# Patient Record
Sex: FEMALE | Race: WHITE | NOT HISPANIC OR LATINO | Employment: OTHER | URBAN - METROPOLITAN AREA
[De-identification: names, ages, dates, MRNs, and addresses within clinical notes are randomized per-mention and may not be internally consistent; named-entity substitution may affect disease eponyms.]

---

## 2017-01-15 RX ORDER — FUROSEMIDE 40 MG/1
TABLET ORAL
Qty: 30 TABLET | Refills: 10 | Status: SHIPPED | OUTPATIENT
Start: 2017-01-15 | End: 2021-07-31

## 2017-03-02 ENCOUNTER — PATIENT OUTREACH (OUTPATIENT)
Dept: ADMINISTRATIVE | Facility: HOSPITAL | Age: 57
End: 2017-03-02

## 2017-03-02 NOTE — PROGRESS NOTES
Spoke with Ms Bui to assist with scheduling mammo. States she has changed pcp. Chart updated and appt cancelled per request.

## 2017-08-29 ENCOUNTER — HOSPITAL ENCOUNTER (EMERGENCY)
Facility: HOSPITAL | Age: 57
Discharge: HOME | End: 2017-08-29
Attending: EMERGENCY MEDICINE
Payer: MEDICARE

## 2017-08-29 VITALS
HEIGHT: 63 IN | OXYGEN SATURATION: 100 % | SYSTOLIC BLOOD PRESSURE: 160 MMHG | WEIGHT: 250 LBS | TEMPERATURE: 98 F | BODY MASS INDEX: 44.3 KG/M2 | HEART RATE: 79 BPM | DIASTOLIC BLOOD PRESSURE: 92 MMHG | RESPIRATION RATE: 18 BRPM

## 2017-08-29 DIAGNOSIS — N39.0 ACUTE UTI: ICD-10-CM

## 2017-08-29 DIAGNOSIS — R11.2 NAUSEA VOMITING AND DIARRHEA: Primary | ICD-10-CM

## 2017-08-29 DIAGNOSIS — R19.7 NAUSEA VOMITING AND DIARRHEA: Primary | ICD-10-CM

## 2017-08-29 LAB
ALBUMIN SERPL BCP-MCNC: 3.9 G/DL
ALP SERPL-CCNC: 101 U/L
ALT SERPL W/O P-5'-P-CCNC: 15 U/L
ANION GAP SERPL CALC-SCNC: 16 MMOL/L
AST SERPL-CCNC: 24 U/L
BACTERIA #/AREA URNS HPF: ABNORMAL /HPF
BASOPHILS # BLD AUTO: 0.01 K/UL
BASOPHILS NFR BLD: 0.1 %
BILIRUB SERPL-MCNC: 0.9 MG/DL
BILIRUB UR QL STRIP: NEGATIVE
BUN SERPL-MCNC: 12 MG/DL
CALCIUM SERPL-MCNC: 8.3 MG/DL
CHLORIDE SERPL-SCNC: 89 MMOL/L
CLARITY UR: ABNORMAL
CO2 SERPL-SCNC: 21 MMOL/L
COLOR UR: YELLOW
CREAT SERPL-MCNC: 0.9 MG/DL
DIFFERENTIAL METHOD: ABNORMAL
EOSINOPHIL # BLD AUTO: 0 K/UL
EOSINOPHIL NFR BLD: 0 %
ERYTHROCYTE [DISTWIDTH] IN BLOOD BY AUTOMATED COUNT: 13.8 %
EST. GFR  (AFRICAN AMERICAN): >60 ML/MIN/1.73 M^2
EST. GFR  (NON AFRICAN AMERICAN): >60 ML/MIN/1.73 M^2
GLUCOSE SERPL-MCNC: 160 MG/DL
GLUCOSE UR QL STRIP: NEGATIVE
HCT VFR BLD AUTO: 39.6 %
HGB BLD-MCNC: 13.4 G/DL
HGB UR QL STRIP: ABNORMAL
HYALINE CASTS #/AREA URNS LPF: 0 /LPF
KETONES UR QL STRIP: NEGATIVE
LEUKOCYTE ESTERASE UR QL STRIP: ABNORMAL
LIPASE SERPL-CCNC: <3 U/L
LYMPHOCYTES # BLD AUTO: 1.1 K/UL
LYMPHOCYTES NFR BLD: 16.2 %
MCH RBC QN AUTO: 27.5 PG
MCHC RBC AUTO-ENTMCNC: 33.8 G/DL
MCV RBC AUTO: 81 FL
MICROSCOPIC COMMENT: ABNORMAL
MONOCYTES # BLD AUTO: 0.5 K/UL
MONOCYTES NFR BLD: 7.5 %
NEUTROPHILS # BLD AUTO: 5.3 K/UL
NEUTROPHILS NFR BLD: 76.2 %
NITRITE UR QL STRIP: NEGATIVE
PH UR STRIP: 7 [PH] (ref 5–8)
PLATELET # BLD AUTO: 250 K/UL
PMV BLD AUTO: 8.1 FL
POTASSIUM SERPL-SCNC: 3.6 MMOL/L
PROT SERPL-MCNC: 8.3 G/DL
PROT UR QL STRIP: ABNORMAL
RBC # BLD AUTO: 4.88 M/UL
RBC #/AREA URNS HPF: 0 /HPF (ref 0–4)
SODIUM SERPL-SCNC: 126 MMOL/L
SP GR UR STRIP: 1.01 (ref 1–1.03)
SQUAMOUS #/AREA URNS HPF: 10 /HPF
URN SPEC COLLECT METH UR: ABNORMAL
UROBILINOGEN UR STRIP-ACNC: ABNORMAL EU/DL
WBC # BLD AUTO: 6.92 K/UL
WBC #/AREA URNS HPF: >100 /HPF (ref 0–5)
WBC CLUMPS URNS QL MICRO: ABNORMAL

## 2017-08-29 PROCEDURE — 81000 URINALYSIS NONAUTO W/SCOPE: CPT

## 2017-08-29 PROCEDURE — 25000003 PHARM REV CODE 250: Performed by: EMERGENCY MEDICINE

## 2017-08-29 PROCEDURE — 99284 EMERGENCY DEPT VISIT MOD MDM: CPT | Mod: 25

## 2017-08-29 PROCEDURE — 96365 THER/PROPH/DIAG IV INF INIT: CPT

## 2017-08-29 PROCEDURE — 83690 ASSAY OF LIPASE: CPT

## 2017-08-29 PROCEDURE — 25500020 PHARM REV CODE 255: Performed by: EMERGENCY MEDICINE

## 2017-08-29 PROCEDURE — 96361 HYDRATE IV INFUSION ADD-ON: CPT

## 2017-08-29 PROCEDURE — 85025 COMPLETE CBC W/AUTO DIFF WBC: CPT

## 2017-08-29 PROCEDURE — 63600175 PHARM REV CODE 636 W HCPCS: Performed by: EMERGENCY MEDICINE

## 2017-08-29 PROCEDURE — 80053 COMPREHEN METABOLIC PANEL: CPT

## 2017-08-29 RX ORDER — ONDANSETRON 2 MG/ML
4 INJECTION INTRAMUSCULAR; INTRAVENOUS
Status: COMPLETED | OUTPATIENT
Start: 2017-08-29 | End: 2017-08-29

## 2017-08-29 RX ORDER — METOCLOPRAMIDE 10 MG/1
10 TABLET ORAL EVERY 6 HOURS
Qty: 18 TABLET | Refills: 0 | Status: SHIPPED | OUTPATIENT
Start: 2017-08-29 | End: 2020-06-14 | Stop reason: SDUPTHER

## 2017-08-29 RX ORDER — MORPHINE SULFATE 4 MG/ML
4 INJECTION, SOLUTION INTRAMUSCULAR; INTRAVENOUS
Status: COMPLETED | OUTPATIENT
Start: 2017-08-29 | End: 2017-08-29

## 2017-08-29 RX ORDER — CIPROFLOXACIN 500 MG/1
500 TABLET ORAL 2 TIMES DAILY
Qty: 20 TABLET | Refills: 0 | Status: SHIPPED | OUTPATIENT
Start: 2017-08-29 | End: 2017-09-08

## 2017-08-29 RX ADMIN — SODIUM CHLORIDE 1000 ML: 0.9 INJECTION, SOLUTION INTRAVENOUS at 11:08

## 2017-08-29 RX ADMIN — ONDANSETRON 4 MG: 2 INJECTION INTRAMUSCULAR; INTRAVENOUS at 02:08

## 2017-08-29 RX ADMIN — MORPHINE SULFATE 4 MG: 4 INJECTION, SOLUTION INTRAMUSCULAR; INTRAVENOUS at 02:08

## 2017-08-29 RX ADMIN — ONDANSETRON 4 MG: 2 INJECTION INTRAMUSCULAR; INTRAVENOUS at 01:08

## 2017-08-29 RX ADMIN — IOHEXOL 75 ML: 350 INJECTION, SOLUTION INTRAVENOUS at 01:08

## 2017-08-29 RX ADMIN — MORPHINE SULFATE 4 MG: 4 INJECTION, SOLUTION INTRAMUSCULAR; INTRAVENOUS at 12:08

## 2017-08-29 RX ADMIN — CEFTRIAXONE 1 G: 1 INJECTION, SOLUTION INTRAVENOUS at 02:08

## 2017-08-29 RX ADMIN — ONDANSETRON 4 MG: 2 INJECTION INTRAMUSCULAR; INTRAVENOUS at 12:08

## 2017-08-29 NOTE — ED PROVIDER NOTES
"SCRIBE #1 NOTE: I, Aries Emily, am scribing for, and in the presence of, Juanita Pagan MD. I have scribed the entire note.      History      Chief Complaint   Patient presents with    Emesis     with diarrhea. RLQ pain since yesterday.        Review of patient's allergies indicates:   Allergen Reactions    Flagyl [metronidazole hcl]      Mouth ulcers developed with Flagyl        HPI   HPI    8/29/2017, 11:58 AM   History obtained from the patient      History of Present Illness: Divya Bui is a 57 y.o. female patient who presents to the Emergency Department for N/V which onset suddenly 3 days ago. Symptoms are episodic and moderate in severity. Sx are exacerbated when trying to tolerate PO and relieved by nothing. Associated sxs include diarrhea and RUQ abd pain. Pt reports last episode of diarrhea yesterday and reports only one episode yesterday. Pt states she reported to urgent care 2 days ago and was prescribed phenergan, bentyl, and Zofran but pt reports no relief from the medications and states "I can't even keep my medications down." No other sxs reported. Patient denies any fever, chills, constipation, dysuria, difficulty urinating, hematuria, CP, SOB and all other sxs at this time. No further complaints or concerns at this time.     Arrival mode: Personal vehicle     PCP: Lucio Salas MD       Past Medical History:  Past Medical History:   Diagnosis Date    Anemia     Anxiety     Blood transfusion     Bowel incontinence     Depression     Esophageal stricture     GERD (gastroesophageal reflux disease)     Hypertension     Latent tuberculosis by skin test 2001    took INH    Liver nodule 1/6/2014    Morbid obesity with BMI of 45.0-49.9, adult     OA (osteoarthritis) of knee 12/12/2014    Pericardial effusion 9/4/2014       Past Surgical History:  Past Surgical History:   Procedure Laterality Date    CHOLECYSTECTOMY      GASTRIC BYPASS      HERNIA REPAIR      right sholder " surgery      SMALL INTESTINE SURGERY           Family History:  Family History   Problem Relation Age of Onset    Liver cancer Father     Diabetes Sister     Crohn's disease Sister     Liver cancer Sister     Crohn's disease Brother     Crohn's disease Other     Lung cancer Mother        Social History:  Social History     Social History Main Topics    Smoking status: Never Smoker    Smokeless tobacco: Never Used    Alcohol use No    Drug use: No    Sexual activity: Yes     Partners: Male       ROS   Review of Systems   Constitutional: Negative for chills and fever.   HENT: Negative for congestion and sore throat.    Respiratory: Negative for cough, chest tightness and shortness of breath.    Cardiovascular: Negative for chest pain.   Gastrointestinal: Positive for abdominal pain, diarrhea, nausea and vomiting. Negative for constipation.   Genitourinary: Negative for difficulty urinating and dysuria.   Musculoskeletal: Negative for back pain and neck pain.   Skin: Negative for rash.   Neurological: Negative for dizziness, weakness, light-headedness, numbness and headaches.   Psychiatric/Behavioral: Negative for agitation and confusion.   All other systems reviewed and are negative.      Physical Exam      Initial Vitals [08/29/17 1137]   BP Pulse Resp Temp SpO2   (!) 182/100 90 18 98.8 °F (37.1 °C) 100 %      MAP       127.33          Physical Exam  Nursing Notes and Vital Signs Reviewed.  Constitutional: Patient is in no apparent distress. Well-developed and well-nourished. Obese.   Head: Atraumatic. Normocephalic.  Eyes: PERRL. EOM intact. Conjunctivae are not pale. No scleral icterus.  ENT: Mucous membranes are moist. Oropharynx is clear and symmetric.    Neck: Supple. Full ROM. No lymphadenopathy.  Cardiovascular: Regular rate. Regular rhythm. No murmurs, rubs, or gallops. Distal pulses are 2+ and symmetric.  Pulmonary/Chest: No respiratory distress. Clear to auscultation bilaterally. No wheezing,  "rales, or rhonchi.  Abdominal: Soft and non-distended.  There is RUQ tenderness.  No rebound, guarding, or rigidity. Good bowel sounds.  Musculoskeletal: Moves all extremities. No obvious deformities. No edema. No calf tenderness.  Skin: Warm and dry.  Neurological:  Alert, awake, and appropriate.  Normal speech.  No acute focal neurological deficits are appreciated.  Psychiatric: Normal affect. Good eye contact. Appropriate in content.    ED Course    Procedures  ED Vital Signs:  Vitals:    08/29/17 1137 08/29/17 1301 08/29/17 1345   BP: (!) 182/100  (!) 147/62   Pulse: 90 86 82   Resp: 18     Temp: 98.8 °F (37.1 °C)     TempSrc: Oral     SpO2: 100% 100% 99%   Weight: 113.4 kg (250 lb)     Height: 5' 3" (1.6 m)         Abnormal Lab Results:  Labs Reviewed   CBC W/ AUTO DIFFERENTIAL - Abnormal; Notable for the following:        Result Value    MCV 81 (*)     MPV 8.1 (*)     Gran% 76.2 (*)     Lymph% 16.2 (*)     All other components within normal limits   COMPREHENSIVE METABOLIC PANEL - Abnormal; Notable for the following:     Sodium 126 (*)     Chloride 89 (*)     CO2 21 (*)     Glucose 160 (*)     Calcium 8.3 (*)     All other components within normal limits   LIPASE - Abnormal; Notable for the following:     Lipase <3 (*)     All other components within normal limits   URINALYSIS - Abnormal; Notable for the following:     Appearance, UA Hazy (*)     Protein, UA 2+ (*)     Occult Blood UA 2+ (*)     Urobilinogen, UA 4.0-6.0 (*)     Leukocytes, UA 1+ (*)     All other components within normal limits   URINALYSIS MICROSCOPIC - Abnormal; Notable for the following:     WBC, UA >100 (*)     WBC Clumps, UA Moderate (*)     Bacteria, UA Many (*)     All other components within normal limits        All Lab Results:  Results for orders placed or performed during the hospital encounter of 08/29/17   CBC W/ AUTO DIFFERENTIAL   Result Value Ref Range    WBC 6.92 3.90 - 12.70 K/uL    RBC 4.88 4.00 - 5.40 M/uL    Hemoglobin " 13.4 12.0 - 16.0 g/dL    Hematocrit 39.6 37.0 - 48.5 %    MCV 81 (L) 82 - 98 fL    MCH 27.5 27.0 - 31.0 pg    MCHC 33.8 32.0 - 36.0 g/dL    RDW 13.8 11.5 - 14.5 %    Platelets 250 150 - 350 K/uL    MPV 8.1 (L) 9.2 - 12.9 fL    Gran # 5.3 1.8 - 7.7 K/uL    Lymph # 1.1 1.0 - 4.8 K/uL    Mono # 0.5 0.3 - 1.0 K/uL    Eos # 0.0 0.0 - 0.5 K/uL    Baso # 0.01 0.00 - 0.20 K/uL    Gran% 76.2 (H) 38.0 - 73.0 %    Lymph% 16.2 (L) 18.0 - 48.0 %    Mono% 7.5 4.0 - 15.0 %    Eosinophil% 0.0 0.0 - 8.0 %    Basophil% 0.1 0.0 - 1.9 %    Differential Method Automated    Comp. Metabolic Panel   Result Value Ref Range    Sodium 126 (L) 136 - 145 mmol/L    Potassium 3.6 3.5 - 5.1 mmol/L    Chloride 89 (L) 95 - 110 mmol/L    CO2 21 (L) 23 - 29 mmol/L    Glucose 160 (H) 70 - 110 mg/dL    BUN, Bld 12 6 - 20 mg/dL    Creatinine 0.9 0.5 - 1.4 mg/dL    Calcium 8.3 (L) 8.7 - 10.5 mg/dL    Total Protein 8.3 6.0 - 8.4 g/dL    Albumin 3.9 3.5 - 5.2 g/dL    Total Bilirubin 0.9 0.1 - 1.0 mg/dL    Alkaline Phosphatase 101 55 - 135 U/L    AST 24 10 - 40 U/L    ALT 15 10 - 44 U/L    Anion Gap 16 8 - 16 mmol/L    eGFR if African American >60 >60 mL/min/1.73 m^2    eGFR if non African American >60 >60 mL/min/1.73 m^2   Lipase   Result Value Ref Range    Lipase <3 (L) 4 - 60 U/L   Urinalysis - Clean Catch   Result Value Ref Range    Specimen UA Urine, Clean Catch     Color, UA Yellow Yellow, Straw, Debra    Appearance, UA Hazy (A) Clear    pH, UA 7.0 5.0 - 8.0    Specific Gravity, UA 1.015 1.005 - 1.030    Protein, UA 2+ (A) Negative    Glucose, UA Negative Negative    Ketones, UA Negative Negative    Bilirubin (UA) Negative Negative    Occult Blood UA 2+ (A) Negative    Nitrite, UA Negative Negative    Urobilinogen, UA 4.0-6.0 (A) <2.0 EU/dL    Leukocytes, UA 1+ (A) Negative   Urinalysis Microscopic   Result Value Ref Range    RBC, UA 0 0 - 4 /hpf    WBC, UA >100 (H) 0 - 5 /hpf    WBC Clumps, UA Moderate (A) None-Rare    Bacteria, UA Many (A) None-Occ  /hpf    Squam Epithel, UA 10 /hpf    Hyaline Casts, UA 0 0-1/lpf /lpf    Microscopic Comment SEE COMMENT          Imaging Results:  Imaging Results          CT Abdomen Pelvis With Contrast (Final result)  Result time 08/29/17 13:38:22    Final result by Rukhsana Adair MD (08/29/17 13:38:22)                 Impression:        No acute intra-abdominal process.    Postoperative changes of the stomach with hiatal hernia.  Status post cholecystectomy.  Postoperative changes with sutures of the bowel loops identified.    Fatty infiltration of the pancreas    Diverticulosis without evidence of acute diverticulitis.      All CT scans at this facility use dose modulation, iterative reconstruction and/or weight based dosing when appropriate to reduce radiation dose to as low as reasonably achievable.       Electronically signed by: RUKHSANA ADAIR MD  Date:     08/29/17  Time:    13:38              Narrative:    CT ABDOMEN PELVIS WITH CONTRAST     Date:  08/29/17 13:09:43    History:   Right upper quadrant pain.     Technique: Procedure performed with 75ml Omnipaque 350.     Comparison: 07/06/2015.    Findings:        CT scan of the Abdomen with contrast:      Lung bases are clear.  There are postoperative changes of the stomach with a moderate size hiatal hernia.    The gallbladder is absent with clips in the gallbladder fossa.  The common bile duct is prominent in caliber at approximately 11 mm.  The liver is homogeneous.    The spleen is nonenlarged.    Adrenal glands and kidneys are normal.  Fatty infiltration with atrophy of the pancreas is noted.    The bowel loops are nondilated.  The appendix in the right abdomen appears normal.  The large bowel loops are nondilated.  Several scattered diverticula are seen throughout the left colon and sigmoid colon.    No ascites or fluid collection.    Ct of the Pelvis with contrast:     No free fluid, masses or inflammation.                                      The Emergency  Provider reviewed the vital signs and test results, which are outlined above.    ED Discussion     1:49 PM: Re-evaluated pt. Pt is resting comfortably and is in no acute distress.  Pt states she is still having pain and is still nauseated.  D/w pt all pertinent results. D/w pt any concerns expressed at this time. Answered all questions. Pt expresses understanding at this time.    2:17 PM: Reassessed pt at this time.  Pt states her condition has improved at this time and she is feeling better. Pt is laying comfortably in ED bed and in NAD. Pt is awake, alert, and oriented. Discussed with pt all pertinent ED information and results. Discussed pt dx and plan of tx. Gave pt all f/u and return to the ED instructions. All questions and concerns were addressed at this time. Pt expresses understanding of information and instructions, and is comfortable with plan to discharge. Pt is stable for discharge.    I discussed with patient and/or family/caretaker that evaluation in the ED does not suggest any emergent or life threatening medical conditions requiring immediate intervention beyond what was provided in the ED, and I believe patient is safe for discharge.  Regardless, an unremarkable evaluation in the ED does not preclude the development or presence of a serious of life threatening condition. As such, patient was instructed to return immediately for any worsening or change in current symptoms.      ED Medication(s):  Medications   cefTRIAXone (ROCEPHIN) 1 g in dextrose 5 % 50 mL IVPB (1 g Intravenous New Bag 8/29/17 1426)   ondansetron injection 4 mg (4 mg Intravenous Given 8/29/17 1211)   sodium chloride 0.9% bolus 1,000 mL (1,000 mLs Intravenous New Bag 8/29/17 1159)   morphine injection 4 mg (4 mg Intravenous Given 8/29/17 1212)   ondansetron injection 4 mg (4 mg Intravenous Given 8/29/17 1327)   omnipaque 350 iohexol 75 mL (75 mLs Intravenous Given 8/29/17 1320)   morphine injection 4 mg (4 mg Intravenous Given  8/29/17 1418)   ondansetron injection 4 mg (4 mg Intravenous Given 8/29/17 1418)       New Prescriptions    CIPROFLOXACIN HCL (CIPRO) 500 MG TABLET    Take 1 tablet (500 mg total) by mouth 2 (two) times daily.    METOCLOPRAMIDE HCL (REGLAN) 10 MG TABLET    Take 1 tablet (10 mg total) by mouth every 6 (six) hours.       Follow-up Information     Lucio Salas MD In 2 days.    Specialty:  Internal Medicine  Contact information:  [address]  [facility]  [place] [address] [phone]                     Medical Decision Making    Medical Decision Making:   Clinical Tests:   Lab Tests: Ordered and Reviewed  Radiological Study: Ordered and Reviewed           Scribe Attestation:   Scribe #1: I performed the above scribed service and the documentation accurately describes the services I performed. I attest to the accuracy of the note.    Attending:   Physician Attestation Statement for Scribe #1: I, Juanita Pagan MD, personally performed the services described in this documentation, as scribed by Aries Diaz, in my presence, and it is both accurate and complete.          Clinical Impression       ICD-10-CM ICD-9-CM   1. Nausea vomiting and diarrhea R11.2 787.91    R19.7 787.01   2. Acute UTI N39.0 599.0       Disposition:   Disposition: Discharged  Condition: Stable         Juanita Pagan MD  08/29/17 [id number]

## 2018-05-10 ENCOUNTER — HOSPITAL ENCOUNTER (OUTPATIENT)
Facility: HOSPITAL | Age: 58
Discharge: HOME | End: 2018-05-12
Attending: EMERGENCY MEDICINE | Admitting: HOSPITALIST
Payer: MEDICARE

## 2018-05-10 DIAGNOSIS — R11.2 INTRACTABLE VOMITING WITH NAUSEA, UNSPECIFIED VOMITING TYPE: Primary | ICD-10-CM

## 2018-05-10 DIAGNOSIS — K44.9 HIATAL HERNIA: ICD-10-CM

## 2018-05-10 DIAGNOSIS — K52.9 GASTROENTERITIS: ICD-10-CM

## 2018-05-10 DIAGNOSIS — K64.4 EXTERNAL HEMORRHOIDS: ICD-10-CM

## 2018-05-10 DIAGNOSIS — R10.9 ABDOMINAL PAIN: ICD-10-CM

## 2018-05-10 DIAGNOSIS — K57.90 DIVERTICULOSIS OF INTESTINE WITHOUT BLEEDING, UNSPECIFIED INTESTINAL TRACT LOCATION: ICD-10-CM

## 2018-05-10 DIAGNOSIS — R11.10 EMESIS: ICD-10-CM

## 2018-05-10 LAB
ALBUMIN SERPL BCP-MCNC: 3.6 G/DL
ALP SERPL-CCNC: 93 U/L
ALT SERPL W/O P-5'-P-CCNC: 8 U/L
ANION GAP SERPL CALC-SCNC: 13 MMOL/L
AST SERPL-CCNC: 11 U/L
BASOPHILS # BLD AUTO: 0.02 K/UL
BASOPHILS NFR BLD: 0.2 %
BILIRUB SERPL-MCNC: 0.3 MG/DL
BILIRUB UR QL STRIP: NEGATIVE
BUN SERPL-MCNC: 9 MG/DL
CALCIUM SERPL-MCNC: 8.8 MG/DL
CHLORIDE SERPL-SCNC: 103 MMOL/L
CLARITY UR: CLEAR
CO2 SERPL-SCNC: 22 MMOL/L
COLOR UR: YELLOW
CREAT SERPL-MCNC: 0.8 MG/DL
DIFFERENTIAL METHOD: ABNORMAL
EOSINOPHIL # BLD AUTO: 0.3 K/UL
EOSINOPHIL NFR BLD: 2.9 %
ERYTHROCYTE [DISTWIDTH] IN BLOOD BY AUTOMATED COUNT: 15.3 %
EST. GFR  (AFRICAN AMERICAN): >60 ML/MIN/1.73 M^2
EST. GFR  (NON AFRICAN AMERICAN): >60 ML/MIN/1.73 M^2
GLUCOSE SERPL-MCNC: 131 MG/DL
GLUCOSE UR QL STRIP: NEGATIVE
HCT VFR BLD AUTO: 35 %
HGB BLD-MCNC: 11 G/DL
HGB UR QL STRIP: ABNORMAL
KETONES UR QL STRIP: NEGATIVE
LEUKOCYTE ESTERASE UR QL STRIP: NEGATIVE
LIPASE SERPL-CCNC: <4 U/L
LYMPHOCYTES # BLD AUTO: 1.4 K/UL
LYMPHOCYTES NFR BLD: 15.6 %
MCH RBC QN AUTO: 25.3 PG
MCHC RBC AUTO-ENTMCNC: 31.4 G/DL
MCV RBC AUTO: 81 FL
MONOCYTES # BLD AUTO: 0.4 K/UL
MONOCYTES NFR BLD: 4.7 %
NEUTROPHILS # BLD AUTO: 6.8 K/UL
NEUTROPHILS NFR BLD: 76.6 %
NITRITE UR QL STRIP: NEGATIVE
OB PNL STL: NEGATIVE
PH UR STRIP: 7 [PH] (ref 5–8)
PLATELET # BLD AUTO: 244 K/UL
PMV BLD AUTO: 9.2 FL
POTASSIUM SERPL-SCNC: 3.6 MMOL/L
PROT SERPL-MCNC: 7.4 G/DL
PROT UR QL STRIP: ABNORMAL
RBC # BLD AUTO: 4.35 M/UL
SODIUM SERPL-SCNC: 138 MMOL/L
SP GR UR STRIP: 1.02 (ref 1–1.03)
URN SPEC COLLECT METH UR: ABNORMAL
UROBILINOGEN UR STRIP-ACNC: NEGATIVE EU/DL
WBC # BLD AUTO: 8.85 K/UL

## 2018-05-10 PROCEDURE — 99285 EMERGENCY DEPT VISIT HI MDM: CPT | Mod: 25

## 2018-05-10 PROCEDURE — 96361 HYDRATE IV INFUSION ADD-ON: CPT

## 2018-05-10 PROCEDURE — 83735 ASSAY OF MAGNESIUM: CPT

## 2018-05-10 PROCEDURE — 83690 ASSAY OF LIPASE: CPT

## 2018-05-10 PROCEDURE — 25000003 PHARM REV CODE 250: Performed by: EMERGENCY MEDICINE

## 2018-05-10 PROCEDURE — 63600175 PHARM REV CODE 636 W HCPCS: Performed by: EMERGENCY MEDICINE

## 2018-05-10 PROCEDURE — 93005 ELECTROCARDIOGRAM TRACING: CPT

## 2018-05-10 PROCEDURE — 82272 OCCULT BLD FECES 1-3 TESTS: CPT

## 2018-05-10 PROCEDURE — 84484 ASSAY OF TROPONIN QUANT: CPT

## 2018-05-10 PROCEDURE — 96374 THER/PROPH/DIAG INJ IV PUSH: CPT

## 2018-05-10 PROCEDURE — 85025 COMPLETE CBC W/AUTO DIFF WBC: CPT

## 2018-05-10 PROCEDURE — 96375 TX/PRO/DX INJ NEW DRUG ADDON: CPT

## 2018-05-10 PROCEDURE — 25500020 PHARM REV CODE 255: Performed by: EMERGENCY MEDICINE

## 2018-05-10 PROCEDURE — 84100 ASSAY OF PHOSPHORUS: CPT

## 2018-05-10 PROCEDURE — 80053 COMPREHEN METABOLIC PANEL: CPT

## 2018-05-10 PROCEDURE — 81003 URINALYSIS AUTO W/O SCOPE: CPT

## 2018-05-10 RX ORDER — ONDANSETRON 2 MG/ML
4 INJECTION INTRAMUSCULAR; INTRAVENOUS
Status: COMPLETED | OUTPATIENT
Start: 2018-05-10 | End: 2018-05-10

## 2018-05-10 RX ORDER — FENTANYL CITRATE 50 UG/ML
50 INJECTION, SOLUTION INTRAMUSCULAR; INTRAVENOUS
Status: COMPLETED | OUTPATIENT
Start: 2018-05-10 | End: 2018-05-10

## 2018-05-10 RX ADMIN — IOHEXOL 75 ML: 350 INJECTION, SOLUTION INTRAVENOUS at 10:05

## 2018-05-10 RX ADMIN — ONDANSETRON 4 MG: 2 INJECTION INTRAMUSCULAR; INTRAVENOUS at 09:05

## 2018-05-10 RX ADMIN — SODIUM CHLORIDE 1000 ML: 0.9 INJECTION, SOLUTION INTRAVENOUS at 09:05

## 2018-05-10 RX ADMIN — FENTANYL CITRATE 50 MCG: 50 INJECTION, SOLUTION INTRAMUSCULAR; INTRAVENOUS at 09:05

## 2018-05-10 RX ADMIN — PROMETHAZINE HYDROCHLORIDE 12.5 MG: 25 INJECTION INTRAMUSCULAR; INTRAVENOUS at 10:05

## 2018-05-11 PROBLEM — K52.9 GASTROENTERITIS: Status: ACTIVE | Noted: 2018-05-11

## 2018-05-11 PROBLEM — K64.4 EXTERNAL HEMORRHOIDS: Status: ACTIVE | Noted: 2018-05-11

## 2018-05-11 PROBLEM — R11.2 INTRACTABLE VOMITING WITH NAUSEA: Status: ACTIVE | Noted: 2018-05-11

## 2018-05-11 PROBLEM — R10.9 ABDOMINAL PAIN: Status: ACTIVE | Noted: 2018-05-11

## 2018-05-11 PROBLEM — K44.9 HIATAL HERNIA: Status: ACTIVE | Noted: 2018-05-11

## 2018-05-11 PROBLEM — K57.90 DIVERTICULOSIS OF INTESTINE WITHOUT BLEEDING: Status: ACTIVE | Noted: 2018-05-11

## 2018-05-11 LAB
ANION GAP SERPL CALC-SCNC: 9 MMOL/L
BASOPHILS # BLD AUTO: 0.01 K/UL
BASOPHILS NFR BLD: 0.1 %
BUN SERPL-MCNC: 5 MG/DL
CALCIUM SERPL-MCNC: 8 MG/DL
CHLORIDE SERPL-SCNC: 103 MMOL/L
CHOLEST SERPL-MCNC: 189 MG/DL
CHOLEST/HDLC SERPL: 2.7 {RATIO}
CO2 SERPL-SCNC: 24 MMOL/L
CREAT SERPL-MCNC: 0.7 MG/DL
CRP SERPL-MCNC: 9.2 MG/L
DIFFERENTIAL METHOD: ABNORMAL
EOSINOPHIL # BLD AUTO: 0 K/UL
EOSINOPHIL NFR BLD: 0.1 %
ERYTHROCYTE [DISTWIDTH] IN BLOOD BY AUTOMATED COUNT: 15.5 %
ERYTHROCYTE [SEDIMENTATION RATE] IN BLOOD BY WESTERGREN METHOD: 49 MM/HR
EST. GFR  (AFRICAN AMERICAN): >60 ML/MIN/1.73 M^2
EST. GFR  (NON AFRICAN AMERICAN): >60 ML/MIN/1.73 M^2
ESTIMATED AVG GLUCOSE: 126 MG/DL
FERRITIN SERPL-MCNC: 12 NG/ML
FOLATE SERPL-MCNC: 12.6 NG/ML
GLUCOSE SERPL-MCNC: 166 MG/DL
HBA1C MFR BLD HPLC: 6 %
HCT VFR BLD AUTO: 33.4 %
HDLC SERPL-MCNC: 71 MG/DL
HDLC SERPL: 37.6 %
HGB BLD-MCNC: 10.5 G/DL
IRON SERPL-MCNC: 31 UG/DL
LACTATE SERPL-SCNC: 1.3 MMOL/L
LDLC SERPL CALC-MCNC: 104.8 MG/DL
LYMPHOCYTES # BLD AUTO: 0.9 K/UL
LYMPHOCYTES NFR BLD: 11.2 %
MAGNESIUM SERPL-MCNC: 1.6 MG/DL
MAGNESIUM SERPL-MCNC: 1.8 MG/DL
MCH RBC QN AUTO: 25.4 PG
MCHC RBC AUTO-ENTMCNC: 31.4 G/DL
MCV RBC AUTO: 81 FL
MONOCYTES # BLD AUTO: 0.3 K/UL
MONOCYTES NFR BLD: 4 %
NEUTROPHILS # BLD AUTO: 6.5 K/UL
NEUTROPHILS NFR BLD: 84.6 %
NONHDLC SERPL-MCNC: 118 MG/DL
PHOSPHATE SERPL-MCNC: 3.3 MG/DL
PHOSPHATE SERPL-MCNC: 4.1 MG/DL
PLATELET # BLD AUTO: 246 K/UL
PMV BLD AUTO: 8.2 FL
POTASSIUM SERPL-SCNC: 3.5 MMOL/L
PROCALCITONIN SERPL IA-MCNC: <0.02 NG/ML
RBC # BLD AUTO: 4.14 M/UL
SATURATED IRON: 7 %
SODIUM SERPL-SCNC: 136 MMOL/L
TOTAL IRON BINDING CAPACITY: 437 UG/DL
TRANSFERRIN SERPL-MCNC: 295 MG/DL
TRIGL SERPL-MCNC: 66 MG/DL
TROPONIN I SERPL DL<=0.01 NG/ML-MCNC: 0.02 NG/ML
VIT B12 SERPL-MCNC: 1174 PG/ML
WBC # BLD AUTO: 7.69 K/UL

## 2018-05-11 PROCEDURE — 96365 THER/PROPH/DIAG IV INF INIT: CPT | Mod: 59

## 2018-05-11 PROCEDURE — 84425 ASSAY OF VITAMIN B-1: CPT

## 2018-05-11 PROCEDURE — 63600175 PHARM REV CODE 636 W HCPCS: Performed by: PHYSICIAN ASSISTANT

## 2018-05-11 PROCEDURE — 84100 ASSAY OF PHOSPHORUS: CPT

## 2018-05-11 PROCEDURE — 82607 VITAMIN B-12: CPT

## 2018-05-11 PROCEDURE — 25000003 PHARM REV CODE 250: Performed by: PHYSICIAN ASSISTANT

## 2018-05-11 PROCEDURE — 93010 ELECTROCARDIOGRAM REPORT: CPT | Mod: ,,, | Performed by: INTERNAL MEDICINE

## 2018-05-11 PROCEDURE — 83735 ASSAY OF MAGNESIUM: CPT

## 2018-05-11 PROCEDURE — C9113 INJ PANTOPRAZOLE SODIUM, VIA: HCPCS | Performed by: NURSE PRACTITIONER

## 2018-05-11 PROCEDURE — 80048 BASIC METABOLIC PNL TOTAL CA: CPT

## 2018-05-11 PROCEDURE — 25000003 PHARM REV CODE 250: Performed by: NURSE PRACTITIONER

## 2018-05-11 PROCEDURE — 96372 THER/PROPH/DIAG INJ SC/IM: CPT | Mod: 59

## 2018-05-11 PROCEDURE — 85025 COMPLETE CBC W/AUTO DIFF WBC: CPT

## 2018-05-11 PROCEDURE — 63600175 PHARM REV CODE 636 W HCPCS: Performed by: NURSE PRACTITIONER

## 2018-05-11 PROCEDURE — 83605 ASSAY OF LACTIC ACID: CPT

## 2018-05-11 PROCEDURE — 96368 THER/DIAG CONCURRENT INF: CPT | Mod: 59

## 2018-05-11 PROCEDURE — 96376 TX/PRO/DX INJ SAME DRUG ADON: CPT | Mod: 59

## 2018-05-11 PROCEDURE — 83036 HEMOGLOBIN GLYCOSYLATED A1C: CPT

## 2018-05-11 PROCEDURE — 82728 ASSAY OF FERRITIN: CPT

## 2018-05-11 PROCEDURE — 85651 RBC SED RATE NONAUTOMATED: CPT

## 2018-05-11 PROCEDURE — 82746 ASSAY OF FOLIC ACID SERUM: CPT

## 2018-05-11 PROCEDURE — 83540 ASSAY OF IRON: CPT

## 2018-05-11 PROCEDURE — 86140 C-REACTIVE PROTEIN: CPT

## 2018-05-11 PROCEDURE — 84145 PROCALCITONIN (PCT): CPT

## 2018-05-11 PROCEDURE — 63600175 PHARM REV CODE 636 W HCPCS: Performed by: EMERGENCY MEDICINE

## 2018-05-11 PROCEDURE — 96375 TX/PRO/DX INJ NEW DRUG ADDON: CPT | Mod: 59

## 2018-05-11 PROCEDURE — 25000003 PHARM REV CODE 250: Performed by: EMERGENCY MEDICINE

## 2018-05-11 PROCEDURE — S0028 INJECTION, FAMOTIDINE, 20 MG: HCPCS | Performed by: EMERGENCY MEDICINE

## 2018-05-11 PROCEDURE — 96366 THER/PROPH/DIAG IV INF ADDON: CPT | Mod: 59

## 2018-05-11 PROCEDURE — G0378 HOSPITAL OBSERVATION PER HR: HCPCS

## 2018-05-11 PROCEDURE — 96361 HYDRATE IV INFUSION ADD-ON: CPT | Mod: 59

## 2018-05-11 PROCEDURE — 80061 LIPID PANEL: CPT

## 2018-05-11 RX ORDER — FAMOTIDINE 10 MG/ML
20 INJECTION INTRAVENOUS
Status: COMPLETED | OUTPATIENT
Start: 2018-05-11 | End: 2018-05-11

## 2018-05-11 RX ORDER — CARVEDILOL 12.5 MG/1
12.5 TABLET ORAL
COMMUNITY
Start: 2018-04-30 | End: 2023-01-09

## 2018-05-11 RX ORDER — ONDANSETRON 2 MG/ML
4 INJECTION INTRAMUSCULAR; INTRAVENOUS EVERY 6 HOURS PRN
Status: DISCONTINUED | OUTPATIENT
Start: 2018-05-11 | End: 2018-05-11

## 2018-05-11 RX ORDER — METOCLOPRAMIDE HYDROCHLORIDE 5 MG/ML
10 INJECTION INTRAMUSCULAR; INTRAVENOUS EVERY 6 HOURS
Status: DISCONTINUED | OUTPATIENT
Start: 2018-05-11 | End: 2018-05-12 | Stop reason: HOSPADM

## 2018-05-11 RX ORDER — DICYCLOMINE HYDROCHLORIDE 10 MG/ML
20 INJECTION INTRAMUSCULAR
Status: COMPLETED | OUTPATIENT
Start: 2018-05-11 | End: 2018-05-11

## 2018-05-11 RX ORDER — ZOLPIDEM TARTRATE 5 MG/1
5 TABLET ORAL NIGHTLY PRN
Status: DISCONTINUED | OUTPATIENT
Start: 2018-05-11 | End: 2018-05-12 | Stop reason: HOSPADM

## 2018-05-11 RX ORDER — AMLODIPINE BESYLATE 5 MG/1
5 TABLET ORAL DAILY
Status: DISCONTINUED | OUTPATIENT
Start: 2018-05-11 | End: 2018-05-12 | Stop reason: HOSPADM

## 2018-05-11 RX ORDER — METOCLOPRAMIDE HYDROCHLORIDE 5 MG/ML
10 INJECTION INTRAMUSCULAR; INTRAVENOUS EVERY 8 HOURS
Status: DISCONTINUED | OUTPATIENT
Start: 2018-05-11 | End: 2018-05-11

## 2018-05-11 RX ORDER — ONDANSETRON 2 MG/ML
4 INJECTION INTRAMUSCULAR; INTRAVENOUS
Status: COMPLETED | OUTPATIENT
Start: 2018-05-11 | End: 2018-05-11

## 2018-05-11 RX ORDER — MORPHINE SULFATE 4 MG/ML
4 INJECTION, SOLUTION INTRAMUSCULAR; INTRAVENOUS
Status: DISCONTINUED | OUTPATIENT
Start: 2018-05-11 | End: 2018-05-11

## 2018-05-11 RX ORDER — ONDANSETRON 2 MG/ML
4 INJECTION INTRAMUSCULAR; INTRAVENOUS EVERY 8 HOURS PRN
Status: DISCONTINUED | OUTPATIENT
Start: 2018-05-11 | End: 2018-05-11

## 2018-05-11 RX ORDER — CARVEDILOL 12.5 MG/1
12.5 TABLET ORAL 2 TIMES DAILY
Status: DISCONTINUED | OUTPATIENT
Start: 2018-05-11 | End: 2018-05-12 | Stop reason: HOSPADM

## 2018-05-11 RX ORDER — DIPHENHYDRAMINE HYDROCHLORIDE 50 MG/ML
12.5 INJECTION INTRAMUSCULAR; INTRAVENOUS ONCE
Status: COMPLETED | OUTPATIENT
Start: 2018-05-11 | End: 2018-05-11

## 2018-05-11 RX ORDER — HYDROCODONE BITARTRATE AND ACETAMINOPHEN 10; 325 MG/1; MG/1
1 TABLET ORAL EVERY 6 HOURS PRN
Status: DISCONTINUED | OUTPATIENT
Start: 2018-05-11 | End: 2018-05-12 | Stop reason: HOSPADM

## 2018-05-11 RX ORDER — CARVEDILOL 12.5 MG/1
12.5 TABLET ORAL 2 TIMES DAILY
Status: DISCONTINUED | OUTPATIENT
Start: 2018-05-11 | End: 2018-05-11

## 2018-05-11 RX ORDER — ONDANSETRON 2 MG/ML
4 INJECTION INTRAMUSCULAR; INTRAVENOUS EVERY 8 HOURS PRN
Status: DISCONTINUED | OUTPATIENT
Start: 2018-05-11 | End: 2018-05-12 | Stop reason: HOSPADM

## 2018-05-11 RX ORDER — MAGNESIUM SULFATE HEPTAHYDRATE 40 MG/ML
2 INJECTION, SOLUTION INTRAVENOUS ONCE
Status: COMPLETED | OUTPATIENT
Start: 2018-05-11 | End: 2018-05-11

## 2018-05-11 RX ORDER — SODIUM CHLORIDE 9 MG/ML
INJECTION, SOLUTION INTRAVENOUS CONTINUOUS
Status: DISCONTINUED | OUTPATIENT
Start: 2018-05-11 | End: 2018-05-11

## 2018-05-11 RX ORDER — MORPHINE SULFATE 4 MG/ML
4 INJECTION, SOLUTION INTRAMUSCULAR; INTRAVENOUS
Status: COMPLETED | OUTPATIENT
Start: 2018-05-11 | End: 2018-05-11

## 2018-05-11 RX ORDER — SODIUM CHLORIDE 0.9 % (FLUSH) 0.9 %
3 SYRINGE (ML) INJECTION
Status: DISCONTINUED | OUTPATIENT
Start: 2018-05-11 | End: 2018-05-12 | Stop reason: HOSPADM

## 2018-05-11 RX ADMIN — MORPHINE SULFATE 4 MG: 4 INJECTION INTRAVENOUS at 06:05

## 2018-05-11 RX ADMIN — AMLODIPINE BESYLATE 5 MG: 5 TABLET ORAL at 10:05

## 2018-05-11 RX ADMIN — PROMETHAZINE HYDROCHLORIDE 6.25 MG: 25 INJECTION INTRAMUSCULAR; INTRAVENOUS at 05:05

## 2018-05-11 RX ADMIN — HYDROCODONE BITARTRATE AND ACETAMINOPHEN 1 TABLET: 10; 325 TABLET ORAL at 06:05

## 2018-05-11 RX ADMIN — PANTOPRAZOLE SODIUM 40 MG: 40 INJECTION, POWDER, FOR SOLUTION INTRAVENOUS at 04:05

## 2018-05-11 RX ADMIN — CARVEDILOL 12.5 MG: 12.5 TABLET, FILM COATED ORAL at 09:05

## 2018-05-11 RX ADMIN — DIPHENHYDRAMINE HYDROCHLORIDE 12.5 MG: 50 INJECTION, SOLUTION INTRAMUSCULAR; INTRAVENOUS at 03:05

## 2018-05-11 RX ADMIN — METOCLOPRAMIDE 10 MG: 5 INJECTION, SOLUTION INTRAMUSCULAR; INTRAVENOUS at 11:05

## 2018-05-11 RX ADMIN — FAMOTIDINE 20 MG: 10 INJECTION INTRAVENOUS at 12:05

## 2018-05-11 RX ADMIN — CARVEDILOL 12.5 MG: 12.5 TABLET, FILM COATED ORAL at 10:05

## 2018-05-11 RX ADMIN — MORPHINE SULFATE 4 MG: 4 INJECTION INTRAVENOUS at 12:05

## 2018-05-11 RX ADMIN — DICYCLOMINE HYDROCHLORIDE 20 MG: 10 INJECTION INTRAMUSCULAR at 12:05

## 2018-05-11 RX ADMIN — PROMETHAZINE HYDROCHLORIDE 6.25 MG: 25 INJECTION INTRAMUSCULAR; INTRAVENOUS at 11:05

## 2018-05-11 RX ADMIN — LORAZEPAM 0.5 MG: 2 INJECTION INTRAMUSCULAR; INTRAVENOUS at 03:05

## 2018-05-11 RX ADMIN — METOCLOPRAMIDE 10 MG: 5 INJECTION, SOLUTION INTRAMUSCULAR; INTRAVENOUS at 10:05

## 2018-05-11 RX ADMIN — METOCLOPRAMIDE 10 MG: 5 INJECTION, SOLUTION INTRAMUSCULAR; INTRAVENOUS at 03:05

## 2018-05-11 RX ADMIN — ZOLPIDEM TARTRATE 5 MG: 5 TABLET ORAL at 09:05

## 2018-05-11 RX ADMIN — HYDROCODONE BITARTRATE AND ACETAMINOPHEN 1 TABLET: 10; 325 TABLET ORAL at 11:05

## 2018-05-11 RX ADMIN — PANTOPRAZOLE SODIUM 40 MG: 40 INJECTION, POWDER, FOR SOLUTION INTRAVENOUS at 09:05

## 2018-05-11 RX ADMIN — MAGNESIUM SULFATE IN WATER 2 G: 40 INJECTION, SOLUTION INTRAVENOUS at 05:05

## 2018-05-11 RX ADMIN — METOCLOPRAMIDE 10 MG: 5 INJECTION, SOLUTION INTRAMUSCULAR; INTRAVENOUS at 06:05

## 2018-05-11 RX ADMIN — FOLIC ACID: 5 INJECTION, SOLUTION INTRAMUSCULAR; INTRAVENOUS; SUBCUTANEOUS at 05:05

## 2018-05-11 RX ADMIN — ONDANSETRON 4 MG: 2 INJECTION INTRAMUSCULAR; INTRAVENOUS at 06:05

## 2018-05-11 RX ADMIN — SODIUM CHLORIDE 1000 ML: 0.9 INJECTION, SOLUTION INTRAVENOUS at 03:05

## 2018-05-11 NOTE — ED NOTES
Patient continues to have intermittent episodes of vomiting.  Dr. Wright notified; awaiting new orders.    IV fluid bolus infusing as ordered. Vitals stable.  Awaiting results and disposition.

## 2018-05-11 NOTE — HPI
" Divya Bui is a 58 y.o. female patient who presents for lower abdominal pain. Oset gradually yesterday.No mitigating or exacerbating factors reported. Associated sxs include N/V and hematochezia described as "streaks of bright red blood when wiping. To note patient reports being discharged from [place] about 2 weeks ago with diverticulitis and campylobacter infection which she completed levaquin course for. Patient states she has history of Gastric Bypass and esophogeal stricture. Patient denies trouble swallowing and getting foods down, states she cant keep them down.  No further complaints or concerns at this time. Hospital medicine was consulted as patient continuing to have n/v with Er treatment. CBC/CMP stable. Lactic neg. CT ABD/Pelvis naf. Chest Xray Naf. Patient placed in obs for further eval/treatment.   "

## 2018-05-11 NOTE — ASSESSMENT & PLAN NOTE
Continue antiemetics  IV hydration  Assess response to ativan/benadryl   Assess respone to reglan IV x 4 doses

## 2018-05-11 NOTE — ED PROVIDER NOTES
"SCRIBE #1 NOTE: I, Zainab Kit, am scribing for, and in the presence of, Mele Wright Jr., MD. I have scribed the entire note.      History      Chief Complaint   Patient presents with    Abdominal Pain     nausea/vomiting and diarrhea for 2 days. Released from [facility] 2 weeks ago with diverticulitis and campylobacter infection.        Review of patient's allergies indicates:   Allergen Reactions    Flagyl [metronidazole hcl]      Mouth ulcers developed with Flagyl        HPI   HPI    5/10/2018, 8:34 PM   History obtained from the patient      History of Present Illness: Divya Bui is a 58 y.o. female patient who presents to the Emergency Department for lower abdominal pain which onset gradually yesterday. Patient reports being discharged from [facility] 2 weeks ago with diverticulitis and campylobacter infection. Patient reports finishing Levaquin a week ago. Patient states sxs were improving since discharge until yesterday. Symptoms are constant and moderate in severity. No mitigating or exacerbating factors reported. Associated sxs include N/V and hematochezia described as "streaks of bright red blood in formed stool". Prior tx includes Zofran without relief. Patient denies any fever, chills, dysuria, hematuria, constipation, diarrhea, and all other sxs at this time. No further complaints or concerns at this time.     Arrival mode: Personal vehicle     PCP: Lucio Salas MD       Past Medical History:  Past Medical History:   Diagnosis Date    Anemia     Anxiety     Blood transfusion     Bowel incontinence     Depression     Esophageal stricture     GERD (gastroesophageal reflux disease)     Hypertension     Latent tuberculosis by skin test 2001    took INH    Liver nodule 1/6/2014    Morbid obesity with BMI of 45.0-49.9, adult     OA (osteoarthritis) of knee 12/12/2014    Pericardial effusion 9/4/2014       Past Surgical History:  Past Surgical History:   Procedure Laterality Date    CHOLECYSTECTOMY   "    GASTRIC BYPASS      HERNIA REPAIR      right sholder surgery      SMALL INTESTINE SURGERY           Family History:  Family History   Problem Relation Age of Onset    Liver cancer Father     Diabetes Sister     Crohn's disease Sister     Liver cancer Sister     Crohn's disease Brother     Crohn's disease Other     Lung cancer Mother        Social History:  Social History     Social History Main Topics    Smoking status: Never Smoker    Smokeless tobacco: Never Used    Alcohol use No    Drug use: No    Sexual activity: Yes     Partners: Male       ROS   Review of Systems   Constitutional: Negative for chills and fever.   HENT: Negative for sore throat.    Respiratory: Negative for shortness of breath.    Cardiovascular: Negative for chest pain.   Gastrointestinal: Positive for abdominal pain (lower), blood in stool, nausea and vomiting. Negative for constipation and diarrhea.   Genitourinary: Negative for dysuria and hematuria.   Musculoskeletal: Negative for back pain.   Skin: Negative for rash.   Neurological: Negative for weakness.   Hematological: Does not bruise/bleed easily.   All other systems reviewed and are negative.      Physical Exam      Initial Vitals [05/10/18 1911]   BP Pulse Resp Temp SpO2   (!) 164/104 95 (!) 24 98.9 °F (37.2 °C) (!) 94 %      MAP       124          Physical Exam  Nursing Notes and Vital Signs Reviewed.  Constitutional: Patient is in no acute distress. Well-developed and well-nourished.  Head: Atraumatic. Normocephalic.  Eyes: PERRL. EOM intact. Conjunctivae are not pale. No scleral icterus.  ENT: Mucous membranes are moist. Oropharynx is clear and symmetric.    Neck: Supple. Full ROM. No lymphadenopathy.  Cardiovascular: Regular rate. Regular rhythm. No murmurs, rubs, or gallops. Distal pulses are 2+ and symmetric.  Pulmonary/Chest: No respiratory distress. Clear to auscultation bilaterally. No wheezing or rales.  Abdominal: Soft and non-distended.  There is  lower abd tenderness.  No rebound, guarding, or rigidity. Good bowel sounds.  Genitourinary: No CVA tenderness  Rectal:  No tenderness.  No masses.  Large external hemorrhoids, no active bleeding, no thrombosed hemorrhoids. No perirectal abscess or masses palpated.  Normal sphincter tone.  Stool color is brown.  Musculoskeletal: Moves all extremities. No obvious deformities. No edema. No calf tenderness.  Skin: Warm and dry.  Neurological:  Alert, awake, and appropriate.  Normal speech.  No acute focal neurological deficits are appreciated.  Psychiatric: Normal affect. Good eye contact. Appropriate in content.    ED Course    Procedures  ED Vital Signs:  Vitals:    05/10/18 2201 05/10/18 2221 05/10/18 2301 05/10/18 2331   BP: (!) 168/88 (!) 152/86 (!) 163/80 (!) 158/82   Pulse: 94 88 96 96   Resp: (!) 22 20     Temp:       TempSrc:       SpO2: 99% 100% 99% 98%   Weight:       Height:        05/11/18 0228 05/11/18 0550 05/11/18 0602 05/11/18 0702   BP:  (!) 144/89 (!) 163/89 (!) 166/78   Pulse: 100 86 84 88   Resp:    18   Temp:    98.5 °F (36.9 °C)   TempSrc:    Oral   SpO2: 99% 98% 98% 98%   Weight:       Height:        05/11/18 0732 05/11/18 1005 05/11/18 1021 05/11/18 1043   BP: (!) 171/90 (!) 170/93 (!) 159/93 (!) 161/87   Pulse: 87 88 94 86   Resp: 19 18 17   Temp:    97.7 °F (36.5 °C)   TempSrc:    Oral   SpO2: 96% 97% 97% 99%   Weight:       Height:        05/11/18 1536 05/11/18 1937 05/11/18 2331   BP: (!) 161/88 130/84 114/64   Pulse: 75 68 75   Resp: 16 18 18   Temp: 98.1 °F (36.7 °C) 98 °F (36.7 °C) 97.8 °F (36.6 °C)   TempSrc: Oral Oral Oral   SpO2: 96% 96% (!) 93%   Weight:      Height:          Abnormal Lab Results:  Labs Reviewed   CBC W/ AUTO DIFFERENTIAL - Abnormal; Notable for the following:        Result Value    Hemoglobin 11.0 (*)     Hematocrit 35.0 (*)     MCV 81 (*)     MCH 25.3 (*)     MCHC 31.4 (*)     RDW 15.3 (*)     Gran% 76.6 (*)     Lymph% 15.6 (*)     All other components within  normal limits   COMPREHENSIVE METABOLIC PANEL - Abnormal; Notable for the following:     CO2 22 (*)     Glucose 131 (*)     ALT 8 (*)     All other components within normal limits   URINALYSIS - Abnormal; Notable for the following:     Protein, UA Trace (*)     Occult Blood UA Trace (*)     All other components within normal limits   BASIC METABOLIC PANEL - Abnormal; Notable for the following:     Glucose 166 (*)     BUN, Bld 5 (*)     Calcium 8.0 (*)     All other components within normal limits   CBC W/ AUTO DIFFERENTIAL - Abnormal; Notable for the following:     Hemoglobin 10.5 (*)     Hematocrit 33.4 (*)     MCV 81 (*)     MCH 25.4 (*)     MCHC 31.4 (*)     RDW 15.5 (*)     MPV 8.2 (*)     Lymph # 0.9 (*)     Gran% 84.6 (*)     Lymph% 11.2 (*)     All other components within normal limits   HEMOGLOBIN A1C - Abnormal; Notable for the following:     Hemoglobin A1C 6.0 (*)     All other components within normal limits   IRON AND TIBC - Abnormal; Notable for the following:     Saturated Iron 7 (*)     All other components within normal limits    Narrative:     Fasting   FERRITIN - Abnormal; Notable for the following:     Ferritin 12 (*)     All other components within normal limits    Narrative:     Fasting   SEDIMENTATION RATE, MANUAL - Abnormal; Notable for the following:     Sed Rate 49 (*)     All other components within normal limits   C-REACTIVE PROTEIN - Abnormal; Notable for the following:     CRP 9.2 (*)     All other components within normal limits   CULTURE, STOOL   CLOSTRIDIUM DIFFICILE   LIPASE   OCCULT BLOOD X 1, STOOL   TROPONIN I   TROPONIN I   TROPONIN I   MAGNESIUM   PHOSPHORUS   LIPID PANEL    Narrative:     Fasting   PROCALCITONIN    Narrative:     Fasting   LACTIC ACID, PLASMA    Narrative:     Fasting   MAGNESIUM    Narrative:     Fasting   PHOSPHORUS    Narrative:     Fasting   MAGNESIUM   PHOSPHORUS   WBC, STOOL   STOOL EXAM-OVA,CYSTS,PARASITES        All Lab Results:  Results for orders  placed or performed during the hospital encounter of 05/10/18   CBC W/ AUTO DIFFERENTIAL   Result Value Ref Range    WBC 8.85 3.90 - 12.70 K/uL    RBC 4.35 4.00 - 5.40 M/uL    Hemoglobin 11.0 (L) 12.0 - 16.0 g/dL    Hematocrit 35.0 (L) 37.0 - 48.5 %    MCV 81 (L) 82 - 98 fL    MCH 25.3 (L) 27.0 - 31.0 pg    MCHC 31.4 (L) 32.0 - 36.0 g/dL    RDW 15.3 (H) 11.5 - 14.5 %    Platelets 244 150 - 350 K/uL    MPV 9.2 9.2 - 12.9 fL    Gran # (ANC) 6.8 1.8 - 7.7 K/uL    Lymph # 1.4 1.0 - 4.8 K/uL    Mono # 0.4 0.3 - 1.0 K/uL    Eos # 0.3 0.0 - 0.5 K/uL    Baso # 0.02 0.00 - 0.20 K/uL    Gran% 76.6 (H) 38.0 - 73.0 %    Lymph% 15.6 (L) 18.0 - 48.0 %    Mono% 4.7 4.0 - 15.0 %    Eosinophil% 2.9 0.0 - 8.0 %    Basophil% 0.2 0.0 - 1.9 %    Differential Method Automated    Comp. Metabolic Panel   Result Value Ref Range    Sodium 138 136 - 145 mmol/L    Potassium 3.6 3.5 - 5.1 mmol/L    Chloride 103 95 - 110 mmol/L    CO2 22 (L) 23 - 29 mmol/L    Glucose 131 (H) 70 - 110 mg/dL    BUN, Bld 9 6 - 20 mg/dL    Creatinine 0.8 0.5 - 1.4 mg/dL    Calcium 8.8 8.7 - 10.5 mg/dL    Total Protein 7.4 6.0 - 8.4 g/dL    Albumin 3.6 3.5 - 5.2 g/dL    Total Bilirubin 0.3 0.1 - 1.0 mg/dL    Alkaline Phosphatase 93 55 - 135 U/L    AST 11 10 - 40 U/L    ALT 8 (L) 10 - 44 U/L    Anion Gap 13 8 - 16 mmol/L    eGFR if African American >60 >60 mL/min/1.73 m^2    eGFR if non African American >60 >60 mL/min/1.73 m^2   Lipase   Result Value Ref Range    Lipase <4 4 - 60 U/L   Urinalysis - Clean Catch   Result Value Ref Range    Specimen UA Urine, Clean Catch     Color, UA Yellow Yellow, Straw, Debra    Appearance, UA Clear Clear    pH, UA 7.0 5.0 - 8.0    Specific Gravity, UA 1.020 1.005 - 1.030    Protein, UA Trace (A) Negative    Glucose, UA Negative Negative    Ketones, UA Negative Negative    Bilirubin (UA) Negative Negative    Occult Blood UA Trace (A) Negative    Nitrite, UA Negative Negative    Urobilinogen, UA Negative <2.0 EU/dL    Leukocytes, UA  Negative Negative   Occult blood x 1, stool   Result Value Ref Range    Occult Blood Negative Negative   Troponin I   Result Value Ref Range    Troponin I 0.023 0.000 - 0.026 ng/mL   Basic metabolic panel   Result Value Ref Range    Sodium 136 136 - 145 mmol/L    Potassium 3.5 3.5 - 5.1 mmol/L    Chloride 103 95 - 110 mmol/L    CO2 24 23 - 29 mmol/L    Glucose 166 (H) 70 - 110 mg/dL    BUN, Bld 5 (L) 6 - 20 mg/dL    Creatinine 0.7 0.5 - 1.4 mg/dL    Calcium 8.0 (L) 8.7 - 10.5 mg/dL    Anion Gap 9 8 - 16 mmol/L    eGFR if African American >60 >60 mL/min/1.73 m^2    eGFR if non African American >60 >60 mL/min/1.73 m^2   CBC auto differential   Result Value Ref Range    WBC 7.69 3.90 - 12.70 K/uL    RBC 4.14 4.00 - 5.40 M/uL    Hemoglobin 10.5 (L) 12.0 - 16.0 g/dL    Hematocrit 33.4 (L) 37.0 - 48.5 %    MCV 81 (L) 82 - 98 fL    MCH 25.4 (L) 27.0 - 31.0 pg    MCHC 31.4 (L) 32.0 - 36.0 g/dL    RDW 15.5 (H) 11.5 - 14.5 %    Platelets 246 150 - 350 K/uL    MPV 8.2 (L) 9.2 - 12.9 fL    Gran # (ANC) 6.5 1.8 - 7.7 K/uL    Lymph # 0.9 (L) 1.0 - 4.8 K/uL    Mono # 0.3 0.3 - 1.0 K/uL    Eos # 0.0 0.0 - 0.5 K/uL    Baso # 0.01 0.00 - 0.20 K/uL    Gran% 84.6 (H) 38.0 - 73.0 %    Lymph% 11.2 (L) 18.0 - 48.0 %    Mono% 4.0 4.0 - 15.0 %    Eosinophil% 0.1 0.0 - 8.0 %    Basophil% 0.1 0.0 - 1.9 %    Differential Method Automated    Lipid panel   Result Value Ref Range    Cholesterol 189 120 - 199 mg/dL    Triglycerides 66 30 - 150 mg/dL    HDL 71 40 - 75 mg/dL    LDL Cholesterol 104.8 63.0 - 159.0 mg/dL    HDL/Chol Ratio 37.6 20.0 - 50.0 %    Total Cholesterol/HDL Ratio 2.7 2.0 - 5.0    Non-HDL Cholesterol 118 mg/dL   Hemoglobin A1c   Result Value Ref Range    Hemoglobin A1C 6.0 (H) 4.0 - 5.6 %    Estimated Avg Glucose 126 68 - 131 mg/dL   Procalcitonin   Result Value Ref Range    Procalcitonin <0.02 <0.25 ng/mL   Lactic acid, plasma   Result Value Ref Range    Lactate (Lactic Acid) 1.3 0.5 - 2.2 mmol/L   Magnesium   Result Value Ref  Range    Magnesium 1.6 1.6 - 2.6 mg/dL   Phosphorus   Result Value Ref Range    Phosphorus 3.3 2.7 - 4.5 mg/dL   Iron and TIBC   Result Value Ref Range    Iron 31 30 - 160 ug/dL    Transferrin 295 200 - 375 mg/dL    TIBC 437 250 - 450 ug/dL    Saturated Iron 7 (L) 20 - 50 %   Ferritin   Result Value Ref Range    Ferritin 12 (L) 20.0 - 300.0 ng/mL   Folate   Result Value Ref Range    Folate 12.6 4.0 - 24.0 ng/mL   Vitamin B12   Result Value Ref Range    Vitamin B-12 1174 (H) 210 - 950 pg/mL   Magnesium   Result Value Ref Range    Magnesium 1.8 1.6 - 2.6 mg/dL   Phosphorus   Result Value Ref Range    Phosphorus 4.1 2.7 - 4.5 mg/dL   Sedimentation rate, manual   Result Value Ref Range    Sed Rate 49 (H) 0 - 20 mm/Hr   C-reactive protein   Result Value Ref Range    CRP 9.2 (H) 0.0 - 8.2 mg/L       Imaging Results:  Imaging Results          X-Ray Chest 1 View (Final result)  Result time 05/11/18 07:46:45    Final result by Max Chavez MD (05/11/18 07:46:45)                 Impression:      No acute process seen.      Electronically signed by: Max Chavez MD  Date:    05/11/2018  Time:    07:46             Narrative:    EXAMINATION:  XR CHEST 1 VIEW    CLINICAL HISTORY:  Vomiting, unspecified    FINDINGS:  Single view of the chest.    Cardiac silhouette is normal.  The lungs demonstrate no evidence of active disease.  No evidence of pleural effusion or pneumothorax.  Bones appear intact.                               CT Abdomen Pelvis With Contrast (Final result)  Result time 05/10/18 23:31:46    Final result by Tono Day MD (05/10/18 23:31:46)                 Impression:      No acute findings    All CT scans at this facility use dose modulation, iterative reconstruction and/or weight based dosing when appropriate to reduce radiation dose to as low as reasonably achievable.      Electronically signed by: Tono Day MD  Date:    05/10/2018  Time:    23:31             Narrative:    EXAMINATION:  CT  ABDOMEN PELVIS WITH CONTRAST    CLINICAL HISTORY:  Lower abdominal pain with onset gradually yesterday.  Patient reports being discharged from outside hospital 2 weeks ago with diverticulitis.  Patient also reports rectal bleeding.    TECHNIQUE:  Procedure performed with 75ml Omnipaque 350.    COMPARISON:  08/29/2017 CT    FINDINGS:  Prior gastric surgery.  Moderate hiatal hernia similar to prior CT.  Prior cholecystectomy.  No abnormality of the liver, spleen, pancreas, adrenals, or kidneys is seen.  There is diverticulosis without CT evidence of diverticulitis.  There is a bowel anastomotic suture line to the left of midline in the upper abdomen.  No evidence of bowel obstruction.  The uterus and adnexal structures are unremarkable.  No evidence to indicate appendicitis.  No acute inflammatory process or free fluid or free air is seen in the abdomen or pelvis.  The lung bases are clear.                               The EKG was ordered, reviewed, and independently interpreted by the ED provider.  Interpretation time: 0106  Rate: 92 BPM  Rhythm: normal sinus rhythm  Interpretation: Possible left atrial enlargement. Borderline ECG. No STEMI.         The Emergency Provider reviewed the vital signs and test results, which are outlined above.    ED Discussion     11:55 PM: Re-evaluated pt. Patient is actively vomiting and c/o worsening abd [place]. Patient will be given Bentyl, pepsid, and morphine. D/w pt all pertinent results. D/w pt any concerns expressed at this time. Answered all questions. Pt expresses understanding at this time.    2:17 AM: Re-evaluated pt. Pt continues to be dry-heaving.  D/w pt all pertinent results. D/w pt any concerns expressed at this time. Answered all questions. Pt expresses understanding at this time.    2:35 AM: Discussed case with Max Bernstein NP ([facility]). Dr. Huffman agrees with current care and management of pt and accepts admission.   Admitting Service: [facility]    Admitting Physician: Dr. Huffman  Admit to: Obs    2:41 AM: Re-evaluated pt. I have discussed test results, shared treatment plan, and the need for admission with patient and family at bedside. Pt and family express understanding at this time and agree with all information. All questions answered. Pt and family have no further questions or concerns at this time. Pt is ready for admit.      ED Medication(s):  Medications   sodium chloride 0.9% flush 3 mL (not administered)   pantoprazole 40 mg in dextrose 5 % 100 mL IVPB (over 15 minutes) (ready to mix system) (40 mg Intravenous Given 5/11/18 2103)   metoclopramide HCl injection 10 mg (10 mg Intravenous Given 5/11/18 2348)   amLODIPine tablet 5 mg (5 mg Oral Given 5/11/18 1030)   carvedilol tablet 12.5 mg (12.5 mg Oral Given 5/11/18 2104)   hydrocodone-acetaminophen 10-325mg per tablet 1 tablet (1 tablet Oral Given 5/12/18 0019)   promethazine (PHENERGAN) 6.25 mg in dextrose 5 % 50 mL IVPB (6.25 mg Intravenous New Bag 5/11/18 2348)   ondansetron injection 4 mg (not administered)   zolpidem tablet 5 mg (5 mg Oral Given 5/11/18 2142)   sodium chloride 0.9% bolus 1,000 mL (0 mLs Intravenous Stopped 5/11/18 0345)   ondansetron injection 4 mg (4 mg Intravenous Given 5/10/18 2144)   fentaNYL injection 50 mcg (50 mcg Intravenous Given 5/10/18 2145)   omnipaque 350 iohexol 75 mL (75 mLs Intravenous Given 5/10/18 2230)   promethazine (PHENERGAN) 12.5 mg in dextrose 5 % 50 mL IVPB (0 mg Intravenous Stopped 5/10/18 2251)   dicyclomine injection 20 mg (20 mg Intramuscular Given 5/11/18 0057)   famotidine (PF) injection 20 mg (20 mg Intravenous Given 5/11/18 0057)   morphine injection 4 mg (4 mg Intravenous Given 5/11/18 0057)   lorazepam (ATIVAN) injection 0.5 mg (0.5 mg Intravenous Given 5/11/18 0338)   diphenhydrAMINE injection 12.5 mg (12.5 mg Intravenous Given 5/11/18 7810)   sodium chloride 0.9% bolus 1,000 mL (0 mLs Intravenous Stopped 5/11/18 9841)   sodium chloride 0.9%  1,000 mL with mvi, adult no.4 with vit K 3,300 unit- 150 mcg/10 mL 10 mL, thiamine 100 mg, folic acid 1 mg infusion ( Intravenous Stopped 5/11/18 1012)   magnesium sulfate 2g in water 50mL IVPB (premix) (0 g Intravenous Stopped 5/11/18 0921)   morphine injection 4 mg (4 mg Intravenous Given 5/11/18 0643)   ondansetron injection 4 mg (4 mg Intravenous Given 5/11/18 0643)       Current Discharge Medication List                Medical Decision Making    Medical Decision Making:   Clinical Tests:   Lab Tests: Ordered and Reviewed  Radiological Study: Ordered and Reviewed  Medical Tests: Ordered and Reviewed           Scribe Attestation:   Scribe #1: I performed the above scribed service and the documentation accurately describes the services I performed. I attest to the accuracy of the note.    Attending:   Physician Attestation Statement for Scribe #1: I, Mele Wright Jr., MD, personally performed the services described in this documentation, as scribed by Zainab Pantoja, in my presence, and it is both accurate and complete.          Clinical Impression       ICD-10-CM ICD-9-CM   1. Intractable vomiting with nausea, unspecified vomiting type R11.2 536.2   2. Hiatal hernia K44.9 553.3   3. Abdominal pain R10.9 789.00   4. Diverticulosis of intestine without bleeding, unspecified intestinal tract location K57.90 562.10   5. External hemorrhoids K64.4 455.3   6. Emesis R11.10 787.03       Disposition:   Disposition: Placed in Observation  Condition: Cora Wright Jr., MD  05/12/18 4827

## 2018-05-11 NOTE — SUBJECTIVE & OBJECTIVE
Past Medical History:   Diagnosis Date    Anemia     Anxiety     Blood transfusion     Bowel incontinence     Depression     Esophageal stricture     GERD (gastroesophageal reflux disease)     Hypertension     Latent tuberculosis by skin test 2001    took INH    Liver nodule 1/6/2014    Morbid obesity with BMI of 45.0-49.9, adult     OA (osteoarthritis) of knee 12/12/2014    Pericardial effusion 9/4/2014       Past Surgical History:   Procedure Laterality Date    CHOLECYSTECTOMY      GASTRIC BYPASS      HERNIA REPAIR      right sholder surgery      SMALL INTESTINE SURGERY         Review of patient's allergies indicates:   Allergen Reactions    Flagyl [metronidazole hcl]      Mouth ulcers developed with Flagyl       Current Facility-Administered Medications on File Prior to Encounter   Medication    cyanocobalamin injection 1,000 mcg     Current Outpatient Prescriptions on File Prior to Encounter   Medication Sig    ABILIFY 15 mg Tab Take 15 mg by mouth once daily.     alprazolam (XANAX XR) 2 MG Tb24 Take 1 mg by mouth once daily. 2 mg tablet(2tabs) oral in the am and 1 mg(1tab)in the afternoon    amlodipine (NORVASC) 5 MG tablet Take 1 tablet (5 mg total) by mouth once daily.    aspirin 81 mg Tab Take 1 tablet by mouth Daily.    cloNIDine (CATAPRES) 0.1 MG tablet Take 1 tablet (0.1 mg total) by mouth 2 (two) times daily.    cyanocobalamin (VITAMIN B-12) 1,000 mcg/mL injection Inject 1 mL (1,000 mcg total) into the muscle every 30 days.    diclofenac (VOLTAREN) 75 MG EC tablet Take 75 mg by mouth 2 (two) times daily.    fluoxetine (PROZAC) 40 MG capsule Take 1 capsule (40 mg total) by mouth once daily.    furosemide (LASIX) 40 MG tablet TAKE 1 TABLET (40 MG TOTAL) BY MOUTH ONCE DAILY.    loperamide (IMODIUM A-D) 2 mg Tab Take 2 mg by mouth. 2 in am    melatonin 10 mg Cap Take by mouth nightly as needed.    metoclopramide HCl (REGLAN) 10 MG tablet Take 1 tablet (10 mg total) by mouth  every 6 (six) hours.    omeprazole (PRILOSEC) 40 MG capsule Take 40 mg by mouth once daily.    ondansetron (ZOFRAN) 4 MG tablet Take 2 tablets (8 mg total) by mouth every 8 (eight) hours as needed for Nausea.    orphenadrine (NORFLEX) 100 mg tablet     oxycodone-acetaminophen (PERCOCET)  mg per tablet     potassium chloride (KLOR-CON) 10 MEQ TbSR Take 1 tablet (10 mEq total) by mouth 2 (two) times daily.    promethazine (PHENERGAN) 25 MG tablet Take 1 tablet (25 mg total) by mouth every 6 (six) hours as needed for Nausea.    zoledronic acid-mannitol & water (RECLAST) 5 mg/100 mL Soln Inject 5 mg into the vein Once a year.    zolpidem (AMBIEN CR) 12.5 MG CR tablet Take 12.5 mg by mouth nightly as needed for Insomnia.     Family History     Problem Relation (Age of Onset)    Crohn's disease Sister, Brother, Other    Diabetes Sister    Liver cancer Father, Sister    Lung cancer Mother        Social History Main Topics    Smoking status: Never Smoker    Smokeless tobacco: Never Used    Alcohol use No    Drug use: No    Sexual activity: Yes     Partners: Male     Review of Systems   Constitutional: Positive for fatigue. Negative for chills, diaphoresis and fever.   HENT: Negative for congestion, sore throat and voice change.    Eyes: Negative for photophobia and visual disturbance.   Respiratory: Negative for cough, shortness of breath, wheezing and stridor.    Cardiovascular: Negative for chest pain and leg swelling.   Gastrointestinal: Positive for abdominal pain, blood in stool, nausea and vomiting. Negative for abdominal distention and constipation.   Endocrine: Negative for polydipsia, polyphagia and polyuria.   Genitourinary: Negative for difficulty urinating, dysuria, flank pain, pelvic pain, urgency and vaginal discharge.   Musculoskeletal: Negative for back pain, joint swelling, neck pain and neck stiffness.   Skin: Negative for color change and rash.   Allergic/Immunologic: Negative for  immunocompromised state.   Neurological: Negative for dizziness, syncope, weakness, numbness and headaches.   Hematological: Does not bruise/bleed easily.   Psychiatric/Behavioral: Negative for agitation, behavioral problems and confusion.     Objective:     Vital Signs (Most Recent):  Temp: 98.9 °F (37.2 °C) (05/10/18 1911)  Pulse: 100 (05/11/18 0228)  Resp: 20 (05/10/18 2221)  BP: (!) 158/82 (05/10/18 2331)  SpO2: 99 % (05/11/18 0228) Vital Signs (24h Range):  Temp:  [98.9 °F (37.2 °C)] 98.9 °F (37.2 °C)  Pulse:  [] 100  Resp:  [20-24] 20  SpO2:  [94 %-100 %] 99 %  BP: (152-168)/() 158/82     Weight: 110.4 kg (243 lb 6.2 oz)  Body mass index is 43.11 kg/m².    Physical Exam   Constitutional: She is oriented to person, place, and time. She appears well-developed and well-nourished. No distress.   HENT:   Head: Normocephalic and atraumatic.   Nose: Nose normal.   Eyes: Conjunctivae and EOM are normal. Pupils are equal, round, and reactive to light. No scleral icterus.   Neck: Normal range of motion. Neck supple. No tracheal deviation present.   Cardiovascular: Normal rate, regular rhythm, normal heart sounds and intact distal pulses.    No murmur heard.  Pulmonary/Chest: Effort normal and breath sounds normal. No stridor. No respiratory distress. She has no wheezes. She has no rales.   Abdominal: Soft. Bowel sounds are normal. She exhibits no distension. There is tenderness ( lower). There is no guarding.   obsese   Genitourinary:   Genitourinary Comments: ua neg  Check stool studies     Musculoskeletal: Normal range of motion. She exhibits no edema or deformity.   Neurological: She is alert and oriented to person, place, and time. No cranial nerve deficit.   Skin: Skin is warm and dry. Capillary refill takes less than 2 seconds. No rash noted. She is not diaphoretic.   Psychiatric: She has a normal mood and affect. Her behavior is normal. Judgment and thought content normal.   Nursing note and vitals  reviewed.        CRANIAL NERVES     CN III, IV, VI   Pupils are equal, round, and reactive to light.  Extraocular motions are normal.        Significant Labs: All pertinent labs within the past 24 hours have been reviewed.  Results for orders placed or performed during the hospital encounter of 05/10/18   CBC W/ AUTO DIFFERENTIAL   Result Value Ref Range    WBC 8.85 3.90 - 12.70 K/uL    RBC 4.35 4.00 - 5.40 M/uL    Hemoglobin 11.0 (L) 12.0 - 16.0 g/dL    Hematocrit 35.0 (L) 37.0 - 48.5 %    MCV 81 (L) 82 - 98 fL    MCH 25.3 (L) 27.0 - 31.0 pg    MCHC 31.4 (L) 32.0 - 36.0 g/dL    RDW 15.3 (H) 11.5 - 14.5 %    Platelets 244 150 - 350 K/uL    MPV 9.2 9.2 - 12.9 fL    Gran # (ANC) 6.8 1.8 - 7.7 K/uL    Lymph # 1.4 1.0 - 4.8 K/uL    Mono # 0.4 0.3 - 1.0 K/uL    Eos # 0.3 0.0 - 0.5 K/uL    Baso # 0.02 0.00 - 0.20 K/uL    Gran% 76.6 (H) 38.0 - 73.0 %    Lymph% 15.6 (L) 18.0 - 48.0 %    Mono% 4.7 4.0 - 15.0 %    Eosinophil% 2.9 0.0 - 8.0 %    Basophil% 0.2 0.0 - 1.9 %    Differential Method Automated    Comp. Metabolic Panel   Result Value Ref Range    Sodium 138 136 - 145 mmol/L    Potassium 3.6 3.5 - 5.1 mmol/L    Chloride 103 95 - 110 mmol/L    CO2 22 (L) 23 - 29 mmol/L    Glucose 131 (H) 70 - 110 mg/dL    BUN, Bld 9 6 - 20 mg/dL    Creatinine 0.8 0.5 - 1.4 mg/dL    Calcium 8.8 8.7 - 10.5 mg/dL    Total Protein 7.4 6.0 - 8.4 g/dL    Albumin 3.6 3.5 - 5.2 g/dL    Total Bilirubin 0.3 0.1 - 1.0 mg/dL    Alkaline Phosphatase 93 55 - 135 U/L    AST 11 10 - 40 U/L    ALT 8 (L) 10 - 44 U/L    Anion Gap 13 8 - 16 mmol/L    eGFR if African American >60 >60 mL/min/1.73 m^2    eGFR if non African American >60 >60 mL/min/1.73 m^2   Lipase   Result Value Ref Range    Lipase <4 4 - 60 U/L   Urinalysis - Clean Catch   Result Value Ref Range    Specimen UA Urine, Clean Catch     Color, UA Yellow Yellow, Straw, Debra    Appearance, UA Clear Clear    pH, UA 7.0 5.0 - 8.0    Specific Gravity, UA 1.020 1.005 - 1.030    Protein, UA Trace (A)  Negative    Glucose, UA Negative Negative    Ketones, UA Negative Negative    Bilirubin (UA) Negative Negative    Occult Blood UA Trace (A) Negative    Nitrite, UA Negative Negative    Urobilinogen, UA Negative <2.0 EU/dL    Leukocytes, UA Negative Negative   Occult blood x 1, stool   Result Value Ref Range    Occult Blood Negative Negative   Troponin I   Result Value Ref Range    Troponin I 0.023 0.000 - 0.026 ng/mL   Lactic acid, plasma   Result Value Ref Range    Lactate (Lactic Acid) 1.3 0.5 - 2.2 mmol/L   Magnesium   Result Value Ref Range    Magnesium 1.6 1.6 - 2.6 mg/dL   Phosphorus   Result Value Ref Range    Phosphorus 3.3 2.7 - 4.5 mg/dL   Magnesium   Result Value Ref Range    Magnesium 1.8 1.6 - 2.6 mg/dL   Phosphorus   Result Value Ref Range    Phosphorus 4.1 2.7 - 4.5 mg/dL       Significant Imaging: I have reviewed all pertinent imaging results/findings within the past 24 hours.   Imaging Results          X-Ray Chest 1 View (In process)                CT Abdomen Pelvis With Contrast (Final result)  Result time 05/10/18 23:31:46    Final result by Tono Day MD (05/10/18 23:31:46)                 Impression:      No acute findings    All CT scans at this facility use dose modulation, iterative reconstruction and/or weight based dosing when appropriate to reduce radiation dose to as low as reasonably achievable.      Electronically signed by: Tono Day MD  Date:    05/10/2018  Time:    23:31             Narrative:    EXAMINATION:  CT ABDOMEN PELVIS WITH CONTRAST    CLINICAL HISTORY:  Lower abdominal pain with onset gradually yesterday.  Patient reports being discharged from outside hospital 2 weeks ago with diverticulitis.  Patient also reports rectal bleeding.    TECHNIQUE:  Procedure performed with 75ml Omnipaque 350.    COMPARISON:  08/29/2017 CT    FINDINGS:  Prior gastric surgery.  Moderate hiatal hernia similar to prior CT.  Prior cholecystectomy.  No abnormality of the liver, spleen,  pancreas, adrenals, or kidneys is seen.  There is diverticulosis without CT evidence of diverticulitis.  There is a bowel anastomotic suture line to the left of midline in the upper abdomen.  No evidence of bowel obstruction.  The uterus and adnexal structures are unremarkable.  No evidence to indicate appendicitis.  No acute inflammatory process or free fluid or free air is seen in the abdomen or pelvis.  The lung bases are clear.

## 2018-05-11 NOTE — PROGRESS NOTES
Pt to room. VSS. Pt c.o of abdomen and headache pain. Pt told NPO. Waiting for DAMON Toro to some see.

## 2018-05-11 NOTE — PLAN OF CARE
Problem: Patient Care Overview  Goal: Plan of Care Review  Outcome: Ongoing (interventions implemented as appropriate)  Pt has been free of falls. PIV intact. Scheduled reglan, PRN zofran and phenergan. No vomiting at this time. Pt is on RA. Pt has no c/o pain, PRN meds given. Call light in reach and hourly rounding made.

## 2018-05-11 NOTE — ASSESSMENT & PLAN NOTE
Check procal, lactic  Check ESR/CRP  Check stool studies  Consider antx therapy pending results. Patient reports just completing levaquin cousre with improvement and symptoms started today of n/v   IV hydration  Gi rest  Consider GI consult pending course

## 2018-05-11 NOTE — ED NOTES
Ita in lab notified that a phlebotomist is needed to draw am labs on patient.  She states she has left a note and will notify the phlebotomist when she comes back to lab.

## 2018-05-11 NOTE — ED NOTES
Patient resting in bed in no acute distress.  IV fluid and medication infusing as ordered.  Patient continues to have intermittent episodes of dry heaving and occasional headache.  Bedside commode available for patient.  No emesis or bowel movements noted.  Vitals are stable.  No further needs voiced at this time.  Bed remains low and locked with side rail up x 1 and call light within reach.  Awaiting placement to hospital floor.

## 2018-05-11 NOTE — PROGRESS NOTES
There patient reports some improvement in symptoms since admit. No further reports of diarrhea. Will check c-diff if the patient has further watery stools. Will start on clear liquid diet and advance as tolerated. Possible discharge home tomorrow.

## 2018-05-11 NOTE — ASSESSMENT & PLAN NOTE
Hx of bypass surgery and history of esophageal stricture    Control n/v and assess response to oral toleration. If unable nor not improving Consider consult to GI/surgery pending course

## 2018-05-11 NOTE — H&P
"[facility] Hospital Medicine  History & Physical    Patient Name: Divya Bui  MRN: [id number]  Admission Date: 5/10/2018  Attending Physician: Nathanael Hennessy MD  Primary Care Provider: Lucio Salas MD         Patient information was obtained from patient, past medical records and ER records.     Subjective:     Principal Problem:Gastroenteritis    Chief Complaint:   Chief Complaint   Patient presents with    Abdominal Pain     nausea/vomiting and diarrhea for 2 days. Released from [facility] 2 weeks ago with diverticulitis and campylobacter infection.         HPI:  Divya Bui is a 58 y.o. female patient who presents for lower abdominal pain. Oset gradually yesterday.No mitigating or exacerbating factors reported. Associated sxs include N/V and hematochezia described as "streaks of bright red blood when wiping. To note patient reports being discharged from [facility] about 2 weeks ago with diverticulitis and campylobacter infection which she completed levaquin course for. Patient states she has history of Gastric Bypass and esophogeal stricture. Patient denies trouble swallowing and getting foods down, states she cant keep them down.  No further complaints or concerns at this time. Hospital medicine was consulted as patient continuing to have n/v with Er treatment. CBC/CMP stable. Lactic neg. CT ABD/Pelvis naf. Chest Xray Naf. Patient placed in obs for further eval/treatment.     Past Medical History:   Diagnosis Date    Anemia     Anxiety     Blood transfusion     Bowel incontinence     Depression     Esophageal stricture     GERD (gastroesophageal reflux disease)     Hypertension     Latent tuberculosis by skin test 2001    took INH    Liver nodule 1/6/2014    Morbid obesity with BMI of 45.0-49.9, adult     OA (osteoarthritis) of knee 12/12/2014    Pericardial effusion 9/4/2014       Past Surgical History:   Procedure Laterality Date    CHOLECYSTECTOMY      GASTRIC BYPASS      HERNIA REPAIR   "    right [facility] [place] surgery      SMALL INTESTINE SURGERY         Review of patient's allergies indicates:   Allergen Reactions    Flagyl [metronidazole hcl]      Mouth ulcers developed with Flagyl       Current Facility-Administered Medications on File Prior to Encounter   Medication    cyanocobalamin injection 1,000 mcg     Current Outpatient Prescriptions on File Prior to Encounter   Medication Sig    ABILIFY 15 mg Tab Take 15 mg by mouth once daily.     alprazolam (XANAX XR) 2 MG Tb24 Take 1 mg by mouth once daily. 2 mg tablet(2tabs) oral in the am and 1 mg(1tab)in the afternoon    amlodipine (NORVASC) 5 MG tablet Take 1 tablet (5 mg total) by mouth once daily.    aspirin 81 mg Tab Take 1 tablet by mouth Daily.    cloNIDine (CATAPRES) 0.1 MG tablet Take 1 tablet (0.1 mg total) by mouth 2 (two) times daily.    cyanocobalamin (VITAMIN B-12) 1,000 mcg/mL injection Inject 1 mL (1,000 mcg total) into the muscle every 30 days.    diclofenac (VOLTAREN) 75 MG EC tablet Take 75 mg by mouth 2 (two) times daily.    fluoxetine (PROZAC) 40 MG capsule Take 1 capsule (40 mg total) by mouth once daily.    furosemide (LASIX) 40 MG tablet TAKE 1 TABLET (40 MG TOTAL) BY MOUTH ONCE DAILY.    loperamide (IMODIUM A-D) 2 mg Tab Take 2 mg by mouth. 2 in am    melatonin 10 mg Cap Take by mouth nightly as needed.    metoclopramide HCl (REGLAN) 10 MG tablet Take 1 tablet (10 mg total) by mouth every 6 (six) hours.    omeprazole (PRILOSEC) 40 MG capsule Take 40 mg by mouth once daily.    ondansetron (ZOFRAN) 4 MG tablet Take 2 tablets (8 mg total) by mouth every 8 (eight) hours as needed for Nausea.    orphenadrine (NORFLEX) 100 mg tablet     oxycodone-acetaminophen (PERCOCET)  mg per tablet     potassium chloride (KLOR-CON) 10 MEQ TbSR Take 1 tablet (10 mEq total) by mouth 2 (two) times daily.    promethazine (PHENERGAN) 25 MG tablet Take 1 tablet (25 mg total) by mouth every 6 (six) hours as needed for Nausea.     zoledronic acid-mannitol & water (RECLAST) 5 mg/100 mL Soln Inject 5 mg into the vein Once a year.    zolpidem (AMBIEN CR) 12.5 MG CR tablet Take 12.5 mg by mouth nightly as needed for Insomnia.     Family History     Problem Relation (Age of Onset)    Crohn's disease Sister, Brother, Other    Diabetes Sister    Liver cancer Father, Sister    Lung cancer Mother        Social History Main Topics    Smoking status: Never Smoker    Smokeless tobacco: Never Used    Alcohol use No    Drug use: No    Sexual activity: Yes     Partners: Male     Review of Systems   Constitutional: Positive for fatigue. Negative for chills, diaphoresis and fever.   HENT: Negative for congestion, sore throat and voice change.    Eyes: Negative for photophobia and visual disturbance.   Respiratory: Negative for cough, shortness of breath, wheezing and stridor.    Cardiovascular: Negative for chest pain and leg swelling.   Gastrointestinal: Positive for abdominal pain, blood in stool, nausea and vomiting. Negative for abdominal distention and constipation.   Endocrine: Negative for polydipsia, polyphagia and polyuria.   Genitourinary: Negative for difficulty urinating, dysuria, flank pain, pelvic pain, urgency and vaginal discharge.   Musculoskeletal: Negative for back pain, joint swelling, neck pain and neck stiffness.   Skin: Negative for color change and rash.   Allergic/Immunologic: Negative for immunocompromised state.   Neurological: Negative for dizziness, syncope, weakness, numbness and headaches.   Hematological: Does not bruise/bleed easily.   Psychiatric/Behavioral: Negative for agitation, behavioral problems and confusion.     Objective:     Vital Signs (Most Recent):  Temp: 98.9 °F (37.2 °C) (05/10/18 1911)  Pulse: 100 (05/11/18 0228)  Resp: 20 (05/10/18 2221)  BP: (!) 158/82 (05/10/18 2331)  SpO2: 99 % (05/11/18 0228) Vital Signs (24h Range):  Temp:  [98.9 °F (37.2 °C)] 98.9 °F (37.2 °C)  Pulse:  [] 100  Resp:  [20-24]  20  SpO2:  [94 %-100 %] 99 %  BP: (152-168)/() 158/82     Weight: 110.4 kg (243 lb 6.2 oz)  Body mass index is 43.11 kg/m².    Physical Exam   Constitutional: She is oriented to person, place, and time. She appears well-developed and well-nourished. No distress.   HENT:   Head: Normocephalic and atraumatic.   Nose: Nose normal.   Eyes: Conjunctivae and EOM are normal. Pupils are equal, round, and reactive to light. No scleral icterus.   Neck: Normal range of motion. Neck supple. No tracheal deviation present.   Cardiovascular: Normal rate, regular rhythm, normal heart sounds and intact distal pulses.    No murmur heard.  Pulmonary/Chest: Effort normal and breath sounds normal. No stridor. No respiratory distress. She has no wheezes. She has no rales.   Abdominal: Soft. Bowel sounds are normal. She exhibits no distension. There is tenderness ( lower). There is no guarding.   obsese   Genitourinary:   Genitourinary Comments: ua neg  Check stool studies     Musculoskeletal: Normal range of motion. She exhibits no edema or deformity.   Neurological: She is alert and oriented to person, place, and time. No cranial nerve deficit.   Skin: Skin is warm and dry. Capillary refill takes less than 2 seconds. No rash noted. She is not diaphoretic.   Psychiatric: She has a normal mood and affect. Her behavior is normal. Judgment and thought content normal.   Nursing note and vitals reviewed.        CRANIAL NERVES     CN III, IV, VI   Pupils are equal, round, and reactive to light.  Extraocular motions are normal.        Significant Labs: All pertinent labs within the past 24 hours have been reviewed.  Results for orders placed or performed during the hospital encounter of 05/10/18   CBC W/ AUTO DIFFERENTIAL   Result Value Ref Range    WBC 8.85 3.90 - 12.70 K/uL    RBC 4.35 4.00 - 5.40 M/uL    Hemoglobin 11.0 (L) 12.0 - 16.0 g/dL    Hematocrit 35.0 (L) 37.0 - 48.5 %    MCV 81 (L) 82 - 98 fL    MCH 25.3 (L) 27.0 - 31.0 pg     MCHC 31.4 (L) 32.0 - 36.0 g/dL    RDW 15.3 (H) 11.5 - 14.5 %    Platelets 244 150 - 350 K/uL    MPV 9.2 9.2 - 12.9 fL    Gran # (ANC) 6.8 1.8 - 7.7 K/uL    Lymph # 1.4 1.0 - 4.8 K/uL    Mono # 0.4 0.3 - 1.0 K/uL    Eos # 0.3 0.0 - 0.5 K/uL    Baso # 0.02 0.00 - 0.20 K/uL    Gran% 76.6 (H) 38.0 - 73.0 %    Lymph% 15.6 (L) 18.0 - 48.0 %    Mono% 4.7 4.0 - 15.0 %    Eosinophil% 2.9 0.0 - 8.0 %    Basophil% 0.2 0.0 - 1.9 %    Differential Method Automated    Comp. Metabolic Panel   Result Value Ref Range    Sodium 138 136 - 145 mmol/L    Potassium 3.6 3.5 - 5.1 mmol/L    Chloride 103 95 - 110 mmol/L    CO2 22 (L) 23 - 29 mmol/L    Glucose 131 (H) 70 - 110 mg/dL    BUN, Bld 9 6 - 20 mg/dL    Creatinine 0.8 0.5 - 1.4 mg/dL    Calcium 8.8 8.7 - 10.5 mg/dL    Total Protein 7.4 6.0 - 8.4 g/dL    Albumin 3.6 3.5 - 5.2 g/dL    Total Bilirubin 0.3 0.1 - 1.0 mg/dL    Alkaline Phosphatase 93 55 - 135 U/L    AST 11 10 - 40 U/L    ALT 8 (L) 10 - 44 U/L    Anion Gap 13 8 - 16 mmol/L    eGFR if African American >60 >60 mL/min/1.73 m^2    eGFR if non African American >60 >60 mL/min/1.73 m^2   Lipase   Result Value Ref Range    Lipase <4 4 - 60 U/L   Urinalysis - Clean Catch   Result Value Ref Range    Specimen UA Urine, Clean Catch     Color, UA Yellow Yellow, Straw, Debra    Appearance, UA Clear Clear    pH, UA 7.0 5.0 - 8.0    Specific Gravity, UA 1.020 1.005 - 1.030    Protein, UA Trace (A) Negative    Glucose, UA Negative Negative    Ketones, UA Negative Negative    Bilirubin (UA) Negative Negative    Occult Blood UA Trace (A) Negative    Nitrite, UA Negative Negative    Urobilinogen, UA Negative <2.0 EU/dL    Leukocytes, UA Negative Negative   Occult blood x 1, stool   Result Value Ref Range    Occult Blood Negative Negative   Troponin I   Result Value Ref Range    Troponin I 0.023 0.000 - 0.026 ng/mL   Lactic acid, plasma   Result Value Ref Range    Lactate (Lactic Acid) 1.3 0.5 - 2.2 mmol/L   Magnesium   Result Value Ref Range     Magnesium 1.6 1.6 - 2.6 mg/dL   Phosphorus   Result Value Ref Range    Phosphorus 3.3 2.7 - 4.5 mg/dL   Magnesium   Result Value Ref Range    Magnesium 1.8 1.6 - 2.6 mg/dL   Phosphorus   Result Value Ref Range    Phosphorus 4.1 2.7 - 4.5 mg/dL       Significant Imaging: I have reviewed all pertinent imaging results/findings within the past 24 hours.   Imaging Results          X-Ray Chest 1 View (In process)                CT Abdomen Pelvis With Contrast (Final result)  Result time 05/10/18 23:31:46    Final result by Tono Day MD (05/10/18 23:31:46)                 Impression:      No acute findings    All CT scans at this facility use dose modulation, iterative reconstruction and/or weight based dosing when appropriate to reduce radiation dose to as low as reasonably achievable.      Electronically signed by: Tono Day MD  Date:    05/10/2018  Time:    23:31             Narrative:    EXAMINATION:  CT ABDOMEN PELVIS WITH CONTRAST    CLINICAL HISTORY:  Lower abdominal pain with onset gradually yesterday.  Patient reports being discharged from outside hospital 2 weeks ago with diverticulitis.  Patient also reports rectal bleeding.    TECHNIQUE:  Procedure performed with 75ml Omnipaque 350.    COMPARISON:  08/29/2017 CT    FINDINGS:  Prior gastric surgery.  Moderate hiatal hernia similar to prior CT.  Prior cholecystectomy.  No abnormality of the liver, spleen, pancreas, adrenals, or kidneys is seen.  There is diverticulosis without CT evidence of diverticulitis.  There is a bowel anastomotic suture line to the left of midline in the upper abdomen.  No evidence of bowel obstruction.  The uterus and adnexal structures are unremarkable.  No evidence to indicate appendicitis.  No acute inflammatory process or free fluid or free air is seen in the abdomen or pelvis.  The lung bases are clear.                                  Assessment/Plan:     * Gastroenteritis    Check procal, lactic  Check  ESR/CRP  Check stool studies  Consider antx therapy pending results. Patient reports just completing levaquin cousre with improvement and symptoms started today of n/v   IV hydration  Gi rest  Consider GI consult pending course          Intractable vomiting with nausea    Continue antiemetics  IV hydration  Assess response to ativan/benadryl   Assess respone to reglan IV x 4 doses           Hiatal hernia    Consult to surgery vs OP follow up pending course        HTN (hypertension)    Monitor trends  Treat IV if need if not tolerating PO        Gastric bypass status for obesity    Hx of bypass surgery and history of esophageal stricture    Control n/v and assess response to oral toleration. If unable nor not improving Consider consult to GI/surgery pending course          VTE Risk Mitigation         Ordered     IP VTE HIGH RISK PATIENT  Once      05/11/18 0247     Place sequential compression device  Until discontinued      05/11/18 0247     Place SALVADOR hose  Until discontinued      05/11/18 0247     Reason for no Mechanical VTE Prophylaxis  Once      05/11/18 0247             Max Bernstein NP  Department of Hospital Medicine   [facility]

## 2018-05-12 VITALS
RESPIRATION RATE: 18 BRPM | SYSTOLIC BLOOD PRESSURE: 151 MMHG | WEIGHT: 243.38 LBS | DIASTOLIC BLOOD PRESSURE: 82 MMHG | TEMPERATURE: 98 F | OXYGEN SATURATION: 96 % | BODY MASS INDEX: 43.12 KG/M2 | HEART RATE: 63 BPM | HEIGHT: 63 IN

## 2018-05-12 PROBLEM — K52.9 GASTROENTERITIS: Status: RESOLVED | Noted: 2018-05-11 | Resolved: 2018-05-12

## 2018-05-12 PROBLEM — R11.2 INTRACTABLE VOMITING WITH NAUSEA: Status: RESOLVED | Noted: 2018-05-11 | Resolved: 2018-05-12

## 2018-05-12 LAB
ANION GAP SERPL CALC-SCNC: 9 MMOL/L
BUN SERPL-MCNC: 6 MG/DL
CALCIUM SERPL-MCNC: 8.3 MG/DL
CHLORIDE SERPL-SCNC: 100 MMOL/L
CO2 SERPL-SCNC: 24 MMOL/L
CREAT SERPL-MCNC: 0.8 MG/DL
EST. GFR  (AFRICAN AMERICAN): >60 ML/MIN/1.73 M^2
EST. GFR  (NON AFRICAN AMERICAN): >60 ML/MIN/1.73 M^2
GLUCOSE SERPL-MCNC: 93 MG/DL
POTASSIUM SERPL-SCNC: 3.5 MMOL/L
SODIUM SERPL-SCNC: 133 MMOL/L

## 2018-05-12 PROCEDURE — 80048 BASIC METABOLIC PNL TOTAL CA: CPT

## 2018-05-12 PROCEDURE — 96367 TX/PROPH/DG ADDL SEQ IV INF: CPT

## 2018-05-12 PROCEDURE — 63600175 PHARM REV CODE 636 W HCPCS: Performed by: PHYSICIAN ASSISTANT

## 2018-05-12 PROCEDURE — 25000003 PHARM REV CODE 250: Performed by: PHYSICIAN ASSISTANT

## 2018-05-12 PROCEDURE — 96375 TX/PRO/DX INJ NEW DRUG ADDON: CPT

## 2018-05-12 PROCEDURE — G0378 HOSPITAL OBSERVATION PER HR: HCPCS

## 2018-05-12 PROCEDURE — 25000003 PHARM REV CODE 250: Performed by: NURSE PRACTITIONER

## 2018-05-12 PROCEDURE — 63600175 PHARM REV CODE 636 W HCPCS: Performed by: NURSE PRACTITIONER

## 2018-05-12 PROCEDURE — C9113 INJ PANTOPRAZOLE SODIUM, VIA: HCPCS | Performed by: NURSE PRACTITIONER

## 2018-05-12 PROCEDURE — 96376 TX/PRO/DX INJ SAME DRUG ADON: CPT

## 2018-05-12 PROCEDURE — 36415 COLL VENOUS BLD VENIPUNCTURE: CPT

## 2018-05-12 PROCEDURE — 96365 THER/PROPH/DIAG IV INF INIT: CPT

## 2018-05-12 RX ORDER — PROMETHAZINE HYDROCHLORIDE 25 MG/1
25 TABLET ORAL EVERY 6 HOURS PRN
Qty: 30 TABLET | Refills: 0 | Status: ON HOLD | OUTPATIENT
Start: 2018-05-12 | End: 2019-04-09 | Stop reason: SDUPTHER

## 2018-05-12 RX ADMIN — HYDROCODONE BITARTRATE AND ACETAMINOPHEN 1 TABLET: 10; 325 TABLET ORAL at 12:05

## 2018-05-12 RX ADMIN — METOCLOPRAMIDE 10 MG: 5 INJECTION, SOLUTION INTRAMUSCULAR; INTRAVENOUS at 05:05

## 2018-05-12 RX ADMIN — AMLODIPINE BESYLATE 5 MG: 5 TABLET ORAL at 08:05

## 2018-05-12 RX ADMIN — PANTOPRAZOLE SODIUM 40 MG: 40 INJECTION, POWDER, FOR SOLUTION INTRAVENOUS at 08:05

## 2018-05-12 RX ADMIN — CARVEDILOL 12.5 MG: 12.5 TABLET, FILM COATED ORAL at 08:05

## 2018-05-12 RX ADMIN — PROMETHAZINE HYDROCHLORIDE 6.25 MG: 25 INJECTION INTRAMUSCULAR; INTRAVENOUS at 06:05

## 2018-05-12 RX ADMIN — HYDROCODONE BITARTRATE AND ACETAMINOPHEN 1 TABLET: 10; 325 TABLET ORAL at 06:05

## 2018-05-12 NOTE — PROGRESS NOTES
Discharge orders received and reviewed with patient. Pt instructed when to take each medication next. Verified pharmacy with patient. IV removed. Pt changing clothes and to press call light when family arrives to .       [id number] pt wheeled down with family for transport home

## 2018-05-12 NOTE — PLAN OF CARE
05/12/18 1203   Final Note   Assessment Type Final Discharge Note   Discharge Disposition Home   Right Care Referral Info   Post Acute Recommendation No Care

## 2018-05-12 NOTE — DISCHARGE SUMMARY
"[facility] Hospital Medicine  Discharge Summary      Patient Name: Divya Bui  MRN: [id number]  Admission Date: 5/10/2018  Hospital Length of Stay: 0 days  Discharge Date and Time:  05/12/2018 12:15 PM  Attending Physician: Christopher Champagne MD   Discharging Provider: Etienne Willams NP  Primary Care Provider: Lucio Salas MD      HPI:    Divya Bui is a 58 y.o. female patient who presents for lower abdominal pain. Oset gradually yesterday.No mitigating or exacerbating factors reported. Associated sxs include N/V and hematochezia described as "streaks of bright red blood when wiping. To note patient reports being discharged from [facility] about 2 weeks ago with diverticulitis and campylobacter infection which she completed levaquin course for. Patient states she has history of Gastric Bypass and esophogeal stricture. Patient denies trouble swallowing and getting foods down, states she cant keep them down.  No further complaints or concerns at this time. Hospital medicine was consulted as patient continuing to have n/v with Er treatment. CBC/CMP stable. Lactic neg. CT ABD/Pelvis naf. Chest Xray Naf. Patient placed in obs for further eval/treatment.     * No surgery found *      Hospital Course:   5/12/18 The patient reports improvement in symptoms overnight. The patient reports that she does have some residual intermittent nausea but no further vomiting. The patient denies any further diarrhea. The patient is tolerating a regular diet. The patient was seen and examined today and deemed stable for discharge. The patient will follow up with her PCP.      Consults:     No new Assessment & Plan notes have been filed under this hospital service since the last note was generated.  Service: Hospital Medicine    Final Active Diagnoses:    Diagnosis Date Noted POA    Hiatal hernia [K44.9] 05/11/2018 Yes    HTN (hypertension) [I10] 08/26/2013 Yes    Gastric bypass status for obesity [Z98.84] 07/19/2013 Not " Applicable      Problems Resolved During this Admission:    Diagnosis Date Noted Date Resolved POA    PRINCIPAL PROBLEM:  Gastroenteritis [K52.9] 05/11/2018 05/12/2018 Yes    Intractable vomiting with nausea [R11.2] 05/11/2018 05/12/2018 Yes       Discharged Condition: stable    Disposition: Home or Self Care    Follow Up:  Follow-up Information     Lucio Salas MD. Schedule an appointment as soon as possible for a visit in 3 days.    Specialty:  Internal Medicine  Contact information:  [address]  [facility]  [place] [address]  [phone]                 Patient Instructions:   No discharge procedures on file.    Significant Diagnostic Studies:   Imaging Results          X-Ray Chest 1 View (Final result)  Result time 05/11/18 07:46:45    Final result by Max Chavez MD (05/11/18 07:46:45)                 Impression:      No acute process seen.      Electronically signed by: Max Chavez MD  Date:    05/11/2018  Time:    07:46             Narrative:    EXAMINATION:  XR CHEST 1 VIEW    CLINICAL HISTORY:  Vomiting, unspecified    FINDINGS:  Single view of the chest.    Cardiac silhouette is normal.  The lungs demonstrate no evidence of active disease.  No evidence of pleural effusion or pneumothorax.  Bones appear intact.                               CT Abdomen Pelvis With Contrast (Final result)  Result time 05/10/18 23:31:46    Final result by Tono Day MD (05/10/18 23:31:46)                 Impression:      No acute findings    All CT scans at this facility use dose modulation, iterative reconstruction and/or weight based dosing when appropriate to reduce radiation dose to as low as reasonably achievable.      Electronically signed by: Tono Day MD  Date:    05/10/2018  Time:    23:31             Narrative:    EXAMINATION:  CT ABDOMEN PELVIS WITH CONTRAST    CLINICAL HISTORY:  Lower abdominal pain with onset gradually yesterday.  Patient reports being discharged from outside  hospital 2 weeks ago with diverticulitis.  Patient also reports rectal bleeding.    TECHNIQUE:  Procedure performed with 75ml Omnipaque 350.    COMPARISON:  08/29/2017 CT    FINDINGS:  Prior gastric surgery.  Moderate hiatal hernia similar to prior CT.  Prior cholecystectomy.  No abnormality of the liver, spleen, pancreas, adrenals, or kidneys is seen.  There is diverticulosis without CT evidence of diverticulitis.  There is a bowel anastomotic suture line to the left of midline in the upper abdomen.  No evidence of bowel obstruction.  The uterus and adnexal structures are unremarkable.  No evidence to indicate appendicitis.  No acute inflammatory process or free fluid or free air is seen in the abdomen or pelvis.  The lung bases are clear.                                   Pending Diagnostic Studies:     Procedure Component Value Units Date/Time    Vitamin B1 [[id number]] Collected:  05/11/18 0507    Order Status:  Sent Lab Status:  In process Updated:  05/11/18 1239    Specimen:  Blood from Blood          Medications:  Reconciled Home Medications:      Medication List      CONTINUE taking these medications    ABILIFY 15 MG Tab  Generic drug:  ARIPiprazole  Take 15 mg by mouth once daily.     ALPRAZolam 2 MG Tb24  Commonly known as:  XANAX XR  Take 1 mg by mouth once daily. 2 mg tablet(2tabs) oral in the am and 1 mg(1tab)in the afternoon     amLODIPine 5 MG tablet  Commonly known as:  NORVASC  Take 1 tablet (5 mg total) by mouth once daily.     aspirin 81 mg Tab  Take 1 tablet by mouth Daily.     carvedilol 12.5 MG tablet  Commonly known as:  COREG  Take 12.5 mg by mouth.     cloNIDine 0.1 MG tablet  Commonly known as:  CATAPRES  Take 1 tablet (0.1 mg total) by mouth 2 (two) times daily.     cyanocobalamin 1,000 mcg/mL injection  Commonly known as:  VITAMIN B-12  Inject 1 mL (1,000 mcg total) into the muscle every 30 days.     diclofenac 75 MG EC tablet  Commonly known as:  VOLTAREN  Take 75 mg by mouth 2 (two)  times daily.     FLUoxetine 40 MG capsule  Commonly known as:  PROZAC  Take 1 capsule (40 mg total) by mouth once daily.     furosemide 40 MG tablet  Commonly known as:  LASIX  TAKE 1 TABLET (40 MG TOTAL) BY MOUTH ONCE DAILY.     loperamide 2 mg Tab  Commonly known as:  IMODIUM A-D  Take 2 mg by mouth. 2 in am     melatonin 10 mg Cap  Take by mouth nightly as needed.     metoclopramide HCl 10 MG tablet  Commonly known as:  REGLAN  Take 1 tablet (10 mg total) by mouth every 6 (six) hours.     omeprazole 40 MG capsule  Commonly known as:  PRILOSEC  Take 40 mg by mouth once daily.     ondansetron 4 MG tablet  Commonly known as:  ZOFRAN  Take 2 tablets (8 mg total) by mouth every 8 (eight) hours as needed for Nausea.     orphenadrine 100 mg tablet  Commonly known as:  NORFLEX     oxyCODONE-acetaminophen  mg per tablet  Commonly known as:  PERCOCET     potassium chloride 10 MEQ Tbsr  Commonly known as:  KLOR-CON  Take 1 tablet (10 mEq total) by mouth 2 (two) times daily.     promethazine 25 MG tablet  Commonly known as:  PHENERGAN  Take 1 tablet (25 mg total) by mouth every 6 (six) hours as needed for Nausea.     RECLAST 5 mg/100 mL Pgbk  Generic drug:  zoledronic acid-mannitol & water  Inject 5 mg into the vein Once a year.     zolpidem 12.5 MG CR tablet  Commonly known as:  AMBIEN CR  Take 12.5 mg by mouth nightly as needed for Insomnia.            Indwelling Lines/Drains at time of discharge:   Lines/Drains/Airways          No matching active lines, drains, or airways          Time spent on the discharge of patient: > 30 minutes  Patient was seen and examined on the date of discharge and determined to be suitable for discharge.         Etienne Willams NP  Department of Hospital Medicine  [facility]

## 2018-05-12 NOTE — HOSPITAL COURSE
5/12/18 The patient reports improvement in symptoms overnight. The patient reports that she does have some residual intermittent nausea but no further vomiting. The patient denies any further diarrhea. The patient is tolerating a regular diet. The patient was seen and examined today and deemed stable for discharge. The patient will follow up with her PCP.

## 2018-05-12 NOTE — PLAN OF CARE
Problem: Patient Care Overview  Goal: Plan of Care Review  Outcome: Ongoing (interventions implemented as appropriate)  Pt alert and oriented. POC reviewed. Clear liquid diet josh advance as tolerated. Pain medication administered per orders. Pt continues to c/o nausea phenergan administered. Pt remained free of falls during shift, no skin breakdown noted at this time. Bed alarm set , call light in reach, room free of clutter, side rails  up x2, will continue to monitor.

## 2018-05-15 LAB — VIT B1 SERPL-MCNC: 40 UG/L (ref 38–122)

## 2019-04-07 ENCOUNTER — HOSPITAL ENCOUNTER (INPATIENT)
Facility: HOSPITAL | Age: 59
LOS: 1 days | Discharge: HOME | DRG: 603 | End: 2019-04-09
Attending: EMERGENCY MEDICINE | Admitting: EMERGENCY MEDICINE
Payer: MEDICARE

## 2019-04-07 DIAGNOSIS — L02.415 CELLULITIS AND ABSCESS OF RIGHT LEG: ICD-10-CM

## 2019-04-07 DIAGNOSIS — I10 ESSENTIAL HYPERTENSION: Primary | ICD-10-CM

## 2019-04-07 DIAGNOSIS — L03.115 CELLULITIS AND ABSCESS OF RIGHT LEG: ICD-10-CM

## 2019-04-07 LAB
ALBUMIN SERPL BCP-MCNC: 3.6 G/DL (ref 3.5–5.2)
ALP SERPL-CCNC: 111 U/L (ref 55–135)
ALT SERPL W/O P-5'-P-CCNC: 9 U/L (ref 10–44)
ANION GAP SERPL CALC-SCNC: 12 MMOL/L (ref 8–16)
AST SERPL-CCNC: 10 U/L (ref 10–40)
BASOPHILS # BLD AUTO: 0.03 K/UL (ref 0–0.2)
BASOPHILS NFR BLD: 0.5 % (ref 0–1.9)
BILIRUB SERPL-MCNC: 0.4 MG/DL (ref 0.1–1)
BUN SERPL-MCNC: 14 MG/DL (ref 8–23)
CALCIUM SERPL-MCNC: 8.9 MG/DL (ref 8.7–10.5)
CHLORIDE SERPL-SCNC: 100 MMOL/L (ref 95–110)
CO2 SERPL-SCNC: 24 MMOL/L (ref 23–29)
CREAT SERPL-MCNC: 1 MG/DL (ref 0.5–1.4)
DIFFERENTIAL METHOD: ABNORMAL
EOSINOPHIL # BLD AUTO: 0.6 K/UL (ref 0–0.5)
EOSINOPHIL NFR BLD: 10.9 % (ref 0–8)
ERYTHROCYTE [DISTWIDTH] IN BLOOD BY AUTOMATED COUNT: 17.2 % (ref 11.5–14.5)
EST. GFR  (AFRICAN AMERICAN): >60 ML/MIN/1.73 M^2
EST. GFR  (NON AFRICAN AMERICAN): >60 ML/MIN/1.73 M^2
GLUCOSE SERPL-MCNC: 120 MG/DL (ref 70–110)
HCT VFR BLD AUTO: 33.7 % (ref 37–48.5)
HGB BLD-MCNC: 10.2 G/DL (ref 12–16)
LACTATE SERPL-SCNC: 0.8 MMOL/L (ref 0.5–2.2)
LYMPHOCYTES # BLD AUTO: 1.9 K/UL (ref 1–4.8)
LYMPHOCYTES NFR BLD: 33 % (ref 18–48)
MCH RBC QN AUTO: 23.8 PG (ref 27–31)
MCHC RBC AUTO-ENTMCNC: 30.3 G/DL (ref 32–36)
MCV RBC AUTO: 79 FL (ref 82–98)
MONOCYTES # BLD AUTO: 0.4 K/UL (ref 0.3–1)
MONOCYTES NFR BLD: 7.4 % (ref 4–15)
NEUTROPHILS # BLD AUTO: 2.7 K/UL (ref 1.8–7.7)
NEUTROPHILS NFR BLD: 48.2 % (ref 38–73)
PLATELET # BLD AUTO: 275 K/UL (ref 150–350)
PMV BLD AUTO: 8 FL (ref 9.2–12.9)
POTASSIUM SERPL-SCNC: 3.8 MMOL/L (ref 3.5–5.1)
PROT SERPL-MCNC: 7.2 G/DL (ref 6–8.4)
RBC # BLD AUTO: 4.28 M/UL (ref 4–5.4)
SODIUM SERPL-SCNC: 136 MMOL/L (ref 136–145)
WBC # BLD AUTO: 5.67 K/UL (ref 3.9–12.7)

## 2019-04-07 PROCEDURE — 87040 BLOOD CULTURE FOR BACTERIA: CPT | Mod: 59

## 2019-04-07 PROCEDURE — 86703 HIV-1/HIV-2 1 RESULT ANTBDY: CPT

## 2019-04-07 PROCEDURE — 85025 COMPLETE CBC W/AUTO DIFF WBC: CPT

## 2019-04-07 PROCEDURE — 63600175 PHARM REV CODE 636 W HCPCS: Performed by: NURSE PRACTITIONER

## 2019-04-07 PROCEDURE — 63600175 PHARM REV CODE 636 W HCPCS: Performed by: EMERGENCY MEDICINE

## 2019-04-07 PROCEDURE — 96376 TX/PRO/DX INJ SAME DRUG ADON: CPT | Performed by: EMERGENCY MEDICINE

## 2019-04-07 PROCEDURE — 99285 EMERGENCY DEPT VISIT HI MDM: CPT | Mod: 25

## 2019-04-07 PROCEDURE — G0378 HOSPITAL OBSERVATION PER HR: HCPCS

## 2019-04-07 PROCEDURE — 86803 HEPATITIS C AB TEST: CPT

## 2019-04-07 PROCEDURE — 80053 COMPREHEN METABOLIC PANEL: CPT

## 2019-04-07 PROCEDURE — 25000003 PHARM REV CODE 250: Performed by: EMERGENCY MEDICINE

## 2019-04-07 PROCEDURE — 96372 THER/PROPH/DIAG INJ SC/IM: CPT | Mod: 59 | Performed by: EMERGENCY MEDICINE

## 2019-04-07 PROCEDURE — 25000003 PHARM REV CODE 250: Performed by: NURSE PRACTITIONER

## 2019-04-07 PROCEDURE — 96375 TX/PRO/DX INJ NEW DRUG ADDON: CPT

## 2019-04-07 PROCEDURE — 83605 ASSAY OF LACTIC ACID: CPT

## 2019-04-07 PROCEDURE — 96365 THER/PROPH/DIAG IV INF INIT: CPT

## 2019-04-07 RX ORDER — ONDANSETRON 2 MG/ML
4 INJECTION INTRAMUSCULAR; INTRAVENOUS
Status: COMPLETED | OUTPATIENT
Start: 2019-04-07 | End: 2019-04-07

## 2019-04-07 RX ORDER — RAMELTEON 8 MG/1
8 TABLET ORAL NIGHTLY PRN
Status: DISCONTINUED | OUTPATIENT
Start: 2019-04-07 | End: 2019-04-09 | Stop reason: HOSPADM

## 2019-04-07 RX ORDER — OXYCODONE AND ACETAMINOPHEN 10; 325 MG/1; MG/1
1 TABLET ORAL EVERY 6 HOURS PRN
Status: DISCONTINUED | OUTPATIENT
Start: 2019-04-07 | End: 2019-04-09 | Stop reason: HOSPADM

## 2019-04-07 RX ORDER — VANCOMYCIN HCL IN 5 % DEXTROSE 1.5G/250ML
1500 PLASTIC BAG, INJECTION (ML) INTRAVENOUS
Status: COMPLETED | OUTPATIENT
Start: 2019-04-07 | End: 2019-04-07

## 2019-04-07 RX ORDER — ASPIRIN 81 MG/1
81 TABLET ORAL DAILY
Status: DISCONTINUED | OUTPATIENT
Start: 2019-04-07 | End: 2019-04-09 | Stop reason: HOSPADM

## 2019-04-07 RX ORDER — PANTOPRAZOLE SODIUM 40 MG/1
40 TABLET, DELAYED RELEASE ORAL DAILY
Status: DISCONTINUED | OUTPATIENT
Start: 2019-04-07 | End: 2019-04-09 | Stop reason: HOSPADM

## 2019-04-07 RX ORDER — CLONIDINE HYDROCHLORIDE 0.1 MG/1
0.1 TABLET ORAL 2 TIMES DAILY
Status: DISCONTINUED | OUTPATIENT
Start: 2019-04-07 | End: 2019-04-09 | Stop reason: HOSPADM

## 2019-04-07 RX ORDER — ARIPIPRAZOLE 5 MG/1
15 TABLET ORAL DAILY
Status: DISCONTINUED | OUTPATIENT
Start: 2019-04-08 | End: 2019-04-09 | Stop reason: HOSPADM

## 2019-04-07 RX ORDER — ALPRAZOLAM 1 MG/1
1 TABLET ORAL 2 TIMES DAILY PRN
Status: DISCONTINUED | OUTPATIENT
Start: 2019-04-07 | End: 2019-04-09 | Stop reason: HOSPADM

## 2019-04-07 RX ORDER — FUROSEMIDE 40 MG/1
40 TABLET ORAL DAILY
Status: DISCONTINUED | OUTPATIENT
Start: 2019-04-08 | End: 2019-04-09

## 2019-04-07 RX ORDER — LOPERAMIDE HYDROCHLORIDE 2 MG/1
2 CAPSULE ORAL DAILY PRN
Status: DISCONTINUED | OUTPATIENT
Start: 2019-04-07 | End: 2019-04-09 | Stop reason: HOSPADM

## 2019-04-07 RX ORDER — ONDANSETRON HYDROCHLORIDE 8 MG/1
8 TABLET, FILM COATED ORAL EVERY 8 HOURS PRN
Status: DISCONTINUED | OUTPATIENT
Start: 2019-04-07 | End: 2019-04-09 | Stop reason: HOSPADM

## 2019-04-07 RX ORDER — CARVEDILOL 12.5 MG/1
12.5 TABLET ORAL 2 TIMES DAILY
Status: DISCONTINUED | OUTPATIENT
Start: 2019-04-07 | End: 2019-04-09 | Stop reason: HOSPADM

## 2019-04-07 RX ORDER — SODIUM CHLORIDE 0.9 % (FLUSH) 0.9 %
10 SYRINGE (ML) INJECTION
Status: DISCONTINUED | OUTPATIENT
Start: 2019-04-07 | End: 2019-04-09 | Stop reason: HOSPADM

## 2019-04-07 RX ORDER — ARIPIPRAZOLE 5 MG/1
15 TABLET ORAL DAILY
Status: DISCONTINUED | OUTPATIENT
Start: 2019-04-07 | End: 2019-04-07

## 2019-04-07 RX ORDER — ENOXAPARIN SODIUM 100 MG/ML
40 INJECTION SUBCUTANEOUS EVERY 24 HOURS
Status: DISCONTINUED | OUTPATIENT
Start: 2019-04-07 | End: 2019-04-09 | Stop reason: HOSPADM

## 2019-04-07 RX ORDER — PROMETHAZINE HYDROCHLORIDE 25 MG/ML
12.5 INJECTION, SOLUTION INTRAMUSCULAR; INTRAVENOUS
Status: DISCONTINUED | OUTPATIENT
Start: 2019-04-07 | End: 2019-04-07

## 2019-04-07 RX ADMIN — ALPRAZOLAM 1 MG: 1 TABLET ORAL at 10:04

## 2019-04-07 RX ADMIN — PIPERACILLIN AND TAZOBACTAM 4.5 G: 4; .5 INJECTION, POWDER, LYOPHILIZED, FOR SOLUTION INTRAVENOUS; PARENTERAL at 12:04

## 2019-04-07 RX ADMIN — ENOXAPARIN SODIUM 40 MG: 100 INJECTION SUBCUTANEOUS at 05:04

## 2019-04-07 RX ADMIN — PIPERACILLIN AND TAZOBACTAM 4.5 G: 4; .5 INJECTION, POWDER, LYOPHILIZED, FOR SOLUTION INTRAVENOUS; PARENTERAL at 08:04

## 2019-04-07 RX ADMIN — OXYCODONE AND ACETAMINOPHEN 1 TABLET: 10; 325 TABLET ORAL at 12:04

## 2019-04-07 RX ADMIN — ASPIRIN 81 MG: 81 TABLET, COATED ORAL at 12:04

## 2019-04-07 RX ADMIN — OXYCODONE AND ACETAMINOPHEN 1 TABLET: 10; 325 TABLET ORAL at 05:04

## 2019-04-07 RX ADMIN — PANTOPRAZOLE SODIUM 40 MG: 40 TABLET, DELAYED RELEASE ORAL at 12:04

## 2019-04-07 RX ADMIN — VANCOMYCIN HYDROCHLORIDE 1500 MG: 100 INJECTION, POWDER, LYOPHILIZED, FOR SOLUTION INTRAVENOUS at 01:04

## 2019-04-07 RX ADMIN — ONDANSETRON 4 MG: 2 INJECTION INTRAMUSCULAR; INTRAVENOUS at 10:04

## 2019-04-07 NOTE — ED NOTES
Pt AAOx3, resting in bed, side rails up x 2 with bed in lowest and locked position, call bell within reach. NAD at this time. Will continue to monitor.

## 2019-04-07 NOTE — ED NOTES
Pt c/o increasing redness and pain to RLE x1 week after hitting it on a bed while on vacation and scratching it. Pt reports being tx with abx.    Patient identifiers verified and correct for Divya Bui.    LOC: The patient is awake, alert and aware of environment with an appropriate affect, the patient is oriented x 3 and speaking appropriately.  APPEARANCE: Patient resting comfortably and in no acute distress, patient is clean and well groomed, patient's clothing is properly fastened.  SKIN: The skin is warm and dry, color consistent with ethnicity, patient has normal skin turgor and moist mucus membranes, skin intact, no breakdown or bruising noted. ** exception to right lower leg. One and a half inch lac to anterior right lower leg with redness and warmth surrounding it. No drainage noted.  MUSCULOSKELETAL: Patient moving all extremities spontaneously.  RESPIRATORY: Airway is open and patent, respirations are spontaneous.  CARDIAC: Patient has a normal rate, no periphreal edema noted, capillary refill < 3 seconds.  ABDOMEN: Soft and non tender to palpation.

## 2019-04-07 NOTE — ED PROVIDER NOTES
"SCRIBE #1 NOTE: I, Maude Isidro, am scribing for, and in the presence of, Jessica Breaux MD. I have scribed the entire note.       History     Chief Complaint   Patient presents with    Cellulitis     Pt states, "I had cut my leg and got the stitches taken out but my leg is all red and swollen now."     Review of patient's allergies indicates:   Allergen Reactions    Flagyl [metronidazole hcl]      Mouth ulcers developed with Flagyl         History of Present Illness     HPI    4/7/2019, 8:56 AM  History obtained from the patient      History of Present Illness: Divya Bui is a 59 y.o. female patient with a PMHx of anemia, anxiety, depression, GERD, HTN, and morbid obesity who presents to the Emergency Department for evaluation of worsening cellulitis which onset gradually over night. Pt reports that she initially cut her R lateral anterior lower leg on a hotel bed frame on 3/31/19. Pt had a laceration repair (and tetanus shot) done at that time and was placed on Cephalexin and Bacitracin ointment. Pt states that the wound became red, infected, and swelled over time. Pt went to  yesterday and was prescribed Clindamycin 150mg and Bactrim DS. However, her leg worsened over night and came to the ED for treatment and evaluation. Symptoms are constant and moderate in severity. No mitigating or exacerbating factors reported. Associated sxs include pain to the area, nausea, redness, and swelling. Patient denies any fever/ chills, CP, SOB, cough, congestion, rash, HA, abdominal pain, emesis, diarrhea, and all other sxs at this time. No further complaints or concerns at this time.       Arrival mode: Personal vehicle      PCP: Lucio Salas MD        Past Medical History:  Past Medical History:   Diagnosis Date    Anemia     Anxiety     Blood transfusion     Bowel incontinence     Depression     Esophageal stricture     GERD (gastroesophageal reflux disease)     Hypertension     Latent tuberculosis by skin " test 2001    took INH    Liver nodule 1/6/2014    Morbid obesity with BMI of 45.0-49.9, adult     OA (osteoarthritis) of knee 12/12/2014    Pericardial effusion 9/4/2014       Past Surgical History:  Past Surgical History:   Procedure Laterality Date    CHOLECYSTECTOMY      COLONOSCOPY N/A 2/25/2015    Performed by [facility]inder P[facility] III, MD at [facility]    EGD (ESOPHAGOGASTRODUODENOSCOPY) N/A 12/19/2013    Performed by Raffaele Bales MD at [facility] ENDO    ESOPHAGOGASTRODUODENOSCOPY (EGD) N/A 2/25/2015    Performed by Narinder Paz III, MD at [facility] ENDO    GASTRIC BYPASS      HERNIA REPAIR      right sholder surgery      SMALL INTESTINE SURGERY           Family History:  Family History   Problem Relation Age of Onset    Crohn's disease Other     Liver cancer Father     Diabetes Sister     Crohn's disease Sister     Liver cancer Sister     Crohn's disease Brother     Lung cancer Mother        Social History:  Social History     Tobacco Use    Smoking status: Never Smoker    Smokeless tobacco: Never Used   Substance and Sexual Activity    Alcohol use: No     Alcohol/week: 0.0 oz    Drug use: No    Sexual activity: Yes     Partners: Male        Review of Systems     Review of Systems   Constitutional: Negative for chills and fever.   HENT: Negative for congestion and sore throat.    Respiratory: Negative for cough and shortness of breath.    Cardiovascular: Positive for leg swelling. Negative for chest pain.   Gastrointestinal: Positive for nausea. Negative for abdominal pain, diarrhea and vomiting.   Genitourinary: Negative for dysuria.   Musculoskeletal: Negative for back pain.   Skin: Positive for color change and wound. Negative for rash.   Neurological: Negative for weakness, light-headedness and headaches.   Hematological: Does not bruise/bleed easily.   All other systems reviewed and are negative.       Physical Exam     Initial Vitals [04/07/19 0830]   BP Pulse Resp Temp SpO2   (!)  "171/88 86 20 97.9 °F (36.6 °C) 97 %      MAP       --          Physical Exam  Nursing Notes and Vital Signs Reviewed.  Constitutional: Patient is in no acute distress. Well-developed and well-nourished.  Head: Atraumatic. Normocephalic.  Eyes: PERRL. EOM intact. Conjunctivae are not pale. No scleral icterus.  ENT: Mucous membranes are moist. Oropharynx is clear and symmetric.    Neck: Supple. Full ROM. No lymphadenopathy.  Cardiovascular: Regular rate. Regular rhythm. No murmurs, rubs, or gallops. Distal pulses are 2+ and symmetric.  Pulmonary/Chest: No respiratory distress. Clear to auscultation bilaterally. No wheezing or rales.  Abdominal: Soft and non-distended.  There is no tenderness.  No rebound, guarding, or rigidity. Good bowel sounds.  Genitourinary: No CVA tenderness  Musculoskeletal: Moves all extremities. No obvious deformities. No edema. No calf tenderness.  Skin: Warm and dry.  Wound of the anterior lateral RLE with surrounding erythema and swelling.  Neurological:  Alert, awake, and appropriate.  Normal speech.  No acute focal neurological deficits are appreciated.  Psychiatric: Normal affect. Good eye contact. Appropriate in content.     ED Course   Procedures  ED Vital Signs:  Vitals:    04/07/19 0830 04/07/19 0905 04/07/19 1005 04/07/19 1135   BP: (!) 171/88 98/70 125/65    Pulse: 86 88 80    Resp: 20 18 18    Temp: 97.9 °F (36.6 °C)   98 °F (36.7 °C)   TempSrc: Oral   Oral   SpO2: 97% 98% 98%    Weight: 112.8 kg (248 lb 9.1 oz)      Height: 5' 3" (1.6 m)       04/07/19 1230 04/07/19 1347   BP: 134/64    Pulse: 82    Resp: 18    Temp:  98.1 °F (36.7 °C)   TempSrc:  Oral   SpO2: 97%    Weight:     Height:         Abnormal Lab Results:  Labs Reviewed   CBC W/ AUTO DIFFERENTIAL - Abnormal; Notable for the following components:       Result Value    Hemoglobin 10.2 (*)     Hematocrit 33.7 (*)     MCV 79 (*)     MCH 23.8 (*)     MCHC 30.3 (*)     RDW 17.2 (*)     MPV 8.0 (*)     Eos # 0.6 (*)     " Eosinophil% 10.9 (*)     All other components within normal limits   COMPREHENSIVE METABOLIC PANEL - Abnormal; Notable for the following components:    Glucose 120 (*)     ALT 9 (*)     All other components within normal limits   CULTURE, BLOOD   CULTURE, BLOOD   LACTIC ACID, PLASMA   HIV 1 / 2 ANTIBODY   HEPATITIS C ANTIBODY        All Lab Results:  Results for orders placed or performed during the hospital encounter of 04/07/19   CBC auto differential   Result Value Ref Range    WBC 5.67 3.90 - 12.70 K/uL    RBC 4.28 4.00 - 5.40 M/uL    Hemoglobin 10.2 (L) 12.0 - 16.0 g/dL    Hematocrit 33.7 (L) 37.0 - 48.5 %    MCV 79 (L) 82 - 98 fL    MCH 23.8 (L) 27.0 - 31.0 pg    MCHC 30.3 (L) 32.0 - 36.0 g/dL    RDW 17.2 (H) 11.5 - 14.5 %    Platelets 275 150 - 350 K/uL    MPV 8.0 (L) 9.2 - 12.9 fL    Gran # (ANC) 2.7 1.8 - 7.7 K/uL    Lymph # 1.9 1.0 - 4.8 K/uL    Mono # 0.4 0.3 - 1.0 K/uL    Eos # 0.6 (H) 0.0 - 0.5 K/uL    Baso # 0.03 0.00 - 0.20 K/uL    Gran% 48.2 38.0 - 73.0 %    Lymph% 33.0 18.0 - 48.0 %    Mono% 7.4 4.0 - 15.0 %    Eosinophil% 10.9 (H) 0.0 - 8.0 %    Basophil% 0.5 0.0 - 1.9 %    Differential Method Automated    Comprehensive metabolic panel   Result Value Ref Range    Sodium 136 136 - 145 mmol/L    Potassium 3.8 3.5 - 5.1 mmol/L    Chloride 100 95 - 110 mmol/L    CO2 24 23 - 29 mmol/L    Glucose 120 (H) 70 - 110 mg/dL    BUN, Bld 14 8 - 23 mg/dL    Creatinine 1.0 0.5 - 1.4 mg/dL    Calcium 8.9 8.7 - 10.5 mg/dL    Total Protein 7.2 6.0 - 8.4 g/dL    Albumin 3.6 3.5 - 5.2 g/dL    Total Bilirubin 0.4 0.1 - 1.0 mg/dL    Alkaline Phosphatase 111 55 - 135 U/L    AST 10 10 - 40 U/L    ALT 9 (L) 10 - 44 U/L    Anion Gap 12 8 - 16 mmol/L    eGFR if African American >60 >60 mL/min/1.73 m^2    eGFR if non African American >60 >60 mL/min/1.73 m^2   Lactic acid, plasma   Result Value Ref Range    Lactate (Lactic Acid) 0.8 0.5 - 2.2 mmol/L         Imaging Results:  Imaging Results          US Soft Tissue Misc (Final  result)  Result time 04/07/19 12:38:12    Final result by Diego Kevin MD (04/07/19 12:38:12)                 Impression:      Subcutaneous edema.  No focal fluid collection.      Electronically signed by: Diego Kevin MD  Date:    04/07/2019  Time:    12:38             Narrative:    EXAMINATION:  US SOFT TISSUE MISC    CLINICAL HISTORY:  Right leg swelling.    TECHNIQUE:  Standard ultrasound technique.    COMPARISON:  None    FINDINGS:  Ultrasound of the anterior right lower extremity soft tissues was performed.  Diffuse subcutaneous edema is present.  No localized fluid collection.                               X-Ray Tibia Fibula 2 View Right (Final result)  Result time 04/07/19 09:33:49    Final result by Diego Kevin MD (04/07/19 09:33:49)                 Impression:      No acute bony abnormality.  Please see above.      Electronically signed by: Diego Kevin MD  Date:    04/07/2019  Time:    09:33             Narrative:    EXAMINATION:  XR TIBIA FIBULA 2 VIEW RIGHT    CLINICAL HISTORY:  Cellulitis of right lower limb,    TECHNIQUE:  Standard radiography performed.    COMPARISON:  None    FINDINGS:  Right knee prosthesis is present.    There appears to be diffuse soft tissue swelling/subcutaneous edema.    There are mild degenerative changes of the right ankle joint.  No acute fracture.                                        The Emergency Provider reviewed the vital signs and test results, which are outlined above.     ED Discussion     10:18 AM: Re-evaluated pt. Pt is resting comfortably and is in no acute distress.  D/w pt all pertinent results. D/w pt any concerns expressed at this time. Answered all questions. Pt expresses understanding at this time.    10:52 AM: Re-evaluated pt. I have discussed test results, shared treatment plan, and the need for admission with patient and family at bedside. Pt and family express understanding at this time and agree with all information. All  questions answered. Pt and family have no further questions or concerns at this time. Pt is ready for admit.    10:52 AM: Discussed case with Dr. Gao ([facility] Medicine). Dr. Gao agrees with current care and management of pt and accepts admission.   Admitting Service: Hospital Medicine  Admitting Physician: Dr. Gao  Admit to: Observation/ [facility]      ED Medication(s):  Medications   ALPRAZolam tablet 1 mg (has no administration in time range)   carvedilol tablet 12.5 mg (has no administration in time range)   aspirin EC tablet 81 mg (81 mg Oral Given 4/7/19 1225)   cloNIDine tablet 0.1 mg (has no administration in time range)   loperamide capsule 2 mg (has no administration in time range)   furosemide tablet 40 mg (has no administration in time range)   pantoprazole EC tablet 40 mg (40 mg Oral Given 4/7/19 1225)   ondansetron tablet 8 mg (has no administration in time range)   oxyCODONE-acetaminophen  mg per tablet 1 tablet (1 tablet Oral Given 4/7/19 1225)   sodium chloride 0.9% flush 10 mL (has no administration in time range)   enoxaparin injection 40 mg (has no administration in time range)   piperacillin-tazobactam 4.5 g in dextrose 5 % 100 mL IVPB (ready to mix system) (0 g Intravenous Stopped 4/7/19 1347)   ARIPiprazole tablet 15 mg (has no administration in time range)   vancomycin (VANCOCIN) 1,250 mg in dextrose 5 % 250 mL IVPB (1,250 mg Intravenous Trough Due 30 minutes Before Dose 4/9/19 0000)   ondansetron injection 4 mg (4 mg Intravenous Given 4/7/19 1027)   vancomycin 1.5 g in 5 % dextrose 250 mL IVPB (1,500 mg Intravenous New Bag 4/7/19 1351)       New Prescriptions    No medications on file                 Medical Decision Making:   Clinical Tests:   Lab Tests: Ordered and Reviewed  Radiological Study: Ordered and Reviewed             Scribe Attestation:   Scribe #1: I performed the above scribed service and the documentation accurately describes the services I performed. I attest to  the accuracy of the note.     Attending:   Physician Attestation Statement for Scribe #1: I, Jessica Breaux MD, personally performed the services described in this documentation, as scribed by Maude Felix, in my presence, and it is both accurate and complete.           Clinical Impression       ICD-10-CM ICD-9-CM   1. Cellulitis and abscess of right leg L03.115 682.6    L02.415        Disposition:   Disposition: Placed in Observation  Condition: Fair         Jessica Breaux MD  04/07/19 1606

## 2019-04-07 NOTE — ED NOTES
Attempted to call report on patient for admit - nurse unable to be reached at this time. Unit secretary states she will have the nurse call me when she finds her. Charge nurse notified.

## 2019-04-07 NOTE — PROGRESS NOTES
Vanc Consult : per Daniela Rizzo for cellulitis of rt leg.   60 y/o with chronic kidney disease, stage 1.   Goal vanc level-10-15. Blood culture x 2 pending.   Crcl 71.4, SCr 1, WBC 5.67, Tmax 98. DW 76.5 kg,   Vancomycin 1500 mg loading followed by 1250mg q18h, due to chronic kidney disease.   Trough before 3rd dose.   Radha Santiago RPh. 4/7/2019 12:25 PM

## 2019-04-07 NOTE — H&P
"[facility] Hospital Medicine  History & Physical    Patient Name: Divya Bui  MRN: [id number]  Admission Date: 4/7/2019  Attending Physician: Dann Gao MD   Primary Care Provider: Lucio Salas MD         Patient information was obtained from patient and ER records.     Subjective:     Principal Problem:Cellulitis and abscess of right leg    Chief Complaint:   Chief Complaint   Patient presents with    Cellulitis     Pt states, "I had cut my leg and got the stitches taken out but my leg is all red and swollen now."        HPI: Divya Bui is a 59 y.o. female patient with a PMHx of anemia, anxiety, depression, GERD, HTN, and morbid obesity who presents to the Emergency Department for evaluation of worsening right LE cellulitis which onset gradually  1 week ago and worsening overnight. She reports initially cutting her leg on a hotel bed frame on 3/31/19. Pt had a laceration repair (and tetanus shot) done at that time and was placed on Cephalexin and Bacitracin ointment. Pt states that the wound became red, infected, and swelled over time. She was seen at urgent care on 4/6/19 and was prescribed Clindamycin 150mg and Bactrim DS. Associated sxs include pain to the area, nausea, redness, and swelling. Patient denies any fever/ chills, CP, SOB, cough, congestion, rash, HA, abdominal pain, emesis, diarrhea, and all other sxs at this time. Labs reviewed and stable. Hospital medicine consult for admission.         Past Medical History:   Diagnosis Date    Anemia     Anxiety     Blood transfusion     Bowel incontinence     Depression     Esophageal stricture     GERD (gastroesophageal reflux disease)     Hypertension     Latent tuberculosis by skin test 2001    took INH    Liver nodule 1/6/2014    Morbid obesity with BMI of 45.0-49.9, adult     OA (osteoarthritis) of knee 12/12/2014    Pericardial effusion 9/4/2014       Past Surgical History:   Procedure Laterality Date    " CHOLECYSTECTOMY      COLONOSCOPY N/A 2/25/2015    Performed by Narinder Paz III, MD at [facility] ENDO    EGD (ESOPHAGOGASTRODUODENOSCOPY) N/A 12/19/2013    Performed by Raffaele Bales MD at [facility] ENDO    ESOPHAGOGASTRODUODENOSCOPY (EGD) N/A 2/25/2015    Performed by Narinder Paz III, MD at [facility] ENDO    GASTRIC BYPASS      HERNIA REPAIR      right [facility] surgery      SMALL INTESTINE SURGERY         Review of patient's allergies indicates:   Allergen Reactions    Flagyl [metronidazole hcl]      Mouth ulcers developed with Flagyl       Current Facility-Administered Medications on File Prior to Encounter   Medication    cyanocobalamin injection 1,000 mcg     Current Outpatient Medications on File Prior to Encounter   Medication Sig    ABILIFY 15 mg Tab Take 15 mg by mouth once daily.     alprazolam (XANAX XR) 2 MG Tb24 Take 1 mg by mouth once daily. 2 mg tablet(2tabs) oral in the am and 1 mg(1tab)in the afternoon    aspirin 81 mg Tab Take 1 tablet by mouth Daily.    carvedilol (COREG) 12.5 MG tablet Take 12.5 mg by mouth.    cloNIDine (CATAPRES) 0.1 MG tablet Take 1 tablet (0.1 mg total) by mouth 2 (two) times daily.    cyanocobalamin (VITAMIN B-12) 1,000 mcg/mL injection Inject 1 mL (1,000 mcg total) into the muscle every 30 days.    diclofenac (VOLTAREN) 75 MG EC tablet Take 75 mg by mouth 2 (two) times daily.    furosemide (LASIX) 40 MG tablet TAKE 1 TABLET (40 MG TOTAL) BY MOUTH ONCE DAILY.    loperamide (IMODIUM A-D) 2 mg Tab Take 2 mg by mouth. 2 in am    melatonin 10 mg Cap Take by mouth nightly as needed.    omeprazole (PRILOSEC) 40 MG capsule Take 40 mg by mouth once daily.    orphenadrine (NORFLEX) 100 mg tablet     oxycodone-acetaminophen (PERCOCET)  mg per tablet     potassium chloride (KLOR-CON) 10 MEQ TbSR Take 1 tablet (10 mEq total) by mouth 2 (two) times daily.    zolpidem (AMBIEN CR) 12.5 MG CR tablet Take 12.5 mg by mouth nightly as needed for Insomnia.     amlodipine (NORVASC) 5 MG tablet Take 1 tablet (5 mg total) by mouth once daily.    fluoxetine (PROZAC) 40 MG capsule Take 1 capsule (40 mg total) by mouth once daily.    metoclopramide HCl (REGLAN) 10 MG tablet Take 1 tablet (10 mg total) by mouth every 6 (six) hours.    ondansetron (ZOFRAN) 4 MG tablet Take 2 tablets (8 mg total) by mouth every 8 (eight) hours as needed for Nausea.    promethazine (PHENERGAN) 25 MG tablet Take 1 tablet (25 mg total) by mouth every 6 (six) hours as needed for Nausea.    zoledronic acid-mannitol & water (RECLAST) 5 mg/100 mL Soln Inject 5 mg into the vein Once a year.     Family History     Problem Relation (Age of Onset)    Crohn's disease Other, Sister, Brother    Diabetes Sister    Liver cancer Father, Sister    Lung cancer Mother        Tobacco Use    Smoking status: Never Smoker    Smokeless tobacco: Never Used   Substance and Sexual Activity    Alcohol use: No     Alcohol/week: 0.0 oz    Drug use: No    Sexual activity: Yes     Partners: Male     Review of Systems   Constitutional: Negative.  Negative for activity change, appetite change, fatigue and fever.   HENT: Negative.    Eyes: Negative.    Respiratory: Negative.  Negative for cough and shortness of breath.    Cardiovascular: Positive for leg swelling. Negative for chest pain.   Gastrointestinal: Positive for nausea. Negative for abdominal pain and vomiting.   Endocrine: Negative.    Genitourinary: Negative.  Negative for dysuria, frequency and urgency.   Musculoskeletal: Negative.    Skin: Positive for color change and wound.   Allergic/Immunologic: Negative.    Neurological: Negative.  Negative for dizziness, speech difficulty and headaches.   Hematological: Negative.    Psychiatric/Behavioral: Negative.      Objective:     Vital Signs (Most Recent):  Temp: 98 °F (36.7 °C) (04/07/19 1135)  Pulse: 80 (04/07/19 1005)  Resp: 18 (04/07/19 1005)  BP: 125/65 (04/07/19 1005)  SpO2: 98 % (04/07/19 1005) Vital Signs  (24h Range):  Temp:  [97.9 °F (36.6 °C)-98 °F (36.7 °C)] 98 °F (36.7 °C)  Pulse:  [80-88] 80  Resp:  [18-20] 18  SpO2:  [97 %-98 %] 98 %  BP: ()/(65-88) 125/65     Weight: 112.8 kg (248 lb 9.1 oz)  Body mass index is 44.03 kg/m².    Physical Exam   Constitutional: She is oriented to person, place, and time. She appears well-developed and well-nourished.   Obese    HENT:   Head: Normocephalic and atraumatic.   Eyes: Pupils are equal, round, and reactive to light. Conjunctivae and EOM are normal.   Neck: Normal range of motion. Neck supple.   Cardiovascular: Normal rate, regular rhythm, normal heart sounds and intact distal pulses.   No murmur heard.  Pulmonary/Chest: Effort normal and breath sounds normal. No respiratory distress.   Abdominal: Soft. Bowel sounds are normal.   Musculoskeletal: Normal range of motion.   Neurological: She is alert and oriented to person, place, and time. She has normal reflexes.   Skin: Skin is warm and dry.   Lateral anterior RLE wound with surrounding erythema and swelling. See pic below    Psychiatric: She has a normal mood and affect. Her behavior is normal. Judgment and thought content normal.   Nursing note and vitals reviewed.                 Significant Labs:   Blood Culture: No results for input(s): LABBLOO in the last 48 hours.  BMP:   Recent Labs   Lab 04/07/19  0921   *      K 3.8      CO2 24   BUN 14   CREATININE 1.0   CALCIUM 8.9     CBC:   Recent Labs   Lab 04/07/19  0921   WBC 5.67   HGB 10.2*   HCT 33.7*        CMP:   Recent Labs   Lab 04/07/19  0921      K 3.8      CO2 24   *   BUN 14   CREATININE 1.0   CALCIUM 8.9   PROT 7.2   ALBUMIN 3.6   BILITOT 0.4   ALKPHOS 111   AST 10   ALT 9*   ANIONGAP 12   EGFRNONAA >60     Lactic Acid:   Recent Labs   Lab 04/07/19  0921   LACTATE 0.8     All pertinent labs within the past 24 hours have been reviewed.    Significant Imaging:   Imaging Results          X-Ray Tibia Fibula 2  View Right (Final result)  Result time 04/07/19 09:33:49    Final result by Diego Kevin MD (04/07/19 09:33:49)                 Impression:      No acute bony abnormality.  Please see above.      Electronically signed by: Diego Kevin MD  Date:    04/07/2019  Time:    09:33             Narrative:    EXAMINATION:  XR TIBIA FIBULA 2 VIEW RIGHT    CLINICAL HISTORY:  Cellulitis of right lower limb,    TECHNIQUE:  Standard radiography performed.    COMPARISON:  None    FINDINGS:  Right knee prosthesis is present.    There appears to be diffuse soft tissue swelling/subcutaneous edema.    There are mild degenerative changes of the right ankle joint.  No acute fracture.                              Assessment/Plan:     * Cellulitis and abscess of right leg  US RLE pending to r/o abscess   Blood cx   IV Vancomycin and Zosyn  Monitor        HTN (hypertension)  Continue carvedilol, clonidine, and lasix    Monitor     Chronic kidney disease, stage I  Stable   Bmp daily         VTE Risk Mitigation (From admission, onward)        Ordered     enoxaparin injection 40 mg  Daily      04/07/19 1157     IP VTE HIGH RISK PATIENT  Once      04/07/19 1157             Daniela Rizzo NP  Department of Hospital Medicine   [facility]

## 2019-04-07 NOTE — HPI
Divya Bui is a 59 y.o. female patient with a PMHx of anemia, anxiety, depression, GERD, HTN, and morbid obesity who presents to the Emergency Department for evaluation of worsening right LE cellulitis which onset gradually 1 week ago and worsening overnight. She reports initially cutting her leg on a hotel bed frame on 3/31/19. Pt had a laceration repair (and tetanus shot) done at that time and was placed on Cephalexin and Bacitracin ointment. Pt states that the wound became red, infected, and swelled over time. She was seen at urgent care on 4/6/19 and was prescribed Clindamycin 150mg and Bactrim DS. Associated sxs include pain to the area, nausea, redness, and swelling. Patient denies any fever/ chills, CP, SOB, cough, congestion, rash, HA, abdominal pain, emesis, diarrhea, and all other sxs at this time. Labs reviewed and stable. Hospital medicine consult for admission.

## 2019-04-07 NOTE — NURSING
Patient arrived to floor via wheelchair. She is AAO x3 and in NAD. She ambulated to bed. Vitals stable. No complaints of pain or nausea. RLE cellulitis- red, tender and warm to touch. Red area marked with pen. Plan of care reviewed with patient. All questions answered. Will continue to monitor.

## 2019-04-07 NOTE — SUBJECTIVE & OBJECTIVE
Past Medical History:   Diagnosis Date    Anemia     Anxiety     Blood transfusion     Bowel incontinence     Depression     Esophageal stricture     GERD (gastroesophageal reflux disease)     Hypertension     Latent tuberculosis by skin test 2001    took INH    Liver nodule 1/6/2014    Morbid obesity with BMI of 45.0-49.9, adult     OA (osteoarthritis) of knee 12/12/2014    Pericardial effusion 9/4/2014       Past Surgical History:   Procedure Laterality Date    CHOLECYSTECTOMY      COLONOSCOPY N/A 2/25/2015    Performed by Narinder Paz III, MD at [facility]    EGD (ESOPHAGOGASTRODUODENOSCOPY) N/A 12/19/2013    Performed by Raffaele Bales MD at [facility] ENDO    ESOPHAGOGASTRODUODENOSCOPY (EGD) N/A 2/25/2015    Performed by Narinder Paz III, MD at [facility] ENDO    GASTRIC BYPASS      HERNIA REPAIR      [facility]      SMALL INTESTINE SURGERY         Review of patient's allergies indicates:   Allergen Reactions    Flagyl [metronidazole hcl]      Mouth ulcers developed with Flagyl       Current Facility-Administered Medications on File Prior to Encounter   Medication    cyanocobalamin injection 1,000 mcg     Current Outpatient Medications on File Prior to Encounter   Medication Sig    ABILIFY 15 mg Tab Take 15 mg by mouth once daily.     alprazolam (XANAX XR) 2 MG Tb24 Take 1 mg by mouth once daily. 2 mg tablet(2tabs) oral in the am and 1 mg(1tab)in the afternoon    aspirin 81 mg Tab Take 1 tablet by mouth Daily.    carvedilol (COREG) 12.5 MG tablet Take 12.5 mg by mouth.    cloNIDine (CATAPRES) 0.1 MG tablet Take 1 tablet (0.1 mg total) by mouth 2 (two) times daily.    cyanocobalamin (VITAMIN B-12) 1,000 mcg/mL injection Inject 1 mL (1,000 mcg total) into the muscle every 30 days.    diclofenac (VOLTAREN) 75 MG EC tablet Take 75 mg by mouth 2 (two) times daily.    furosemide (LASIX) 40 MG tablet TAKE 1 TABLET (40 MG TOTAL) BY MOUTH ONCE DAILY.    loperamide (IMODIUM A-D)  2 mg Tab Take 2 mg by mouth. 2 in am    melatonin 10 mg Cap Take by mouth nightly as needed.    omeprazole (PRILOSEC) 40 MG capsule Take 40 mg by mouth once daily.    orphenadrine (NORFLEX) 100 mg tablet     oxycodone-acetaminophen (PERCOCET)  mg per tablet     potassium chloride (KLOR-CON) 10 MEQ TbSR Take 1 tablet (10 mEq total) by mouth 2 (two) times daily.    zolpidem (AMBIEN CR) 12.5 MG CR tablet Take 12.5 mg by mouth nightly as needed for Insomnia.    amlodipine (NORVASC) 5 MG tablet Take 1 tablet (5 mg total) by mouth once daily.    fluoxetine (PROZAC) 40 MG capsule Take 1 capsule (40 mg total) by mouth once daily.    metoclopramide HCl (REGLAN) 10 MG tablet Take 1 tablet (10 mg total) by mouth every 6 (six) hours.    ondansetron (ZOFRAN) 4 MG tablet Take 2 tablets (8 mg total) by mouth every 8 (eight) hours as needed for Nausea.    promethazine (PHENERGAN) 25 MG tablet Take 1 tablet (25 mg total) by mouth every 6 (six) hours as needed for Nausea.    zoledronic acid-mannitol & water (RECLAST) 5 mg/100 mL Soln Inject 5 mg into the vein Once a year.     Family History     Problem Relation (Age of Onset)    Crohn's disease Other, Sister, Brother    Diabetes Sister    Liver cancer Father, Sister    Lung cancer Mother        Tobacco Use    Smoking status: Never Smoker    Smokeless tobacco: Never Used   Substance and Sexual Activity    Alcohol use: No     Alcohol/week: 0.0 oz    Drug use: No    Sexual activity: Yes     Partners: Male     Review of Systems   Constitutional: Negative.  Negative for activity change, appetite change, fatigue and fever.   HENT: Negative.    Eyes: Negative.    Respiratory: Negative.  Negative for cough and shortness of breath.    Cardiovascular: Positive for leg swelling. Negative for chest pain.   Gastrointestinal: Positive for nausea. Negative for abdominal pain and vomiting.   Endocrine: Negative.    Genitourinary: Negative.  Negative for dysuria, frequency and  urgency.   Musculoskeletal: Negative.    Skin: Positive for color change and wound.   Allergic/Immunologic: Negative.    Neurological: Negative.  Negative for dizziness, speech difficulty and headaches.   Hematological: Negative.    Psychiatric/Behavioral: Negative.      Objective:     Vital Signs (Most Recent):  Temp: 98 °F (36.7 °C) (04/07/19 1135)  Pulse: 80 (04/07/19 1005)  Resp: 18 (04/07/19 1005)  BP: 125/65 (04/07/19 1005)  SpO2: 98 % (04/07/19 1005) Vital Signs (24h Range):  Temp:  [97.9 °F (36.6 °C)-98 °F (36.7 °C)] 98 °F (36.7 °C)  Pulse:  [80-88] 80  Resp:  [18-20] 18  SpO2:  [97 %-98 %] 98 %  BP: ()/(65-88) 125/65     Weight: 112.8 kg (248 lb 9.1 oz)  Body mass index is 44.03 kg/m².    Physical Exam   Constitutional: She is oriented to person, place, and time. She appears well-developed and well-nourished.   Obese    HENT:   Head: Normocephalic and atraumatic.   Eyes: Pupils are equal, round, and reactive to light. Conjunctivae and EOM are normal.   Neck: Normal range of motion. Neck supple.   Cardiovascular: Normal rate, regular rhythm, normal heart sounds and intact distal pulses.   No murmur heard.  Pulmonary/Chest: Effort normal and breath sounds normal. No respiratory distress.   Abdominal: Soft. Bowel sounds are normal.   Musculoskeletal: Normal range of motion.   Neurological: She is alert and oriented to person, place, and time. She has normal reflexes.   Skin: Skin is warm and dry.   Lateral anterior RLE wound with surrounding erythema and swelling. See pic below    Psychiatric: She has a normal mood and affect. Her behavior is normal. Judgment and thought content normal.   Nursing note and vitals reviewed.         Significant Labs:   Blood Culture: No results for input(s): LABBLOO in the last 48 hours.  BMP:   Recent Labs   Lab 04/07/19  0921   *      K 3.8      CO2 24   BUN 14   CREATININE 1.0   CALCIUM 8.9     CBC:   Recent Labs   Lab 04/07/19  0921   WBC 5.67   HGB  10.2*   HCT 33.7*        CMP:   Recent Labs   Lab 04/07/19  0921      K 3.8      CO2 24   *   BUN 14   CREATININE 1.0   CALCIUM 8.9   PROT 7.2   ALBUMIN 3.6   BILITOT 0.4   ALKPHOS 111   AST 10   ALT 9*   ANIONGAP 12   EGFRNONAA >60     Lactic Acid:   Recent Labs   Lab 04/07/19  0921   LACTATE 0.8     All pertinent labs within the past 24 hours have been reviewed.    Significant Imaging:   Imaging Results          X-Ray Tibia Fibula 2 View Right (Final result)  Result time 04/07/19 09:33:49    Final result by Diego Kevin MD (04/07/19 09:33:49)                 Impression:      No acute bony abnormality.  Please see above.      Electronically signed by: Diego Kevin MD  Date:    04/07/2019  Time:    09:33             Narrative:    EXAMINATION:  XR TIBIA FIBULA 2 VIEW RIGHT    CLINICAL HISTORY:  Cellulitis of right lower limb,    TECHNIQUE:  Standard radiography performed.    COMPARISON:  None    FINDINGS:  Right knee prosthesis is present.    There appears to be diffuse soft tissue swelling/subcutaneous edema.    There are mild degenerative changes of the right ankle joint.  No acute fracture.

## 2019-04-08 LAB
ANION GAP SERPL CALC-SCNC: 9 MMOL/L (ref 8–16)
BASOPHILS # BLD AUTO: 0.03 K/UL (ref 0–0.2)
BASOPHILS NFR BLD: 0.5 % (ref 0–1.9)
BUN SERPL-MCNC: 14 MG/DL (ref 8–23)
CALCIUM SERPL-MCNC: 8.2 MG/DL (ref 8.7–10.5)
CHLORIDE SERPL-SCNC: 100 MMOL/L (ref 95–110)
CO2 SERPL-SCNC: 26 MMOL/L (ref 23–29)
CREAT SERPL-MCNC: 1 MG/DL (ref 0.5–1.4)
DIFFERENTIAL METHOD: ABNORMAL
EOSINOPHIL # BLD AUTO: 0.5 K/UL (ref 0–0.5)
EOSINOPHIL NFR BLD: 9 % (ref 0–8)
ERYTHROCYTE [DISTWIDTH] IN BLOOD BY AUTOMATED COUNT: 17.6 % (ref 11.5–14.5)
EST. GFR  (AFRICAN AMERICAN): >60 ML/MIN/1.73 M^2
EST. GFR  (NON AFRICAN AMERICAN): >60 ML/MIN/1.73 M^2
GLUCOSE SERPL-MCNC: 99 MG/DL (ref 70–110)
HCT VFR BLD AUTO: 30.2 % (ref 37–48.5)
HCV AB SERPL QL IA: NEGATIVE
HGB BLD-MCNC: 8.9 G/DL (ref 12–16)
HIV 1+2 AB+HIV1 P24 AG SERPL QL IA: NEGATIVE
LYMPHOCYTES # BLD AUTO: 1.4 K/UL (ref 1–4.8)
LYMPHOCYTES NFR BLD: 24.3 % (ref 18–48)
MCH RBC QN AUTO: 23.7 PG (ref 27–31)
MCHC RBC AUTO-ENTMCNC: 29.5 G/DL (ref 32–36)
MCV RBC AUTO: 80 FL (ref 82–98)
MONOCYTES # BLD AUTO: 0.5 K/UL (ref 0.3–1)
MONOCYTES NFR BLD: 8.8 % (ref 4–15)
NEUTROPHILS # BLD AUTO: 3.3 K/UL (ref 1.8–7.7)
NEUTROPHILS NFR BLD: 57.4 % (ref 38–73)
PLATELET # BLD AUTO: 270 K/UL (ref 150–350)
PMV BLD AUTO: 8.2 FL (ref 9.2–12.9)
POTASSIUM SERPL-SCNC: 4 MMOL/L (ref 3.5–5.1)
RBC # BLD AUTO: 3.76 M/UL (ref 4–5.4)
SODIUM SERPL-SCNC: 135 MMOL/L (ref 136–145)
WBC # BLD AUTO: 5.8 K/UL (ref 3.9–12.7)

## 2019-04-08 PROCEDURE — 80048 BASIC METABOLIC PNL TOTAL CA: CPT

## 2019-04-08 PROCEDURE — 36415 COLL VENOUS BLD VENIPUNCTURE: CPT

## 2019-04-08 PROCEDURE — 25000003 PHARM REV CODE 250: Performed by: NURSE PRACTITIONER

## 2019-04-08 PROCEDURE — 63600175 PHARM REV CODE 636 W HCPCS: Performed by: NURSE PRACTITIONER

## 2019-04-08 PROCEDURE — 96372 THER/PROPH/DIAG INJ SC/IM: CPT | Mod: 59 | Performed by: EMERGENCY MEDICINE

## 2019-04-08 PROCEDURE — 11000001 HC ACUTE MED/SURG PRIVATE ROOM

## 2019-04-08 PROCEDURE — 63600175 PHARM REV CODE 636 W HCPCS: Performed by: EMERGENCY MEDICINE

## 2019-04-08 PROCEDURE — 25000003 PHARM REV CODE 250: Performed by: EMERGENCY MEDICINE

## 2019-04-08 PROCEDURE — 96376 TX/PRO/DX INJ SAME DRUG ADON: CPT | Performed by: EMERGENCY MEDICINE

## 2019-04-08 PROCEDURE — 85025 COMPLETE CBC W/AUTO DIFF WBC: CPT

## 2019-04-08 RX ORDER — MICONAZOLE NITRATE 2 %
POWDER (GRAM) TOPICAL 2 TIMES DAILY
Status: DISCONTINUED | OUTPATIENT
Start: 2019-04-08 | End: 2019-04-09 | Stop reason: HOSPADM

## 2019-04-08 RX ORDER — MICONAZOLE NITRATE 2 %
1 CREAM WITH APPLICATOR VAGINAL NIGHTLY
Status: DISCONTINUED | OUTPATIENT
Start: 2019-04-08 | End: 2019-04-09 | Stop reason: HOSPADM

## 2019-04-08 RX ADMIN — Medication: at 09:04

## 2019-04-08 RX ADMIN — OXYCODONE AND ACETAMINOPHEN 1 TABLET: 10; 325 TABLET ORAL at 06:04

## 2019-04-08 RX ADMIN — ARIPIPRAZOLE 15 MG: 5 TABLET ORAL at 09:04

## 2019-04-08 RX ADMIN — CARVEDILOL 12.5 MG: 12.5 TABLET, FILM COATED ORAL at 09:04

## 2019-04-08 RX ADMIN — MICONAZOLE NITRATE 1 APPLICATOR: 20 CREAM VAGINAL at 10:04

## 2019-04-08 RX ADMIN — PIPERACILLIN AND TAZOBACTAM 4.5 G: 4; .5 INJECTION, POWDER, LYOPHILIZED, FOR SOLUTION INTRAVENOUS; PARENTERAL at 04:04

## 2019-04-08 RX ADMIN — VANCOMYCIN HYDROCHLORIDE 1250 MG: 100 INJECTION, POWDER, LYOPHILIZED, FOR SOLUTION INTRAVENOUS at 09:04

## 2019-04-08 RX ADMIN — FUROSEMIDE 40 MG: 40 TABLET ORAL at 09:04

## 2019-04-08 RX ADMIN — ENOXAPARIN SODIUM 40 MG: 100 INJECTION SUBCUTANEOUS at 06:04

## 2019-04-08 RX ADMIN — OXYCODONE AND ACETAMINOPHEN 1 TABLET: 10; 325 TABLET ORAL at 04:04

## 2019-04-08 RX ADMIN — OXYCODONE AND ACETAMINOPHEN 1 TABLET: 10; 325 TABLET ORAL at 10:04

## 2019-04-08 RX ADMIN — RAMELTEON 8 MG: 8 TABLET, FILM COATED ORAL at 10:04

## 2019-04-08 RX ADMIN — PANTOPRAZOLE SODIUM 40 MG: 40 TABLET, DELAYED RELEASE ORAL at 09:04

## 2019-04-08 RX ADMIN — PIPERACILLIN AND TAZOBACTAM 4.5 G: 4; .5 INJECTION, POWDER, LYOPHILIZED, FOR SOLUTION INTRAVENOUS; PARENTERAL at 08:04

## 2019-04-08 RX ADMIN — ASPIRIN 81 MG: 81 TABLET, COATED ORAL at 09:04

## 2019-04-08 NOTE — ASSESSMENT & PLAN NOTE
US RLE pending to r/o abscess   Blood cx   IV Vancomycin and Zosyn  Monitor    4/8/19  Blood cx show NGTD   Continue IV abx

## 2019-04-08 NOTE — PROGRESS NOTES
[facility] Hospital Medicine  Progress Note    Patient Name: Divya Bui  MRN: [id number]  Patient Class: OP- Observation   Admission Date: 4/7/2019  Length of Stay: 0 days  Attending Physician: Dann Gao MD  Primary Care Provider: Lucio Salas MD        Subjective:     Principal Problem:Cellulitis and abscess of right leg    HPI:  Divya Bui is a 59 y.o. female patient with a PMHx of anemia, anxiety, depression, GERD, HTN, and morbid obesity who presents to the Emergency Department for evaluation of worsening right LE cellulitis which onset gradually  1 week ago and worsening overnight. She reports initially cutting her leg on a hotel bed frame on 3/31/19. Pt had a laceration repair (and tetanus shot) done at that time and was placed on Cephalexin and Bacitracin ointment. Pt states that the wound became red, infected, and swelled over time. She was seen at urgent care on 4/6/19 and was prescribed Clindamycin 150mg and Bactrim DS. Associated sxs include pain to the area, nausea, redness, and swelling. Patient denies any fever/ chills, CP, SOB, cough, congestion, rash, HA, abdominal pain, emesis, diarrhea, and all other sxs at this time. Labs reviewed and stable. Hospital medicine consult for admission.         Hospital Course:  60 y/o female admitted for cellulitis of right leg. IV vancomycin and zosyn started. Blood cx remain negative. Symptoms improving. Will continue IV abx.     Interval History: No acute events overnight. Symptoms improving. Continue IV abx.    Review of Systems   Constitutional: Negative.  Negative for activity change, appetite change, fatigue and fever.   HENT: Negative.    Eyes: Negative.    Respiratory: Negative.  Negative for cough and shortness of breath.    Cardiovascular: Positive for leg swelling. Negative for chest pain.   Gastrointestinal: Positive for nausea. Negative for abdominal pain and vomiting.   Endocrine: Negative.    Genitourinary: Negative.   Negative for dysuria, frequency and urgency.   Musculoskeletal: Negative.    Skin: Positive for color change and wound.   Allergic/Immunologic: Negative.    Neurological: Negative.  Negative for dizziness, speech difficulty and headaches.   Hematological: Negative.    Psychiatric/Behavioral: Negative.      Objective:     Vital Signs (Most Recent):  Temp: 97.7 °F (36.5 °C) (04/08/19 0734)  Pulse: 80 (04/08/19 0734)  Resp: 18 (04/08/19 0734)  BP: 118/62 (04/08/19 0734)  SpO2: 98 % (04/08/19 0734) Vital Signs (24h Range):  Temp:  [97.4 °F (36.3 °C)-98.4 °F (36.9 °C)] 97.7 °F (36.5 °C)  Pulse:  [75-87] 80  Resp:  [18] 18  SpO2:  [95 %-98 %] 98 %  BP: (100-134)/(58-72) 118/62     Weight: 112.8 kg (248 lb 9.1 oz)  Body mass index is 44.03 kg/m².    Intake/Output Summary (Last 24 hours) at 4/8/2019 1209  Last data filed at 4/7/2019 1347  Gross per 24 hour   Intake 100 ml   Output --   Net 100 ml      Physical Exam   Constitutional: She is oriented to person, place, and time. She appears well-developed and well-nourished.   Obese    HENT:   Head: Normocephalic and atraumatic.   Eyes: Pupils are equal, round, and reactive to light. Conjunctivae and EOM are normal.   Neck: Normal range of motion. Neck supple.   Cardiovascular: Normal rate, regular rhythm, normal heart sounds and intact distal pulses.   No murmur heard.  Pulmonary/Chest: Effort normal and breath sounds normal. No respiratory distress.   Abdominal: Soft. Bowel sounds are normal.   Musculoskeletal: Normal range of motion.   Neurological: She is alert and oriented to person, place, and time. She has normal reflexes.   Skin: Skin is warm and dry.   Lateral anterior RLE wound with surrounding erythema and swelling. (improving)    Psychiatric: She has a normal mood and affect. Her behavior is normal. Judgment and thought content normal.   Nursing note and vitals reviewed.      Significant Labs:   Blood Culture:   Recent Labs   Lab 04/07/19  1230 04/07/19  1243    LABBLOO No Growth to date No Growth to date     BMP:   Recent Labs   Lab 04/08/19  0444   GLU 99   *   K 4.0      CO2 26   BUN 14   CREATININE 1.0   CALCIUM 8.2*     CBC:   Recent Labs   Lab 04/07/19 0921 04/08/19 0445   WBC 5.67 5.80   HGB 10.2* 8.9*   HCT 33.7* 30.2*    270     CMP:   Recent Labs   Lab 04/07/19 0921 04/08/19 0444    135*   K 3.8 4.0    100   CO2 24 26   * 99   BUN 14 14   CREATININE 1.0 1.0   CALCIUM 8.9 8.2*   PROT 7.2  --    ALBUMIN 3.6  --    BILITOT 0.4  --    ALKPHOS 111  --    AST 10  --    ALT 9*  --    ANIONGAP 12 9   EGFRNONAA >60 >60     All pertinent labs within the past 24 hours have been reviewed.    Significant Imaging:   Imaging Results          US Soft Tissue Misc (Final result)  Result time 04/07/19 12:38:12    Final result by Diego Kevin MD (04/07/19 12:38:12)                 Impression:      Subcutaneous edema.  No focal fluid collection.      Electronically signed by: Diego Kevin MD  Date:    04/07/2019  Time:    12:38             Narrative:    EXAMINATION:  US SOFT TISSUE MISC    CLINICAL HISTORY:  Right leg swelling.    TECHNIQUE:  Standard ultrasound technique.    COMPARISON:  None    FINDINGS:  Ultrasound of the anterior right lower extremity soft tissues was performed.  Diffuse subcutaneous edema is present.  No localized fluid collection.                               X-Ray Tibia Fibula 2 View Right (Final result)  Result time 04/07/19 09:33:49    Final result by Diego Kevin MD (04/07/19 09:33:49)                 Impression:      No acute bony abnormality.  Please see above.      Electronically signed by: Diego Kevin MD  Date:    04/07/2019  Time:    09:33             Narrative:    EXAMINATION:  XR TIBIA FIBULA 2 VIEW RIGHT    CLINICAL HISTORY:  Cellulitis of right lower limb,    TECHNIQUE:  Standard radiography performed.    COMPARISON:  None    FINDINGS:  Right knee prosthesis is present.    There  appears to be diffuse soft tissue swelling/subcutaneous edema.    There are mild degenerative changes of the right ankle joint.  No acute fracture.                              Assessment/Plan:      * Cellulitis and abscess of right leg  US RLE pending to r/o abscess   Blood cx   IV Vancomycin and Zosyn  Monitor    4/8/19  Blood cx show NGTD   Continue IV abx       HTN (hypertension)  Continue carvedilol, clonidine, and lasix    Monitor   Bp stable     Chronic kidney disease, stage I  Stable   Bmp daily       VTE Risk Mitigation (From admission, onward)        Ordered     enoxaparin injection 40 mg  Daily      04/07/19 1157     IP VTE HIGH RISK PATIENT  Once      04/07/19 1157              Daniela Rizzo NP  Department of Hospital Medicine   [facility]

## 2019-04-08 NOTE — PLAN OF CARE
Problem: Adult Inpatient Plan of Care  Goal: Patient-Specific Goal (Individualization)  Outcome: Ongoing (interventions implemented as appropriate)  Patient remains injury free.  No signs or symptoms of distress noted.  No further swelling noted to the RLL.  Will continue to monitor.

## 2019-04-08 NOTE — PLAN OF CARE
Problem: Adult Inpatient Plan of Care  Goal: Plan of Care Review  Outcome: Ongoing (interventions implemented as appropriate)  No acute distress noted. Antibiotics infusing. Redness and warmth to RLE. Safety measures are in place. Call bell within reach. Pain being managed. Pt free of falls. Will continue to monitor. Chart check complete.

## 2019-04-08 NOTE — HOSPITAL COURSE
Patient admitted to Med Surg for cellulitis of R leg under the care of Hospital Medicine. She was given IV vancomyinc and IV zosyn. BC are NGTD. Symptoms improved slowly. US showed Subcutaneous edema.  No focal fluid collection. Patient will continue PO clindamycin and PO bactrim along with bacitracin ointment BID and f/u with her PCP on Friday. Patient was seen and examined today and deemed stable for discharge home.

## 2019-04-08 NOTE — PLAN OF CARE
04/08/19 1320   CHURCHILL Message   Medicare Outpatient and Observation Notification regarding financial responsibility Given to patient/caregiver;Explained to patient/caregiver;Signed/date by patient/caregiver   Date CHURCHILL was signed 04/08/19   Time CHURCHILL was signed 1326

## 2019-04-08 NOTE — SUBJECTIVE & OBJECTIVE
Interval History: No acute events overnight. Symptoms improving. Continue IV abx.    Review of Systems   Constitutional: Negative.  Negative for activity change, appetite change, fatigue and fever.   HENT: Negative.    Eyes: Negative.    Respiratory: Negative.  Negative for cough and shortness of breath.    Cardiovascular: Positive for leg swelling. Negative for chest pain.   Gastrointestinal: Positive for nausea. Negative for abdominal pain and vomiting.   Endocrine: Negative.    Genitourinary: Negative.  Negative for dysuria, frequency and urgency.   Musculoskeletal: Negative.    Skin: Positive for color change and wound.   Allergic/Immunologic: Negative.    Neurological: Negative.  Negative for dizziness, speech difficulty and headaches.   Hematological: Negative.    Psychiatric/Behavioral: Negative.      Objective:     Vital Signs (Most Recent):  Temp: 97.7 °F (36.5 °C) (04/08/19 0734)  Pulse: 80 (04/08/19 0734)  Resp: 18 (04/08/19 0734)  BP: 118/62 (04/08/19 0734)  SpO2: 98 % (04/08/19 0734) Vital Signs (24h Range):  Temp:  [97.4 °F (36.3 °C)-98.4 °F (36.9 °C)] 97.7 °F (36.5 °C)  Pulse:  [75-87] 80  Resp:  [18] 18  SpO2:  [95 %-98 %] 98 %  BP: (100-134)/(58-72) 118/62     Weight: 112.8 kg (248 lb 9.1 oz)  Body mass index is 44.03 kg/m².    Intake/Output Summary (Last 24 hours) at 4/8/2019 1209  Last data filed at 4/7/2019 1347  Gross per 24 hour   Intake 100 ml   Output --   Net 100 ml      Physical Exam   Constitutional: She is oriented to person, place, and time. She appears well-developed and well-nourished.   Obese    HENT:   Head: Normocephalic and atraumatic.   Eyes: Pupils are equal, round, and reactive to light. Conjunctivae and EOM are normal.   Neck: Normal range of motion. Neck supple.   Cardiovascular: Normal rate, regular rhythm, normal heart sounds and intact distal pulses.   No murmur heard.  Pulmonary/Chest: Effort normal and breath sounds normal. No respiratory distress.   Abdominal: Soft.  Bowel sounds are normal.   Musculoskeletal: Normal range of motion.   Neurological: She is alert and oriented to person, place, and time. She has normal reflexes.   Skin: Skin is warm and dry.   Lateral anterior RLE wound with surrounding erythema and swelling. (improving)    Psychiatric: She has a normal mood and affect. Her behavior is normal. Judgment and thought content normal.   Nursing note and vitals reviewed.      Significant Labs:   Blood Culture:   Recent Labs   Lab 04/07/19  1230 04/07/19  1240   LABBLOO No Growth to date No Growth to date     BMP:   Recent Labs   Lab 04/08/19  0444   GLU 99   *   K 4.0      CO2 26   BUN 14   CREATININE 1.0   CALCIUM 8.2*     CBC:   Recent Labs   Lab 04/07/19  0921 04/08/19  0445   WBC 5.67 5.80   HGB 10.2* 8.9*   HCT 33.7* 30.2*    270     CMP:   Recent Labs   Lab 04/07/19 0921 04/08/19  0444    135*   K 3.8 4.0    100   CO2 24 26   * 99   BUN 14 14   CREATININE 1.0 1.0   CALCIUM 8.9 8.2*   PROT 7.2  --    ALBUMIN 3.6  --    BILITOT 0.4  --    ALKPHOS 111  --    AST 10  --    ALT 9*  --    ANIONGAP 12 9   EGFRNONAA >60 >60     All pertinent labs within the past 24 hours have been reviewed.    Significant Imaging:   Imaging Results          US Soft Tissue Misc (Final result)  Result time 04/07/19 12:38:12    Final result by Diego Kevin MD (04/07/19 12:38:12)                 Impression:      Subcutaneous edema.  No focal fluid collection.      Electronically signed by: Diego Kevin MD  Date:    04/07/2019  Time:    12:38             Narrative:    EXAMINATION:  US SOFT TISSUE MISC    CLINICAL HISTORY:  Right leg swelling.    TECHNIQUE:  Standard ultrasound technique.    COMPARISON:  None    FINDINGS:  Ultrasound of the anterior right lower extremity soft tissues was performed.  Diffuse subcutaneous edema is present.  No localized fluid collection.                               X-Ray Tibia Fibula 2 View Right (Final  result)  Result time 04/07/19 09:33:49    Final result by Diego Kevin MD (04/07/19 09:33:49)                 Impression:      No acute bony abnormality.  Please see above.      Electronically signed by: Diego Kevin MD  Date:    04/07/2019  Time:    09:33             Narrative:    EXAMINATION:  XR TIBIA FIBULA 2 VIEW RIGHT    CLINICAL HISTORY:  Cellulitis of right lower limb,    TECHNIQUE:  Standard radiography performed.    COMPARISON:  None    FINDINGS:  Right knee prosthesis is present.    There appears to be diffuse soft tissue swelling/subcutaneous edema.    There are mild degenerative changes of the right ankle joint.  No acute fracture.

## 2019-04-09 VITALS
SYSTOLIC BLOOD PRESSURE: 130 MMHG | DIASTOLIC BLOOD PRESSURE: 64 MMHG | OXYGEN SATURATION: 99 % | RESPIRATION RATE: 18 BRPM | WEIGHT: 248.56 LBS | HEART RATE: 67 BPM | BODY MASS INDEX: 44.04 KG/M2 | TEMPERATURE: 98 F | HEIGHT: 63 IN

## 2019-04-09 LAB
ANION GAP SERPL CALC-SCNC: 8 MMOL/L (ref 8–16)
BASOPHILS # BLD AUTO: 0.03 K/UL (ref 0–0.2)
BASOPHILS NFR BLD: 0.7 % (ref 0–1.9)
BUN SERPL-MCNC: 12 MG/DL (ref 8–23)
CALCIUM SERPL-MCNC: 8.4 MG/DL (ref 8.7–10.5)
CHLORIDE SERPL-SCNC: 95 MMOL/L (ref 95–110)
CO2 SERPL-SCNC: 28 MMOL/L (ref 23–29)
CREAT SERPL-MCNC: 1 MG/DL (ref 0.5–1.4)
DIFFERENTIAL METHOD: ABNORMAL
EOSINOPHIL # BLD AUTO: 0.5 K/UL (ref 0–0.5)
EOSINOPHIL NFR BLD: 10.2 % (ref 0–8)
ERYTHROCYTE [DISTWIDTH] IN BLOOD BY AUTOMATED COUNT: 17.4 % (ref 11.5–14.5)
EST. GFR  (AFRICAN AMERICAN): >60 ML/MIN/1.73 M^2
EST. GFR  (NON AFRICAN AMERICAN): >60 ML/MIN/1.73 M^2
GLUCOSE SERPL-MCNC: 115 MG/DL (ref 70–110)
HCT VFR BLD AUTO: 32.8 % (ref 37–48.5)
HGB BLD-MCNC: 9.6 G/DL (ref 12–16)
LYMPHOCYTES # BLD AUTO: 1.8 K/UL (ref 1–4.8)
LYMPHOCYTES NFR BLD: 40 % (ref 18–48)
MCH RBC QN AUTO: 23.9 PG (ref 27–31)
MCHC RBC AUTO-ENTMCNC: 29.3 G/DL (ref 32–36)
MCV RBC AUTO: 82 FL (ref 82–98)
MONOCYTES # BLD AUTO: 0.3 K/UL (ref 0.3–1)
MONOCYTES NFR BLD: 7.4 % (ref 4–15)
NEUTROPHILS # BLD AUTO: 1.9 K/UL (ref 1.8–7.7)
NEUTROPHILS NFR BLD: 41.7 % (ref 38–73)
PLATELET # BLD AUTO: 303 K/UL (ref 150–350)
PMV BLD AUTO: 8.4 FL (ref 9.2–12.9)
POTASSIUM SERPL-SCNC: 4.1 MMOL/L (ref 3.5–5.1)
RBC # BLD AUTO: 4.02 M/UL (ref 4–5.4)
SODIUM SERPL-SCNC: 131 MMOL/L (ref 136–145)
VANCOMYCIN TROUGH SERPL-MCNC: 13.9 UG/ML (ref 10–22)
WBC # BLD AUTO: 4.6 K/UL (ref 3.9–12.7)

## 2019-04-09 PROCEDURE — 80048 BASIC METABOLIC PNL TOTAL CA: CPT

## 2019-04-09 PROCEDURE — 63600175 PHARM REV CODE 636 W HCPCS: Performed by: EMERGENCY MEDICINE

## 2019-04-09 PROCEDURE — 80202 ASSAY OF VANCOMYCIN: CPT

## 2019-04-09 PROCEDURE — 36415 COLL VENOUS BLD VENIPUNCTURE: CPT

## 2019-04-09 PROCEDURE — 25000003 PHARM REV CODE 250: Performed by: NURSE PRACTITIONER

## 2019-04-09 PROCEDURE — 63600175 PHARM REV CODE 636 W HCPCS: Performed by: NURSE PRACTITIONER

## 2019-04-09 PROCEDURE — 85025 COMPLETE CBC W/AUTO DIFF WBC: CPT

## 2019-04-09 PROCEDURE — 25000003 PHARM REV CODE 250: Performed by: EMERGENCY MEDICINE

## 2019-04-09 RX ORDER — SULFAMETHOXAZOLE AND TRIMETHOPRIM 800; 160 MG/1; MG/1
1 TABLET ORAL 2 TIMES DAILY
Qty: 20 TABLET | Refills: 0 | Status: SHIPPED | OUTPATIENT
Start: 2019-04-09 | End: 2020-06-14

## 2019-04-09 RX ORDER — PROMETHAZINE HYDROCHLORIDE 25 MG/1
25 TABLET ORAL EVERY 6 HOURS PRN
Qty: 30 TABLET | Refills: 0 | Status: SHIPPED | OUTPATIENT
Start: 2019-04-09 | End: 2023-01-23 | Stop reason: SDUPTHER

## 2019-04-09 RX ORDER — CLINDAMYCIN HYDROCHLORIDE 300 MG/1
300 CAPSULE ORAL EVERY 6 HOURS
Status: ON HOLD | COMMUNITY
End: 2019-04-09 | Stop reason: SDUPTHER

## 2019-04-09 RX ORDER — SULFAMETHOXAZOLE AND TRIMETHOPRIM 800; 160 MG/1; MG/1
1 TABLET ORAL 2 TIMES DAILY
Status: ON HOLD | COMMUNITY
End: 2019-04-09 | Stop reason: SDUPTHER

## 2019-04-09 RX ORDER — CLINDAMYCIN HYDROCHLORIDE 300 MG/1
300 CAPSULE ORAL EVERY 6 HOURS
Qty: 40 CAPSULE | Refills: 0 | Status: SHIPPED | OUTPATIENT
Start: 2019-04-09 | End: 2020-06-14

## 2019-04-09 RX ORDER — BACITRACIN 500 [USP'U]/G
1 OINTMENT TOPICAL 2 TIMES DAILY
COMMUNITY
Start: 2019-03-31 | End: 2021-07-31

## 2019-04-09 RX ORDER — FLUCONAZOLE 150 MG/1
TABLET ORAL
Qty: 2 TABLET | Refills: 0 | Status: SHIPPED | OUTPATIENT
Start: 2019-04-09 | End: 2021-07-31

## 2019-04-09 RX ADMIN — CARVEDILOL 12.5 MG: 12.5 TABLET, FILM COATED ORAL at 08:04

## 2019-04-09 RX ADMIN — ARIPIPRAZOLE 15 MG: 5 TABLET ORAL at 08:04

## 2019-04-09 RX ADMIN — Medication: at 08:04

## 2019-04-09 RX ADMIN — FUROSEMIDE 40 MG: 40 TABLET ORAL at 08:04

## 2019-04-09 RX ADMIN — ASPIRIN 81 MG: 81 TABLET, COATED ORAL at 08:04

## 2019-04-09 RX ADMIN — PANTOPRAZOLE SODIUM 40 MG: 40 TABLET, DELAYED RELEASE ORAL at 08:04

## 2019-04-09 RX ADMIN — OXYCODONE AND ACETAMINOPHEN 1 TABLET: 10; 325 TABLET ORAL at 03:04

## 2019-04-09 RX ADMIN — ONDANSETRON HYDROCHLORIDE 8 MG: 8 TABLET, FILM COATED ORAL at 03:04

## 2019-04-09 RX ADMIN — PIPERACILLIN AND TAZOBACTAM 4.5 G: 4; .5 INJECTION, POWDER, LYOPHILIZED, FOR SOLUTION INTRAVENOUS; PARENTERAL at 05:04

## 2019-04-09 RX ADMIN — VANCOMYCIN HYDROCHLORIDE 1250 MG: 100 INJECTION, POWDER, LYOPHILIZED, FOR SOLUTION INTRAVENOUS at 03:04

## 2019-04-09 NOTE — CHAPLAIN
Initial visit with patient.  Provided ministries of listening, presence, and prayer.  Patient shared with me what happened to her and plans to take steps to make sure it doesn't happen to her again.  Patient was getting ready to go home and I prayed with her before leaving.  Spiritual care remains available as needed.    Chaplain Sang Abbasi M.Div., [facility]

## 2019-04-09 NOTE — DISCHARGE SUMMARY
[facility] Hospital Medicine  Discharge Summary      Patient Name: Divya Bui  MRN: [id number]  Admission Date: 4/7/2019  Hospital Length of Stay: 1 days  Discharge Date and Time:  04/09/2019 3:28 PM  Attending Physician: No att. providers found   Discharging Provider: GOKUL Mauricio  Primary Care Provider: Lucio Salas MD      HPI:   Divya Bui is a 59 y.o. female patient with a PMHx of anemia, anxiety, depression, GERD, HTN, and morbid obesity who presents to the Emergency Department for evaluation of worsening right LE cellulitis which onset gradually  1 week ago and worsening overnight. She reports initially cutting her leg on a hotel bed frame on 3/31/19. Pt had a laceration repair (and tetanus shot) done at that time and was placed on Cephalexin and Bacitracin ointment. Pt states that the wound became red, infected, and swelled over time. She was seen at urgent care on 4/6/19 and was prescribed Clindamycin 150mg and Bactrim DS. Associated sxs include pain to the area, nausea, redness, and swelling. Patient denies any fever/ chills, CP, SOB, cough, congestion, rash, HA, abdominal pain, emesis, diarrhea, and all other sxs at this time. Labs reviewed and stable. Hospital medicine consult for admission.         * No surgery found *      Hospital Course:   Patient admitted to Med Surg for cellulitis of R leg under the care of Hospital Medicine. She was given IV vancomyinc and IV zosyn. BC are NGTD. Symptoms improved slowly. US showed Subcutaneous edema.  No focal fluid collection. Patient will continue PO clindamycin and PO bactrim along with bacitracin ointment BID and f/u with her PCP on Friday. Patient was seen and examined today and deemed stable for discharge home.      Consults:       Final Active Diagnoses:    Diagnosis Date Noted POA    PRINCIPAL PROBLEM:  Cellulitis and abscess of right leg [L03.115, L02.415] 04/07/2019 Yes    HTN (hypertension) [I10] 08/26/2013 Yes     Chronic kidney disease, stage I [N18.1] 07/02/2012 Yes      Problems Resolved During this Admission:       Discharged Condition: stable    Disposition: Home or Self Care    Follow Up:  Follow-up Information     Lucio Salas MD In 1 week.    Specialty:  Internal Medicine  Why:  hospital follow up  Contact information:  [address]  [facility]  [place] [address]  [phone]                 Patient Instructions:      Diet Cardiac     Notify your health care provider if you experience any of the following:  temperature >100.4     Notify your health care provider if you experience any of the following:  redness, tenderness, or signs of infection (pain, swelling, redness, odor or green/yellow discharge around incision site)     Activity as tolerated       Significant Diagnostic Studies: Labs:   BMP:   Recent Labs   Lab 04/08/19 0444 04/09/19  0511   GLU 99 115*   * 131*   K 4.0 4.1    95   CO2 26 28   BUN 14 12   CREATININE 1.0 1.0   CALCIUM 8.2* 8.4*   , CMP   Recent Labs   Lab 04/08/19 0444 04/09/19  0511   * 131*   K 4.0 4.1    95   CO2 26 28   GLU 99 115*   BUN 14 12   CREATININE 1.0 1.0   CALCIUM 8.2* 8.4*   ANIONGAP 9 8   ESTGFRAFRICA >60 >60   EGFRNONAA >60 >60   , CBC   Recent Labs   Lab 04/08/19 0445 04/09/19  0511   WBC 5.80 4.60   HGB 8.9* 9.6*   HCT 30.2* 32.8*    303    and All labs within the past 24 hours have been reviewed    Pending Diagnostic Studies:     None         Medications:  Reconciled Home Medications:      Medication List      START taking these medications    fluconazole 150 MG Tab  Commonly known as:  DIFLUCAN  Take 1 pill. If no relief in 3 days, take 1 additional pill.        CONTINUE taking these medications    ABILIFY 15 MG Tab  Generic drug:  ARIPiprazole  Take 15 mg by mouth once daily.     ALPRAZolam 2 MG Tb24  Commonly known as:  XANAX XR  Take 1 mg by mouth once daily. 2 mg tablet(2tabs) oral in the am and 1 mg(1tab)in the afternoon      amLODIPine 5 MG tablet  Commonly known as:  NORVASC  Take 1 tablet (5 mg total) by mouth once daily.     aspirin 81 mg Tab  Take 1 tablet by mouth Daily.     bacitracin 500 unit/gram ointment  Apply 1 g topically 2 (two) times daily.     carvedilol 12.5 MG tablet  Commonly known as:  COREG  Take 12.5 mg by mouth.     clindamycin 300 MG capsule  Commonly known as:  CLEOCIN  Take 1 capsule (300 mg total) by mouth every 6 (six) hours.     cloNIDine 0.1 MG tablet  Commonly known as:  CATAPRES  Take 1 tablet (0.1 mg total) by mouth 2 (two) times daily.     cyanocobalamin 1,000 mcg/mL injection  Commonly known as:  VITAMIN B-12  Inject 1 mL (1,000 mcg total) into the muscle every 30 days.     diclofenac 75 MG EC tablet  Commonly known as:  VOLTAREN  Take 75 mg by mouth 2 (two) times daily.     FLUoxetine 40 MG capsule  Take 1 capsule (40 mg total) by mouth once daily.     furosemide 40 MG tablet  Commonly known as:  LASIX  TAKE 1 TABLET (40 MG TOTAL) BY MOUTH ONCE DAILY.     loperamide 2 mg Tab  Commonly known as:  IMODIUM A-D  Take 2 mg by mouth. 2 in am     melatonin 10 mg Cap  Take by mouth nightly as needed.     metoclopramide HCl 10 MG tablet  Commonly known as:  REGLAN  Take 1 tablet (10 mg total) by mouth every 6 (six) hours.     omeprazole 40 MG capsule  Commonly known as:  PRILOSEC  Take 40 mg by mouth once daily.     ondansetron 4 MG tablet  Commonly known as:  ZOFRAN  Take 2 tablets (8 mg total) by mouth every 8 (eight) hours as needed for Nausea.     orphenadrine 100 mg tablet  Commonly known as:  NORFLEX     oxyCODONE-acetaminophen  mg per tablet  Commonly known as:  PERCOCET     potassium chloride 10 MEQ Tbsr  Commonly known as:  KLOR-CON  Take 1 tablet (10 mEq total) by mouth 2 (two) times daily.     promethazine 25 MG tablet  Commonly known as:  PHENERGAN  Take 1 tablet (25 mg total) by mouth every 6 (six) hours as needed for Nausea.     RECLAST 5 mg/100 mL Pgbk  Generic drug:  zoledronic  acid-mannitol & water  Inject 5 mg into the vein Once a year.     sulfamethoxazole-trimethoprim 800-160mg 800-160 mg Tab  Commonly known as:  BACTRIM DS  Take 1 tablet by mouth 2 (two) times daily.     zolpidem 12.5 MG CR tablet  Commonly known as:  AMBIEN CR  Take 12.5 mg by mouth nightly as needed for Insomnia.            Indwelling Lines/Drains at time of discharge:   Lines/Drains/Airways          None          Time spent on the discharge of patient: 60 minutes  Patient was seen and examined on the date of discharge and determined to be suitable for discharge.         JUNIOR Mauricio-C  Department of Hospital Medicine  [facility]

## 2019-04-09 NOTE — PLAN OF CARE
04/09/19 1356   Final Note   Assessment Type Final Discharge Note   Anticipated Discharge Disposition Home   Right Care Referral Info   Post Acute Recommendation No Care

## 2019-04-09 NOTE — PLAN OF CARE
Problem: Adult Inpatient Plan of Care  Goal: Patient-Specific Goal (Individualization)  Outcome: Outcome(s) achieved Date Met: 04/09/19  Patient remains injury free.  No signs or symptoms of distress noted. Patient denies any discomfort at this time.  Patient being discharged to home with self care.

## 2019-04-09 NOTE — PLAN OF CARE
Problem: Adult Inpatient Plan of Care  Goal: Plan of Care Review  Outcome: Ongoing (interventions implemented as appropriate)  Pt remained free of injury during shift, stable condition, pain adequately controlled with PRN medication, no acute distress,  receiving antibiotics, and will continue to monitor. 24hr chart review performed.

## 2019-04-09 NOTE — PLAN OF CARE
Met with patient initial assessment completed.. Patient is independent with adls and iadls.  Patient denies any post hospital needs or services at this time. Patient indicated that her spouse will provide her transportation upon discharge. Patient agreed to bedside delivery.  Updated white board with 's name and number. Transitional Care Folder, Discharge Planning Begins on Admission pamphlet, [facility] Bedside Delivery pamphlet, Advance Directive information given to patient along with the contact information given.Instructed patient or family to call with any questions or concerns.    Discussed accessing the "[employer] via the "[employer] Instant Activation. Patient agreed. Confirmed telephone number. Activation link sent.            [facility] - [facility] - [address]  [address]  [place] [address]  Phone: [phone] Fax: [phone]    [facility] [address]  [address] [facility] [address]  Phone: [phone] Fax: [phone]    [facility] [id number] [facility] [address]  [address]  [facility] [address]  Phone: [phone] Fax: [phone]    Lucio Salas MD  Payor: MEDICARE / Plan: MEDICARE PART A & B / Product Type: [facility] /             04/09/19 0827   Discharge Assessment   Assessment Type Discharge Planning Assessment   Confirmed/corrected address and phone number on facesheet? Yes   Assessment information obtained from? Patient;Medical Record   Expected Length of Stay (days)   (tbd)   Communicated expected length of stay with patient/caregiver yes   Prior to hospitilization cognitive status: Alert/Oriented   Prior to hospitalization functional status: Independent   Current cognitive status: Alert/Oriented   Current Functional Status: Independent   Facility Arrived From: home   Lives With spouse   Able to Return to Prior Arrangements yes   Is patient able to care for self  after discharge? Yes   Who are your caregiver(s) and their phone number(s)? Trenton Bui ( spouse ) [phone]   Patient's perception of discharge disposition home or selfcare   Readmission Within the Last 30 Days no previous admission in last 30 days   Patient currently being followed by outpatient case management? No   Patient currently receives any other outside agency services? No   Equipment Currently Used at Home none   Do you have any problems affording any of your prescribed medications? No   Is the patient taking medications as prescribed? yes   Does the patient have transportation home? Yes   Transportation Anticipated family or friend will provide   Does the patient receive services at the [facility] Clinic? No   Discharge Plan A Home;Home with family   Discharge Plan B Home;Home with family;Home Health   DME Needed Upon Discharge  none   Patient/Family in Agreement with Plan yes

## 2019-04-09 NOTE — PLAN OF CARE
04/09/19 0825   Discharge Rx Bedside Delivery   If you have discharge prescriptions, do you want them filled and delivered to you before you leave? Yes

## 2019-04-09 NOTE — PROGRESS NOTES
Clinical Pharmacy Progress Note - Vancomycin Dosing    Vancomycin day # 3     Indication: Cellulitis   Goal trough: 10-15 mcg/mL     Concomitant abx tx: Zosyn 4.5 g IV every 8 hours.     Estimated Creatinine Clearance: 71.4 mL/min (based on SCr of 1 mg/dL).  Serum Creatinine stable     Lab Results   Component Value Date    WBC 4.60 04/09/2019       Tmax/24h 98.9 °F (37.2 °C)     Cultures: Blood x 2 collected 4/7/19: NGTD  (Updated: 04/08/19 2012)     Vancomycin trough level 13.9 mcg/mL at 0135 this morning prior to the 3rd dose. Trough is within goal range of 10-15 mcg/mL     Plan:   - Continue Vancomycin 1250 mg IV every 18 hours.   - Repeat Vancomycin trough level on 4/11/19 at 0800.  - Pharmacy will continue to follow patient?s clinical status, renal function, C&S, and adjust dose as necessary to maintain trough levels between 10 and 15 mcg/mL.      Thank you for allowing us to participate in this patient's care.      Roslyn Warren, PharmMARQUES 04/09/2019 8:27 AM

## 2019-04-12 LAB
BACTERIA BLD CULT: NORMAL
BACTERIA BLD CULT: NORMAL

## 2020-06-13 ENCOUNTER — HOSPITAL ENCOUNTER (EMERGENCY)
Facility: HOSPITAL | Age: 60
Discharge: HOME | End: 2020-06-13
Attending: EMERGENCY MEDICINE
Payer: MEDICARE

## 2020-06-13 VITALS
WEIGHT: 197.75 LBS | BODY MASS INDEX: 35.04 KG/M2 | HEIGHT: 63 IN | RESPIRATION RATE: 16 BRPM | SYSTOLIC BLOOD PRESSURE: 139 MMHG | TEMPERATURE: 99 F | DIASTOLIC BLOOD PRESSURE: 68 MMHG | OXYGEN SATURATION: 96 % | HEART RATE: 78 BPM

## 2020-06-13 DIAGNOSIS — E87.6 HYPOKALEMIA: ICD-10-CM

## 2020-06-13 DIAGNOSIS — N39.0 ACUTE LOWER UTI: ICD-10-CM

## 2020-06-13 DIAGNOSIS — R11.2 NON-INTRACTABLE VOMITING WITH NAUSEA, UNSPECIFIED VOMITING TYPE: Primary | ICD-10-CM

## 2020-06-13 LAB
ALBUMIN SERPL BCP-MCNC: 4 G/DL (ref 3.5–5.2)
ALP SERPL-CCNC: 90 U/L (ref 55–135)
ALT SERPL W/O P-5'-P-CCNC: 9 U/L (ref 10–44)
ANION GAP SERPL CALC-SCNC: 15 MMOL/L (ref 8–16)
AST SERPL-CCNC: 9 U/L (ref 10–40)
BACTERIA #/AREA URNS HPF: ABNORMAL /HPF
BASOPHILS # BLD AUTO: 0.01 K/UL (ref 0–0.2)
BASOPHILS NFR BLD: 0.1 % (ref 0–1.9)
BILIRUB SERPL-MCNC: 0.5 MG/DL (ref 0.1–1)
BILIRUB UR QL STRIP: NEGATIVE
BUN SERPL-MCNC: 13 MG/DL (ref 8–23)
CALCIUM SERPL-MCNC: 8.9 MG/DL (ref 8.7–10.5)
CHLORIDE SERPL-SCNC: 97 MMOL/L (ref 95–110)
CLARITY UR: CLEAR
CO2 SERPL-SCNC: 19 MMOL/L (ref 23–29)
COLOR UR: YELLOW
CREAT SERPL-MCNC: 1.1 MG/DL (ref 0.5–1.4)
DIFFERENTIAL METHOD: ABNORMAL
EOSINOPHIL # BLD AUTO: 0 K/UL (ref 0–0.5)
EOSINOPHIL NFR BLD: 0 % (ref 0–8)
ERYTHROCYTE [DISTWIDTH] IN BLOOD BY AUTOMATED COUNT: 14.9 % (ref 11.5–14.5)
EST. GFR  (AFRICAN AMERICAN): >60 ML/MIN/1.73 M^2
EST. GFR  (NON AFRICAN AMERICAN): 54 ML/MIN/1.73 M^2
GLUCOSE SERPL-MCNC: 147 MG/DL (ref 70–110)
GLUCOSE UR QL STRIP: NEGATIVE
HCT VFR BLD AUTO: 35.2 % (ref 37–48.5)
HGB BLD-MCNC: 11.2 G/DL (ref 12–16)
HGB UR QL STRIP: ABNORMAL
IMM GRANULOCYTES # BLD AUTO: 0.05 K/UL (ref 0–0.04)
IMM GRANULOCYTES NFR BLD AUTO: 0.7 % (ref 0–0.5)
KETONES UR QL STRIP: NEGATIVE
LEUKOCYTE ESTERASE UR QL STRIP: ABNORMAL
LIPASE SERPL-CCNC: 5 U/L (ref 4–60)
LYMPHOCYTES # BLD AUTO: 0.8 K/UL (ref 1–4.8)
LYMPHOCYTES NFR BLD: 11.5 % (ref 18–48)
MCH RBC QN AUTO: 25.6 PG (ref 27–31)
MCHC RBC AUTO-ENTMCNC: 31.8 G/DL (ref 32–36)
MCV RBC AUTO: 80 FL (ref 82–98)
MICROSCOPIC COMMENT: ABNORMAL
MONOCYTES # BLD AUTO: 0.4 K/UL (ref 0.3–1)
MONOCYTES NFR BLD: 5.5 % (ref 4–15)
NEUTROPHILS # BLD AUTO: 6 K/UL (ref 1.8–7.7)
NEUTROPHILS NFR BLD: 82.2 % (ref 38–73)
NITRITE UR QL STRIP: NEGATIVE
NRBC BLD-RTO: 0 /100 WBC
PH UR STRIP: 6 [PH] (ref 5–8)
PLATELET # BLD AUTO: 412 K/UL (ref 150–350)
PMV BLD AUTO: 8.8 FL (ref 9.2–12.9)
POTASSIUM SERPL-SCNC: 2.9 MMOL/L (ref 3.5–5.1)
PROT SERPL-MCNC: 7.6 G/DL (ref 6–8.4)
PROT UR QL STRIP: NEGATIVE
RBC # BLD AUTO: 4.38 M/UL (ref 4–5.4)
RBC #/AREA URNS HPF: 0 /HPF (ref 0–4)
SARS-COV-2 RDRP RESP QL NAA+PROBE: NEGATIVE
SODIUM SERPL-SCNC: 131 MMOL/L (ref 136–145)
SP GR UR STRIP: 1.02 (ref 1–1.03)
URN SPEC COLLECT METH UR: ABNORMAL
UROBILINOGEN UR STRIP-ACNC: NEGATIVE EU/DL
WBC # BLD AUTO: 7.29 K/UL (ref 3.9–12.7)
WBC #/AREA URNS HPF: 10 /HPF (ref 0–5)

## 2020-06-13 PROCEDURE — 99285 EMERGENCY DEPT VISIT HI MDM: CPT | Mod: 25

## 2020-06-13 PROCEDURE — 63600175 PHARM REV CODE 636 W HCPCS: Performed by: EMERGENCY MEDICINE

## 2020-06-13 PROCEDURE — 96375 TX/PRO/DX INJ NEW DRUG ADDON: CPT | Mod: 59

## 2020-06-13 PROCEDURE — 96366 THER/PROPH/DIAG IV INF ADDON: CPT

## 2020-06-13 PROCEDURE — 96376 TX/PRO/DX INJ SAME DRUG ADON: CPT | Mod: 59

## 2020-06-13 PROCEDURE — 81000 URINALYSIS NONAUTO W/SCOPE: CPT

## 2020-06-13 PROCEDURE — 85025 COMPLETE CBC W/AUTO DIFF WBC: CPT

## 2020-06-13 PROCEDURE — 25000003 PHARM REV CODE 250: Performed by: EMERGENCY MEDICINE

## 2020-06-13 PROCEDURE — 36415 COLL VENOUS BLD VENIPUNCTURE: CPT

## 2020-06-13 PROCEDURE — 96367 TX/PROPH/DG ADDL SEQ IV INF: CPT

## 2020-06-13 PROCEDURE — 96365 THER/PROPH/DIAG IV INF INIT: CPT

## 2020-06-13 PROCEDURE — 80053 COMPREHEN METABOLIC PANEL: CPT

## 2020-06-13 PROCEDURE — U0002 COVID-19 LAB TEST NON-CDC: HCPCS

## 2020-06-13 PROCEDURE — 25500020 PHARM REV CODE 255: Performed by: EMERGENCY MEDICINE

## 2020-06-13 PROCEDURE — 83690 ASSAY OF LIPASE: CPT

## 2020-06-13 RX ORDER — CEPHALEXIN 500 MG/1
500 CAPSULE ORAL EVERY 8 HOURS
Qty: 21 CAPSULE | Refills: 0 | Status: SHIPPED | OUTPATIENT
Start: 2020-06-13 | End: 2020-06-20

## 2020-06-13 RX ORDER — HYDRALAZINE HYDROCHLORIDE 20 MG/ML
20 INJECTION INTRAMUSCULAR; INTRAVENOUS
Status: COMPLETED | OUTPATIENT
Start: 2020-06-13 | End: 2020-06-13

## 2020-06-13 RX ORDER — POTASSIUM CHLORIDE 20 MEQ/1
40 TABLET, EXTENDED RELEASE ORAL
Status: COMPLETED | OUTPATIENT
Start: 2020-06-13 | End: 2020-06-13

## 2020-06-13 RX ORDER — MORPHINE SULFATE 4 MG/ML
4 INJECTION, SOLUTION INTRAMUSCULAR; INTRAVENOUS
Status: COMPLETED | OUTPATIENT
Start: 2020-06-13 | End: 2020-06-13

## 2020-06-13 RX ORDER — METOCLOPRAMIDE 10 MG/1
10 TABLET ORAL EVERY 6 HOURS
Qty: 30 TABLET | Refills: 0 | Status: SHIPPED | OUTPATIENT
Start: 2020-06-13 | End: 2020-10-19 | Stop reason: SDUPTHER

## 2020-06-13 RX ORDER — ONDANSETRON 2 MG/ML
4 INJECTION INTRAMUSCULAR; INTRAVENOUS
Status: COMPLETED | OUTPATIENT
Start: 2020-06-13 | End: 2020-06-13

## 2020-06-13 RX ORDER — FLUCONAZOLE 200 MG/1
200 TABLET ORAL DAILY
Qty: 2 TABLET | Refills: 0 | Status: SHIPPED | OUTPATIENT
Start: 2020-06-13 | End: 2020-06-15

## 2020-06-13 RX ORDER — POTASSIUM CHLORIDE 7.45 MG/ML
10 INJECTION INTRAVENOUS
Status: COMPLETED | OUTPATIENT
Start: 2020-06-13 | End: 2020-06-13

## 2020-06-13 RX ORDER — HYDRALAZINE HYDROCHLORIDE 20 MG/ML
20 INJECTION INTRAMUSCULAR; INTRAVENOUS
Status: DISCONTINUED | OUTPATIENT
Start: 2020-06-13 | End: 2020-06-14 | Stop reason: HOSPADM

## 2020-06-13 RX ADMIN — POTASSIUM CHLORIDE 10 MEQ: 10 INJECTION, SOLUTION INTRAVENOUS at 05:06

## 2020-06-13 RX ADMIN — POTASSIUM CHLORIDE 20 MEQ: 20 TABLET, EXTENDED RELEASE ORAL at 02:06

## 2020-06-13 RX ADMIN — PROMETHAZINE HYDROCHLORIDE 12.5 MG: 25 INJECTION INTRAMUSCULAR; INTRAVENOUS at 12:06

## 2020-06-13 RX ADMIN — POTASSIUM CHLORIDE 10 MEQ: 10 INJECTION, SOLUTION INTRAVENOUS at 08:06

## 2020-06-13 RX ADMIN — PROMETHAZINE HYDROCHLORIDE 12.5 MG: 25 INJECTION INTRAMUSCULAR; INTRAVENOUS at 04:06

## 2020-06-13 RX ADMIN — POTASSIUM CHLORIDE 10 MEQ: 10 INJECTION, SOLUTION INTRAVENOUS at 06:06

## 2020-06-13 RX ADMIN — MORPHINE SULFATE 4 MG: 4 INJECTION INTRAVENOUS at 02:06

## 2020-06-13 RX ADMIN — IOHEXOL 100 ML: 350 INJECTION, SOLUTION INTRAVENOUS at 02:06

## 2020-06-13 RX ADMIN — ONDANSETRON HYDROCHLORIDE 4 MG: 2 INJECTION, SOLUTION INTRAMUSCULAR; INTRAVENOUS at 02:06

## 2020-06-13 RX ADMIN — SODIUM CHLORIDE 1000 ML: 0.9 INJECTION, SOLUTION INTRAVENOUS at 12:06

## 2020-06-13 RX ADMIN — ONDANSETRON HYDROCHLORIDE 4 MG: 2 INJECTION, SOLUTION INTRAMUSCULAR; INTRAVENOUS at 12:06

## 2020-06-13 RX ADMIN — CEFTRIAXONE 1 G: 1 INJECTION, SOLUTION INTRAVENOUS at 04:06

## 2020-06-13 RX ADMIN — MORPHINE SULFATE 4 MG: 4 INJECTION INTRAVENOUS at 12:06

## 2020-06-13 RX ADMIN — PROMETHAZINE HYDROCHLORIDE 12.5 MG: 25 INJECTION INTRAMUSCULAR; INTRAVENOUS at 02:06

## 2020-06-13 RX ADMIN — MORPHINE SULFATE 4 MG: 4 INJECTION INTRAVENOUS at 06:06

## 2020-06-13 RX ADMIN — POTASSIUM CHLORIDE 10 MEQ: 10 INJECTION, SOLUTION INTRAVENOUS at 07:06

## 2020-06-13 RX ADMIN — HYDRALAZINE HYDROCHLORIDE 20 MG: 20 INJECTION INTRAMUSCULAR; INTRAVENOUS at 12:06

## 2020-06-13 NOTE — ED PROVIDER NOTES
SCRIBE #1 NOTE: I, Goldie Cha, am scribing for, and in the presence of, Fortunato Rodriguez Jr., MD. I have scribed the entire note.       History     Chief Complaint   Patient presents with    Emesis     x 1 week, went to urgent care yesterday and was given IV fluids and sent home on medication. pt still unable to keep anything down.     Review of patient's allergies indicates:   Allergen Reactions    Flagyl [metronidazole hcl]      Mouth ulcers developed with Flagyl         History of Present Illness     HPI    6/13/2020, 12:06 PM  History obtained from the patient      History of Present Illness: Divya Bui is a 60 y.o. female patient with a h/o anemia, GERD, HTN, morbid obesity, pericardial effusion, who presents to the Emergency Department for evaluation of emesis which onset a week ago.  The patient has had a Delores-en-Y gastric bypass in the past.  Pt reports going to  yesterday and was given IV fluids, Toradol, phenergan, and then sent home. After she had a gastric bypass, she has these sxs present every 6-7 months which usually resolves.  Notes this episode is worsened usual .  Symptoms are episodic and moderate in severity. No mitigating or exacerbating factors reported. Associated sxs include nausea, diarrhea, passing gas, low grade fever (Tmax 98.7 F). Patient denies any SOB, CP, cough, constipation, abd pain, dysuria, HA, and all other sxs at this time. Prior Tx includes Toradol and phenergan given at . Pt reports being allergic to flagyl. No further complaints or concerns at this time.       Arrival mode: Personal transportation     PCP: Lucio Salas MD      Past Medical History:  Past Medical History:   Diagnosis Date    Anemia     Anxiety     Blood transfusion     Bowel incontinence     Depression     Esophageal stricture     GERD (gastroesophageal reflux disease)     Hypertension     Latent tuberculosis by skin test 2001    took INH    Liver nodule 1/6/2014    Morbid  obesity with BMI of 45.0-49.9, adult     OA (osteoarthritis) of knee 12/12/2014    Pericardial effusion 9/4/2014       Past Surgical History:  Past Surgical History:   Procedure Laterality Date    CHOLECYSTECTOMY      GASTRIC BYPASS      HERNIA REPAIR      right sholder surgery      SMALL INTESTINE SURGERY           Family History:  Family History   Problem Relation Age of Onset    Crohn's disease Other     Liver cancer Father     Diabetes Sister     Crohn's disease Sister     Liver cancer Sister     Crohn's disease Brother     Lung cancer Mother        Social History:   Social History     Tobacco Use    Smoking status: Never Smoker    Smokeless tobacco: Never Used   Substance and Sexual Activity    Alcohol use: No     Alcohol/week: 0.0 standard drinks    Drug use: No    Sexual activity: Yes     Partners: Male        Review of Systems     Review of Systems   Constitutional: Positive for fever (low grade 98.7 F).   HENT: Negative for sore throat.    Respiratory: Negative for shortness of breath.    Cardiovascular: Negative for chest pain.   Gastrointestinal: Positive for diarrhea, nausea and vomiting. Negative for abdominal pain.        (+) passing gas   Genitourinary: Negative for dysuria.   Musculoskeletal: Negative for back pain.   Skin: Negative for rash.   Neurological: Negative for headaches.   Hematological: Does not bruise/bleed easily.   All other systems reviewed and are negative.       Physical Exam     Initial Vitals [06/13/20 1128]   BP Pulse Resp Temp SpO2   (!) 186/103 94 16 98.2 °F (36.8 °C) 100 %      MAP       --          Physical Exam  Nursing Notes and Vital Signs Reviewed.  Constitutional: Well-developed and well-nourished.   Head: Atraumatic. Normocephalic.  Eyes: EOM intact. No scleral icterus.  ENT: Mucous membranes are moist. Oropharynx is clear and symmetric.  nares are clear  Neck: Supple. Full ROM. No lymphadenopathy.  Cardiovascular: Regular rate. Regular rhythm. No  "murmurs, rubs, or gallops. Distal pulses are 2+ and symmetric.  Pulmonary/Chest: No respiratory distress. Clear to auscultation bilaterally. No wheezing or rales.  Abdominal: Soft and non-distended.  There is no tenderness.  No rebound, guarding, or rigidity. Good bowel sounds.  Genitourinary: No CVA tenderness.  No suprapubic tenderness  Musculoskeletal: Moves all extremities. No obvious deformities. No calf tenderness.  Skin: Warm and dry.  Neurological:  Alert, awake, and appropriate.  Normal speech.  No acute focal neurological deficits are appreciated.  Two through 12 intact bilaterally.  Psychiatric: Normal affect. Good eye contact. Appropriate in content.     ED Course   Procedures  ED Vital Signs:  Vitals:    06/13/20 1128 06/13/20 1200 06/13/20 1202 06/13/20 1233   BP: (!) 186/103 (!) 191/98 (!) 202/113 (!) 194/92   Pulse: 94 81 107 81   Resp: 16      Temp: 98.2 °F (36.8 °C)      TempSrc: Oral      SpO2: 100% 99% 99% 100%   Weight: 89.7 kg (197 lb 12 oz)      Height: 5' 3" (1.6 m)       06/13/20 1302 06/13/20 1332   BP: (!) 173/79 (!) 173/81   Pulse: 91 102   Resp:     Temp:     TempSrc:     SpO2: 100% 100%   Weight:     Height:         Abnormal Lab Results:  Labs Reviewed   CBC W/ AUTO DIFFERENTIAL - Abnormal; Notable for the following components:       Result Value    Hemoglobin 11.2 (*)     Hematocrit 35.2 (*)     Mean Corpuscular Volume 80 (*)     Mean Corpuscular Hemoglobin 25.6 (*)     Mean Corpuscular Hemoglobin Conc 31.8 (*)     RDW 14.9 (*)     Platelets 412 (*)     MPV 8.8 (*)     Immature Granulocytes 0.7 (*)     Immature Grans (Abs) 0.05 (*)     Lymph # 0.8 (*)     Gran% 82.2 (*)     Lymph% 11.5 (*)     All other components within normal limits   COMPREHENSIVE METABOLIC PANEL - Abnormal; Notable for the following components:    Sodium 131 (*)     Potassium 2.9 (*)     CO2 19 (*)     Glucose 147 (*)     AST 9 (*)     ALT 9 (*)     eGFR if non  54 (*)     All other components " within normal limits   URINALYSIS, REFLEX TO URINE CULTURE - Abnormal; Notable for the following components:    Occult Blood UA Trace (*)     Leukocytes, UA 2+ (*)     All other components within normal limits    Narrative:     Preferred Collection Type->Urine, Clean Catch  Specimen Source->Urine   URINALYSIS MICROSCOPIC - Abnormal; Notable for the following components:    WBC, UA 10 (*)     Bacteria Moderate (*)     All other components within normal limits    Narrative:     Preferred Collection Type->Urine, Clean Catch  Specimen Source->Urine   LIPASE   SARS-COV-2 RNA AMPLIFICATION, QUAL        All Lab Results:  Results for orders placed or performed during the hospital encounter of 06/13/20   CBC auto differential   Result Value Ref Range    WBC 7.29 3.90 - 12.70 K/uL    RBC 4.38 4.00 - 5.40 M/uL    Hemoglobin 11.2 (L) 12.0 - 16.0 g/dL    Hematocrit 35.2 (L) 37.0 - 48.5 %    Mean Corpuscular Volume 80 (L) 82 - 98 fL    Mean Corpuscular Hemoglobin 25.6 (L) 27.0 - 31.0 pg    Mean Corpuscular Hemoglobin Conc 31.8 (L) 32.0 - 36.0 g/dL    RDW 14.9 (H) 11.5 - 14.5 %    Platelets 412 (H) 150 - 350 K/uL    MPV 8.8 (L) 9.2 - 12.9 fL    Immature Granulocytes 0.7 (H) 0.0 - 0.5 %    Gran # (ANC) 6.0 1.8 - 7.7 K/uL    Immature Grans (Abs) 0.05 (H) 0.00 - 0.04 K/uL    Lymph # 0.8 (L) 1.0 - 4.8 K/uL    Mono # 0.4 0.3 - 1.0 K/uL    Eos # 0.0 0.0 - 0.5 K/uL    Baso # 0.01 0.00 - 0.20 K/uL    nRBC 0 0 /100 WBC    Gran% 82.2 (H) 38.0 - 73.0 %    Lymph% 11.5 (L) 18.0 - 48.0 %    Mono% 5.5 4.0 - 15.0 %    Eosinophil% 0.0 0.0 - 8.0 %    Basophil% 0.1 0.0 - 1.9 %    Differential Method Automated    Comprehensive metabolic panel   Result Value Ref Range    Sodium 131 (L) 136 - 145 mmol/L    Potassium 2.9 (L) 3.5 - 5.1 mmol/L    Chloride 97 95 - 110 mmol/L    CO2 19 (L) 23 - 29 mmol/L    Glucose 147 (H) 70 - 110 mg/dL    BUN, Bld 13 8 - 23 mg/dL    Creatinine 1.1 0.5 - 1.4 mg/dL    Calcium 8.9 8.7 - 10.5 mg/dL    Total Protein 7.6 6.0 -  8.4 g/dL    Albumin 4.0 3.5 - 5.2 g/dL    Total Bilirubin 0.5 0.1 - 1.0 mg/dL    Alkaline Phosphatase 90 55 - 135 U/L    AST 9 (L) 10 - 40 U/L    ALT 9 (L) 10 - 44 U/L    Anion Gap 15 8 - 16 mmol/L    eGFR if African American >60 >60 mL/min/1.73 m^2    eGFR if non African American 54 (A) >60 mL/min/1.73 m^2   Lipase   Result Value Ref Range    Lipase 5 4 - 60 U/L   Urinalysis, Reflex to Urine Culture Urine, Clean Catch    Specimen: Urine   Result Value Ref Range    Specimen UA Urine, Clean Catch     Color, UA Yellow Yellow, Straw, Debra    Appearance, UA Clear Clear    pH, UA 6.0 5.0 - 8.0    Specific Gravity, UA 1.020 1.005 - 1.030    Protein, UA Negative Negative    Glucose, UA Negative Negative    Ketones, UA Negative Negative    Bilirubin (UA) Negative Negative    Occult Blood UA Trace (A) Negative    Nitrite, UA Negative Negative    Urobilinogen, UA Negative <2.0 EU/dL    Leukocytes, UA 2+ (A) Negative   COVID-19 Rapid Screening   Result Value Ref Range    SARS-CoV-2 RNA, Amplification, Qual Negative Negative   Urinalysis Microscopic   Result Value Ref Range    RBC, UA 0 0 - 4 /hpf    WBC, UA 10 (H) 0 - 5 /hpf    Bacteria Moderate (A) None-Occ /hpf    Microscopic Comment SEE COMMENT          Imaging Results          CT Abdomen Pelvis With Contrast (Final result)  Result time 06/13/20 15:20:23    Final result by Max Grewal III, MD (06/13/20 15:20:23)                 Impression:      1.  Diverticulosis with no definite radiographic evidence of diverticulitis.    2.  No michelle bowel obstruction or free air.    3.  Hiatal hernia.  Postop changes.    4.  Otherwise as above.    All CT scans at this facility are performed  using dose modulation techniques as appropriate to performed exam including the following:  automated exposure control; adjustment of mA and/or kV according to the patients size (this includes techniques or standardized protocols for targeted exams where dose is matched to indication/reason  for exam: i.e. extremities or head);  iterative reconstruction technique.      Electronically signed by: Max Grewal MD  Date:    06/13/2020  Time:    15:20             Narrative:    EXAMINATION:  CT ABDOMEN PELVIS WITH CONTRAST    CLINICAL HISTORY:  Abdominal distension;    TECHNIQUE:  Axial CT imaging was performed through the abdomen and pelvis with  75cc  of intravenous contrast. Multiplanar reformats were performed and interpreted.    COMPARISON:  None    FINDINGS:  The heart size is normal.  Lung bases are clear.  No free intraperitoneal air or bowel obstruction.  Bony windows show no acute abnormality.    There is a moderate size hiatal hernia.  Surgical clips are projected along the distal esophagus/proximal stomach region.  The liver and spleen are not enlarged.  Surgical clips noted in the gallbladder fossa.  There is no adrenal mass or enlargement.  No pancreatic abnormality is seen.  There is mild prominence of the common duct.    Suspect small renal cysts but no solid mass or obstruction.  Suspect a small fat containing anterior abdominal wall hernia.  There is again evidence of moderately extensive diverticulosis.  Cannot definitely visualize evidence of diverticulitis.  A large segment of the colon is somewhat small and questionably contracted but I cannot evaluate the lumen or wall thickness without oral contrast.    No additional significant findings.  No abnormality in the right lower quadrant to suggest acute appendicitis.                                     The Emergency Provider reviewed the vital signs and test results, which are outlined above.     ED Discussion       2:15 PM  Patient remains nauseated.  Workup remains in process.  Patient is aware of results this time.  Will re-dose with antiemetics.  Pain is well controlled.    3:29 PM: Re-evaluated pt. Pt is resting comfortably and is in no acute distress.  Pt states her nausea has improved, but not completely.  D/w pt all pertinent  results. D/w pt any concerns expressed at this time. Answered all questions. Pt expresses understanding at this time.    4:20 PM: Reassessed pt at this time.  Pt states her condition has improved at this time. Discussed with pt all pertinent ED information and results. Discussed pt dx and plan of tx. Gave pt all f/u and return to the ED instructions. All questions and concerns were addressed at this time. Pt expresses understanding of information and instructions, and is comfortable with plan to discharge. Pt is stable for discharge.    I discussed with patient and/or family/caretaker that evaluation in the ED does not suggest any emergent or life threatening medical conditions requiring immediate intervention beyond what was provided in the ED, and I believe patient is safe for discharge.  Regardless, an unremarkable evaluation in the ED does not preclude the development or presence of a serious of life threatening condition. As such, patient was instructed to return immediately for any worsening or change in current symptoms.    4:24 PM  Patient is stable nontoxic in nausea has resolved.  Patient is now tolerating p.o. and states that she will be able to tolerate her antibiotics at home for her UTI.  She does have Reglan usually works very well for her and requests a prescription for this.  She is requesting discharge and agrees to return if her symptoms return or worsen in any way.  She is stable and safe for discharge in my opinion.  I discussed with her all findings well as plan of care.  She has requested Diflucan as usually gets a yeast infection after antibiotics.     MDM        Medical Decision Making:   Clinical Tests:   Lab Tests: Ordered and Reviewed  Radiological Study: Ordered and Reviewed           ED Medication(s):  Medications   potassium chloride 10 mEq in 100 mL IVPB (has no administration in time range)   hydrALAZINE injection 20 mg (has no administration in time range)   cefTRIAXone (ROCEPHIN) 1  g/50 mL D5W IVPB (has no administration in time range)   sodium chloride 0.9% bolus 1,000 mL (0 mLs Intravenous Stopped 6/13/20 1424)   morphine injection 4 mg (4 mg Intravenous Given 6/13/20 1249)   ondansetron injection 4 mg (4 mg Intravenous Given 6/13/20 1248)   promethazine (PHENERGAN) 12.5 mg in dextrose 5 % 50 mL IVPB (0 mg Intravenous Stopped 6/13/20 1425)   hydrALAZINE injection 20 mg (20 mg Intravenous Given 6/13/20 1249)   promethazine (PHENERGAN) 12.5 mg in dextrose 5 % 50 mL IVPB (12.5 mg Intravenous New Bag 6/13/20 1437)   ondansetron injection 4 mg (4 mg Intravenous Given 6/13/20 1437)   potassium chloride SA CR tablet 40 mEq (20 mEq Oral Given 6/13/20 1436)   morphine injection 4 mg (4 mg Intravenous Given 6/13/20 1437)   iohexoL (OMNIPAQUE 350) injection 100 mL (100 mLs Intravenous Given 6/13/20 1455)   promethazine (PHENERGAN) 12.5 mg in dextrose 5 % 50 mL IVPB (12.5 mg Intravenous New Bag 6/13/20 1600)       New Prescriptions    No medications on file               Scribe Attestation:   Scribe #1: I performed the above scribed service and the documentation accurately describes the services I performed. I attest to the accuracy of the note.     Attending:   Physician Attestation Statement for Scribe #1: I, Fortunato Rodriguez Jr., MD, personally performed the services described in this documentation, as scribed by Goldie Cha, in my presence, and it is both accurate and complete.           Clinical Impression     No diagnosis found.    Disposition:   Disposition: Discharged  Condition: Stable         Fortunato Rodriguez Jr., MD  06/13/20 [id number]

## 2020-06-14 ENCOUNTER — HOSPITAL ENCOUNTER (OUTPATIENT)
Facility: HOSPITAL | Age: 60
Discharge: HOME | End: 2020-06-15
Attending: EMERGENCY MEDICINE | Admitting: INTERNAL MEDICINE
Payer: MEDICARE

## 2020-06-14 DIAGNOSIS — R11.2 INTRACTABLE VOMITING WITH NAUSEA, UNSPECIFIED VOMITING TYPE: Primary | ICD-10-CM

## 2020-06-14 DIAGNOSIS — K22.2 ESOPHAGEAL STRICTURE: ICD-10-CM

## 2020-06-14 DIAGNOSIS — N39.0 ACUTE LOWER UTI: ICD-10-CM

## 2020-06-14 DIAGNOSIS — I10 ESSENTIAL HYPERTENSION: ICD-10-CM

## 2020-06-14 PROBLEM — R11.10 INTRACTABLE VOMITING: Status: ACTIVE | Noted: 2020-06-14

## 2020-06-14 PROBLEM — E87.6 HYPOKALEMIA: Status: ACTIVE | Noted: 2020-06-14

## 2020-06-14 LAB
ALBUMIN SERPL BCP-MCNC: 4 G/DL (ref 3.5–5.2)
ALP SERPL-CCNC: 85 U/L (ref 55–135)
ALT SERPL W/O P-5'-P-CCNC: 10 U/L (ref 10–44)
ANION GAP SERPL CALC-SCNC: 16 MMOL/L (ref 8–16)
AST SERPL-CCNC: 10 U/L (ref 10–40)
BASOPHILS # BLD AUTO: 0.02 K/UL (ref 0–0.2)
BASOPHILS NFR BLD: 0.3 % (ref 0–1.9)
BILIRUB SERPL-MCNC: 0.5 MG/DL (ref 0.1–1)
BUN SERPL-MCNC: 10 MG/DL (ref 8–23)
CALCIUM SERPL-MCNC: 9 MG/DL (ref 8.7–10.5)
CHLORIDE SERPL-SCNC: 96 MMOL/L (ref 95–110)
CO2 SERPL-SCNC: 18 MMOL/L (ref 23–29)
CREAT SERPL-MCNC: 1.1 MG/DL (ref 0.5–1.4)
DIFFERENTIAL METHOD: ABNORMAL
EOSINOPHIL # BLD AUTO: 0 K/UL (ref 0–0.5)
EOSINOPHIL NFR BLD: 0.1 % (ref 0–8)
ERYTHROCYTE [DISTWIDTH] IN BLOOD BY AUTOMATED COUNT: 15.2 % (ref 11.5–14.5)
EST. GFR  (AFRICAN AMERICAN): >60 ML/MIN/1.73 M^2
EST. GFR  (NON AFRICAN AMERICAN): 54 ML/MIN/1.73 M^2
GLUCOSE SERPL-MCNC: 181 MG/DL (ref 70–110)
HCT VFR BLD AUTO: 35.9 % (ref 37–48.5)
HGB BLD-MCNC: 11.5 G/DL (ref 12–16)
IMM GRANULOCYTES # BLD AUTO: 0.05 K/UL (ref 0–0.04)
IMM GRANULOCYTES NFR BLD AUTO: 0.7 % (ref 0–0.5)
LIPASE SERPL-CCNC: 5 U/L (ref 4–60)
LYMPHOCYTES # BLD AUTO: 0.6 K/UL (ref 1–4.8)
LYMPHOCYTES NFR BLD: 8.2 % (ref 18–48)
MCH RBC QN AUTO: 25.6 PG (ref 27–31)
MCHC RBC AUTO-ENTMCNC: 32 G/DL (ref 32–36)
MCV RBC AUTO: 80 FL (ref 82–98)
MONOCYTES # BLD AUTO: 0.4 K/UL (ref 0.3–1)
MONOCYTES NFR BLD: 5.8 % (ref 4–15)
NEUTROPHILS # BLD AUTO: 6.3 K/UL (ref 1.8–7.7)
NEUTROPHILS NFR BLD: 84.9 % (ref 38–73)
NRBC BLD-RTO: 0 /100 WBC
PLATELET # BLD AUTO: 401 K/UL (ref 150–350)
PMV BLD AUTO: 8.5 FL (ref 9.2–12.9)
POTASSIUM SERPL-SCNC: 3.1 MMOL/L (ref 3.5–5.1)
PROT SERPL-MCNC: 7.7 G/DL (ref 6–8.4)
RBC # BLD AUTO: 4.49 M/UL (ref 4–5.4)
SODIUM SERPL-SCNC: 130 MMOL/L (ref 136–145)
WBC # BLD AUTO: 7.36 K/UL (ref 3.9–12.7)

## 2020-06-14 PROCEDURE — 63600175 PHARM REV CODE 636 W HCPCS: Performed by: EMERGENCY MEDICINE

## 2020-06-14 PROCEDURE — 80053 COMPREHEN METABOLIC PANEL: CPT

## 2020-06-14 PROCEDURE — 99285 EMERGENCY DEPT VISIT HI MDM: CPT | Mod: 25

## 2020-06-14 PROCEDURE — 96367 TX/PROPH/DG ADDL SEQ IV INF: CPT

## 2020-06-14 PROCEDURE — 25000003 PHARM REV CODE 250: Performed by: EMERGENCY MEDICINE

## 2020-06-14 PROCEDURE — 96375 TX/PRO/DX INJ NEW DRUG ADDON: CPT

## 2020-06-14 PROCEDURE — 96376 TX/PRO/DX INJ SAME DRUG ADON: CPT

## 2020-06-14 PROCEDURE — 25000003 PHARM REV CODE 250: Performed by: NURSE PRACTITIONER

## 2020-06-14 PROCEDURE — 85025 COMPLETE CBC W/AUTO DIFF WBC: CPT

## 2020-06-14 PROCEDURE — G0378 HOSPITAL OBSERVATION PER HR: HCPCS

## 2020-06-14 PROCEDURE — 83690 ASSAY OF LIPASE: CPT

## 2020-06-14 PROCEDURE — 96372 THER/PROPH/DIAG INJ SC/IM: CPT | Mod: 59

## 2020-06-14 PROCEDURE — 63600175 PHARM REV CODE 636 W HCPCS: Performed by: NURSE PRACTITIONER

## 2020-06-14 PROCEDURE — 96365 THER/PROPH/DIAG IV INF INIT: CPT

## 2020-06-14 RX ORDER — METOCLOPRAMIDE HYDROCHLORIDE 5 MG/ML
10 INJECTION INTRAMUSCULAR; INTRAVENOUS EVERY 6 HOURS
Status: DISCONTINUED | OUTPATIENT
Start: 2020-06-14 | End: 2020-06-15 | Stop reason: HOSPADM

## 2020-06-14 RX ORDER — CLONIDINE HYDROCHLORIDE 0.1 MG/1
0.1 TABLET ORAL 2 TIMES DAILY
Status: DISCONTINUED | OUTPATIENT
Start: 2020-06-14 | End: 2020-06-15 | Stop reason: HOSPADM

## 2020-06-14 RX ORDER — SODIUM CHLORIDE 9 MG/ML
1000 INJECTION, SOLUTION INTRAVENOUS CONTINUOUS
Status: DISCONTINUED | OUTPATIENT
Start: 2020-06-14 | End: 2020-06-14

## 2020-06-14 RX ORDER — CARVEDILOL 12.5 MG/1
12.5 TABLET ORAL 2 TIMES DAILY WITH MEALS
Status: DISCONTINUED | OUTPATIENT
Start: 2020-06-14 | End: 2020-06-15 | Stop reason: HOSPADM

## 2020-06-14 RX ORDER — ZOLPIDEM TARTRATE 5 MG/1
5 TABLET ORAL NIGHTLY PRN
Status: DISCONTINUED | OUTPATIENT
Start: 2020-06-14 | End: 2020-06-15 | Stop reason: HOSPADM

## 2020-06-14 RX ORDER — AMLODIPINE BESYLATE 5 MG/1
5 TABLET ORAL DAILY
Status: DISCONTINUED | OUTPATIENT
Start: 2020-06-15 | End: 2020-06-15 | Stop reason: HOSPADM

## 2020-06-14 RX ORDER — SODIUM CHLORIDE 0.9 % (FLUSH) 0.9 %
10 SYRINGE (ML) INJECTION
Status: DISCONTINUED | OUTPATIENT
Start: 2020-06-14 | End: 2020-06-15 | Stop reason: HOSPADM

## 2020-06-14 RX ORDER — ENOXAPARIN SODIUM 100 MG/ML
40 INJECTION SUBCUTANEOUS EVERY 24 HOURS
Status: DISCONTINUED | OUTPATIENT
Start: 2020-06-14 | End: 2020-06-15 | Stop reason: HOSPADM

## 2020-06-14 RX ORDER — ONDANSETRON 2 MG/ML
4 INJECTION INTRAMUSCULAR; INTRAVENOUS
Status: COMPLETED | OUTPATIENT
Start: 2020-06-14 | End: 2020-06-14

## 2020-06-14 RX ORDER — METOCLOPRAMIDE HYDROCHLORIDE 5 MG/ML
10 INJECTION INTRAMUSCULAR; INTRAVENOUS
Status: COMPLETED | OUTPATIENT
Start: 2020-06-14 | End: 2020-06-14

## 2020-06-14 RX ORDER — HYDRALAZINE HYDROCHLORIDE 20 MG/ML
20 INJECTION INTRAMUSCULAR; INTRAVENOUS
Status: DISCONTINUED | OUTPATIENT
Start: 2020-06-14 | End: 2020-06-14

## 2020-06-14 RX ORDER — PANTOPRAZOLE SODIUM 40 MG/10ML
40 INJECTION, POWDER, LYOPHILIZED, FOR SOLUTION INTRAVENOUS DAILY
Status: DISCONTINUED | OUTPATIENT
Start: 2020-06-15 | End: 2020-06-15 | Stop reason: HOSPADM

## 2020-06-14 RX ORDER — ONDANSETRON 4 MG/1
4 TABLET, ORALLY DISINTEGRATING ORAL EVERY 8 HOURS PRN
Status: DISCONTINUED | OUTPATIENT
Start: 2020-06-14 | End: 2020-06-15 | Stop reason: HOSPADM

## 2020-06-14 RX ORDER — DICYCLOMINE HYDROCHLORIDE 10 MG/1
10 CAPSULE ORAL EVERY 6 HOURS PRN
Status: DISCONTINUED | OUTPATIENT
Start: 2020-06-14 | End: 2020-06-15 | Stop reason: HOSPADM

## 2020-06-14 RX ORDER — ONDANSETRON 2 MG/ML
4 INJECTION INTRAMUSCULAR; INTRAVENOUS EVERY 8 HOURS
Status: DISCONTINUED | OUTPATIENT
Start: 2020-06-14 | End: 2020-06-15 | Stop reason: HOSPADM

## 2020-06-14 RX ORDER — SODIUM CHLORIDE AND POTASSIUM CHLORIDE 150; 900 MG/100ML; MG/100ML
INJECTION, SOLUTION INTRAVENOUS CONTINUOUS
Status: DISCONTINUED | OUTPATIENT
Start: 2020-06-14 | End: 2020-06-15 | Stop reason: HOSPADM

## 2020-06-14 RX ORDER — MORPHINE SULFATE 4 MG/ML
4 INJECTION, SOLUTION INTRAMUSCULAR; INTRAVENOUS
Status: COMPLETED | OUTPATIENT
Start: 2020-06-14 | End: 2020-06-14

## 2020-06-14 RX ORDER — HYDRALAZINE HYDROCHLORIDE 20 MG/ML
10 INJECTION INTRAMUSCULAR; INTRAVENOUS EVERY 6 HOURS PRN
Status: DISCONTINUED | OUTPATIENT
Start: 2020-06-14 | End: 2020-06-15 | Stop reason: HOSPADM

## 2020-06-14 RX ORDER — ASPIRIN 81 MG/1
81 TABLET ORAL DAILY
Status: DISCONTINUED | OUTPATIENT
Start: 2020-06-15 | End: 2020-06-15 | Stop reason: HOSPADM

## 2020-06-14 RX ORDER — POTASSIUM CHLORIDE 7.45 MG/ML
10 INJECTION INTRAVENOUS
Status: COMPLETED | OUTPATIENT
Start: 2020-06-14 | End: 2020-06-14

## 2020-06-14 RX ADMIN — SODIUM CHLORIDE AND POTASSIUM CHLORIDE: .9; .15 SOLUTION INTRAVENOUS at 04:06

## 2020-06-14 RX ADMIN — ZOLPIDEM TARTRATE 5 MG: 5 TABLET, COATED ORAL at 08:06

## 2020-06-14 RX ADMIN — MORPHINE SULFATE 4 MG: 4 INJECTION INTRAVENOUS at 12:06

## 2020-06-14 RX ADMIN — PROMETHAZINE HYDROCHLORIDE 12.5 MG: 25 INJECTION INTRAMUSCULAR; INTRAVENOUS at 08:06

## 2020-06-14 RX ADMIN — ENOXAPARIN SODIUM 40 MG: 40 INJECTION SUBCUTANEOUS at 04:06

## 2020-06-14 RX ADMIN — METOCLOPRAMIDE 10 MG: 5 INJECTION, SOLUTION INTRAMUSCULAR; INTRAVENOUS at 06:06

## 2020-06-14 RX ADMIN — SODIUM CHLORIDE 1000 ML: 0.9 INJECTION, SOLUTION INTRAVENOUS at 01:06

## 2020-06-14 RX ADMIN — CEFTRIAXONE 1 G: 1 INJECTION, SOLUTION INTRAVENOUS at 12:06

## 2020-06-14 RX ADMIN — CARVEDILOL 12.5 MG: 12.5 TABLET, FILM COATED ORAL at 06:06

## 2020-06-14 RX ADMIN — CLONIDINE HYDROCHLORIDE 0.1 MG: 0.1 TABLET ORAL at 08:06

## 2020-06-14 RX ADMIN — METOCLOPRAMIDE 10 MG: 5 INJECTION, SOLUTION INTRAMUSCULAR; INTRAVENOUS at 12:06

## 2020-06-14 RX ADMIN — SODIUM CHLORIDE 1000 ML: 0.9 INJECTION, SOLUTION INTRAVENOUS at 12:06

## 2020-06-14 RX ADMIN — DICYCLOMINE HYDROCHLORIDE 10 MG: 10 CAPSULE ORAL at 08:06

## 2020-06-14 RX ADMIN — ONDANSETRON HYDROCHLORIDE 4 MG: 2 INJECTION, SOLUTION INTRAMUSCULAR; INTRAVENOUS at 03:06

## 2020-06-14 RX ADMIN — ONDANSETRON HYDROCHLORIDE 4 MG: 2 INJECTION, SOLUTION INTRAMUSCULAR; INTRAVENOUS at 01:06

## 2020-06-14 RX ADMIN — ONDANSETRON 4 MG: 2 INJECTION INTRAMUSCULAR; INTRAVENOUS at 11:06

## 2020-06-14 RX ADMIN — MORPHINE SULFATE 4 MG: 4 INJECTION INTRAVENOUS at 03:06

## 2020-06-14 RX ADMIN — POTASSIUM CHLORIDE 10 MEQ: 10 INJECTION, SOLUTION INTRAVENOUS at 01:06

## 2020-06-14 NOTE — ASSESSMENT & PLAN NOTE
Has been intolerant to medications by mouth  Prn IV hydralazine  Resume amlodipine, Coreg, Clonidine

## 2020-06-14 NOTE — HPI
Pt is a 59 yo female with PMHx of HTN, GERD, Depression/Anxiety, S/P Delores - N- Y gastric bypass (several years ago) who presents to the ED for second day due to intractable nausea and vomiting. Also, associated intermittent epigastric and lower abdominal pain. Pt describes episodes of intractable nausea and vomiting approx every 6 months. She returned to the ED due to continued symptoms. She denies any urinary symptoms and has chronic diarrhea. CT imaging of abdomen is negative for acute findings. She denies weakness, lightheadedness and presyncopal symptoms.   Vital signs noted elevated blood pressure &  heart rate 122  Labs find chronic anemia with H/H 11.5/35.9, sodium 130, K + 3.1, CO2 18 and serum creatinine 1.1(at baseline). Pt was given consecutive doses of IV Zofran, Reglan and IV morphine in the ED.  Pt is placed on Observation for symptom management and replacement of potassium.

## 2020-06-14 NOTE — H&P
"[facility]  History & Physical    Patient Name: Divya Bui  MRN: [id number]  Admission Date: 6/14/2020  Attending Physician: Jayjay Simon MD   Primary Care Provider: Lucio Salas MD         Patient information was obtained from patient, past medical records and ER records.     Subjective:     Principal Problem:Intractable vomiting    Chief Complaint:   Chief Complaint   Patient presents with    Vomiting     PT states, "I was here yesterday and they told me to come back if I couldn't hold anythig down."        HPI: Pt is a 59 yo female with PMHx of HTN, GERD, Depression/Anxiety, S/P [place] - N- Y gastric bypass (several years ago) who presents to the ED for second day due to intractable nausea and vomiting. Also, associated intermittent epigastric and lower abdominal pain. Pt describes episodes of intractable nausea and vomiting approx every 6 months. She returned to the ED due to continued symptoms. She denies any urinary symptoms and has chronic diarrhea. CT imaging of abdomen is negative for acute findings. She denies weakness, lightheadedness and presyncopal symptoms.   Vital signs noted elevated blood pressure &  heart rate 122  Labs find chronic anemia with H/H 11.5/35.9, sodium 130, K + 3.1, CO2 18 and serum creatinine 1.1(at baseline). Pt was given consecutive doses of IV Zofran, Reglan and IV morphine in the ED.  Pt is placed on Observation for symptom management and replacement of potassium.     Past Medical History:   Diagnosis Date    Anemia     Anxiety     Blood transfusion     Bowel incontinence     Depression     Esophageal stricture     GERD (gastroesophageal reflux disease)     Hypertension     Latent tuberculosis by skin test 2001    took INH    Liver nodule 1/6/2014    Morbid obesity with BMI of 45.0-49.9, adult     OA (osteoarthritis) of knee 12/12/2014    Pericardial effusion 9/4/2014       Past Surgical History:   Procedure Laterality " Date    CHOLECYSTECTOMY      GASTRIC BYPASS      HERNIA REPAIR      right [facility] surgery      SMALL INTESTINE SURGERY         Review of patient's allergies indicates:   Allergen Reactions    Metronidazole hcl Other (See Comments)     Mouth ulcers developed with Flagyl  Oral sores.  Mouth ulcers developed with Flagyl       Current Facility-Administered Medications on File Prior to Encounter   Medication    [COMPLETED] cefTRIAXone (ROCEPHIN) 1 g/50 mL D5W IVPB    [COMPLETED] morphine injection 4 mg    [COMPLETED] potassium chloride 10 mEq in 100 mL IVPB    [DISCONTINUED] hydrALAZINE injection 20 mg     Current Outpatient Medications on File Prior to Encounter   Medication Sig    ABILIFY 15 mg Tab Take 15 mg by mouth once daily.     alprazolam (XANAX XR) 2 MG Tb24 Take 1 mg by mouth once daily. 2 mg tablet(2tabs) oral in the am and 1 mg(1tab)in the afternoon    amlodipine (NORVASC) 5 MG tablet Take 1 tablet (5 mg total) by mouth once daily.    aspirin 81 mg Tab Take 1 tablet by mouth Daily.    bacitracin 500 unit/gram ointment Apply 1 g topically 2 (two) times daily.    carvedilol (COREG) 12.5 MG tablet Take 12.5 mg by mouth.    cephALEXin (KEFLEX) 500 MG capsule Take 1 capsule (500 mg total) by mouth every 8 (eight) hours. for 7 days    cloNIDine (CATAPRES) 0.1 MG tablet Take 1 tablet (0.1 mg total) by mouth 2 (two) times daily.    cyanocobalamin (VITAMIN B-12) 1,000 mcg/mL injection Inject 1 mL (1,000 mcg total) into the muscle every 30 days.    diclofenac (VOLTAREN) 75 MG EC tablet Take 75 mg by mouth 2 (two) times daily.    fluconazole (DIFLUCAN) 150 MG Tab Take 1 pill. If no relief in 3 days, take 1 additional pill.    fluconazole (DIFLUCAN) 200 MG Tab Take 1 tablet (200 mg total) by mouth once daily. for 2 doses    fluoxetine (PROZAC) 40 MG capsule Take 1 capsule (40 mg total) by mouth once daily.    furosemide (LASIX) 40 MG tablet TAKE 1 TABLET (40 MG TOTAL) BY MOUTH ONCE DAILY.     loperamide (IMODIUM A-D) 2 mg Tab Take 2 mg by mouth. 2 in am    melatonin 10 mg Cap Take by mouth nightly as needed.    metoclopramide HCl (REGLAN) 10 MG tablet Take 1 tablet (10 mg total) by mouth every 6 (six) hours.    omeprazole (PRILOSEC) 40 MG capsule Take 40 mg by mouth once daily.    orphenadrine (NORFLEX) 100 mg tablet     oxycodone-acetaminophen (PERCOCET)  mg per tablet     potassium chloride (KLOR-CON) 10 MEQ TbSR Take 1 tablet (10 mEq total) by mouth 2 (two) times daily.    promethazine (PHENERGAN) 25 MG tablet Take 1 tablet (25 mg total) by mouth every 6 (six) hours as needed for Nausea.    zoledronic acid-mannitol & water (RECLAST) 5 mg/100 mL Soln Inject 5 mg into the vein Once a year.    zolpidem (AMBIEN CR) 12.5 MG CR tablet Take 12.5 mg by mouth nightly as needed for Insomnia.    [DISCONTINUED] clindamycin (CLEOCIN) 300 MG capsule Take 1 capsule (300 mg total) by mouth every 6 (six) hours.    [DISCONTINUED] metoclopramide HCl (REGLAN) 10 MG tablet Take 1 tablet (10 mg total) by mouth every 6 (six) hours.    [DISCONTINUED] ondansetron (ZOFRAN) 4 MG tablet Take 2 tablets (8 mg total) by mouth every 8 (eight) hours as needed for Nausea.    [DISCONTINUED] sulfamethoxazole-trimethoprim 800-160mg (BACTRIM DS) 800-160 mg Tab Take 1 tablet by mouth 2 (two) times daily.     Family History     Problem Relation (Age of Onset)    Crohn's disease Other, Sister, Brother    Diabetes Sister    Liver cancer Father, Sister    Lung cancer Mother        Tobacco Use    Smoking status: Never Smoker    Smokeless tobacco: Never Used   Substance and Sexual Activity    Alcohol use: No     Alcohol/week: 0.0 standard drinks    Drug use: No    Sexual activity: Yes     Partners: Male     Review of Systems   Constitutional: Positive for activity change, appetite change and chills. Negative for fatigue and fever.   HENT: Negative.    Respiratory: Negative for cough and shortness of breath.     Cardiovascular: Negative for chest pain, palpitations and leg swelling.   Gastrointestinal: Positive for abdominal pain, diarrhea, nausea and vomiting.   Genitourinary: Negative.    Musculoskeletal: Negative.    Skin: Negative for pallor, rash and wound.   Neurological: Negative for dizziness, weakness and light-headedness.   Psychiatric/Behavioral: Negative for confusion.     Objective:     Vital Signs (Most Recent):  Temp: 97.8 °F (36.6 °C) (06/14/20 1546)  Pulse: 92 (06/14/20 1546)  Resp: 20 (06/14/20 1206)  BP: (!) 179/99 (06/14/20 1546)  SpO2: 100 % (06/14/20 1546) Vital Signs (24h Range):  Temp:  [97.8 °F (36.6 °C)-98.7 °F (37.1 °C)] 97.8 °F (36.6 °C)  Pulse:  [] 92  Resp:  [16-20] 20  SpO2:  [96 %-100 %] 100 %  BP: (124-208)/() 179/99     Weight: 89.5 kg (197 lb 6.8 oz)  Body mass index is 34.97 kg/m².    Physical Exam  Vitals signs and nursing note reviewed.   Constitutional:       Appearance: Normal appearance. She is well-developed.   HENT:      Head: Normocephalic and atraumatic.      Nose: Nose normal.   Eyes:      General: No scleral icterus.     Conjunctiva/sclera: Conjunctivae normal.      Pupils: Pupils are equal, round, and reactive to light.   Neck:      Musculoskeletal: Normal range of motion and neck supple.   Cardiovascular:      Rate and Rhythm: Normal rate and regular rhythm.      Heart sounds: Normal heart sounds. No murmur. No friction rub. No gallop.    Pulmonary:      Effort: Pulmonary effort is normal.      Breath sounds: Normal breath sounds.   Abdominal:      General: Bowel sounds are normal. There is no distension.      Palpations: Abdomen is soft.      Tenderness: There is abdominal tenderness in the epigastric area.   Musculoskeletal: Normal range of motion.         General: No tenderness.   Skin:     General: Skin is warm and dry.   Neurological:      Mental Status: She is alert and oriented to person, place, and time.   Psychiatric:         Behavior: Behavior normal.            CRANIAL NERVES     CN III, IV, VI   Pupils are equal, round, and reactive to light.       Significant Labs:   CBC:   Recent Labs   Lab 06/13/20  1156 06/14/20  1238   WBC 7.29 7.36   HGB 11.2* 11.5*   HCT 35.2* 35.9*   * 401*     CMP:   Recent Labs   Lab 06/13/20  1156 06/14/20  1238   * 130*   K 2.9* 3.1*   CL 97 96   CO2 19* 18*   * 181*   BUN 13 10   CREATININE 1.1 1.1   CALCIUM 8.9 9.0   PROT 7.6 7.7   ALBUMIN 4.0 4.0   BILITOT 0.5 0.5   ALKPHOS 90 85   AST 9* 10   ALT 9* 10   ANIONGAP 15 16   EGFRNONAA 54* 54*     All pertinent labs within the past 24 hours have been reviewed.    Significant Imaging: I have reviewed all pertinent imaging results/findings within the past 24 hours.    Assessment/Plan:     * Intractable vomiting  IV hydration  Scheduled and prn antiemetics  Minimize analgesics due to slowing effects of peristalsis   Clear liquid diet      Hypokalemia  Replete and monitor  IV fluids with potassium      Hypothyroid  Lab Results   Component Value Date    TSH 1.376 09/06/2016         HTN (hypertension)  Has been intolerant to medications by mouth  Prn IV hydralazine  Resume amlodipine, Coreg, Clonidine        VTE Risk Mitigation (From admission, onward)         Ordered     enoxaparin injection 40 mg  Every 24 hours      06/14/20 1544     IP VTE HIGH RISK PATIENT  Once      06/14/20 1544     Place sequential compression device  Until discontinued      06/14/20 1544                   Carmen Michael NP  Department of Hospital Medicine   [facility]

## 2020-06-14 NOTE — SUBJECTIVE & OBJECTIVE
Past Medical History:   Diagnosis Date    Anemia     Anxiety     Blood transfusion     Bowel incontinence     Depression     Esophageal stricture     GERD (gastroesophageal reflux disease)     Hypertension     Latent tuberculosis by skin test 2001    took INH    Liver nodule 1/6/2014    Morbid obesity with BMI of 45.0-49.9, adult     OA (osteoarthritis) of knee 12/12/2014    Pericardial effusion 9/4/2014       Past Surgical History:   Procedure Laterality Date    CHOLECYSTECTOMY      GASTRIC BYPASS      HERNIA REPAIR      right sholder surgery      SMALL INTESTINE SURGERY         Review of patient's allergies indicates:   Allergen Reactions    Metronidazole hcl Other (See Comments)     Mouth ulcers developed with Flagyl  Oral sores.  Mouth ulcers developed with Flagyl       Current Facility-Administered Medications on File Prior to Encounter   Medication    [COMPLETED] cefTRIAXone (ROCEPHIN) 1 g/50 mL D5W IVPB    [COMPLETED] morphine injection 4 mg    [COMPLETED] potassium chloride 10 mEq in 100 mL IVPB    [DISCONTINUED] hydrALAZINE injection 20 mg     Current Outpatient Medications on File Prior to Encounter   Medication Sig    ABILIFY 15 mg Tab Take 15 mg by mouth once daily.     alprazolam (XANAX XR) 2 MG Tb24 Take 1 mg by mouth once daily. 2 mg tablet(2tabs) oral in the am and 1 mg(1tab)in the afternoon    amlodipine (NORVASC) 5 MG tablet Take 1 tablet (5 mg total) by mouth once daily.    aspirin 81 mg Tab Take 1 tablet by mouth Daily.    bacitracin 500 unit/gram ointment Apply 1 g topically 2 (two) times daily.    carvedilol (COREG) 12.5 MG tablet Take 12.5 mg by mouth.    cephALEXin (KEFLEX) 500 MG capsule Take 1 capsule (500 mg total) by mouth every 8 (eight) hours. for 7 days    cloNIDine (CATAPRES) 0.1 MG tablet Take 1 tablet (0.1 mg total) by mouth 2 (two) times daily.    cyanocobalamin (VITAMIN B-12) 1,000 mcg/mL injection Inject 1 mL (1,000 mcg total) into the muscle every  30 days.    diclofenac (VOLTAREN) 75 MG EC tablet Take 75 mg by mouth 2 (two) times daily.    fluconazole (DIFLUCAN) 150 MG Tab Take 1 pill. If no relief in 3 days, take 1 additional pill.    fluconazole (DIFLUCAN) 200 MG Tab Take 1 tablet (200 mg total) by mouth once daily. for 2 doses    fluoxetine (PROZAC) 40 MG capsule Take 1 capsule (40 mg total) by mouth once daily.    furosemide (LASIX) 40 MG tablet TAKE 1 TABLET (40 MG TOTAL) BY MOUTH ONCE DAILY.    loperamide (IMODIUM A-D) 2 mg Tab Take 2 mg by mouth. 2 in am    melatonin 10 mg Cap Take by mouth nightly as needed.    metoclopramide HCl (REGLAN) 10 MG tablet Take 1 tablet (10 mg total) by mouth every 6 (six) hours.    omeprazole (PRILOSEC) 40 MG capsule Take 40 mg by mouth once daily.    orphenadrine (NORFLEX) 100 mg tablet     oxycodone-acetaminophen (PERCOCET)  mg per tablet     potassium chloride (KLOR-CON) 10 MEQ TbSR Take 1 tablet (10 mEq total) by mouth 2 (two) times daily.    promethazine (PHENERGAN) 25 MG tablet Take 1 tablet (25 mg total) by mouth every 6 (six) hours as needed for Nausea.    zoledronic acid-mannitol & water (RECLAST) 5 mg/100 mL Soln Inject 5 mg into the vein Once a year.    zolpidem (AMBIEN CR) 12.5 MG CR tablet Take 12.5 mg by mouth nightly as needed for Insomnia.    [DISCONTINUED] clindamycin (CLEOCIN) 300 MG capsule Take 1 capsule (300 mg total) by mouth every 6 (six) hours.    [DISCONTINUED] metoclopramide HCl (REGLAN) 10 MG tablet Take 1 tablet (10 mg total) by mouth every 6 (six) hours.    [DISCONTINUED] ondansetron (ZOFRAN) 4 MG tablet Take 2 tablets (8 mg total) by mouth every 8 (eight) hours as needed for Nausea.    [DISCONTINUED] sulfamethoxazole-trimethoprim 800-160mg (BACTRIM DS) 800-160 mg Tab Take 1 tablet by mouth 2 (two) times daily.     Family History     Problem Relation (Age of Onset)    Crohn's disease Other, Sister, Brother    Diabetes Sister    Liver cancer Father, Sister    Lung  cancer Mother        Tobacco Use    Smoking status: Never Smoker    Smokeless tobacco: Never Used   Substance and Sexual Activity    Alcohol use: No     Alcohol/week: 0.0 standard drinks    Drug use: No    Sexual activity: Yes     Partners: Male     Review of Systems   Constitutional: Positive for activity change, appetite change and chills. Negative for fatigue and fever.   HENT: Negative.    Respiratory: Negative for cough and shortness of breath.    Cardiovascular: Negative for chest pain, palpitations and leg swelling.   Gastrointestinal: Positive for abdominal pain, diarrhea, nausea and vomiting.   Genitourinary: Negative.    Musculoskeletal: Negative.    Skin: Negative for pallor, rash and wound.   Neurological: Negative for dizziness, weakness and light-headedness.   Psychiatric/Behavioral: Negative for confusion.     Objective:     Vital Signs (Most Recent):  Temp: 97.8 °F (36.6 °C) (06/14/20 1546)  Pulse: 92 (06/14/20 1546)  Resp: 20 (06/14/20 1206)  BP: (!) 179/99 (06/14/20 1546)  SpO2: 100 % (06/14/20 1546) Vital Signs (24h Range):  Temp:  [97.8 °F (36.6 °C)-98.7 °F (37.1 °C)] 97.8 °F (36.6 °C)  Pulse:  [] 92  Resp:  [16-20] 20  SpO2:  [96 %-100 %] 100 %  BP: (124-208)/() 179/99     Weight: 89.5 kg (197 lb 6.8 oz)  Body mass index is 34.97 kg/m².    Physical Exam  Vitals signs and nursing note reviewed.   Constitutional:       Appearance: Normal appearance. She is well-developed.   HENT:      Head: Normocephalic and atraumatic.      Nose: Nose normal.   Eyes:      General: No scleral icterus.     Conjunctiva/sclera: Conjunctivae normal.      Pupils: Pupils are equal, round, and reactive to light.   Neck:      Musculoskeletal: Normal range of motion and neck supple.   Cardiovascular:      Rate and Rhythm: Normal rate and regular rhythm.      Heart sounds: Normal heart sounds. No murmur. No friction rub. No gallop.    Pulmonary:      Effort: Pulmonary effort is normal.      Breath sounds:  Normal breath sounds.   Abdominal:      General: Bowel sounds are normal. There is no distension.      Palpations: Abdomen is soft.      Tenderness: There is abdominal tenderness in the epigastric area.   Musculoskeletal: Normal range of motion.         General: No tenderness.   Skin:     General: Skin is warm and dry.   Neurological:      Mental Status: She is alert and oriented to person, place, and time.   Psychiatric:         Behavior: Behavior normal.           CRANIAL NERVES     CN III, IV, VI   Pupils are equal, round, and reactive to light.       Significant Labs:   CBC:   Recent Labs   Lab 06/13/20  1156 06/14/20  1238   WBC 7.29 7.36   HGB 11.2* 11.5*   HCT 35.2* 35.9*   * 401*     CMP:   Recent Labs   Lab 06/13/20  1156 06/14/20  1238   * 130*   K 2.9* 3.1*   CL 97 96   CO2 19* 18*   * 181*   BUN 13 10   CREATININE 1.1 1.1   CALCIUM 8.9 9.0   PROT 7.6 7.7   ALBUMIN 4.0 4.0   BILITOT 0.5 0.5   ALKPHOS 90 85   AST 9* 10   ALT 9* 10   ANIONGAP 15 16   EGFRNONAA 54* 54*     All pertinent labs within the past 24 hours have been reviewed.    Significant Imaging: I have reviewed all pertinent imaging results/findings within the past 24 hours.

## 2020-06-14 NOTE — ED NOTES
Patient sitting up in bed, no acute distress noted, awake, alert, and oriented x 3, calm, respirations even and unlabored, and skin is warm and dry. Patient updated on status and plan of care. Side rails up x 2, call light in reach, bed low and locked.Denies any needs at this time. Will continue to monitor.

## 2020-06-14 NOTE — ASSESSMENT & PLAN NOTE
IV hydration  Scheduled and prn antiemetics  Minimize analgesics due to slowing effects of peristalsis   Clear liquid diet

## 2020-06-14 NOTE — ED PROVIDER NOTES
"SCRIBE #1 NOTE: I, Donato Brandon, am scribing for, and in the presence of, Fortunato Rodriguez Jr., MD. I have scribed the entire note.       History     Chief Complaint   Patient presents with    Vomiting     PT states, "I was here yesterday and they told me to come back if I couldn't hold anythig down."     Review of patient's allergies indicates:   Allergen Reactions    Metronidazole hcl Other (See Comments)     Mouth ulcers developed with Flagyl  Oral sores.  Mouth ulcers developed with Flagyl         History of Present Illness     HPI    6/14/2020, 12:12 PM  History obtained from the patient      History of Present Illness: Divya Bui is a 60 y.o. female patient with a PMHx of anemia, anxiety, bowel incontinence, depression, esophageal stricture, GERD, HTN, liver nodule, osteoarthritis of the knee, and pericardial effusion who presents to the Emergency Department for evaluation of vomiting which onset suddenly 1 week PTA. Pt was seen at Urgent Care yesterday and was discharged after IV fluids, Toradol, and phenergan. Pt has a PSHx of Delores-en-Y gastric bypass, and states that similar sxs occur every 6 months or so before resolving spontaneously. Pt was seen here yesterday and offered admission, but pt declined.  Patient was feeling better at time of discharge and thought she would do okay at home.  Pt was also dx'd with a UTI and Rx'd Keflex. Symptoms are episodic and moderate in severity. No mitigating or exacerbating factors reported. Associated sxs include abdominal pain, nausea, diarrhea, and a mild fever. Patient denies any constipation, hematochezia, CP, SOB, back pain, hematuria, and all other sxs at this time. Prior Tx includes phenergan, but pt could not tolerate. No further complaints or concerns at this time.  Urine not repeated today as she has received antibiotics in the interim.  She did not take her antibiotics this morning however will be re-dosed with Rocephin pending cultures.      Arrival " mode: Personal vehicle    PCP: Lucio Salas MD        Past Medical History:  Past Medical History:   Diagnosis Date    Anemia     Anxiety     Blood transfusion     Bowel incontinence     Depression     Esophageal stricture     GERD (gastroesophageal reflux disease)     Hypertension     Latent tuberculosis by skin test 2001    took INH    Liver nodule 1/6/2014    Morbid obesity with BMI of 45.0-49.9, adult     OA (osteoarthritis) of knee 12/12/2014    Pericardial effusion 9/4/2014       Past Surgical History:  Past Surgical History:   Procedure Laterality Date    CHOLECYSTECTOMY      GASTRIC BYPASS      HERNIA REPAIR      right sholder surgery      SMALL INTESTINE SURGERY           Family History:  Family History   Problem Relation Age of Onset    Crohn's disease Other     Liver cancer Father     Diabetes Sister     Crohn's disease Sister     Liver cancer Sister     Crohn's disease Brother     Lung cancer Mother        Social History:  Social History     Tobacco Use    Smoking status: Never Smoker    Smokeless tobacco: Never Used   Substance and Sexual Activity    Alcohol use: No     Alcohol/week: 0.0 standard drinks    Drug use: No    Sexual activity: Yes     Partners: Male        Review of Systems     Review of Systems   Constitutional: Positive for fever (Mild). Negative for chills.   HENT: Negative for sore throat.    Respiratory: Negative for cough and shortness of breath.    Cardiovascular: Negative for chest pain and leg swelling.   Gastrointestinal: Positive for abdominal pain, diarrhea, nausea and vomiting. Negative for blood in stool and constipation.   Genitourinary: Negative for dysuria and hematuria.   Musculoskeletal: Negative for back pain, neck pain and neck stiffness.   Skin: Negative for rash and wound.   Neurological: Negative for dizziness, weakness, light-headedness, numbness and headaches.   Hematological: Does not bruise/bleed easily.   All other systems  "reviewed and are negative.     Physical Exam     Initial Vitals [06/14/20 1206]   BP Pulse Resp Temp SpO2   (!) 140/92 (!) 122 20 -- 97 %      MAP       --          Physical Exam  Nursing Notes and Vital Signs Reviewed.  Constitutional: Well-developed and well-nourished.  Head: Atraumatic. Normocephalic.  Eyes: EOM intact. No scleral icterus.  ENT: Mucous membranes are moist. Oropharynx is clear and symmetric.    Neck: Supple. Full ROM. No lymphadenopathy.  Cardiovascular: Regular rate. Regular rhythm. No murmurs, rubs, or gallops. Distal pulses are 2+ and symmetric.  Pulmonary/Chest: No respiratory distress. Clear to auscultation bilaterally. No wheezing or rales.  Abdominal: Soft and non-distended. There is no tenderness to palpation. No rebound, guarding, or rigidity. Good bowel sounds.  Genitourinary: No CVA tenderness. No suprapubic tenderness.  Musculoskeletal: Moves all extremities. No obvious deformities. No calf tenderness.  Skin: Warm and dry.  Neurological:  Alert, awake, and appropriate.  Normal speech.  No acute focal neurological deficits are appreciated. CN II-XII intact bilaterally.  Psychiatric: Normal affect. Good eye contact. Appropriate in content.     ED Course   Procedures  ED Vital Signs:  Vitals:    06/14/20 1206 06/14/20 1300 06/14/20 1315 06/14/20 1330   BP: (!) 140/92 (!) 198/98 (!) 178/91 (!) 188/110   Pulse: (!) 122 (!) 119 106 104   Resp: 20      TempSrc: Axillary      SpO2: 97% 99% 100% 100%   Weight: 89.5 kg (197 lb 6.8 oz)      Height: 5' 3" (1.6 m)          Abnormal Lab Results:  Labs Reviewed   CBC W/ AUTO DIFFERENTIAL - Abnormal; Notable for the following components:       Result Value    Hemoglobin 11.5 (*)     Hematocrit 35.9 (*)     Mean Corpuscular Volume 80 (*)     Mean Corpuscular Hemoglobin 25.6 (*)     RDW 15.2 (*)     Platelets 401 (*)     MPV 8.5 (*)     Immature Granulocytes 0.7 (*)     Immature Grans (Abs) 0.05 (*)     Lymph # 0.6 (*)     Gran% 84.9 (*)     Lymph% 8.2 " (*)     All other components within normal limits   COMPREHENSIVE METABOLIC PANEL - Abnormal; Notable for the following components:    Sodium 130 (*)     Potassium 3.1 (*)     CO2 18 (*)     Glucose 181 (*)     eGFR if non  54 (*)     All other components within normal limits   LIPASE        All Lab Results:  From 6/13/20:  Urinalysis, Reflex to Urine Culture Urine, Clean Catch    Specimen: Urine   Result Value Ref Range    Specimen UA Urine, Clean Catch     Color, UA Yellow Yellow, Straw, Debra    Appearance, UA Clear Clear    pH, UA 6.0 5.0 - 8.0    Specific Gravity, UA 1.020 1.005 - 1.030    Protein, UA Negative Negative    Glucose, UA Negative Negative    Ketones, UA Negative Negative    Bilirubin (UA) Negative Negative    Occult Blood UA Trace (A) Negative    Nitrite, UA Negative Negative    Urobilinogen, UA Negative <2.0 EU/dL    Leukocytes, UA 2+ (A) Negative   COVID-19 Rapid Screening   Result Value Ref Range    SARS-CoV-2 RNA, Amplification, Qual Negative Negative   Urinalysis Microscopic   Result Value Ref Range    RBC, UA 0 0 - 4 /hpf    WBC, UA 10 (H) 0 - 5 /hpf    Bacteria Moderate (A) None-Occ /hpf        Results for orders placed or performed during the hospital encounter of 06/14/20   CBC auto differential   Result Value Ref Range    WBC 7.36 3.90 - 12.70 K/uL    RBC 4.49 4.00 - 5.40 M/uL    Hemoglobin 11.5 (L) 12.0 - 16.0 g/dL    Hematocrit 35.9 (L) 37.0 - 48.5 %    Mean Corpuscular Volume 80 (L) 82 - 98 fL    Mean Corpuscular Hemoglobin 25.6 (L) 27.0 - 31.0 pg    Mean Corpuscular Hemoglobin Conc 32.0 32.0 - 36.0 g/dL    RDW 15.2 (H) 11.5 - 14.5 %    Platelets 401 (H) 150 - 350 K/uL    MPV 8.5 (L) 9.2 - 12.9 fL    Immature Granulocytes 0.7 (H) 0.0 - 0.5 %    Gran # (ANC) 6.3 1.8 - 7.7 K/uL    Immature Grans (Abs) 0.05 (H) 0.00 - 0.04 K/uL    Lymph # 0.6 (L) 1.0 - 4.8 K/uL    Mono # 0.4 0.3 - 1.0 K/uL    Eos # 0.0 0.0 - 0.5 K/uL    Baso # 0.02 0.00 - 0.20 K/uL    nRBC 0 0 /100 WBC     Gran% 84.9 (H) 38.0 - 73.0 %    Lymph% 8.2 (L) 18.0 - 48.0 %    Mono% 5.8 4.0 - 15.0 %    Eosinophil% 0.1 0.0 - 8.0 %    Basophil% 0.3 0.0 - 1.9 %    Differential Method Automated    Comprehensive metabolic panel   Result Value Ref Range    Sodium 130 (L) 136 - 145 mmol/L    Potassium 3.1 (L) 3.5 - 5.1 mmol/L    Chloride 96 95 - 110 mmol/L    CO2 18 (L) 23 - 29 mmol/L    Glucose 181 (H) 70 - 110 mg/dL    BUN, Bld 10 8 - 23 mg/dL    Creatinine 1.1 0.5 - 1.4 mg/dL    Calcium 9.0 8.7 - 10.5 mg/dL    Total Protein 7.7 6.0 - 8.4 g/dL    Albumin 4.0 3.5 - 5.2 g/dL    Total Bilirubin 0.5 0.1 - 1.0 mg/dL    Alkaline Phosphatase 85 55 - 135 U/L    AST 10 10 - 40 U/L    ALT 10 10 - 44 U/L    Anion Gap 16 8 - 16 mmol/L    eGFR if African American >60 >60 mL/min/1.73 m^2    eGFR if non African American 54 (A) >60 mL/min/1.73 m^2   Lipase   Result Value Ref Range    Lipase 5 4 - 60 U/L       Imaging Results:  Imaging Results    None               The Emergency Provider reviewed the vital signs and test results, which are outlined above.     ED Discussion     12:19 PM: Did not redo UA because pt was given antibiotics in the interim and had a UA yesterday with culture pending. Pt was given antibiotics today because they could not tolerate their morning dose at home.    1:35 PM: Discussed case with Biju Simon MD ([facility]). Dr. Simon agrees with current care and management of pt and accepts admission.   Admitting Service: [facility]  Admitting Physician: Dr. Simon  Admit to: Observation/Tele    1:37 PM: Re-evaluated pt. I have discussed test results, shared treatment plan, and the need for admission with patient and family at bedside. Pt and family express understanding at this time and agree with all information. All questions answered. Pt and family have no further questions or concerns at this time. Pt is ready for admit.    1:38 PM  Patient is stable and nontoxic.  Patient is a bounce-back from intractable nausea  and vomiting yesterday.  She was better yesterday but reverted and is now unable hold down any of her meds.  She has hypokalemia can as well.  Patient is being.  Patient is aware of the of care.     Medical Decision Making:   Clinical Tests:   Lab Tests: Ordered and Reviewed           ED Medication(s):  Medications   potassium chloride 10 mEq in 100 mL IVPB (has no administration in time range)   ondansetron injection 4 mg (has no administration in time range)   0.9%  NaCl infusion (has no administration in time range)   sodium chloride 0.9% bolus 1,000 mL (1,000 mLs Intravenous New Bag 6/14/20 1237)   morphine injection 4 mg (4 mg Intravenous Given 6/14/20 1258)   metoclopramide HCl injection 10 mg (10 mg Intravenous Given 6/14/20 1258)   cefTRIAXone (ROCEPHIN) 1 g/50 mL D5W IVPB (1 g Intravenous New Bag 6/14/20 1259)       New Prescriptions    No medications on file               Scribe Attestation:   Scribe #1: I performed the above scribed service and the documentation accurately describes the services I performed. I attest to the accuracy of the note.     Attending:   Physician Attestation Statement for Scribe #1: I, Fortunato Rodriguez Jr., MD, personally performed the services described in this documentation, as scribed by Donato Taylor, in my presence, and it is both accurate and complete.           Clinical Impression       ICD-10-CM ICD-9-CM   1. Intractable vomiting with nausea, unspecified vomiting type  R11.2 536.2   2. Acute lower UTI  N39.0 599.0       Disposition:   Disposition: Placed in Observation  Condition: Fair       Fortunato Rodriguez Jr., MD  06/14/20 9789

## 2020-06-15 VITALS
DIASTOLIC BLOOD PRESSURE: 68 MMHG | RESPIRATION RATE: 20 BRPM | WEIGHT: 197.56 LBS | HEIGHT: 63 IN | HEART RATE: 64 BPM | TEMPERATURE: 98 F | BODY MASS INDEX: 35 KG/M2 | OXYGEN SATURATION: 100 % | SYSTOLIC BLOOD PRESSURE: 129 MMHG

## 2020-06-15 PROBLEM — R11.10 INTRACTABLE VOMITING: Status: RESOLVED | Noted: 2020-06-14 | Resolved: 2020-06-15

## 2020-06-15 LAB
ALBUMIN SERPL BCP-MCNC: 3.3 G/DL (ref 3.5–5.2)
ALP SERPL-CCNC: 69 U/L (ref 55–135)
ALT SERPL W/O P-5'-P-CCNC: 7 U/L (ref 10–44)
ANION GAP SERPL CALC-SCNC: 9 MMOL/L (ref 8–16)
AST SERPL-CCNC: 7 U/L (ref 10–40)
BASOPHILS # BLD AUTO: 0.03 K/UL (ref 0–0.2)
BASOPHILS NFR BLD: 0.5 % (ref 0–1.9)
BILIRUB SERPL-MCNC: 0.3 MG/DL (ref 0.1–1)
BUN SERPL-MCNC: 7 MG/DL (ref 8–23)
CALCIUM SERPL-MCNC: 8.2 MG/DL (ref 8.7–10.5)
CHLORIDE SERPL-SCNC: 102 MMOL/L (ref 95–110)
CO2 SERPL-SCNC: 24 MMOL/L (ref 23–29)
CREAT SERPL-MCNC: 1.1 MG/DL (ref 0.5–1.4)
DIFFERENTIAL METHOD: ABNORMAL
EOSINOPHIL # BLD AUTO: 0.1 K/UL (ref 0–0.5)
EOSINOPHIL NFR BLD: 2.2 % (ref 0–8)
ERYTHROCYTE [DISTWIDTH] IN BLOOD BY AUTOMATED COUNT: 15.4 % (ref 11.5–14.5)
EST. GFR  (AFRICAN AMERICAN): >60 ML/MIN/1.73 M^2
EST. GFR  (NON AFRICAN AMERICAN): 54 ML/MIN/1.73 M^2
GLUCOSE SERPL-MCNC: 104 MG/DL (ref 70–110)
HCT VFR BLD AUTO: 32.6 % (ref 37–48.5)
HGB BLD-MCNC: 10 G/DL (ref 12–16)
IMM GRANULOCYTES # BLD AUTO: 0.03 K/UL (ref 0–0.04)
IMM GRANULOCYTES NFR BLD AUTO: 0.5 % (ref 0–0.5)
LYMPHOCYTES # BLD AUTO: 1.6 K/UL (ref 1–4.8)
LYMPHOCYTES NFR BLD: 28.4 % (ref 18–48)
MAGNESIUM SERPL-MCNC: 1.8 MG/DL (ref 1.6–2.6)
MCH RBC QN AUTO: 25.3 PG (ref 27–31)
MCHC RBC AUTO-ENTMCNC: 30.7 G/DL (ref 32–36)
MCV RBC AUTO: 82 FL (ref 82–98)
MONOCYTES # BLD AUTO: 0.8 K/UL (ref 0.3–1)
MONOCYTES NFR BLD: 13.7 % (ref 4–15)
NEUTROPHILS # BLD AUTO: 3 K/UL (ref 1.8–7.7)
NEUTROPHILS NFR BLD: 54.7 % (ref 38–73)
NRBC BLD-RTO: 0 /100 WBC
PHOSPHATE SERPL-MCNC: 2.8 MG/DL (ref 2.7–4.5)
PLATELET # BLD AUTO: 359 K/UL (ref 150–350)
PMV BLD AUTO: 8.8 FL (ref 9.2–12.9)
POTASSIUM SERPL-SCNC: 3.1 MMOL/L (ref 3.5–5.1)
PROT SERPL-MCNC: 6.1 G/DL (ref 6–8.4)
RBC # BLD AUTO: 3.96 M/UL (ref 4–5.4)
SODIUM SERPL-SCNC: 135 MMOL/L (ref 136–145)
WBC # BLD AUTO: 5.49 K/UL (ref 3.9–12.7)

## 2020-06-15 PROCEDURE — 25000003 PHARM REV CODE 250: Performed by: NURSE PRACTITIONER

## 2020-06-15 PROCEDURE — 36415 COLL VENOUS BLD VENIPUNCTURE: CPT

## 2020-06-15 PROCEDURE — G0378 HOSPITAL OBSERVATION PER HR: HCPCS

## 2020-06-15 PROCEDURE — 63600175 PHARM REV CODE 636 W HCPCS: Performed by: NURSE PRACTITIONER

## 2020-06-15 PROCEDURE — 80053 COMPREHEN METABOLIC PANEL: CPT

## 2020-06-15 PROCEDURE — 85025 COMPLETE CBC W/AUTO DIFF WBC: CPT

## 2020-06-15 PROCEDURE — C9113 INJ PANTOPRAZOLE SODIUM, VIA: HCPCS | Performed by: NURSE PRACTITIONER

## 2020-06-15 PROCEDURE — 83735 ASSAY OF MAGNESIUM: CPT

## 2020-06-15 PROCEDURE — 84100 ASSAY OF PHOSPHORUS: CPT

## 2020-06-15 RX ADMIN — PANTOPRAZOLE SODIUM 40 MG: 40 INJECTION, POWDER, LYOPHILIZED, FOR SOLUTION INTRAVENOUS at 09:06

## 2020-06-15 RX ADMIN — ENOXAPARIN SODIUM 40 MG: 40 INJECTION SUBCUTANEOUS at 05:06

## 2020-06-15 RX ADMIN — METOCLOPRAMIDE 10 MG: 5 INJECTION, SOLUTION INTRAMUSCULAR; INTRAVENOUS at 05:06

## 2020-06-15 RX ADMIN — ONDANSETRON 4 MG: 2 INJECTION INTRAMUSCULAR; INTRAVENOUS at 01:06

## 2020-06-15 RX ADMIN — ONDANSETRON 4 MG: 2 INJECTION INTRAMUSCULAR; INTRAVENOUS at 05:06

## 2020-06-15 RX ADMIN — CEFTRIAXONE 1 G: 1 INJECTION, SOLUTION INTRAVENOUS at 01:06

## 2020-06-15 RX ADMIN — METOCLOPRAMIDE 10 MG: 5 INJECTION, SOLUTION INTRAMUSCULAR; INTRAVENOUS at 02:06

## 2020-06-15 RX ADMIN — ASPIRIN 81 MG: 81 TABLET, COATED ORAL at 09:06

## 2020-06-15 RX ADMIN — DICYCLOMINE HYDROCHLORIDE 10 MG: 10 CAPSULE ORAL at 12:06

## 2020-06-15 RX ADMIN — METOCLOPRAMIDE 10 MG: 5 INJECTION, SOLUTION INTRAMUSCULAR; INTRAVENOUS at 11:06

## 2020-06-15 RX ADMIN — CLONIDINE HYDROCHLORIDE 0.1 MG: 0.1 TABLET ORAL at 09:06

## 2020-06-15 RX ADMIN — PROMETHAZINE HYDROCHLORIDE 12.5 MG: 25 INJECTION INTRAMUSCULAR; INTRAVENOUS at 10:06

## 2020-06-15 RX ADMIN — AMLODIPINE BESYLATE 5 MG: 5 TABLET ORAL at 09:06

## 2020-06-15 RX ADMIN — CARVEDILOL 12.5 MG: 12.5 TABLET, FILM COATED ORAL at 05:06

## 2020-06-15 RX ADMIN — CARVEDILOL 12.5 MG: 12.5 TABLET, FILM COATED ORAL at 09:06

## 2020-06-15 RX ADMIN — SODIUM CHLORIDE AND POTASSIUM CHLORIDE: .9; .15 SOLUTION INTRAVENOUS at 04:06

## 2020-06-15 NOTE — PLAN OF CARE
Problem: Nausea and Vomiting  Goal: Fluid and Electrolyte Balance  Outcome: Ongoing, Progressing     Pt aao x 4. VSS  Speech clear.  Complain of abdominal cramps, administered prn bentyl.  No vomiting during shift.  Nausea and vomiting controlled with scheduled medications.  Plan of care reviewed. Pt verbalized understanding.  Safety interventions continued.  Will continue to monitor.

## 2020-06-15 NOTE — PLAN OF CARE
MIGUEL ANGEL met with patient and her  at bedside to complete discharge planning assessment. Patient was alert and oriented during the assessment. Patient lives with her  and he helps mange her care at this time. Patient currently denies using any medical assistive equipment. She denies any current Home Health or Outpatient treatment. Patient denies having a LW/POA. She denies any admission in the last 30 days. At this time no cm needs are identified. Patient denies any post hospital needs or services at this time. Transitional Care Folder, Discharge Planning Begins on Admission pamphlet, [facility] Bedside Delivery pamphlet, Advance Directive information given to patient. Sw placed contact information on white board. Miguel Angel instructed patient or family to call with any questions or concerns.    D/C plan: Home with family  Lucio Salas MD    [facility] [place] - [address]  [address]  [place] [address]  Phone: [phone] Fax: [phone]    [facility] [address]  [address] [facility] [address]  Phone: [phone] Fax: [phone]    [id number] [place], [place] - [address]  [address] [facility] [address]  Phone: [phone] Fax: [phone]  BedSide: Yes  Transportation:      06/15/20 1616   Discharge Assessment   Assessment Type Discharge Planning Assessment   Confirmed/corrected address and phone number on facesheet? Yes   Assessment information obtained from? Patient   Communicated expected length of stay with patient/caregiver no   Prior to hospitilization cognitive status: Alert/Oriented   Prior to hospitalization functional status: Independent   Current cognitive status: Alert/Oriented   Current Functional Status: Independent   Lives With spouse   Able to Return to Prior Arrangements yes   Is patient able to care for self after discharge? Yes   Who are your caregiver(s) and their  phone number(s)? Hao Chowdhury [phone]   Readmission Within the Last 30 Days no previous admission in last 30 days   Patient currently being followed by outpatient case management? No   Patient currently receives any other outside agency services? No   Equipment Currently Used at Home none   Do you have any problems affording any of your prescribed medications? No   Is the patient taking medications as prescribed? yes   Does the patient have transportation home? Yes   Transportation Anticipated family or friend will provide   Does the patient receive services at the [facility]mad[facility] Clinic? No   Discharge Plan A Home with family   Discharge Plan B Home   DME Needed Upon Discharge  none   Patient/Family in Agreement with Plan yes

## 2020-06-16 NOTE — PROGRESS NOTES
Discharge instructions given to patient, verbalized understanding - IV removed without difficulty, pressure dressing applied to site - cardiac monitor removed and returned to monitor tech - patient to car via wheelchair without distress noted.

## 2020-06-17 NOTE — HOSPITAL COURSE
Patient 61 yo female placed in observation due to N/V in a patient with Rouen Y. Patient treated symptomatically, monitored on tele. Patient improving with therapies and able to tolerate PO intake. Patient seen and examined determined appropriate for a safe discharge with close O/P follow up.

## 2020-06-17 NOTE — DISCHARGE SUMMARY
[facility] Hospital Medicine  Discharge Summary      Patient Name: Divya Bui  MRN: [id number]  Admission Date: 6/14/2020  Hospital Length of Stay: 0 days  Discharge Date and Time: 6/15/2020  9:54 PM  Attending Physician: No att. providers found   Discharging Provider: Faisal Klein MD  Primary Care Provider: Lucio Salas MD      HPI:   Pt is a 59 yo female with PMHx of HTN, GERD, Depression/Anxiety, S/P Delores - N- Y gastric bypass (several years ago) who presents to the ED for second day due to intractable nausea and vomiting. Also, associated intermittent epigastric and lower abdominal pain. Pt describes episodes of intractable nausea and vomiting approx every 6 months. She returned to the ED due to continued symptoms. She denies any urinary symptoms and has chronic diarrhea. CT imaging of abdomen is negative for acute findings. She denies weakness, lightheadedness and presyncopal symptoms.   Vital signs noted elevated blood pressure &  heart rate 122  Labs find chronic anemia with H/H 11.5/35.9, sodium 130, K + 3.1, CO2 18 and serum creatinine 1.1(at baseline). Pt was given consecutive doses of IV Zofran, Reglan and IV morphine in the ED.  Pt is placed on Observation for symptom management and replacement of potassium.     * No surgery found *      Hospital Course:   Patient 59 yo female placed in observation due to N/V in a patient with Rouen Y. Patient treated symptomatically, monitored on tele. Patient improving with therapies and able to tolerate PO intake. Patient seen and examined determined appropriate for a safe discharge with close O/P follow up.     Consults:     No new Assessment & Plan notes have been filed under this hospital service since the last note was generated.  Service: Hospital Medicine    Final Active Diagnoses:    Diagnosis Date Noted POA    Hypokalemia [E87.6] 06/14/2020 Yes    HTN (hypertension) [I10] 08/26/2013 Yes    Hypothyroid [E03.9] 08/26/2013 Yes       Problems Resolved During this Admission:    Diagnosis Date Noted Date Resolved POA    PRINCIPAL PROBLEM:  Intractable vomiting [R11.10] 06/14/2020 06/15/2020 Yes       Discharged Condition: stable    Disposition: Home or Self Care    Follow Up:  Follow-up Information     Lucio Salas MD In 3 days.    Specialty: Internal Medicine  Contact information:  [address]  [facility]  [place] [address]  [phone]                 Patient Instructions:      Diet Cardiac     Activity as tolerated       Significant Diagnostic Studies: Labs: BMP: No results for input(s): GLU, NA, K, CL, CO2, BUN, CREATININE, CALCIUM, MG in the last 48 hours., CMP No results for input(s): NA, K, CL, CO2, GLU, BUN, CREATININE, CALCIUM, PROT, ALBUMIN, BILITOT, ALKPHOS, AST, ALT, ANIONGAP, ESTGFRAFRICA, EGFRNONAA in the last 48 hours., CBC No results for input(s): WBC, HGB, HCT, PLT in the last 48 hours. and All labs within the past 24 hours have been reviewed    Pending Diagnostic Studies:     None         Medications:  Reconciled Home Medications:      Medication List      CONTINUE taking these medications    ABILIFY 15 MG Tab  Generic drug: ARIPiprazole  Take 15 mg by mouth once daily.     ALPRAZolam 2 MG Tb24  Commonly known as: XANAX XR  Take 1 mg by mouth once daily. 2 mg tablet(2tabs) oral in the am and 1 mg(1tab)in the afternoon     amLODIPine 5 MG tablet  Commonly known as: NORVASC  Take 1 tablet (5 mg total) by mouth once daily.     aspirin 81 mg Tab  Take 1 tablet by mouth Daily.     bacitracin 500 unit/gram ointment  Apply 1 g topically 2 (two) times daily.     carvediloL 12.5 MG tablet  Commonly known as: COREG  Take 12.5 mg by mouth.     cephALEXin 500 MG capsule  Commonly known as: KEFLEX  Take 1 capsule (500 mg total) by mouth every 8 (eight) hours. for 7 days     cloNIDine 0.1 MG tablet  Commonly known as: CATAPRES  Take 1 tablet (0.1 mg total) by mouth 2 (two) times daily.     cyanocobalamin 1,000 mcg/mL  injection  Commonly known as: VITAMIN B-12  Inject 1 mL (1,000 mcg total) into the muscle every 30 days.     diclofenac 75 MG EC tablet  Commonly known as: VOLTAREN  Take 75 mg by mouth 2 (two) times daily.     * fluconazole 150 MG Tab  Commonly known as: DIFLUCAN  Take 1 pill. If no relief in 3 days, take 1 additional pill.     FLUoxetine 40 MG capsule  Take 1 capsule (40 mg total) by mouth once daily.     furosemide 40 MG tablet  Commonly known as: LASIX  TAKE 1 TABLET (40 MG TOTAL) BY MOUTH ONCE DAILY.     loperamide 2 mg Tab  Commonly known as: IMODIUM A-D  Take 2 mg by mouth. 2 in am     melatonin 10 mg Cap  Take by mouth nightly as needed.     metoclopramide HCl 10 MG tablet  Commonly known as: REGLAN  Take 1 tablet (10 mg total) by mouth every 6 (six) hours.     omeprazole 40 MG capsule  Commonly known as: PRILOSEC  Take 40 mg by mouth once daily.     orphenadrine 100 mg tablet  Commonly known as: NORFLEX     oxyCODONE-acetaminophen  mg per tablet  Commonly known as: PERCOCET     potassium chloride 10 MEQ Tbsr  Commonly known as: KLOR-CON  Take 1 tablet (10 mEq total) by mouth 2 (two) times daily.     promethazine 25 MG tablet  Commonly known as: PHENERGAN  Take 1 tablet (25 mg total) by mouth every 6 (six) hours as needed for Nausea.     RECLAST 5 mg/100 mL Pgbk  Generic drug: zoledronic acid-mannitol & water  Inject 5 mg into the vein Once a year.     zolpidem 12.5 MG CR tablet  Commonly known as: AMBIEN CR  Take 12.5 mg by mouth nightly as needed for Insomnia.         * This list has 1 medication(s) that are the same as other medications prescribed for you. Read the directions carefully, and ask your doctor or other care provider to review them with you.            ASK your doctor about these medications    * fluconazole 200 MG Tab  Commonly known as: DIFLUCAN  Take 1 tablet (200 mg total) by mouth once daily. for 2 doses  Ask about: Should I take this medication?         * This list has 1  medication(s) that are the same as other medications prescribed for you. Read the directions carefully, and ask your doctor or other care provider to review them with you.                Indwelling Lines/Drains at time of discharge:   Lines/Drains/Airways     None                 Time spent on the discharge of patient: 34 minutes  Patient was seen and examined on the date of discharge and determined to be suitable for discharge.         Faisal Klein MD  Department of Hospital Medicine  [facility]

## 2020-10-19 ENCOUNTER — HOSPITAL ENCOUNTER (EMERGENCY)
Facility: HOSPITAL | Age: 60
Discharge: HOME | End: 2020-10-19
Attending: EMERGENCY MEDICINE
Payer: MEDICARE

## 2020-10-19 VITALS
OXYGEN SATURATION: 100 % | HEART RATE: 66 BPM | RESPIRATION RATE: 18 BRPM | TEMPERATURE: 98 F | SYSTOLIC BLOOD PRESSURE: 145 MMHG | WEIGHT: 193 LBS | DIASTOLIC BLOOD PRESSURE: 81 MMHG | BODY MASS INDEX: 34.2 KG/M2 | HEIGHT: 63 IN

## 2020-10-19 DIAGNOSIS — R11.2 NON-INTRACTABLE VOMITING WITH NAUSEA, UNSPECIFIED VOMITING TYPE: Primary | ICD-10-CM

## 2020-10-19 DIAGNOSIS — E87.6 HYPOKALEMIA: ICD-10-CM

## 2020-10-19 LAB
ALBUMIN SERPL BCP-MCNC: 3.6 G/DL (ref 3.5–5.2)
ALP SERPL-CCNC: 80 U/L (ref 55–135)
ALT SERPL W/O P-5'-P-CCNC: 7 U/L (ref 10–44)
ANION GAP SERPL CALC-SCNC: 13 MMOL/L (ref 8–16)
AST SERPL-CCNC: 11 U/L (ref 10–40)
BASOPHILS # BLD AUTO: 0.05 K/UL (ref 0–0.2)
BASOPHILS NFR BLD: 0.9 % (ref 0–1.9)
BILIRUB SERPL-MCNC: 0.3 MG/DL (ref 0.1–1)
BILIRUB UR QL STRIP: NEGATIVE
BUN SERPL-MCNC: 14 MG/DL (ref 8–23)
CALCIUM SERPL-MCNC: 8.2 MG/DL (ref 8.7–10.5)
CHLORIDE SERPL-SCNC: 106 MMOL/L (ref 95–110)
CLARITY UR: CLEAR
CO2 SERPL-SCNC: 19 MMOL/L (ref 23–29)
COLOR UR: YELLOW
CREAT SERPL-MCNC: 0.8 MG/DL (ref 0.5–1.4)
DACRYOCYTES BLD QL SMEAR: ABNORMAL
DIFFERENTIAL METHOD: ABNORMAL
EOSINOPHIL # BLD AUTO: 0.5 K/UL (ref 0–0.5)
EOSINOPHIL NFR BLD: 8.8 % (ref 0–8)
ERYTHROCYTE [DISTWIDTH] IN BLOOD BY AUTOMATED COUNT: 16.8 % (ref 11.5–14.5)
EST. GFR  (AFRICAN AMERICAN): >60 ML/MIN/1.73 M^2
EST. GFR  (NON AFRICAN AMERICAN): >60 ML/MIN/1.73 M^2
GLUCOSE SERPL-MCNC: 115 MG/DL (ref 70–110)
GLUCOSE UR QL STRIP: NEGATIVE
HCT VFR BLD AUTO: 30.6 % (ref 37–48.5)
HGB BLD-MCNC: 9.4 G/DL (ref 12–16)
HGB UR QL STRIP: ABNORMAL
IMM GRANULOCYTES # BLD AUTO: 0.01 K/UL (ref 0–0.04)
IMM GRANULOCYTES NFR BLD AUTO: 0.2 % (ref 0–0.5)
KETONES UR QL STRIP: NEGATIVE
LEUKOCYTE ESTERASE UR QL STRIP: ABNORMAL
LIPASE SERPL-CCNC: 12 U/L (ref 4–60)
LYMPHOCYTES # BLD AUTO: 1.2 K/UL (ref 1–4.8)
LYMPHOCYTES NFR BLD: 22.4 % (ref 18–48)
MCH RBC QN AUTO: 24.9 PG (ref 27–31)
MCHC RBC AUTO-ENTMCNC: 30.7 G/DL (ref 32–36)
MCV RBC AUTO: 81 FL (ref 82–98)
MICROSCOPIC COMMENT: NORMAL
MONOCYTES # BLD AUTO: 0.4 K/UL (ref 0.3–1)
MONOCYTES NFR BLD: 7.3 % (ref 4–15)
NEUTROPHILS # BLD AUTO: 3.3 K/UL (ref 1.8–7.7)
NEUTROPHILS NFR BLD: 60.4 % (ref 38–73)
NITRITE UR QL STRIP: NEGATIVE
NRBC BLD-RTO: 0 /100 WBC
OVALOCYTES BLD QL SMEAR: ABNORMAL
PH UR STRIP: 6 [PH] (ref 5–8)
PLATELET # BLD AUTO: 327 K/UL (ref 150–350)
PMV BLD AUTO: 9.4 FL (ref 9.2–12.9)
POIKILOCYTOSIS BLD QL SMEAR: SLIGHT
POTASSIUM SERPL-SCNC: 2.9 MMOL/L (ref 3.5–5.1)
PROT SERPL-MCNC: 7 G/DL (ref 6–8.4)
PROT UR QL STRIP: NEGATIVE
RBC # BLD AUTO: 3.77 M/UL (ref 4–5.4)
RBC #/AREA URNS HPF: 1 /HPF (ref 0–4)
SCHISTOCYTES BLD QL SMEAR: PRESENT
SODIUM SERPL-SCNC: 138 MMOL/L (ref 136–145)
SP GR UR STRIP: 1.01 (ref 1–1.03)
STOMATOCYTES BLD QL SMEAR: PRESENT
TROPONIN I SERPL DL<=0.01 NG/ML-MCNC: <0.006 NG/ML (ref 0–0.03)
URN SPEC COLLECT METH UR: ABNORMAL
UROBILINOGEN UR STRIP-ACNC: NEGATIVE EU/DL
WBC # BLD AUTO: 5.48 K/UL (ref 3.9–12.7)
WBC #/AREA URNS HPF: 1 /HPF (ref 0–5)

## 2020-10-19 PROCEDURE — 96375 TX/PRO/DX INJ NEW DRUG ADDON: CPT

## 2020-10-19 PROCEDURE — 81000 URINALYSIS NONAUTO W/SCOPE: CPT

## 2020-10-19 PROCEDURE — 96365 THER/PROPH/DIAG IV INF INIT: CPT

## 2020-10-19 PROCEDURE — 83690 ASSAY OF LIPASE: CPT

## 2020-10-19 PROCEDURE — 80053 COMPREHEN METABOLIC PANEL: CPT

## 2020-10-19 PROCEDURE — 36415 COLL VENOUS BLD VENIPUNCTURE: CPT

## 2020-10-19 PROCEDURE — 25000003 PHARM REV CODE 250: Performed by: EMERGENCY MEDICINE

## 2020-10-19 PROCEDURE — C9113 INJ PANTOPRAZOLE SODIUM, VIA: HCPCS | Performed by: EMERGENCY MEDICINE

## 2020-10-19 PROCEDURE — 99284 EMERGENCY DEPT VISIT MOD MDM: CPT | Mod: 25

## 2020-10-19 PROCEDURE — 63600175 PHARM REV CODE 636 W HCPCS: Performed by: EMERGENCY MEDICINE

## 2020-10-19 PROCEDURE — 84484 ASSAY OF TROPONIN QUANT: CPT

## 2020-10-19 PROCEDURE — 85025 COMPLETE CBC W/AUTO DIFF WBC: CPT

## 2020-10-19 PROCEDURE — 96366 THER/PROPH/DIAG IV INF ADDON: CPT

## 2020-10-19 RX ORDER — DIPHENHYDRAMINE HYDROCHLORIDE 50 MG/ML
12.5 INJECTION INTRAMUSCULAR; INTRAVENOUS
Status: COMPLETED | OUTPATIENT
Start: 2020-10-19 | End: 2020-10-19

## 2020-10-19 RX ORDER — METOCLOPRAMIDE HYDROCHLORIDE 5 MG/ML
10 INJECTION INTRAMUSCULAR; INTRAVENOUS
Status: DISCONTINUED | OUTPATIENT
Start: 2020-10-19 | End: 2020-10-19

## 2020-10-19 RX ORDER — ONDANSETRON 2 MG/ML
4 INJECTION INTRAMUSCULAR; INTRAVENOUS
Status: DISCONTINUED | OUTPATIENT
Start: 2020-10-19 | End: 2020-10-19

## 2020-10-19 RX ORDER — METOCLOPRAMIDE 10 MG/1
10 TABLET ORAL EVERY 6 HOURS
Qty: 30 TABLET | Refills: 0 | Status: SHIPPED | OUTPATIENT
Start: 2020-10-19 | End: 2020-10-27

## 2020-10-19 RX ORDER — MAG HYDROX/ALUMINUM HYD/SIMETH 200-200-20
30 SUSPENSION, ORAL (FINAL DOSE FORM) ORAL
Status: COMPLETED | OUTPATIENT
Start: 2020-10-19 | End: 2020-10-19

## 2020-10-19 RX ORDER — PANTOPRAZOLE SODIUM 40 MG/10ML
40 INJECTION, POWDER, LYOPHILIZED, FOR SOLUTION INTRAVENOUS
Status: COMPLETED | OUTPATIENT
Start: 2020-10-19 | End: 2020-10-19

## 2020-10-19 RX ORDER — POTASSIUM CHLORIDE 7.45 MG/ML
10 INJECTION INTRAVENOUS
Status: COMPLETED | OUTPATIENT
Start: 2020-10-19 | End: 2020-10-19

## 2020-10-19 RX ORDER — METOCLOPRAMIDE HYDROCHLORIDE 5 MG/ML
10 INJECTION INTRAMUSCULAR; INTRAVENOUS
Status: COMPLETED | OUTPATIENT
Start: 2020-10-19 | End: 2020-10-19

## 2020-10-19 RX ADMIN — DIPHENHYDRAMINE HYDROCHLORIDE 12.5 MG: 50 INJECTION, SOLUTION INTRAMUSCULAR; INTRAVENOUS at 06:10

## 2020-10-19 RX ADMIN — METOCLOPRAMIDE 10 MG: 5 INJECTION, SOLUTION INTRAMUSCULAR; INTRAVENOUS at 06:10

## 2020-10-19 RX ADMIN — POTASSIUM CHLORIDE 10 MEQ: 7.46 INJECTION, SOLUTION INTRAVENOUS at 06:10

## 2020-10-19 RX ADMIN — MAGNESIUM HYDROXIDE/ALUMINUM HYDROXICE/SIMETHICONE 30 ML: 120; 1200; 1200 SUSPENSION ORAL at 07:10

## 2020-10-19 RX ADMIN — SODIUM CHLORIDE, SODIUM LACTATE, POTASSIUM CHLORIDE, AND CALCIUM CHLORIDE 1000 ML: .6; .31; .03; .02 INJECTION, SOLUTION INTRAVENOUS at 06:10

## 2020-10-19 RX ADMIN — PANTOPRAZOLE SODIUM 40 MG: 40 INJECTION, POWDER, FOR SOLUTION INTRAVENOUS at 07:10

## 2020-10-19 NOTE — ED NOTES
Patient notified of need for urine sample; states she used the restroom in the lobby after triage.

## 2020-10-19 NOTE — ED PROVIDER NOTES
3:44 PM: N/V unrelieved by zofran and phenergan.  Feels dehydrated.  Hx bariatric surgery.      Pt was placed back in lobby. I explained to pt that due to lack of available rooms pt was seen by myself to begin the workup. Pt understands they will be seen and dispositioned by the next available physician. Pt voices understanding and agrees with plan. Pt also understands the longer than normal wait time. I am removing myself from the care of pt and pt will now be assigned to the next available physician.     Betsy De Leon PA-C  3:44 PM         Betsy De Leon PA-C  10/19/20 1551

## 2020-10-19 NOTE — ED PROVIDER NOTES
3:44 PM: N/V unrelieved by zofran and phenergan.  Feels dehydrated.  Hx bariatric surgery.       Pt was placed back in lobby. I explained to pt that due to lack of available rooms pt was seen by myself to begin the workup. Pt understands they will be seen and dispositioned by the next available physician. Pt voices understanding and agrees with plan. Pt also understands the longer than normal wait time. I am removing myself from the care of pt and pt will now be assigned to the next available physician.      Betsy De Leon PA-C  3:44 PM    SCRIBE #1 NOTE: I, Goldie Cha, am scribing for, and in the presence of, Calin Still MD. I have scribed the entire note.       History     Chief Complaint   Patient presents with    Emesis     N/V x 7 weeks. Sent over from Dr. Tuttle (GI) lora they could not get an IV on her at the office.    Abdominal Cramping     Review of patient's allergies indicates:   Allergen Reactions    Metronidazole hcl Other (See Comments)     Mouth ulcers developed with Flagyl  Oral sores.  Mouth ulcers developed with Flagyl         History of Present Illness     HPI    10/19/2020, 4:29 PM  History obtained from the patient      History of Present Illness: Divya Bui is a 60 y.o. female patient with a h/o anemia, anxiety, blood transfusion, bowel incontinence, depression, esophageal stricture, GERD, HTN, liver nodule, morbid obesity, pericardial effusion, gastric bypass surgery who presents to the Emergency Department for evaluation of nausea/emesis x 7 weeks. Pt was sent to the ED by Dr. Tuttle (Gastroenterology) for further tx and evaluation. Pt reports that she hasn't been able to hold down any solid foods for the past 7 weeks. Symptoms are episodic and moderate in severity. No mitigating or exacerbating factors reported. Associated sxs include LUQ and epigastric abd pain. Patient denies any fever, chills, diarrhea, constipation, back pain, dysuria, flank pain, HA, and all other sxs at  this time. Prior Tx includes Zofran and Phenergan with minimal relief in sxs. No further complaints or concerns at this time.       Arrival mode: Personal transportation     PCP: Lucio Salas MD      Past Medical History:  Past Medical History:   Diagnosis Date    Anemia     Anxiety     Blood transfusion     Bowel incontinence     Depression     Esophageal stricture     GERD (gastroesophageal reflux disease)     Hypertension     Latent tuberculosis by skin test 2001    took INH    Liver nodule 1/6/2014    Morbid obesity with BMI of 45.0-49.9, adult     OA (osteoarthritis) of knee 12/12/2014    Pericardial effusion 9/4/2014       Past Surgical History:  Past Surgical History:   Procedure Laterality Date    CHOLECYSTECTOMY      GASTRIC BYPASS      HERNIA REPAIR      right sholder surgery      SMALL INTESTINE SURGERY           Family History:  Family History   Problem Relation Age of Onset    Crohn's disease Other     Liver cancer Father     Diabetes Sister     Crohn's disease Sister     Liver cancer Sister     Crohn's disease Brother     Lung cancer Mother        Social History:   Social History     Tobacco Use    Smoking status: Never Smoker    Smokeless tobacco: Never Used   Substance and Sexual Activity    Alcohol use: No     Alcohol/week: 0.0 standard drinks    Drug use: No    Sexual activity: Yes     Partners: Male        Review of Systems     Review of Systems   Constitutional: Negative for chills and fever.   HENT: Negative for congestion and sore throat.    Respiratory: Negative for cough and shortness of breath.    Cardiovascular: Negative for chest pain.   Gastrointestinal: Positive for abdominal pain (LUQ/epigastric), nausea and vomiting. Negative for constipation and diarrhea.   Genitourinary: Negative for dysuria and flank pain.   Musculoskeletal: Negative for back pain.   Skin: Negative for rash.   Neurological: Negative for weakness and headaches.   Hematological: Does  "not bruise/bleed easily.   All other systems reviewed and are negative.       Physical Exam     Initial Vitals [10/19/20 1526]   BP Pulse Resp Temp SpO2   (!) 145/80 75 18 97.9 °F (36.6 °C) 100 %      MAP       --          Physical Exam  Nursing Notes and Vital Signs Reviewed.  Constitutional: Well-developed and well-nourished.   Head: Atraumatic. Normocephalic.  Eyes: EOM intact. No scleral icterus.  ENT: Mucous membranes are moist. Oropharynx is clear and symmetric.    Neck: Supple. Full ROM. No lymphadenopathy.  Cardiovascular: Regular rate. Regular rhythm. No murmurs, rubs, or gallops. Distal pulses are 2+ and symmetric.  Pulmonary/Chest: No respiratory distress. Clear to auscultation bilaterally. No wheezing or rales.  Abdominal: Soft and non-distended. LUQ and epigastric tenderness to palpation.  No rebound, guarding, or rigidity.  Genitourinary: No CVA tenderness  Musculoskeletal: Moves all extremities. No obvious deformities. No calf tenderness.  Skin: Warm and dry.  Neurological:  Alert, awake, and appropriate.  Normal speech.  No acute focal neurological deficits are appreciated.  Psychiatric: Normal affect. Good eye contact. Appropriate in content.     ED Course   Procedures  ED Vital Signs:  Vitals:    10/19/20 1526 10/19/20 1757 10/19/20 1833 10/19/20 1930   BP: (!) 145/80 (!) 162/77 (!) 170/91 (!) 157/96   Pulse: 75 80 69 77   Resp: 18 18 20 20   Temp: 97.9 °F (36.6 °C)      TempSrc: Oral      SpO2: 100% 99% 100% 100%   Weight: 87.5 kg (193 lb)      Height: 5' 3" (1.6 m)       10/19/20 2000   BP: (!) 145/81   Pulse: 66   Resp: 18   Temp:    TempSrc:    SpO2: 100%   Weight:    Height:        Abnormal Lab Results:  Labs Reviewed   CBC W/ AUTO DIFFERENTIAL - Abnormal; Notable for the following components:       Result Value    RBC 3.77 (*)     Hemoglobin 9.4 (*)     Hematocrit 30.6 (*)     Mean Corpuscular Volume 81 (*)     Mean Corpuscular Hemoglobin 24.9 (*)     Mean Corpuscular Hemoglobin Conc 30.7 (*) "     RDW 16.8 (*)     Eosinophil% 8.8 (*)     All other components within normal limits   COMPREHENSIVE METABOLIC PANEL - Abnormal; Notable for the following components:    Potassium 2.9 (*)     CO2 19 (*)     Glucose 115 (*)     Calcium 8.2 (*)     ALT 7 (*)     All other components within normal limits   URINALYSIS, REFLEX TO URINE CULTURE - Abnormal; Notable for the following components:    Occult Blood UA Trace (*)     Leukocytes, UA Trace (*)     All other components within normal limits    Narrative:     Specimen Source->Urine   LIPASE   TROPONIN I   URINALYSIS MICROSCOPIC    Narrative:     Specimen Source->Urine        All Lab Results:  Results for orders placed or performed during the hospital encounter of 10/19/20   CBC auto differential   Result Value Ref Range    WBC 5.48 3.90 - 12.70 K/uL    RBC 3.77 (L) 4.00 - 5.40 M/uL    Hemoglobin 9.4 (L) 12.0 - 16.0 g/dL    Hematocrit 30.6 (L) 37.0 - 48.5 %    Mean Corpuscular Volume 81 (L) 82 - 98 fL    Mean Corpuscular Hemoglobin 24.9 (L) 27.0 - 31.0 pg    Mean Corpuscular Hemoglobin Conc 30.7 (L) 32.0 - 36.0 g/dL    RDW 16.8 (H) 11.5 - 14.5 %    Platelets 327 150 - 350 K/uL    MPV 9.4 9.2 - 12.9 fL    Immature Granulocytes 0.2 0.0 - 0.5 %    Gran # (ANC) 3.3 1.8 - 7.7 K/uL    Immature Grans (Abs) 0.01 0.00 - 0.04 K/uL    Lymph # 1.2 1.0 - 4.8 K/uL    Mono # 0.4 0.3 - 1.0 K/uL    Eos # 0.5 0.0 - 0.5 K/uL    Baso # 0.05 0.00 - 0.20 K/uL    nRBC 0 0 /100 WBC    Gran% 60.4 38.0 - 73.0 %    Lymph% 22.4 18.0 - 48.0 %    Mono% 7.3 4.0 - 15.0 %    Eosinophil% 8.8 (H) 0.0 - 8.0 %    Basophil% 0.9 0.0 - 1.9 %    Poik Slight     Ovalocytes Occasional     Tear Drop Cells Occasional     Stomatocytes Present     Schistocytes Present     Differential Method Automated    Comprehensive metabolic panel   Result Value Ref Range    Sodium 138 136 - 145 mmol/L    Potassium 2.9 (L) 3.5 - 5.1 mmol/L    Chloride 106 95 - 110 mmol/L    CO2 19 (L) 23 - 29 mmol/L    Glucose 115 (H) 70 - 110  mg/dL    BUN, Bld 14 8 - 23 mg/dL    Creatinine 0.8 0.5 - 1.4 mg/dL    Calcium 8.2 (L) 8.7 - 10.5 mg/dL    Total Protein 7.0 6.0 - 8.4 g/dL    Albumin 3.6 3.5 - 5.2 g/dL    Total Bilirubin 0.3 0.1 - 1.0 mg/dL    Alkaline Phosphatase 80 55 - 135 U/L    AST 11 10 - 40 U/L    ALT 7 (L) 10 - 44 U/L    Anion Gap 13 8 - 16 mmol/L    eGFR if African American >60 >60 mL/min/1.73 m^2    eGFR if non African American >60 >60 mL/min/1.73 m^2   Lipase   Result Value Ref Range    Lipase 12 4 - 60 U/L   Urinalysis, Reflex to Urine Culture Urine, Clean Catch    Specimen: Urine   Result Value Ref Range    Specimen UA Urine, Clean Catch     Color, UA Yellow Yellow, Straw, Debra    Appearance, UA Clear Clear    pH, UA 6.0 5.0 - 8.0    Specific Gravity, UA 1.015 1.005 - 1.030    Protein, UA Negative Negative    Glucose, UA Negative Negative    Ketones, UA Negative Negative    Bilirubin (UA) Negative Negative    Occult Blood UA Trace (A) Negative    Nitrite, UA Negative Negative    Urobilinogen, UA Negative <2.0 EU/dL    Leukocytes, UA Trace (A) Negative   Troponin I   Result Value Ref Range    Troponin I <0.006 0.000 - 0.026 ng/mL   Urinalysis Microscopic   Result Value Ref Range    RBC, UA 1 0 - 4 /hpf    WBC, UA 1 0 - 5 /hpf    Microscopic Comment SEE COMMENT          The Emergency Provider reviewed the vital signs and test results, which are outlined above.     ED Discussion       6:44 PM: Reassessed pt at this time.  Pt states her condition has improved at this time. Discussed with pt all pertinent ED information and results. Discussed pt dx and plan of tx. Gave pt all f/u and return to the ED instructions. All questions and concerns were addressed at this time. Pt expresses understanding of information and instructions, and is comfortable with plan to discharge. Pt is stable for discharge.    I discussed with patient and/or family/caretaker that evaluation in the ED does not suggest any emergent or life threatening medical  conditions requiring immediate intervention beyond what was provided in the ED, and I believe patient is safe for discharge.  Regardless, an unremarkable evaluation in the ED does not preclude the development or presence of a serious of life threatening condition. As such, patient was instructed to return immediately for any worsening or change in current symptoms.       MDM     ED Course as of Oct 19 2157   Mon Oct 19, 2020   1847 Should be able to tolerate her oral potassium as prescribed.    Potassium(!): 2.9 [BA]   1847 Tolerating PO. Feeling much better. No abdominal tenderness/pain at this time. Okay for outpatient f/u with strict return precautions.     [BA]      ED Course User Index  [BA] Calin Still MD     Medical Decision Making:   Clinical Tests:   Lab Tests: Ordered and Reviewed           ED Medication(s):  Medications   lactated ringers bolus 1,000 mL (0 mLs Intravenous Stopped 10/19/20 2014)   metoclopramide HCl injection 10 mg (10 mg Intravenous Given 10/19/20 1809)   diphenhydrAMINE injection 12.5 mg (12.5 mg Intravenous Given 10/19/20 1807)   potassium chloride 10 mEq in 100 mL IVPB (0 mEq Intravenous Stopped 10/19/20 2014)   aluminum-magnesium hydroxide-simethicone 200-200-20 mg/5 mL suspension 30 mL (30 mLs Oral Given 10/19/20 1920)   pantoprazole injection 40 mg (40 mg Intravenous Given 10/19/20 1921)       Discharge Medication List as of 10/19/2020  6:48 PM          Follow-up Information     Your Gastroenterologist. Schedule an appointment as soon as possible for a visit in 2 days.    Why: For re-evaluation and further treatment           [facility]. Go today.    Specialty: Emergency Medicine  Why: If symptoms worsen, For re-evaluation and further treatment, As needed  Contact information:  [id number] [facility] [address] [phone]                   Scribe Attestation:   Scribe #1: I performed the above scribed service and the documentation  accurately describes the services I performed. I attest to the accuracy of the note.     Attending:   Physician Attestation Statement for Scribe #1: I, Calin Still MD, personally performed the services described in this documentation, as scribed by Goldie Cha, in my presence, and it is both accurate and complete.           Clinical Impression       ICD-10-CM ICD-9-CM   1. Non-intractable vomiting with nausea, unspecified vomiting type  R11.2 787.01   2. Hypokalemia  E87.6 276.8       Disposition:   Disposition: Discharged  Condition: Stable            Calin Still MD  10/19/20 0234

## 2021-04-28 ENCOUNTER — PATIENT MESSAGE (OUTPATIENT)
Dept: RESEARCH | Facility: HOSPITAL | Age: 61
End: 2021-04-28

## 2021-07-30 ENCOUNTER — HOSPITAL ENCOUNTER (EMERGENCY)
Facility: HOSPITAL | Age: 61
Discharge: HOME | End: 2021-07-31
Attending: EMERGENCY MEDICINE
Payer: MEDICARE

## 2021-07-30 DIAGNOSIS — I10 HYPERTENSION: ICD-10-CM

## 2021-07-30 DIAGNOSIS — K59.00 CONSTIPATION, UNSPECIFIED CONSTIPATION TYPE: ICD-10-CM

## 2021-07-30 DIAGNOSIS — R10.13 EPIGASTRIC PAIN: Primary | ICD-10-CM

## 2021-07-30 DIAGNOSIS — R11.2 NON-INTRACTABLE VOMITING WITH NAUSEA, UNSPECIFIED VOMITING TYPE: ICD-10-CM

## 2021-07-30 LAB
ALBUMIN SERPL BCP-MCNC: 4 G/DL (ref 3.5–5.2)
ALP SERPL-CCNC: 108 U/L (ref 55–135)
ALT SERPL W/O P-5'-P-CCNC: 22 U/L (ref 10–44)
ANION GAP SERPL CALC-SCNC: 12 MMOL/L (ref 8–16)
AST SERPL-CCNC: 23 U/L (ref 10–40)
BACTERIA #/AREA URNS HPF: NORMAL /HPF
BASOPHILS # BLD AUTO: 0.03 K/UL (ref 0–0.2)
BASOPHILS NFR BLD: 0.6 % (ref 0–1.9)
BILIRUB SERPL-MCNC: 0.6 MG/DL (ref 0.1–1)
BILIRUB UR QL STRIP: NEGATIVE
BUN SERPL-MCNC: 8 MG/DL (ref 8–23)
CALCIUM SERPL-MCNC: 9 MG/DL (ref 8.7–10.5)
CHLORIDE SERPL-SCNC: 100 MMOL/L (ref 95–110)
CLARITY UR: CLEAR
CO2 SERPL-SCNC: 25 MMOL/L (ref 23–29)
COLOR UR: YELLOW
CREAT SERPL-MCNC: 0.8 MG/DL (ref 0.5–1.4)
DIFFERENTIAL METHOD: ABNORMAL
EOSINOPHIL # BLD AUTO: 0.2 K/UL (ref 0–0.5)
EOSINOPHIL NFR BLD: 4.8 % (ref 0–8)
ERYTHROCYTE [DISTWIDTH] IN BLOOD BY AUTOMATED COUNT: 18.6 % (ref 11.5–14.5)
EST. GFR  (AFRICAN AMERICAN): >60 ML/MIN/1.73 M^2
EST. GFR  (NON AFRICAN AMERICAN): >60 ML/MIN/1.73 M^2
GLUCOSE SERPL-MCNC: 103 MG/DL (ref 70–110)
GLUCOSE UR QL STRIP: NEGATIVE
HCT VFR BLD AUTO: 39.7 % (ref 37–48.5)
HGB BLD-MCNC: 12.3 G/DL (ref 12–16)
HGB UR QL STRIP: ABNORMAL
IMM GRANULOCYTES # BLD AUTO: 0.02 K/UL (ref 0–0.04)
IMM GRANULOCYTES NFR BLD AUTO: 0.4 % (ref 0–0.5)
KETONES UR QL STRIP: NEGATIVE
LACTATE SERPL-SCNC: 1.1 MMOL/L (ref 0.5–2.2)
LEUKOCYTE ESTERASE UR QL STRIP: ABNORMAL
LIPASE SERPL-CCNC: 4 U/L (ref 4–60)
LYMPHOCYTES # BLD AUTO: 1.3 K/UL (ref 1–4.8)
LYMPHOCYTES NFR BLD: 26.9 % (ref 18–48)
MCH RBC QN AUTO: 26.5 PG (ref 27–31)
MCHC RBC AUTO-ENTMCNC: 31 G/DL (ref 32–36)
MCV RBC AUTO: 85 FL (ref 82–98)
MICROSCOPIC COMMENT: NORMAL
MONOCYTES # BLD AUTO: 0.4 K/UL (ref 0.3–1)
MONOCYTES NFR BLD: 7 % (ref 4–15)
NEUTROPHILS # BLD AUTO: 3 K/UL (ref 1.8–7.7)
NEUTROPHILS NFR BLD: 60.3 % (ref 38–73)
NITRITE UR QL STRIP: NEGATIVE
NRBC BLD-RTO: 0 /100 WBC
PH UR STRIP: 7 [PH] (ref 5–8)
PLATELET # BLD AUTO: 240 K/UL (ref 150–450)
PMV BLD AUTO: 9.1 FL (ref 9.2–12.9)
POTASSIUM SERPL-SCNC: 4.1 MMOL/L (ref 3.5–5.1)
PROT SERPL-MCNC: 7.9 G/DL (ref 6–8.4)
PROT UR QL STRIP: NEGATIVE
RBC # BLD AUTO: 4.65 M/UL (ref 4–5.4)
RBC #/AREA URNS HPF: 0 /HPF (ref 0–4)
SARS-COV-2 RDRP RESP QL NAA+PROBE: NEGATIVE
SODIUM SERPL-SCNC: 137 MMOL/L (ref 136–145)
SP GR UR STRIP: 1.01 (ref 1–1.03)
URN SPEC COLLECT METH UR: ABNORMAL
UROBILINOGEN UR STRIP-ACNC: NEGATIVE EU/DL
WBC # BLD AUTO: 4.99 K/UL (ref 3.9–12.7)
WBC #/AREA URNS HPF: 5 /HPF (ref 0–5)

## 2021-07-30 PROCEDURE — 25000003 PHARM REV CODE 250: Performed by: EMERGENCY MEDICINE

## 2021-07-30 PROCEDURE — U0002 COVID-19 LAB TEST NON-CDC: HCPCS | Performed by: REGISTERED NURSE

## 2021-07-30 PROCEDURE — 80053 COMPREHEN METABOLIC PANEL: CPT | Performed by: REGISTERED NURSE

## 2021-07-30 PROCEDURE — 83690 ASSAY OF LIPASE: CPT | Performed by: REGISTERED NURSE

## 2021-07-30 PROCEDURE — 93010 EKG 12-LEAD: ICD-10-PCS | Mod: ,,, | Performed by: INTERNAL MEDICINE

## 2021-07-30 PROCEDURE — 93010 ELECTROCARDIOGRAM REPORT: CPT | Mod: ,,, | Performed by: INTERNAL MEDICINE

## 2021-07-30 PROCEDURE — 63600175 PHARM REV CODE 636 W HCPCS: Performed by: EMERGENCY MEDICINE

## 2021-07-30 PROCEDURE — 81000 URINALYSIS NONAUTO W/SCOPE: CPT | Performed by: REGISTERED NURSE

## 2021-07-30 PROCEDURE — 96375 TX/PRO/DX INJ NEW DRUG ADDON: CPT

## 2021-07-30 PROCEDURE — 83605 ASSAY OF LACTIC ACID: CPT | Performed by: EMERGENCY MEDICINE

## 2021-07-30 PROCEDURE — 93005 ELECTROCARDIOGRAM TRACING: CPT

## 2021-07-30 PROCEDURE — 85025 COMPLETE CBC W/AUTO DIFF WBC: CPT | Performed by: REGISTERED NURSE

## 2021-07-30 PROCEDURE — 99285 EMERGENCY DEPT VISIT HI MDM: CPT | Mod: 25

## 2021-07-30 PROCEDURE — 96365 THER/PROPH/DIAG IV INF INIT: CPT

## 2021-07-30 RX ORDER — ONDANSETRON 4 MG/1
4 TABLET, ORALLY DISINTEGRATING ORAL EVERY 8 HOURS PRN
COMMUNITY
Start: 2021-06-12 | End: 2022-01-22

## 2021-07-30 RX ORDER — DICYCLOMINE HYDROCHLORIDE 20 MG/1
20 TABLET ORAL 3 TIMES DAILY PRN
COMMUNITY
Start: 2021-05-09 | End: 2023-02-07 | Stop reason: SDUPTHER

## 2021-07-30 RX ORDER — DOXEPIN HYDROCHLORIDE 50 MG/1
50 CAPSULE ORAL NIGHTLY
COMMUNITY
Start: 2021-07-15

## 2021-07-30 RX ORDER — PANTOPRAZOLE SODIUM 40 MG/1
40 TABLET, DELAYED RELEASE ORAL EVERY MORNING
COMMUNITY
Start: 2021-02-25 | End: 2023-01-09

## 2021-07-30 RX ORDER — LISINOPRIL AND HYDROCHLOROTHIAZIDE 20; 25 MG/1; MG/1
1 TABLET ORAL DAILY
COMMUNITY
Start: 2020-08-13 | End: 2023-01-09

## 2021-07-30 RX ORDER — MORPHINE SULFATE 2 MG/ML
6 INJECTION, SOLUTION INTRAMUSCULAR; INTRAVENOUS
Status: COMPLETED | OUTPATIENT
Start: 2021-07-30 | End: 2021-07-30

## 2021-07-30 RX ORDER — DOCUSATE SODIUM 100 MG/1
100 CAPSULE, LIQUID FILLED ORAL 3 TIMES DAILY
COMMUNITY
Start: 2021-02-18 | End: 2022-01-22

## 2021-07-30 RX ADMIN — PROMETHAZINE HYDROCHLORIDE 12.5 MG: 25 INJECTION INTRAMUSCULAR; INTRAVENOUS at 10:07

## 2021-07-30 RX ADMIN — MORPHINE SULFATE 6 MG: 2 INJECTION, SOLUTION INTRAMUSCULAR; INTRAVENOUS at 10:07

## 2021-07-31 VITALS
WEIGHT: 203.63 LBS | TEMPERATURE: 100 F | RESPIRATION RATE: 18 BRPM | BODY MASS INDEX: 36.07 KG/M2 | OXYGEN SATURATION: 97 % | HEART RATE: 76 BPM | SYSTOLIC BLOOD PRESSURE: 155 MMHG | DIASTOLIC BLOOD PRESSURE: 88 MMHG

## 2021-07-31 LAB — TROPONIN I SERPL DL<=0.01 NG/ML-MCNC: <0.006 NG/ML (ref 0–0.03)

## 2021-07-31 PROCEDURE — C9113 INJ PANTOPRAZOLE SODIUM, VIA: HCPCS | Performed by: EMERGENCY MEDICINE

## 2021-07-31 PROCEDURE — 63600175 PHARM REV CODE 636 W HCPCS: Performed by: EMERGENCY MEDICINE

## 2021-07-31 PROCEDURE — 96375 TX/PRO/DX INJ NEW DRUG ADDON: CPT

## 2021-07-31 PROCEDURE — 84484 ASSAY OF TROPONIN QUANT: CPT | Performed by: EMERGENCY MEDICINE

## 2021-07-31 PROCEDURE — 96376 TX/PRO/DX INJ SAME DRUG ADON: CPT

## 2021-07-31 PROCEDURE — 25000003 PHARM REV CODE 250: Performed by: EMERGENCY MEDICINE

## 2021-07-31 PROCEDURE — 25500020 PHARM REV CODE 255: Performed by: EMERGENCY MEDICINE

## 2021-07-31 RX ORDER — PANTOPRAZOLE SODIUM 40 MG/10ML
40 INJECTION, POWDER, LYOPHILIZED, FOR SOLUTION INTRAVENOUS
Status: COMPLETED | OUTPATIENT
Start: 2021-07-31 | End: 2021-07-31

## 2021-07-31 RX ORDER — MORPHINE SULFATE 4 MG/ML
4 INJECTION, SOLUTION INTRAMUSCULAR; INTRAVENOUS
Status: COMPLETED | OUTPATIENT
Start: 2021-07-31 | End: 2021-07-31

## 2021-07-31 RX ORDER — PROMETHAZINE HYDROCHLORIDE 25 MG/1
25 TABLET ORAL EVERY 6 HOURS PRN
Qty: 15 TABLET | Refills: 0 | Status: SHIPPED | OUTPATIENT
Start: 2021-07-31 | End: 2023-01-09

## 2021-07-31 RX ORDER — CLONIDINE HYDROCHLORIDE 0.1 MG/1
0.1 TABLET ORAL
Status: COMPLETED | OUTPATIENT
Start: 2021-07-31 | End: 2021-07-31

## 2021-07-31 RX ADMIN — CLONIDINE HYDROCHLORIDE 0.1 MG: 0.1 TABLET ORAL at 02:07

## 2021-07-31 RX ADMIN — MORPHINE SULFATE 4 MG: 4 INJECTION INTRAVENOUS at 02:07

## 2021-07-31 RX ADMIN — MAGNESIUM HYDROXIDE/ALUMINUM HYDROXICE/SIMETHICONE 50 ML: 120; 1200; 1200 SUSPENSION ORAL at 02:07

## 2021-07-31 RX ADMIN — IOHEXOL 100 ML: 350 INJECTION, SOLUTION INTRAVENOUS at 01:07

## 2021-07-31 RX ADMIN — PANTOPRAZOLE SODIUM 40 MG: 40 INJECTION, POWDER, LYOPHILIZED, FOR SOLUTION INTRAVENOUS at 02:07

## 2021-09-27 ENCOUNTER — HOSPITAL ENCOUNTER (EMERGENCY)
Facility: HOSPITAL | Age: 61
Discharge: HOME | End: 2021-09-27
Attending: EMERGENCY MEDICINE
Payer: MEDICARE

## 2021-09-27 VITALS
BODY MASS INDEX: 33.66 KG/M2 | RESPIRATION RATE: 18 BRPM | TEMPERATURE: 99 F | WEIGHT: 190 LBS | HEART RATE: 102 BPM | DIASTOLIC BLOOD PRESSURE: 89 MMHG | OXYGEN SATURATION: 97 % | HEIGHT: 63 IN | SYSTOLIC BLOOD PRESSURE: 180 MMHG

## 2021-09-27 DIAGNOSIS — M79.601 RIGHT ARM PAIN: ICD-10-CM

## 2021-09-27 DIAGNOSIS — M54.50 ACUTE LOW BACK PAIN WITHOUT SCIATICA, UNSPECIFIED BACK PAIN LATERALITY: ICD-10-CM
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
## 2021-09-27 PROCEDURE — 25000003 PHARM REV CODE 250: Performed by: NURSE PRACTITIONER

## 2021-09-27 PROCEDURE — 99284 EMERGENCY DEPT VISIT MOD MDM: CPT | Mod: 25

## 2021-09-27 RX ORDER — TRAMADOL HYDROCHLORIDE 50 MG/1
50 TABLET ORAL
Status: COMPLETED | OUTPATIENT
Start: 2021-09-27 | End: 2021-09-27

## 2021-09-27 RX ORDER — TRAMADOL HYDROCHLORIDE 50 MG/1
50 TABLET ORAL EVERY 6 HOURS PRN
Qty: 12 TABLET | Refills: 0 | Status: SHIPPED | OUTPATIENT
Start: 2021-09-27 | End: 2021-09-27

## 2021-09-27 RX ORDER — ONDANSETRON 4 MG/1
4 TABLET, ORALLY DISINTEGRATING ORAL
Status: COMPLETED | OUTPATIENT
Start: 2021-09-27 | End: 2021-09-27

## 2021-09-27 RX ORDER — ONDANSETRON 4 MG/1
4 TABLET, FILM COATED ORAL EVERY 6 HOURS PRN
Qty: 12 TABLET | Refills: 0 | Status: SHIPPED | OUTPATIENT
Start: 2021-09-27 | End: 2022-01-22

## 2021-09-27 RX ORDER — CYCLOBENZAPRINE HCL 10 MG
10 TABLET ORAL 3 TIMES DAILY PRN
Qty: 15 TABLET | Refills: 0 | Status: SHIPPED | OUTPATIENT
Start: 2021-09-27 | End: 2021-10-02

## 2021-09-27 RX ORDER — CLONIDINE HYDROCHLORIDE 0.1 MG/1
0.1 TABLET ORAL
Status: COMPLETED | OUTPATIENT
Start: 2021-09-27 | End: 2021-09-27

## 2021-09-27 RX ADMIN — CLONIDINE HYDROCHLORIDE 0.1 MG: 0.1 TABLET ORAL at 05:09

## 2021-09-27 RX ADMIN — TRAMADOL HYDROCHLORIDE 50 MG: 50 TABLET, FILM COATED ORAL at 05:09

## 2021-09-27 RX ADMIN — ONDANSETRON 4 MG: 4 TABLET, ORALLY DISINTEGRATING ORAL at 08:09

## 2022-01-21 PROCEDURE — 96375 TX/PRO/DX INJ NEW DRUG ADDON: CPT

## 2022-01-21 PROCEDURE — 99285 EMERGENCY DEPT VISIT HI MDM: CPT | Mod: 25

## 2022-01-21 PROCEDURE — 96374 THER/PROPH/DIAG INJ IV PUSH: CPT

## 2022-01-22 ENCOUNTER — HOSPITAL ENCOUNTER (EMERGENCY)
Facility: HOSPITAL | Age: 62
Discharge: HOME | End: 2022-01-22
Attending: FAMILY MEDICINE
Payer: MEDICARE

## 2022-01-22 VITALS
WEIGHT: 226.13 LBS | OXYGEN SATURATION: 95 % | RESPIRATION RATE: 20 BRPM | DIASTOLIC BLOOD PRESSURE: 78 MMHG | TEMPERATURE: 99 F | SYSTOLIC BLOOD PRESSURE: 156 MMHG | HEART RATE: 82 BPM | BODY MASS INDEX: 40.05 KG/M2

## 2022-01-22 DIAGNOSIS — R07.9 CHEST PAIN: ICD-10-CM

## 2022-01-22 DIAGNOSIS — R06.02 SOB (SHORTNESS OF BREATH): Primary | ICD-10-CM

## 2022-01-22 DIAGNOSIS — I10 PRIMARY HYPERTENSION: ICD-10-CM

## 2022-01-22 LAB
ALBUMIN SERPL BCP-MCNC: 3.7 G/DL (ref 3.5–5.2)
ALP SERPL-CCNC: 98 U/L (ref 55–135)
ALT SERPL W/O P-5'-P-CCNC: 13 U/L (ref 10–44)
ANION GAP SERPL CALC-SCNC: 12 MMOL/L (ref 8–16)
AST SERPL-CCNC: 11 U/L (ref 10–40)
BASOPHILS # BLD AUTO: 0.04 K/UL (ref 0–0.2)
BASOPHILS NFR BLD: 0.7 % (ref 0–1.9)
BILIRUB SERPL-MCNC: 0.5 MG/DL (ref 0.1–1)
BNP SERPL-MCNC: 76 PG/ML (ref 0–99)
BUN SERPL-MCNC: 14 MG/DL (ref 8–23)
CALCIUM SERPL-MCNC: 8.8 MG/DL (ref 8.7–10.5)
CHLORIDE SERPL-SCNC: 105 MMOL/L (ref 95–110)
CO2 SERPL-SCNC: 20 MMOL/L (ref 23–29)
CREAT SERPL-MCNC: 0.8 MG/DL (ref 0.5–1.4)
CTP QC/QA: YES
CTP QC/QA: YES
DIFFERENTIAL METHOD: ABNORMAL
EOSINOPHIL # BLD AUTO: 0.3 K/UL (ref 0–0.5)
EOSINOPHIL NFR BLD: 4.4 % (ref 0–8)
ERYTHROCYTE [DISTWIDTH] IN BLOOD BY AUTOMATED COUNT: 13.1 % (ref 11.5–14.5)
EST. GFR  (AFRICAN AMERICAN): >60 ML/MIN/1.73 M^2
EST. GFR  (NON AFRICAN AMERICAN): >60 ML/MIN/1.73 M^2
GLUCOSE SERPL-MCNC: 102 MG/DL (ref 70–110)
HCT VFR BLD AUTO: 35.3 % (ref 37–48.5)
HGB BLD-MCNC: 11 G/DL (ref 12–16)
IMM GRANULOCYTES # BLD AUTO: 0.01 K/UL (ref 0–0.04)
IMM GRANULOCYTES NFR BLD AUTO: 0.2 % (ref 0–0.5)
LIPASE SERPL-CCNC: 6 U/L (ref 4–60)
LYMPHOCYTES # BLD AUTO: 1.5 K/UL (ref 1–4.8)
LYMPHOCYTES NFR BLD: 25 % (ref 18–48)
MCH RBC QN AUTO: 27.4 PG (ref 27–31)
MCHC RBC AUTO-ENTMCNC: 31.2 G/DL (ref 32–36)
MCV RBC AUTO: 88 FL (ref 82–98)
MONOCYTES # BLD AUTO: 0.5 K/UL (ref 0.3–1)
MONOCYTES NFR BLD: 8.9 % (ref 4–15)
NEUTROPHILS # BLD AUTO: 3.7 K/UL (ref 1.8–7.7)
NEUTROPHILS NFR BLD: 60.8 % (ref 38–73)
NRBC BLD-RTO: 0 /100 WBC
PLATELET # BLD AUTO: 257 K/UL (ref 150–450)
PMV BLD AUTO: 8.7 FL (ref 9.2–12.9)
POC MOLECULAR INFLUENZA A AGN: NEGATIVE
POC MOLECULAR INFLUENZA B AGN: NEGATIVE
POTASSIUM SERPL-SCNC: 3.7 MMOL/L (ref 3.5–5.1)
PROT SERPL-MCNC: 7 G/DL (ref 6–8.4)
RBC # BLD AUTO: 4.01 M/UL (ref 4–5.4)
SARS-COV-2 RDRP RESP QL NAA+PROBE: NEGATIVE
SODIUM SERPL-SCNC: 137 MMOL/L (ref 136–145)
TROPONIN I SERPL DL<=0.01 NG/ML-MCNC: <0.006 NG/ML (ref 0–0.03)
TROPONIN I SERPL DL<=0.01 NG/ML-MCNC: <0.006 NG/ML (ref 0–0.03)
WBC # BLD AUTO: 6.09 K/UL (ref 3.9–12.7)

## 2022-01-22 PROCEDURE — 83690 ASSAY OF LIPASE: CPT | Performed by: FAMILY MEDICINE

## 2022-01-22 PROCEDURE — 93010 ELECTROCARDIOGRAM REPORT: CPT | Mod: ,,, | Performed by: STUDENT IN AN ORGANIZED HEALTH CARE EDUCATION/TRAINING PROGRAM

## 2022-01-22 PROCEDURE — 84484 ASSAY OF TROPONIN QUANT: CPT | Mod: 91 | Performed by: FAMILY MEDICINE

## 2022-01-22 PROCEDURE — 25000003 PHARM REV CODE 250: Performed by: FAMILY MEDICINE

## 2022-01-22 PROCEDURE — 80053 COMPREHEN METABOLIC PANEL: CPT | Performed by: NURSE PRACTITIONER

## 2022-01-22 PROCEDURE — U0002 COVID-19 LAB TEST NON-CDC: HCPCS | Performed by: FAMILY MEDICINE

## 2022-01-22 PROCEDURE — 25500020 PHARM REV CODE 255: Performed by: FAMILY MEDICINE

## 2022-01-22 PROCEDURE — 84484 ASSAY OF TROPONIN QUANT: CPT | Performed by: NURSE PRACTITIONER

## 2022-01-22 PROCEDURE — 83880 ASSAY OF NATRIURETIC PEPTIDE: CPT | Performed by: NURSE PRACTITIONER

## 2022-01-22 PROCEDURE — 85025 COMPLETE CBC W/AUTO DIFF WBC: CPT | Performed by: NURSE PRACTITIONER

## 2022-01-22 PROCEDURE — 93010 EKG 12-LEAD: ICD-10-PCS | Mod: ,,, | Performed by: STUDENT IN AN ORGANIZED HEALTH CARE EDUCATION/TRAINING PROGRAM

## 2022-01-22 PROCEDURE — 93005 ELECTROCARDIOGRAM TRACING: CPT

## 2022-01-22 PROCEDURE — 25000003 PHARM REV CODE 250: Performed by: NURSE PRACTITIONER

## 2022-01-22 PROCEDURE — 63600175 PHARM REV CODE 636 W HCPCS: Performed by: FAMILY MEDICINE

## 2022-01-22 RX ORDER — ONDANSETRON 2 MG/ML
4 INJECTION INTRAMUSCULAR; INTRAVENOUS
Status: COMPLETED | OUTPATIENT
Start: 2022-01-22 | End: 2022-01-22

## 2022-01-22 RX ORDER — ASPIRIN 325 MG
325 TABLET ORAL
Status: COMPLETED | OUTPATIENT
Start: 2022-01-22 | End: 2022-01-22

## 2022-01-22 RX ORDER — MORPHINE SULFATE 4 MG/ML
4 INJECTION, SOLUTION INTRAMUSCULAR; INTRAVENOUS
Status: COMPLETED | OUTPATIENT
Start: 2022-01-22 | End: 2022-01-22

## 2022-01-22 RX ADMIN — NITROGLYCERIN 1 INCH: 20 OINTMENT TOPICAL at 05:01

## 2022-01-22 RX ADMIN — IOHEXOL 100 ML: 350 INJECTION, SOLUTION INTRAVENOUS at 04:01

## 2022-01-22 RX ADMIN — ONDANSETRON 4 MG: 2 INJECTION INTRAMUSCULAR; INTRAVENOUS at 03:01

## 2022-01-22 RX ADMIN — MORPHINE SULFATE 4 MG: 4 INJECTION INTRAVENOUS at 03:01

## 2022-01-22 RX ADMIN — ASPIRIN 325 MG ORAL TABLET 325 MG: 325 PILL ORAL at 02:01

## 2022-01-22 NOTE — ED NOTES
Pt removed the IV from her right breast herself. No bleeding noted on assessment of site. IV catheter intact.

## 2022-01-22 NOTE — ED NOTES
Bed: Int 24  Expected date:   Expected time:   Means of arrival: Personal Transportation  Comments:

## 2022-01-22 NOTE — FIRST PROVIDER EVALUATION
Medical screening exam completed.  I have conducted a focused provider triage encounter, findings are as follows:    Brief history of present illness:  SOB since yesterday     Vitals:    01/21/22 2347   BP: (!) 203/96   Pulse: 90   Resp: 16   Temp: 98.1 °F (36.7 °C)   TempSrc: Oral   SpO2: 95%   Weight: 106 kg (233 lb 11 oz)       Pertinent physical exam: Walks with no abnormality     Brief workup plan: [facility] covid     Preliminary workup initiated; this workup will be continued and followed by the physician or advanced practice provider that is assigned to the patient when roomed.

## 2022-01-22 NOTE — ED PROVIDER NOTES
SCRIBE #1 NOTE: I, Titus Nagy, am scribing for, and in the presence of, Alona Cloud MD. I have scribed the HPI, ROS, PEx.     SCRIBE #2 NOTE: I, Minerva Stewart, am scribing for, and in the presence of,  Jessica Breaux MD. I have scribed the remaining portions of the note not scribed by Scribe #1.      History     Chief Complaint   Patient presents with    Shortness of Breath     Pt reports SOB at rest. Pt states she is being tx for a UTI (cipro as of yesterday). Pt reports polyuria and dysuria. Pt reports right shoulder pain radiating to lower back, denies acute trauma, denies physical exertion. Pt's upper extremity pulses are equal.     Review of patient's allergies indicates:   Allergen Reactions    Metronidazole hcl Other (See Comments)     Mouth ulcers developed with Flagyl  Oral sores.  Mouth ulcers developed with Flagyl         History of Present Illness     HPI    1/22/2022, 2:51 AM  History obtained from the patient      History of Present Illness: Divya Bui is a 61 y.o. female patient with a PMHx of HTN and anemia who presents to the Emergency Department for evaluation of SOB x 3 days. Pt reports that she called EMS last night for same issue but decided not to come to ED after normal ECG. Pt reports that today sxs have worsened. Pt was seen at  x 2 days pta and started on cipro for UTI. Pt reports normal BM today. Pt notes SHx of appendectomy and cholecystectomy. Symptoms are constant and moderate in severity. No mitigating or exacerbating factors reported. Associated sxs include n/v, R shoulder, and R sided back pain. Patient denies any CP, fever, cough, HA, diarrhea, and all other sxs at this time. No further complaints or concerns at this time.       Arrival mode: Personal vehicle     PCP: Lucio Salas MD        Past Medical History:  Past Medical History:   Diagnosis Date    Anemia     Anxiety     Blood transfusion     Bowel incontinence     Depression     Esophageal stricture      GERD (gastroesophageal reflux disease)     Hypertension     Latent tuberculosis by skin test 2001    took INH    Liver nodule 1/6/2014    Morbid obesity with BMI of 45.0-49.9, adult     OA (osteoarthritis) of knee 12/12/2014    Pericardial effusion 9/4/2014       Past Surgical History:  Past Surgical History:   Procedure Laterality Date    CHOLECYSTECTOMY      GASTRIC BYPASS      HERNIA REPAIR      right sholder surgery      SMALL INTESTINE SURGERY           Family History:  Family History   Problem Relation Age of Onset    Crohn's disease Other     Liver cancer Father     Diabetes Sister     Crohn's disease Sister     Liver cancer Sister     Crohn's disease Brother     Lung cancer Mother        Social History:  Social History     Tobacco Use    Smoking status: Never Smoker    Smokeless tobacco: Never Used   Substance and Sexual Activity    Alcohol use: Not Currently     Alcohol/week: 0.0 standard drinks    Drug use: No    Sexual activity: Yes     Partners: Male        Review of Systems     Review of Systems   Constitutional: Negative for chills and fever.   HENT: Negative for sore throat.    Respiratory: Positive for shortness of breath. Negative for cough.    Cardiovascular: Negative for chest pain.   Gastrointestinal: Positive for nausea and vomiting. Negative for diarrhea.   Genitourinary: Positive for dysuria.   Musculoskeletal: Positive for myalgias (R shoulder and R sided back pain).      Physical Exam     Initial Vitals [01/21/22 2347]   BP Pulse Resp Temp SpO2   (!) 203/96 90 16 98.1 °F (36.7 °C) 95 %      MAP       --          Physical Exam  Nursing Notes and Vital Signs Reviewed.  Constitutional: Patient is in no acute distress. Well-developed and well-nourished.  Head: Atraumatic. Normocephalic.  Eyes: PERRL. EOM intact. Conjunctivae are not pale. No scleral icterus.  ENT: Mucous membranes are moist. Oropharynx is clear and symmetric.    Neck: Supple. Full ROM. No  lymphadenopathy.  Cardiovascular: Regular rate. Regular rhythm. No murmurs, rubs, or gallops. Distal pulses are 2+ and symmetric.  Pulmonary/Chest: No respiratory distress. Clear to auscultation bilaterally. No wheezing or rales.  Abdominal: Soft and non-distended.  Tenderness to palpation in RUQ.  No rebound, guarding, or rigidity. Good bowel sounds.  Genitourinary: No CVA tenderness  Musculoskeletal: Moves all extremities. No obvious deformities. No edema. No calf tenderness.  Skin: Warm and dry.  Neurological:  Alert, awake, and appropriate.  Normal speech.  No acute focal neurological deficits are appreciated.  Psychiatric: Normal affect. Good eye contact. Appropriate in content.     ED Course   Procedures  ED Vital Signs:  Vitals:    01/21/22 2347 01/22/22 0240 01/22/22 0249 01/22/22 0300   BP: (!) 203/96 (!) 180/92     Pulse: 90 91  90   Resp: 16 20     Temp: 98.1 °F (36.7 °C) 98.2 °F (36.8 °C) 98.2 °F (36.8 °C)    TempSrc: Oral Oral     SpO2: 95% 98%     Weight: 106 kg (233 lb 11 oz) 102.6 kg (226 lb 1.6 oz)      01/22/22 0346 01/22/22 0410 01/22/22 0524 01/22/22 0552   BP:  (!) 160/73  (!) 158/76   Pulse:  88 88 88   Resp: 20 16  16   Temp:       TempSrc:       SpO2:  98%  100%   Weight:        01/22/22 0645 01/22/22 0803 01/22/22 1118   BP: (!) 179/89 (!) 168/85 (!) 156/78   Pulse: 86 97 82   Resp: 16 20 20   Temp: 98.5 °F (36.9 °C)  98.6 °F (37 °C)   TempSrc: Oral  Oral   SpO2: 97% 95% 95%   Weight:          Abnormal Lab Results:  Labs Reviewed   CBC W/ AUTO DIFFERENTIAL - Abnormal; Notable for the following components:       Result Value    Hemoglobin 11.0 (*)     Hematocrit 35.3 (*)     MCHC 31.2 (*)     MPV 8.7 (*)     All other components within normal limits   COMPREHENSIVE METABOLIC PANEL - Abnormal; Notable for the following components:    CO2 20 (*)     All other components within normal limits   TROPONIN I   B-TYPE NATRIURETIC PEPTIDE   LIPASE   TROPONIN I   SARS-COV-2 RDRP GENE   POCT INFLUENZA  A/B MOLECULAR        All Lab Results:  Results for orders placed or performed during the hospital encounter of 01/22/22   CBC auto differential   Result Value Ref Range    WBC 6.09 3.90 - 12.70 K/uL    RBC 4.01 4.00 - 5.40 M/uL    Hemoglobin 11.0 (L) 12.0 - 16.0 g/dL    Hematocrit 35.3 (L) 37.0 - 48.5 %    MCV 88 82 - 98 fL    MCH 27.4 27.0 - 31.0 pg    MCHC 31.2 (L) 32.0 - 36.0 g/dL    RDW 13.1 11.5 - 14.5 %    Platelets 257 150 - 450 K/uL    MPV 8.7 (L) 9.2 - 12.9 fL    Immature Granulocytes 0.2 0.0 - 0.5 %    Gran # (ANC) 3.7 1.8 - 7.7 K/uL    Immature Grans (Abs) 0.01 0.00 - 0.04 K/uL    Lymph # 1.5 1.0 - 4.8 K/uL    Mono # 0.5 0.3 - 1.0 K/uL    Eos # 0.3 0.0 - 0.5 K/uL    Baso # 0.04 0.00 - 0.20 K/uL    nRBC 0 0 /100 WBC    Gran % 60.8 38.0 - 73.0 %    Lymph % 25.0 18.0 - 48.0 %    Mono % 8.9 4.0 - 15.0 %    Eosinophil % 4.4 0.0 - 8.0 %    Basophil % 0.7 0.0 - 1.9 %    Differential Method Automated    Comprehensive metabolic panel   Result Value Ref Range    Sodium 137 136 - 145 mmol/L    Potassium 3.7 3.5 - 5.1 mmol/L    Chloride 105 95 - 110 mmol/L    CO2 20 (L) 23 - 29 mmol/L    Glucose 102 70 - 110 mg/dL    BUN 14 8 - 23 mg/dL    Creatinine 0.8 0.5 - 1.4 mg/dL    Calcium 8.8 8.7 - 10.5 mg/dL    Total Protein 7.0 6.0 - 8.4 g/dL    Albumin 3.7 3.5 - 5.2 g/dL    Total Bilirubin 0.5 0.1 - 1.0 mg/dL    Alkaline Phosphatase 98 55 - 135 U/L    AST 11 10 - 40 U/L    ALT 13 10 - 44 U/L    Anion Gap 12 8 - 16 mmol/L    eGFR if African American >60 >60 mL/min/1.73 m^2    eGFR if non African American >60 >60 mL/min/1.73 m^2   Troponin I #1   Result Value Ref Range    Troponin I <0.006 0.000 - 0.026 ng/mL   BNP   Result Value Ref Range    BNP 76 0 - 99 pg/mL   Lipase   Result Value Ref Range    Lipase 6 4 - 60 U/L   Troponin I   Result Value Ref Range    Troponin I <0.006 0.000 - 0.026 ng/mL   POCT COVID-19 Rapid Screening   Result Value Ref Range    POC Rapid COVID Negative Negative     Acceptable Yes     POCT Influenza A/B Molecular   Result Value Ref Range    POC Molecular Influenza A Ag Negative Negative, Not Reported    POC Molecular Influenza B Ag Negative Negative, Not Reported     Acceptable Yes        Imaging Results:  Imaging Results          CTA Chest Non-Coronary (PE Study) (Final result)  Result time 01/22/22 08:13:44    Final result by Christopher Ibarra MD (01/22/22 08:13:44)                 Impression:      No CT evidence of pulmonary embolism. No acute findings.    All CT scans at this facility are performed  using dose modulation techniques as appropriate to performed exam including the following:  automated exposure control; adjustment of mA and/or kV according to the patients size (this includes techniques or standardized protocols for targeted exams where dose is matched to indication/reason for exam: i.e. extremities or head);  iterative reconstruction technique.      Electronically signed by: Christopher Ibarra  Date:    01/22/2022  Time:    08:13             Narrative:    EXAMINATION:  CTA CHEST NON CORONARY    CLINICAL HISTORY:  Pulmonary embolism (PE) suspected, unknown D-dimer;    FINDINGS:  Lungs are clear of acute infiltrate or consolidation.  No pleural fluid or pneumothorax is identified.  No CT evidence of pulmonary embolism or aortic dissection.  No enlarged lymph nodes identified.  No acute bony abnormality is appreciated.  Arthritic changes noted involving the thoracic spine.                               X-Ray Chest AP Portable (Final result)  Result time 01/22/22 08:02:30    Final result by Christopher Garcia MD (01/22/22 08:02:30)                 Impression:      No acute findings.      Electronically signed by: Christopher Garcia MD  Date:    01/22/2022  Time:    08:02             Narrative:    EXAMINATION:  XR CHEST AP PORTABLE    CLINICAL HISTORY:  Chest Pain;    TECHNIQUE:  Single frontal view of the chest was performed.    COMPARISON:  05/11/2018    FINDINGS:  The  cardiomediastinal silhouette is normal.    The lungs are clear.  No pleural effusions.    Bones are unremarkable.                             Type of Interpretation: ED Physician (Independently Interpreted).  Radiology Procedure Done: Chest CT with Contrast.  Interpretation: No evidence of PE or other acute abnormality.        The EKG was ordered, reviewed, and independently interpreted by the ED provider.  Interpretation time: 0:19  Rate: 93 BPM  Rhythm: normal sinus rhythm  Interpretation: Minimal voltage criteria for LVH, may be normal variant ( R in aVL). Cannot rule out anterior infarct, age undetermined. Abnormal ECG. No STEMI.         The Emergency Provider reviewed the vital signs and test results, which are outlined above.     ED Discussion     5:28 AM: Pt is having CP. Will give nitro.    6:00 AM: Dr. Cloud transfers care of patient to Dr. Breaux pending results.    9:13 AM: Reassessed pt at this time.  Pt states her condition has improved at this time. Discussed with pt all pertinent ED information and results. Discussed pt dx and plan of tx. Gave pt all f/u and return to the ED instructions. All questions and concerns were addressed at this time. Pt expresses understanding of information and instructions, and is comfortable with plan to discharge. Pt is stable for discharge.    I discussed with patient and/or family/caretaker that evaluation in the ED does not suggest any emergent or life threatening medical conditions requiring immediate intervention beyond what was provided in the ED, and I believe patient is safe for discharge.  Regardless, an unremarkable evaluation in the ED does not preclude the development or presence of a serious of life threatening condition. As such, patient was instructed to return immediately for any worsening or change in current symptoms.         Medical Decision Making:   Clinical Tests:   Lab Tests: Ordered and Reviewed  Radiological Study: Ordered and Reviewed  Medical  Tests: Ordered and Reviewed           ED Medication(s):  Medications   aspirin tablet 325 mg (325 mg Oral Given 1/22/22 7729)   morphine injection 4 mg (4 mg Intravenous Given 1/22/22 7766)   ondansetron injection 4 mg (4 mg Intravenous Given 1/22/22 6253)   iohexoL (OMNIPAQUE 350) injection 100 mL (100 mLs Intravenous Given 1/22/22 3222)   nitroGLYCERIN 2% TD oint ointment 1 inch (1 inch Topical (Top) Given 1/22/22 8461)       Discharge Medication List as of 1/22/2022  9:09 AM           Follow-up Information     [facility] In 2 days.    Specialty: Cardiology  Contact information:  [address] [place] [address] [id number] [phone]           [facility] - Emergency Dept..    Specialty: Emergency Medicine  Why: As needed, If symptoms worsen  Contact information:  [address] [facility] [id number] [phone]                           Scribe Attestation:   Scribe #1: I performed the above scribed service and the documentation accurately describes the services I performed. I attest to the accuracy of the note.     Attending:   Physician Attestation Statement for Scribe #1: I, Alona Cloud MD, personally performed the services described in this documentation, as scribed by Titus Nagy, in my presence, and it is both accurate and complete.       Scribe Attestation:   Scribe #2: I performed the above scribed service and the documentation accurately describes the services I performed. I attest to the accuracy of the note.    Attending Attestation:           Physician Attestation for Scribe:    Physician Attestation Statement for Scribe #2: I, Jessica Breaux MD, reviewed documentation, as scribed by Minerva Stewart in my presence, and it is both accurate and complete. I also acknowledge and confirm the content of the note done by rOtizibe #1.           Clinical Impression       ICD-10-CM ICD-9-CM   1. SOB (shortness of breath)  R06.02 786.05   2. Chest pain  R07.9 786.50    3. Primary hypertension  I10 401.9       Disposition:   Disposition: Discharged  Condition: Stable         Jessica Breaux MD  01/27/22 0426

## 2022-05-16 ENCOUNTER — HOSPITAL ENCOUNTER (EMERGENCY)
Facility: HOSPITAL | Age: 62
Discharge: HOME | End: 2022-05-17
Attending: EMERGENCY MEDICINE
Payer: MEDICARE

## 2022-05-16 DIAGNOSIS — I10 HYPERTENSION, UNSPECIFIED TYPE: ICD-10-CM

## 2022-05-16 DIAGNOSIS — R31.9 HEMATURIA, UNSPECIFIED TYPE: Primary | ICD-10-CM

## 2022-05-16 DIAGNOSIS — R10.9 FLANK PAIN, ACUTE: ICD-10-CM

## 2022-05-16 LAB
ALBUMIN SERPL BCP-MCNC: 3.7 G/DL (ref 3.5–5.2)
ALP SERPL-CCNC: 114 U/L (ref 55–135)
ALT SERPL W/O P-5'-P-CCNC: 9 U/L (ref 10–44)
ANION GAP SERPL CALC-SCNC: 14 MMOL/L (ref 8–16)
AST SERPL-CCNC: 11 U/L (ref 10–40)
BACTERIA #/AREA URNS HPF: NORMAL /HPF
BASOPHILS # BLD AUTO: 0.04 K/UL (ref 0–0.2)
BASOPHILS NFR BLD: 1 % (ref 0–1.9)
BILIRUB SERPL-MCNC: 0.5 MG/DL (ref 0.1–1)
BILIRUB UR QL STRIP: NEGATIVE
BUN SERPL-MCNC: 11 MG/DL (ref 8–23)
CALCIUM SERPL-MCNC: 8.7 MG/DL (ref 8.7–10.5)
CHLORIDE SERPL-SCNC: 104 MMOL/L (ref 95–110)
CLARITY UR: CLEAR
CO2 SERPL-SCNC: 22 MMOL/L (ref 23–29)
COLOR UR: YELLOW
CREAT SERPL-MCNC: 0.8 MG/DL (ref 0.5–1.4)
DIFFERENTIAL METHOD: ABNORMAL
EOSINOPHIL # BLD AUTO: 0.3 K/UL (ref 0–0.5)
EOSINOPHIL NFR BLD: 7.6 % (ref 0–8)
ERYTHROCYTE [DISTWIDTH] IN BLOOD BY AUTOMATED COUNT: 13.6 % (ref 11.5–14.5)
EST. GFR  (AFRICAN AMERICAN): >60 ML/MIN/1.73 M^2
EST. GFR  (NON AFRICAN AMERICAN): >60 ML/MIN/1.73 M^2
GLUCOSE SERPL-MCNC: 95 MG/DL (ref 70–110)
GLUCOSE UR QL STRIP: NEGATIVE
HCT VFR BLD AUTO: 37 % (ref 37–48.5)
HGB BLD-MCNC: 11.4 G/DL (ref 12–16)
HGB UR QL STRIP: ABNORMAL
IMM GRANULOCYTES # BLD AUTO: 0.02 K/UL (ref 0–0.04)
IMM GRANULOCYTES NFR BLD AUTO: 0.5 % (ref 0–0.5)
KETONES UR QL STRIP: NEGATIVE
LEUKOCYTE ESTERASE UR QL STRIP: ABNORMAL
LYMPHOCYTES # BLD AUTO: 1.1 K/UL (ref 1–4.8)
LYMPHOCYTES NFR BLD: 28.9 % (ref 18–48)
MCH RBC QN AUTO: 27.3 PG (ref 27–31)
MCHC RBC AUTO-ENTMCNC: 30.8 G/DL (ref 32–36)
MCV RBC AUTO: 89 FL (ref 82–98)
MICROSCOPIC COMMENT: NORMAL
MONOCYTES # BLD AUTO: 0.3 K/UL (ref 0.3–1)
MONOCYTES NFR BLD: 8.6 % (ref 4–15)
NEUTROPHILS # BLD AUTO: 2.1 K/UL (ref 1.8–7.7)
NEUTROPHILS NFR BLD: 53.4 % (ref 38–73)
NITRITE UR QL STRIP: NEGATIVE
NRBC BLD-RTO: 0 /100 WBC
PH UR STRIP: 7 [PH] (ref 5–8)
PLATELET # BLD AUTO: 283 K/UL (ref 150–450)
PMV BLD AUTO: 8.5 FL (ref 9.2–12.9)
POTASSIUM SERPL-SCNC: 3.9 MMOL/L (ref 3.5–5.1)
PROT SERPL-MCNC: 6.9 G/DL (ref 6–8.4)
PROT UR QL STRIP: NEGATIVE
RBC # BLD AUTO: 4.18 M/UL (ref 4–5.4)
RBC #/AREA URNS HPF: 1 /HPF (ref 0–4)
SODIUM SERPL-SCNC: 140 MMOL/L (ref 136–145)
SP GR UR STRIP: 1.02 (ref 1–1.03)
SQUAMOUS #/AREA URNS HPF: 1 /HPF
URN SPEC COLLECT METH UR: ABNORMAL
UROBILINOGEN UR STRIP-ACNC: NEGATIVE EU/DL
WBC # BLD AUTO: 3.84 K/UL (ref 3.9–12.7)
WBC #/AREA URNS HPF: 5 /HPF (ref 0–5)

## 2022-05-16 PROCEDURE — 96361 HYDRATE IV INFUSION ADD-ON: CPT

## 2022-05-16 PROCEDURE — 80053 COMPREHEN METABOLIC PANEL: CPT | Performed by: EMERGENCY MEDICINE

## 2022-05-16 PROCEDURE — 99285 EMERGENCY DEPT VISIT HI MDM: CPT | Mod: 25

## 2022-05-16 PROCEDURE — 85025 COMPLETE CBC W/AUTO DIFF WBC: CPT | Performed by: EMERGENCY MEDICINE

## 2022-05-16 PROCEDURE — 63600175 PHARM REV CODE 636 W HCPCS: Performed by: EMERGENCY MEDICINE

## 2022-05-16 PROCEDURE — 81000 URINALYSIS NONAUTO W/SCOPE: CPT | Performed by: EMERGENCY MEDICINE

## 2022-05-16 PROCEDURE — 96365 THER/PROPH/DIAG IV INF INIT: CPT

## 2022-05-16 PROCEDURE — 25000003 PHARM REV CODE 250: Performed by: EMERGENCY MEDICINE

## 2022-05-16 RX ORDER — CIPROFLOXACIN 500 MG/1
500 TABLET ORAL
COMMUNITY
Start: 2022-05-16 | End: 2022-05-26

## 2022-05-16 RX ORDER — TRAMADOL HYDROCHLORIDE 50 MG/1
50 TABLET ORAL EVERY 6 HOURS
COMMUNITY
End: 2023-01-09

## 2022-05-16 RX ORDER — TRAMADOL HYDROCHLORIDE 50 MG/1
50 TABLET ORAL
Status: COMPLETED | OUTPATIENT
Start: 2022-05-17 | End: 2022-05-17

## 2022-05-16 RX ADMIN — PROMETHAZINE HYDROCHLORIDE 12.5 MG: 25 INJECTION INTRAMUSCULAR; INTRAVENOUS at 10:05

## 2022-05-16 RX ADMIN — SODIUM CHLORIDE 500 ML: 0.9 INJECTION, SOLUTION INTRAVENOUS at 10:05

## 2022-05-17 VITALS
TEMPERATURE: 98 F | BODY MASS INDEX: 40.89 KG/M2 | SYSTOLIC BLOOD PRESSURE: 154 MMHG | WEIGHT: 230.81 LBS | DIASTOLIC BLOOD PRESSURE: 77 MMHG | HEIGHT: 63 IN | OXYGEN SATURATION: 97 % | RESPIRATION RATE: 18 BRPM | HEART RATE: 84 BPM

## 2022-05-17 PROCEDURE — 25000003 PHARM REV CODE 250: Performed by: EMERGENCY MEDICINE

## 2022-05-17 RX ADMIN — TRAMADOL HYDROCHLORIDE 50 MG: 50 TABLET, COATED ORAL at 12:05

## 2022-06-16 ENCOUNTER — HOSPITAL ENCOUNTER (EMERGENCY)
Facility: HOSPITAL | Age: 62
Discharge: HOME | End: 2022-06-16
Attending: EMERGENCY MEDICINE
Payer: MEDICARE

## 2022-06-16 VITALS
RESPIRATION RATE: 18 BRPM | OXYGEN SATURATION: 99 % | BODY MASS INDEX: 40.75 KG/M2 | TEMPERATURE: 98 F | SYSTOLIC BLOOD PRESSURE: 160 MMHG | WEIGHT: 230 LBS | DIASTOLIC BLOOD PRESSURE: 80 MMHG | HEART RATE: 87 BPM | HEIGHT: 63 IN

## 2022-06-16 DIAGNOSIS — S16.1XXA STRAIN OF NECK MUSCLE, INITIAL ENCOUNTER: Primary | ICD-10-CM
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
## 2022-06-16 PROCEDURE — 99283 EMERGENCY DEPT VISIT LOW MDM: CPT

## 2022-06-16 RX ORDER — METHOCARBAMOL 750 MG/1
1500 TABLET, FILM COATED ORAL 3 TIMES DAILY
Qty: 30 TABLET | Refills: 0 | Status: SHIPPED | OUTPATIENT
Start: 2022-06-16 | End: 2022-06-21
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
## 2023-01-09 ENCOUNTER — OFFICE VISIT (OUTPATIENT)
Dept: INTERNAL MEDICINE | Facility: CLINIC | Age: 63
End: 2023-01-09
Payer: MEDICARE

## 2023-01-09 ENCOUNTER — HOSPITAL ENCOUNTER (OUTPATIENT)
Dept: RADIOLOGY | Facility: HOSPITAL | Age: 63
Discharge: HOME | End: 2023-01-09
Attending: FAMILY MEDICINE
Payer: MEDICARE

## 2023-01-09 VITALS
WEIGHT: 226.88 LBS | HEART RATE: 104 BPM | BODY MASS INDEX: 40.2 KG/M2 | TEMPERATURE: 98 F | SYSTOLIC BLOOD PRESSURE: 138 MMHG | RESPIRATION RATE: 20 BRPM | DIASTOLIC BLOOD PRESSURE: 94 MMHG | OXYGEN SATURATION: 97 % | HEIGHT: 63 IN

## 2023-01-09 DIAGNOSIS — M25.561 CHRONIC PAIN OF BOTH KNEES: ICD-10-CM

## 2023-01-09 DIAGNOSIS — E78.5 HYPERLIPIDEMIA, UNSPECIFIED HYPERLIPIDEMIA TYPE: ICD-10-CM

## 2023-01-09 DIAGNOSIS — G89.29 CHRONIC PAIN OF BOTH KNEES: ICD-10-CM

## 2023-01-09 DIAGNOSIS — D51.9 ANEMIA DUE TO VITAMIN B12 DEFICIENCY, UNSPECIFIED B12 DEFICIENCY TYPE: ICD-10-CM

## 2023-01-09 DIAGNOSIS — I10 PRIMARY HYPERTENSION: ICD-10-CM

## 2023-01-09 DIAGNOSIS — R73.03 PREDIABETES: ICD-10-CM

## 2023-01-09 DIAGNOSIS — G89.29 CHRONIC ABDOMINAL PAIN: ICD-10-CM

## 2023-01-09 DIAGNOSIS — Z00.00 ROUTINE ADULT HEALTH MAINTENANCE: Primary | ICD-10-CM

## 2023-01-09 DIAGNOSIS — M25.562 CHRONIC PAIN OF BOTH KNEES: ICD-10-CM

## 2023-01-09 DIAGNOSIS — R10.9 CHRONIC ABDOMINAL PAIN: ICD-10-CM

## 2023-01-09 DIAGNOSIS — K13.0 ANGULAR CHEILITIS: ICD-10-CM

## 2023-01-09 DIAGNOSIS — I31.39 PERICARDIAL EFFUSION: ICD-10-CM

## 2023-01-09 PROCEDURE — 3044F PR MOST RECENT HEMOGLOBIN A1C LEVEL <7.0%: ICD-10-PCS | Mod: CPTII,S$GLB,, | Performed by: FAMILY MEDICINE

## 2023-01-09 PROCEDURE — 3008F PR BODY MASS INDEX (BMI) DOCUMENTED: ICD-10-PCS | Mod: CPTII,S$GLB,, | Performed by: FAMILY MEDICINE

## 2023-01-09 PROCEDURE — 3044F HG A1C LEVEL LT 7.0%: CPT | Mod: CPTII,S$GLB,, | Performed by: FAMILY MEDICINE

## 2023-01-09 PROCEDURE — 99204 OFFICE O/P NEW MOD 45 MIN: CPT | Mod: S$GLB,,, | Performed by: FAMILY MEDICINE

## 2023-01-09 PROCEDURE — 3080F DIAST BP >= 90 MM HG: CPT | Mod: CPTII,S$GLB,, | Performed by: FAMILY MEDICINE

## 2023-01-09 PROCEDURE — 99999 PR PBB SHADOW E&M-EST. PATIENT-LVL V: CPT | Mod: PBBFAC,,, | Performed by: FAMILY MEDICINE

## 2023-01-09 PROCEDURE — 3075F SYST BP GE 130 - 139MM HG: CPT | Mod: CPTII,S$GLB,, | Performed by: FAMILY MEDICINE

## 2023-01-09 PROCEDURE — 3080F PR MOST RECENT DIASTOLIC BLOOD PRESSURE >= 90 MM HG: ICD-10-PCS | Mod: CPTII,S$GLB,, | Performed by: FAMILY MEDICINE

## 2023-01-09 PROCEDURE — 99999 PR PBB SHADOW E&M-EST. PATIENT-LVL V: ICD-10-PCS | Mod: PBBFAC,,, | Performed by: FAMILY MEDICINE

## 2023-01-09 PROCEDURE — 3075F PR MOST RECENT SYSTOLIC BLOOD PRESS GE 130-139MM HG: ICD-10-PCS | Mod: CPTII,S$GLB,, | Performed by: FAMILY MEDICINE

## 2023-01-09 PROCEDURE — 3008F BODY MASS INDEX DOCD: CPT | Mod: CPTII,S$GLB,, | Performed by: FAMILY MEDICINE

## 2023-01-09 PROCEDURE — 99204 PR OFFICE/OUTPT VISIT, NEW, LEVL IV, 45-59 MIN: ICD-10-PCS | Mod: S$GLB,,, | Performed by: FAMILY MEDICINE

## 2023-01-09 RX ORDER — ATORVASTATIN CALCIUM 40 MG/1
40 TABLET, FILM COATED ORAL DAILY
Qty: 90 TABLET | Refills: 3 | Status: SHIPPED | OUTPATIENT
Start: 2023-01-09 | End: 2024-01-09

## 2023-01-09 RX ORDER — KETOCONAZOLE 20 MG/G
CREAM TOPICAL DAILY
Qty: 30 G | Refills: 1 | Status: SHIPPED | OUTPATIENT
Start: 2023-01-09 | End: 2023-08-27 | Stop reason: SDUPTHER

## 2023-01-09 RX ORDER — OLMESARTAN MEDOXOMIL AND HYDROCHLOROTHIAZIDE 40/12.5 40; 12.5 MG/1; MG/1
1 TABLET ORAL DAILY
COMMUNITY
End: 2023-05-17 | Stop reason: SDUPTHER

## 2023-01-09 RX ORDER — AMLODIPINE BESYLATE 5 MG/1
5 TABLET ORAL DAILY
Qty: 90 TABLET | Refills: 3 | Status: SHIPPED | OUTPATIENT
Start: 2023-01-09 | End: 2023-05-17 | Stop reason: SDUPTHER

## 2023-01-10 ENCOUNTER — APPOINTMENT (OUTPATIENT)
Dept: RADIOLOGY | Facility: HOSPITAL | Age: 63
End: 2023-01-10
Attending: FAMILY MEDICINE
Payer: MEDICARE

## 2023-01-10 ENCOUNTER — OFFICE VISIT (OUTPATIENT)
Dept: OBSTETRICS AND GYNECOLOGY | Facility: CLINIC | Age: 63
End: 2023-01-10
Payer: MEDICARE

## 2023-01-10 VITALS
WEIGHT: 230.94 LBS | BODY MASS INDEX: 42.5 KG/M2 | DIASTOLIC BLOOD PRESSURE: 76 MMHG | HEIGHT: 62 IN | SYSTOLIC BLOOD PRESSURE: 126 MMHG

## 2023-01-10 DIAGNOSIS — Z12.31 ENCOUNTER FOR SCREENING MAMMOGRAM FOR MALIGNANT NEOPLASM OF BREAST: ICD-10-CM

## 2023-01-10 DIAGNOSIS — B37.2 YEAST INFECTION OF THE SKIN: ICD-10-CM

## 2023-01-10 DIAGNOSIS — M25.561 CHRONIC PAIN OF BOTH KNEES: ICD-10-CM

## 2023-01-10 DIAGNOSIS — G89.29 CHRONIC PAIN OF BOTH KNEES: ICD-10-CM

## 2023-01-10 DIAGNOSIS — M25.562 CHRONIC PAIN OF BOTH KNEES: ICD-10-CM

## 2023-01-10 DIAGNOSIS — Z00.00 ROUTINE ADULT HEALTH MAINTENANCE: ICD-10-CM

## 2023-01-10 DIAGNOSIS — Z13.820 SCREENING FOR OSTEOPOROSIS: ICD-10-CM

## 2023-01-10 DIAGNOSIS — Z01.419 ENCOUNTER FOR GYNECOLOGICAL EXAMINATION WITHOUT ABNORMAL FINDING: Primary | ICD-10-CM

## 2023-01-10 DIAGNOSIS — N95.8 OTHER SPECIFIED MENOPAUSAL AND PERIMENOPAUSAL DISORDERS: ICD-10-CM

## 2023-01-10 PROBLEM — Z79.899 POLYPHARMACY: Status: ACTIVE | Noted: 2020-02-26

## 2023-01-10 PROBLEM — R29.810 WEAKNESS OF FACE MUSCLES: Status: ACTIVE | Noted: 2020-02-22

## 2023-01-10 PROBLEM — H91.90 HEARING LOSS: Status: ACTIVE | Noted: 2023-01-10

## 2023-01-10 PROBLEM — F43.21 GRIEF: Status: ACTIVE | Noted: 2021-04-29

## 2023-01-10 PROBLEM — L02.415 CELLULITIS AND ABSCESS OF RIGHT LEG: Status: RESOLVED | Noted: 2019-04-07 | Resolved: 2023-01-10

## 2023-01-10 PROBLEM — L03.115 CELLULITIS AND ABSCESS OF RIGHT LEG: Status: RESOLVED | Noted: 2019-04-07 | Resolved: 2023-01-10

## 2023-01-10 PROBLEM — N18.31 STAGE 3A CHRONIC KIDNEY DISEASE: Status: ACTIVE | Noted: 2022-05-16

## 2023-01-10 PROBLEM — H53.2 DIPLOPIA: Status: ACTIVE | Noted: 2020-02-22

## 2023-01-10 PROBLEM — C44.310 BASAL CELL CARCINOMA (BCC) OF FACE: Status: ACTIVE | Noted: 2020-02-26

## 2023-01-10 PROBLEM — B96.20 E COLI BACTEREMIA: Status: ACTIVE | Noted: 2020-12-09

## 2023-01-10 PROBLEM — F41.1 GAD (GENERALIZED ANXIETY DISORDER): Status: ACTIVE | Noted: 2019-02-05

## 2023-01-10 PROBLEM — K92.1 HEMATOCHEZIA: Status: ACTIVE | Noted: 2021-08-21

## 2023-01-10 PROBLEM — E66.01 MORBID OBESITY: Status: ACTIVE | Noted: 2022-06-20

## 2023-01-10 PROBLEM — R60.0 LOCALIZED EDEMA: Status: ACTIVE | Noted: 2019-01-10

## 2023-01-10 PROBLEM — K56.600 PARTIAL OBSTRUCTION OF SMALL INTESTINE: Status: ACTIVE | Noted: 2020-12-09

## 2023-01-10 PROBLEM — R09.89 SUSPECTED CEREBROVASCULAR ACCIDENT (CVA): Status: ACTIVE | Noted: 2022-06-20

## 2023-01-10 PROBLEM — E87.1 CHRONIC HYPONATREMIA: Status: ACTIVE | Noted: 2022-05-16

## 2023-01-10 PROBLEM — G89.4 CHRONIC PAIN DISORDER: Status: ACTIVE | Noted: 2019-01-10

## 2023-01-10 PROBLEM — R78.81 E COLI BACTEREMIA: Status: ACTIVE | Noted: 2020-12-09

## 2023-01-10 PROBLEM — F33.41 RECURRENT MAJOR DEPRESSIVE DISORDER, IN PARTIAL REMISSION: Status: ACTIVE | Noted: 2019-02-05

## 2023-01-10 PROBLEM — Z96.651 HISTORY OF RIGHT KNEE JOINT REPLACEMENT: Status: ACTIVE | Noted: 2022-05-16

## 2023-01-10 PROBLEM — Z90.49 S/P APPENDECTOMY: Status: ACTIVE | Noted: 2022-05-16

## 2023-01-10 PROBLEM — N17.9 AKI (ACUTE KIDNEY INJURY): Status: ACTIVE | Noted: 2020-12-09

## 2023-01-10 PROBLEM — Z79.899 CHRONIC PRESCRIPTION BENZODIAZEPINE USE: Status: ACTIVE | Noted: 2021-08-21

## 2023-01-10 PROCEDURE — 3008F BODY MASS INDEX DOCD: CPT | Mod: CPTII,S$GLB,, | Performed by: NURSE PRACTITIONER

## 2023-01-10 PROCEDURE — 3074F PR MOST RECENT SYSTOLIC BLOOD PRESSURE < 130 MM HG: ICD-10-PCS | Mod: CPTII,S$GLB,, | Performed by: NURSE PRACTITIONER

## 2023-01-10 PROCEDURE — 99999 PR PBB SHADOW E&M-EST. PATIENT-LVL V: ICD-10-PCS | Mod: PBBFAC,,, | Performed by: NURSE PRACTITIONER

## 2023-01-10 PROCEDURE — 73562 X-RAY EXAM OF KNEE 3: CPT | Mod: TC,50,PO

## 2023-01-10 PROCEDURE — 99999 PR PBB SHADOW E&M-EST. PATIENT-LVL V: CPT | Mod: PBBFAC,,, | Performed by: NURSE PRACTITIONER

## 2023-01-10 PROCEDURE — 3074F SYST BP LT 130 MM HG: CPT | Mod: CPTII,S$GLB,, | Performed by: NURSE PRACTITIONER

## 2023-01-10 PROCEDURE — 88175 CYTOPATH C/V AUTO FLUID REDO: CPT | Performed by: NURSE PRACTITIONER

## 2023-01-10 PROCEDURE — 87624 HPV HI-RISK TYP POOLED RSLT: CPT | Performed by: NURSE PRACTITIONER

## 2023-01-10 PROCEDURE — 1160F PR REVIEW ALL MEDS BY PRESCRIBER/CLIN PHARMACIST DOCUMENTED: ICD-10-PCS | Mod: CPTII,S$GLB,, | Performed by: NURSE PRACTITIONER

## 2023-01-10 PROCEDURE — 3078F PR MOST RECENT DIASTOLIC BLOOD PRESSURE < 80 MM HG: ICD-10-PCS | Mod: CPTII,S$GLB,, | Performed by: NURSE PRACTITIONER

## 2023-01-10 PROCEDURE — 3044F HG A1C LEVEL LT 7.0%: CPT | Mod: CPTII,S$GLB,, | Performed by: NURSE PRACTITIONER

## 2023-01-10 PROCEDURE — 3008F PR BODY MASS INDEX (BMI) DOCUMENTED: ICD-10-PCS | Mod: CPTII,S$GLB,, | Performed by: NURSE PRACTITIONER

## 2023-01-10 PROCEDURE — 1159F PR MEDICATION LIST DOCUMENTED IN MEDICAL RECORD: ICD-10-PCS | Mod: CPTII,S$GLB,, | Performed by: NURSE PRACTITIONER

## 2023-01-10 PROCEDURE — 3078F DIAST BP <80 MM HG: CPT | Mod: CPTII,S$GLB,, | Performed by: NURSE PRACTITIONER

## 2023-01-10 PROCEDURE — 73562 XR KNEE ORTHO BILAT: ICD-10-PCS | Mod: 26,50,, | Performed by: RADIOLOGY

## 2023-01-10 PROCEDURE — 73562 X-RAY EXAM OF KNEE 3: CPT | Mod: 26,50,, | Performed by: RADIOLOGY

## 2023-01-10 PROCEDURE — 3044F PR MOST RECENT HEMOGLOBIN A1C LEVEL <7.0%: ICD-10-PCS | Mod: CPTII,S$GLB,, | Performed by: NURSE PRACTITIONER

## 2023-01-10 PROCEDURE — 1160F RVW MEDS BY RX/DR IN RCRD: CPT | Mod: CPTII,S$GLB,, | Performed by: NURSE PRACTITIONER

## 2023-01-10 PROCEDURE — 1159F MED LIST DOCD IN RCRD: CPT | Mod: CPTII,S$GLB,, | Performed by: NURSE PRACTITIONER

## 2023-01-10 RX ORDER — HYDROCORTISONE 25 MG/G
CREAM TOPICAL
COMMUNITY
Start: 2022-09-26 | End: 2023-08-27 | Stop reason: SDUPTHER

## 2023-01-10 RX ORDER — ZOLPIDEM TARTRATE 10 MG/1
10 TABLET ORAL NIGHTLY PRN
COMMUNITY
Start: 2022-12-23

## 2023-01-10 RX ORDER — MUPIROCIN 20 MG/G
OINTMENT TOPICAL
COMMUNITY
Start: 2022-09-26 | End: 2023-08-27 | Stop reason: SDUPTHER

## 2023-01-10 RX ORDER — FLUCONAZOLE 150 MG/1
150 TABLET ORAL
Qty: 2 TABLET | Refills: 0 | Status: SHIPPED | OUTPATIENT
Start: 2023-01-10 | End: 2023-01-14

## 2023-01-10 RX ORDER — ARIPIPRAZOLE 10 MG/1
10 TABLET ORAL DAILY
COMMUNITY
Start: 2022-12-22

## 2023-01-10 RX ORDER — PREDNISONE 20 MG/1
40 TABLET ORAL DAILY
Qty: 8 TABLET | Refills: 0 | Status: SHIPPED | OUTPATIENT
Start: 2023-01-10 | End: 2023-01-23

## 2023-01-10 NOTE — PROGRESS NOTES
Subjective:       Patient ID: Divya Bui is a 62 y.o. female.    Chief Complaint:  Well Woman      History of Present Illness  HPI  Annual Exam-Postmenopausal   presents for annual exam. The patient has no complaints today. The patient is not currently sexually active --  passed away  r/t COVID. GYN screening history: last pap: approximate date 2012 and was normal and last mammogram: approximate date 2012 and was normal. Normal pap hx.  The patient has never taken hormone replacement therapy. Patient denies post-menopausal vaginal bleeding. The patient wears seatbelts: yes. The patient participates in regular exercise: no. Has the patient ever been transfused or tattooed?: yes. X1 right wrist .  The patient reports that there is not domestic violence in her life.    Frequent UTIs and blood in her stools; has seen GI and urology. Last UTI about a month ago.  In the last year about 8-9 UTIs.  Feels like it is almost monthly.      Colonoscopy 2021 normal-diverticulosis    Bone density  pt reports mild loss    GYN & OB History  No LMP recorded. Patient is postmenopausal.   Date of Last Pap: 1/10/2023    OB History    Para Term  AB Living   3 3 3     3   SAB IAB Ectopic Multiple Live Births                  # Outcome Date GA Lbr Alcon/2nd Weight Sex Delivery Anes PTL Lv   3 Term            2 Term            1 Term                Review of Systems  Review of Systems   Constitutional:  Negative for activity change, appetite change, chills, fatigue and fever.   HENT:  Negative for nasal congestion and mouth sores.    Respiratory:  Negative for cough, shortness of breath and wheezing.    Cardiovascular:  Negative for chest pain.   Gastrointestinal:  Positive for blood in stool. Negative for abdominal pain, constipation, diarrhea, nausea and vomiting.   Endocrine: Negative for hair loss and hot flashes.   Genitourinary:  Negative for bladder incontinence, decreased libido,  dyspareunia, dysuria, frequency, genital sores, pelvic pain, urgency, vaginal discharge, vaginal pain, urinary incontinence, postcoital bleeding, postmenopausal bleeding, vaginal dryness and vaginal odor.   Musculoskeletal:  Negative for back pain.   Integumentary:  Positive for rash. Negative for breast mass, nipple discharge, breast skin changes and breast tenderness.   Neurological:  Negative for headaches.   Hematological:  Negative for adenopathy.   Psychiatric/Behavioral:  Negative for depression. The patient is not nervous/anxious.    All other systems reviewed and are negative.  Breast: Negative for breast self exam, lump, mass, mastodynia, nipple discharge, skin changes and tenderness        Objective:      Physical Exam:   Constitutional: She is oriented to person, place, and time. She appears well-developed and well-nourished. No distress.    HENT:   Head: Normocephalic and atraumatic.   Nose: Nose normal.    Eyes: Pupils are equal, round, and reactive to light. Conjunctivae and EOM are normal. Right eye exhibits no discharge. Left eye exhibits no discharge.     Cardiovascular:  Normal rate, regular rhythm and normal heart sounds.      Exam reveals no gallop, no friction rub, no clubbing, no cyanosis and no edema.       No murmur heard.   Pulmonary/Chest: Effort normal and breath sounds normal. No respiratory distress. She has no decreased breath sounds. She has no wheezes. She has no rhonchi. She has no rales. She exhibits no tenderness. Right breast exhibits no inverted nipple, no mass, no nipple discharge, no skin change, no tenderness, no bleeding and no swelling. Left breast exhibits no inverted nipple, no mass, no nipple discharge, no skin change, no tenderness, no bleeding and no swelling. Breasts are symmetrical.            Abdominal: Soft. Bowel sounds are normal. She exhibits no distension. There is no abdominal tenderness. There is no rebound and no guarding. Hernia confirmed negative in the  right inguinal area and confirmed negative in the left inguinal area.     Genitourinary:    Inguinal canal and vagina normal.   Rectum:      No external hemorrhoid.            Pelvic exam was performed with patient supine.   The external female genitalia was normal.   No external genitalia lesions identified,Genitalia hair distrobution normal .   Labial bartholins normal.There is no rash, tenderness, lesion or injury on the right labia. There is no rash, tenderness, lesion or injury on the left labia. Cervix is normal. Left adnexum displays no tenderness. No erythema,  no vaginal discharge, tenderness or bleeding in the vagina.    No foreign body in the vagina.      No signs of injury in the vagina.   Vagina was moist.Cervix exhibits no lesion, no discharge, no friability, no lesion and no polyp. Uterus is not enlarged and not tender. Normal urethral meatus.Urethral Meatus exhibits: urethral lesion and prolapsedUrethra findings: no urethral mass, no tenderness and no urethral scarring          Musculoskeletal: Normal range of motion and moves all extremeties.      Lymphadenopathy: No inguinal adenopathy noted on the right or left side.    Neurological: She is alert and oriented to person, place, and time.    Skin: Skin is warm and dry. No rash noted. She is not diaphoretic. No cyanosis or erythema. No pallor. Nails show no clubbing.    Psychiatric: She has a normal mood and affect. Her speech is normal and behavior is normal. Judgment and thought content normal.           Assessment:        1. Encounter for gynecological examination without abnormal finding    2. Screening for osteoporosis    3. Other specified menopausal and perimenopausal disorders    4. Encounter for screening mammogram for malignant neoplasm of breast    5. Yeast infection of the skin    6. Routine adult health maintenance               Plan:   Make sure to wipe from front to back, void after intercourse, avoid bubble baths, void regularly.  RTC  if persists to consider vaginal estrogen    Rx sent for diflucan and discussed risk with xanax -- pt reports xanax does not make her sleepy, but will monitor -- prefers over cream/powder  Vaginal hygiene practices discussed.    Ensure adequate calcium and vitamin D intake and daily weight bearing exercises.  Dexa ordered    PMB discussed; if she experiences any vaginal bleeding she should come back in to be evaluated.    Reviewed updated recommendations for pap smears (every 3 years) in low risk patients.  Recommend annual pelvic exams.  Reviewed recommendations for annual CBE.  Next pap due in 2026 -- needs to continue until 10 years from now r/t irregular pap history.   RTC 1 year or sooner prn.  To PCP for other health maintenance.  mammo ordered, continue yearly until age 73    Encounter for gynecological examination without abnormal finding  -     Liquid-Based Pap Smear, Screening  -     HPV High Risk Genotypes, PCR  -     DXA Bone Density Spine And Hip; Future; Expected date: 01/10/2023    Screening for osteoporosis  -     DXA Bone Density Spine And Hip; Future; Expected date: 01/10/2023    Other specified menopausal and perimenopausal disorders  -     DXA Bone Density Spine And Hip; Future; Expected date: 01/10/2023    Encounter for screening mammogram for malignant neoplasm of breast  -     Mammo Digital Screening Bilat w/ Stepan; Future; Expected date: 01/10/2023    Yeast infection of the skin  -     fluconazole (DIFLUCAN) 150 MG Tab; Take 1 tablet (150 mg total) by mouth every 72 hours. for 2 doses  Dispense: 2 tablet; Refill: 0    Routine adult health maintenance  -     Ambulatory referral/consult to Gynecology

## 2023-01-11 ENCOUNTER — TELEPHONE (OUTPATIENT)
Dept: INTERNAL MEDICINE | Facility: CLINIC | Age: 63
End: 2023-01-11
Payer: MEDICARE

## 2023-01-11 ENCOUNTER — HOSPITAL ENCOUNTER (OUTPATIENT)
Dept: CARDIOLOGY | Facility: HOSPITAL | Age: 63
Discharge: HOME | End: 2023-01-11
Attending: FAMILY MEDICINE
Payer: MEDICARE

## 2023-01-11 VITALS
HEIGHT: 62 IN | SYSTOLIC BLOOD PRESSURE: 126 MMHG | DIASTOLIC BLOOD PRESSURE: 76 MMHG | BODY MASS INDEX: 42.33 KG/M2 | WEIGHT: 230 LBS

## 2023-01-11 DIAGNOSIS — G89.29 CHRONIC PAIN OF BOTH KNEES: Primary | ICD-10-CM

## 2023-01-11 DIAGNOSIS — M25.562 CHRONIC PAIN OF BOTH KNEES: Primary | ICD-10-CM

## 2023-01-11 DIAGNOSIS — M25.561 CHRONIC PAIN OF BOTH KNEES: Primary | ICD-10-CM

## 2023-01-11 DIAGNOSIS — G89.4 CHRONIC PAIN DISORDER: ICD-10-CM

## 2023-01-11 DIAGNOSIS — I31.39 PERICARDIAL EFFUSION: ICD-10-CM

## 2023-01-11 PROCEDURE — 93306 ECHO (CUPID ONLY): ICD-10-PCS | Mod: 26,,, | Performed by: INTERNAL MEDICINE

## 2023-01-11 PROCEDURE — 93306 TTE W/DOPPLER COMPLETE: CPT | Mod: 26,,, | Performed by: INTERNAL MEDICINE

## 2023-01-11 PROCEDURE — 93306 TTE W/DOPPLER COMPLETE: CPT

## 2023-01-11 NOTE — TELEPHONE ENCOUNTER
----- Message from Farzana Wakefield MA sent at 1/11/2023  4:30 PM CST -----  Patient wishes to be referred to pain management  ----- Message -----  From: Luis Antonio Bates  Sent: 1/11/2023   4:17 PM CST  To: Bill Ortiz Staff    Pt does wish to see Pain management

## 2023-01-11 NOTE — TELEPHONE ENCOUNTER
Would she like to see pain management?  I had sent in a few days of prednisone to help with inflammation

## 2023-01-11 NOTE — TELEPHONE ENCOUNTER
Patient stated ortho called her to set up an appt.  Then called back to cancel it because if she has hardware there was nothing they could do for the pain unless she would like surgery.  She would like to know if there is anything suggested to help with the pain and swelling.  Please advise

## 2023-01-12 ENCOUNTER — TELEPHONE (OUTPATIENT)
Dept: PAIN MEDICINE | Facility: CLINIC | Age: 63
End: 2023-01-12
Payer: MEDICARE

## 2023-01-12 NOTE — PROGRESS NOTES
Subjective:      Patient ID: Divya Bui is a 62 y.o. female.    Chief Complaint: Establish Care      Patient here to establish care as her previous PCP has passed away.  Reports severe pain in bilateral knees.  Gait instability, fell several weeks ago and having pain in her arm since that time.  Needs referral to multiple specialists.    Review of Systems   Constitutional:  Positive for activity change. Negative for appetite change.   Respiratory:  Negative for shortness of breath.    Cardiovascular:  Positive for leg swelling. Negative for chest pain and palpitations.   Gastrointestinal:  Positive for abdominal pain (chronic) and constipation. Negative for diarrhea, nausea and vomiting.   Psychiatric/Behavioral:  Positive for dysphoric mood. The patient is nervous/anxious.    Past Medical History:   Diagnosis Date    Anemia     Anxiety     Basal cell carcinoma (BCC) of face 2/26/2020    Blood transfusion     Bowel incontinence     Depression     Esophageal stricture     GERD (gastroesophageal reflux disease)     Hypertension     Latent tuberculosis by skin test 2001    took INH    Liver nodule 1/6/2014    Morbid obesity with BMI of 45.0-49.9, adult     OA (osteoarthritis) of knee 12/12/2014    Pericardial effusion 9/4/2014          Past Surgical History:   Procedure Laterality Date    CHOLECYSTECTOMY      GASTRIC BYPASS      HERNIA REPAIR      right sholder surgery      SMALL INTESTINE SURGERY       Family History   Problem Relation Age of Onset    Lung cancer Mother         smoker    Liver cancer Father     Diabetes Sister     Crohn's disease Sister     Liver cancer Sister     Diabetes Sister     Crohn's disease Brother     Crohn's disease Other     Breast cancer Neg Hx     Ovarian cancer Neg Hx     Colon cancer Neg Hx      Social History     Socioeconomic History    Marital status:    Tobacco Use    Smoking status: Never    Smokeless tobacco: Never   Substance and Sexual Activity    Alcohol use: Not  "Currently     Alcohol/week: 0.0 standard drinks    Drug use: No    Sexual activity: Yes     Partners: Male     Review of patient's allergies indicates:   Allergen Reactions    Metronidazole hcl Other (See Comments)     Mouth ulcers developed with Flagyl  Oral sores.  Mouth ulcers developed with Flagyl       Objective:       BP (!) 138/94 (BP Location: Right arm, Patient Position: Sitting, BP Method: Large (Manual))   Pulse 104   Temp 97.6 °F (36.4 °C) (Temporal)   Resp 20   Ht 5' 2.5" (1.588 m)   Wt 102.9 kg (226 lb 13.7 oz)   SpO2 97%   BMI 40.83 kg/m²   Physical Exam  Vitals reviewed.   Constitutional:       General: She is not in acute distress.     Appearance: Normal appearance. She is well-developed. She is obese. She is not ill-appearing or diaphoretic.   HENT:      Head: Normocephalic and atraumatic.      Right Ear: Hearing, tympanic membrane, ear canal and external ear normal.      Left Ear: Hearing, tympanic membrane, ear canal and external ear normal.      Nose: Nose normal.      Mouth/Throat:      Pharynx: Uvula midline. No oropharyngeal exudate.   Eyes:      Conjunctiva/sclera: Conjunctivae normal.      Pupils: Pupils are equal, round, and reactive to light.   Neck:      Thyroid: No thyromegaly.      Trachea: No tracheal deviation.   Cardiovascular:      Rate and Rhythm: Normal rate and regular rhythm.      Heart sounds: Normal heart sounds. No murmur heard.  Pulmonary:      Effort: Pulmonary effort is normal. No respiratory distress.      Breath sounds: Normal breath sounds.   Abdominal:      General: Bowel sounds are normal.      Palpations: Abdomen is soft.      Tenderness: There is no abdominal tenderness. There is no guarding.      Hernia: No hernia is present.   Musculoskeletal:         General: Swelling (bilateral knees) and tenderness (generalized bilateral knees) present. Normal range of motion.      Cervical back: Normal range of motion and neck supple.   Lymphadenopathy:      Cervical: " No cervical adenopathy.   Skin:     General: Skin is warm and dry.      Capillary Refill: Capillary refill takes less than 2 seconds.   Neurological:      General: No focal deficit present.      Mental Status: She is alert and oriented to person, place, and time.      Gait: Gait abnormal.   Psychiatric:         Mood and Affect: Mood normal.         Behavior: Behavior normal.         Thought Content: Thought content normal.         Judgment: Judgment normal.     Assessment:     1. Routine adult health maintenance    2. Anemia due to vitamin B12 deficiency, unspecified B12 deficiency type    3. Primary hypertension    4. Hyperlipidemia, unspecified hyperlipidemia type    5. Prediabetes    6. Chronic pain of both knees    7. Pericardial effusion    8. Chronic abdominal pain    9. Angular cheilitis      Plan:   Routine adult health maintenance  -     CBC Auto Differential; Future; Expected date: 01/09/2023  -     Comprehensive Metabolic Panel; Future; Expected date: 01/09/2023  -     Hemoglobin A1C; Future; Expected date: 01/09/2023  -     Lipid Panel; Future; Expected date: 01/09/2023  -     TSH; Future; Expected date: 01/09/2023  -     Vitamin B12; Future; Expected date: 01/09/2023  -     Ambulatory referral/consult to Gynecology; Future; Expected date: 01/16/2023    Anemia due to vitamin B12 deficiency, unspecified B12 deficiency type  -     CBC Auto Differential; Future; Expected date: 01/09/2023  -     Vitamin B12; Future; Expected date: 01/09/2023    Primary hypertension    Hyperlipidemia, unspecified hyperlipidemia type  -     CBC Auto Differential; Future; Expected date: 01/09/2023  -     Comprehensive Metabolic Panel; Future; Expected date: 01/09/2023  -     Hemoglobin A1C; Future; Expected date: 01/09/2023  -     Lipid Panel; Future; Expected date: 01/09/2023  -     TSH; Future; Expected date: 01/09/2023  -     Vitamin B12; Future; Expected date: 01/09/2023    Prediabetes  -     Hemoglobin A1C; Future;  Expected date: 01/09/2023    Chronic pain of both knees  -     Cancel: X-Ray Knee 3 View Bilateral; Future; Expected date: 01/09/2023  -     X-ray Knee Ortho Bilateral; Future; Expected date: 01/09/2023  -     Ambulatory referral/consult to Orthopedics; Future; Expected date: 01/17/2023    Pericardial effusion  -     Echo; Future    Chronic abdominal pain  -     Ambulatory referral/consult to Gastroenterology; Future; Expected date: 01/16/2023    Angular cheilitis    Other orders  -     atorvastatin (LIPITOR) 40 MG tablet; Take 1 tablet (40 mg total) by mouth once daily.  Dispense: 90 tablet; Refill: 3  -     amLODIPine (NORVASC) 5 MG tablet; Take 1 tablet (5 mg total) by mouth once daily.  Dispense: 90 tablet; Refill: 3  -     ketoconazole (NIZORAL) 2 % cream; Apply topically once daily.  Dispense: 30 g; Refill: 1  -     predniSONE (DELTASONE) 20 MG tablet; Take 2 tablets (40 mg total) by mouth once daily. for 4 days  Dispense: 8 tablet; Refill: 0    Follow-up with me in about 2 weeks to review labs, recheck blood pressure  Medication List with Changes/Refills   New Medications    ATORVASTATIN (LIPITOR) 40 MG TABLET    Take 1 tablet (40 mg total) by mouth once daily.    FLUCONAZOLE (DIFLUCAN) 150 MG TAB    Take 1 tablet (150 mg total) by mouth every 72 hours. for 2 doses    KETOCONAZOLE (NIZORAL) 2 % CREAM    Apply topically once daily.    PREDNISONE (DELTASONE) 20 MG TABLET    Take 2 tablets (40 mg total) by mouth once daily. for 4 days   Current Medications    ALPRAZOLAM (XANAX XR) 2 MG TB24    Take 1 mg by mouth once daily. 2 mg tablet(2tabs) oral in the am and 1 mg(1tab)in the afternoon    ARIPIPRAZOLE (ABILIFY) 10 MG TAB    Take 10 mg by mouth.    ASPIRIN 81 MG TAB    Take 1 tablet by mouth Daily.    CYANOCOBALAMIN (VITAMIN B-12) 1,000 MCG/ML INJECTION    Inject 1 mL (1,000 mcg total) into the muscle every 30 days.    DICYCLOMINE (BENTYL) 20 MG TABLET    Take 20 mg by mouth 3 (three) times daily as needed.     DOXEPIN (SINEQUAN) 50 MG CAPSULE    Take 50 mg by mouth nightly.    FLUOXETINE (PROZAC) 40 MG CAPSULE    Take 1 capsule (40 mg total) by mouth once daily.    HYDROCORTISONE 2.5 % CREAM    Apply topically.    MUPIROCIN (BACTROBAN) 2 % OINTMENT    Apply topically 2 (two) times daily.    OLMESARTAN-HYDROCHLOROTHIAZIDE (BENICAR HCT) 40-12.5 MG TAB    Take 1 tablet by mouth once daily.    PROMETHAZINE (PHENERGAN) 25 MG TABLET    Take 1 tablet (25 mg total) by mouth every 6 (six) hours as needed for Nausea.    ZOLPIDEM (AMBIEN) 10 MG TAB    Take 10 mg by mouth nightly as needed.   Changed and/or Refilled Medications    Modified Medication Previous Medication    AMLODIPINE (NORVASC) 5 MG TABLET amlodipine (NORVASC) 5 MG tablet       Take 1 tablet (5 mg total) by mouth once daily.    Take 1 tablet (5 mg total) by mouth once daily.   Discontinued Medications    ABILIFY 15 MG TAB    Take 15 mg by mouth once daily.     CARVEDILOL (COREG) 12.5 MG TABLET    Take 12.5 mg by mouth.    CLONIDINE (CATAPRES) 0.1 MG TABLET    Take 1 tablet (0.1 mg total) by mouth 2 (two) times daily.    LISINOPRIL-HYDROCHLOROTHIAZIDE (PRINZIDE,ZESTORETIC) 20-25 MG TAB    Take 1 tablet by mouth once daily.    PANTOPRAZOLE (PROTONIX) 40 MG TABLET    Take 40 mg by mouth every morning.    PROMETHAZINE (PHENERGAN) 25 MG TABLET    Take 1 tablet (25 mg total) by mouth every 6 (six) hours as needed for Nausea.    TRAMADOL (ULTRAM) 50 MG TABLET    Take 50 mg by mouth every 6 (six) hours.    ZOLPIDEM (AMBIEN CR) 12.5 MG CR TABLET    Take 12.5 mg by mouth nightly as needed for Insomnia.

## 2023-01-12 NOTE — TELEPHONE ENCOUNTER
PT notified next available NP at [facility] 6/1/23 scheduled at 9am with Dr. Lai. All questions answered. Pt placed on waitlist.

## 2023-01-12 NOTE — TELEPHONE ENCOUNTER
----- Message from Jaden Lopez sent at 1/12/2023  8:43 AM CST -----  Contact: Divya Stokes would like a call back at [phone] in regards to getting an appointment scheduled from her referral.The first available was on 5/24/23 and patient would like something sooner if possible.  Thanks   Am

## 2023-01-13 LAB
AORTIC ROOT ANNULUS: 2.66 CM
ASCENDING AORTA: 3.2 CM
AV INDEX (PROSTH): 0.94
AV MEAN GRADIENT: 5 MMHG
AV PEAK GRADIENT: 10 MMHG
AV VALVE AREA: 2.89 CM2
AV VELOCITY RATIO: 0.92
BSA FOR ECHO PROCEDURE: 2.14 M2
CV ECHO LV RWT: 0.5 CM
DOP CALC AO PEAK VEL: 1.57 M/S
DOP CALC AO VTI: 32.2 CM
DOP CALC LVOT AREA: 3.1 CM2
DOP CALC LVOT DIAMETER: 1.98 CM
DOP CALC LVOT PEAK VEL: 1.44 M/S
DOP CALC LVOT STROKE VOLUME: 92.94 CM3
DOP CALC RVOT PEAK VEL: 1.07 M/S
DOP CALC RVOT VTI: 22.8 CM
DOP CALCLVOT PEAK VEL VTI: 30.2 CM
E WAVE DECELERATION TIME: 245.25 MSEC
E/A RATIO: 0.73
E/E' RATIO: 12 M/S
ECHO LV POSTERIOR WALL: 1.09 CM (ref 0.6–1.1)
EJECTION FRACTION: 60 %
FRACTIONAL SHORTENING: 38 % (ref 28–44)
INTERVENTRICULAR SEPTUM: 1.05 CM (ref 0.6–1.1)
IVRT: 88.49 MSEC
LA MAJOR: 6.09 CM
LA MINOR: 5.17 CM
LA WIDTH: 4.1 CM
LEFT ATRIUM SIZE: 3.48 CM
LEFT ATRIUM VOLUME INDEX MOD: 14.8 ML/M2
LEFT ATRIUM VOLUME INDEX: 33.4 ML/M2
LEFT ATRIUM VOLUME MOD: 30.09 CM3
LEFT ATRIUM VOLUME: 67.82 CM3
LEFT INTERNAL DIMENSION IN SYSTOLE: 2.69 CM (ref 2.1–4)
LEFT VENTRICLE DIASTOLIC VOLUME INDEX: 41.57 ML/M2
LEFT VENTRICLE DIASTOLIC VOLUME: 84.39 ML
LEFT VENTRICLE MASS INDEX: 78 G/M2
LEFT VENTRICLE SYSTOLIC VOLUME INDEX: 13.2 ML/M2
LEFT VENTRICLE SYSTOLIC VOLUME: 26.89 ML
LEFT VENTRICULAR INTERNAL DIMENSION IN DIASTOLE: 4.33 CM (ref 3.5–6)
LEFT VENTRICULAR MASS: 158.39 G
LV LATERAL E/E' RATIO: 10.29 M/S
LV SEPTAL E/E' RATIO: 14.4 M/S
LVOT MG: 5.1 MMHG
LVOT MV: 1.09 CM/S
MV PEAK A VEL: 0.99 M/S
MV PEAK E VEL: 0.72 M/S
MV STENOSIS PRESSURE HALF TIME: 71.12 MS
MV VALVE AREA P 1/2 METHOD: 3.09 CM2
PISA TR MAX VEL: 2.82 M/S
PV MEAN GRADIENT: 2.56 MMHG
PV PEAK VELOCITY: 1.54 CM/S
RA MAJOR: 4.61 CM
RA WIDTH: 3.4 CM
RIGHT VENTRICULAR END-DIASTOLIC DIMENSION: 3.29 CM
SINUS: 3.05 CM
STJ: 3.09 CM
TDI LATERAL: 0.07 M/S
TDI SEPTAL: 0.05 M/S
TDI: 0.06 M/S
TR MAX PG: 32 MMHG
TRICUSPID ANNULAR PLANE SYSTOLIC EXCURSION: 1.78 CM

## 2023-01-23 ENCOUNTER — OFFICE VISIT (OUTPATIENT)
Dept: INTERNAL MEDICINE | Facility: CLINIC | Age: 63
End: 2023-01-23
Payer: MEDICARE

## 2023-01-23 VITALS
HEIGHT: 62 IN | TEMPERATURE: 99 F | SYSTOLIC BLOOD PRESSURE: 132 MMHG | OXYGEN SATURATION: 96 % | HEART RATE: 94 BPM | BODY MASS INDEX: 42.88 KG/M2 | DIASTOLIC BLOOD PRESSURE: 94 MMHG | WEIGHT: 233 LBS

## 2023-01-23 DIAGNOSIS — R11.10 VOMITING, UNSPECIFIED VOMITING TYPE, UNSPECIFIED WHETHER NAUSEA PRESENT: Primary | ICD-10-CM

## 2023-01-23 DIAGNOSIS — R11.0 NAUSEA: ICD-10-CM

## 2023-01-23 DIAGNOSIS — M79.10 MUSCLE PAIN: ICD-10-CM

## 2023-01-23 DIAGNOSIS — R19.7 DIARRHEA, UNSPECIFIED TYPE: ICD-10-CM

## 2023-01-23 DIAGNOSIS — R05.9 COUGH: ICD-10-CM

## 2023-01-23 DIAGNOSIS — R19.7 DIARRHEA: ICD-10-CM

## 2023-01-23 DIAGNOSIS — R05.9 COUGH, UNSPECIFIED TYPE: ICD-10-CM

## 2023-01-23 DIAGNOSIS — R73.03 PREDIABETES: ICD-10-CM

## 2023-01-23 LAB
CTP QC/QA: YES
CTP QC/QA: YES
POC MOLECULAR INFLUENZA A AGN: NEGATIVE
POC MOLECULAR INFLUENZA B AGN: NEGATIVE
SARS-COV-2 RDRP RESP QL NAA+PROBE: NEGATIVE
SARS-COV-2 RNA RESP QL NAA+PROBE: DETECTED

## 2023-01-23 PROCEDURE — 3044F HG A1C LEVEL LT 7.0%: CPT | Mod: CPTII,S$GLB,, | Performed by: FAMILY MEDICINE

## 2023-01-23 PROCEDURE — 87502 INFLUENZA DNA AMP PROBE: CPT | Mod: QW,S$GLB,, | Performed by: FAMILY MEDICINE

## 2023-01-23 PROCEDURE — 3008F PR BODY MASS INDEX (BMI) DOCUMENTED: ICD-10-PCS | Mod: CPTII,S$GLB,, | Performed by: FAMILY MEDICINE

## 2023-01-23 PROCEDURE — 1159F MED LIST DOCD IN RCRD: CPT | Mod: CPTII,S$GLB,, | Performed by: FAMILY MEDICINE

## 2023-01-23 PROCEDURE — 99214 PR OFFICE/OUTPT VISIT, EST, LEVL IV, 30-39 MIN: ICD-10-PCS | Mod: S$GLB,,, | Performed by: FAMILY MEDICINE

## 2023-01-23 PROCEDURE — 1159F PR MEDICATION LIST DOCUMENTED IN MEDICAL RECORD: ICD-10-PCS | Mod: CPTII,S$GLB,, | Performed by: FAMILY MEDICINE

## 2023-01-23 PROCEDURE — 87502 POCT INFLUENZA A/B MOLECULAR: ICD-10-PCS | Mod: QW,S$GLB,, | Performed by: FAMILY MEDICINE

## 2023-01-23 PROCEDURE — 87635: ICD-10-PCS | Mod: QW,S$GLB,, | Performed by: FAMILY MEDICINE

## 2023-01-23 PROCEDURE — 99214 OFFICE O/P EST MOD 30 MIN: CPT | Mod: S$GLB,,, | Performed by: FAMILY MEDICINE

## 2023-01-23 PROCEDURE — U0005 INFEC AGEN DETEC AMPLI PROBE: HCPCS | Performed by: FAMILY MEDICINE

## 2023-01-23 PROCEDURE — 3044F PR MOST RECENT HEMOGLOBIN A1C LEVEL <7.0%: ICD-10-PCS | Mod: CPTII,S$GLB,, | Performed by: FAMILY MEDICINE

## 2023-01-23 PROCEDURE — 3080F DIAST BP >= 90 MM HG: CPT | Mod: CPTII,S$GLB,, | Performed by: FAMILY MEDICINE

## 2023-01-23 PROCEDURE — 87635 SARS-COV-2 COVID-19 AMP PRB: CPT | Mod: QW,S$GLB,, | Performed by: FAMILY MEDICINE

## 2023-01-23 PROCEDURE — 3008F BODY MASS INDEX DOCD: CPT | Mod: CPTII,S$GLB,, | Performed by: FAMILY MEDICINE

## 2023-01-23 PROCEDURE — 3080F PR MOST RECENT DIASTOLIC BLOOD PRESSURE >= 90 MM HG: ICD-10-PCS | Mod: CPTII,S$GLB,, | Performed by: FAMILY MEDICINE

## 2023-01-23 PROCEDURE — 3075F PR MOST RECENT SYSTOLIC BLOOD PRESS GE 130-139MM HG: ICD-10-PCS | Mod: CPTII,S$GLB,, | Performed by: FAMILY MEDICINE

## 2023-01-23 PROCEDURE — 99999 PR PBB SHADOW E&M-EST. PATIENT-LVL IV: ICD-10-PCS | Mod: PBBFAC,,, | Performed by: FAMILY MEDICINE

## 2023-01-23 PROCEDURE — 3075F SYST BP GE 130 - 139MM HG: CPT | Mod: CPTII,S$GLB,, | Performed by: FAMILY MEDICINE

## 2023-01-23 PROCEDURE — 99999 PR PBB SHADOW E&M-EST. PATIENT-LVL IV: CPT | Mod: PBBFAC,,, | Performed by: FAMILY MEDICINE

## 2023-01-23 RX ORDER — PROMETHAZINE HYDROCHLORIDE 25 MG/1
25 TABLET ORAL EVERY 6 HOURS PRN
Qty: 30 TABLET | Refills: 0 | Status: SHIPPED | OUTPATIENT
Start: 2023-01-23 | End: 2023-04-19 | Stop reason: SDUPTHER

## 2023-01-23 RX ORDER — PREDNISONE 20 MG/1
40 TABLET ORAL DAILY
Qty: 8 TABLET | Refills: 2 | Status: SHIPPED | OUTPATIENT
Start: 2023-01-23 | End: 2023-01-27

## 2023-01-23 RX ORDER — DIPHENOXYLATE HYDROCHLORIDE AND ATROPINE SULFATE 2.5; .025 MG/1; MG/1
1 TABLET ORAL 4 TIMES DAILY PRN
Qty: 12 TABLET | Refills: 0 | Status: SHIPPED | OUTPATIENT
Start: 2023-01-23 | End: 2023-02-02

## 2023-01-23 NOTE — PROGRESS NOTES
Subjective:      Patient ID: Divya Bui is a 62 y.o. female.    Chief Complaint: Nausea, Generalized Body Aches, Cough, Headache, Diarrhea, and Nasal Congestion      The patient is here today for routine follow up. Labs drawn earlier discussed today with the patient.  Unable to get into pain management for some months now-reports prednisone did help for her knee pain.  Reports since yesterday having nausea, coughing, headache, diarrhea, vomiting, body aches, congestion.  Did have exposure to family members with COVID last week    Nausea  Associated symptoms include chills, congestion, coughing, fatigue, headaches and nausea.   Cough  Associated symptoms include chills and headaches.   Headache   Associated symptoms include coughing and nausea.   Diarrhea   Associated symptoms include chills, coughing and headaches.   Review of Systems   Constitutional:  Positive for chills and fatigue.   HENT:  Positive for congestion.    Respiratory:  Positive for cough.    Gastrointestinal:  Positive for diarrhea and nausea.   Neurological:  Positive for headaches.   Past Medical History:   Diagnosis Date    Anemia     Anxiety     Basal cell carcinoma (BCC) of face 2/26/2020    Blood transfusion     Bowel incontinence     Depression     Esophageal stricture     GERD (gastroesophageal reflux disease)     Hypertension     Latent tuberculosis by skin test 2001    took INH    Liver nodule 1/6/2014    Morbid obesity with BMI of 45.0-49.9, adult     OA (osteoarthritis) of knee 12/12/2014    Pericardial effusion 9/4/2014          Past Surgical History:   Procedure Laterality Date    CHOLECYSTECTOMY      GASTRIC BYPASS      HERNIA REPAIR      right sholder surgery      SMALL INTESTINE SURGERY       Family History   Problem Relation Age of Onset    Lung cancer Mother         smoker    Liver cancer Father     Diabetes Sister     Crohn's disease Sister     Liver cancer Sister     Diabetes Sister     Crohn's disease Brother     Crohn's  "disease Other     Breast cancer Neg Hx     Ovarian cancer Neg Hx     Colon cancer Neg Hx      Social History     Socioeconomic History    Marital status:    Tobacco Use    Smoking status: Never    Smokeless tobacco: Never   Substance and Sexual Activity    Alcohol use: Not Currently     Alcohol/week: 0.0 standard drinks    Drug use: No    Sexual activity: Yes     Partners: Male     Review of patient's allergies indicates:   Allergen Reactions    Metronidazole hcl Other (See Comments)     Mouth ulcers developed with Flagyl  Oral sores.  Mouth ulcers developed with Flagyl       Objective:       BP (!) 132/94 (BP Location: Right arm, Patient Position: Sitting, BP Method: Large (Manual))   Pulse 94   Temp 99.1 °F (37.3 °C) (Tympanic)   Ht 5' 2" (1.575 m)   Wt 105.7 kg (233 lb 0.4 oz)   SpO2 96%   BMI 42.62 kg/m²   Physical Exam  Vitals reviewed.   Constitutional:       General: She is not in acute distress.     Appearance: She is well-developed. She is not diaphoretic.   HENT:      Head: Normocephalic.      Right Ear: Hearing, tympanic membrane, ear canal and external ear normal.      Left Ear: Hearing, tympanic membrane, ear canal and external ear normal.      Nose: Mucosal edema present.      Right Sinus: No maxillary sinus tenderness or frontal sinus tenderness.      Left Sinus: No maxillary sinus tenderness or frontal sinus tenderness.      Mouth/Throat:      Pharynx: Uvula midline. Posterior oropharyngeal erythema present.   Eyes:      Conjunctiva/sclera: Conjunctivae normal.      Pupils: Pupils are equal, round, and reactive to light.   Cardiovascular:      Rate and Rhythm: Normal rate and regular rhythm.      Heart sounds: Normal heart sounds.   Pulmonary:      Effort: Pulmonary effort is normal. No respiratory distress.      Breath sounds: Normal breath sounds.   Abdominal:      General: Bowel sounds are normal.      Palpations: Abdomen is soft.   Lymphadenopathy:      Cervical: No cervical " adenopathy.   Skin:     General: Skin is warm and dry.   Psychiatric:         Behavior: Behavior normal.     Assessment:     1. Vomiting, unspecified vomiting type, unspecified whether nausea present    2. Prediabetes    3. Diarrhea, unspecified type    4. Cough, unspecified type    5. Cough    6. Diarrhea    7. Muscle pain    8. Nausea      Plan:   Vomiting, unspecified vomiting type, unspecified whether nausea present    Prediabetes    Diarrhea, unspecified type    Cough, unspecified type  -     POCT Influenza A/B Molecular  -     POCT COVID-19 Rapid Screening    Cough  -     COVID-19 Routine Screening    Diarrhea  -     COVID-19 Routine Screening    Muscle pain  -     COVID-19 Routine Screening    Nausea  -     COVID-19 Routine Screening    Other orders  -     promethazine (PHENERGAN) 25 MG tablet; Take 1 tablet (25 mg total) by mouth every 6 (six) hours as needed for Nausea.  Dispense: 30 tablet; Refill: 0  -     predniSONE (DELTASONE) 20 MG tablet; Take 2 tablets (40 mg total) by mouth once daily. for 4 days  Dispense: 8 tablet; Refill: 2  -     diphenoxylate-atropine 2.5-0.025 mg (LOMOTIL) 2.5-0.025 mg per tablet; Take 1 tablet by mouth 4 (four) times daily as needed for Diarrhea.  Dispense: 12 tablet; Refill: 0    Flu and COVID rapid test today negative-will send out PCR for COVID  Continue all other current medications.     Medication List with Changes/Refills   New Medications    DIPHENOXYLATE-ATROPINE 2.5-0.025 MG (LOMOTIL) 2.5-0.025 MG PER TABLET    Take 1 tablet by mouth 4 (four) times daily as needed for Diarrhea.   Current Medications    ALPRAZOLAM (XANAX XR) 2 MG TB24    Take 1 mg by mouth once daily. 2 mg tablet(2tabs) oral in the am and 1 mg(1tab)in the afternoon    AMLODIPINE (NORVASC) 5 MG TABLET    Take 1 tablet (5 mg total) by mouth once daily.    ARIPIPRAZOLE (ABILIFY) 10 MG TAB    Take 10 mg by mouth.    ASPIRIN 81 MG TAB    Take 1 tablet by mouth Daily.    ATORVASTATIN (LIPITOR) 40 MG TABLET     Take 1 tablet (40 mg total) by mouth once daily.    CYANOCOBALAMIN (VITAMIN B-12) 1,000 MCG/ML INJECTION    Inject 1 mL (1,000 mcg total) into the muscle every 30 days.    DICYCLOMINE (BENTYL) 20 MG TABLET    Take 20 mg by mouth 3 (three) times daily as needed.    DOXEPIN (SINEQUAN) 50 MG CAPSULE    Take 50 mg by mouth nightly.    FLUOXETINE (PROZAC) 40 MG CAPSULE    Take 1 capsule (40 mg total) by mouth once daily.    HYDROCORTISONE 2.5 % CREAM    Apply topically.    KETOCONAZOLE (NIZORAL) 2 % CREAM    Apply topically once daily.    MUPIROCIN (BACTROBAN) 2 % OINTMENT    Apply topically 2 (two) times daily.    OLMESARTAN-HYDROCHLOROTHIAZIDE (BENICAR HCT) 40-12.5 MG TAB    Take 1 tablet by mouth once daily.    ZOLPIDEM (AMBIEN) 10 MG TAB    Take 10 mg by mouth nightly as needed.   Changed and/or Refilled Medications    Modified Medication Previous Medication    PREDNISONE (DELTASONE) 20 MG TABLET predniSONE (DELTASONE) 20 MG tablet       Take 2 tablets (40 mg total) by mouth once daily. for 4 days    Take 2 tablets (40 mg total) by mouth once daily. for 4 days    PROMETHAZINE (PHENERGAN) 25 MG TABLET promethazine (PHENERGAN) 25 MG tablet       Take 1 tablet (25 mg total) by mouth every 6 (six) hours as needed for Nausea.    Take 1 tablet (25 mg total) by mouth every 6 (six) hours as needed for Nausea.

## 2023-01-24 ENCOUNTER — TELEPHONE (OUTPATIENT)
Dept: INTERNAL MEDICINE | Facility: CLINIC | Age: 63
End: 2023-01-24
Payer: MEDICARE

## 2023-01-24 NOTE — TELEPHONE ENCOUNTER
Left message on machine to call back to verify on which pharmacy she would like to have her prescription sent.

## 2023-01-24 NOTE — TELEPHONE ENCOUNTER
----- Message from Dominique Cha sent at 1/24/2023  9:48 AM CST -----  Contact: Pt  States she tested positive for COVID and the med that was given to her , her pharm doesn't carry and is calling to have it called in to another pharm & can be reached at [phone]//thanks/dbw     [facility]   [address]

## 2023-02-01 ENCOUNTER — HOSPITAL ENCOUNTER (EMERGENCY)
Facility: HOSPITAL | Age: 63
Discharge: HOME | End: 2023-02-02
Attending: EMERGENCY MEDICINE
Payer: MEDICARE

## 2023-02-01 ENCOUNTER — NURSE TRIAGE (OUTPATIENT)
Dept: ADMINISTRATIVE | Facility: CLINIC | Age: 63
End: 2023-02-01
Payer: MEDICARE

## 2023-02-01 DIAGNOSIS — K92.1 HEMATOCHEZIA: ICD-10-CM

## 2023-02-01 DIAGNOSIS — N39.0 ACUTE LOWER UTI: Primary | ICD-10-CM

## 2023-02-01 LAB
ALBUMIN SERPL BCP-MCNC: 3.3 G/DL (ref 3.5–5.2)
ALP SERPL-CCNC: 105 U/L (ref 55–135)
ALT SERPL W/O P-5'-P-CCNC: 11 U/L (ref 10–44)
ANION GAP SERPL CALC-SCNC: 16 MMOL/L (ref 8–16)
AST SERPL-CCNC: 9 U/L (ref 10–40)
BACTERIA #/AREA URNS HPF: ABNORMAL /HPF
BASOPHILS # BLD AUTO: 0.03 K/UL (ref 0–0.2)
BASOPHILS NFR BLD: 0.4 % (ref 0–1.9)
BILIRUB SERPL-MCNC: 0.3 MG/DL (ref 0.1–1)
BILIRUB UR QL STRIP: NEGATIVE
BUN SERPL-MCNC: 11 MG/DL (ref 8–23)
CALCIUM SERPL-MCNC: 8 MG/DL (ref 8.7–10.5)
CHLORIDE SERPL-SCNC: 107 MMOL/L (ref 95–110)
CLARITY UR: ABNORMAL
CO2 SERPL-SCNC: 18 MMOL/L (ref 23–29)
COLOR UR: YELLOW
CREAT SERPL-MCNC: 1.1 MG/DL (ref 0.5–1.4)
DIFFERENTIAL METHOD: ABNORMAL
EOSINOPHIL # BLD AUTO: 0.3 K/UL (ref 0–0.5)
EOSINOPHIL NFR BLD: 3.8 % (ref 0–8)
ERYTHROCYTE [DISTWIDTH] IN BLOOD BY AUTOMATED COUNT: 14.2 % (ref 11.5–14.5)
EST. GFR  (NO RACE VARIABLE): 56 ML/MIN/1.73 M^2
GLUCOSE SERPL-MCNC: 92 MG/DL (ref 70–110)
GLUCOSE UR QL STRIP: NEGATIVE
HCT VFR BLD AUTO: 33.2 % (ref 37–48.5)
HGB BLD-MCNC: 10.5 G/DL (ref 12–16)
HGB UR QL STRIP: ABNORMAL
IMM GRANULOCYTES # BLD AUTO: 0.08 K/UL (ref 0–0.04)
IMM GRANULOCYTES NFR BLD AUTO: 1.1 % (ref 0–0.5)
KETONES UR QL STRIP: NEGATIVE
LEUKOCYTE ESTERASE UR QL STRIP: ABNORMAL
LYMPHOCYTES # BLD AUTO: 1.5 K/UL (ref 1–4.8)
LYMPHOCYTES NFR BLD: 19.9 % (ref 18–48)
MCH RBC QN AUTO: 27.8 PG (ref 27–31)
MCHC RBC AUTO-ENTMCNC: 31.6 G/DL (ref 32–36)
MCV RBC AUTO: 88 FL (ref 82–98)
MICROSCOPIC COMMENT: ABNORMAL
MONOCYTES # BLD AUTO: 0.5 K/UL (ref 0.3–1)
MONOCYTES NFR BLD: 6.9 % (ref 4–15)
NEUTROPHILS # BLD AUTO: 5.1 K/UL (ref 1.8–7.7)
NEUTROPHILS NFR BLD: 67.9 % (ref 38–73)
NITRITE UR QL STRIP: POSITIVE
NRBC BLD-RTO: 0 /100 WBC
PH UR STRIP: 7 [PH] (ref 5–8)
PLATELET # BLD AUTO: 245 K/UL (ref 150–450)
PMV BLD AUTO: 8.5 FL (ref 9.2–12.9)
POTASSIUM SERPL-SCNC: 3.5 MMOL/L (ref 3.5–5.1)
PROT SERPL-MCNC: 6.8 G/DL (ref 6–8.4)
PROT UR QL STRIP: NEGATIVE
RBC # BLD AUTO: 3.78 M/UL (ref 4–5.4)
RBC #/AREA URNS HPF: 4 /HPF (ref 0–4)
SODIUM SERPL-SCNC: 141 MMOL/L (ref 136–145)
SP GR UR STRIP: 1.01 (ref 1–1.03)
SQUAMOUS #/AREA URNS HPF: 4 /HPF
URN SPEC COLLECT METH UR: ABNORMAL
UROBILINOGEN UR STRIP-ACNC: NEGATIVE EU/DL
WBC # BLD AUTO: 7.54 K/UL (ref 3.9–12.7)
WBC #/AREA URNS HPF: 69 /HPF (ref 0–5)
WBC CLUMPS URNS QL MICRO: ABNORMAL

## 2023-02-01 PROCEDURE — 25000003 PHARM REV CODE 250: Performed by: NURSE PRACTITIONER

## 2023-02-01 PROCEDURE — 81000 URINALYSIS NONAUTO W/SCOPE: CPT | Performed by: NURSE PRACTITIONER

## 2023-02-01 PROCEDURE — 85025 COMPLETE CBC W/AUTO DIFF WBC: CPT | Performed by: NURSE PRACTITIONER

## 2023-02-01 PROCEDURE — 99285 EMERGENCY DEPT VISIT HI MDM: CPT | Mod: 25

## 2023-02-01 PROCEDURE — 87088 URINE BACTERIA CULTURE: CPT | Performed by: NURSE PRACTITIONER

## 2023-02-01 PROCEDURE — 80053 COMPREHEN METABOLIC PANEL: CPT | Performed by: NURSE PRACTITIONER

## 2023-02-01 PROCEDURE — 25000003 PHARM REV CODE 250: Performed by: EMERGENCY MEDICINE

## 2023-02-01 PROCEDURE — 63600175 PHARM REV CODE 636 W HCPCS: Performed by: EMERGENCY MEDICINE

## 2023-02-01 PROCEDURE — 87077 CULTURE AEROBIC IDENTIFY: CPT | Mod: 59 | Performed by: NURSE PRACTITIONER

## 2023-02-01 PROCEDURE — 85025 COMPLETE CBC W/AUTO DIFF WBC: CPT | Mod: 91 | Performed by: EMERGENCY MEDICINE

## 2023-02-01 PROCEDURE — 87186 SC STD MICRODIL/AGAR DIL: CPT | Mod: 59 | Performed by: NURSE PRACTITIONER

## 2023-02-01 PROCEDURE — 96361 HYDRATE IV INFUSION ADD-ON: CPT

## 2023-02-01 PROCEDURE — 87086 URINE CULTURE/COLONY COUNT: CPT | Performed by: NURSE PRACTITIONER

## 2023-02-01 PROCEDURE — 96365 THER/PROPH/DIAG IV INF INIT: CPT

## 2023-02-01 PROCEDURE — 96375 TX/PRO/DX INJ NEW DRUG ADDON: CPT

## 2023-02-01 RX ORDER — ONDANSETRON 4 MG/1
4 TABLET, ORALLY DISINTEGRATING ORAL
Status: DISCONTINUED | OUTPATIENT
Start: 2023-02-01 | End: 2023-02-02 | Stop reason: HOSPADM

## 2023-02-01 RX ORDER — MORPHINE SULFATE 4 MG/ML
4 INJECTION, SOLUTION INTRAMUSCULAR; INTRAVENOUS
Status: COMPLETED | OUTPATIENT
Start: 2023-02-02 | End: 2023-02-01

## 2023-02-01 RX ORDER — METOCLOPRAMIDE HYDROCHLORIDE 5 MG/ML
10 INJECTION INTRAMUSCULAR; INTRAVENOUS
Status: COMPLETED | OUTPATIENT
Start: 2023-02-01 | End: 2023-02-01

## 2023-02-01 RX ORDER — ONDANSETRON 2 MG/ML
4 INJECTION INTRAMUSCULAR; INTRAVENOUS
Status: DISCONTINUED | OUTPATIENT
Start: 2023-02-02 | End: 2023-02-02 | Stop reason: HOSPADM

## 2023-02-01 RX ADMIN — MORPHINE SULFATE 4 MG: 4 INJECTION INTRAVENOUS at 11:02

## 2023-02-01 RX ADMIN — CEFTRIAXONE 1 G: 1 INJECTION, POWDER, FOR SOLUTION INTRAMUSCULAR; INTRAVENOUS at 11:02

## 2023-02-01 RX ADMIN — METOCLOPRAMIDE 10 MG: 5 INJECTION, SOLUTION INTRAMUSCULAR; INTRAVENOUS at 10:02

## 2023-02-01 RX ADMIN — SODIUM CHLORIDE 1000 ML: 9 INJECTION, SOLUTION INTRAVENOUS at 10:02

## 2023-02-01 NOTE — TELEPHONE ENCOUNTER
Bright red blood in diarrhea since last night. Has noticed blood in stool twice now. +vomiting. +severe weakness, trouble standing due to weakness.     Dispo-ER now for eval, have someone else drive. Pt vu     Reason for Disposition   SEVERE dizziness (e.g., unable to stand, requires support to walk, feels like passing out now)    Additional Information   Negative: Passed out (i.e., fainted, collapsed and was not responding)   Negative: Shock suspected (e.g., cold/pale/clammy skin, too weak to stand, low BP, rapid pulse)   Negative: Vomiting red blood or black (coffee ground) material   Negative: Sounds like a life-threatening emergency to the triager   Negative: SEVERE rectal bleeding (large blood clots; constant or on and off bleeding)    Protocols used: Rectal Bleeding-A-[place]

## 2023-02-01 NOTE — FIRST PROVIDER EVALUATION
Brief history of present illness:  Patient complains of nausea vomiting diarrhea COVID positive    There were no vitals filed for this visit.    Pertinent physical exam:  NAD    Brief workup plan:  Labs fluids    Preliminary workup initiated; this workup will be continued and followed by the physician or advanced practice provider that is assigned to the patient when roomed.

## 2023-02-02 ENCOUNTER — TELEPHONE (OUTPATIENT)
Dept: INTERNAL MEDICINE | Facility: CLINIC | Age: 63
End: 2023-02-02
Payer: MEDICARE

## 2023-02-02 VITALS
RESPIRATION RATE: 17 BRPM | SYSTOLIC BLOOD PRESSURE: 161 MMHG | OXYGEN SATURATION: 98 % | TEMPERATURE: 98 F | HEART RATE: 88 BPM | BODY MASS INDEX: 42.48 KG/M2 | DIASTOLIC BLOOD PRESSURE: 84 MMHG | WEIGHT: 232.25 LBS

## 2023-02-02 LAB
BASOPHILS # BLD AUTO: 0.01 K/UL (ref 0–0.2)
BASOPHILS NFR BLD: 0.2 % (ref 0–1.9)
DIFFERENTIAL METHOD: ABNORMAL
EOSINOPHIL # BLD AUTO: 0.2 K/UL (ref 0–0.5)
EOSINOPHIL NFR BLD: 3.7 % (ref 0–8)
ERYTHROCYTE [DISTWIDTH] IN BLOOD BY AUTOMATED COUNT: 14.1 % (ref 11.5–14.5)
HCT VFR BLD AUTO: 31.6 % (ref 37–48.5)
HGB BLD-MCNC: 10 G/DL (ref 12–16)
IMM GRANULOCYTES # BLD AUTO: 0.07 K/UL (ref 0–0.04)
IMM GRANULOCYTES NFR BLD AUTO: 1.1 % (ref 0–0.5)
LYMPHOCYTES # BLD AUTO: 1.1 K/UL (ref 1–4.8)
LYMPHOCYTES NFR BLD: 17.3 % (ref 18–48)
MCH RBC QN AUTO: 28 PG (ref 27–31)
MCHC RBC AUTO-ENTMCNC: 31.6 G/DL (ref 32–36)
MCV RBC AUTO: 89 FL (ref 82–98)
MONOCYTES # BLD AUTO: 0.4 K/UL (ref 0.3–1)
MONOCYTES NFR BLD: 5.9 % (ref 4–15)
NEUTROPHILS # BLD AUTO: 4.6 K/UL (ref 1.8–7.7)
NEUTROPHILS NFR BLD: 71.8 % (ref 38–73)
NRBC BLD-RTO: 0 /100 WBC
PLATELET # BLD AUTO: 221 K/UL (ref 150–450)
PMV BLD AUTO: 8.6 FL (ref 9.2–12.9)
RBC # BLD AUTO: 3.57 M/UL (ref 4–5.4)
WBC # BLD AUTO: 6.42 K/UL (ref 3.9–12.7)

## 2023-02-02 PROCEDURE — 63600175 PHARM REV CODE 636 W HCPCS: Performed by: EMERGENCY MEDICINE

## 2023-02-02 RX ORDER — TRAMADOL HYDROCHLORIDE 50 MG/1
50 TABLET ORAL EVERY 6 HOURS PRN
Qty: 12 TABLET | Refills: 0 | Status: SHIPPED | OUTPATIENT
Start: 2023-02-02 | End: 2023-02-07 | Stop reason: SDUPTHER

## 2023-02-02 RX ORDER — CEPHALEXIN 500 MG/1
500 CAPSULE ORAL 4 TIMES DAILY
Qty: 28 CAPSULE | Refills: 0 | Status: SHIPPED | OUTPATIENT
Start: 2023-02-02 | End: 2023-02-09

## 2023-02-02 NOTE — ED PROVIDER NOTES
SCRIBE #1 NOTE: I, Chris Russell, am scribing for, and in the presence of, Fortunato Rodriguez Jr., MD. I have scribed the entire note.       History     Chief Complaint   Patient presents with    Weakness     Weak, N/V/D, COVID+ one week ago. Now has blood in stool- bright red onset today.     Review of patient's allergies indicates:   Allergen Reactions    Metronidazole hcl Other (See Comments)     Mouth ulcers developed with Flagyl  Oral sores.  Mouth ulcers developed with Flagyl         History of Present Illness     HPI    2/1/2023, 10:02 PM  History obtained from the patient      History of Present Illness: Divya Bui is a 62 y.o. female patient with a PMHx of hemorrhoids, diverticulitis, GERD, and bowel incontinence who presents to the Emergency Department for evaluation of bright red blood in stool which onset today. Symptoms are intermittent and moderate in severity. Pt was diagnosed with COVID 1x week PTA, and reports completing her medicine regiment. No mitigating or exacerbating factors reported. Associated sxs include nausea, vomiting, diarrhea, fever, abdominal pain, and weakness. Patient denies any HA, dizziness, SOB, cough, light-headedness, and all other sxs at this time. No prior Tx for hemorrhoids reported. No further complaints or concerns at this time.       Arrival mode: Personal vehicle    PCP: Angel Khan MD        Past Medical History:  Past Medical History:   Diagnosis Date    Anemia     Anxiety     Basal cell carcinoma (BCC) of face 2/26/2020    Blood transfusion     Bowel incontinence     Depression     Esophageal stricture     GERD (gastroesophageal reflux disease)     Hypertension     Latent tuberculosis by skin test 2001    took INH    Liver nodule 1/6/2014    Morbid obesity with BMI of 45.0-49.9, adult     OA (osteoarthritis) of knee 12/12/2014    Pericardial effusion 9/4/2014       Past Surgical History:  Past Surgical History:   Procedure Laterality Date    CHOLECYSTECTOMY       GASTRIC BYPASS      HERNIA REPAIR      right sholder surgery      SMALL INTESTINE SURGERY           Family History:  Family History   Problem Relation Age of Onset    Lung cancer Mother         smoker    Liver cancer Father     Diabetes Sister     Crohn's disease Sister     Liver cancer Sister     Diabetes Sister     Crohn's disease Brother     Crohn's disease Other     Breast cancer Neg Hx     Ovarian cancer Neg Hx     Colon cancer Neg Hx        Social History:  Social History     Tobacco Use    Smoking status: Never    Smokeless tobacco: Never   Substance and Sexual Activity    Alcohol use: Not Currently     Alcohol/week: 0.0 standard drinks    Drug use: No    Sexual activity: Yes     Partners: Male        Review of Systems     Review of Systems   Constitutional:  Positive for fever.   HENT:  Negative for sore throat.    Respiratory:  Negative for cough and shortness of breath.    Cardiovascular:  Negative for chest pain.   Gastrointestinal:  Positive for abdominal pain, blood in stool (bright red), diarrhea, nausea and vomiting.   Genitourinary:  Negative for dysuria.   Musculoskeletal:  Negative for back pain.   Skin:  Negative for rash.   Neurological:  Positive for weakness. Negative for dizziness, light-headedness and headaches.   Hematological:  Does not bruise/bleed easily.   All other systems reviewed and are negative.     Physical Exam     Initial Vitals [02/01/23 1702]   BP Pulse Resp Temp SpO2   (!) 170/90 99 (!) 26 97.9 °F (36.6 °C) 97 %      MAP       --          Physical Exam  Nursing Notes and Vital Signs Reviewed.  Constitutional: Patient is in no acute distress. Well-developed and well-nourished.  Head: Atraumatic. Normocephalic.  Eyes:  EOM intact.  No scleral icterus.  ENT: Mucous membranes are moist.  Nares clear   Neck:  Full ROM. No JVD.  Cardiovascular: Regular rate. Regular rhythm No murmurs, rubs, or gallops. Distal pulses are 2+ and symmetric  Pulmonary/Chest: No respiratory distress.  Clear to auscultation bilaterally. No wheezing or rales.  Equal chest wall rise bilaterally  Abdominal: Soft and non-distended.  Suprapubic tenderness present.  Mild left lower quadrant tenderness.  No rebound, guarding, or rigidity. Good bowel sounds.    Genitourinary: No CVA tenderness.  Mild suprapubic tenderness  Musculoskeletal: Moves all extremities. No obvious deformities.  5 x 5 strength in all extremities   Skin: Warm and dry.  Neurological:  Alert, awake, and appropriate.  Normal speech.  No acute focal neurological deficits are appreciated.  Two through 12 intact bilaterally.  Psychiatric: Normal affect. Good eye contact. Appropriate in content.  Rectal: Female chaperone present for the duration of the rectal exam. Tenderness. External hemorrhoids. Normal sphincter tone.          ED Course   Procedures  ED Vital Signs:  Vitals:    02/01/23 1702 02/01/23 2157 02/01/23 2354 02/01/23 2359   BP: (!) 170/90 130/88  (!) 115/90   Pulse: 99 108  95   Resp: (!) 26 (!) 25 17    Temp: 97.9 °F (36.6 °C)      TempSrc: Oral      SpO2: 97% 98%  100%   Weight: 105.3 kg (232 lb 4.1 oz)          Abnormal Lab Results:  Labs Reviewed   CBC W/ AUTO DIFFERENTIAL - Abnormal; Notable for the following components:       Result Value    RBC 3.78 (*)     Hemoglobin 10.5 (*)     Hematocrit 33.2 (*)     MCHC 31.6 (*)     MPV 8.5 (*)     Immature Granulocytes 1.1 (*)     Immature Grans (Abs) 0.08 (*)     All other components within normal limits   COMPREHENSIVE METABOLIC PANEL - Abnormal; Notable for the following components:    CO2 18 (*)     Calcium 8.0 (*)     Albumin 3.3 (*)     AST 9 (*)     eGFR 56 (*)     All other components within normal limits   URINALYSIS, REFLEX TO URINE CULTURE - Abnormal; Notable for the following components:    Appearance, UA Hazy (*)     Occult Blood UA 1+ (*)     Nitrite, UA Positive (*)     Leukocytes, UA 3+ (*)     All other components within normal limits    Narrative:     Specimen Source->Urine    URINALYSIS MICROSCOPIC - Abnormal; Notable for the following components:    WBC, UA 69 (*)     WBC Clumps, UA Occasional (*)     Bacteria Many (*)     All other components within normal limits    Narrative:     Specimen Source->Urine   CBC W/ AUTO DIFFERENTIAL - Abnormal; Notable for the following components:    RBC 3.57 (*)     Hemoglobin 10.0 (*)     Hematocrit 31.6 (*)     MCHC 31.6 (*)     MPV 8.6 (*)     Immature Granulocytes 1.1 (*)     Immature Grans (Abs) 0.07 (*)     Lymph % 17.3 (*)     All other components within normal limits   CULTURE, URINE        All Lab Results:  Results for orders placed or performed during the hospital encounter of 02/01/23   CBC auto differential   Result Value Ref Range    WBC 7.54 3.90 - 12.70 K/uL    RBC 3.78 (L) 4.00 - 5.40 M/uL    Hemoglobin 10.5 (L) 12.0 - 16.0 g/dL    Hematocrit 33.2 (L) 37.0 - 48.5 %    MCV 88 82 - 98 fL    MCH 27.8 27.0 - 31.0 pg    MCHC 31.6 (L) 32.0 - 36.0 g/dL    RDW 14.2 11.5 - 14.5 %    Platelets 245 150 - 450 K/uL    MPV 8.5 (L) 9.2 - 12.9 fL    Immature Granulocytes 1.1 (H) 0.0 - 0.5 %    Gran # (ANC) 5.1 1.8 - 7.7 K/uL    Immature Grans (Abs) 0.08 (H) 0.00 - 0.04 K/uL    Lymph # 1.5 1.0 - 4.8 K/uL    Mono # 0.5 0.3 - 1.0 K/uL    Eos # 0.3 0.0 - 0.5 K/uL    Baso # 0.03 0.00 - 0.20 K/uL    nRBC 0 0 /100 WBC    Gran % 67.9 38.0 - 73.0 %    Lymph % 19.9 18.0 - 48.0 %    Mono % 6.9 4.0 - 15.0 %    Eosinophil % 3.8 0.0 - 8.0 %    Basophil % 0.4 0.0 - 1.9 %    Differential Method Automated    Comprehensive metabolic panel   Result Value Ref Range    Sodium 141 136 - 145 mmol/L    Potassium 3.5 3.5 - 5.1 mmol/L    Chloride 107 95 - 110 mmol/L    CO2 18 (L) 23 - 29 mmol/L    Glucose 92 70 - 110 mg/dL    BUN 11 8 - 23 mg/dL    Creatinine 1.1 0.5 - 1.4 mg/dL    Calcium 8.0 (L) 8.7 - 10.5 mg/dL    Total Protein 6.8 6.0 - 8.4 g/dL    Albumin 3.3 (L) 3.5 - 5.2 g/dL    Total Bilirubin 0.3 0.1 - 1.0 mg/dL    Alkaline Phosphatase 105 55 - 135 U/L    AST 9 (L) 10  - 40 U/L    ALT 11 10 - 44 U/L    Anion Gap 16 8 - 16 mmol/L    eGFR 56 (A) >60 mL/min/1.73 m^2   Urinalysis, Reflex to Urine Culture Urine, Clean Catch    Specimen: Urine   Result Value Ref Range    Specimen UA Urine, Clean Catch     Color, UA Yellow Yellow, Straw, Debra    Appearance, UA Hazy (A) Clear    pH, UA 7.0 5.0 - 8.0    Specific Gravity, UA 1.010 1.005 - 1.030    Protein, UA Negative Negative    Glucose, UA Negative Negative    Ketones, UA Negative Negative    Bilirubin (UA) Negative Negative    Occult Blood UA 1+ (A) Negative    Nitrite, UA Positive (A) Negative    Urobilinogen, UA Negative <2.0 EU/dL    Leukocytes, UA 3+ (A) Negative   Urinalysis Microscopic   Result Value Ref Range    RBC, UA 4 0 - 4 /hpf    WBC, UA 69 (H) 0 - 5 /hpf    WBC Clumps, UA Occasional (A) None-Rare    Bacteria Many (A) None-Occ /hpf    Squam Epithel, UA 4 /hpf    Microscopic Comment SEE COMMENT    CBC auto differential   Result Value Ref Range    WBC 6.42 3.90 - 12.70 K/uL    RBC 3.57 (L) 4.00 - 5.40 M/uL    Hemoglobin 10.0 (L) 12.0 - 16.0 g/dL    Hematocrit 31.6 (L) 37.0 - 48.5 %    MCV 89 82 - 98 fL    MCH 28.0 27.0 - 31.0 pg    MCHC 31.6 (L) 32.0 - 36.0 g/dL    RDW 14.1 11.5 - 14.5 %    Platelets 221 150 - 450 K/uL    MPV 8.6 (L) 9.2 - 12.9 fL    Immature Granulocytes 1.1 (H) 0.0 - 0.5 %    Gran # (ANC) 4.6 1.8 - 7.7 K/uL    Immature Grans (Abs) 0.07 (H) 0.00 - 0.04 K/uL    Lymph # 1.1 1.0 - 4.8 K/uL    Mono # 0.4 0.3 - 1.0 K/uL    Eos # 0.2 0.0 - 0.5 K/uL    Baso # 0.01 0.00 - 0.20 K/uL    nRBC 0 0 /100 WBC    Gran % 71.8 38.0 - 73.0 %    Lymph % 17.3 (L) 18.0 - 48.0 %    Mono % 5.9 4.0 - 15.0 %    Eosinophil % 3.7 0.0 - 8.0 %    Basophil % 0.2 0.0 - 1.9 %    Differential Method Automated          Imaging Results:  Imaging Results              CT Abdomen Pelvis  Without Contrast (Final result)  Result time 02/01/23 22:45:50      Final result by Kylie Hernandez MD (02/01/23 22:45:50)                   Impression:      No  acute process.    All CT scans   are performed using dose optimization techniques including the following: automated exposure control; adjustment of the mA and/or kV; use of iterative reconstruction technique.  Dose modulation was employed for ALARA by means of: Automated exposure control; adjustment of the mA and/or kV according to patient size (this includes techniques or standardized protocols for targeted exams where dose is matched to indication/reason for exam; i.e. extremities or head); and/or use of iterative reconstructive technique.      Electronically signed by: David Espinal  Date:    02/01/2023  Time:    22:45               Narrative:    EXAMINATION:  CT ABDOMEN PELVIS WITHOUT CONTRAST    CLINICAL HISTORY:  Abdominal abscess/infection suspected;    TECHNIQUE:  Low dose axial images, sagittal and coronal reformations were obtained from the lung bases to the pubic symphysis, Oral contrast was not administered.    COMPARISON:  None    FINDINGS:  Heart: Normal in size as far as seen.  No pericardial effusion as far seen.    Lung Bases: Well aerated, without consolidation or pleural fluid.    Liver: Hepatomegaly.  With no focal hepatic lesions.    Gallbladder: Cholecystectomy.    Bile Ducts: No evidence of dilated ducts.    Pancreas: Fatty atrophy.    Spleen: Unremarkable.    Adrenals: Unremarkable.    Kidneys/ Ureters: Unremarkable.    Bladder: No evidence of wall thickening.    Reproductive organs: Unremarkable.    GI Tract/Mesentery: No evidence of bowel obstruction or inflammation.  Postsurgical changes of possible prior appendectomy.  The appendix is not visualized and presumably surgically removed.  Postsurgical changes along the left hemiabdomen..  Status post gastric bypass.  Colonic diverticulosis    Peritoneal Space: No ascites. No free air.    Retroperitoneum: No significant adenopathy.    Abdominal wall: Fat containing umbilical hernia.    Vasculature: No aneurysm.    Bones: No acute fracture.                                             The Emergency Provider reviewed the vital signs and test results, which are outlined above.     ED Discussion     12:35 AM: Discussed pt's case with Dr. Alvarez (Gastroenterology) who recommends discharging and treating with abx and following up with PCP for blood tests within 48 hours. Recommends pt returns if bleeding persists.    12:38 AM: Reassessed pt at this time.  Discussed with pt all pertinent ED information and results. Discussed pt dx and plan of tx. Gave pt all f/u and return to the ED instructions. All questions and concerns were addressed at this time. Pt expresses understanding of information and instructions, and is comfortable with plan to discharge. Pt is stable for discharge.    I discussed with patient and/or family/caretaker that evaluation in the ED does not suggest any emergent or life threatening medical conditions requiring immediate intervention beyond what was provided in the ED, and I believe patient is safe for discharge.  Regardless, an unremarkable evaluation in the ED does not preclude the development or presence of a serious of life threatening condition. As such, patient was instructed to return immediately for any worsening or change in current symptoms.             Medical Decision Making:   History:   Old Medical Records: I decided to obtain old medical records.  Old Records Summarized: records from clinic visits.  Clinical Tests:   Lab Tests: Ordered and Reviewed  Radiological Study: Ordered and Reviewed  Other:   I have discussed this case with another health care provider.       <> Summary of the Discussion: I discussed the case with Dr. Alvarez with GI.  We discussed her condition.  Believes that her pain is likely due to her UTI and her hemorrhoid is likely the source of her bleeding.  He is aware of her minimal drop in her hemoglobin.  Patient has received hydration in the interim.  Patient is stable and nontoxic.  Patient presented  with concerns for possible GI bleed.  She is 1 week out from infection with COVID is complaining lower abdominal pain and left lower quadrant abdominal pain with bright red blood per rectum x1 today.  I ordered a evaluated each lab.  Patient has a urinary tract infection that is nitrite positive.  Initial hemoglobin was 10.5 with a mild dropped to 10.0 with hydration.  White count was normal.  I discussed the case with GI with recommendations included under discussion.  White count is normal..  I discussed with the patient all findings as well as the plan of care.  Patient seems very reliable verbalized her agreement understanding with the plan of care.  She is safe for discharge in my opinion.  Indication for CT scan was concerns for intra-abdominal infection such as diverticulitis source of her GI bleeding.         ED Medication(s):  Medications   ondansetron disintegrating tablet 4 mg (4 mg Oral Not Given 2/1/23 1730)   ondansetron injection 4 mg (4 mg Intravenous Not Given 2/2/23 0000)   sodium chloride 0.9% bolus 1,000 mL 1,000 mL (0 mLs Intravenous Stopped 2/1/23 2320)   metoclopramide HCl injection 10 mg (10 mg Intravenous Given 2/1/23 2220)   cefTRIAXone (ROCEPHIN) 1 g in dextrose 5 % in water (D5W) 5 % 50 mL IVPB (MB+) (0 g Intravenous Stopped 2/1/23 2342)   morphine injection 4 mg (4 mg Intravenous Given 2/1/23 2354)       New Prescriptions    CEPHALEXIN (KEFLEX) 500 MG CAPSULE    Take 1 capsule (500 mg total) by mouth 4 (four) times daily. for 7 days    TRAMADOL (ULTRAM) 50 MG TABLET    Take 1 tablet (50 mg total) by mouth every 6 (six) hours as needed for Pain.        Follow-up Information       Angel Khan MD In 2 days.    Specialty: Internal Medicine  Contact information:  [address] [facility] [address] [phone]               Paul Alvarez MD.    Specialties: Transplant, Hepatology, Gastroenterology  Contact information:  [address] [facility]  [id number]  [phone]                                 Scribe Attestation:   Scribe #1: I performed the above scribed service and the documentation accurately describes the services I performed. I attest to the accuracy of the note.     Attending:   Physician Attestation Statement for Scribe #1: I, Fortunato Rodriguez Jr., MD, personally performed the services described in this documentation, as scribed by Chris Russell, in my presence, and it is both accurate and complete.           Clinical Impression       ICD-10-CM ICD-9-CM   1. Acute lower UTI  N39.0 599.0   2. Hematochezia  K92.1 578.1       Disposition:   Disposition: Discharged  Condition: Stable       Fortunato Rodriguez Jr., MD  02/02/23 0044

## 2023-02-02 NOTE — DISCHARGE INSTRUCTIONS
Keflex as prescribed for her bladder infection.  Use Ultram for pain.  Please have your blood counts rechecked in 48 hours by your primary care doctor.  If her bleeding result returns or worsens in any way return immediately.

## 2023-02-02 NOTE — TELEPHONE ENCOUNTER
----- Message from Kim Betancourt sent at 2/2/2023  9:07 AM CST -----  Pt stated she went to ER last evening because of abdominal pain and blood in urine. She would like the nurse to call her back regarding if she need to schedule shane appt with PCP. Call back number is [phone]. Thx .EL

## 2023-02-03 ENCOUNTER — TELEPHONE (OUTPATIENT)
Dept: INTERNAL MEDICINE | Facility: CLINIC | Age: 63
End: 2023-02-03
Payer: MEDICARE

## 2023-02-03 NOTE — TELEPHONE ENCOUNTER
----- Message from Chinyere Horvath sent at 2/3/2023  3:23 PM CST -----  Pt stated she went to the er Wednesday and has a uti. She stated it has only gotten worse the cramping and spasms are unbearable. She is requesting a call back with advice on what to do now. Call back number [phone]  [facility] jm

## 2023-02-03 NOTE — TELEPHONE ENCOUNTER
Please notify I have sent in uribel to help with the spasms.  Sent to [facility].  She can start taking that this afternoon.  She does have a culture with 2 different bacteria growing out, sensitivities are pending, tell her I will check on that tomorrow and send her a message, may need to change her antibiotic. Make sure she is checking her messages on the portal.

## 2023-02-03 NOTE — TELEPHONE ENCOUNTER
Patient stated she went to ER Wednesday and has a UTI.  She is having a lot of spasms and cramping that is unbearable.  As well as having diarrhea and bloody stool.  Please advise

## 2023-02-04 ENCOUNTER — NURSE TRIAGE (OUTPATIENT)
Dept: ADMINISTRATIVE | Facility: CLINIC | Age: 63
End: 2023-02-04
Payer: MEDICARE

## 2023-02-04 ENCOUNTER — OFFICE VISIT (OUTPATIENT)
Dept: URGENT CARE | Facility: CLINIC | Age: 63
End: 2023-02-04
Payer: MEDICARE

## 2023-02-04 VITALS
HEART RATE: 91 BPM | DIASTOLIC BLOOD PRESSURE: 72 MMHG | TEMPERATURE: 99 F | OXYGEN SATURATION: 96 % | HEIGHT: 62 IN | BODY MASS INDEX: 42.69 KG/M2 | SYSTOLIC BLOOD PRESSURE: 126 MMHG | RESPIRATION RATE: 16 BRPM | WEIGHT: 232 LBS

## 2023-02-04 DIAGNOSIS — R10.2 SUPRAPUBIC ABDOMINAL PAIN: ICD-10-CM

## 2023-02-04 DIAGNOSIS — N12 PYELONEPHRITIS: Primary | ICD-10-CM

## 2023-02-04 DIAGNOSIS — R11.0 NAUSEA: ICD-10-CM

## 2023-02-04 DIAGNOSIS — R30.0 DYSURIA: ICD-10-CM

## 2023-02-04 LAB
BACTERIA UR CULT: ABNORMAL
BACTERIA UR CULT: ABNORMAL

## 2023-02-04 PROCEDURE — 1159F MED LIST DOCD IN RCRD: CPT | Mod: CPTII,S$GLB,,

## 2023-02-04 PROCEDURE — 1159F PR MEDICATION LIST DOCUMENTED IN MEDICAL RECORD: ICD-10-PCS | Mod: CPTII,S$GLB,,

## 2023-02-04 PROCEDURE — 99204 OFFICE O/P NEW MOD 45 MIN: CPT | Mod: 25,S$GLB,,

## 2023-02-04 PROCEDURE — 1160F PR REVIEW ALL MEDS BY PRESCRIBER/CLIN PHARMACIST DOCUMENTED: ICD-10-PCS | Mod: CPTII,S$GLB,,

## 2023-02-04 PROCEDURE — 3008F BODY MASS INDEX DOCD: CPT | Mod: CPTII,S$GLB,,

## 2023-02-04 PROCEDURE — 3044F HG A1C LEVEL LT 7.0%: CPT | Mod: CPTII,S$GLB,,

## 2023-02-04 PROCEDURE — 99204 PR OFFICE/OUTPT VISIT, NEW, LEVL IV, 45-59 MIN: ICD-10-PCS | Mod: 25,S$GLB,,

## 2023-02-04 PROCEDURE — 1160F RVW MEDS BY RX/DR IN RCRD: CPT | Mod: CPTII,S$GLB,,

## 2023-02-04 PROCEDURE — 3078F PR MOST RECENT DIASTOLIC BLOOD PRESSURE < 80 MM HG: ICD-10-PCS | Mod: CPTII,S$GLB,,

## 2023-02-04 PROCEDURE — 96372 THER/PROPH/DIAG INJ SC/IM: CPT | Mod: S$GLB,,,

## 2023-02-04 PROCEDURE — 3074F PR MOST RECENT SYSTOLIC BLOOD PRESSURE < 130 MM HG: ICD-10-PCS | Mod: CPTII,S$GLB,,

## 2023-02-04 PROCEDURE — 3074F SYST BP LT 130 MM HG: CPT | Mod: CPTII,S$GLB,,

## 2023-02-04 PROCEDURE — 96372 PR INJECTION,THERAP/PROPH/DIAG2ST, IM OR SUBCUT: ICD-10-PCS | Mod: S$GLB,,,

## 2023-02-04 PROCEDURE — 3078F DIAST BP <80 MM HG: CPT | Mod: CPTII,S$GLB,,

## 2023-02-04 PROCEDURE — 3044F PR MOST RECENT HEMOGLOBIN A1C LEVEL <7.0%: ICD-10-PCS | Mod: CPTII,S$GLB,,

## 2023-02-04 PROCEDURE — 3008F PR BODY MASS INDEX (BMI) DOCUMENTED: ICD-10-PCS | Mod: CPTII,S$GLB,,

## 2023-02-04 RX ORDER — CIPROFLOXACIN 500 MG/1
500 TABLET ORAL EVERY 12 HOURS
Qty: 14 TABLET | Refills: 0 | Status: SHIPPED | OUTPATIENT
Start: 2023-02-04 | End: 2023-02-11

## 2023-02-04 RX ORDER — ONDANSETRON 2 MG/ML
8 INJECTION INTRAMUSCULAR; INTRAVENOUS
Status: COMPLETED | OUTPATIENT
Start: 2023-02-04 | End: 2023-02-04

## 2023-02-04 RX ORDER — KETOROLAC TROMETHAMINE 30 MG/ML
30 INJECTION, SOLUTION INTRAMUSCULAR; INTRAVENOUS
Status: COMPLETED | OUTPATIENT
Start: 2023-02-04 | End: 2023-02-04

## 2023-02-04 RX ADMIN — ONDANSETRON 8 MG: 2 INJECTION INTRAMUSCULAR; INTRAVENOUS at 01:02

## 2023-02-04 RX ADMIN — KETOROLAC TROMETHAMINE 30 MG: 30 INJECTION, SOLUTION INTRAMUSCULAR; INTRAVENOUS at 01:02

## 2023-02-04 NOTE — PROGRESS NOTES
"Subjective:       Patient ID: Divya Bui is a 62 y.o. female.    Vitals:  height is 5' 2" (1.575 m) and weight is 105.2 kg (232 lb). Her tympanic temperature is 98.5 °F (36.9 °C). Her blood pressure is 126/72 and her pulse is 91. Her respiration is 16 and oxygen saturation is 96%.     Chief Complaint: Abdominal Pain    Divya Bui is a 62 y.o. female who presents for abdominal pain which onset 3 days ago. Patient was evaluated in ER on 2/1 where she was dx with UTI and given Keflex. Also noted to have external hemorrhoids. There were concerns for abdominal abscess/diverticulitis but CT negative at the time. She is still having abdominal pain. Pain radiates to bilateral flank area. She spoke with PCP yesterday who sent in Uribel for bladder spasms. She reports sxs have not resolved. No exacerbating or mitigating factors. Associated sxs include nausea. Patient denies any fever, chills, vomiting, constipation, or hematemesis. She is also concerned about urine culture results.     Abdominal Pain  This is a new problem. The current episode started in the past 7 days (3). The problem has been rapidly worsening. The pain is located in the suprapubic region. The pain is at a severity of 8/10. The quality of the pain is aching. Pain radiation: back. Associated symptoms include diarrhea, hematochezia and nausea. Pertinent negatives include no anorexia, arthralgias, belching, constipation, dysuria, fever, flatus, frequency, headaches, hematuria, melena, myalgias, vomiting or weight loss.     Constitution: Negative for chills and fever.   Cardiovascular:  Negative for chest pain.   Respiratory:  Negative for shortness of breath.    Gastrointestinal:  Positive for abdominal pain, nausea, diarrhea and bright red blood in stool. Negative for vomiting and constipation.   Genitourinary:  Negative for dysuria, frequency, urgency, hematuria and vaginal discharge.   Musculoskeletal:  Negative for joint pain and muscle ache. "   Neurological:  Negative for dizziness, headaches, numbness and tingling.     Objective:      Physical Exam   Constitutional: She is oriented to person, place, and time. She appears well-developed.   HENT:   Head: Normocephalic and atraumatic.   Ears:   Right Ear: External ear normal.   Left Ear: External ear normal.   Nose: Nose normal.   Mouth/Throat: Mucous membranes are normal.   Eyes: Conjunctivae and lids are normal.   Neck: Trachea normal. Neck supple.   Cardiovascular: Normal rate, regular rhythm and normal heart sounds.   Pulmonary/Chest: Effort normal and breath sounds normal. No respiratory distress.   Abdominal: Normal appearance and bowel sounds are normal. She exhibits no distension and no mass. Soft. There is generalized abdominal tenderness. There is left CVA tenderness and right CVA tenderness. There is no rebound and no guarding.      Comments: Generalized abdominal tenderness to palpation. Suprapubic tenderness to palpation. No distension. No rebound or guarding. Normal bowel sounds. R and L CVA tenderness.   Musculoskeletal: Normal range of motion.         General: Normal range of motion.   Neurological: She is alert and oriented to person, place, and time. She has normal strength.   Skin: Skin is warm, dry, intact, not diaphoretic and not pale.   Psychiatric: Her speech is normal and behavior is normal. Judgment and thought content normal.   Nursing note and vitals reviewed.      Assessment:       1. Pyelonephritis    2. Suprapubic abdominal pain    3. Dysuria    4. Nausea          Plan:         Pyelonephritis  -     ciprofloxacin HCl (CIPRO) 500 MG tablet; Take 1 tablet (500 mg total) by mouth every 12 (twelve) hours. for 7 days  Dispense: 14 tablet; Refill: 0    Suprapubic abdominal pain  -     ketorolac injection 30 mg    Dysuria    Nausea  -     ondansetron injection 8 mg    Chart reviewed.  Afebrile. VSS. Patient is in NAD.  UA reviewed from 2/1. Urine culture pending.  Discussed negative  results with patient.  Zofran IM given in clinic.   Toradol IM given in clinic. CBC and CMP reviewed. Kidney function reviewed.  Generalized abdominal tenderness on exam. No acute abdomen.  CVA tenderness. Will change abx from Keflex to Ciprofloxacin to cover for pyelonephritis.  Continue Uribel as needed for spasms.  Meds: Ciprofloxacin sent to preferred pharmacy.  Increase fluid intake and plenty of rest.  Tylenol/Ibuprofen (as permitted) as needed for any pain or discomfort.  Recommend close follow up with PCP for further management.  If symptoms do not resolve, return to clinic for further evaluation.  ER precautions given such as SOB, CP, or fever not resolved with fever-reducing medications.

## 2023-02-04 NOTE — TELEPHONE ENCOUNTER
Patient recently went to the ER and dx with a UTI. Started Keflex 3 days ago. Patient has a persistent fever. Culture shows PRESUMPTIVE PROTEUS SPECIES  GRAM NEGATIVE JUSTINE.     Patient also c/o worsening of bladder pain. Advised per protocol to be seen within the next 24 hours. Patient VU.  Advised the patient to call back with any further questions or if symptoms worsen.     Reason for Disposition   [1] Taking antibiotic > 24 hours for UTI (urinary tract or bladder infection) AND [2] fever persists    Additional Information   Negative: Shock suspected (e.g., cold/pale/clammy skin, too weak to stand, low BP, rapid pulse)   Negative: Sounds like a life-threatening emergency to the triager   Negative: [1] Unable to urinate (or only a few drops) > 4 hours AND [2] bladder feels very full (e.g., palpable bladder or strong urge to urinate)   Negative: Passing pure blood or large blood clots (i.e., size > a dime) (Exceptions: flecks, small strands, or pinkish-red color)   Negative: Fever > 103 F (39.4 C)   Negative: Patient sounds very sick or weak to the triager   Negative: [1] SEVERE pain (e.g., excruciating) AND [2] no improvement 2 hours after pain medications   Negative: [1] Fever > 100.0 F (37.8 C) AND [2] new-onset since starting antibiotics   Negative: [1] Side (flank) or lower back pain AND [2] new-onset since starting antibiotics   Negative: [1] Taking antibiotic > 24 hours for UTI AND [2] flank or lower back pain worsening   Negative: [1] Vomiting 2 or more times AND [2] interferes with taking oral antibiotic    Protocols used: Urinary Tract Infection on Antibiotic Follow-up Call - Female-MESSI-

## 2023-02-04 NOTE — PATIENT INSTRUCTIONS
PLEASE READ YOUR DISCHARGE INSTRUCTIONS ENTIRELY AS IT CONTAINS IMPORTANT INFORMATION.    Discontinue Keflex.    Take the antibiotics to completion.     Drink plenty of fluids, wipe front to back, take showers not baths, no scented soaps, wear breathable cotton underwear, urinate after sexual intercourse.     Please go to the ER for worsening symptoms including fever, worsening flank pain, vomiting, etc.     Please return or see your primary care doctor if you develop new or worsening symptoms.     Please arrange follow up with your primary medical clinic as soon as possible. You must understand that you've received an Urgent Care treatment only and that you may be released before all of your medical problems are known or treated. You, the patient, will arrange for follow up as instructed. If your symptoms worsen or fail to improve you should go to the Emergency Room.  WE CANNOT RULE OUT ALL POSSIBLE CAUSES OF YOUR SYMPTOMS IN THE URGENT CARE SETTING PLEASE GO TO THE ER IF YOU FEELS YOUR CONDITION IS WORSENING OR YOU WOULD LIKE EMERGENT EVALUATION.

## 2023-02-04 NOTE — TELEPHONE ENCOUNTER
"Divya calling states she had urine cultures done on 2/1/23. Just got results on pt portal and would like to know what results mean. States she went to  today for lower abdominal pain, nausea & diarrhea. Temp around 99. and antibiotic changed to Cipro. Wants to know if she is on the correct antibiotic for the culture growth. Advised per triage that on call provider will be contacted and informed caller of brief hold. V/u.     Spoke to Dr. Prateek Rhoades, on call provider regarding pt call. States if antibiotic listed as "sensitive" under the cultured bacteria, pt is on correct antibiotic. Triager sees Cipro listed as sensitive for each bacteria growth. Per Dr. Rhoades, pt is on correct antibiotic.    Updated Divya per Dr. Rhoades's verbal statement and instructed pt to call back if further questions and/or new/worsening symptoms. V/u.   Reason for Disposition   Caller requesting lab results  (Exception: Routine or non-urgent lab result.)    Protocols used: PCP Call - No Triage-A-    "

## 2023-02-07 ENCOUNTER — OFFICE VISIT (OUTPATIENT)
Dept: INTERNAL MEDICINE | Facility: CLINIC | Age: 63
End: 2023-02-07
Payer: MEDICARE

## 2023-02-07 ENCOUNTER — LAB VISIT (OUTPATIENT)
Dept: LAB | Facility: HOSPITAL | Age: 63
End: 2023-02-07
Attending: PHYSICIAN ASSISTANT
Payer: MEDICARE

## 2023-02-07 ENCOUNTER — OFFICE VISIT (OUTPATIENT)
Dept: GASTROENTEROLOGY | Facility: CLINIC | Age: 63
End: 2023-02-07
Payer: MEDICARE

## 2023-02-07 VITALS
HEIGHT: 63 IN | OXYGEN SATURATION: 96 % | DIASTOLIC BLOOD PRESSURE: 88 MMHG | HEART RATE: 99 BPM | WEIGHT: 235.88 LBS | TEMPERATURE: 99 F | BODY MASS INDEX: 41.79 KG/M2 | SYSTOLIC BLOOD PRESSURE: 128 MMHG

## 2023-02-07 VITALS
BODY MASS INDEX: 41.02 KG/M2 | DIASTOLIC BLOOD PRESSURE: 86 MMHG | HEART RATE: 97 BPM | WEIGHT: 231.5 LBS | HEIGHT: 63 IN | OXYGEN SATURATION: 98 % | SYSTOLIC BLOOD PRESSURE: 120 MMHG

## 2023-02-07 DIAGNOSIS — R19.7 DIARRHEA, UNSPECIFIED TYPE: ICD-10-CM

## 2023-02-07 DIAGNOSIS — K62.5 BRBPR (BRIGHT RED BLOOD PER RECTUM): ICD-10-CM

## 2023-02-07 DIAGNOSIS — N39.0 URINARY TRACT INFECTION WITHOUT HEMATURIA, SITE UNSPECIFIED: Primary | ICD-10-CM

## 2023-02-07 DIAGNOSIS — R10.11 RIGHT UPPER QUADRANT ABDOMINAL PAIN: ICD-10-CM

## 2023-02-07 LAB
BASOPHILS # BLD AUTO: 0.03 K/UL (ref 0–0.2)
BASOPHILS NFR BLD: 0.5 % (ref 0–1.9)
DIFFERENTIAL METHOD: ABNORMAL
EOSINOPHIL # BLD AUTO: 0.3 K/UL (ref 0–0.5)
EOSINOPHIL NFR BLD: 4.6 % (ref 0–8)
ERYTHROCYTE [DISTWIDTH] IN BLOOD BY AUTOMATED COUNT: 14.2 % (ref 11.5–14.5)
HCT VFR BLD AUTO: 33.8 % (ref 37–48.5)
HGB BLD-MCNC: 10.4 G/DL (ref 12–16)
IMM GRANULOCYTES # BLD AUTO: 0.04 K/UL (ref 0–0.04)
IMM GRANULOCYTES NFR BLD AUTO: 0.7 % (ref 0–0.5)
LYMPHOCYTES # BLD AUTO: 1.1 K/UL (ref 1–4.8)
LYMPHOCYTES NFR BLD: 19.3 % (ref 18–48)
MCH RBC QN AUTO: 28.1 PG (ref 27–31)
MCHC RBC AUTO-ENTMCNC: 30.8 G/DL (ref 32–36)
MCV RBC AUTO: 91 FL (ref 82–98)
MONOCYTES # BLD AUTO: 0.4 K/UL (ref 0.3–1)
MONOCYTES NFR BLD: 6.7 % (ref 4–15)
NEUTROPHILS # BLD AUTO: 3.9 K/UL (ref 1.8–7.7)
NEUTROPHILS NFR BLD: 68.2 % (ref 38–73)
NRBC BLD-RTO: 0 /100 WBC
PLATELET # BLD AUTO: 236 K/UL (ref 150–450)
PMV BLD AUTO: 9.1 FL (ref 9.2–12.9)
RBC # BLD AUTO: 3.7 M/UL (ref 4–5.4)
WBC # BLD AUTO: 5.66 K/UL (ref 3.9–12.7)

## 2023-02-07 PROCEDURE — 85025 COMPLETE CBC W/AUTO DIFF WBC: CPT | Performed by: PHYSICIAN ASSISTANT

## 2023-02-07 PROCEDURE — 3074F SYST BP LT 130 MM HG: CPT | Mod: CPTII,S$GLB,, | Performed by: FAMILY MEDICINE

## 2023-02-07 PROCEDURE — 3044F PR MOST RECENT HEMOGLOBIN A1C LEVEL <7.0%: ICD-10-PCS | Mod: CPTII,S$GLB,, | Performed by: FAMILY MEDICINE

## 2023-02-07 PROCEDURE — 99999 PR PBB SHADOW E&M-EST. PATIENT-LVL V: CPT | Mod: PBBFAC,,, | Performed by: PHYSICIAN ASSISTANT

## 2023-02-07 PROCEDURE — 1159F MED LIST DOCD IN RCRD: CPT | Mod: CPTII,S$GLB,, | Performed by: PHYSICIAN ASSISTANT

## 2023-02-07 PROCEDURE — 36415 COLL VENOUS BLD VENIPUNCTURE: CPT | Performed by: PHYSICIAN ASSISTANT

## 2023-02-07 PROCEDURE — 3079F DIAST BP 80-89 MM HG: CPT | Mod: CPTII,S$GLB,, | Performed by: FAMILY MEDICINE

## 2023-02-07 PROCEDURE — 3008F PR BODY MASS INDEX (BMI) DOCUMENTED: ICD-10-PCS | Mod: CPTII,S$GLB,, | Performed by: PHYSICIAN ASSISTANT

## 2023-02-07 PROCEDURE — 3008F PR BODY MASS INDEX (BMI) DOCUMENTED: ICD-10-PCS | Mod: CPTII,S$GLB,, | Performed by: FAMILY MEDICINE

## 2023-02-07 PROCEDURE — 99999 PR PBB SHADOW E&M-EST. PATIENT-LVL V: CPT | Mod: PBBFAC,,, | Performed by: FAMILY MEDICINE

## 2023-02-07 PROCEDURE — 3074F PR MOST RECENT SYSTOLIC BLOOD PRESSURE < 130 MM HG: ICD-10-PCS | Mod: CPTII,S$GLB,, | Performed by: PHYSICIAN ASSISTANT

## 2023-02-07 PROCEDURE — 3074F SYST BP LT 130 MM HG: CPT | Mod: CPTII,S$GLB,, | Performed by: PHYSICIAN ASSISTANT

## 2023-02-07 PROCEDURE — 3044F HG A1C LEVEL LT 7.0%: CPT | Mod: CPTII,S$GLB,, | Performed by: FAMILY MEDICINE

## 2023-02-07 PROCEDURE — 3008F BODY MASS INDEX DOCD: CPT | Mod: CPTII,S$GLB,, | Performed by: FAMILY MEDICINE

## 2023-02-07 PROCEDURE — 99214 OFFICE O/P EST MOD 30 MIN: CPT | Mod: S$GLB,,, | Performed by: FAMILY MEDICINE

## 2023-02-07 PROCEDURE — 99203 PR OFFICE/OUTPT VISIT, NEW, LEVL III, 30-44 MIN: ICD-10-PCS | Mod: S$GLB,,, | Performed by: PHYSICIAN ASSISTANT

## 2023-02-07 PROCEDURE — 99203 OFFICE O/P NEW LOW 30 MIN: CPT | Mod: S$GLB,,, | Performed by: PHYSICIAN ASSISTANT

## 2023-02-07 PROCEDURE — 3079F PR MOST RECENT DIASTOLIC BLOOD PRESSURE 80-89 MM HG: ICD-10-PCS | Mod: CPTII,S$GLB,, | Performed by: FAMILY MEDICINE

## 2023-02-07 PROCEDURE — 3079F DIAST BP 80-89 MM HG: CPT | Mod: CPTII,S$GLB,, | Performed by: PHYSICIAN ASSISTANT

## 2023-02-07 PROCEDURE — 1159F PR MEDICATION LIST DOCUMENTED IN MEDICAL RECORD: ICD-10-PCS | Mod: CPTII,S$GLB,, | Performed by: PHYSICIAN ASSISTANT

## 2023-02-07 PROCEDURE — 3044F HG A1C LEVEL LT 7.0%: CPT | Mod: CPTII,S$GLB,, | Performed by: PHYSICIAN ASSISTANT

## 2023-02-07 PROCEDURE — 99214 PR OFFICE/OUTPT VISIT, EST, LEVL IV, 30-39 MIN: ICD-10-PCS | Mod: S$GLB,,, | Performed by: FAMILY MEDICINE

## 2023-02-07 PROCEDURE — 99999 PR PBB SHADOW E&M-EST. PATIENT-LVL V: ICD-10-PCS | Mod: PBBFAC,,, | Performed by: FAMILY MEDICINE

## 2023-02-07 PROCEDURE — 3074F PR MOST RECENT SYSTOLIC BLOOD PRESSURE < 130 MM HG: ICD-10-PCS | Mod: CPTII,S$GLB,, | Performed by: FAMILY MEDICINE

## 2023-02-07 PROCEDURE — 99999 PR PBB SHADOW E&M-EST. PATIENT-LVL V: ICD-10-PCS | Mod: PBBFAC,,, | Performed by: PHYSICIAN ASSISTANT

## 2023-02-07 PROCEDURE — 3044F PR MOST RECENT HEMOGLOBIN A1C LEVEL <7.0%: ICD-10-PCS | Mod: CPTII,S$GLB,, | Performed by: PHYSICIAN ASSISTANT

## 2023-02-07 PROCEDURE — 3008F BODY MASS INDEX DOCD: CPT | Mod: CPTII,S$GLB,, | Performed by: PHYSICIAN ASSISTANT

## 2023-02-07 PROCEDURE — 3079F PR MOST RECENT DIASTOLIC BLOOD PRESSURE 80-89 MM HG: ICD-10-PCS | Mod: CPTII,S$GLB,, | Performed by: PHYSICIAN ASSISTANT

## 2023-02-07 RX ORDER — DICYCLOMINE HYDROCHLORIDE 20 MG/1
20 TABLET ORAL 3 TIMES DAILY PRN
Qty: 60 TABLET | Refills: 0 | Status: SHIPPED | OUTPATIENT
Start: 2023-02-07 | End: 2024-02-07

## 2023-02-07 RX ORDER — SUCRALFATE 1 G/1
1 TABLET ORAL 4 TIMES DAILY
Qty: 40 TABLET | Refills: 0 | Status: ON HOLD | OUTPATIENT
Start: 2023-02-07 | End: 2023-11-25 | Stop reason: HOSPADM

## 2023-02-07 RX ORDER — DICYCLOMINE HYDROCHLORIDE 20 MG/1
20 TABLET ORAL 3 TIMES DAILY PRN
Qty: 60 TABLET | Refills: 0 | Status: SHIPPED | OUTPATIENT
Start: 2023-02-07 | End: 2023-02-07 | Stop reason: SDUPTHER

## 2023-02-07 RX ORDER — DICYCLOMINE HYDROCHLORIDE 20 MG/1
TABLET ORAL
Qty: 60 TABLET | Refills: 0 | OUTPATIENT
Start: 2023-02-07

## 2023-02-07 RX ORDER — TRAMADOL HYDROCHLORIDE 50 MG/1
50 TABLET ORAL EVERY 6 HOURS PRN
Qty: 30 TABLET | Refills: 0 | Status: SHIPPED | OUTPATIENT
Start: 2023-02-07 | End: 2023-02-17

## 2023-02-07 NOTE — PROGRESS NOTES
Clinic Consult:  [facility] Consultation Note    Reason for Consult:  Diagnoses of Right upper quadrant abdominal pain, BRBPR (bright red blood per rectum), and Diarrhea, unspecified type were pertinent to this visit.    PCP: Angel Khan   [id number] [facility] [id number]    CC: Abdominal Pain and Diarrhea      HPI:  This is a 62 y.o. female here for evaluation of the above.  Began with abdominal pain near end of January - found to have COVID. Went to the ER with continued symptoms 2/1/23. CT of abdomen was negative. Followed up with PCP. Was given Lomotil for diarrhea - works well. If she doesn't take it, she can't make it to the bathroom. 10-12 episodes of diarrhea per day if she isn't taking Lomotil. + nocturnal symptoms. Sees intermittent rectal bleeding. BRBPR mostly with wiping but can see it dropping into the toilet bowel. Having irritation to the rectum from excessive bowel movements.   Was on antiviral for COVID. Then on cephalexin for UTI. Diarrhea started about the same time as UTI.   Nausea and vomiting. Nausea is constant. Not eating well. Eats soup a little at a time. She is tolerating liquids. Denies dizziness, weakness, lightheadedness.  Vomiting about 1-2x per day. Has phenergan which works well. Zofran didn't work.   Labs show anemia but stable with hgb at 10. Will recheck today. No previous WBC count.    Review of Systems   Constitutional:  Positive for fatigue. Negative for activity change, appetite change, chills, diaphoresis, fever and unexpected weight change.   HENT:  Negative for trouble swallowing.    Respiratory:  Negative for cough and shortness of breath.    Cardiovascular:  Negative for chest pain.   Gastrointestinal:  Positive for abdominal pain (lower), anal bleeding, blood in stool, diarrhea, nausea and vomiting. Negative for abdominal distention and constipation.   Genitourinary:  Negative for dysuria.   Skin:  Negative for color change and pallor.    Neurological:  Negative for dizziness, weakness and light-headedness.   Psychiatric/Behavioral:  Negative for dysphoric mood. The patient is nervous/anxious.       Medical History:   Past Medical History:   Diagnosis Date    Anemia     Anxiety     Basal cell carcinoma (BCC) of face 2/26/2020    Blood transfusion     Bowel incontinence     Depression     Esophageal stricture     GERD (gastroesophageal reflux disease)     Hypertension     Latent tuberculosis by skin test 2001    took INH    Liver nodule 1/6/2014    Morbid obesity with BMI of 45.0-49.9, adult     OA (osteoarthritis) of knee 12/12/2014    Pericardial effusion 9/4/2014       Surgical History:   Past Surgical History:   Procedure Laterality Date    CHOLECYSTECTOMY      GASTRIC BYPASS      HERNIA REPAIR      right sholder surgery      SMALL INTESTINE SURGERY         Family History:    Family History   Problem Relation Age of Onset    Lung cancer Mother         smoker    Liver cancer Father     Diabetes Sister     Crohn's disease Sister     Liver cancer Sister     Diabetes Sister     Crohn's disease Brother     Crohn's disease Other     Breast cancer Neg Hx     Ovarian cancer Neg Hx     Colon cancer Neg Hx        Social History:   Social History     Socioeconomic History    Marital status:    Tobacco Use    Smoking status: Never    Smokeless tobacco: Never   Substance and Sexual Activity    Alcohol use: Not Currently     Alcohol/week: 0.0 standard drinks    Drug use: No    Sexual activity: Yes     Partners: Male       Allergies:   Review of patient's allergies indicates:   Allergen Reactions    Metronidazole hcl Other (See Comments)     Mouth ulcers developed with Flagyl  Oral sores.  Mouth ulcers developed with Flagyl       Home Medications:   Current Outpatient Medications on File Prior to Visit   Medication Sig Dispense Refill    alprazolam (XANAX XR) 2 MG Tb24 Take 1 mg by mouth once daily. 2 mg tablet(2tabs) oral in the am and 1 mg(1tab)in the  afternoon      amLODIPine (NORVASC) 5 MG tablet Take 1 tablet (5 mg total) by mouth once daily. 90 tablet 3    ARIPiprazole (ABILIFY) 10 MG Tab Take 10 mg by mouth.      aspirin 81 mg Tab Take 1 tablet by mouth Daily.      atorvastatin (LIPITOR) 40 MG tablet Take 1 tablet (40 mg total) by mouth once daily. 90 tablet 3    cephALEXin (KEFLEX) 500 MG capsule Take 1 capsule (500 mg total) by mouth 4 (four) times daily. for 7 days 28 capsule 0    ciprofloxacin HCl (CIPRO) 500 MG tablet Take 1 tablet (500 mg total) by mouth every 12 (twelve) hours. for 7 days 14 tablet 0    dicyclomine (BENTYL) 20 mg tablet Take 1 tablet (20 mg total) by mouth 3 (three) times daily as needed (abdominal pain). 60 tablet 0    doxepin (SINEQUAN) 50 MG capsule Take 50 mg by mouth nightly.      hydrocortisone 2.5 % cream Apply topically.      ketoconazole (NIZORAL) 2 % cream Apply topically once daily. 30 g 1    adriel-m.blue-s.phos-phsal-hyo (URIBEL) 118-10-40.8-36 mg Cap Take 1 capsule by mouth 3 (three) times daily as needed (bladder pain). 30 capsule 1    mupirocin (BACTROBAN) 2 % ointment Apply topically 2 (two) times daily.      olmesartan-hydrochlorothiazide (BENICAR HCT) 40-12.5 mg Tab Take 1 tablet by mouth once daily.      promethazine (PHENERGAN) 25 MG tablet Take 1 tablet (25 mg total) by mouth every 6 (six) hours as needed for Nausea. 30 tablet 0    traMADoL (ULTRAM) 50 mg tablet Take 1 tablet (50 mg total) by mouth every 6 (six) hours as needed for Pain. 30 tablet 0    zolpidem (AMBIEN) 10 mg Tab Take 10 mg by mouth nightly as needed.      fluoxetine (PROZAC) 40 MG capsule Take 1 capsule (40 mg total) by mouth once daily. 30 capsule 11    sucralfate (CARAFATE) 1 gram tablet Take 1 tablet (1 g total) by mouth 4 (four) times daily. (Patient not taking: Reported on 2/7/2023) 40 tablet 0    [DISCONTINUED] cyanocobalamin (VITAMIN B-12) 1,000 mcg/mL injection Inject 1 mL (1,000 mcg total) into the muscle every 30 days. (Patient not  "taking: Reported on 2023) 3 mL 3    [DISCONTINUED] dicyclomine (BENTYL) 20 mg tablet Take 20 mg by mouth 3 (three) times daily as needed.      [DISCONTINUED] furosemide (LASIX) 40 MG tablet TAKE 1 TABLET (40 MG TOTAL) BY MOUTH ONCE DAILY. 30 tablet 10    [DISCONTINUED] traMADoL (ULTRAM) 50 mg tablet Take 1 tablet (50 mg total) by mouth every 6 (six) hours as needed for Pain. 12 tablet 0     No current facility-administered medications on file prior to visit.       Physical Exam:  /86   Pulse 97   Ht 5' 3" (1.6 m)   Wt 105 kg (231 lb 7.7 oz)   SpO2 98%   BMI 41.01 kg/m²   Body mass index is 41.01 kg/m².  Physical Exam  Constitutional:       General: She is not in acute distress.     Appearance: Normal appearance. She is not ill-appearing, toxic-appearing or diaphoretic.   HENT:      Head: Normocephalic and atraumatic.   Eyes:      General: No scleral icterus.     Extraocular Movements: Extraocular movements intact.   Cardiovascular:      Rate and Rhythm: Normal rate and regular rhythm.   Pulmonary:      Effort: Pulmonary effort is normal. No respiratory distress.      Breath sounds: Normal breath sounds.   Abdominal:      General: Bowel sounds are normal. There is no distension.      Palpations: Abdomen is soft. There is no mass.      Tenderness: There is no abdominal tenderness. There is no guarding.   Musculoskeletal:         General: Normal range of motion.      Cervical back: Normal range of motion.   Skin:     General: Skin is warm and dry.      Coloration: Skin is not jaundiced or pale.   Neurological:      Mental Status: She is alert and oriented to person, place, and time.         Labs: Pertinent labs reviewed.  Endoscopy:   CRC Screenin  Anticoagulation: Aspirin 81mg    Assessment:  1. Right upper quadrant abdominal pain    2. BRBPR (bright red blood per rectum)    3. Diarrhea, unspecified type         Recommendations:  -High suspicion of Cdiff. Stool studies. Explained c diff is " highly contagious. Wash hands frequently with warm water and soap.   -Repeat CBC today to ensure no progression of anemia or elevated WBC count.   -If negative, in need of colonoscopy and likely EGD as well given nausea/vomiting.  -Possibly related to COVID?  -Will follow up after stool studies.   -Instructed ED with red flags symptoms such as dehydration, progression of symptoms, dizziness, weakness, lightheadedness, severe abdominal pain, fevers, etc.     Right upper quadrant abdominal pain  -     Ambulatory referral/consult to Gastroenterology  -     Clostridium difficile EIA; Future; Expected date: 02/07/2023  -     Giardia / Cryptosporidum, EIA; Future; Expected date: 02/07/2023  -     Stool Exam-Ova,Cysts,Parasites; Future; Expected date: 02/21/2023  -     Stool culture; Future; Expected date: 02/07/2023  -     CBC Auto Differential; Future; Expected date: 02/07/2023    BRBPR (bright red blood per rectum)  -     Ambulatory referral/consult to Gastroenterology  -     Clostridium difficile EIA; Future; Expected date: 02/07/2023  -     Giardia / Cryptosporidum, EIA; Future; Expected date: 02/07/2023  -     Stool Exam-Ova,Cysts,Parasites; Future; Expected date: 02/21/2023  -     Stool culture; Future; Expected date: 02/07/2023  -     CBC Auto Differential; Future; Expected date: 02/07/2023    Diarrhea, unspecified type  -     Ambulatory referral/consult to Gastroenterology  -     Clostridium difficile EIA; Future; Expected date: 02/07/2023  -     Giardia / Cryptosporidum, EIA; Future; Expected date: 02/07/2023  -     Stool Exam-Ova,Cysts,Parasites; Future; Expected date: 02/21/2023  -     Stool culture; Future; Expected date: 02/07/2023  -     CBC Auto Differential; Future; Expected date: 02/07/2023        No follow-ups on file.    Thank you so much for allowing me to participate in the care of Divya Norwood PA-C

## 2023-02-07 NOTE — TELEPHONE ENCOUNTER
Please notify sent rx for dicyclomine to [facility] as [facility]

## 2023-02-09 ENCOUNTER — TELEPHONE (OUTPATIENT)
Dept: GASTROENTEROLOGY | Facility: CLINIC | Age: 63
End: 2023-02-09
Payer: MEDICARE

## 2023-02-09 NOTE — PROGRESS NOTES
Subjective:      Patient ID: Divya Bui is a 62 y.o. female.    Chief Complaint: Hospital Follow Up      Patient here for ER and urgent care follow-up.  Diagnosised with urinary infection, taking antibiotics.  Continues to have significant abdominal pain, worse on the right upper side.  Reports bright red blood with diarrhea as well.  She had CT at [facility] over the weekend which she reports was normal.  Concerned because she is not feeling any better.  Recent COVID infection-respiratory symptoms seem to have resolved    Review of Systems   Constitutional:  Positive for activity change, appetite change and fatigue.   Gastrointestinal:  Positive for abdominal pain, blood in stool and diarrhea.   Genitourinary:  Positive for dysuria.   Past Medical History:   Diagnosis Date    Anemia     Anxiety     Basal cell carcinoma (BCC) of face 2/26/2020    Blood transfusion     Bowel incontinence     Depression     Esophageal stricture     GERD (gastroesophageal reflux disease)     Hypertension     Latent tuberculosis by skin test 2001    took INH    Liver nodule 1/6/2014    Morbid obesity with BMI of 45.0-49.9, adult     OA (osteoarthritis) of knee 12/12/2014    Pericardial effusion 9/4/2014          Past Surgical History:   Procedure Laterality Date    CHOLECYSTECTOMY      GASTRIC BYPASS      HERNIA REPAIR      right sholder surgery      SMALL INTESTINE SURGERY       Family History   Problem Relation Age of Onset    Lung cancer Mother         smoker    Liver cancer Father     Diabetes Sister     Crohn's disease Sister     Liver cancer Sister     Diabetes Sister     Crohn's disease Brother     Crohn's disease Other     Breast cancer Neg Hx     Ovarian cancer Neg Hx     Colon cancer Neg Hx      Social History     Socioeconomic History    Marital status:    Tobacco Use    Smoking status: Never    Smokeless tobacco: Never   Substance and Sexual Activity    Alcohol use: Not Currently     Alcohol/week: 0.0 standard drinks  "   Drug use: No    Sexual activity: Yes     Partners: Male     Review of patient's allergies indicates:   Allergen Reactions    Metronidazole hcl Other (See Comments)     Mouth ulcers developed with Flagyl  Oral sores.  Mouth ulcers developed with Flagyl       Objective:       /88 (BP Location: Right arm)   Pulse 99   Temp 99 °F (37.2 °C) (Tympanic)   Ht 5' 3" (1.6 m)   Wt 107 kg (235 lb 14.3 oz)   SpO2 96%   BMI 41.79 kg/m²   Physical Exam  Vitals reviewed.   Constitutional:       General: She is not in acute distress.     Appearance: Normal appearance. She is well-developed. She is not diaphoretic.   Cardiovascular:      Rate and Rhythm: Normal rate and regular rhythm.      Heart sounds: Normal heart sounds.   Pulmonary:      Effort: Pulmonary effort is normal. No respiratory distress.      Breath sounds: Normal breath sounds.   Abdominal:      General: Bowel sounds are normal.      Palpations: Abdomen is soft.      Tenderness: There is abdominal tenderness (Right flank/right upper quadrant). There is no guarding.   Neurological:      Mental Status: She is alert.   Psychiatric:         Mood and Affect: Mood normal.         Behavior: Behavior normal.     Assessment:     1. Urinary tract infection without hematuria, site unspecified    2. Right upper quadrant abdominal pain    3. BRBPR (bright red blood per rectum)    4. Diarrhea, unspecified type      Plan:   Urinary tract infection without hematuria, site unspecified  -     Urine culture; Future; Expected date: 02/07/2023  -     Urinalysis; Future; Expected date: 02/07/2023    Right upper quadrant abdominal pain  -     Ambulatory referral/consult to Gastroenterology; Future; Expected date: 02/14/2023  -     traMADoL (ULTRAM) 50 mg tablet; Take 1 tablet (50 mg total) by mouth every 6 (six) hours as needed for Pain.  Dispense: 30 tablet; Refill: 0    BRBPR (bright red blood per rectum)  -     Ambulatory referral/consult to Gastroenterology; Future; " Expected date: 02/14/2023    Diarrhea, unspecified type  -     Ambulatory referral/consult to Gastroenterology; Future; Expected date: 02/14/2023    Other orders  -     sucralfate (CARAFATE) 1 gram tablet; Take 1 tablet (1 g total) by mouth 4 (four) times daily. (Patient not taking: Reported on 2/7/2023)  Dispense: 40 tablet; Refill: 0  -     Discontinue: dicyclomine (BENTYL) 20 mg tablet; Take 1 tablet (20 mg total) by mouth 3 (three) times daily as needed (abdominal pain).  Dispense: 60 tablet; Refill: 0    Will request CT results from [facility]  Plan to recheck urine and culture when she has completed her antibiotics early next week  Medication List with Changes/Refills   New Medications    SUCRALFATE (CARAFATE) 1 GRAM TABLET    Take 1 tablet (1 g total) by mouth 4 (four) times daily.   Current Medications    ALPRAZOLAM (XANAX XR) 2 MG TB24    Take 1 mg by mouth once daily. 2 mg tablet(2tabs) oral in the am and 1 mg(1tab)in the afternoon    AMLODIPINE (NORVASC) 5 MG TABLET    Take 1 tablet (5 mg total) by mouth once daily.    ARIPIPRAZOLE (ABILIFY) 10 MG TAB    Take 10 mg by mouth.    ASPIRIN 81 MG TAB    Take 1 tablet by mouth Daily.    ATORVASTATIN (LIPITOR) 40 MG TABLET    Take 1 tablet (40 mg total) by mouth once daily.    CEPHALEXIN (KEFLEX) 500 MG CAPSULE    Take 1 capsule (500 mg total) by mouth 4 (four) times daily. for 7 days    CIPROFLOXACIN HCL (CIPRO) 500 MG TABLET    Take 1 tablet (500 mg total) by mouth every 12 (twelve) hours. for 7 days    DOXEPIN (SINEQUAN) 50 MG CAPSULE    Take 50 mg by mouth nightly.    FLUOXETINE (PROZAC) 40 MG CAPSULE    Take 1 capsule (40 mg total) by mouth once daily.    HYDROCORTISONE 2.5 % CREAM    Apply topically.    KETOCONAZOLE (NIZORAL) 2 % CREAM    Apply topically once daily.    METHEN-M.BLUE-S.PHOS-PHSAL-HYO (URIBEL) 118-10-40.8-36 MG CAP    Take 1 capsule by mouth 3 (three) times daily as needed (bladder pain).    MUPIROCIN (BACTROBAN) 2 % OINTMENT    Apply  topically 2 (two) times daily.    OLMESARTAN-HYDROCHLOROTHIAZIDE (BENICAR HCT) 40-12.5 MG TAB    Take 1 tablet by mouth once daily.    PROMETHAZINE (PHENERGAN) 25 MG TABLET    Take 1 tablet (25 mg total) by mouth every 6 (six) hours as needed for Nausea.    ZOLPIDEM (AMBIEN) 10 MG TAB    Take 10 mg by mouth nightly as needed.   Changed and/or Refilled Medications    Modified Medication Previous Medication    DICYCLOMINE (BENTYL) 20 MG TABLET dicyclomine (BENTYL) 20 mg tablet       Take 1 tablet (20 mg total) by mouth 3 (three) times daily as needed (abdominal pain).    Take 20 mg by mouth 3 (three) times daily as needed.    TRAMADOL (ULTRAM) 50 MG TABLET traMADoL (ULTRAM) 50 mg tablet       Take 1 tablet (50 mg total) by mouth every 6 (six) hours as needed for Pain.    Take 1 tablet (50 mg total) by mouth every 6 (six) hours as needed for Pain.

## 2023-02-09 NOTE — TELEPHONE ENCOUNTER
----- Message from Eli Briseno sent at 2/9/2023 10:40 AM CST -----  Contact: Divya Stokes is needing a call back regarding her diarrhea getting worse as all of her testing has been negative. Please call her back at [phone]

## 2023-02-09 NOTE — TELEPHONE ENCOUNTER
Patient calling to report that diarrhea is getting worse. Patient informed that we are still waiting on remainder of results to come in. Once all results have returned then Ms. Evans will be able to make a recommendation. Patient reports taking imodium but states that it gives her constipation. Patient asking for something else.

## 2023-02-10 ENCOUNTER — TELEPHONE (OUTPATIENT)
Dept: GASTROENTEROLOGY | Facility: CLINIC | Age: 63
End: 2023-02-10
Payer: MEDICARE

## 2023-02-10 ENCOUNTER — PATIENT MESSAGE (OUTPATIENT)
Dept: GASTROENTEROLOGY | Facility: CLINIC | Age: 63
End: 2023-02-10
Payer: MEDICARE

## 2023-02-10 DIAGNOSIS — K62.5 BRBPR (BRIGHT RED BLOOD PER RECTUM): Primary | ICD-10-CM

## 2023-02-10 DIAGNOSIS — R10.11 RIGHT UPPER QUADRANT ABDOMINAL PAIN: ICD-10-CM

## 2023-02-10 DIAGNOSIS — R19.7 DIARRHEA, UNSPECIFIED TYPE: ICD-10-CM

## 2023-02-10 RX ORDER — POLYETHYLENE GLYCOL 3350, SODIUM SULFATE ANHYDROUS, SODIUM BICARBONATE, SODIUM CHLORIDE, POTASSIUM CHLORIDE 236; 22.74; 6.74; 5.86; 2.97 G/4L; G/4L; G/4L; G/4L; G/4L
4 POWDER, FOR SOLUTION ORAL ONCE
Qty: 4000 ML | Refills: 0 | Status: SHIPPED | OUTPATIENT
Start: 2023-02-10 | End: 2023-02-10

## 2023-02-10 NOTE — TELEPHONE ENCOUNTER
----- Message from Mayi Orta sent at 2/10/2023  2:34 PM CST -----  Contact: Divya Stokes is requesting a callback from the nurse about the colonoscopy please.    Please call Divya at [phone] (home)      thanks

## 2023-02-10 NOTE — TELEPHONE ENCOUNTER
Patient calling in regards to colonoscopy. Patient advised that she is scheduled for an endoscopy appointment on 2/17/23 at 11:30 pm. Patient states that her buttocks are raw from diarrhea. Patient instructed Desitin or Santino's butt paste, also recommended baby wipes to clean after bowel movements.

## 2023-02-13 ENCOUNTER — LAB VISIT (OUTPATIENT)
Dept: LAB | Facility: HOSPITAL | Age: 63
End: 2023-02-13
Attending: FAMILY MEDICINE
Payer: MEDICARE

## 2023-02-13 DIAGNOSIS — N39.0 URINARY TRACT INFECTION WITHOUT HEMATURIA, SITE UNSPECIFIED: ICD-10-CM

## 2023-02-13 LAB
BACTERIA #/AREA URNS AUTO: ABNORMAL /HPF
BILIRUB UR QL STRIP: NEGATIVE
CLARITY UR REFRACT.AUTO: CLEAR
COLOR UR AUTO: YELLOW
GLUCOSE UR QL STRIP: NEGATIVE
HGB UR QL STRIP: NEGATIVE
KETONES UR QL STRIP: NEGATIVE
LEUKOCYTE ESTERASE UR QL STRIP: ABNORMAL
MICROSCOPIC COMMENT: ABNORMAL
NITRITE UR QL STRIP: NEGATIVE
NON-SQ EPI CELLS #/AREA URNS AUTO: 0 /HPF
PH UR STRIP: 7 [PH] (ref 5–8)
PROT UR QL STRIP: ABNORMAL
RBC #/AREA URNS AUTO: 2 /HPF (ref 0–4)
SP GR UR STRIP: 1.01 (ref 1–1.03)
SQUAMOUS #/AREA URNS AUTO: 5 /HPF
URN SPEC COLLECT METH UR: ABNORMAL
WBC #/AREA URNS AUTO: 13 /HPF (ref 0–5)

## 2023-02-13 PROCEDURE — 81001 URINALYSIS AUTO W/SCOPE: CPT | Performed by: FAMILY MEDICINE

## 2023-02-13 PROCEDURE — 87086 URINE CULTURE/COLONY COUNT: CPT | Performed by: FAMILY MEDICINE

## 2023-02-14 ENCOUNTER — TELEPHONE (OUTPATIENT)
Dept: INTERNAL MEDICINE | Facility: CLINIC | Age: 63
End: 2023-02-14
Payer: MEDICARE

## 2023-02-14 LAB — BACTERIA UR CULT: NO GROWTH

## 2023-02-14 NOTE — TELEPHONE ENCOUNTER
----- Message from Livia Lopez sent at 2/14/2023  9:32 AM CST -----  Contact: Divya  Patient is calling to speak with the nurse in regards to results. Reports needing to go over results from urine labs. Please give patient a callback at .[phone].

## 2023-02-15 DIAGNOSIS — R19.7 DIARRHEA, UNSPECIFIED TYPE: Primary | ICD-10-CM

## 2023-02-15 RX ORDER — CHOLESTYRAMINE 4 G/4.8G
1 POWDER, FOR SUSPENSION ORAL 2 TIMES DAILY
Qty: 60 PACKET | Refills: 2 | Status: ON HOLD | OUTPATIENT
Start: 2023-02-15 | End: 2023-08-29 | Stop reason: HOSPADM

## 2023-02-17 ENCOUNTER — HOSPITAL ENCOUNTER (OUTPATIENT)
Dept: PREADMISSION TESTING | Facility: HOSPITAL | Age: 63
Discharge: HOME | End: 2023-02-17
Attending: INTERNAL MEDICINE
Payer: MEDICARE

## 2023-02-17 DIAGNOSIS — R19.7 DIARRHEA, UNSPECIFIED TYPE: ICD-10-CM

## 2023-02-17 DIAGNOSIS — R10.11 RIGHT UPPER QUADRANT ABDOMINAL PAIN: ICD-10-CM

## 2023-02-17 DIAGNOSIS — K62.5 BRBPR (BRIGHT RED BLOOD PER RECTUM): ICD-10-CM

## 2023-02-24 ENCOUNTER — HOSPITAL ENCOUNTER (OUTPATIENT)
Dept: PREADMISSION TESTING | Facility: HOSPITAL | Age: 63
Discharge: HOME | End: 2023-02-24
Attending: INTERNAL MEDICINE
Payer: MEDICARE

## 2023-03-01 ENCOUNTER — TELEPHONE (OUTPATIENT)
Dept: INTERNAL MEDICINE | Facility: CLINIC | Age: 63
End: 2023-03-01
Payer: MEDICARE

## 2023-03-01 NOTE — TELEPHONE ENCOUNTER
Patient stated that she needs a refill on PREDNISONE AND TRAMADOL   Does not have appointment until June with pain management   Please advise

## 2023-03-01 NOTE — TELEPHONE ENCOUNTER
----- Message from Vero Snell sent at 3/1/2023  3:46 PM CST -----  Contact: Divya  Patient stated that she needs a refill on PREDNISONE AND TRAMADOL Please call her back at [phone], Patient didn't know the mg, not on medication list on [facility].           [facility] [address] [facility]  [address] [place] [address]  Phone: [phone] Fax: [phone]

## 2023-03-06 ENCOUNTER — TELEPHONE (OUTPATIENT)
Dept: INTERNAL MEDICINE | Facility: CLINIC | Age: 63
End: 2023-03-06
Payer: MEDICARE

## 2023-03-06 ENCOUNTER — NURSE TRIAGE (OUTPATIENT)
Dept: ADMINISTRATIVE | Facility: CLINIC | Age: 63
End: 2023-03-06
Payer: MEDICARE

## 2023-03-06 ENCOUNTER — HOSPITAL ENCOUNTER (OUTPATIENT)
Facility: HOSPITAL | Age: 63
Discharge: HOME | End: 2023-03-06
Attending: INTERNAL MEDICINE | Admitting: INTERNAL MEDICINE
Payer: MEDICARE

## 2023-03-06 ENCOUNTER — ANESTHESIA (OUTPATIENT)
Dept: ENDOSCOPY | Facility: HOSPITAL | Age: 63
End: 2023-03-06
Payer: MEDICARE

## 2023-03-06 ENCOUNTER — ANESTHESIA EVENT (OUTPATIENT)
Dept: ENDOSCOPY | Facility: HOSPITAL | Age: 63
End: 2023-03-06
Payer: MEDICARE

## 2023-03-06 DIAGNOSIS — K62.5 RECTAL BLEEDING: Primary | ICD-10-CM

## 2023-03-06 PROCEDURE — 27201012 HC FORCEPS, HOT/COLD, DISP: Performed by: INTERNAL MEDICINE

## 2023-03-06 PROCEDURE — 88305 TISSUE EXAM BY PATHOLOGIST: CPT | Mod: 59 | Performed by: PATHOLOGY

## 2023-03-06 PROCEDURE — 63600175 PHARM REV CODE 636 W HCPCS: Performed by: NURSE ANESTHETIST, CERTIFIED REGISTERED

## 2023-03-06 PROCEDURE — 37000008 HC ANESTHESIA 1ST 15 MINUTES: Performed by: INTERNAL MEDICINE

## 2023-03-06 PROCEDURE — 25000003 PHARM REV CODE 250: Performed by: NURSE ANESTHETIST, CERTIFIED REGISTERED

## 2023-03-06 PROCEDURE — 37000009 HC ANESTHESIA EA ADD 15 MINS: Performed by: INTERNAL MEDICINE

## 2023-03-06 PROCEDURE — 45380 COLONOSCOPY AND BIOPSY: CPT | Performed by: INTERNAL MEDICINE

## 2023-03-06 PROCEDURE — 88305 TISSUE EXAM BY PATHOLOGIST: CPT | Mod: 26,,, | Performed by: PATHOLOGY

## 2023-03-06 PROCEDURE — 45380 COLONOSCOPY AND BIOPSY: CPT | Mod: ,,, | Performed by: INTERNAL MEDICINE

## 2023-03-06 PROCEDURE — 88305 TISSUE EXAM BY PATHOLOGIST: ICD-10-PCS | Mod: 26,,, | Performed by: PATHOLOGY

## 2023-03-06 PROCEDURE — 45380 PR COLONOSCOPY,BIOPSY: ICD-10-PCS | Mod: ,,, | Performed by: INTERNAL MEDICINE

## 2023-03-06 RX ORDER — PROPOFOL 10 MG/ML
VIAL (ML) INTRAVENOUS
Status: DISCONTINUED | OUTPATIENT
Start: 2023-03-06 | End: 2023-03-06

## 2023-03-06 RX ORDER — LIDOCAINE HYDROCHLORIDE 10 MG/ML
INJECTION, SOLUTION EPIDURAL; INFILTRATION; INTRACAUDAL; PERINEURAL
Status: DISCONTINUED | OUTPATIENT
Start: 2023-03-06 | End: 2023-03-06

## 2023-03-06 RX ADMIN — PROPOFOL 100 MG: 10 INJECTION, EMULSION INTRAVENOUS at 06:03

## 2023-03-06 RX ADMIN — PROPOFOL 50 MG: 10 INJECTION, EMULSION INTRAVENOUS at 06:03

## 2023-03-06 RX ADMIN — PROPOFOL 50 MG: 10 INJECTION, EMULSION INTRAVENOUS at 07:03

## 2023-03-06 RX ADMIN — SODIUM CHLORIDE, SODIUM LACTATE, POTASSIUM CHLORIDE, AND CALCIUM CHLORIDE: .6; .31; .03; .02 INJECTION, SOLUTION INTRAVENOUS at 06:03

## 2023-03-06 RX ADMIN — LIDOCAINE HYDROCHLORIDE 50 MG: 10 INJECTION, SOLUTION EPIDURAL; INFILTRATION; INTRACAUDAL; PERINEURAL at 06:03

## 2023-03-06 NOTE — ANESTHESIA PREPROCEDURE EVALUATION
03/06/2023  Divya Bui is a 63 y.o., female.      Pre-op Assessment    I have reviewed the Patient Summary Reports.     I have reviewed the Nursing Notes. I have reviewed the NPO Status.   I have reviewed the Medications.     Review of Systems  Anesthesia Hx:  No problems with previous Anesthesia    Cardiovascular:   Hypertension, well controlled ECG has been reviewed. Normal sinus rhythm   Voltage criteria for left ventricular hypertrophy   Cannot rule out Anterior infarct ,age undetermined   Abnormal ECG   When compared with ECG of 30-JUL-2021 23:21,   No significant change was found   Confirmed by Cesar MELO, Vanessa' BShana ([id number]) on 1/24/2022 6:39:53 AM    Hepatic/GI:   GERD Chronic Nausea refuses zofran   Musculoskeletal:   Arthritis     Endocrine:  Morbid Obesity / BMI > 40  Psych:   Psychiatric History anxiety depression          Physical Exam  General: Well nourished    Airway:  Mallampati: II   Mouth Opening: Normal  TM Distance: Normal  Tongue: Normal  Neck ROM: Normal ROM    Dental:  Intact    Chest/Lungs:  Normal Respiratory Rate    Heart:  Rate: Normal  Rhythm: Regular Rhythm        Anesthesia Plan  Type of Anesthesia, risks & benefits discussed:    Anesthesia Type: MAC  Intra-op Monitoring Plan: Standard ASA Monitors  Post Op Pain Control Plan: multimodal analgesia  Induction:  IV  Informed Consent: Informed consent signed with the Patient and all parties understand the risks and agree with anesthesia plan.  All questions answered.   ASA Score: 3  Day of Surgery Review of History & Physical: H&P Update referred to the surgeon/provider.I have interviewed and examined the patient. I have reviewed the patient's H&P dated: There are no significant changes.     Ready For Surgery From Anesthesia Perspective.     .

## 2023-03-06 NOTE — PROVATION PATIENT INSTRUCTIONS
Discharge Summary/Instructions after an Endoscopic Procedure  Patient Name: Divya Bui  Patient MRN: [id number]  Patient YOB: 1960     Monday, March 6, 2023 Beti Diallo MD  Dear patient,  As a result of recent federal legislation (The Federal Cures Act), you may   receive lab or pathology results from your procedure in your [facility]   account before your physician is able to contact you. Your physician or   their representative will relay the results to you with their   recommendations at their soonest availability.  Thank you,  RESTRICTIONS:  During your procedure today, you received medications for sedation.  These   medications may affect your judgment, balance and coordination.  Therefore,   for 24 hours, you have the following restrictions:   - DO NOT drive a car, operate machinery, make legal/financial decisions,   sign important papers or drink alcohol.    ACTIVITY:  Today: no heavy lifting, straining or running due to procedural   sedation/anesthesia.  The following day: return to full activity including work.  DIET:  Eat and drink normally unless instructed otherwise.     TREATMENT FOR COMMON SIDE EFFECTS:  - Mild abdominal pain, nausea, belching, bloating or excessive gas:  rest,   eat lightly and use a heating pad.  - Sore Throat: treat with throat lozenges and/or gargle with warm salt   water.  - Because air was used during the procedure, expelling large amounts of air   from your rectum or belching is normal.  - If a bowel prep was taken, you may not have a bowel movement for 1-3 days.    This is normal.  SYMPTOMS TO WATCH FOR AND REPORT TO YOUR PHYSICIAN:  1. Abdominal pain or bloating, other than gas cramps.  2. Chest pain.  3. Back pain.  4. Signs of infection such as: chills or fever occurring within 24 hours   after the procedure.  5. Rectal bleeding, which would show as bright red, maroon, or black stools.   (A tablespoon of blood from the rectum is not serious, especially if    hemorrhoids are present.)  6. Vomiting.  7. Weakness or dizziness.  GO DIRECTLY TO THE NEAREST EMERGENCY ROOM IF YOU HAVE ANY OF THE FOLLOWING:      Difficulty breathing              Chills and/or fever over 101 F   Persistent vomiting and/or vomiting blood   Severe abdominal pain   Severe chest pain   Black, tarry stools   Bleeding- more than one tablespoon   Any other symptom or condition that you feel may need urgent attention  Your doctor recommends these additional instructions:  If any biopsies were taken, your doctors clinic will contact you in 1 to 2   weeks with any results.  - Discharge patient to home.   - Resume previous diet.   - Continue present medications.   - Await pathology results.   - Repeat colonoscopy for surveillance based on previous pathology results.   - Patient has a contact number available for emergencies.  The signs and   symptoms of potential delayed complications were discussed with the   patient.  Return to normal activities tomorrow.  Written discharge   instructions were provided to the patient.  For questions, problems or results please call your physician Beti Diallo MD at Work:  [phone]  If you have any questions about the above instructions, call the GI   department at [phone] or call the endoscopy unit at [phone]   from 7am until 3 pm.  [facility] [place], EMERGENCY ROOM PHONE NUMBER:   [phone]  IF A COMPLICATION OR EMERGENCY SITUATION ARISES AND YOU ARE UNABLE TO REACH   YOUR PHYSICIAN - GO DIRECTLY TO THE EMERGENCY ROOM.  I have read or have had read to me these discharge instructions for my   procedure and have received a written copy.  I understand these   instructions and will follow-up with my physician if I have any questions.     __________________________________       _____________________________________  Nurse Signature                                          Patient/Designated   Responsible Party  Signature  Beti Diallo MD  3/6/2023 7:31:04 AM  This report has been verified and signed electronically.  Dear patient,  As a result of recent federal legislation (The Federal Cures Act), you may   receive lab or pathology results from your procedure in your [facility]   account before your physician is able to contact you. Your physician or   their representative will relay the results to you with their   recommendations at their soonest availability.  Thank you,  PROVATION

## 2023-03-06 NOTE — TELEPHONE ENCOUNTER
Patient informed due to elevated blood pressure, blurry vision, and headaches to go to the emergency room.  Patient stated EMT was on the way and she was going to [place].

## 2023-03-06 NOTE — PLAN OF CARE
DR FARIAS AT BEDSIDE TO SPEAK TO PT. REGARDING RESULTS.  VSS, NO GI BLEEDING, NO ABD. PAIN, NO N/V. PT. DISCHARGED FROM UNIT.

## 2023-03-06 NOTE — H&P
PRE PROCEDURE H&P    Patient Name: Divya Bui  MRN: [id number]  : 1960  Date of Procedure:  3/6/2023  Referring Physician: Cristina Norwood PA-C  Primary Physician: Angel Khan MD  Procedure Physician: Beti Diallo MD       Planned Procedure: Colonoscopy  Diagnosis: diarrhea and rectal bleeding   Chief Complaint: Same as above    HPI: Patient is an 63 y.o. female is here for the above.     Last colonoscopy:   Family history: None   Anticoagulation: None     Past Medical History:   Past Medical History:   Diagnosis Date    Anemia     Anxiety     Basal cell carcinoma (BCC) of face 2020    Blood transfusion     Bowel incontinence     Depression     Esophageal stricture     GERD (gastroesophageal reflux disease)     Hypertension     Latent tuberculosis by skin test     took INH    Liver nodule 2014    Morbid obesity with BMI of 45.0-49.9, adult     OA (osteoarthritis) of knee 2014    Pericardial effusion 2014        Past Surgical History:  Past Surgical History:   Procedure Laterality Date    CHOLECYSTECTOMY      GASTRIC BYPASS      HERNIA REPAIR      right sholder surgery      SMALL INTESTINE SURGERY          Home Medications:  Prior to Admission medications    Medication Sig Start Date End Date Taking? Authorizing Provider   alprazolam (XANAX XR) 2 MG Tb24 Take 1 mg by mouth once daily. 2 mg tablet(2tabs) oral in the am and 1 mg(1tab)in the afternoon 4/30/15  Yes Historical Provider   amLODIPine (NORVASC) 5 MG tablet Take 1 tablet (5 mg total) by mouth once daily. 23 Yes Angel Khan MD   ARIPiprazole (ABILIFY) 10 MG Tab Take 10 mg by mouth. 22  Yes Historical Provider   aspirin 81 mg Tab Take 1 tablet by mouth Daily. 12  Yes Historical Provider   atorvastatin (LIPITOR) 40 MG tablet Take 1 tablet (40 mg total) by mouth once daily. 23 Yes Angel Khan MD   cholestyramine-aspartame (CHOLESTYRAMINE LIGHT) 4 gram PwPk Take 1  packet (4 g total) by mouth 2 (two) times daily. 2/15/23 5/16/23 Yes Cristina Norwood PA-C   dicyclomine (BENTYL) 20 mg tablet Take 1 tablet (20 mg total) by mouth 3 (three) times daily as needed (abdominal pain). 2/7/23  Yes Angel Khan MD   doxepin (SINEQUAN) 50 MG capsule Take 50 mg by mouth nightly. 7/15/21  Yes Historical Provider   hydrocortisone 2.5 % cream Apply topically. 9/26/22  Yes Historical Provider   ketoconazole (NIZORAL) 2 % cream Apply topically once daily. 1/9/23  Yes MD george Novaphos-phsal-hyo (URIBEL) 118-10-40.8-36 mg Cap Take 1 capsule by mouth 3 (three) times daily as needed (bladder pain). 2/3/23  Yes Angel Khan MD   mupirocin (BACTROBAN) 2 % ointment Apply topically 2 (two) times daily. 9/26/22  Yes Historical Provider   olmesartan-hydrochlorothiazide (BENICAR HCT) 40-12.5 mg Tab Take 1 tablet by mouth once daily.   Yes Historical Provider   promethazine (PHENERGAN) 25 MG tablet Take 1 tablet (25 mg total) by mouth every 6 (six) hours as needed for Nausea. 1/23/23  Yes Angel Khan MD   zolpidem (AMBIEN) 10 mg Tab Take 10 mg by mouth nightly as needed. 12/23/22  Yes Historical Provider   fluoxetine (PROZAC) 40 MG capsule Take 1 capsule (40 mg total) by mouth once daily. 10/8/14 1/23/23  Diego Delatorre NP   sucralfate (CARAFATE) 1 gram tablet Take 1 tablet (1 g total) by mouth 4 (four) times daily.  Patient not taking: Reported on 2/7/2023 2/7/23   Angel Khan MD   furosemide (LASIX) 40 MG tablet TAKE 1 TABLET (40 MG TOTAL) BY MOUTH ONCE DAILY. 1/15/17 7/31/21  Diego Delatorre NP        Allergies:  Review of patient's allergies indicates:   Allergen Reactions    Metronidazole hcl Other (See Comments)     Mouth ulcers developed with Flagyl  Oral sores.  Mouth ulcers developed with Flagyl        Social History:   Social History     Socioeconomic History    Marital status:    Tobacco Use    Smoking status: Never     "Smokeless tobacco: Never   Substance and Sexual Activity    Alcohol use: Not Currently     Alcohol/week: 0.0 standard drinks    Drug use: No    Sexual activity: Yes     Partners: Male       Family History:  Family History   Problem Relation Age of Onset    Lung cancer Mother         smoker    Liver cancer Father     Diabetes Sister     Crohn's disease Sister     Liver cancer Sister     Diabetes Sister     Crohn's disease Brother     Crohn's disease Other     Breast cancer Neg Hx     Ovarian cancer Neg Hx     Colon cancer Neg Hx        ROS: No acute cardiac events, no acute respiratory complaints.     Physical Exam (all patients):    BP (!) 151/74   Pulse 96   Temp 98.2 °F (36.8 °C) (Temporal)   Resp 20   Ht 5' 3" (1.6 m)   Wt 104.3 kg (230 lb)   SpO2 99%   Breastfeeding No   BMI 40.74 kg/m²   Lungs: Clear to auscultation bilaterally, respirations unlabored  Heart: Regular rate and rhythm, S1 and S2 normal, no obvious murmurs  Abdomen:         Soft, non-tender, bowel sounds normal, no masses, no organomegaly    Lab Results   Component Value Date    WBC 5.66 02/07/2023    MCV 91 02/07/2023    RDW 14.2 02/07/2023     02/07/2023    INR 0.9 08/28/2014    GLU 92 02/01/2023    HGBA1C 5.5 01/09/2023    BUN 11 02/01/2023     02/01/2023    K 3.5 02/01/2023     02/01/2023        SEDATION PLAN: per anesthesia      History reviewed, vital signs satisfactory, cardiopulmonary status satisfactory, sedation options, risks and plans have been discussed with the patient  All their questions were answered and the patient agrees to the sedation procedures as planned and the patient is deemed an appropriate candidate for the sedation as planned.    Procedure explained to patient, informed consent obtained and placed in chart.    Beti Diallo  3/6/2023  6:54 AM    "

## 2023-03-06 NOTE — TELEPHONE ENCOUNTER
----- Message from [facility] sent at 3/6/2023  2:20 PM CST -----  Type:  Needs Medical Advice    Who Called: Pt   Would the patient rather a call back or a response via [facility]? Callback   Best Call Back Number: [phone]  Additional Information: Pt requesting callback to discuss blood pressure. Bp is 204/137, pt was instructed to go to hospital/ call ambulance by nurse on call.         negative...

## 2023-03-06 NOTE — ANESTHESIA POSTPROCEDURE EVALUATION
Anesthesia Post Evaluation    Patient: Divya Bui    Procedure(s) Performed: Procedure(s) (LRB):  COLONOSCOPY (N/A)    Final Anesthesia Type: MAC      Patient location during evaluation: GI PACU  Patient participation: Yes- Able to Participate  Level of consciousness: awake and alert  Post-procedure vital signs: reviewed and stable  Pain management: adequate  Airway patency: patent    PONV status at discharge: No PONV  Anesthetic complications: no      Cardiovascular status: blood pressure returned to baseline  Respiratory status: unassisted and spontaneous ventilation  Hydration status: euvolemic  Follow-up not needed.          Vitals Value Taken Time   /86 03/06/23 0751   Temp 36.2 °C (97.2 °F) 03/06/23 0731   Pulse 92 03/06/23 0751   Resp 20 03/06/23 0751   SpO2 98 % 03/06/23 0751         Event Time   Out of Recovery 08:18:32         Pain/Caty Score: Caty Score: 10 (3/6/2023  7:51 AM)

## 2023-03-06 NOTE — TRANSFER OF CARE
"Anesthesia Transfer of Care Note    Patient: Divya Bui    Procedure(s) Performed: Procedure(s) (LRB):  COLONOSCOPY (N/A)    Patient location: GI    Anesthesia Type: MAC    Transport from OR: Transported from OR on room air with adequate spontaneous ventilation    Post pain: adequate analgesia    Post assessment: no apparent anesthetic complications    Post vital signs: stable    Level of consciousness: sedated    Nausea/Vomiting: no nausea/vomiting    Complications: none    Transfer of care protocol was followed      Last vitals:   Visit Vitals  BP (!) 151/74   Pulse 96   Temp 36.8 °C (98.2 °F) (Temporal)   Resp 20   Ht 5' 3" (1.6 m)   Wt 104.3 kg (230 lb)   SpO2 99%   Breastfeeding No   BMI 40.74 kg/m²     "

## 2023-03-06 NOTE — TELEPHONE ENCOUNTER
Divya calling c/o headache rated as 8/10 and high blood pressure today. BP is 196/115 at 1245. Advil last taken at 1000 with no relief. States she had colonoscopy today and woke up with severe headache. Has already taken BP meds for the day. Advised per triage protocol to go to nearest ED now for physician eusebia. Instructed to call  now if no immediate . V/u.   Reason for Disposition   SEVERE headache, sudden-onset (i.e., reaching maximum intensity within seconds to 1 hour)    Additional Information   Negative: Difficult to awaken or acting confused (e.g., disoriented, slurred speech)   Negative: Weakness of the face, arm or leg on one side of the body and new-onset   Negative: Numbness of the face, arm or leg on one side of the body and new-onset   Negative: Loss of speech or garbled speech and new-onset   Negative: Passed out (i.e., fainted, collapsed and was not responding)   Negative: Sounds like a life-threatening emergency to the triager   Negative: Unable to walk without falling   Negative: Stiff neck (can't touch chin to chest)   Negative: Possibility of carbon monoxide exposure   Negative: SEVERE headache, states 'worst headache' of life    Protocols used: Headache-A-OH

## 2023-03-07 VITALS
OXYGEN SATURATION: 98 % | SYSTOLIC BLOOD PRESSURE: 136 MMHG | WEIGHT: 230 LBS | TEMPERATURE: 97 F | DIASTOLIC BLOOD PRESSURE: 86 MMHG | HEART RATE: 92 BPM | RESPIRATION RATE: 20 BRPM | HEIGHT: 63 IN | BODY MASS INDEX: 40.75 KG/M2

## 2023-03-09 LAB
FINAL PATHOLOGIC DIAGNOSIS: NORMAL
Lab: NORMAL

## 2023-04-17 PROBLEM — N17.9 AKI (ACUTE KIDNEY INJURY): Status: RESOLVED | Noted: 2020-12-09 | Resolved: 2023-04-17

## 2023-04-19 RX ORDER — PROMETHAZINE HYDROCHLORIDE 25 MG/1
25 TABLET ORAL EVERY 6 HOURS PRN
Qty: 30 TABLET | Refills: 0 | Status: ON HOLD | OUTPATIENT
Start: 2023-04-19 | End: 2023-11-25 | Stop reason: HOSPADM

## 2023-04-19 NOTE — TELEPHONE ENCOUNTER
No new care gaps identified.  [facility] Embedded Care Gaps. Reference number: [id number]. 4/19/2023   1:04:30 PM CDT

## 2023-04-20 ENCOUNTER — HOSPITAL ENCOUNTER (OUTPATIENT)
Dept: RADIOLOGY | Facility: HOSPITAL | Age: 63
Discharge: HOME | End: 2023-04-20
Attending: NURSE PRACTITIONER
Payer: MEDICARE

## 2023-04-20 DIAGNOSIS — Z12.31 ENCOUNTER FOR SCREENING MAMMOGRAM FOR MALIGNANT NEOPLASM OF BREAST: ICD-10-CM

## 2023-04-20 PROCEDURE — 77067 SCR MAMMO BI INCL CAD: CPT | Mod: TC

## 2023-04-20 PROCEDURE — 77063 MAMMO DIGITAL SCREENING BILAT WITH TOMO: ICD-10-PCS | Mod: 26,,, | Performed by: RADIOLOGY

## 2023-04-20 PROCEDURE — 77063 BREAST TOMOSYNTHESIS BI: CPT | Mod: 26,,, | Performed by: RADIOLOGY

## 2023-04-20 PROCEDURE — 77067 SCR MAMMO BI INCL CAD: CPT | Mod: 26,,, | Performed by: RADIOLOGY

## 2023-04-20 PROCEDURE — 77067 MAMMO DIGITAL SCREENING BILAT WITH TOMO: ICD-10-PCS | Mod: 26,,, | Performed by: RADIOLOGY

## 2023-05-17 ENCOUNTER — LAB VISIT (OUTPATIENT)
Dept: LAB | Facility: HOSPITAL | Age: 63
End: 2023-05-17
Attending: FAMILY MEDICINE
Payer: MEDICARE

## 2023-05-17 ENCOUNTER — CLINICAL SUPPORT (OUTPATIENT)
Dept: CARDIOLOGY | Facility: CLINIC | Age: 63
End: 2023-05-17
Payer: MEDICARE

## 2023-05-17 ENCOUNTER — OFFICE VISIT (OUTPATIENT)
Dept: INTERNAL MEDICINE | Facility: CLINIC | Age: 63
End: 2023-05-17
Payer: MEDICARE

## 2023-05-17 VITALS
SYSTOLIC BLOOD PRESSURE: 104 MMHG | HEIGHT: 63 IN | OXYGEN SATURATION: 94 % | TEMPERATURE: 99 F | HEART RATE: 77 BPM | BODY MASS INDEX: 43.01 KG/M2 | DIASTOLIC BLOOD PRESSURE: 68 MMHG | WEIGHT: 242.75 LBS

## 2023-05-17 DIAGNOSIS — D64.9 ANEMIA, UNSPECIFIED TYPE: ICD-10-CM

## 2023-05-17 DIAGNOSIS — I10 PRIMARY HYPERTENSION: ICD-10-CM

## 2023-05-17 DIAGNOSIS — I49.9 IRREGULAR HEART BEAT: ICD-10-CM

## 2023-05-17 DIAGNOSIS — R35.0 URINARY FREQUENCY: Primary | ICD-10-CM

## 2023-05-17 LAB
ALBUMIN SERPL BCP-MCNC: 3.4 G/DL (ref 3.5–5.2)
ALP SERPL-CCNC: 131 U/L (ref 55–135)
ALT SERPL W/O P-5'-P-CCNC: 11 U/L (ref 10–44)
ANION GAP SERPL CALC-SCNC: 9 MMOL/L (ref 8–16)
AST SERPL-CCNC: 13 U/L (ref 10–40)
BASOPHILS # BLD AUTO: 0.04 K/UL (ref 0–0.2)
BASOPHILS NFR BLD: 0.7 % (ref 0–1.9)
BILIRUB SERPL-MCNC: 0.4 MG/DL (ref 0.1–1)
BILIRUB SERPL-MCNC: NEGATIVE MG/DL
BLOOD URINE, POC: 250
BUN SERPL-MCNC: 21 MG/DL (ref 8–23)
CALCIUM SERPL-MCNC: 8.1 MG/DL (ref 8.7–10.5)
CHLORIDE SERPL-SCNC: 106 MMOL/L (ref 95–110)
CLARITY, POC UA: ABNORMAL
CO2 SERPL-SCNC: 24 MMOL/L (ref 23–29)
COLOR, POC UA: YELLOW
CREAT SERPL-MCNC: 1 MG/DL (ref 0.5–1.4)
DIFFERENTIAL METHOD: ABNORMAL
EOSINOPHIL # BLD AUTO: 0.4 K/UL (ref 0–0.5)
EOSINOPHIL NFR BLD: 7.3 % (ref 0–8)
ERYTHROCYTE [DISTWIDTH] IN BLOOD BY AUTOMATED COUNT: 14.1 % (ref 11.5–14.5)
EST. GFR  (NO RACE VARIABLE): >60 ML/MIN/1.73 M^2
GLUCOSE SERPL-MCNC: 113 MG/DL (ref 70–110)
GLUCOSE UR QL STRIP: NORMAL
HCT VFR BLD AUTO: 35.3 % (ref 37–48.5)
HGB BLD-MCNC: 10.7 G/DL (ref 12–16)
IMM GRANULOCYTES # BLD AUTO: 0.02 K/UL (ref 0–0.04)
IMM GRANULOCYTES NFR BLD AUTO: 0.3 % (ref 0–0.5)
KETONES UR QL STRIP: NEGATIVE
LEUKOCYTE ESTERASE URINE, POC: ABNORMAL
LYMPHOCYTES # BLD AUTO: 1.4 K/UL (ref 1–4.8)
LYMPHOCYTES NFR BLD: 25 % (ref 18–48)
MCH RBC QN AUTO: 29.1 PG (ref 27–31)
MCHC RBC AUTO-ENTMCNC: 30.3 G/DL (ref 32–36)
MCV RBC AUTO: 96 FL (ref 82–98)
MONOCYTES # BLD AUTO: 0.5 K/UL (ref 0.3–1)
MONOCYTES NFR BLD: 8 % (ref 4–15)
NEUTROPHILS # BLD AUTO: 3.4 K/UL (ref 1.8–7.7)
NEUTROPHILS NFR BLD: 58.7 % (ref 38–73)
NITRITE, POC UA: POSITIVE
NRBC BLD-RTO: 0 /100 WBC
PH, POC UA: 5
PLATELET # BLD AUTO: 227 K/UL (ref 150–450)
PMV BLD AUTO: 9 FL (ref 9.2–12.9)
POTASSIUM SERPL-SCNC: 4.1 MMOL/L (ref 3.5–5.1)
PROT SERPL-MCNC: 6.5 G/DL (ref 6–8.4)
PROTEIN, POC: ABNORMAL
RBC # BLD AUTO: 3.68 M/UL (ref 4–5.4)
SODIUM SERPL-SCNC: 139 MMOL/L (ref 136–145)
SPECIFIC GRAVITY, POC UA: 1.02
UROBILINOGEN, POC UA: NORMAL
WBC # BLD AUTO: 5.75 K/UL (ref 3.9–12.7)

## 2023-05-17 PROCEDURE — 81002 POCT URINE DIPSTICK WITHOUT MICROSCOPE: ICD-10-PCS | Mod: ,,, | Performed by: FAMILY MEDICINE

## 2023-05-17 PROCEDURE — 93010 ELECTROCARDIOGRAM REPORT: CPT | Mod: ,,, | Performed by: STUDENT IN AN ORGANIZED HEALTH CARE EDUCATION/TRAINING PROGRAM

## 2023-05-17 PROCEDURE — 3044F PR MOST RECENT HEMOGLOBIN A1C LEVEL <7.0%: ICD-10-PCS | Mod: CPTII,,, | Performed by: FAMILY MEDICINE

## 2023-05-17 PROCEDURE — 3074F SYST BP LT 130 MM HG: CPT | Mod: CPTII,,, | Performed by: FAMILY MEDICINE

## 2023-05-17 PROCEDURE — 3008F BODY MASS INDEX DOCD: CPT | Mod: CPTII,,, | Performed by: FAMILY MEDICINE

## 2023-05-17 PROCEDURE — 87086 URINE CULTURE/COLONY COUNT: CPT | Performed by: FAMILY MEDICINE

## 2023-05-17 PROCEDURE — 81002 URINALYSIS NONAUTO W/O SCOPE: CPT | Mod: ,,, | Performed by: FAMILY MEDICINE

## 2023-05-17 PROCEDURE — 99214 OFFICE O/P EST MOD 30 MIN: CPT | Mod: PO | Performed by: FAMILY MEDICINE

## 2023-05-17 PROCEDURE — 1101F PR PT FALLS ASSESS DOC 0-1 FALLS W/OUT INJ PAST YR: ICD-10-PCS | Mod: CPTII,,, | Performed by: FAMILY MEDICINE

## 2023-05-17 PROCEDURE — 1159F PR MEDICATION LIST DOCUMENTED IN MEDICAL RECORD: ICD-10-PCS | Mod: CPTII,,, | Performed by: FAMILY MEDICINE

## 2023-05-17 PROCEDURE — 1101F PT FALLS ASSESS-DOCD LE1/YR: CPT | Mod: CPTII,,, | Performed by: FAMILY MEDICINE

## 2023-05-17 PROCEDURE — 99999 PR PBB SHADOW E&M-EST. PATIENT-LVL IV: ICD-10-PCS | Mod: PBBFAC,,, | Performed by: FAMILY MEDICINE

## 2023-05-17 PROCEDURE — 85025 COMPLETE CBC W/AUTO DIFF WBC: CPT | Performed by: FAMILY MEDICINE

## 2023-05-17 PROCEDURE — 99999 PR PBB SHADOW E&M-EST. PATIENT-LVL IV: CPT | Mod: PBBFAC,,, | Performed by: FAMILY MEDICINE

## 2023-05-17 PROCEDURE — 99214 OFFICE O/P EST MOD 30 MIN: CPT | Mod: ,,, | Performed by: FAMILY MEDICINE

## 2023-05-17 PROCEDURE — 93005 ELECTROCARDIOGRAM TRACING: CPT

## 2023-05-17 PROCEDURE — 36415 COLL VENOUS BLD VENIPUNCTURE: CPT | Mod: PO | Performed by: FAMILY MEDICINE

## 2023-05-17 PROCEDURE — 3008F PR BODY MASS INDEX (BMI) DOCUMENTED: ICD-10-PCS | Mod: CPTII,,, | Performed by: FAMILY MEDICINE

## 2023-05-17 PROCEDURE — 3288F FALL RISK ASSESSMENT DOCD: CPT | Mod: CPTII,,, | Performed by: FAMILY MEDICINE

## 2023-05-17 PROCEDURE — 93010 EKG 12-LEAD: ICD-10-PCS | Mod: ,,, | Performed by: STUDENT IN AN ORGANIZED HEALTH CARE EDUCATION/TRAINING PROGRAM

## 2023-05-17 PROCEDURE — 3078F DIAST BP <80 MM HG: CPT | Mod: CPTII,,, | Performed by: FAMILY MEDICINE

## 2023-05-17 PROCEDURE — 3288F PR FALLS RISK ASSESSMENT DOCUMENTED: ICD-10-PCS | Mod: CPTII,,, | Performed by: FAMILY MEDICINE

## 2023-05-17 PROCEDURE — 3044F HG A1C LEVEL LT 7.0%: CPT | Mod: CPTII,,, | Performed by: FAMILY MEDICINE

## 2023-05-17 PROCEDURE — 1159F MED LIST DOCD IN RCRD: CPT | Mod: CPTII,,, | Performed by: FAMILY MEDICINE

## 2023-05-17 PROCEDURE — 3078F PR MOST RECENT DIASTOLIC BLOOD PRESSURE < 80 MM HG: ICD-10-PCS | Mod: CPTII,,, | Performed by: FAMILY MEDICINE

## 2023-05-17 PROCEDURE — 80053 COMPREHEN METABOLIC PANEL: CPT | Performed by: FAMILY MEDICINE

## 2023-05-17 PROCEDURE — 3074F PR MOST RECENT SYSTOLIC BLOOD PRESSURE < 130 MM HG: ICD-10-PCS | Mod: CPTII,,, | Performed by: FAMILY MEDICINE

## 2023-05-17 PROCEDURE — 99214 PR OFFICE/OUTPT VISIT, EST, LEVL IV, 30-39 MIN: ICD-10-PCS | Mod: ,,, | Performed by: FAMILY MEDICINE

## 2023-05-17 RX ORDER — NITROFURANTOIN 25; 75 MG/1; MG/1
100 CAPSULE ORAL 2 TIMES DAILY
Qty: 14 CAPSULE | Refills: 0 | Status: SHIPPED | OUTPATIENT
Start: 2023-05-17 | End: 2023-08-27 | Stop reason: SDUPTHER

## 2023-05-17 RX ORDER — OLMESARTAN MEDOXOMIL AND HYDROCHLOROTHIAZIDE 40/12.5 40; 12.5 MG/1; MG/1
1 TABLET ORAL DAILY
Qty: 90 TABLET | Refills: 3 | Status: SHIPPED | OUTPATIENT
Start: 2023-05-17

## 2023-05-17 RX ORDER — AMLODIPINE BESYLATE 5 MG/1
5 TABLET ORAL DAILY
Qty: 90 TABLET | Refills: 3 | Status: SHIPPED | OUTPATIENT
Start: 2023-05-17 | End: 2024-05-16

## 2023-05-17 RX ORDER — FUROSEMIDE 20 MG/1
20 TABLET ORAL DAILY PRN
Qty: 30 TABLET | Refills: 5 | Status: SHIPPED | OUTPATIENT
Start: 2023-05-17 | End: 2024-01-04 | Stop reason: SDUPTHER

## 2023-05-17 NOTE — PROGRESS NOTES
Subjective:      Patient ID: Divya Bui is a 63 y.o. female.    Chief Complaint: Medication Refill and Urinary Frequency      Patient here today for routine follow-up.  Blood pressure well controlled.  She reports urinary urgency, frequency for about a week now  No other acute problems today    Medication Refill    Urinary Frequency   Associated symptoms include frequency and urgency.   Review of Systems   Genitourinary:  Positive for frequency and urgency.   Past Medical History:   Diagnosis Date    Anemia     Anxiety     Basal cell carcinoma (BCC) of face 2/26/2020    Blood transfusion     Bowel incontinence     Depression     Esophageal stricture     GERD (gastroesophageal reflux disease)     Hypertension     Latent tuberculosis by skin test 2001    took INH    Liver nodule 1/6/2014    Morbid obesity with BMI of 45.0-49.9, adult     OA (osteoarthritis) of knee 12/12/2014    Pericardial effusion 9/4/2014          Past Surgical History:   Procedure Laterality Date    CHOLECYSTECTOMY      COLONOSCOPY N/A 3/6/2023    Procedure: COLONOSCOPY;  Surgeon: Beti Diallo MD;  Location: [facility];  Service: Endoscopy;  Laterality: N/A;    GASTRIC BYPASS      HERNIA REPAIR      right sholder surgery      SMALL INTESTINE SURGERY       Family History   Problem Relation Age of Onset    Lung cancer Mother         smoker    Liver cancer Father     Diabetes Sister     Crohn's disease Sister     Liver cancer Sister     Diabetes Sister     Crohn's disease Brother     Crohn's disease Other     Breast cancer Neg Hx     Ovarian cancer Neg Hx     Colon cancer Neg Hx      Social History     Socioeconomic History    Marital status:    Tobacco Use    Smoking status: Never    Smokeless tobacco: Never   Substance and Sexual Activity    Alcohol use: Not Currently     Alcohol/week: 0.0 standard drinks    Drug use: No    Sexual activity: Yes     Partners: Male     Review of patient's allergies indicates:   Allergen Reactions     "Metronidazole hcl Other (See Comments)     Mouth ulcers developed with Flagyl  Oral sores.  Mouth ulcers developed with Flagyl       Objective:       /68 (BP Location: Right arm, Patient Position: Sitting, BP Method: Large (Manual))   Pulse 77   Temp 98.5 °F (36.9 °C) (Tympanic)   Ht 5' 3" (1.6 m)   Wt 110.1 kg (242 lb 11.6 oz)   SpO2 (!) 94%   BMI 43.00 kg/m²   Physical Exam  Vitals reviewed.   Constitutional:       General: She is not in acute distress.     Appearance: Normal appearance. She is well-developed. She is obese. She is not diaphoretic.   Cardiovascular:      Rate and Rhythm: Normal rate. Rhythm irregular.      Heart sounds: Normal heart sounds.      Comments: Skipped beats  Pulmonary:      Effort: Pulmonary effort is normal. No respiratory distress.      Breath sounds: Normal breath sounds.   Abdominal:      General: Bowel sounds are normal.      Palpations: Abdomen is soft.      Tenderness: There is no abdominal tenderness.   Musculoskeletal:      Right lower leg: No edema.      Left lower leg: No edema.   Neurological:      Mental Status: She is alert.   Psychiatric:         Behavior: Behavior normal.     Assessment:     1. Urinary frequency    2. Primary hypertension    3. Anemia, unspecified type    4. Irregular heart beat      Plan:   Urinary frequency  -     POCT urine dipstick without microscope  -     Urine culture; Future; Expected date: 05/17/2023    Primary hypertension  -     Comprehensive Metabolic Panel; Future; Expected date: 11/13/2023    Anemia, unspecified type  -     CBC Auto Differential; Future; Expected date: 05/17/2023    Irregular heart beat  -     IN OFFICE EKG 12-LEAD (to [place])    Other orders  -     olmesartan-hydrochlorothiazide (BENICAR HCT) 40-12.5 mg Tab; Take 1 tablet by mouth once daily.  Dispense: 90 tablet; Refill: 3  -     amLODIPine (NORVASC) 5 MG tablet; Take 1 tablet (5 mg total) by mouth once daily.  Dispense: 90 tablet; Refill: 3  -     " nitrofurantoin, macrocrystal-monohydrate, (MACROBID) 100 MG capsule; Take 1 capsule (100 mg total) by mouth 2 (two) times daily.  Dispense: 14 capsule; Refill: 0  -     furosemide (LASIX) 20 MG tablet; Take 1 tablet (20 mg total) by mouth daily as needed (swelling).  Dispense: 30 tablet; Refill: 5    Urine dip does indicate infection, will follow culture results.  EKG:  Frequent PACs, normal sinus rhythm  Continue all other current medications.     Medication List with Changes/Refills   New Medications    FUROSEMIDE (LASIX) 20 MG TABLET    Take 1 tablet (20 mg total) by mouth daily as needed (swelling).    NITROFURANTOIN, MACROCRYSTAL-MONOHYDRATE, (MACROBID) 100 MG CAPSULE    Take 1 capsule (100 mg total) by mouth 2 (two) times daily.   Current Medications    ALPRAZOLAM (XANAX XR) 2 MG TB24    Take 1 mg by mouth once daily. 2 mg tablet(2tabs) oral in the am and 1 mg(1tab)in the afternoon    ARIPIPRAZOLE (ABILIFY) 10 MG TAB    Take 10 mg by mouth once daily.    ASPIRIN 81 MG TAB    Take 1 tablet by mouth Daily.    ATORVASTATIN (LIPITOR) 40 MG TABLET    Take 1 tablet (40 mg total) by mouth once daily.    CHOLESTYRAMINE-ASPARTAME (CHOLESTYRAMINE LIGHT) 4 GRAM PWPK    Take 1 packet (4 g total) by mouth 2 (two) times daily.    DICYCLOMINE (BENTYL) 20 MG TABLET    Take 1 tablet (20 mg total) by mouth 3 (three) times daily as needed (abdominal pain).    DOXEPIN (SINEQUAN) 50 MG CAPSULE    Take 50 mg by mouth nightly.    FLUOXETINE (PROZAC) 40 MG CAPSULE    Take 1 capsule (40 mg total) by mouth once daily.    HYDROCORTISONE 2.5 % CREAM    Apply topically.    KETOCONAZOLE (NIZORAL) 2 % CREAM    Apply topically once daily.    METHEN-M.BLUE-S.PHOS-PHSAL-HYO (URIBEL) 118-10-40.8-36 MG CAP    Take 1 capsule by mouth 3 (three) times daily as needed (bladder pain).    MUPIROCIN (BACTROBAN) 2 % OINTMENT    Apply topically as needed.    PROMETHAZINE (PHENERGAN) 25 MG TABLET    Take 1 tablet (25 mg total) by mouth every 6 (six) hours  as needed for Nausea.    SUCRALFATE (CARAFATE) 1 GRAM TABLET    Take 1 tablet (1 g total) by mouth 4 (four) times daily.    ZOLPIDEM (AMBIEN) 10 MG TAB    Take 10 mg by mouth nightly as needed.   Changed and/or Refilled Medications    Modified Medication Previous Medication    AMLODIPINE (NORVASC) 5 MG TABLET amLODIPine (NORVASC) 5 MG tablet       Take 1 tablet (5 mg total) by mouth once daily.    Take 1 tablet (5 mg total) by mouth once daily.    OLMESARTAN-HYDROCHLOROTHIAZIDE (BENICAR HCT) 40-12.5 MG TAB olmesartan-hydrochlorothiazide (BENICAR HCT) 40-12.5 mg Tab       Take 1 tablet by mouth once daily.    Take 1 tablet by mouth once daily.

## 2023-05-18 LAB
BACTERIA UR CULT: NORMAL
BACTERIA UR CULT: NORMAL

## 2023-05-22 ENCOUNTER — TELEPHONE (OUTPATIENT)
Dept: INTERNAL MEDICINE | Facility: CLINIC | Age: 63
End: 2023-05-22
Payer: MEDICARE

## 2023-05-22 DIAGNOSIS — R35.0 URINARY FREQUENCY: Primary | ICD-10-CM

## 2023-05-22 NOTE — TELEPHONE ENCOUNTER
Patient states she is having the urgency, painful urinating, yellow tint.  She has completed the macrobid and requesting another round of the abx.

## 2023-05-22 NOTE — TELEPHONE ENCOUNTER
----- Message from Abundio Oseguera sent at 5/22/2023 12:51 PM CDT -----  Contact: Divya Rodriguezi is calling to in regards to the medication hat was prescribe for her UTI . Please send a new RX over     [facility] - [place], [place] - [address] [address]  Phone: [phone] Fax: [phone]

## 2023-05-22 NOTE — TELEPHONE ENCOUNTER
Urine culture is ordered, she can come bring the specimen today or tomorrow.  Should not have completed the antibiotic until Thursday morning

## 2023-05-23 ENCOUNTER — LAB VISIT (OUTPATIENT)
Dept: LAB | Facility: HOSPITAL | Age: 63
End: 2023-05-23
Attending: FAMILY MEDICINE
Payer: MEDICARE

## 2023-05-23 DIAGNOSIS — R35.0 URINARY FREQUENCY: ICD-10-CM

## 2023-05-23 PROCEDURE — 87086 URINE CULTURE/COLONY COUNT: CPT | Performed by: FAMILY MEDICINE

## 2023-05-25 ENCOUNTER — TELEPHONE (OUTPATIENT)
Dept: INTERNAL MEDICINE | Facility: CLINIC | Age: 63
End: 2023-05-25
Payer: MEDICARE

## 2023-05-25 LAB
BACTERIA UR CULT: NORMAL
BACTERIA UR CULT: NORMAL

## 2023-05-25 NOTE — TELEPHONE ENCOUNTER
----- Message from Alannah Malone sent at 5/25/2023  4:04 PM CDT -----  Contact: Divya Stokes is needing a call back in regards to having a bladder infection. She stated that she wants to understand her results and that she is still in pain. Please give her a call back at [phone]

## 2023-05-31 NOTE — PROGRESS NOTES
New Patient Chronic Pain Note (Initial Visit)    Referring Physician: Angel Khan MD    PCP: Angel Khan MD    Chief Complaint:   Chief Complaint   Patient presents with    Knee Pain    Low-back Pain    Hip Pain    Leg Pain     Patient has pain in several areas mostly knees then radiates to legs, hips and lower back.  Pain scale 8/10        SUBJECTIVE:    Divya Bui is a 63 y.o. female with past medical history significant for anxiety/depression, hypertension, hearing loss, stage 3 chronic kidney disease, morbid obesity status post bariatric surgery, diverticulosis, GERD, osteoarthritis/osteopenia, status post right total knee arthroplasty, cerebrovascular accident who presents to the clinic for the evaluation of bilateral knee pain.  Patient reports pain has been present for over 5+ years without inciting accident injury or trauma.  Patient reports receiving prior total knee arthroplasty, approximately 5-6 years prior with  at [facility].  Patient reports minimal, approximately 30% relief following knee replacement.  Today she reports pain which is constant which is rated an 8/10.  Pain at its best is a 7/10 and at its worse is a 10/10.  Pain is described as sharp in nature and predominantly over the suprapatellar aspect of both knees.  Pain is exacerbated with prolonged standing and walking.  She does report significant weakness in the lower extremities associated with her pain.  Patient particularly mentions difficulty stepping up on occur or stepping off of a curb without requiring assistance.  Pain is marginally improved with Advil which she takes up to 4 pills daily.  Patient reports she is completed aquatic therapy in the past and has continued physician directed physical therapy exercises at home over the last 6 weeks without meaningful improvement in her pain.    Patient reports significant motor weakness.  Patient denies night fever/night sweats, urinary incontinence, bowel  incontinence, significant weight loss, and loss of sensations.      Pain Disability Index Review:     Last 3 PDI Scores 6/1/2023   Pain Disability Index (PDI) 45       Non-Pharmacologic Treatments:  Physical Therapy/Home Exercise: yes  Ice/Heat:yes  TENS: no  Acupuncture: no  Massage: no  Chiropractic: no    Other: no      Pain Medications:  - Adjuvant Medications: Ambien (Zolpidem) and Xanax (Alprazolam)  - Anti-Coagulants: Aspirin    Pain Procedures:   None    Past Medical History:   Diagnosis Date    Anemia     Anxiety     Basal cell carcinoma (BCC) of face 2/26/2020    Blood transfusion     Bowel incontinence     Depression     Esophageal stricture     GERD (gastroesophageal reflux disease)     Hypertension     Latent tuberculosis by skin test 2001    took INH    Liver nodule 1/6/2014    Morbid obesity with BMI of 45.0-49.9, adult     OA (osteoarthritis) of knee 12/12/2014    Pericardial effusion 9/4/2014     Past Surgical History:   Procedure Laterality Date    CHOLECYSTECTOMY      COLONOSCOPY N/A 3/6/2023    Procedure: COLONOSCOPY;  Surgeon: Beti Diallo MD;  Location: [facility];  Service: Endoscopy;  Laterality: N/A;    GASTRIC BYPASS      HERNIA REPAIR      right sholder surgery      SMALL INTESTINE SURGERY       Review of patient's allergies indicates:   Allergen Reactions    Metronidazole hcl Other (See Comments)     Mouth ulcers developed with Flagyl  Oral sores.  Mouth ulcers developed with Flagyl       Current Outpatient Medications   Medication Sig    alprazolam (XANAX XR) 2 MG Tb24 Take 1 mg by mouth once daily. 2 mg tablet(2tabs) oral in the am and 1 mg(1tab)in the afternoon    amLODIPine (NORVASC) 5 MG tablet Take 1 tablet (5 mg total) by mouth once daily.    ARIPiprazole (ABILIFY) 10 MG Tab Take 10 mg by mouth once daily.    aspirin 81 mg Tab Take 1 tablet by mouth Daily.    atorvastatin (LIPITOR) 40 MG tablet Take 1 tablet (40 mg total) by mouth once daily.    dicyclomine (BENTYL) 20 mg  tablet Take 1 tablet (20 mg total) by mouth 3 (three) times daily as needed (abdominal pain).    doxepin (SINEQUAN) 50 MG capsule Take 50 mg by mouth nightly.    furosemide (LASIX) 20 MG tablet Take 1 tablet (20 mg total) by mouth daily as needed (swelling).    hydrocortisone 2.5 % cream Apply topically.    ketoconazole (NIZORAL) 2 % cream Apply topically once daily.    methen-dreablue-s.phos-phsal-hyo (URIBEL) 118-10-40.8-36 mg Cap Take 1 capsule by mouth 3 (three) times daily as needed (bladder pain).    mupirocin (BACTROBAN) 2 % ointment Apply topically as needed.    nitrofurantoin, macrocrystal-monohydrate, (MACROBID) 100 MG capsule Take 1 capsule (100 mg total) by mouth 2 (two) times daily.    olmesartan-hydrochlorothiazide (BENICAR HCT) 40-12.5 mg Tab Take 1 tablet by mouth once daily.    promethazine (PHENERGAN) 25 MG tablet Take 1 tablet (25 mg total) by mouth every 6 (six) hours as needed for Nausea.    sucralfate (CARAFATE) 1 gram tablet Take 1 tablet (1 g total) by mouth 4 (four) times daily.    zolpidem (AMBIEN) 10 mg Tab Take 10 mg by mouth nightly as needed.    cholestyramine-aspartame (CHOLESTYRAMINE LIGHT) 4 gram PwPk Take 1 packet (4 g total) by mouth 2 (two) times daily. (Patient not taking: Reported on 5/17/2023)    fluoxetine (PROZAC) 40 MG capsule Take 1 capsule (40 mg total) by mouth once daily.    pregabalin (LYRICA) 75 MG capsule Take 1 capsule (75 mg total) by mouth every evening for 10 days, THEN 2 capsules (150 mg total) every evening for 10 days, THEN 3 capsules (225 mg total) every evening for 10 days.     No current facility-administered medications for this visit.       Review of Systems     GENERAL:  No weight loss, malaise or fevers.  HEENT:   No recent changes in vision or hearing  NECK:  Negative for lumps, no difficulty with swallowing.  RESPIRATORY:  Negative for cough, wheezing or shortness of breath, patient denies any recent URI.  CARDIOVASCULAR:  Negative for chest pain or  "palpitations.  GI:  Negative for abdominal discomfort, blood in stools or black stools or change in bowel habits.  MUSCULOSKELETAL:  See HPI.  SKIN:  Negative for lesions, rash, and itching.  PSYCH:  No mood disorder or recent psychosocial stressors.   HEMATOLOGY/LYMPHOLOGY:  Negative for prolonged bleeding, bruising easily or swollen nodes.    NEURO:   No history of syncope, paralysis, seizures or tremors.  All other reviewed and negative other than HPI.    OBJECTIVE:    BP (!) 184/104   Pulse 107   Resp 16   Ht 5' 3" (1.6 m)   Wt 108.2 kg (238 lb 8.6 oz)   BMI 42.26 kg/m²       Physical Exam    GENERAL: Well appearing, in no acute distress, alert and oriented x3.Obese  PSYCH:  Mood and affect appropriate.  SKIN: Skin color, texture, turgor normal, no rashes or lesions.  HEAD/FACE:  Normocephalic, atraumatic. Cranial nerves grossly intact.      CV: RRR with palpation of the radial artery.  PULM: No evidence of respiratory difficulty, symmetric chest rise.  GI:  Soft and non-tender.    BACK: Straight leg raising in the sitting and supine positions is negative to radicular pain.  pain to palpation over the facet joints of the lumbar spine or spinous processes. Normal range of motion without pain reproduction.  EXTREMITIES:  peripheral joint range of motion is reduced with instability in bilateral lower extremities.. No deformities, edema, or skin discoloration. Good capillary refill.  MUSCULOSKELETAL: Unable to stand on heels & toes.   hip provocative maneuvers are negative.      Knee Exam:  bilateral    Erythema: Absent  Deformity/Swelling: Absent  Effusion: Absent  Chronic Bony Changes: Absent  Creptius: Absent     milddiffuse tenderness palpation of the mediolateral joint lines and patella     ROM: full range with pain     Strength is 5/5 bilateral     Meniscus   Brennan:  Medial - negative Lateral - negative    Stability Lachman: normal  PCL-Posterior Drawer: normal MCL - Valgus: normal LCL - Varus: normal "     Patella   Patellar apprehension: negative  Patellar Tracking: normal     Neurovascular intact      Facet loading test is negative bilaterally.   Bilateral upper and lower extremity strength is normal and symmetric.  No atrophy or tone abnormalities are noted.    RIGHT Lower extremity: Hip flexion 5/5, Hip Abduction 5/5, Hip Adduction 5/5, Knee extension 5/5, Knee flexion 5/5, Ankle dorsiflexion5/5, Extensor hallucis longus 5/5, Ankle plantarflexion 5/5  LEFT Lower extremity:  Hip flexion 5/5, Hip Abduction 5/5,Hip Adduction 5/5, Knee extension 5/5, Knee flexion 5/5, Ankle dorsiflexion 5/5, Extensor hallucis longus 5/5, Ankle plantarflexion 5/5  -Normal testing knee (patellar) jerk and ankle (achilles) jerk    NEURO: Bilateral upper and lower extremity coordination and muscle stretch reflexes are physiologic and symmetric. No loss of sensation is noted.  GAIT: normal.    Imaging:   Bilateral knee x-ray 01/10/2023  FINDINGS:  Bilateral total knee arthroplasties are noted.  No hardware failure or loosening.  No periprosthetic fracture.  No joint effusions are suggested.    09/27/21    X-Ray Lumbar Spine Ap And Lateral    Narrative  EXAMINATION:  XR LUMBAR SPINE AP AND LATERAL    CLINICAL HISTORY:  XR LUMBAR SPINE AP AND LATERAL    COMPARISON:  None    FINDINGS:  Multiple radiographic views  were obtained.    No evidence of acute fracture or dislocation.  Mild degenerative joint disease.  Postsurgical changes of prior cholecystectomy.  No spondylolisthesis      ASSESSMENT: 63 y.o. year old female with bilateral knee pain, consistent with     1. History of bilateral knee arthroplasty  IR Peripheral Nerve Injection    Case Request-RAD/Other Procedure Area: Bilateral Genicular nerve block with RN IV sedation    IR Peripheral Nerve Injection    Ambulatory referral/consult to Physical/Occupational Therapy      2. Chronic pain of both knees  Ambulatory referral/consult to Pain Clinic    IR Peripheral Nerve Injection     Case Request-RAD/Other Procedure Area: Bilateral Genicular nerve block with RN IV sedation    IR Peripheral Nerve Injection    Ambulatory referral/consult to Physical/Occupational Therapy            PLAN:   - Interventions: We have discussed scheduling a diagnostic bilateral genicular nerve block to determine the extent to which the degenerative changes in the knee are mediated by the branches of the genicular nerve. We have discussed targeting the superior medial genicular nerve (midpoint of the femur at the superior medial epicondyle), the superior lateral genicular nerve (midpoint of the femur at the superior lateral epicondyle), and the inferior medial genicular nerve (midpoint of the tibia at the inferior medial epicondyle). If the block reproducibly results in significant relief of the patients usual pain,then this would be indication for subsequent therapeutic radiofrequency ablation for more sustained relief.     Explained the risks and benefits of the procedure in detail with the patient today in clinic along with alternative treatment options, and the patient elected to pursue the intervention at this time.      - Anticoagulation use: Yes aspirin     report:  Reviewed and consistent with medication use as prescribed.    - Medications:  --I will start the patient on a Lyrica titration to see if this helps with neuropathic pain.  We discussed increasing the dose gradually to reach a therapeutic goal according to the following algorithm.  We discussed potential side effects of this medication which may include drowsiness,dizziness, dry mouth, constipation or peripheral edema.    -Week 1: Take 1 capsule, 75 mg q.h.s.  -Week 2: Take 2 capsules, 150 mg q.h.s.  -Week 3: Take 3 capsules, 225 mg q.h.s.      - Therapy:   We discussed initiating physical therapy to help manage the patient/s painful condition. The patient was counseled that muscle strengthening will improve the long term prognosis in regards to  pain and may also help increase range of motion and mobility. They were told that one of the goals of physical therapy is that they learn how to do the exercises so that they can do them independently at home daily upon completion. The patient's questions were answered and they were agreeable to this course. [facility].    - Imaging: Reviewed available imaging with patient and answered any questions they had regarding study.    - Records: Obtain old records from outside physicians and imaging:    - Follow up visit: return to clinic in 4-6 weeks      The above plan and management options were discussed at length with patient. Patient is in agreement with the above and verbalized understanding.    - I discussed the goals of interventional chronic pain management with the patient on today's visit. We discussed a multimodal and systematic approach to pain.  This includes diagnostic and therapeutic injections, adjuvant pharmacologic treatment, physical therapy, and at times psychiatry.  I emphasized the importance of regular exercise, core strengthening and stretching, diet and weight loss as a cornerstone of long-term pain management.    - This condition does not require this patient to take time off of work, and the primary goal of our Pain Management services is to improve the patient's functional capacity.  - Patient Questions: Answered all of the patient's questions regarding diagnoses, therapy, treatment and next steps        Denis Lai MD  Interventional Pain Management  [facility]    Disclaimer:  This note was prepared using voice recognition system and is likely to have sound alike errors that may have been overlooked even after proof reading.  Please call me with any questions

## 2023-05-31 NOTE — H&P (VIEW-ONLY)
New Patient Chronic Pain Note (Initial Visit)    Referring Physician: Angel Khan MD    PCP: Angel Khan MD    Chief Complaint:   Chief Complaint   Patient presents with    Knee Pain    Low-back Pain    Hip Pain    Leg Pain     Patient has pain in several areas mostly knees then radiates to legs, hips and lower back.  Pain scale 8/10        SUBJECTIVE:    Divya Bui is a 63 y.o. female with past medical history significant for anxiety/depression, hypertension, hearing loss, stage 3 chronic kidney disease, morbid obesity status post bariatric surgery, diverticulosis, GERD, osteoarthritis/osteopenia, status post right total knee arthroplasty, cerebrovascular accident who presents to the clinic for the evaluation of bilateral knee pain.  Patient reports pain has been present for over 5+ years without inciting accident injury or trauma.  Patient reports receiving prior total knee arthroplasty, approximately 5-6 years prior with  at [facility].  Patient reports minimal, approximately 30% relief following knee replacement.  Today she reports pain which is constant which is rated an 8/10.  Pain at its best is a 7/10 and at its worse is a 10/10.  Pain is described as sharp in nature and predominantly over the suprapatellar aspect of both knees.  Pain is exacerbated with prolonged standing and walking.  She does report significant weakness in the lower extremities associated with her pain.  Patient particularly mentions difficulty stepping up on occur or stepping off of a curb without requiring assistance.  Pain is marginally improved with Advil which she takes up to 4 pills daily.  Patient reports she is completed aquatic therapy in the past and has continued physician directed physical therapy exercises at home over the last 6 weeks without meaningful improvement in her pain.    Patient reports significant motor weakness.  Patient denies night fever/night sweats, urinary incontinence, bowel  incontinence, significant weight loss, and loss of sensations.      Pain Disability Index Review:     Last 3 PDI Scores 6/1/2023   Pain Disability Index (PDI) 45       Non-Pharmacologic Treatments:  Physical Therapy/Home Exercise: yes  Ice/Heat:yes  TENS: no  Acupuncture: no  Massage: no  Chiropractic: no    Other: no      Pain Medications:  - Adjuvant Medications: Ambien (Zolpidem) and Xanax (Alprazolam)  - Anti-Coagulants: Aspirin    Pain Procedures:   None    Past Medical History:   Diagnosis Date    Anemia     Anxiety     Basal cell carcinoma (BCC) of face 2/26/2020    Blood transfusion     Bowel incontinence     Depression     Esophageal stricture     GERD (gastroesophageal reflux disease)     Hypertension     Latent tuberculosis by skin test 2001    took INH    Liver nodule 1/6/2014    Morbid obesity with BMI of 45.0-49.9, adult     OA (osteoarthritis) of knee 12/12/2014    Pericardial effusion 9/4/2014     Past Surgical History:   Procedure Laterality Date    CHOLECYSTECTOMY      COLONOSCOPY N/A 3/6/2023    Procedure: COLONOSCOPY;  Surgeon: Beti Diallo MD;  Location: [facility];  Service: Endoscopy;  Laterality: N/A;    GASTRIC BYPASS      HERNIA REPAIR      right sholder surgery      SMALL INTESTINE SURGERY       Review of patient's allergies indicates:   Allergen Reactions    Metronidazole hcl Other (See Comments)     Mouth ulcers developed with Flagyl  Oral sores.  Mouth ulcers developed with Flagyl       Current Outpatient Medications   Medication Sig    alprazolam (XANAX XR) 2 MG Tb24 Take 1 mg by mouth once daily. 2 mg tablet(2tabs) oral in the am and 1 mg(1tab)in the afternoon    amLODIPine (NORVASC) 5 MG tablet Take 1 tablet (5 mg total) by mouth once daily.    ARIPiprazole (ABILIFY) 10 MG Tab Take 10 mg by mouth once daily.    aspirin 81 mg Tab Take 1 tablet by mouth Daily.    atorvastatin (LIPITOR) 40 MG tablet Take 1 tablet (40 mg total) by mouth once daily.    dicyclomine (BENTYL) 20 mg  tablet Take 1 tablet (20 mg total) by mouth 3 (three) times daily as needed (abdominal pain).    doxepin (SINEQUAN) 50 MG capsule Take 50 mg by mouth nightly.    furosemide (LASIX) 20 MG tablet Take 1 tablet (20 mg total) by mouth daily as needed (swelling).    hydrocortisone 2.5 % cream Apply topically.    ketoconazole (NIZORAL) 2 % cream Apply topically once daily.    methen-dreablue-s.phos-phsal-hyo (URIBEL) 118-10-40.8-36 mg Cap Take 1 capsule by mouth 3 (three) times daily as needed (bladder pain).    mupirocin (BACTROBAN) 2 % ointment Apply topically as needed.    nitrofurantoin, macrocrystal-monohydrate, (MACROBID) 100 MG capsule Take 1 capsule (100 mg total) by mouth 2 (two) times daily.    olmesartan-hydrochlorothiazide (BENICAR HCT) 40-12.5 mg Tab Take 1 tablet by mouth once daily.    promethazine (PHENERGAN) 25 MG tablet Take 1 tablet (25 mg total) by mouth every 6 (six) hours as needed for Nausea.    sucralfate (CARAFATE) 1 gram tablet Take 1 tablet (1 g total) by mouth 4 (four) times daily.    zolpidem (AMBIEN) 10 mg Tab Take 10 mg by mouth nightly as needed.    cholestyramine-aspartame (CHOLESTYRAMINE LIGHT) 4 gram PwPk Take 1 packet (4 g total) by mouth 2 (two) times daily. (Patient not taking: Reported on 5/17/2023)    fluoxetine (PROZAC) 40 MG capsule Take 1 capsule (40 mg total) by mouth once daily.    pregabalin (LYRICA) 75 MG capsule Take 1 capsule (75 mg total) by mouth every evening for 10 days, THEN 2 capsules (150 mg total) every evening for 10 days, THEN 3 capsules (225 mg total) every evening for 10 days.     No current facility-administered medications for this visit.       Review of Systems     GENERAL:  No weight loss, malaise or fevers.  HEENT:   No recent changes in vision or hearing  NECK:  Negative for lumps, no difficulty with swallowing.  RESPIRATORY:  Negative for cough, wheezing or shortness of breath, patient denies any recent URI.  CARDIOVASCULAR:  Negative for chest pain or  "palpitations.  GI:  Negative for abdominal discomfort, blood in stools or black stools or change in bowel habits.  MUSCULOSKELETAL:  See HPI.  SKIN:  Negative for lesions, rash, and itching.  PSYCH:  No mood disorder or recent psychosocial stressors.   HEMATOLOGY/LYMPHOLOGY:  Negative for prolonged bleeding, bruising easily or swollen nodes.    NEURO:   No history of syncope, paralysis, seizures or tremors.  All other reviewed and negative other than HPI.    OBJECTIVE:    BP (!) 184/104   Pulse 107   Resp 16   Ht 5' 3" (1.6 m)   Wt 108.2 kg (238 lb 8.6 oz)   BMI 42.26 kg/m²       Physical Exam    GENERAL: Well appearing, in no acute distress, alert and oriented x3.Obese  PSYCH:  Mood and affect appropriate.  SKIN: Skin color, texture, turgor normal, no rashes or lesions.  HEAD/FACE:  Normocephalic, atraumatic. Cranial nerves grossly intact.      CV: RRR with palpation of the radial artery.  PULM: No evidence of respiratory difficulty, symmetric chest rise.  GI:  Soft and non-tender.    BACK: Straight leg raising in the sitting and supine positions is negative to radicular pain.  pain to palpation over the facet joints of the lumbar spine or spinous processes. Normal range of motion without pain reproduction.  EXTREMITIES:  peripheral joint range of motion is reduced with instability in bilateral lower extremities.. No deformities, edema, or skin discoloration. Good capillary refill.  MUSCULOSKELETAL: Unable to stand on heels & toes.   hip provocative maneuvers are negative.      Knee Exam:  bilateral    Erythema: Absent  Deformity/Swelling: Absent  Effusion: Absent  Chronic Bony Changes: Absent  Creptius: Absent     milddiffuse tenderness palpation of the mediolateral joint lines and patella     ROM: full range with pain     Strength is 5/5 bilateral     Meniscus   Brennan:  Medial - negative Lateral - negative    Stability Lachman: normal  PCL-Posterior Drawer: normal MCL - Valgus: normal LCL - Varus: normal "     Patella   Patellar apprehension: negative  Patellar Tracking: normal     Neurovascular intact      Facet loading test is negative bilaterally.   Bilateral upper and lower extremity strength is normal and symmetric.  No atrophy or tone abnormalities are noted.    RIGHT Lower extremity: Hip flexion 5/5, Hip Abduction 5/5, Hip Adduction 5/5, Knee extension 5/5, Knee flexion 5/5, Ankle dorsiflexion5/5, Extensor hallucis longus 5/5, Ankle plantarflexion 5/5  LEFT Lower extremity:  Hip flexion 5/5, Hip Abduction 5/5,Hip Adduction 5/5, Knee extension 5/5, Knee flexion 5/5, Ankle dorsiflexion 5/5, Extensor hallucis longus 5/5, Ankle plantarflexion 5/5  -Normal testing knee (patellar) jerk and ankle (achilles) jerk    NEURO: Bilateral upper and lower extremity coordination and muscle stretch reflexes are physiologic and symmetric. No loss of sensation is noted.  GAIT: normal.    Imaging:   Bilateral knee x-ray 01/10/2023  FINDINGS:  Bilateral total knee arthroplasties are noted.  No hardware failure or loosening.  No periprosthetic fracture.  No joint effusions are suggested.    09/27/21    X-Ray Lumbar Spine Ap And Lateral    Narrative  EXAMINATION:  XR LUMBAR SPINE AP AND LATERAL    CLINICAL HISTORY:  XR LUMBAR SPINE AP AND LATERAL    COMPARISON:  None    FINDINGS:  Multiple radiographic views  were obtained.    No evidence of acute fracture or dislocation.  Mild degenerative joint disease.  Postsurgical changes of prior cholecystectomy.  No spondylolisthesis      ASSESSMENT: 63 y.o. year old female with bilateral knee pain, consistent with     1. History of bilateral knee arthroplasty  IR Peripheral Nerve Injection    Case Request-RAD/Other Procedure Area: Bilateral Genicular nerve block with RN IV sedation    IR Peripheral Nerve Injection    Ambulatory referral/consult to Physical/Occupational Therapy      2. Chronic pain of both knees  Ambulatory referral/consult to Pain Clinic    IR Peripheral Nerve Injection     Case Request-RAD/Other Procedure Area: Bilateral Genicular nerve block with RN IV sedation    IR Peripheral Nerve Injection    Ambulatory referral/consult to Physical/Occupational Therapy            PLAN:   - Interventions: We have discussed scheduling a diagnostic bilateral genicular nerve block to determine the extent to which the degenerative changes in the knee are mediated by the branches of the genicular nerve. We have discussed targeting the superior medial genicular nerve (midpoint of the femur at the superior medial epicondyle), the superior lateral genicular nerve (midpoint of the femur at the superior lateral epicondyle), and the inferior medial genicular nerve (midpoint of the tibia at the inferior medial epicondyle). If the block reproducibly results in significant relief of the patients usual pain,then this would be indication for subsequent therapeutic radiofrequency ablation for more sustained relief.     Explained the risks and benefits of the procedure in detail with the patient today in clinic along with alternative treatment options, and the patient elected to pursue the intervention at this time.      - Anticoagulation use: Yes aspirin     report:  Reviewed and consistent with medication use as prescribed.    - Medications:  --I will start the patient on a Lyrica titration to see if this helps with neuropathic pain.  We discussed increasing the dose gradually to reach a therapeutic goal according to the following algorithm.  We discussed potential side effects of this medication which may include drowsiness,dizziness, dry mouth, constipation or peripheral edema.    -Week 1: Take 1 capsule, 75 mg q.h.s.  -Week 2: Take 2 capsules, 150 mg q.h.s.  -Week 3: Take 3 capsules, 225 mg q.h.s.      - Therapy:   We discussed initiating physical therapy to help manage the patient/s painful condition. The patient was counseled that muscle strengthening will improve the long term prognosis in regards to  pain and may also help increase range of motion and mobility. They were told that one of the goals of physical therapy is that they learn how to do the exercises so that they can do them independently at home daily upon completion. The patient's questions were answered and they were agreeable to this course. [facility] the patient.    - Imaging: Reviewed available imaging with patient and answered any questions they had regarding study.    - Records: Obtain old records from outside physicians and imaging:    - Follow up visit: return to clinic in 4-6 weeks      The above plan and management options were discussed at length with patient. Patient is in agreement with the above and verbalized understanding.    - I discussed the goals of interventional chronic pain management with the patient on today's visit. We discussed a multimodal and systematic approach to pain.  This includes diagnostic and therapeutic injections, adjuvant pharmacologic treatment, physical therapy, and at times psychiatry.  I emphasized the importance of regular exercise, core strengthening and stretching, diet and weight loss as a cornerstone of long-term pain management.    - This condition does not require this patient to take time off of work, and the primary goal of our Pain Management services is to improve the patient's functional capacity.  - Patient Questions: Answered all of the patient's questions regarding diagnoses, therapy, treatment and next steps        Denis Lai MD  Interventional Pain Management  [facility]    Disclaimer:  This note was prepared using voice recognition system and is likely to have sound alike errors that may have been overlooked even after proof reading.  Please call me with any questions

## 2023-06-01 ENCOUNTER — OFFICE VISIT (OUTPATIENT)
Dept: PAIN MEDICINE | Facility: CLINIC | Age: 63
End: 2023-06-01
Payer: MEDICARE

## 2023-06-01 ENCOUNTER — TELEPHONE (OUTPATIENT)
Dept: PAIN MEDICINE | Facility: CLINIC | Age: 63
End: 2023-06-01

## 2023-06-01 VITALS
HEIGHT: 63 IN | DIASTOLIC BLOOD PRESSURE: 104 MMHG | SYSTOLIC BLOOD PRESSURE: 184 MMHG | HEART RATE: 107 BPM | RESPIRATION RATE: 16 BRPM | BODY MASS INDEX: 42.27 KG/M2 | WEIGHT: 238.56 LBS

## 2023-06-01 DIAGNOSIS — G89.29 CHRONIC PAIN OF BOTH KNEES: ICD-10-CM

## 2023-06-01 DIAGNOSIS — M25.561 CHRONIC PAIN OF BOTH KNEES: ICD-10-CM

## 2023-06-01 DIAGNOSIS — Z96.653 HISTORY OF BILATERAL KNEE ARTHROPLASTY: Primary | ICD-10-CM

## 2023-06-01 DIAGNOSIS — M25.562 CHRONIC PAIN OF BOTH KNEES: ICD-10-CM

## 2023-06-01 PROCEDURE — 1101F PR PT FALLS ASSESS DOC 0-1 FALLS W/OUT INJ PAST YR: ICD-10-PCS | Mod: CPTII,S$GLB,, | Performed by: ANESTHESIOLOGY

## 2023-06-01 PROCEDURE — 3080F PR MOST RECENT DIASTOLIC BLOOD PRESSURE >= 90 MM HG: ICD-10-PCS | Mod: CPTII,S$GLB,, | Performed by: ANESTHESIOLOGY

## 2023-06-01 PROCEDURE — 3044F PR MOST RECENT HEMOGLOBIN A1C LEVEL <7.0%: ICD-10-PCS | Mod: CPTII,S$GLB,, | Performed by: ANESTHESIOLOGY

## 2023-06-01 PROCEDURE — 1159F MED LIST DOCD IN RCRD: CPT | Mod: CPTII,S$GLB,, | Performed by: ANESTHESIOLOGY

## 2023-06-01 PROCEDURE — 99999 PR PBB SHADOW E&M-EST. PATIENT-LVL V: ICD-10-PCS | Mod: PBBFAC,,, | Performed by: ANESTHESIOLOGY

## 2023-06-01 PROCEDURE — 3008F BODY MASS INDEX DOCD: CPT | Mod: CPTII,S$GLB,, | Performed by: ANESTHESIOLOGY

## 2023-06-01 PROCEDURE — 3288F PR FALLS RISK ASSESSMENT DOCUMENTED: ICD-10-PCS | Mod: CPTII,S$GLB,, | Performed by: ANESTHESIOLOGY

## 2023-06-01 PROCEDURE — 1101F PT FALLS ASSESS-DOCD LE1/YR: CPT | Mod: CPTII,S$GLB,, | Performed by: ANESTHESIOLOGY

## 2023-06-01 PROCEDURE — 3077F PR MOST RECENT SYSTOLIC BLOOD PRESSURE >= 140 MM HG: ICD-10-PCS | Mod: CPTII,S$GLB,, | Performed by: ANESTHESIOLOGY

## 2023-06-01 PROCEDURE — 1159F PR MEDICATION LIST DOCUMENTED IN MEDICAL RECORD: ICD-10-PCS | Mod: CPTII,S$GLB,, | Performed by: ANESTHESIOLOGY

## 2023-06-01 PROCEDURE — 99204 PR OFFICE/OUTPT VISIT, NEW, LEVL IV, 45-59 MIN: ICD-10-PCS | Mod: S$GLB,,, | Performed by: ANESTHESIOLOGY

## 2023-06-01 PROCEDURE — 3008F PR BODY MASS INDEX (BMI) DOCUMENTED: ICD-10-PCS | Mod: CPTII,S$GLB,, | Performed by: ANESTHESIOLOGY

## 2023-06-01 PROCEDURE — 3080F DIAST BP >= 90 MM HG: CPT | Mod: CPTII,S$GLB,, | Performed by: ANESTHESIOLOGY

## 2023-06-01 PROCEDURE — 3077F SYST BP >= 140 MM HG: CPT | Mod: CPTII,S$GLB,, | Performed by: ANESTHESIOLOGY

## 2023-06-01 PROCEDURE — 99204 OFFICE O/P NEW MOD 45 MIN: CPT | Mod: S$GLB,,, | Performed by: ANESTHESIOLOGY

## 2023-06-01 PROCEDURE — 99999 PR PBB SHADOW E&M-EST. PATIENT-LVL V: CPT | Mod: PBBFAC,,, | Performed by: ANESTHESIOLOGY

## 2023-06-01 PROCEDURE — 3288F FALL RISK ASSESSMENT DOCD: CPT | Mod: CPTII,S$GLB,, | Performed by: ANESTHESIOLOGY

## 2023-06-01 PROCEDURE — 3044F HG A1C LEVEL LT 7.0%: CPT | Mod: CPTII,S$GLB,, | Performed by: ANESTHESIOLOGY

## 2023-06-01 RX ORDER — PREGABALIN 75 MG/1
CAPSULE ORAL
Qty: 60 CAPSULE | Refills: 0 | Status: SHIPPED | OUTPATIENT
Start: 2023-06-01 | End: 2023-07-27 | Stop reason: DRUGHIGH

## 2023-06-01 NOTE — TELEPHONE ENCOUNTER
Called patient to inform her that she did leave yaquelin in the lobby.  Patient informed me that her son will pick it up for her (Herbert Bui).  Gave this information to the  4th floor at [facility]

## 2023-06-01 NOTE — TELEPHONE ENCOUNTER
----- Message from Lee Phillips MA sent at 6/1/2023  2:11 PM CDT -----  Contact: Divya    ----- Message -----  From: Jaden Lopez  Sent: 6/1/2023   2:06 PM CDT  To: Ming Barrios Staff    Divya would like a call back at [phone] in regards to her appointment she had today 6/1/23, this morning patient believes she left her Jonel table in the office and would like for someone to check for it.  Thanks   Am

## 2023-06-09 NOTE — PRE-PROCEDURE INSTRUCTIONS
Spoke with patient regarding procedure scheduled on 6.16     Arrival time 0630     Has patient been sick with fever or on antibiotics within the last 7 days? No     Does the patient have any open wounds, sores or rashes? No     Does the patient have any recent fractures? no     Has patient received a vaccination within the last 7 days? No     Received the COVID vaccination? yes     Has the patient stopped all medications as directed? na     Does patient have a pacemaker and or defibrillator? no     Does the patient have a ride to and from procedure and someone reliable to remain with patient? SON     Is the patient diabetic? no     Does the patient have sleep apnea? Or use O2 at home? no     Is the patient receiving sedation? Yes     Is the patient instructed to remain NPO beginning at midnight the night before their procedure? yes     Procedure location confirmed with patient? Yes     Covid- Denies signs/symptoms. Instructed to notify PAT/MD if any changes.

## 2023-06-16 ENCOUNTER — HOSPITAL ENCOUNTER (OUTPATIENT)
Facility: HOSPITAL | Age: 63
Discharge: HOME | End: 2023-06-16
Attending: ANESTHESIOLOGY | Admitting: ANESTHESIOLOGY
Payer: MEDICARE

## 2023-06-16 VITALS
SYSTOLIC BLOOD PRESSURE: 138 MMHG | BODY MASS INDEX: 41.84 KG/M2 | OXYGEN SATURATION: 96 % | WEIGHT: 236.13 LBS | TEMPERATURE: 97 F | HEART RATE: 100 BPM | DIASTOLIC BLOOD PRESSURE: 71 MMHG | RESPIRATION RATE: 18 BRPM | HEIGHT: 63 IN

## 2023-06-16 DIAGNOSIS — M17.9 KNEE OSTEOARTHRITIS: ICD-10-CM

## 2023-06-16 PROBLEM — G89.29 CHRONIC PAIN OF BOTH KNEES: Status: ACTIVE | Noted: 2023-06-16

## 2023-06-16 PROBLEM — Z96.653 HISTORY OF BILATERAL KNEE ARTHROPLASTY: Status: ACTIVE | Noted: 2023-06-16

## 2023-06-16 PROBLEM — M25.562 CHRONIC PAIN OF BOTH KNEES: Status: ACTIVE | Noted: 2023-06-16

## 2023-06-16 PROBLEM — M25.561 CHRONIC PAIN OF BOTH KNEES: Status: ACTIVE | Noted: 2023-06-16

## 2023-06-16 PROCEDURE — 25000003 PHARM REV CODE 250: Performed by: ANESTHESIOLOGY

## 2023-06-16 PROCEDURE — 63600175 PHARM REV CODE 636 W HCPCS: Performed by: ANESTHESIOLOGY

## 2023-06-16 PROCEDURE — 64454 NJX AA&/STRD GNCLR NRV BRNCH: CPT | Mod: 50,,, | Performed by: ANESTHESIOLOGY

## 2023-06-16 PROCEDURE — 64454 PR NERVE BLOCK INJ, ANES/STEROID, GENICULAR NERVE, W/IMG: ICD-10-PCS | Mod: 50,,, | Performed by: ANESTHESIOLOGY

## 2023-06-16 PROCEDURE — 64454 NJX AA&/STRD GNCLR NRV BRNCH: CPT | Mod: LT | Performed by: ANESTHESIOLOGY

## 2023-06-16 RX ORDER — FENTANYL CITRATE 50 UG/ML
INJECTION, SOLUTION INTRAMUSCULAR; INTRAVENOUS
Status: DISCONTINUED | OUTPATIENT
Start: 2023-06-16 | End: 2023-06-16 | Stop reason: HOSPADM

## 2023-06-16 RX ORDER — TRIAMCINOLONE ACETONIDE 40 MG/ML
INJECTION, SUSPENSION INTRA-ARTICULAR; INTRAMUSCULAR
Status: DISCONTINUED | OUTPATIENT
Start: 2023-06-16 | End: 2023-06-16 | Stop reason: HOSPADM

## 2023-06-16 RX ORDER — BUPIVACAINE HYDROCHLORIDE 5 MG/ML
INJECTION, SOLUTION EPIDURAL; INTRACAUDAL
Status: DISCONTINUED | OUTPATIENT
Start: 2023-06-16 | End: 2023-06-16 | Stop reason: HOSPADM

## 2023-06-16 RX ORDER — INDOMETHACIN 25 MG/1
CAPSULE ORAL
Status: DISCONTINUED | OUTPATIENT
Start: 2023-06-16 | End: 2023-06-16 | Stop reason: HOSPADM

## 2023-06-16 RX ORDER — MIDAZOLAM HYDROCHLORIDE 1 MG/ML
INJECTION, SOLUTION INTRAMUSCULAR; INTRAVENOUS
Status: DISCONTINUED | OUTPATIENT
Start: 2023-06-16 | End: 2023-06-16 | Stop reason: HOSPADM

## 2023-06-16 NOTE — OP NOTE
Divya Bui  63 y.o. female      Vitals:    06/16/23 0740   BP: 114/72   Pulse: 92   Resp: 15   Temp:        Procedure Date: 06/16/2023      Procedure Note:    Bilateral  Geniculate nerve block under fluoroscopy                               Surgeon: Denis Lai MD    Assistant: None    Pre-Op Diagnosis:  Bilateral  Chronic pain of both knees [M25.561, M25.562, G89.29]  History of bilateral knee arthroplasty [Z96.653]    Post-Op Diagnosis: Chronic pain of both knees [M25.561, M25.562, G89.29]  History of bilateral knee arthroplasty [Z96.653]    Sedation:  Conscious sedation provided by M.D    The patient was monitored with continuous pulse oximetry, EKG, and intermittent blood pressure monitors.  The patient was hemodynamically stable throughout the entire process was responsive to voice, and breathing spontaneously.  Supplemental O2 was provided at 2L/min via nasal cannula.  Patient was comfortable for the duration of the procedure. (See nurse documentation and case log for sedation time)    There was a total of 2mg IV Midazolam and 100mcg Fentanyl titrated for the procedure    EBL: None    Complications: None    Specimens: None    Description of procedure:    After written consent was obtained, patient placed in supine position.  The area over the medial and lateral aspect of the superior epi-condyle of the femur and the medial tibial metaphysis were prepped with chlorhexidine.  The procedure targets the superior medial genicular nerve (midpoint of the femur at the superior medial epicondyle), the superior lateral genicular nerve (midpoint of the femur at the superior lateral epicondyle), and the inferior medial genicular nerve (midpoint of the tibia at the inferior medial epicondyle).The area was draped in the usual sterile fashion.  Approximately 8 mL total 1% lidocaine was infiltrated into the skin overlying the 3 predetermined entry points. A 22 gauge spinal needle was then advanced under fluoroscopy in the  AP and lateral views into the positions of the geniculate nerves at these levels. After negative aspiration and no paresthesias there was injection of 2 mL of 0.25% bupivacaine and 40 mg Kenalog into each of these 3 areas for a total volume of 6 mL. Needle was withdrawn and a sterile band-aid applied to the skin.    Patient tolerated the procedure well, and was reporting improvement of pain symptoms after the injection.  Patient was discharged from the clinic in stable condition.

## 2023-06-16 NOTE — DISCHARGE INSTRUCTIONS

## 2023-06-16 NOTE — DISCHARGE SUMMARY
Discharge Note  Short Stay      SUMMARY     Admit Date: 6/16/2023    Attending Physician: Denis Lai MD        Discharge Physician: Denis Lai MD        Discharge Date: 6/16/2023 7:43 AM    Procedure(s) (LRB):  Bilateral Genicular nerve block with RN IV sedation (Bilateral)    Final Diagnosis: Chronic pain of both knees [M25.561, M25.562, G89.29]  History of bilateral knee arthroplasty [Z96.653]    Disposition: Home or self care    Patient Instructions:   Current Discharge Medication List        CONTINUE these medications which have NOT CHANGED    Details   alprazolam (XANAX XR) 2 MG Tb24 Take 1 mg by mouth once daily. 2 mg tablet(2tabs) oral in the am and 1 mg(1tab)in the afternoon      amLODIPine (NORVASC) 5 MG tablet Take 1 tablet (5 mg total) by mouth once daily.  Qty: 90 tablet, Refills: 3    Comments: .      ARIPiprazole (ABILIFY) 10 MG Tab Take 10 mg by mouth once daily.      aspirin 81 mg Tab Take 1 tablet by mouth Daily.      atorvastatin (LIPITOR) 40 MG tablet Take 1 tablet (40 mg total) by mouth once daily.  Qty: 90 tablet, Refills: 3      dicyclomine (BENTYL) 20 mg tablet Take 1 tablet (20 mg total) by mouth 3 (three) times daily as needed (abdominal pain).  Qty: 60 tablet, Refills: 0      doxepin (SINEQUAN) 50 MG capsule Take 50 mg by mouth nightly.      furosemide (LASIX) 20 MG tablet Take 1 tablet (20 mg total) by mouth daily as needed (swelling).  Qty: 30 tablet, Refills: 5      adriel-m.blue-s.phos-phsal-hyo (URIBEL) 118-10-40.8-36 mg Cap Take 1 capsule by mouth 3 (three) times daily as needed (bladder pain).  Qty: 30 capsule, Refills: 1      nitrofurantoin, macrocrystal-monohydrate, (MACROBID) 100 MG capsule Take 1 capsule (100 mg total) by mouth 2 (two) times daily.  Qty: 14 capsule, Refills: 0      olmesartan-hydrochlorothiazide (BENICAR HCT) 40-12.5 mg Tab Take 1 tablet by mouth once daily.  Qty: 90 tablet, Refills: 3    Comments: .      pregabalin (LYRICA) 75 MG capsule Take 1 capsule (75  mg total) by mouth every evening for 10 days, THEN 2 capsules (150 mg total) every evening for 10 days, THEN 3 capsules (225 mg total) every evening for 10 days.  Qty: 60 capsule, Refills: 0      promethazine (PHENERGAN) 25 MG tablet Take 1 tablet (25 mg total) by mouth every 6 (six) hours as needed for Nausea.  Qty: 30 tablet, Refills: 0      sucralfate (CARAFATE) 1 gram tablet Take 1 tablet (1 g total) by mouth 4 (four) times daily.  Qty: 40 tablet, Refills: 0      zolpidem (AMBIEN) 10 mg Tab Take 10 mg by mouth nightly as needed.      cholestyramine-aspartame (CHOLESTYRAMINE LIGHT) 4 gram PwPk Take 1 packet (4 g total) by mouth 2 (two) times daily.  Qty: 60 packet, Refills: 2    Associated Diagnoses: Diarrhea, unspecified type      fluoxetine (PROZAC) 40 MG capsule Take 1 capsule (40 mg total) by mouth once daily.  Qty: 30 capsule, Refills: 11      hydrocortisone 2.5 % cream Apply topically.      ketoconazole (NIZORAL) 2 % cream Apply topically once daily.  Qty: 30 g, Refills: 1      mupirocin (BACTROBAN) 2 % ointment Apply topically as needed.                 Discharge Diagnosis: Chronic pain of both knees [M25.561, M25.562, G89.29]  History of bilateral knee arthroplasty [Z96.653]  Condition on Discharge: Stable with no complications to procedure   Diet on Discharge: Same as before.  Activity: as per instruction sheet.  Discharge to: Home with a responsible adult.  Follow up: 2-4 weeks       Please call the office at [phone] if you experience any weakness or loss of sensation, fever > 101.5, pain uncontrolled with oral medications, persistent nausea/vomiting/or diarrhea, redness or drainage from the incisions, or any other worrisome concerns. If physician on call was not reached or could not communicate with our office for any reason please go to the nearest emergency department

## 2023-07-26 NOTE — PROGRESS NOTES
Established Patient Chronic Pain Note   Referring Physician: No ref. provider found    PCP: Angel Khan MD    Chief Complaint:   Chief Complaint   Patient presents with    Knee Pain     Patient received 90% relief from nerve block for 4 wks then pain returned.  Patient has pain in both knees.  Pain scale 7/10        SUBJECTIVE:  Interval history 07/27/2023   Patient presents status post bilateral genicular nerve block 06/16/2023.  Patient reports 90% relief lasting approximately 4 weeks following her procedure.  Today she reports pain has insidiously returned in his 95% back to baseline.  Pain is intermittent and today is rated 7/10.  Patient reports pain is described as sharp in nature and is along the suprapatellar aspect of both knees.  Pain is exacerbated with knee flexion, extension and with standing and with ambulation.  Patient has continued Lyrica 75 mg in the evening with significant somnolence.  She is continued physician directed physical therapy exercises at home over the last 6 weeks without meaningful improvement in her pain.    HPI 06/01/2023  Divya Bui is a 63 y.o. female with past medical history significant for anxiety/depression, hypertension, hearing loss, stage 3 chronic kidney disease, morbid obesity status post bariatric surgery, diverticulosis, GERD, osteoarthritis/osteopenia, status post right total knee arthroplasty, cerebrovascular accident who presents to the clinic for the evaluation of bilateral knee pain.  Patient reports pain has been present for over 5+ years without inciting accident injury or trauma.  Patient reports receiving prior total knee arthroplasty, approximately 5-6 years prior with  at [facility].  Patient reports minimal, approximately 30% relief following knee replacement.  Today she reports pain which is constant which is rated an 8/10.  Pain at its best is a 7/10 and at its worse is a 10/10.  Pain is described as sharp in nature and predominantly  over the suprapatellar aspect of both knees.  Pain is exacerbated with prolonged standing and walking.  She does report significant weakness in the lower extremities associated with her pain.  Patient particularly mentions difficulty stepping up on occur or stepping off of a curb without requiring assistance.  Pain is marginally improved with Advil which she takes up to 4 pills daily.  Patient reports she is completed aquatic therapy in the past and has continued physician directed physical therapy exercises at home over the last 6 weeks without meaningful improvement in her pain.    Patient reports significant motor weakness.  Patient denies night fever/night sweats, urinary incontinence, bowel incontinence, significant weight loss, and loss of sensations.      Pain Disability Index Review:     Last 3 PDI Scores 7/27/2023 6/1/2023   Pain Disability Index (PDI) 43 45       Non-Pharmacologic Treatments:  Physical Therapy/Home Exercise: yes  Ice/Heat:yes  TENS: no  Acupuncture: no  Massage: no  Chiropractic: no    Other: no      Pain Medications:  - Adjuvant Medications: Ambien (Zolpidem) and Xanax (Alprazolam)  - Anti-Coagulants: Aspirin    Pain Procedures:   -06/16/2023: Bilateral genicular nerve block    Past Medical History:   Diagnosis Date    Anemia     Anxiety     Basal cell carcinoma (BCC) of face 2/26/2020    Blood transfusion     Bowel incontinence     Depression     Esophageal stricture     GERD (gastroesophageal reflux disease)     Hypertension     Latent tuberculosis by skin test 2001    took INH    Liver nodule 1/6/2014    Morbid obesity with BMI of 45.0-49.9, adult     OA (osteoarthritis) of knee 12/12/2014    Pericardial effusion 9/4/2014     Past Surgical History:   Procedure Laterality Date    CHOLECYSTECTOMY      COLONOSCOPY N/A 3/6/2023    Procedure: COLONOSCOPY;  Surgeon: Beti Diallo MD;  Location: [facility];  Service: Endoscopy;  Laterality: N/A;    GASTRIC BYPASS      HERNIA REPAIR       INJECTION OF ANESTHETIC AGENT AROUND NERVE Bilateral 6/16/2023    Procedure: Bilateral Genicular nerve block with RN IV sedation;  Surgeon: Denis Lai MD;  Location: [facility];  Service: Pain Management;  Laterality: Bilateral;    right sholder surgery      SMALL INTESTINE SURGERY       Review of patient's allergies indicates:   Allergen Reactions    Metronidazole hcl Other (See Comments)     Mouth ulcers developed with Flagyl  Oral sores.  Mouth ulcers developed with Flagyl       Current Outpatient Medications   Medication Sig    alprazolam (XANAX XR) 2 MG Tb24 Take 1 mg by mouth once daily. 2 mg tablet(2tabs) oral in the am and 1 mg(1tab)in the afternoon    amLODIPine (NORVASC) 5 MG tablet Take 1 tablet (5 mg total) by mouth once daily.    ARIPiprazole (ABILIFY) 10 MG Tab Take 10 mg by mouth once daily.    aspirin 81 mg Tab Take 1 tablet by mouth Daily.    atorvastatin (LIPITOR) 40 MG tablet Take 1 tablet (40 mg total) by mouth once daily.    dicyclomine (BENTYL) 20 mg tablet Take 1 tablet (20 mg total) by mouth 3 (three) times daily as needed (abdominal pain).    doxepin (SINEQUAN) 50 MG capsule Take 50 mg by mouth nightly.    furosemide (LASIX) 20 MG tablet Take 1 tablet (20 mg total) by mouth daily as needed (swelling).    hydrocortisone 2.5 % cream Apply topically.    ketoconazole (NIZORAL) 2 % cream Apply topically once daily.    adriel-m.blue-s.phos-phsal-hyo (URIBEL) 118-10-40.8-36 mg Cap Take 1 capsule by mouth 3 (three) times daily as needed (bladder pain).    mupirocin (BACTROBAN) 2 % ointment Apply topically as needed.    nitrofurantoin, macrocrystal-monohydrate, (MACROBID) 100 MG capsule Take 1 capsule (100 mg total) by mouth 2 (two) times daily.    olmesartan-hydrochlorothiazide (BENICAR HCT) 40-12.5 mg Tab Take 1 tablet by mouth once daily.    promethazine (PHENERGAN) 25 MG tablet Take 1 tablet (25 mg total) by mouth every 6 (six) hours as needed for Nausea.    sucralfate (CARAFATE) 1 gram  "tablet Take 1 tablet (1 g total) by mouth 4 (four) times daily.    zolpidem (AMBIEN) 10 mg Tab Take 10 mg by mouth nightly as needed.    cholestyramine-aspartame (CHOLESTYRAMINE LIGHT) 4 gram PwPk Take 1 packet (4 g total) by mouth 2 (two) times daily. (Patient not taking: Reported on 5/17/2023)    fluoxetine (PROZAC) 40 MG capsule Take 1 capsule (40 mg total) by mouth once daily.    pregabalin (LYRICA) 75 MG capsule Take 1 capsule (75 mg total) by mouth every evening.     No current facility-administered medications for this visit.       Review of Systems     GENERAL:  No weight loss, malaise or fevers.  HEENT:   No recent changes in vision or hearing  NECK:  Negative for lumps, no difficulty with swallowing.  RESPIRATORY:  Negative for cough, wheezing or shortness of breath, patient denies any recent URI.  CARDIOVASCULAR:  Negative for chest pain or palpitations.  GI:  Negative for abdominal discomfort, blood in stools or black stools or change in bowel habits.  MUSCULOSKELETAL:  See HPI.  SKIN:  Negative for lesions, rash, and itching.  PSYCH:  No mood disorder or recent psychosocial stressors.   HEMATOLOGY/LYMPHOLOGY:  Negative for prolonged bleeding, bruising easily or swollen nodes.    NEURO:   No history of syncope, paralysis, seizures or tremors.  All other reviewed and negative other than HPI.    OBJECTIVE:    BP (!) 165/101   Pulse 101   Resp 18   Ht 5' 3" (1.6 m)   Wt 106.7 kg (235 lb 3.7 oz)   BMI 41.67 kg/m²       Physical Exam    GENERAL: Well appearing, in no acute distress, alert and oriented x3.Obese  PSYCH:  Mood and affect appropriate.  SKIN: Skin color, texture, turgor normal, no rashes or lesions.  HEAD/FACE:  Normocephalic, atraumatic. Cranial nerves grossly intact.      CV: RRR with palpation of the radial artery.  PULM: No evidence of respiratory difficulty, symmetric chest rise.  GI:  Soft and non-tender.    BACK: Straight leg raising in the sitting and supine positions is negative to " radicular pain.  pain to palpation over the facet joints of the lumbar spine or spinous processes. Normal range of motion without pain reproduction.  EXTREMITIES:  peripheral joint range of motion is reduced with instability in bilateral lower extremities.. No deformities, edema, or skin discoloration. Good capillary refill.  MUSCULOSKELETAL: Unable to stand on heels & toes.   hip provocative maneuvers are negative.      Knee Exam:  bilateral    Erythema: Absent  Deformity/Swelling: Absent  Effusion: Absent  Chronic Bony Changes: Absent  Creptius: Absent     milddiffuse tenderness palpation of the mediolateral joint lines and patella     ROM: full range with pain     Strength is 5/5 bilateral     Meniscus   Brennan:  Medial - negative Lateral - negative    Stability Lachman: normal  PCL-Posterior Drawer: normal MCL - Valgus: normal LCL - Varus: normal     Patella   Patellar apprehension: negative  Patellar Tracking: normal     Neurovascular intact      Facet loading test is negative bilaterally.   Bilateral upper and lower extremity strength is normal and symmetric.  No atrophy or tone abnormalities are noted.    RIGHT Lower extremity: Hip flexion 5/5, Hip Abduction 5/5, Hip Adduction 5/5, Knee extension 5/5, Knee flexion 5/5, Ankle dorsiflexion5/5, Extensor hallucis longus 5/5, Ankle plantarflexion 5/5  LEFT Lower extremity:  Hip flexion 5/5, Hip Abduction 5/5,Hip Adduction 5/5, Knee extension 5/5, Knee flexion 5/5, Ankle dorsiflexion 5/5, Extensor hallucis longus 5/5, Ankle plantarflexion 5/5  -Normal testing knee (patellar) jerk and ankle (achilles) jerk    NEURO: Bilateral upper and lower extremity coordination and muscle stretch reflexes are physiologic and symmetric. No loss of sensation is noted.  GAIT: normal.    Imaging:   Bilateral knee x-ray 01/10/2023  FINDINGS:  Bilateral total knee arthroplasties are noted.  No hardware failure or loosening.  No periprosthetic fracture.  No joint effusions are  suggested.    09/27/21    X-Ray Lumbar Spine Ap And Lateral    Narrative  EXAMINATION:  XR LUMBAR SPINE AP AND LATERAL    CLINICAL HISTORY:  XR LUMBAR SPINE AP AND LATERAL    COMPARISON:  None    FINDINGS:  Multiple radiographic views  were obtained.    No evidence of acute fracture or dislocation.  Mild degenerative joint disease.  Postsurgical changes of prior cholecystectomy.  No spondylolisthesis      ASSESSMENT: 63 y.o. year old female with bilateral knee pain, consistent with     1. Chronic pain of both knees  IR Peripheral Nerve Injection    Case Request-RAD/Other Procedure Area: Bilateral Genicular nerve block with RN IV sedation    IR Peripheral Nerve Injection      2. History of bilateral knee arthroplasty  IR Peripheral Nerve Injection    Case Request-RAD/Other Procedure Area: Bilateral Genicular nerve block with RN IV sedation    IR Peripheral Nerve Injection              PLAN:   - Interventions:  Patient obtained 90% relief lasting for weeks following 1st genicular nerve block.  We have discussed scheduling a second bilateral genicular nerve block to determine the extent to which the degenerative changes in the knee are mediated by the branches of the genicular nerve. We have discussed targeting the superior medial genicular nerve (midpoint of the femur at the superior medial epicondyle), the superior lateral genicular nerve (midpoint of the femur at the superior lateral epicondyle), and the inferior medial genicular nerve (midpoint of the tibia at the inferior medial epicondyle). If the block reproducibly results in significant relief of the patients usual pain,then this would be indication for subsequent therapeutic radiofrequency ablation for more sustained relief.     Explained the risks and benefits of the procedure in detail with the patient today in clinic along with alternative treatment options, and the patient elected to pursue the intervention at this time.      - Anticoagulation use: Yes  aspirin  Per GARCÍA guidelines, patient can continue aspirin for her genicular block.     report:  Reviewed and consistent with medication use as prescribed.    - Medications:  --Continue Lyrica.  We discussed potential side effects of this medication which may include drowsiness,dizziness, dry mouth, constipation or peripheral edema.    -75 mg q.h.s.  -90 day supply sent in      - Therapy:   We discussed continuing physical therapy to help manage the patient/s painful condition. The patient was counseled that muscle strengthening will improve the long term prognosis in regards to pain and may also help increase range of motion and mobility. They were told that one of the goals of physical therapy is that they learn how to do the exercises so that they can do them independently at home daily upon completion. The patient's questions were answered and they were agreeable to this course. A referral for [facility] physical therapy was provided to the patient.    - Imaging: Reviewed available imaging with patient and answered any questions they had regarding study.    - Records: Obtain old records from outside physicians and imaging:    - Follow up visit: return to clinic in 4-6 weeks      The above plan and management options were discussed at length with patient. Patient is in agreement with the above and verbalized understanding.    - I discussed the goals of interventional chronic pain management with the patient on today's visit. We discussed a multimodal and systematic approach to pain.  This includes diagnostic and therapeutic injections, adjuvant pharmacologic treatment, physical therapy, and at times psychiatry.  I emphasized the importance of regular exercise, core strengthening and stretching, diet and weight loss as a cornerstone of long-term pain management.    - This condition does not require this patient to take time off of work, and the primary goal of our Pain Management services is to improve the patient's  functional capacity.  - Patient Questions: Answered all of the patient's questions regarding diagnoses, therapy, treatment and next steps        Denis Lai MD  Interventional Pain Management  [facility]    Disclaimer:  This note was prepared using voice recognition system and is likely to have sound alike errors that may have been overlooked even after proof reading.  Please call me with any questions

## 2023-07-26 NOTE — H&P (VIEW-ONLY)
Established Patient Chronic Pain Note   Referring Physician: No ref. provider found    PCP: Angel Khan MD    Chief Complaint:   Chief Complaint   Patient presents with    Knee Pain     Patient received 90% relief from nerve block for 4 wks then pain returned.  Patient has pain in both knees.  Pain scale 7/10        SUBJECTIVE:  Interval history 07/27/2023   Patient presents status post bilateral genicular nerve block 06/16/2023.  Patient reports 90% relief lasting approximately 4 weeks following her procedure.  Today she reports pain has insidiously returned in his 95% back to baseline.  Pain is intermittent and today is rated 7/10.  Patient reports pain is described as sharp in nature and is along the suprapatellar aspect of both knees.  Pain is exacerbated with knee flexion, extension and with standing and with ambulation.  Patient has continued Lyrica 75 mg in the evening with significant somnolence.  She is continued physician directed physical therapy exercises at home over the last 6 weeks without meaningful improvement in her pain.    HPI 06/01/2023  Divya Bui is a 63 y.o. female with past medical history significant for anxiety/depression, hypertension, hearing loss, stage 3 chronic kidney disease, morbid obesity status post bariatric surgery, diverticulosis, GERD, osteoarthritis/osteopenia, status post right total knee arthroplasty, cerebrovascular accident who presents to the clinic for the evaluation of bilateral knee pain.  Patient reports pain has been present for over 5+ years without inciting accident injury or trauma.  Patient reports receiving prior total knee arthroplasty, approximately 5-6 years prior with  at [facility].  Patient reports minimal, approximately 30% relief following knee replacement.  Today she reports pain which is constant which is rated an 8/10.  Pain at its best is a 7/10 and at its worse is a 10/10.  Pain is described as sharp in nature and predominantly  over the suprapatellar aspect of both knees.  Pain is exacerbated with prolonged standing and walking.  She does report significant weakness in the lower extremities associated with her pain.  Patient particularly mentions difficulty stepping up on occur or stepping off of a curb without requiring assistance.  Pain is marginally improved with Advil which she takes up to 4 pills daily.  Patient reports she is completed aquatic therapy in the past and has continued physician directed physical therapy exercises at home over the last 6 weeks without meaningful improvement in her pain.    Patient reports significant motor weakness.  Patient denies night fever/night sweats, urinary incontinence, bowel incontinence, significant weight loss, and loss of sensations.      Pain Disability Index Review:     Last 3 PDI Scores 7/27/2023 6/1/2023   Pain Disability Index (PDI) 43 45       Non-Pharmacologic Treatments:  Physical Therapy/Home Exercise: yes  Ice/Heat:yes  TENS: no  Acupuncture: no  Massage: no  Chiropractic: no    Other: no      Pain Medications:  - Adjuvant Medications: Ambien (Zolpidem) and Xanax (Alprazolam)  - Anti-Coagulants: Aspirin    Pain Procedures:   -06/16/2023: Bilateral genicular nerve block    Past Medical History:   Diagnosis Date    Anemia     Anxiety     Basal cell carcinoma (BCC) of face 2/26/2020    Blood transfusion     Bowel incontinence     Depression     Esophageal stricture     GERD (gastroesophageal reflux disease)     Hypertension     Latent tuberculosis by skin test 2001    took INH    Liver nodule 1/6/2014    Morbid obesity with BMI of 45.0-49.9, adult     OA (osteoarthritis) of knee 12/12/2014    Pericardial effusion 9/4/2014     Past Surgical History:   Procedure Laterality Date    CHOLECYSTECTOMY      COLONOSCOPY N/A 3/6/2023    Procedure: COLONOSCOPY;  Surgeon: Beti Diallo MD;  Location: [facility];  Service: Endoscopy;  Laterality: N/A;    GASTRIC BYPASS      HERNIA REPAIR       INJECTION OF ANESTHETIC AGENT AROUND NERVE Bilateral 6/16/2023    Procedure: Bilateral Genicular nerve block with RN IV sedation;  Surgeon: Denis Lai MD;  Location: [facility];  Service: Pain Management;  Laterality: Bilateral;    right sholder surgery      SMALL INTESTINE SURGERY       Review of patient's allergies indicates:   Allergen Reactions    Metronidazole hcl Other (See Comments)     Mouth ulcers developed with Flagyl  Oral sores.  Mouth ulcers developed with Flagyl       Current Outpatient Medications   Medication Sig    alprazolam (XANAX XR) 2 MG Tb24 Take 1 mg by mouth once daily. 2 mg tablet(2tabs) oral in the am and 1 mg(1tab)in the afternoon    amLODIPine (NORVASC) 5 MG tablet Take 1 tablet (5 mg total) by mouth once daily.    ARIPiprazole (ABILIFY) 10 MG Tab Take 10 mg by mouth once daily.    aspirin 81 mg Tab Take 1 tablet by mouth Daily.    atorvastatin (LIPITOR) 40 MG tablet Take 1 tablet (40 mg total) by mouth once daily.    dicyclomine (BENTYL) 20 mg tablet Take 1 tablet (20 mg total) by mouth 3 (three) times daily as needed (abdominal pain).    doxepin (SINEQUAN) 50 MG capsule Take 50 mg by mouth nightly.    furosemide (LASIX) 20 MG tablet Take 1 tablet (20 mg total) by mouth daily as needed (swelling).    hydrocortisone 2.5 % cream Apply topically.    ketoconazole (NIZORAL) 2 % cream Apply topically once daily.    adriel-m.blue-s.phos-phsal-hyo (URIBEL) 118-10-40.8-36 mg Cap Take 1 capsule by mouth 3 (three) times daily as needed (bladder pain).    mupirocin (BACTROBAN) 2 % ointment Apply topically as needed.    nitrofurantoin, macrocrystal-monohydrate, (MACROBID) 100 MG capsule Take 1 capsule (100 mg total) by mouth 2 (two) times daily.    olmesartan-hydrochlorothiazide (BENICAR HCT) 40-12.5 mg Tab Take 1 tablet by mouth once daily.    promethazine (PHENERGAN) 25 MG tablet Take 1 tablet (25 mg total) by mouth every 6 (six) hours as needed for Nausea.    sucralfate (CARAFATE) 1 gram  "tablet Take 1 tablet (1 g total) by mouth 4 (four) times daily.    zolpidem (AMBIEN) 10 mg Tab Take 10 mg by mouth nightly as needed.    cholestyramine-aspartame (CHOLESTYRAMINE LIGHT) 4 gram PwPk Take 1 packet (4 g total) by mouth 2 (two) times daily. (Patient not taking: Reported on 5/17/2023)    fluoxetine (PROZAC) 40 MG capsule Take 1 capsule (40 mg total) by mouth once daily.    pregabalin (LYRICA) 75 MG capsule Take 1 capsule (75 mg total) by mouth every evening.     No current facility-administered medications for this visit.       Review of Systems     GENERAL:  No weight loss, malaise or fevers.  HEENT:   No recent changes in vision or hearing  NECK:  Negative for lumps, no difficulty with swallowing.  RESPIRATORY:  Negative for cough, wheezing or shortness of breath, patient denies any recent URI.  CARDIOVASCULAR:  Negative for chest pain or palpitations.  GI:  Negative for abdominal discomfort, blood in stools or black stools or change in bowel habits.  MUSCULOSKELETAL:  See HPI.  SKIN:  Negative for lesions, rash, and itching.  PSYCH:  No mood disorder or recent psychosocial stressors.   HEMATOLOGY/LYMPHOLOGY:  Negative for prolonged bleeding, bruising easily or swollen nodes.    NEURO:   No history of syncope, paralysis, seizures or tremors.  All other reviewed and negative other than HPI.    OBJECTIVE:    BP (!) 165/101   Pulse 101   Resp 18   Ht 5' 3" (1.6 m)   Wt 106.7 kg (235 lb 3.7 oz)   BMI 41.67 kg/m²       Physical Exam    GENERAL: Well appearing, in no acute distress, alert and oriented x3.Obese  PSYCH:  Mood and affect appropriate.  SKIN: Skin color, texture, turgor normal, no rashes or lesions.  HEAD/FACE:  Normocephalic, atraumatic. Cranial nerves grossly intact.      CV: RRR with palpation of the radial artery.  PULM: No evidence of respiratory difficulty, symmetric chest rise.  GI:  Soft and non-tender.    BACK: Straight leg raising in the sitting and supine positions is negative to " radicular pain.  pain to palpation over the facet joints of the lumbar spine or spinous processes. Normal range of motion without pain reproduction.  EXTREMITIES:  peripheral joint range of motion is reduced with instability in bilateral lower extremities.. No deformities, edema, or skin discoloration. Good capillary refill.  MUSCULOSKELETAL: Unable to stand on heels & toes.   hip provocative maneuvers are negative.      Knee Exam:  bilateral    Erythema: Absent  Deformity/Swelling: Absent  Effusion: Absent  Chronic Bony Changes: Absent  Creptius: Absent     milddiffuse tenderness palpation of the mediolateral joint lines and patella     ROM: full range with pain     Strength is 5/5 bilateral     Meniscus   Brennan:  Medial - negative Lateral - negative    Stability Lachman: normal  PCL-Posterior Drawer: normal MCL - Valgus: normal LCL - Varus: normal     Patella   Patellar apprehension: negative  Patellar Tracking: normal     Neurovascular intact      Facet loading test is negative bilaterally.   Bilateral upper and lower extremity strength is normal and symmetric.  No atrophy or tone abnormalities are noted.    RIGHT Lower extremity: Hip flexion 5/5, Hip Abduction 5/5, Hip Adduction 5/5, Knee extension 5/5, Knee flexion 5/5, Ankle dorsiflexion5/5, Extensor hallucis longus 5/5, Ankle plantarflexion 5/5  LEFT Lower extremity:  Hip flexion 5/5, Hip Abduction 5/5,Hip Adduction 5/5, Knee extension 5/5, Knee flexion 5/5, Ankle dorsiflexion 5/5, Extensor hallucis longus 5/5, Ankle plantarflexion 5/5  -Normal testing knee (patellar) jerk and ankle (achilles) jerk    NEURO: Bilateral upper and lower extremity coordination and muscle stretch reflexes are physiologic and symmetric. No loss of sensation is noted.  GAIT: normal.    Imaging:   Bilateral knee x-ray 01/10/2023  FINDINGS:  Bilateral total knee arthroplasties are noted.  No hardware failure or loosening.  No periprosthetic fracture.  No joint effusions are  suggested.    09/27/21    X-Ray Lumbar Spine Ap And Lateral    Narrative  EXAMINATION:  XR LUMBAR SPINE AP AND LATERAL    CLINICAL HISTORY:  XR LUMBAR SPINE AP AND LATERAL    COMPARISON:  None    FINDINGS:  Multiple radiographic views  were obtained.    No evidence of acute fracture or dislocation.  Mild degenerative joint disease.  Postsurgical changes of prior cholecystectomy.  No spondylolisthesis      ASSESSMENT: 63 y.o. year old female with bilateral knee pain, consistent with     1. Chronic pain of both knees  IR Peripheral Nerve Injection    Case Request-RAD/Other Procedure Area: Bilateral Genicular nerve block with RN IV sedation    IR Peripheral Nerve Injection      2. History of bilateral knee arthroplasty  IR Peripheral Nerve Injection    Case Request-RAD/Other Procedure Area: Bilateral Genicular nerve block with RN IV sedation    IR Peripheral Nerve Injection              PLAN:   - Interventions:  Patient obtained 90% relief lasting for weeks following 1st genicular nerve block.  We have discussed scheduling a second bilateral genicular nerve block to determine the extent to which the degenerative changes in the knee are mediated by the branches of the genicular nerve. We have discussed targeting the superior medial genicular nerve (midpoint of the femur at the superior medial epicondyle), the superior lateral genicular nerve (midpoint of the femur at the superior lateral epicondyle), and the inferior medial genicular nerve (midpoint of the tibia at the inferior medial epicondyle). If the block reproducibly results in significant relief of the patients usual pain,then this would be indication for subsequent therapeutic radiofrequency ablation for more sustained relief.     Explained the risks and benefits of the procedure in detail with the patient today in clinic along with alternative treatment options, and the patient elected to pursue the intervention at this time.      - Anticoagulation use: Yes  aspirin  Per GARCÍA guidelines, patient can continue aspirin for her genicular block.     report:  Reviewed and consistent with medication use as prescribed.    - Medications:  --Continue Lyrica.  We discussed potential side effects of this medication which may include drowsiness,dizziness, dry mouth, constipation or peripheral edema.    -75 mg q.h.s.  -90 day supply sent in      - Therapy:   We discussed continuing physical therapy to help manage the patient/s painful condition. The patient was counseled that muscle strengthening will improve the long term prognosis in regards to pain and may also help increase range of motion and mobility. They were told that one of the goals of physical therapy is that they learn how to do the exercises so that they can do them independently at home daily upon completion. The patient's questions were answered and they were agreeable to this course. A referral for [facility] physical therapy was provided to the patient.    - Imaging: Reviewed available imaging with patient and answered any questions they had regarding study.    - Records: Obtain old records from outside physicians and imaging:    - Follow up visit: return to clinic in 4-6 weeks      The above plan and management options were discussed at length with patient. Patient is in agreement with the above and verbalized understanding.    - I discussed the goals of interventional chronic pain management with the patient on today's visit. We discussed a multimodal and systematic approach to pain.  This includes diagnostic and therapeutic injections, adjuvant pharmacologic treatment, physical therapy, and at times psychiatry.  I emphasized the importance of regular exercise, core strengthening and stretching, diet and weight loss as a cornerstone of long-term pain management.    - This condition does not require this patient to take time off of work, and the primary goal of our Pain Management services is to improve the patient's  functional capacity.  - Patient Questions: Answered all of the patient's questions regarding diagnoses, therapy, treatment and next steps        Denis Lai MD  Interventional Pain Management  [facility]    Disclaimer:  This note was prepared using voice recognition system and is likely to have sound alike errors that may have been overlooked even after proof reading.  Please call me with any questions

## 2023-07-27 ENCOUNTER — OFFICE VISIT (OUTPATIENT)
Dept: PAIN MEDICINE | Facility: CLINIC | Age: 63
End: 2023-07-27
Payer: MEDICARE

## 2023-07-27 VITALS
HEIGHT: 63 IN | RESPIRATION RATE: 18 BRPM | DIASTOLIC BLOOD PRESSURE: 101 MMHG | BODY MASS INDEX: 41.68 KG/M2 | SYSTOLIC BLOOD PRESSURE: 165 MMHG | HEART RATE: 101 BPM | WEIGHT: 235.25 LBS

## 2023-07-27 DIAGNOSIS — M25.561 CHRONIC PAIN OF BOTH KNEES: Primary | ICD-10-CM

## 2023-07-27 DIAGNOSIS — M25.562 CHRONIC PAIN OF BOTH KNEES: Primary | ICD-10-CM

## 2023-07-27 DIAGNOSIS — G89.29 CHRONIC PAIN OF BOTH KNEES: Primary | ICD-10-CM

## 2023-07-27 DIAGNOSIS — Z96.653 HISTORY OF BILATERAL KNEE ARTHROPLASTY: ICD-10-CM

## 2023-07-27 PROCEDURE — 3080F PR MOST RECENT DIASTOLIC BLOOD PRESSURE >= 90 MM HG: ICD-10-PCS | Mod: CPTII,S$GLB,, | Performed by: ANESTHESIOLOGY

## 2023-07-27 PROCEDURE — 3288F PR FALLS RISK ASSESSMENT DOCUMENTED: ICD-10-PCS | Mod: CPTII,S$GLB,, | Performed by: ANESTHESIOLOGY

## 2023-07-27 PROCEDURE — 3008F PR BODY MASS INDEX (BMI) DOCUMENTED: ICD-10-PCS | Mod: CPTII,S$GLB,, | Performed by: ANESTHESIOLOGY

## 2023-07-27 PROCEDURE — 1159F PR MEDICATION LIST DOCUMENTED IN MEDICAL RECORD: ICD-10-PCS | Mod: CPTII,S$GLB,, | Performed by: ANESTHESIOLOGY

## 2023-07-27 PROCEDURE — 3077F SYST BP >= 140 MM HG: CPT | Mod: CPTII,S$GLB,, | Performed by: ANESTHESIOLOGY

## 2023-07-27 PROCEDURE — 3044F PR MOST RECENT HEMOGLOBIN A1C LEVEL <7.0%: ICD-10-PCS | Mod: CPTII,S$GLB,, | Performed by: ANESTHESIOLOGY

## 2023-07-27 PROCEDURE — 3080F DIAST BP >= 90 MM HG: CPT | Mod: CPTII,S$GLB,, | Performed by: ANESTHESIOLOGY

## 2023-07-27 PROCEDURE — 1101F PT FALLS ASSESS-DOCD LE1/YR: CPT | Mod: CPTII,S$GLB,, | Performed by: ANESTHESIOLOGY

## 2023-07-27 PROCEDURE — 3008F BODY MASS INDEX DOCD: CPT | Mod: CPTII,S$GLB,, | Performed by: ANESTHESIOLOGY

## 2023-07-27 PROCEDURE — 1159F MED LIST DOCD IN RCRD: CPT | Mod: CPTII,S$GLB,, | Performed by: ANESTHESIOLOGY

## 2023-07-27 PROCEDURE — 99214 PR OFFICE/OUTPT VISIT, EST, LEVL IV, 30-39 MIN: ICD-10-PCS | Mod: S$GLB,,, | Performed by: ANESTHESIOLOGY

## 2023-07-27 PROCEDURE — 99214 OFFICE O/P EST MOD 30 MIN: CPT | Mod: S$GLB,,, | Performed by: ANESTHESIOLOGY

## 2023-07-27 PROCEDURE — 3077F PR MOST RECENT SYSTOLIC BLOOD PRESSURE >= 140 MM HG: ICD-10-PCS | Mod: CPTII,S$GLB,, | Performed by: ANESTHESIOLOGY

## 2023-07-27 PROCEDURE — 1101F PR PT FALLS ASSESS DOC 0-1 FALLS W/OUT INJ PAST YR: ICD-10-PCS | Mod: CPTII,S$GLB,, | Performed by: ANESTHESIOLOGY

## 2023-07-27 PROCEDURE — 99999 PR PBB SHADOW E&M-EST. PATIENT-LVL V: CPT | Mod: PBBFAC,,, | Performed by: ANESTHESIOLOGY

## 2023-07-27 PROCEDURE — 3288F FALL RISK ASSESSMENT DOCD: CPT | Mod: CPTII,S$GLB,, | Performed by: ANESTHESIOLOGY

## 2023-07-27 PROCEDURE — 99999 PR PBB SHADOW E&M-EST. PATIENT-LVL V: ICD-10-PCS | Mod: PBBFAC,,, | Performed by: ANESTHESIOLOGY

## 2023-07-27 PROCEDURE — 3044F HG A1C LEVEL LT 7.0%: CPT | Mod: CPTII,S$GLB,, | Performed by: ANESTHESIOLOGY

## 2023-07-27 RX ORDER — PREGABALIN 75 MG/1
75 CAPSULE ORAL NIGHTLY
Qty: 90 CAPSULE | Refills: 0 | Status: ON HOLD | OUTPATIENT
Start: 2023-07-27 | End: 2023-11-25 | Stop reason: HOSPADM

## 2023-08-02 NOTE — PRE-PROCEDURE INSTRUCTIONS
Spoke with patient regarding procedure scheduled on 8.11     Arrival time 0930     Has patient been sick with fever or on antibiotics within the last 7 days? No     Does the patient have any open wounds, sores or rashes? No     Does the patient have any recent fractures? no     Has patient received a vaccination within the last 7 days? No     Received the COVID vaccination?      Has the patient stopped all medications as directed? na     Does patient have a pacemaker and or defibrillator? no     Does the patient have a ride to and from procedure and someone reliable to remain with patient? dedra      Is the patient diabetic? no     Does the patient have sleep apnea? Or use O2 at home? no     Is the patient receiving sedation?     Is the patient instructed to remain NPO beginning at midnight the night before their procedure? yes     Procedure location confirmed with patient? Yes     Covid- Denies signs/symptoms. Instructed to notify PAT/MD if any changes.

## 2023-08-07 RX ORDER — METHENAMINE, SODIUM PHOSPHATE, MONOBASIC, ANHYDROUS, PHENYL SALICYLATE, METHYLENE BLUE AND HYOSCYAMINE SULFATE 118; 40.8; 36; 10; .12 MG/1; MG/1; MG/1; MG/1; MG/1
CAPSULE ORAL
Qty: 30 CAPSULE | Refills: 1 | Status: SHIPPED | OUTPATIENT
Start: 2023-08-07 | End: 2023-12-28

## 2023-08-11 ENCOUNTER — HOSPITAL ENCOUNTER (OUTPATIENT)
Facility: HOSPITAL | Age: 63
Discharge: HOME | End: 2023-08-11
Attending: ANESTHESIOLOGY | Admitting: ANESTHESIOLOGY
Payer: MEDICARE

## 2023-08-11 VITALS
OXYGEN SATURATION: 100 % | BODY MASS INDEX: 41.21 KG/M2 | HEART RATE: 85 BPM | TEMPERATURE: 98 F | HEIGHT: 63 IN | DIASTOLIC BLOOD PRESSURE: 90 MMHG | WEIGHT: 232.56 LBS | SYSTOLIC BLOOD PRESSURE: 149 MMHG | RESPIRATION RATE: 15 BRPM

## 2023-08-11 DIAGNOSIS — M17.9 KNEE OSTEOARTHRITIS: ICD-10-CM

## 2023-08-11 PROCEDURE — 63600175 PHARM REV CODE 636 W HCPCS: Performed by: ANESTHESIOLOGY

## 2023-08-11 PROCEDURE — 64454 NJX AA&/STRD GNCLR NRV BRNCH: CPT | Mod: LT | Performed by: ANESTHESIOLOGY

## 2023-08-11 PROCEDURE — 25000003 PHARM REV CODE 250: Performed by: ANESTHESIOLOGY

## 2023-08-11 PROCEDURE — 64454 PR NERVE BLOCK INJ, ANES/STEROID, GENICULAR NERVE, W/IMG: ICD-10-PCS | Mod: 50,,, | Performed by: ANESTHESIOLOGY

## 2023-08-11 PROCEDURE — 64454 NJX AA&/STRD GNCLR NRV BRNCH: CPT | Mod: 50,,, | Performed by: ANESTHESIOLOGY

## 2023-08-11 RX ORDER — TRIAMCINOLONE ACETONIDE 40 MG/ML
INJECTION, SUSPENSION INTRA-ARTICULAR; INTRAMUSCULAR
Status: DISCONTINUED | OUTPATIENT
Start: 2023-08-11 | End: 2023-08-11 | Stop reason: HOSPADM

## 2023-08-11 RX ORDER — BUPIVACAINE HYDROCHLORIDE 5 MG/ML
INJECTION, SOLUTION EPIDURAL; INTRACAUDAL
Status: DISCONTINUED | OUTPATIENT
Start: 2023-08-11 | End: 2023-08-11 | Stop reason: HOSPADM

## 2023-08-11 RX ORDER — FENTANYL CITRATE 50 UG/ML
INJECTION, SOLUTION INTRAMUSCULAR; INTRAVENOUS
Status: DISCONTINUED | OUTPATIENT
Start: 2023-08-11 | End: 2023-08-11 | Stop reason: HOSPADM

## 2023-08-11 RX ORDER — MIDAZOLAM HYDROCHLORIDE 1 MG/ML
INJECTION, SOLUTION INTRAMUSCULAR; INTRAVENOUS
Status: DISCONTINUED | OUTPATIENT
Start: 2023-08-11 | End: 2023-08-11 | Stop reason: HOSPADM

## 2023-08-11 RX ORDER — INDOMETHACIN 25 MG/1
CAPSULE ORAL
Status: DISCONTINUED | OUTPATIENT
Start: 2023-08-11 | End: 2023-08-11 | Stop reason: HOSPADM

## 2023-08-11 NOTE — PLAN OF CARE
Pt prepared for her procedure.  I was present in the room when MD spoke with Pt and verify that the MD note was discussed and pt still wanted to precede with procedure.

## 2023-08-11 NOTE — DISCHARGE INSTRUCTIONS

## 2023-08-11 NOTE — DISCHARGE SUMMARY
Discharge Note  Short Stay      SUMMARY     Admit Date: 8/11/2023    Attending Physician: Denis Lai MD        Discharge Physician: Denis Lai MD        Discharge Date: 8/11/2023 10:39 AM    Procedure(s) (LRB):  Bilateral Genicular nerve block with RN IV sedation (Bilateral)    Final Diagnosis: Chronic pain of both knees [M25.561, M25.562, G89.29]  History of bilateral knee arthroplasty [Z96.653]    Disposition: Home or self care    Patient Instructions:   Current Discharge Medication List        CONTINUE these medications which have NOT CHANGED    Details   alprazolam (XANAX XR) 2 MG Tb24 Take 1 mg by mouth once daily. 2 mg tablet(2tabs) oral in the am and 1 mg(1tab)in the afternoon      amLODIPine (NORVASC) 5 MG tablet Take 1 tablet (5 mg total) by mouth once daily.  Qty: 90 tablet, Refills: 3    Comments: .      ARIPiprazole (ABILIFY) 10 MG Tab Take 10 mg by mouth once daily.      aspirin 81 mg Tab Take 1 tablet by mouth Daily.      atorvastatin (LIPITOR) 40 MG tablet Take 1 tablet (40 mg total) by mouth once daily.  Qty: 90 tablet, Refills: 3      doxepin (SINEQUAN) 50 MG capsule Take 50 mg by mouth nightly.      olmesartan-hydrochlorothiazide (BENICAR HCT) 40-12.5 mg Tab Take 1 tablet by mouth once daily.  Qty: 90 tablet, Refills: 3    Comments: .      pregabalin (LYRICA) 75 MG capsule Take 1 capsule (75 mg total) by mouth every evening.  Qty: 90 capsule, Refills: 0      sucralfate (CARAFATE) 1 gram tablet Take 1 tablet (1 g total) by mouth 4 (four) times daily.  Qty: 40 tablet, Refills: 0      zolpidem (AMBIEN) 10 mg Tab Take 10 mg by mouth nightly as needed.      cholestyramine-aspartame (CHOLESTYRAMINE LIGHT) 4 gram PwPk Take 1 packet (4 g total) by mouth 2 (two) times daily.  Qty: 60 packet, Refills: 2    Associated Diagnoses: Diarrhea, unspecified type      dicyclomine (BENTYL) 20 mg tablet Take 1 tablet (20 mg total) by mouth 3 (three) times daily as needed (abdominal pain).  Qty: 60 tablet,  Refills: 0      fluoxetine (PROZAC) 40 MG capsule Take 1 capsule (40 mg total) by mouth once daily.  Qty: 30 capsule, Refills: 11      furosemide (LASIX) 20 MG tablet Take 1 tablet (20 mg total) by mouth daily as needed (swelling).  Qty: 30 tablet, Refills: 5      hydrocortisone 2.5 % cream Apply topically.      ketoconazole (NIZORAL) 2 % cream Apply topically once daily.  Qty: 30 g, Refills: 1      mupirocin (BACTROBAN) 2 % ointment Apply topically as needed.      nitrofurantoin, macrocrystal-monohydrate, (MACROBID) 100 MG capsule Take 1 capsule (100 mg total) by mouth 2 (two) times daily.  Qty: 14 capsule, Refills: 0      promethazine (PHENERGAN) 25 MG tablet Take 1 tablet (25 mg total) by mouth every 6 (six) hours as needed for Nausea.  Qty: 30 tablet, Refills: 0      URO--10-40.8-36 mg Cap TAKE 1 CAPSULE BY MOUTH THREE TIMES DAILY AS NEEDED FOR BLADDER PAIN  Qty: 30 capsule, Refills: 1    Comments: This prescription was filled on 8/4/2023. Any refills authorized will be placed on file.                 Discharge Diagnosis: Chronic pain of both knees [M25.561, M25.562, G89.29]  History of bilateral knee arthroplasty [Z96.653]  Condition on Discharge: Stable with no complications to procedure   Diet on Discharge: Same as before.  Activity: as per instruction sheet.  Discharge to: Home with a responsible adult.  Follow up: 2-4 weeks       Please call the office at [phone] if you experience any weakness or loss of sensation, fever > 101.5, pain uncontrolled with oral medications, persistent nausea/vomiting/or diarrhea, redness or drainage from the incisions, or any other worrisome concerns. If physician on call was not reached or could not communicate with our office for any reason please go to the nearest emergency department

## 2023-08-11 NOTE — OP NOTE
Divya Bui  63 y.o. female      Vitals:    08/11/23 1036   BP: (!) 147/89   Pulse: 89   Resp: 17   Temp:        Procedure Date:TODAYDATE@    Procedure Note:    Bilateral  Geniculate nerve block under fluoroscopy                               Surgeon: Denis Lai MD    Assistant: None    Pre-Op Diagnosis:  Bilateral  Chronic pain of both knees [M25.561, M25.562, G89.29]  History of bilateral knee arthroplasty [Z96.653]    Post-Op Diagnosis: Chronic pain of both knees [M25.561, M25.562, G89.29]  History of bilateral knee arthroplasty [Z96.653]    Sedation:  Conscious sedation provided by M.D    The patient was monitored with continuous pulse oximetry, EKG, and intermittent blood pressure monitors.  The patient was hemodynamically stable throughout the entire process was responsive to voice, and breathing spontaneously.  Supplemental O2 was provided at 2L/min via nasal cannula.  Patient was comfortable for the duration of the procedure. (See nurse documentation and case log for sedation time)    There was a total of 1mg IV Midazolam and 75mcg Fentanyl titrated for the procedure    EBL: None    Complications: None    Specimens: None    Description of procedure:    After written consent was obtained, patient placed in supine position.  The area over the medial and lateral aspect of the superior epi-condyle of the femur and the medial tibial metaphysis were prepped with chlorhexidine.  The procedure targets the superior medial genicular nerve (midpoint of the femur at the superior medial epicondyle), the superior lateral genicular nerve (midpoint of the femur at the superior lateral epicondyle), and the inferior medial genicular nerve (midpoint of the tibia at the inferior medial epicondyle).The area was draped in the usual sterile fashion.  Approximately 8 mL total 1% lidocaine was infiltrated into the skin overlying the 3 predetermined entry points. A 22 gauge spinal needle was then advanced under fluoroscopy in the  AP and lateral views into the positions of the geniculate nerves at these levels. After negative aspiration and no paresthesias there was injection of 2 mL of 0.25% bupivacaine and 40 mg Kenalog into each of these 3 areas for a total volume of 6 mL. Needle was withdrawn and a sterile band-aid applied to the skin.    Patient tolerated the procedure well, and was reporting improvement of pain symptoms after the injection.  Patient was discharged from the clinic in stable condition.

## 2023-08-14 ENCOUNTER — TELEPHONE (OUTPATIENT)
Dept: PAIN MEDICINE | Facility: CLINIC | Age: 63
End: 2023-08-14
Payer: MEDICARE

## 2023-08-14 NOTE — TELEPHONE ENCOUNTER
Reached out to patient to schedule appointment from messages. Apt has been made.   Pt understand. All questions answered.     Lee Phillips  Medical Assistant

## 2023-08-14 NOTE — TELEPHONE ENCOUNTER
----- Message from Lee Phillips MA sent at 7/27/2023  1:24 PM CDT -----  Regarding: proc  Get pt % relief from her inj on 8/11/523

## 2023-08-14 NOTE — TELEPHONE ENCOUNTER
Reach out to pt to see how much relief she received from her MBB on 8/11/23 pt reported 90% and it is still in affect. Inform the pt that I will let  know and I will get back to get with the next treatment steps. Pt understood,

## 2023-08-15 ENCOUNTER — TELEPHONE (OUTPATIENT)
Dept: PAIN MEDICINE | Facility: CLINIC | Age: 63
End: 2023-08-15
Payer: MEDICARE

## 2023-08-15 NOTE — TELEPHONE ENCOUNTER
----- Message from Delfina Carter sent at 8/15/2023  4:23 PM CDT -----  Contact: Divya  Patient is calling to speak with the nurse regarding issues with the area where procedure was performed.  Please give a call back at [phone] as requested.  Thanks  LR

## 2023-08-15 NOTE — TELEPHONE ENCOUNTER
Returned pt call. She states that she had a procedure on her knee on 08/11. States that she is still having pain in her right knee but her left knee is fine. Informed pt it can take up to 4 weeks for the injection to reach full effectiveness. We recommend the use of over the counter medications. You can alternated between tylenol (do not exceed 3 grams a day) and ibuprofen every 4-6 hours. We also recommend applying heat/ice to the injections site to help with pain and inflammation. Pt verbalized understanding.

## 2023-08-25 ENCOUNTER — TELEPHONE (OUTPATIENT)
Dept: INTERNAL MEDICINE | Facility: CLINIC | Age: 63
End: 2023-08-25
Payer: MEDICARE

## 2023-08-25 ENCOUNTER — PATIENT MESSAGE (OUTPATIENT)
Dept: INTERNAL MEDICINE | Facility: CLINIC | Age: 63
End: 2023-08-25
Payer: MEDICARE

## 2023-08-25 RX ORDER — ONDANSETRON HYDROCHLORIDE 8 MG/1
8 TABLET, FILM COATED ORAL EVERY 8 HOURS PRN
Qty: 20 TABLET | Refills: 0 | Status: ON HOLD | OUTPATIENT
Start: 2023-08-25 | End: 2023-11-25 | Stop reason: HOSPADM

## 2023-08-25 NOTE — TELEPHONE ENCOUNTER
----- Message from Debra Waters sent at 8/25/2023  3:21 PM CDT -----  Name of Who is Calling:Patient           What is the request in detail:Patient is requesting medication for nausea. Patient states she has been vomiting and diarrhea Since Monday 08/21. Pharmacy will close for 630pm. If after closing time please send to [facility] on [address].      [facility] - [address] [address]  Phone: [phone] Fax: [phone]             Can the clinic reply by [facility]:no           [facility] Number [facility] Back [facility]: [phone]

## 2023-08-25 NOTE — TELEPHONE ENCOUNTER
Patient is requesting medication for nausea. Patient states she has been vomiting and diarrhea Since Monday 08/21/23  Please advise

## 2023-08-27 ENCOUNTER — NURSE TRIAGE (OUTPATIENT)
Dept: ADMINISTRATIVE | Facility: CLINIC | Age: 63
End: 2023-08-27
Payer: MEDICARE

## 2023-08-27 ENCOUNTER — HOSPITAL ENCOUNTER (OUTPATIENT)
Facility: HOSPITAL | Age: 63
Discharge: HOME | End: 2023-08-29
Attending: EMERGENCY MEDICINE | Admitting: INTERNAL MEDICINE
Payer: MEDICARE

## 2023-08-27 DIAGNOSIS — E83.42 HYPOMAGNESEMIA: ICD-10-CM

## 2023-08-27 DIAGNOSIS — E87.1 HYPONATREMIA: ICD-10-CM

## 2023-08-27 DIAGNOSIS — R00.0 TACHYCARDIA: ICD-10-CM

## 2023-08-27 DIAGNOSIS — R10.13 EPIGASTRIC PAIN: ICD-10-CM

## 2023-08-27 DIAGNOSIS — R11.2 NAUSEA AND VOMITING, UNSPECIFIED VOMITING TYPE: Primary | ICD-10-CM

## 2023-08-27 DIAGNOSIS — I10 HYPERTENSION, UNSPECIFIED TYPE: ICD-10-CM

## 2023-08-27 LAB
ALBUMIN SERPL BCP-MCNC: 3.9 G/DL (ref 3.5–5.2)
ALP SERPL-CCNC: 117 U/L (ref 55–135)
ALT SERPL W/O P-5'-P-CCNC: 7 U/L (ref 10–44)
ANION GAP SERPL CALC-SCNC: 15 MMOL/L (ref 8–16)
APTT PPP: 27.2 SEC (ref 21–32)
AST SERPL-CCNC: 12 U/L (ref 10–40)
BASOPHILS # BLD AUTO: 0.02 K/UL (ref 0–0.2)
BASOPHILS NFR BLD: 0.2 % (ref 0–1.9)
BILIRUB SERPL-MCNC: 1.1 MG/DL (ref 0.1–1)
BILIRUB UR QL STRIP: NEGATIVE
BNP SERPL-MCNC: <10 PG/ML (ref 0–99)
BUN SERPL-MCNC: 13 MG/DL (ref 8–23)
CALCIUM SERPL-MCNC: 8.7 MG/DL (ref 8.7–10.5)
CHLORIDE SERPL-SCNC: 85 MMOL/L (ref 95–110)
CLARITY UR: CLEAR
CO2 SERPL-SCNC: 20 MMOL/L (ref 23–29)
COLOR UR: COLORLESS
CREAT SERPL-MCNC: 1.2 MG/DL (ref 0.5–1.4)
DIFFERENTIAL METHOD: ABNORMAL
EOSINOPHIL # BLD AUTO: 0.1 K/UL (ref 0–0.5)
EOSINOPHIL NFR BLD: 0.7 % (ref 0–8)
ERYTHROCYTE [DISTWIDTH] IN BLOOD BY AUTOMATED COUNT: 13 % (ref 11.5–14.5)
EST. GFR  (NO RACE VARIABLE): 50 ML/MIN/1.73 M^2
GIANT PLATELETS BLD QL SMEAR: PRESENT
GLUCOSE SERPL-MCNC: 115 MG/DL (ref 70–110)
GLUCOSE UR QL STRIP: NEGATIVE
HCT VFR BLD AUTO: 39.2 % (ref 37–48.5)
HGB BLD-MCNC: 13.8 G/DL (ref 12–16)
HGB UR QL STRIP: NEGATIVE
IMM GRANULOCYTES # BLD AUTO: 0.06 K/UL (ref 0–0.04)
IMM GRANULOCYTES NFR BLD AUTO: 0.7 % (ref 0–0.5)
INR PPP: 1 (ref 0.8–1.2)
KETONES UR QL STRIP: NEGATIVE
LEUKOCYTE ESTERASE UR QL STRIP: ABNORMAL
LIPASE SERPL-CCNC: 5 U/L (ref 4–60)
LYMPHOCYTES # BLD AUTO: 0.9 K/UL (ref 1–4.8)
LYMPHOCYTES NFR BLD: 11.5 % (ref 18–48)
MAGNESIUM SERPL-MCNC: 1.5 MG/DL (ref 1.6–2.6)
MCH RBC QN AUTO: 29.6 PG (ref 27–31)
MCHC RBC AUTO-ENTMCNC: 35.2 G/DL (ref 32–36)
MCV RBC AUTO: 84 FL (ref 82–98)
MICROSCOPIC COMMENT: ABNORMAL
MONOCYTES # BLD AUTO: 0.5 K/UL (ref 0.3–1)
MONOCYTES NFR BLD: 6.6 % (ref 4–15)
NEUTROPHILS # BLD AUTO: 6.6 K/UL (ref 1.8–7.7)
NEUTROPHILS NFR BLD: 80.3 % (ref 38–73)
NITRITE UR QL STRIP: NEGATIVE
NRBC BLD-RTO: 0 /100 WBC
PH UR STRIP: 7 [PH] (ref 5–8)
PLATELET # BLD AUTO: 208 K/UL (ref 150–450)
PLATELET BLD QL SMEAR: ABNORMAL
PMV BLD AUTO: 8.5 FL (ref 9.2–12.9)
POTASSIUM SERPL-SCNC: 3.7 MMOL/L (ref 3.5–5.1)
PROT SERPL-MCNC: 7.6 G/DL (ref 6–8.4)
PROT UR QL STRIP: NEGATIVE
PROTHROMBIN TIME: 10.5 SEC (ref 9–12.5)
RBC # BLD AUTO: 4.66 M/UL (ref 4–5.4)
SODIUM SERPL-SCNC: 120 MMOL/L (ref 136–145)
SODIUM UR-SCNC: <20 MMOL/L (ref 20–250)
SP GR UR STRIP: 1.02 (ref 1–1.03)
TROPONIN I SERPL DL<=0.01 NG/ML-MCNC: 0.01 NG/ML (ref 0–0.03)
URN SPEC COLLECT METH UR: ABNORMAL
UROBILINOGEN UR STRIP-ACNC: NEGATIVE EU/DL
WBC # BLD AUTO: 8.2 K/UL (ref 3.9–12.7)
WBC #/AREA URNS HPF: 6 /HPF (ref 0–5)

## 2023-08-27 PROCEDURE — 81000 URINALYSIS NONAUTO W/SCOPE: CPT | Performed by: NURSE PRACTITIONER

## 2023-08-27 PROCEDURE — 96361 HYDRATE IV INFUSION ADD-ON: CPT

## 2023-08-27 PROCEDURE — G0378 HOSPITAL OBSERVATION PER HR: HCPCS

## 2023-08-27 PROCEDURE — 93005 ELECTROCARDIOGRAM TRACING: CPT

## 2023-08-27 PROCEDURE — 80053 COMPREHEN METABOLIC PANEL: CPT | Performed by: NURSE PRACTITIONER

## 2023-08-27 PROCEDURE — 85730 THROMBOPLASTIN TIME PARTIAL: CPT | Performed by: EMERGENCY MEDICINE

## 2023-08-27 PROCEDURE — 85610 PROTHROMBIN TIME: CPT | Performed by: EMERGENCY MEDICINE

## 2023-08-27 PROCEDURE — 96375 TX/PRO/DX INJ NEW DRUG ADDON: CPT

## 2023-08-27 PROCEDURE — 85025 COMPLETE CBC W/AUTO DIFF WBC: CPT | Performed by: NURSE PRACTITIONER

## 2023-08-27 PROCEDURE — 25500020 PHARM REV CODE 255: Performed by: EMERGENCY MEDICINE

## 2023-08-27 PROCEDURE — 25000003 PHARM REV CODE 250: Performed by: NURSE PRACTITIONER

## 2023-08-27 PROCEDURE — 93010 ELECTROCARDIOGRAM REPORT: CPT | Mod: ,,, | Performed by: INTERNAL MEDICINE

## 2023-08-27 PROCEDURE — 93010 EKG 12-LEAD: ICD-10-PCS | Mod: ,,, | Performed by: INTERNAL MEDICINE

## 2023-08-27 PROCEDURE — 84484 ASSAY OF TROPONIN QUANT: CPT | Performed by: EMERGENCY MEDICINE

## 2023-08-27 PROCEDURE — 84300 ASSAY OF URINE SODIUM: CPT | Performed by: EMERGENCY MEDICINE

## 2023-08-27 PROCEDURE — 96372 THER/PROPH/DIAG INJ SC/IM: CPT | Performed by: INTERNAL MEDICINE

## 2023-08-27 PROCEDURE — 83880 ASSAY OF NATRIURETIC PEPTIDE: CPT | Performed by: EMERGENCY MEDICINE

## 2023-08-27 PROCEDURE — 25000003 PHARM REV CODE 250: Performed by: INTERNAL MEDICINE

## 2023-08-27 PROCEDURE — 63600175 PHARM REV CODE 636 W HCPCS: Performed by: EMERGENCY MEDICINE

## 2023-08-27 PROCEDURE — 83735 ASSAY OF MAGNESIUM: CPT | Performed by: EMERGENCY MEDICINE

## 2023-08-27 PROCEDURE — 96365 THER/PROPH/DIAG IV INF INIT: CPT | Mod: 59

## 2023-08-27 PROCEDURE — 83690 ASSAY OF LIPASE: CPT | Performed by: NURSE PRACTITIONER

## 2023-08-27 PROCEDURE — 63600175 PHARM REV CODE 636 W HCPCS: Performed by: NURSE PRACTITIONER

## 2023-08-27 PROCEDURE — 63600175 PHARM REV CODE 636 W HCPCS: Performed by: INTERNAL MEDICINE

## 2023-08-27 PROCEDURE — 99285 EMERGENCY DEPT VISIT HI MDM: CPT | Mod: 25

## 2023-08-27 PROCEDURE — 25000003 PHARM REV CODE 250: Performed by: EMERGENCY MEDICINE

## 2023-08-27 PROCEDURE — 83935 ASSAY OF URINE OSMOLALITY: CPT | Performed by: EMERGENCY MEDICINE

## 2023-08-27 RX ORDER — AMLODIPINE BESYLATE 5 MG/1
5 TABLET ORAL DAILY
Status: DISCONTINUED | OUTPATIENT
Start: 2023-08-28 | End: 2023-08-29 | Stop reason: HOSPADM

## 2023-08-27 RX ORDER — ALPRAZOLAM 1 MG/1
1 TABLET ORAL 3 TIMES DAILY PRN
COMMUNITY
Start: 2023-07-31

## 2023-08-27 RX ORDER — ALPRAZOLAM 1 MG/1
1 TABLET ORAL 3 TIMES DAILY PRN
Status: DISCONTINUED | OUTPATIENT
Start: 2023-08-27 | End: 2023-08-29 | Stop reason: HOSPADM

## 2023-08-27 RX ORDER — MAGNESIUM SULFATE 1 G/100ML
1 INJECTION INTRAVENOUS
Status: COMPLETED | OUTPATIENT
Start: 2023-08-27 | End: 2023-08-27

## 2023-08-27 RX ORDER — MAG HYDROX/ALUMINUM HYD/SIMETH 200-200-20
30 SUSPENSION, ORAL (FINAL DOSE FORM) ORAL EVERY 6 HOURS PRN
Status: DISCONTINUED | OUTPATIENT
Start: 2023-08-27 | End: 2023-08-29 | Stop reason: HOSPADM

## 2023-08-27 RX ORDER — IPRATROPIUM BROMIDE AND ALBUTEROL SULFATE 2.5; .5 MG/3ML; MG/3ML
3 SOLUTION RESPIRATORY (INHALATION) EVERY 4 HOURS PRN
Status: DISCONTINUED | OUTPATIENT
Start: 2023-08-27 | End: 2023-08-29 | Stop reason: HOSPADM

## 2023-08-27 RX ORDER — LOSARTAN POTASSIUM 25 MG/1
25 TABLET ORAL DAILY
Status: DISCONTINUED | OUTPATIENT
Start: 2023-08-27 | End: 2023-08-29 | Stop reason: HOSPADM

## 2023-08-27 RX ORDER — DOXEPIN HYDROCHLORIDE 25 MG/1
50 CAPSULE ORAL NIGHTLY
Status: DISCONTINUED | OUTPATIENT
Start: 2023-08-27 | End: 2023-08-28

## 2023-08-27 RX ORDER — ONDANSETRON 2 MG/ML
4 INJECTION INTRAMUSCULAR; INTRAVENOUS EVERY 8 HOURS PRN
Status: DISCONTINUED | OUTPATIENT
Start: 2023-08-27 | End: 2023-08-29 | Stop reason: HOSPADM

## 2023-08-27 RX ORDER — SODIUM CHLORIDE 9 MG/ML
INJECTION, SOLUTION INTRAVENOUS CONTINUOUS
Status: DISCONTINUED | OUTPATIENT
Start: 2023-08-27 | End: 2023-08-28

## 2023-08-27 RX ORDER — ENOXAPARIN SODIUM 100 MG/ML
40 INJECTION SUBCUTANEOUS EVERY 24 HOURS
Status: DISCONTINUED | OUTPATIENT
Start: 2023-08-27 | End: 2023-08-28

## 2023-08-27 RX ORDER — NAPROXEN SODIUM 220 MG/1
81 TABLET, FILM COATED ORAL DAILY
Status: DISCONTINUED | OUTPATIENT
Start: 2023-08-28 | End: 2023-08-29 | Stop reason: HOSPADM

## 2023-08-27 RX ORDER — ZOLPIDEM TARTRATE 5 MG/1
5 TABLET ORAL NIGHTLY PRN
Status: DISCONTINUED | OUTPATIENT
Start: 2023-08-27 | End: 2023-08-29 | Stop reason: HOSPADM

## 2023-08-27 RX ORDER — ONDANSETRON 2 MG/ML
4 INJECTION INTRAMUSCULAR; INTRAVENOUS
Status: COMPLETED | OUTPATIENT
Start: 2023-08-27 | End: 2023-08-27

## 2023-08-27 RX ORDER — METOPROLOL TARTRATE 1 MG/ML
5 INJECTION, SOLUTION INTRAVENOUS
Status: DISPENSED | OUTPATIENT
Start: 2023-08-27 | End: 2023-08-28

## 2023-08-27 RX ORDER — HYDRALAZINE HYDROCHLORIDE 20 MG/ML
10 INJECTION INTRAMUSCULAR; INTRAVENOUS EVERY 8 HOURS PRN
Status: DISCONTINUED | OUTPATIENT
Start: 2023-08-27 | End: 2023-08-29 | Stop reason: HOSPADM

## 2023-08-27 RX ORDER — ACETAMINOPHEN 325 MG/1
650 TABLET ORAL EVERY 6 HOURS PRN
Status: DISCONTINUED | OUTPATIENT
Start: 2023-08-27 | End: 2023-08-29 | Stop reason: HOSPADM

## 2023-08-27 RX ORDER — MORPHINE SULFATE 4 MG/ML
4 INJECTION, SOLUTION INTRAMUSCULAR; INTRAVENOUS
Status: COMPLETED | OUTPATIENT
Start: 2023-08-27 | End: 2023-08-27

## 2023-08-27 RX ORDER — ZOLPIDEM TARTRATE 5 MG/1
5 TABLET ORAL NIGHTLY PRN
Status: DISCONTINUED | OUTPATIENT
Start: 2023-08-27 | End: 2023-08-27 | Stop reason: SDUPTHER

## 2023-08-27 RX ORDER — ATORVASTATIN CALCIUM 40 MG/1
40 TABLET, FILM COATED ORAL DAILY
Status: DISCONTINUED | OUTPATIENT
Start: 2023-08-28 | End: 2023-08-29 | Stop reason: HOSPADM

## 2023-08-27 RX ADMIN — DOXEPIN HYDROCHLORIDE 50 MG: 25 CAPSULE ORAL at 11:08

## 2023-08-27 RX ADMIN — LOSARTAN POTASSIUM 25 MG: 25 TABLET, FILM COATED ORAL at 11:08

## 2023-08-27 RX ADMIN — PROMETHAZINE HYDROCHLORIDE 12.5 MG: 25 INJECTION INTRAMUSCULAR; INTRAVENOUS at 07:08

## 2023-08-27 RX ADMIN — IOHEXOL 100 ML: 350 INJECTION, SOLUTION INTRAVENOUS at 08:08

## 2023-08-27 RX ADMIN — ENOXAPARIN SODIUM 40 MG: 40 INJECTION SUBCUTANEOUS at 11:08

## 2023-08-27 RX ADMIN — SODIUM CHLORIDE 1000 ML: 9 INJECTION, SOLUTION INTRAVENOUS at 06:08

## 2023-08-27 RX ADMIN — ONDANSETRON 4 MG: 2 INJECTION INTRAMUSCULAR; INTRAVENOUS at 06:08

## 2023-08-27 RX ADMIN — MORPHINE SULFATE 4 MG: 4 INJECTION INTRAVENOUS at 07:08

## 2023-08-27 RX ADMIN — MAGNESIUM SULFATE IN DEXTROSE 1 G: 10 INJECTION, SOLUTION INTRAVENOUS at 09:08

## 2023-08-27 NOTE — FIRST PROVIDER EVALUATION
"Medical screening examination initiated.  I have conducted a focused provider triage encounter, findings are as follows:    Brief history of present illness:  Patient presents with nausea, vomiting diarrhea x1 week.  Reports that she has not eaten a week.  No relief with Zofran at home.    Vitals:    08/27/23 1803   Pulse: (!) 114   Resp: 20   TempSrc: Oral   Weight: 103.7 kg (228 lb 8.1 oz)   Height: 5' 3" (1.6 m)       Pertinent physical exam:  Tachycardic but does not appear in any acute distress    Brief workup plan:  Labs, IV fluids and meds.    Preliminary workup initiated; this workup will be continued and followed by the physician or advanced practice provider that is assigned to the patient when roomed.  "

## 2023-08-27 NOTE — TELEPHONE ENCOUNTER
"Pt with nausea and vomiting for 6 days, Zofran for the last 3 days has been used with no improvement.  Care advice state to go to ED/[facility] now.  Pt states [facility] in her area is closed and she will go to [facility] in [place].  Patient verbally understands, all questions answered, advised to call back for any worsening symptoms or further needs.     Reason for Disposition   [1] SEVERE vomiting (e.g., 6 or more times/day) AND [2] present > 8 hours (Exception: Patient sounds well, is drinking liquids, does not sound dehydrated, and vomiting has lasted less than 24 hours.)    Additional Information   Negative: Shock suspected (e.g., cold/pale/clammy skin, too weak to stand, low BP, rapid pulse)   Negative: Difficult to awaken or acting confused (e.g., disoriented, slurred speech)   Negative: Sounds like a life-threatening emergency to the triager   Negative: [1] Vomiting AND [2] contains red blood or black ("coffee ground") material  (Exception: Few red streaks in vomit that only happened once.)   Negative: Severe pain in one eye   Negative: Recent head injury (within last 3 days)   Negative: Recent abdominal injury (within last 3 days)   Negative: [1] Insulin-dependent diabetes (Type I) AND [2] glucose > 400 mg/dl (22 mmol/l)   Negative: [1] Vomiting AND [2] hernia is more painful or swollen than usual    Protocols used: Vomiting-A-AH    "

## 2023-08-28 LAB
ALBUMIN SERPL BCP-MCNC: 3.4 G/DL (ref 3.5–5.2)
ALP SERPL-CCNC: 103 U/L (ref 55–135)
ALT SERPL W/O P-5'-P-CCNC: 8 U/L (ref 10–44)
ANION GAP SERPL CALC-SCNC: 11 MMOL/L (ref 8–16)
ANION GAP SERPL CALC-SCNC: 11 MMOL/L (ref 8–16)
ANION GAP SERPL CALC-SCNC: 12 MMOL/L (ref 8–16)
ANION GAP SERPL CALC-SCNC: 14 MMOL/L (ref 8–16)
AST SERPL-CCNC: 17 U/L (ref 10–40)
BASOPHILS # BLD AUTO: 0.03 K/UL (ref 0–0.2)
BASOPHILS NFR BLD: 0.4 % (ref 0–1.9)
BILIRUB SERPL-MCNC: 0.8 MG/DL (ref 0.1–1)
BUN SERPL-MCNC: 10 MG/DL (ref 8–23)
BUN SERPL-MCNC: 10 MG/DL (ref 8–23)
BUN SERPL-MCNC: 11 MG/DL (ref 8–23)
BUN SERPL-MCNC: 13 MG/DL (ref 8–23)
CALCIUM SERPL-MCNC: 8.3 MG/DL (ref 8.7–10.5)
CALCIUM SERPL-MCNC: 8.4 MG/DL (ref 8.7–10.5)
CHLORIDE SERPL-SCNC: 104 MMOL/L (ref 95–110)
CHLORIDE SERPL-SCNC: 91 MMOL/L (ref 95–110)
CHLORIDE SERPL-SCNC: 95 MMOL/L (ref 95–110)
CHLORIDE SERPL-SCNC: 96 MMOL/L (ref 95–110)
CO2 SERPL-SCNC: 16 MMOL/L (ref 23–29)
CO2 SERPL-SCNC: 17 MMOL/L (ref 23–29)
CO2 SERPL-SCNC: 17 MMOL/L (ref 23–29)
CO2 SERPL-SCNC: 23 MMOL/L (ref 23–29)
CREAT SERPL-MCNC: 1 MG/DL (ref 0.5–1.4)
CREAT SERPL-MCNC: 1.1 MG/DL (ref 0.5–1.4)
DIFFERENTIAL METHOD: ABNORMAL
EOSINOPHIL # BLD AUTO: 0.1 K/UL (ref 0–0.5)
EOSINOPHIL NFR BLD: 1.5 % (ref 0–8)
ERYTHROCYTE [DISTWIDTH] IN BLOOD BY AUTOMATED COUNT: 13.3 % (ref 11.5–14.5)
EST. GFR  (NO RACE VARIABLE): 56 ML/MIN/1.73 M^2
EST. GFR  (NO RACE VARIABLE): >60 ML/MIN/1.73 M^2
GLUCOSE SERPL-MCNC: 103 MG/DL (ref 70–110)
GLUCOSE SERPL-MCNC: 125 MG/DL (ref 70–110)
GLUCOSE SERPL-MCNC: 94 MG/DL (ref 70–110)
GLUCOSE SERPL-MCNC: 94 MG/DL (ref 70–110)
HCT VFR BLD AUTO: 38 % (ref 37–48.5)
HGB BLD-MCNC: 12.5 G/DL (ref 12–16)
IMM GRANULOCYTES # BLD AUTO: 0.03 K/UL (ref 0–0.04)
IMM GRANULOCYTES NFR BLD AUTO: 0.4 % (ref 0–0.5)
LYMPHOCYTES # BLD AUTO: 1.2 K/UL (ref 1–4.8)
LYMPHOCYTES NFR BLD: 16.7 % (ref 18–48)
MAGNESIUM SERPL-MCNC: 1.9 MG/DL (ref 1.6–2.6)
MCH RBC QN AUTO: 29.3 PG (ref 27–31)
MCHC RBC AUTO-ENTMCNC: 32.9 G/DL (ref 32–36)
MCV RBC AUTO: 89 FL (ref 82–98)
MONOCYTES # BLD AUTO: 0.6 K/UL (ref 0.3–1)
MONOCYTES NFR BLD: 8.6 % (ref 4–15)
NEUTROPHILS # BLD AUTO: 5 K/UL (ref 1.8–7.7)
NEUTROPHILS NFR BLD: 72.4 % (ref 38–73)
NRBC BLD-RTO: 0 /100 WBC
OSMOLALITY UR: 120 MOSM/KG (ref 50–1200)
PLATELET # BLD AUTO: 183 K/UL (ref 150–450)
PMV BLD AUTO: 9.2 FL (ref 9.2–12.9)
POTASSIUM SERPL-SCNC: 3.3 MMOL/L (ref 3.5–5.1)
POTASSIUM SERPL-SCNC: 3.7 MMOL/L (ref 3.5–5.1)
POTASSIUM SERPL-SCNC: 4.1 MMOL/L (ref 3.5–5.1)
POTASSIUM SERPL-SCNC: 4.2 MMOL/L (ref 3.5–5.1)
PROT SERPL-MCNC: 6.8 G/DL (ref 6–8.4)
RBC # BLD AUTO: 4.27 M/UL (ref 4–5.4)
SODIUM SERPL-SCNC: 123 MMOL/L (ref 136–145)
SODIUM SERPL-SCNC: 125 MMOL/L (ref 136–145)
SODIUM SERPL-SCNC: 127 MMOL/L (ref 136–145)
SODIUM SERPL-SCNC: 132 MMOL/L (ref 136–145)
WBC # BLD AUTO: 6.89 K/UL (ref 3.9–12.7)

## 2023-08-28 PROCEDURE — 36415 COLL VENOUS BLD VENIPUNCTURE: CPT | Performed by: INTERNAL MEDICINE

## 2023-08-28 PROCEDURE — 25000003 PHARM REV CODE 250: Performed by: INTERNAL MEDICINE

## 2023-08-28 PROCEDURE — 63600175 PHARM REV CODE 636 W HCPCS: Performed by: INTERNAL MEDICINE

## 2023-08-28 PROCEDURE — 96366 THER/PROPH/DIAG IV INF ADDON: CPT

## 2023-08-28 PROCEDURE — 96376 TX/PRO/DX INJ SAME DRUG ADON: CPT

## 2023-08-28 PROCEDURE — 80048 BASIC METABOLIC PNL TOTAL CA: CPT | Mod: 91,XB | Performed by: INTERNAL MEDICINE

## 2023-08-28 PROCEDURE — 85025 COMPLETE CBC W/AUTO DIFF WBC: CPT | Performed by: INTERNAL MEDICINE

## 2023-08-28 PROCEDURE — 25000003 PHARM REV CODE 250: Performed by: NURSE PRACTITIONER

## 2023-08-28 PROCEDURE — G0378 HOSPITAL OBSERVATION PER HR: HCPCS

## 2023-08-28 PROCEDURE — 96372 THER/PROPH/DIAG INJ SC/IM: CPT | Performed by: FAMILY MEDICINE

## 2023-08-28 PROCEDURE — 83735 ASSAY OF MAGNESIUM: CPT | Performed by: INTERNAL MEDICINE

## 2023-08-28 PROCEDURE — 96361 HYDRATE IV INFUSION ADD-ON: CPT

## 2023-08-28 PROCEDURE — 80048 BASIC METABOLIC PNL TOTAL CA: CPT | Mod: XB | Performed by: INTERNAL MEDICINE

## 2023-08-28 PROCEDURE — 63600175 PHARM REV CODE 636 W HCPCS: Performed by: FAMILY MEDICINE

## 2023-08-28 PROCEDURE — 80053 COMPREHEN METABOLIC PANEL: CPT | Performed by: INTERNAL MEDICINE

## 2023-08-28 PROCEDURE — 63600175 PHARM REV CODE 636 W HCPCS: Performed by: NURSE PRACTITIONER

## 2023-08-28 RX ORDER — SODIUM BICARBONATE 650 MG/1
650 TABLET ORAL 3 TIMES DAILY
Status: DISCONTINUED | OUTPATIENT
Start: 2023-08-28 | End: 2023-08-28

## 2023-08-28 RX ORDER — DOXEPIN HYDROCHLORIDE 25 MG/1
50 CAPSULE ORAL NIGHTLY
Status: DISCONTINUED | OUTPATIENT
Start: 2023-08-28 | End: 2023-08-29 | Stop reason: HOSPADM

## 2023-08-28 RX ORDER — ENOXAPARIN SODIUM 100 MG/ML
40 INJECTION SUBCUTANEOUS EVERY 12 HOURS
Status: DISCONTINUED | OUTPATIENT
Start: 2023-08-28 | End: 2023-08-29 | Stop reason: HOSPADM

## 2023-08-28 RX ADMIN — ALPRAZOLAM 1 MG: 1 TABLET ORAL at 12:08

## 2023-08-28 RX ADMIN — ACETAMINOPHEN 650 MG: 325 TABLET ORAL at 08:08

## 2023-08-28 RX ADMIN — SODIUM BICARBONATE 650 MG: 650 TABLET ORAL at 08:08

## 2023-08-28 RX ADMIN — ALPRAZOLAM 1 MG: 1 TABLET ORAL at 08:08

## 2023-08-28 RX ADMIN — DOXEPIN HYDROCHLORIDE 50 MG: 25 CAPSULE ORAL at 08:08

## 2023-08-28 RX ADMIN — LOSARTAN POTASSIUM 25 MG: 25 TABLET, FILM COATED ORAL at 08:08

## 2023-08-28 RX ADMIN — ENOXAPARIN SODIUM 40 MG: 40 INJECTION SUBCUTANEOUS at 02:08

## 2023-08-28 RX ADMIN — ALPRAZOLAM 1 MG: 1 TABLET ORAL at 05:08

## 2023-08-28 RX ADMIN — ATORVASTATIN CALCIUM 40 MG: 40 TABLET, FILM COATED ORAL at 08:08

## 2023-08-28 RX ADMIN — AMLODIPINE BESYLATE 5 MG: 5 TABLET ORAL at 08:08

## 2023-08-28 RX ADMIN — ASPIRIN 81 MG CHEWABLE TABLET 81 MG: 81 TABLET CHEWABLE at 08:08

## 2023-08-28 RX ADMIN — SODIUM BICARBONATE 650 MG: 650 TABLET ORAL at 02:08

## 2023-08-28 RX ADMIN — ALUMINUM HYDROXIDE, MAGNESIUM HYDROXIDE, AND DIMETHICONE 30 ML: 200; 20; 200 SUSPENSION ORAL at 02:08

## 2023-08-28 RX ADMIN — ZOLPIDEM TARTRATE 5 MG: 5 TABLET ORAL at 08:08

## 2023-08-28 RX ADMIN — PROMETHAZINE HYDROCHLORIDE 12.5 MG: 25 INJECTION INTRAMUSCULAR; INTRAVENOUS at 01:08

## 2023-08-28 RX ADMIN — SODIUM CHLORIDE: 9 INJECTION, SOLUTION INTRAVENOUS at 08:08

## 2023-08-28 RX ADMIN — ZOLPIDEM TARTRATE 5 MG: 5 TABLET ORAL at 12:08

## 2023-08-28 RX ADMIN — PROMETHAZINE HYDROCHLORIDE 12.5 MG: 25 INJECTION INTRAMUSCULAR; INTRAVENOUS at 04:08

## 2023-08-28 RX ADMIN — SODIUM CHLORIDE: 9 INJECTION, SOLUTION INTRAVENOUS at 12:08

## 2023-08-28 RX ADMIN — SODIUM CHLORIDE: 9 INJECTION, SOLUTION INTRAVENOUS at 02:08

## 2023-08-28 RX ADMIN — ONDANSETRON 4 MG: 2 INJECTION INTRAMUSCULAR; INTRAVENOUS at 03:08

## 2023-08-28 NOTE — ASSESSMENT & PLAN NOTE
-CT abdomen with no evidence of acute intra-abdominal pathology.    -symptoms ongoing for the past 5-6 days  -supportive care with IV fluids, IV antiemetics.    No further episodes of vomiting, did have some nausea this am, relieved with antiemetics

## 2023-08-28 NOTE — SUBJECTIVE & OBJECTIVE
Interval History: intermittent nausea and some anxiety this am, home xanax as needed     Review of Systems   Gastrointestinal:  Positive for nausea.   Psychiatric/Behavioral:  The patient is nervous/anxious.      Objective:     Vital Signs (Most Recent):  Temp: 97.8 °F (36.6 °C) (08/28/23 0822)  Pulse: 86 (08/28/23 0822)  Resp: 18 (08/28/23 0822)  BP: 134/75 (08/28/23 0822)  SpO2: 95 % (08/28/23 0822) Vital Signs (24h Range):  Temp:  [97.8 °F (36.6 °C)-98.9 °F (37.2 °C)] 97.8 °F (36.6 °C)  Pulse:  [] 86  Resp:  [12-20] 18  SpO2:  [95 %-99 %] 95 %  BP: (122-183)/() 134/75     Weight: 104.3 kg (229 lb 15 oz)  Body mass index is 40.73 kg/m².    Intake/Output Summary (Last 24 hours) at 8/28/2023 1005  Last data filed at 8/28/2023 0828  Gross per 24 hour   Intake 1284.25 ml   Output 400 ml   Net 884.25 ml         Physical Exam  Vitals and nursing note reviewed.   Constitutional:       Appearance: She is obese.   Cardiovascular:      Rate and Rhythm: Normal rate and regular rhythm.      Pulses: Normal pulses.      Heart sounds: Normal heart sounds.   Pulmonary:      Effort: Pulmonary effort is normal.      Breath sounds: Normal breath sounds. No wheezing.   Abdominal:      General: Bowel sounds are normal. There is no distension.      Palpations: Abdomen is soft.      Tenderness: There is no abdominal tenderness.   Musculoskeletal:         General: No swelling. Normal range of motion.   Skin:     General: Skin is warm and dry.      Findings: No bruising.   Neurological:      Mental Status: She is alert and oriented to person, place, and time.         Significant Labs: All pertinent labs within the past 24 hours have been reviewed.    Significant Imaging: I have reviewed all pertinent imaging results/findings within the past 24 hours.

## 2023-08-28 NOTE — H&P
[facility] Emergency Dept.  [facility] Medicine  History & Physical    Patient Name: Divya Bui  MRN: [id number]  Patient Class: Emergency  Admission Date: 8/27/2023  Attending Physician: Lucy Westbrook MD   Primary Care Provider: Angel Khan MD         Patient information was obtained from patient, past medical records and ER records.     Subjective:     Principal Problem:Hyponatremia    Chief Complaint:   Chief Complaint   Patient presents with    Vomiting     N/V since Monday        HPI: Ms. Bui is a 63-year-old  female with PMH significant for HTN, GERD, hyperlipidemia, depression/anxiety, presented to the ED complaining of abdominal discomfort, nausea, vomiting for the past 5-6 days.  She denies fever, chills.  Denies sick contacts at home.  Reports 1-2 nonbloody loose bowel movements.  Reports decreased oral intake due to decreased appetite.  In the ED, CT abdomen pelvis reveals no evidence of acute intra-abdominal pathology.  However laboratory workup is remarkable for low sodium of 120, chloride 85, suggesting volume depletion.  Creatinine 1.2, magnesium within normal limits.  Rest of the laboratory workup unremarkable.  Patient received NS 1 L bolus.    Placed in observation for hyponatremia, secondary to volume depletion.  Nausea/vomiting.      Past Medical History:   Diagnosis Date    Anemia     Anxiety     Basal cell carcinoma (BCC) of face 2/26/2020    Blood transfusion     Bowel incontinence     Depression     Esophageal stricture     GERD (gastroesophageal reflux disease)     Hypertension     Latent tuberculosis by skin test 2001    took INH    Liver nodule 1/6/2014    Morbid obesity with BMI of 45.0-49.9, adult     OA (osteoarthritis) of knee 12/12/2014    Pericardial effusion 9/4/2014       Past Surgical History:   Procedure Laterality Date    CHOLECYSTECTOMY      COLONOSCOPY N/A 3/6/2023    Procedure: COLONOSCOPY;  Surgeon: Beti Diallo MD;  Location:  [facility] ENDO;  Service: Endoscopy;  Laterality: N/A;    GASTRIC BYPASS      HERNIA REPAIR      INJECTION OF ANESTHETIC AGENT AROUND NERVE Bilateral 6/16/2023    Procedure: Bilateral Genicular nerve block with RN IV sedation;  Surgeon: Denis Lai MD;  Location: [facility] PAIN MGT;  Service: Pain Management;  Laterality: Bilateral;    INJECTION OF ANESTHETIC AGENT AROUND NERVE Bilateral 8/11/2023    Procedure: Bilateral Genicular nerve block with RN IV sedation;  Surgeon: Denis Lai MD;  Location: [facility] PAIN MGT;  Service: Pain Management;  Laterality: Bilateral;    right sholder surgery      SMALL INTESTINE SURGERY         Review of patient's allergies indicates:   Allergen Reactions    Metronidazole hcl Other (See Comments)     Mouth ulcers developed with Flagyl  Oral sores.  Mouth ulcers developed with Flagyl       No current facility-administered medications on file prior to encounter.     Current Outpatient Medications on File Prior to Encounter   Medication Sig    ALPRAZolam (XANAX) 1 MG tablet Take 1 mg by mouth 3 (three) times daily as needed.    amLODIPine (NORVASC) 5 MG tablet Take 1 tablet (5 mg total) by mouth once daily.    ARIPiprazole (ABILIFY) 10 MG Tab Take 10 mg by mouth once daily.    aspirin 81 mg Tab Take 1 tablet by mouth Daily.    atorvastatin (LIPITOR) 40 MG tablet Take 1 tablet (40 mg total) by mouth once daily.    cholestyramine-aspartame (CHOLESTYRAMINE LIGHT) 4 gram PwPk Take 1 packet (4 g total) by mouth 2 (two) times daily. (Patient not taking: Reported on 5/17/2023)    dicyclomine (BENTYL) 20 mg tablet Take 1 tablet (20 mg total) by mouth 3 (three) times daily as needed (abdominal pain).    doxepin (SINEQUAN) 50 MG capsule Take 50 mg by mouth nightly.    fluoxetine (PROZAC) 40 MG capsule Take 1 capsule (40 mg total) by mouth once daily.    furosemide (LASIX) 20 MG tablet Take 1 tablet (20 mg total) by mouth daily as needed (swelling).    olmesartan-hydrochlorothiazide  (BENICAR HCT) 40-12.5 mg Tab Take 1 tablet by mouth once daily.    ondansetron (ZOFRAN) 8 MG tablet Take 1 tablet (8 mg total) by mouth every 8 (eight) hours as needed for Nausea.    pregabalin (LYRICA) 75 MG capsule Take 1 capsule (75 mg total) by mouth every evening.    promethazine (PHENERGAN) 25 MG tablet Take 1 tablet (25 mg total) by mouth every 6 (six) hours as needed for Nausea.    sucralfate (CARAFATE) 1 gram tablet Take 1 tablet (1 g total) by mouth 4 (four) times daily.    URO--10-40.8-36 mg Cap TAKE 1 CAPSULE BY MOUTH THREE TIMES DAILY AS NEEDED FOR BLADDER PAIN    zolpidem (AMBIEN) 10 mg Tab Take 10 mg by mouth nightly as needed.    [DISCONTINUED] alprazolam (XANAX XR) 2 MG Tb24 Take 1 mg by mouth once daily. 2 mg tablet(2tabs) oral in the am and 1 mg(1tab)in the afternoon    [DISCONTINUED] hydrocortisone 2.5 % cream Apply topically.    [DISCONTINUED] ketoconazole (NIZORAL) 2 % cream Apply topically once daily.    [DISCONTINUED] mupirocin (BACTROBAN) 2 % ointment Apply topically as needed.    [DISCONTINUED] nitrofurantoin, macrocrystal-monohydrate, (MACROBID) 100 MG capsule Take 1 capsule (100 mg total) by mouth 2 (two) times daily.     Family History       Problem Relation (Age of Onset)    Crohn's disease Sister, Brother, Other    Diabetes Sister, Sister    Liver cancer Father, Sister    Lung cancer Mother          Tobacco Use    Smoking status: Never    Smokeless tobacco: Never   Substance and Sexual Activity    Alcohol use: Not Currently     Alcohol/week: 0.0 standard drinks of alcohol    Drug use: No    Sexual activity: Yes     Partners: Male     Review of Systems   Constitutional:  Positive for activity change and appetite change. Negative for chills and fever.   HENT: Negative.  Negative for congestion, sore throat and trouble swallowing.    Eyes: Negative.    Respiratory: Negative.  Negative for cough, shortness of breath and wheezing.    Cardiovascular: Negative.  Negative  for chest pain and palpitations.   Gastrointestinal:  Positive for abdominal pain, nausea and vomiting. Negative for blood in stool.   Endocrine: Negative.    Genitourinary: Negative.  Negative for dysuria and flank pain.   Musculoskeletal: Negative.  Negative for back pain.   Skin: Negative.  Negative for rash.   Allergic/Immunologic: Negative.    Neurological:  Positive for weakness (Generalized). Negative for speech difficulty, numbness and headaches.   Hematological: Negative.    Psychiatric/Behavioral:  Negative for hallucinations. The patient is nervous/anxious.    All other systems reviewed and are negative.    Objective:     Vital Signs (Most Recent):  Temp: 98.9 °F (37.2 °C) (08/27/23 1803)  Pulse: 79 (08/27/23 2215)  Resp: 18 (08/27/23 2100)  BP: (!) 150/85 (08/27/23 2215)  SpO2: 97 % (08/27/23 2215) Vital Signs (24h Range):  Temp:  [98.9 °F (37.2 °C)] 98.9 °F (37.2 °C)  Pulse:  [] 79  Resp:  [12-20] 18  SpO2:  [96 %-99 %] 97 %  BP: (148-183)/() 150/85     Weight: 103.7 kg (228 lb 8.1 oz)  Body mass index is 40.48 kg/m².     Physical Exam  Vitals and nursing note reviewed.   Constitutional:       General: She is awake. She is not in acute distress.     Appearance: She is morbidly obese. She is not ill-appearing.   HENT:      Head: Normocephalic and atraumatic.      Mouth/Throat:      Mouth: Mucous membranes are moist.   Eyes:      General: No scleral icterus.     Conjunctiva/sclera: Conjunctivae normal.   Cardiovascular:      Rate and Rhythm: Normal rate and regular rhythm.   Pulmonary:      Effort: Pulmonary effort is normal. No respiratory distress.      Breath sounds: Normal breath sounds. No wheezing.   Abdominal:      Palpations: Abdomen is soft.      Tenderness: There is no abdominal tenderness.      Comments: Obese abdomen, nontender   Musculoskeletal:         General: Swelling present. Normal range of motion.      Cervical back: Neck supple.   Skin:     General: Skin is warm.       Coloration: Skin is not jaundiced.   Neurological:      General: No focal deficit present.      Mental Status: She is alert and oriented to person, place, and time. Mental status is at baseline.   Psychiatric:         Attention and Perception: Attention normal.         Mood and Affect: Mood is anxious.         Speech: Speech normal.         Behavior: Behavior normal. Behavior is cooperative.                Significant Labs: All pertinent labs within the past 24 hours have been reviewed.  BMP:   Recent Labs   Lab 08/27/23 1854 08/27/23 2010   *  --    *  --    K 3.7  --    CL 85*  --    CO2 20*  --    BUN 13  --    CREATININE 1.2  --    CALCIUM 8.7  --    MG  --  1.5*     CBC:   Recent Labs   Lab 08/27/23 1854   WBC 8.20   HGB 13.8   HCT 39.2        CMP:   Recent Labs   Lab 08/27/23 1854   *   K 3.7   CL 85*   CO2 20*   *   BUN 13   CREATININE 1.2   CALCIUM 8.7   PROT 7.6   ALBUMIN 3.9   BILITOT 1.1*   ALKPHOS 117   AST 12   ALT 7*   ANIONGAP 15       Significant Imaging: I have reviewed all pertinent imaging results/findings within the past 24 hours.  I have reviewed and interpreted all pertinent imaging results/findings within the past 24 hours.    Imaging Results              CT Abdomen Pelvis With Contrast (Final result)  Result time 08/27/23 21:05:02      Final result by Lucio Romero MD (08/27/23 21:05:02)                   Impression:      No definite acute abnormality identified.  Correlation and further evaluation as warranted.    All CT scans at this facility are performed  using dose modulation techniques as appropriate to performed exam including the following:  automated exposure control; adjustment of mA and/or kV according to the patients size (this includes techniques or standardized protocols for targeted exams where dose is matched to indication/reason for exam: i.e. extremities or head);  iterative reconstruction technique.      Electronically signed  by: Lucio Romero  Date:    08/27/2023  Time:    21:05               Narrative:    EXAMINATION:  CT ABDOMEN PELVIS WITH CONTRAST    CLINICAL HISTORY:  Abdominal abscess/infection suspected;    TECHNIQUE:  Low dose axial images, sagittal and coronal reformations were obtained from the lung bases to the pubic symphysis.  Contrast was administered.  Images acquired after the administration of 100 mL Omnipaque 350 IV contrast.    COMPARISON:  Multiple priors.    FINDINGS:  Heart: Normal in size. No pericardial effusion.    Lung Bases: Well aerated, without consolidation or pleural fluid.    Liver: Normal in size and attenuation, with no focal hepatic lesions.    Gallbladder: Gallbladder surgically absent.    Bile Ducts: Mild intra and extrahepatic biliary ductal dilation likely related patient status post cholecystectomy.    Pancreas: Fatty atrophy of the pancreas.    Spleen: Unremarkable.    Adrenals: Unremarkable.    Kidneys/ Ureters: Unremarkable.    Bladder: Air within the bladder likely related to recent catheterization.  Correlation is advised.    Reproductive organs: Unremarkable.    GI Tract/Mesentery: Diverticulosis without diverticulitis.  Small bowel appears within normal limits.  No evidence of appendicitis.  Bariatric surgical change.    Peritoneal Space: No ascites. No free air.    Retroperitoneum: No significant adenopathy.    Abdominal wall: Unremarkable.    Vasculature: No significant atherosclerosis or aneurysm.    Bones: No acute fracture.                                      I have independently reviewed all pertinent labs within the past 24 hours.    I have independently reviewed, visualized and interpreted all pertinent imaging results within the past 24 hours and discussed the findings with the ED physician, Lucy Westbrook MD          Assessment/Plan:     * Hyponatremia  Patient has hyponatremia which is uncontrolled,We will aim to correct the sodium by 4-6mEq in 24 hours. We will monitor  sodium Every 8 hours. The hyponatremia is due to Dehydration/hypovolemia. We will obtain the following studies: Urine sodium, urine osmolality, serum osmolality. We will treat the hyponatremia with IV fluids as follows:   cc/hour. The patient's sodium results have been reviewed and are listed below.  Recent Labs   Lab 08/27/23  1854   *     -sodium 120, chloride 85, suggesting volume depletion.    -continue  cc/hour.    -BNP every 8 hours, avoid over-correction.    -hold SSRIs/SNRIs    Nausea & vomiting  -CT abdomen with no evidence of acute intra-abdominal pathology.    -symptoms ongoing for the past 5-6 days  -supportive care with IV fluids, IV antiemetics.      HTN (hypertension)  -continue home medications, except diuretics.    -continue olmesartan, metoprolol, amlodipine.  -IV hydralazine as needed      Chronic prescription benzodiazepine use  -continue Xanax      Morbid obesity with BMI of 40.0-44.9, adult  Body mass index is 40.48 kg/m². Morbid obesity complicates all aspects of disease management from diagnostic modalities to treatment. Weight loss encouraged and health benefits explained to patient.           VTE Risk Mitigation (From admission, onward)         Ordered     Place sequential compression device  Until discontinued         08/27/23 2227                   The patient is placed in OBSERVATION status.      Montana Way MD  Department of Hospital Medicine  [facility] - Emergency Dept.

## 2023-08-28 NOTE — ASSESSMENT & PLAN NOTE
Body mass index is 40.48 kg/m². Morbid obesity complicates all aspects of disease management from diagnostic modalities to treatment. Weight loss encouraged and health benefits explained to patient.

## 2023-08-28 NOTE — HOSPITAL COURSE
Ms. Bui is a 63 year old female that was admitted to [facility] for N/V, IVVD, hyponatremia.  CT ab/pelvis with no acute findings.  She was placed on IVF, antiemetics as needed, home SSRI was held.  Sodium gradually improved.  Patient seen and assessed today, VVS, Na 134, no further episodes of N/V while hospitalized and patient is tolerating regular diet.  Hyponatremia likely caused by IVVD, patient has been on Prozac for years without any adverse affects.  Discussed with patient restarting home Prozac and following up with PCP this week for repeat lab work to ensure Na levels stay stable.  Patient will also follow-up with her Psychiatrist as outpatient.

## 2023-08-28 NOTE — ASSESSMENT & PLAN NOTE
Patient has hyponatremia which is uncontrolled,We will aim to correct the sodium by 4-6mEq in 24 hours. We will monitor sodium Every 8 hours. The hyponatremia is due to Dehydration/hypovolemia. We will obtain the following studies: Urine sodium, urine osmolality, serum osmolality. We will treat the hyponatremia with IV fluids as follows:   cc/hour. The patient's sodium results have been reviewed and are listed below.  Recent Labs   Lab 08/28/23  0751   *     -sodium 120, chloride 85, suggesting volume depletion.    -continue  cc/hour.    -BNP every 8 hours, avoid over-correction.    -hold SSRIs/SNRIs    Na improving 120>123, will continue IVF and trend Na levels

## 2023-08-28 NOTE — HPI
Ms. Bui is a 63-year-old  female with PMH significant for HTN, GERD, hyperlipidemia, depression/anxiety, presented to the ED complaining of abdominal discomfort, nausea, vomiting for the past 5-6 days.  She denies fever, chills.  Denies sick contacts at home.  Reports 1-2 nonbloody loose bowel movements.  Reports decreased oral intake due to decreased appetite.  In the ED, CT abdomen pelvis reveals no evidence of acute intra-abdominal pathology.  However laboratory workup is remarkable for low sodium of 120, chloride 85, suggesting volume depletion.  Creatinine 1.2, magnesium within normal limits.  Rest of the laboratory workup unremarkable.  Patient received NS 1 L bolus.    Placed in observation for hyponatremia, secondary to volume depletion.  Nausea/vomiting.

## 2023-08-28 NOTE — ASSESSMENT & PLAN NOTE
Body mass index is 40.73 kg/m². Morbid obesity complicates all aspects of disease management from diagnostic modalities to treatment. Weight loss encouraged and health benefits explained to patient.

## 2023-08-28 NOTE — ASSESSMENT & PLAN NOTE
Patient has hyponatremia which is uncontrolled,We will aim to correct the sodium by 4-6mEq in 24 hours. We will monitor sodium Every 8 hours. The hyponatremia is due to Dehydration/hypovolemia. We will obtain the following studies: Urine sodium, urine osmolality, serum osmolality. We will treat the hyponatremia with IV fluids as follows:   cc/hour. The patient's sodium results have been reviewed and are listed below.  Recent Labs   Lab 08/27/23  1854   *     -sodium 120, chloride 85, suggesting volume depletion.    -continue  cc/hour.    -BNP every 8 hours, avoid over-correction.    -hold SSRIs/SNRIs

## 2023-08-28 NOTE — ED PROVIDER NOTES
SCRIBE #1 NOTE: I, Og Hughes, am scribing for, and in the presence of, Lucy Westbrook MD. I have scribed the entire note.       History     Chief Complaint   Patient presents with    Vomiting     N/V since Monday     Review of patient's allergies indicates:   Allergen Reactions    Metronidazole hcl Other (See Comments)     Mouth ulcers developed with Flagyl  Oral sores.  Mouth ulcers developed with Flagyl         History of Present Illness     HPI    8/27/2023, 7:14 PM  History obtained from the patient      History of Present Illness: Divya Bui is a 63 y.o. female patient with a PMHx of anemia, basal cell carcinoma of face, GERD, HTN,  who presents to the Emergency Department for evaluation of vomiting which onset 6 days ago. Pt states that she hasn't been able to hold anything down. Pt only feels discomfort to the epigastric area. Symptoms are constant and moderate in severity. No mitigating or exacerbating factors reported. Associated sxs include epigastric abdominal pain, generalized weakness, and light-headedness. Patient denies any CP, CP radiating to any part of her body, SOB, diarrhea, fever, chills, and all other sxs at this time. No further complaints or concerns at this time.       Arrival mode: Personal vehicle    PCP: Angel Khan MD        Past Medical History:  Past Medical History:   Diagnosis Date    Anemia     Anxiety     Basal cell carcinoma (BCC) of face 2/26/2020    Blood transfusion     Bowel incontinence     Depression     Esophageal stricture     GERD (gastroesophageal reflux disease)     Hypertension     Latent tuberculosis by skin test 2001    took INH    Liver nodule 1/6/2014    Morbid obesity with BMI of 45.0-49.9, adult     OA (osteoarthritis) of knee 12/12/2014    Pericardial effusion 9/4/2014       Past Surgical History:  Past Surgical History:   Procedure Laterality Date    CHOLECYSTECTOMY      COLONOSCOPY N/A 3/6/2023    Procedure: COLONOSCOPY;  Surgeon:  Beti Diallo MD;  Location: [facility] ENDO;  Service: Endoscopy;  Laterality: N/A;    GASTRIC BYPASS      HERNIA REPAIR      INJECTION OF ANESTHETIC AGENT AROUND NERVE Bilateral 6/16/2023    Procedure: Bilateral Genicular nerve block with RN IV sedation;  Surgeon: Denis Lai MD;  Location: [facility] PAIN MGT;  Service: Pain Management;  Laterality: Bilateral;    INJECTION OF ANESTHETIC AGENT AROUND NERVE Bilateral 8/11/2023    Procedure: Bilateral Genicular nerve block with RN IV sedation;  Surgeon: Denis Lai MD;  Location: [facility] PAIN MGT;  Service: Pain Management;  Laterality: Bilateral;    right sholder surgery      SMALL INTESTINE SURGERY           Family History:  Family History   Problem Relation Age of Onset    Lung cancer Mother         smoker    Liver cancer Father     Diabetes Sister     Crohn's disease Sister     Liver cancer Sister     Diabetes Sister     Crohn's disease Brother     Crohn's disease Other     Breast cancer Neg Hx     Ovarian cancer Neg Hx     Colon cancer Neg Hx        Social History:  Social History     Tobacco Use    Smoking status: Never    Smokeless tobacco: Never   Substance and Sexual Activity    Alcohol use: Not Currently     Alcohol/week: 0.0 standard drinks of alcohol    Drug use: No    Sexual activity: Yes     Partners: Male        Review of Systems     Review of Systems   Constitutional:  Negative for chills and fever.   HENT:  Negative for sore throat.    Respiratory:  Negative for shortness of breath.    Cardiovascular:  Negative for chest pain.   Gastrointestinal:  Positive for abdominal pain (epigastric), nausea and vomiting. Negative for diarrhea.   Genitourinary:  Negative for dysuria.   Musculoskeletal:  Negative for back pain.   Skin:  Negative for rash.   Neurological:  Positive for weakness (generalized) and light-headedness.   All other systems reviewed and are negative.     Physical Exam     Initial Vitals [08/27/23 1803]   BP Pulse Resp Temp SpO2   (!)  178/104 (!) 114 20 98.9 °F (37.2 °C) 96 %      MAP       --          Physical Exam  Nursing Notes and Vital Signs Reviewed.  Constitutional: Patient is in no acute distress. Well-developed and well-nourished. Pt does appear weak.   Head: Atraumatic. Normocephalic.  Eyes: PERRL. EOM intact. Conjunctivae are not pale. No scleral icterus.  ENT: Mouth is dry.  Neck: Supple. Full ROM. No lymphadenopathy.  Cardiovascular: Regular rate. Regular rhythm. No murmurs, rubs, or gallops. Distal pulses are 2+ and symmetric.  Pulmonary/Chest: No respiratory distress. Clear to auscultation bilaterally. No wheezing or rales.  Abdominal: Soft and non-distended. Mild tenderness to upper abdomen. No rebound, guarding, or rigidity. Good bowel sounds.  Genitourinary: No CVA tenderness  Musculoskeletal: Moves all extremities. No obvious deformities. No edema. No calf tenderness.  Skin: Warm and dry.  Neurological:  Alert, awake, and appropriate.  Normal speech.  No acute focal neurological deficits are appreciated.  Psychiatric: Normal affect. Good eye contact. Appropriate in content.     ED Course   Critical Care    Date/Time: 8/27/2023 9:37 PM    Performed by: Lucy Westbrook MD  Authorized by: Lucy Westbrook MD  Direct patient critical care time: 15 minutes  Additional history critical care time: 5 minutes  Ordering / reviewing critical care time: 5 minutes  Documentation critical care time: 5 minutes  Consulting other physicians critical care time: 5 minutes  Consult with family critical care time: 10 minutes  Total critical care time (exclusive of procedural time) : 45 minutes  Critical care time was exclusive of teaching time and separately billable procedures and treating other patients.  Critical care was necessary to treat or prevent imminent or life-threatening deterioration of the following conditions: hyponatremia.  Critical care was time spent personally by me on the following activities: blood draw for specimens,  "development of treatment plan with patient or surrogate, discussions with consultants, interpretation of cardiac output measurements, evaluation of patient's response to treatment, examination of patient, ordering and review of laboratory studies, review of old charts, re-evaluation of patient's condition, ordering and review of radiographic studies, pulse oximetry, ordering and performing treatments and interventions and obtaining history from patient or surrogate.        ED Vital Signs:  Vitals:    08/27/23 1803 08/27/23 1916 08/27/23 1947 08/27/23 1956   BP: (!) 178/104 (!) 183/100  (!) 172/86   Pulse: (!) 114 102  81   Resp: 20 12 16 14   Temp: 98.9 °F (37.2 °C)      TempSrc: Oral      SpO2: 96% 98%  99%   Weight: 103.7 kg (228 lb 8.1 oz)      Height: 5' 3" (1.6 m)       08/27/23 1957 08/27/23 2000 08/27/23 2022 08/27/23 2100   BP:  (!) 148/82 (!) 163/79 (!) 171/96   Pulse: 82 81 73 89   Resp:  20 18 18   Temp:       TempSrc:       SpO2:  97% 96% 96%   Weight:       Height:        08/27/23 2203 08/27/23 2206 08/27/23 2215 08/27/23 2252   BP: (!) 152/74 (!) 149/65 (!) 150/85 (!) 155/72   Pulse: 85 83 79 81   Resp:       Temp:       TempSrc:       SpO2: 99% 99% 97% 97%   Weight:       Height:        08/28/23 0011   BP: (!) 182/91   Pulse: 95   Resp: 18   Temp: 98.1 °F (36.7 °C)   TempSrc: Oral   SpO2: 96%   Weight: 104.3 kg (229 lb 15 oz)   Height: 5' 3" (1.6 m)       Abnormal Lab Results:  Labs Reviewed   CBC W/ AUTO DIFFERENTIAL - Abnormal; Notable for the following components:       Result Value    MPV 8.5 (*)     Immature Granulocytes 0.7 (*)     Immature Grans (Abs) 0.06 (*)     Lymph # 0.9 (*)     Gran % 80.3 (*)     Lymph % 11.5 (*)     All other components within normal limits   COMPREHENSIVE METABOLIC PANEL - Abnormal; Notable for the following components:    Sodium 120 (*)     Chloride 85 (*)     CO2 20 (*)     Glucose 115 (*)     Total Bilirubin 1.1 (*)     ALT 7 (*)     eGFR 50 (*)     All other " components within normal limits   URINALYSIS, REFLEX TO URINE CULTURE - Abnormal; Notable for the following components:    Color, UA Colorless (*)     Leukocytes, UA 1+ (*)     All other components within normal limits    Narrative:     Specimen Source->Urine   MAGNESIUM - Abnormal; Notable for the following components:    Magnesium 1.5 (*)     All other components within normal limits   SODIUM, URINE, RANDOM - Abnormal; Notable for the following components:    Sodium, Urine <20 (*)     All other components within normal limits    Narrative:     Specimen Source->Urine   URINALYSIS MICROSCOPIC - Abnormal; Notable for the following components:    WBC, UA 6 (*)     All other components within normal limits    Narrative:     Specimen Source->Urine   LIPASE   PROTIME-INR   APTT   B-TYPE NATRIURETIC PEPTIDE   TROPONIN I   OSMOLALITY, URINE RANDOM   BASIC METABOLIC PANEL        All Lab Results:  Results for orders placed or performed during the hospital encounter of 08/27/23   CBC auto differential   Result Value Ref Range    WBC 8.20 3.90 - 12.70 K/uL    RBC 4.66 4.00 - 5.40 M/uL    Hemoglobin 13.8 12.0 - 16.0 g/dL    Hematocrit 39.2 37.0 - 48.5 %    MCV 84 82 - 98 fL    MCH 29.6 27.0 - 31.0 pg    MCHC 35.2 32.0 - 36.0 g/dL    RDW 13.0 11.5 - 14.5 %    Platelets 208 150 - 450 K/uL    MPV 8.5 (L) 9.2 - 12.9 fL    Immature Granulocytes 0.7 (H) 0.0 - 0.5 %    Gran # (ANC) 6.6 1.8 - 7.7 K/uL    Immature Grans (Abs) 0.06 (H) 0.00 - 0.04 K/uL    Lymph # 0.9 (L) 1.0 - 4.8 K/uL    Mono # 0.5 0.3 - 1.0 K/uL    Eos # 0.1 0.0 - 0.5 K/uL    Baso # 0.02 0.00 - 0.20 K/uL    nRBC 0 0 /100 WBC    Gran % 80.3 (H) 38.0 - 73.0 %    Lymph % 11.5 (L) 18.0 - 48.0 %    Mono % 6.6 4.0 - 15.0 %    Eosinophil % 0.7 0.0 - 8.0 %    Basophil % 0.2 0.0 - 1.9 %    Platelet Estimate Appears normal     Large/Giant Platelets Present     Differential Method Automated    Comprehensive metabolic panel   Result Value Ref Range    Sodium 120 (L) 136 - 145  mmol/L    Potassium 3.7 3.5 - 5.1 mmol/L    Chloride 85 (L) 95 - 110 mmol/L    CO2 20 (L) 23 - 29 mmol/L    Glucose 115 (H) 70 - 110 mg/dL    BUN 13 8 - 23 mg/dL    Creatinine 1.2 0.5 - 1.4 mg/dL    Calcium 8.7 8.7 - 10.5 mg/dL    Total Protein 7.6 6.0 - 8.4 g/dL    Albumin 3.9 3.5 - 5.2 g/dL    Total Bilirubin 1.1 (H) 0.1 - 1.0 mg/dL    Alkaline Phosphatase 117 55 - 135 U/L    AST 12 10 - 40 U/L    ALT 7 (L) 10 - 44 U/L    eGFR 50 (A) >60 mL/min/1.73 m^2    Anion Gap 15 8 - 16 mmol/L   Lipase   Result Value Ref Range    Lipase 5 4 - 60 U/L   Urinalysis, Reflex to Urine Culture Urine, Clean Catch    Specimen: Urine   Result Value Ref Range    Specimen UA Urine, Clean Catch     Color, UA Colorless (A) Yellow, Straw, Debra    Appearance, UA Clear Clear    pH, UA 7.0 5.0 - 8.0    Specific Gravity, UA 1.020 1.005 - 1.030    Protein, UA Negative Negative    Glucose, UA Negative Negative    Ketones, UA Negative Negative    Bilirubin (UA) Negative Negative    Occult Blood UA Negative Negative    Nitrite, UA Negative Negative    Urobilinogen, UA Negative <2.0 EU/dL    Leukocytes, UA 1+ (A) Negative   Protime-INR   Result Value Ref Range    Prothrombin Time 10.5 9.0 - 12.5 sec    INR 1.0 0.8 - 1.2   APTT   Result Value Ref Range    aPTT 27.2 21.0 - 32.0 sec   Brain natriuretic peptide   Result Value Ref Range    BNP <10 0 - 99 pg/mL   Troponin I   Result Value Ref Range    Troponin I 0.006 0.000 - 0.026 ng/mL   Magnesium   Result Value Ref Range    Magnesium 1.5 (L) 1.6 - 2.6 mg/dL   Sodium, urine, random   Result Value Ref Range    Sodium, Urine <20 (A) 20 - 250 mmol/L   Urinalysis Microscopic   Result Value Ref Range    WBC, UA 6 (H) 0 - 5 /hpf    Microscopic Comment SEE COMMENT          Imaging Results:  Imaging Results              CT Abdomen Pelvis With Contrast (Final result)  Result time 08/27/23 21:05:02      Final result by Lucio Romero MD (08/27/23 21:05:02)                   Impression:      No definite  acute abnormality identified.  Correlation and further evaluation as warranted.    All CT scans at this facility are performed  using dose modulation techniques as appropriate to performed exam including the following:  automated exposure control; adjustment of mA and/or kV according to the patients size (this includes techniques or standardized protocols for targeted exams where dose is matched to indication/reason for exam: i.e. extremities or head);  iterative reconstruction technique.      Electronically signed by: Lucio Romero  Date:    08/27/2023  Time:    21:05               Narrative:    EXAMINATION:  CT ABDOMEN PELVIS WITH CONTRAST    CLINICAL HISTORY:  Abdominal abscess/infection suspected;    TECHNIQUE:  Low dose axial images, sagittal and coronal reformations were obtained from the lung bases to the pubic symphysis.  Contrast was administered.  Images acquired after the administration of 100 mL Omnipaque 350 IV contrast.    COMPARISON:  Multiple priors.    FINDINGS:  Heart: Normal in size. No pericardial effusion.    Lung Bases: Well aerated, without consolidation or pleural fluid.    Liver: Normal in size and attenuation, with no focal hepatic lesions.    Gallbladder: Gallbladder surgically absent.    Bile Ducts: Mild intra and extrahepatic biliary ductal dilation likely related patient status post cholecystectomy.    Pancreas: Fatty atrophy of the pancreas.    Spleen: Unremarkable.    Adrenals: Unremarkable.    Kidneys/ Ureters: Unremarkable.    Bladder: Air within the bladder likely related to recent catheterization.  Correlation is advised.    Reproductive organs: Unremarkable.    GI Tract/Mesentery: Diverticulosis without diverticulitis.  Small bowel appears within normal limits.  No evidence of appendicitis.  Bariatric surgical change.    Peritoneal Space: No ascites. No free air.    Retroperitoneum: No significant adenopathy.    Abdominal wall: Unremarkable.    Vasculature: No significant  atherosclerosis or aneurysm.    Bones: No acute fracture.                                       The EKG was ordered, reviewed, and independently interpreted by the ED provider.  Interpretation time: 19:04  Rate: 96 BPM  Rhythm: normal sinus rhythm  Interpretation: Right bundle branch block. Septal infarct, age undetermined Inferior infarct, possibly acute. ACUTE MI / STEMI.            The Emergency Provider reviewed the vital signs and test results, which are outlined above.     ED Discussion     7:23 PM: Discussed pt's case with Dr. Reji Olivares MD (Interventional Cardiology) who interpreted EKG.     9:42 PM: Discussed case with Hannah Angelo NP  (Hospital Medicine). Dr. Way agrees with current care and management of pt and accepts admission. Requesting to collect UA, urine osmolality, and sodium.   Admitting Service: Hospital Medicine  Admitting Physician: Dr. Way  Admit to: Obs Med Tele    9:46 PM: Re-evaluated pt. I have discussed test results, shared treatment plan, and the need for admission with patient and family at bedside. Pt and family express understanding at this time and agree with all information. All questions answered. Pt and family have no further questions or concerns at this time. Pt is ready for admit.       Medical Decision Making  Patient with nausea vomiting upper abdominal pain that started on Monday    Amount and/or Complexity of Data Reviewed  Labs: ordered. Decision-making details documented in ED Course.  Radiology: ordered. Decision-making details documented in ED Course.     Details: Chest x-ray no acute findings  ECG/medicine tests: ordered. Decision-making details documented in ED Course.     Details: EKG with right bundle-branch pattern.  Dr. Olivares was consulted immediately and he looked at the EKG.  Patient was not having any chest pain or shortness breath at all.  Dr. Olivares did not feel that it was a STEMI  Discussion of management or test interpretation with external  provider(s): Patient lives by herself ,she is been having vomiting since Monday  andnot able to hold down anything in a even her blood pressure medication.  Given IV fluids, morphine, Zofran, Phenergan.  Her sodium is 120, magnesium 1.5.  CT scan of the abdomen pelvis did not show anything acute.  She is about to get Lopressor for her blood pressure which about 180 systolic.  Troponin was normal EKG with no STEMI but did show a right bundle-branch pattern    Consulted [facility] to request admission of pt he overnight for hydration and recheck of her labs  Dr. Way will admit pt     Risk  Prescription drug management.           Medical Decision Making:   Differential Diagnosis:   Gastroenteritis, Bowel obstruction, Colitis, Diverticulitis, Cholecystitis, Appendicitis, Perforated bowel, Herniation, Infectious etiology, UTI, Pyelonephritis, Inferior cardiac infarct, Biliary obstruction,           ED Medication(s):  Medications   metoprolol injection 5 mg (0 mg Intravenous Hold 8/27/23 2210)   ondansetron injection 4 mg (has no administration in time range)   albuterol-ipratropium 2.5 mg-0.5 mg/3 mL nebulizer solution 3 mL (has no administration in time range)   aluminum-magnesium hydroxide-simethicone 200-200-20 mg/5 mL suspension 30 mL (has no administration in time range)   acetaminophen tablet 650 mg (has no administration in time range)   0.9%  NaCl infusion (has no administration in time range)   hydrALAZINE injection 10 mg (has no administration in time range)   amLODIPine tablet 5 mg (has no administration in time range)   ALPRAZolam tablet 1 mg (has no administration in time range)   doxepin capsule 50 mg (50 mg Oral Given 8/27/23 2315)   atorvastatin tablet 40 mg (has no administration in time range)   aspirin chewable tablet 81 mg (has no administration in time range)   zolpidem tablet 5 mg (has no administration in time range)   enoxaparin injection 40 mg (40 mg Subcutaneous Given 8/27/23 2314)   losartan tablet  25 mg (25 mg Oral Given 8/27/23 2314)   promethazine (PHENERGAN) 12.5 mg in dextrose 5 % (D5W) 50 mL IVPB (has no administration in time range)   sodium chloride 0.9% bolus 1,000 mL 1,000 mL (0 mLs Intravenous Stopped 8/27/23 2212)   ondansetron injection 4 mg (4 mg Intravenous Given 8/27/23 1851)   morphine injection 4 mg (4 mg Intravenous Given 8/27/23 1947)   promethazine (PHENERGAN) 12.5 mg in dextrose 5 % (D5W) 50 mL IVPB (0 mg Intravenous Stopped 8/27/23 2002)   iohexoL (OMNIPAQUE 350) injection 100 mL (100 mLs Intravenous Given 8/27/23 2039)   magnesium sulfate in dextrose IVPB (premix) 1 g (0 g Intravenous Stopped 8/27/23 2240)       Current Discharge Medication List                  Scribe Attestation:   Scribe #1: I performed the above scribed service and the documentation accurately describes the services I performed. I attest to the accuracy of the note.     Attending:   Physician Attestation Statement for Scribe #1: I, Lucy Westbrook MD, personally performed the services described in this documentation, as scribed by Og Hughes, in my presence, and it is both accurate and complete.           Clinical Impression       ICD-10-CM ICD-9-CM   1. Nausea and vomiting, unspecified vomiting type  R11.2 787.01   2. Tachycardia  R00.0 785.0   3. Epigastric pain  R10.13 789.06   4. Hyponatremia  E87.1 276.1   5. Hypomagnesemia  E83.42 275.2   6. Hypertension, unspecified type  I10 401.9       Disposition:   Disposition: Placed in Observation  Condition: Lucy Shepherd Do, MD  08/28/23 0030

## 2023-08-28 NOTE — PLAN OF CARE
[facility] - Med Surg 3  Discharge Assessment    Primary Care Provider: Angel Khan MD     Discharge Assessment (most recent)       BRIEF DISCHARGE ASSESSMENT - 08/28/23 1017          Discharge Planning    Assessment Type Discharge Planning Brief Assessment     Resource/Environmental Concerns none     Support Systems Children     Assistance Needed Independent     Equipment Currently Used at Home none     Current Living Arrangements home     Patient/Family Anticipates Transition to home     Patient/Family Anticipated Services at Transition none     DME Needed Upon Discharge  none     Discharge Plan A Home                   Sw met with patient to complete assessment and to discuss d/c planning needs. Patient has no d/c needs at this time. Sw to follow up, as needed, for d/c planning purposes.

## 2023-08-28 NOTE — PLAN OF CARE
Patient remained free of any injury throughout shift. VSS. No complaints of any pain.  Monitoring for nausea and vomiting, treating with PRN medications. Call light in reach and side rails up x2. Will continue with plan of care.     Problem: Adult Inpatient Plan of Care  Goal: Absence of Hospital-Acquired Illness or Injury  Outcome: Ongoing, Progressing     Problem: Adult Inpatient Plan of Care  Goal: Optimal Comfort and Wellbeing  Outcome: Ongoing, Progressing     Problem: Adult Inpatient Plan of Care  Goal: Plan of Care Review  Outcome: Ongoing, Progressing

## 2023-08-28 NOTE — ASSESSMENT & PLAN NOTE
-continue home medications, except diuretics.    -continue olmesartan, metoprolol, amlodipine.  -IV hydralazine as needed

## 2023-08-28 NOTE — SUBJECTIVE & OBJECTIVE
Past Medical History:   Diagnosis Date    Anemia     Anxiety     Basal cell carcinoma (BCC) of face 2/26/2020    Blood transfusion     Bowel incontinence     Depression     Esophageal stricture     GERD (gastroesophageal reflux disease)     Hypertension     Latent tuberculosis by skin test 2001    took INH    Liver nodule 1/6/2014    Morbid obesity with BMI of 45.0-49.9, adult     OA (osteoarthritis) of knee 12/12/2014    Pericardial effusion 9/4/2014       Past Surgical History:   Procedure Laterality Date    CHOLECYSTECTOMY      COLONOSCOPY N/A 3/6/2023    Procedure: COLONOSCOPY;  Surgeon: Beti Diallo MD;  Location: [facility];  Service: Endoscopy;  Laterality: N/A;    GASTRIC BYPASS      HERNIA REPAIR      INJECTION OF ANESTHETIC AGENT AROUND NERVE Bilateral 6/16/2023    Procedure: Bilateral Genicular nerve block with RN IV sedation;  Surgeon: Denis Lai MD;  Location: [facility];  Service: Pain Management;  Laterality: Bilateral;    INJECTION OF ANESTHETIC AGENT AROUND NERVE Bilateral 8/11/2023    Procedure: Bilateral Genicular nerve block with RN IV sedation;  Surgeon: Denis Lai MD;  Location: [facility];  Service: Pain Management;  Laterality: Bilateral;    right sholder surgery      SMALL INTESTINE SURGERY         Review of patient's allergies indicates:   Allergen Reactions    Metronidazole hcl Other (See Comments)     Mouth ulcers developed with Flagyl  Oral sores.  Mouth ulcers developed with Flagyl       No current facility-administered medications on file prior to encounter.     Current Outpatient Medications on File Prior to Encounter   Medication Sig    ALPRAZolam (XANAX) 1 MG tablet Take 1 mg by mouth 3 (three) times daily as needed.    amLODIPine (NORVASC) 5 MG tablet Take 1 tablet (5 mg total) by mouth once daily.    ARIPiprazole (ABILIFY) 10 MG Tab Take 10 mg by mouth once daily.    aspirin 81 mg Tab Take 1 tablet by mouth Daily.    atorvastatin (LIPITOR) 40 MG tablet Take 1  tablet (40 mg total) by mouth once daily.    cholestyramine-aspartame (CHOLESTYRAMINE LIGHT) 4 gram PwPk Take 1 packet (4 g total) by mouth 2 (two) times daily. (Patient not taking: Reported on 5/17/2023)    dicyclomine (BENTYL) 20 mg tablet Take 1 tablet (20 mg total) by mouth 3 (three) times daily as needed (abdominal pain).    doxepin (SINEQUAN) 50 MG capsule Take 50 mg by mouth nightly.    fluoxetine (PROZAC) 40 MG capsule Take 1 capsule (40 mg total) by mouth once daily.    furosemide (LASIX) 20 MG tablet Take 1 tablet (20 mg total) by mouth daily as needed (swelling).    olmesartan-hydrochlorothiazide (BENICAR HCT) 40-12.5 mg Tab Take 1 tablet by mouth once daily.    ondansetron (ZOFRAN) 8 MG tablet Take 1 tablet (8 mg total) by mouth every 8 (eight) hours as needed for Nausea.    pregabalin (LYRICA) 75 MG capsule Take 1 capsule (75 mg total) by mouth every evening.    promethazine (PHENERGAN) 25 MG tablet Take 1 tablet (25 mg total) by mouth every 6 (six) hours as needed for Nausea.    sucralfate (CARAFATE) 1 gram tablet Take 1 tablet (1 g total) by mouth 4 (four) times daily.    URO--10-40.8-36 mg Cap TAKE 1 CAPSULE BY MOUTH THREE TIMES DAILY AS NEEDED FOR BLADDER PAIN    zolpidem (AMBIEN) 10 mg Tab Take 10 mg by mouth nightly as needed.    [DISCONTINUED] alprazolam (XANAX XR) 2 MG Tb24 Take 1 mg by mouth once daily. 2 mg tablet(2tabs) oral in the am and 1 mg(1tab)in the afternoon    [DISCONTINUED] hydrocortisone 2.5 % cream Apply topically.    [DISCONTINUED] ketoconazole (NIZORAL) 2 % cream Apply topically once daily.    [DISCONTINUED] mupirocin (BACTROBAN) 2 % ointment Apply topically as needed.    [DISCONTINUED] nitrofurantoin, macrocrystal-monohydrate, (MACROBID) 100 MG capsule Take 1 capsule (100 mg total) by mouth 2 (two) times daily.     Family History       Problem Relation (Age of Onset)    Crohn's disease Sister, Brother, Other    Diabetes Sister, Sister    Liver cancer Father, Sister    Lung  cancer Mother          Tobacco Use    Smoking status: Never    Smokeless tobacco: Never   Substance and Sexual Activity    Alcohol use: Not Currently     Alcohol/week: 0.0 standard drinks of alcohol    Drug use: No    Sexual activity: Yes     Partners: Male     Review of Systems   Constitutional:  Positive for activity change and appetite change. Negative for chills and fever.   HENT: Negative.  Negative for congestion, sore throat and trouble swallowing.    Eyes: Negative.    Respiratory: Negative.  Negative for cough, shortness of breath and wheezing.    Cardiovascular: Negative.  Negative for chest pain and palpitations.   Gastrointestinal:  Positive for abdominal pain, nausea and vomiting. Negative for blood in stool.   Endocrine: Negative.    Genitourinary: Negative.  Negative for dysuria and flank pain.   Musculoskeletal: Negative.  Negative for back pain.   Skin: Negative.  Negative for rash.   Allergic/Immunologic: Negative.    Neurological:  Positive for weakness (Generalized). Negative for speech difficulty, numbness and headaches.   Hematological: Negative.    Psychiatric/Behavioral:  Negative for hallucinations. The patient is nervous/anxious.    All other systems reviewed and are negative.    Objective:     Vital Signs (Most Recent):  Temp: 98.9 °F (37.2 °C) (08/27/23 1803)  Pulse: 79 (08/27/23 2215)  Resp: 18 (08/27/23 2100)  BP: (!) 150/85 (08/27/23 2215)  SpO2: 97 % (08/27/23 2215) Vital Signs (24h Range):  Temp:  [98.9 °F (37.2 °C)] 98.9 °F (37.2 °C)  Pulse:  [] 79  Resp:  [12-20] 18  SpO2:  [96 %-99 %] 97 %  BP: (148-183)/() 150/85     Weight: 103.7 kg (228 lb 8.1 oz)  Body mass index is 40.48 kg/m².     Physical Exam  Vitals and nursing note reviewed.   Constitutional:       General: She is awake. She is not in acute distress.     Appearance: She is morbidly obese. She is not ill-appearing.   HENT:      Head: Normocephalic and atraumatic.      Mouth/Throat:      Mouth: Mucous membranes  are moist.   Eyes:      General: No scleral icterus.     Conjunctiva/sclera: Conjunctivae normal.   Cardiovascular:      Rate and Rhythm: Normal rate and regular rhythm.   Pulmonary:      Effort: Pulmonary effort is normal. No respiratory distress.      Breath sounds: Normal breath sounds. No wheezing.   Abdominal:      Palpations: Abdomen is soft.      Tenderness: There is no abdominal tenderness.      Comments: Obese abdomen, nontender   Musculoskeletal:         General: Swelling present. Normal range of motion.      Cervical back: Neck supple.   Skin:     General: Skin is warm.      Coloration: Skin is not jaundiced.   Neurological:      General: No focal deficit present.      Mental Status: She is alert and oriented to person, place, and time. Mental status is at baseline.   Psychiatric:         Attention and Perception: Attention normal.         Mood and Affect: Mood is anxious.         Speech: Speech normal.         Behavior: Behavior normal. Behavior is cooperative.                Significant Labs: All pertinent labs within the past 24 hours have been reviewed.  BMP:   Recent Labs   Lab 08/27/23 1854 08/27/23 2010   *  --    *  --    K 3.7  --    CL 85*  --    CO2 20*  --    BUN 13  --    CREATININE 1.2  --    CALCIUM 8.7  --    MG  --  1.5*     CBC:   Recent Labs   Lab 08/27/23 1854   WBC 8.20   HGB 13.8   HCT 39.2        CMP:   Recent Labs   Lab 08/27/23 1854   *   K 3.7   CL 85*   CO2 20*   *   BUN 13   CREATININE 1.2   CALCIUM 8.7   PROT 7.6   ALBUMIN 3.9   BILITOT 1.1*   ALKPHOS 117   AST 12   ALT 7*   ANIONGAP 15       Significant Imaging: I have reviewed all pertinent imaging results/findings within the past 24 hours.  I have reviewed and interpreted all pertinent imaging results/findings within the past 24 hours.    Imaging Results              CT Abdomen Pelvis With Contrast (Final result)  Result time 08/27/23 21:05:02      Final result by Lucio Rmoero MD  (08/27/23 21:05:02)                   Impression:      No definite acute abnormality identified.  Correlation and further evaluation as warranted.    All CT scans at this facility are performed  using dose modulation techniques as appropriate to performed exam including the following:  automated exposure control; adjustment of mA and/or kV according to the patients size (this includes techniques or standardized protocols for targeted exams where dose is matched to indication/reason for exam: i.e. extremities or head);  iterative reconstruction technique.      Electronically signed by: Lucio Romero  Date:    08/27/2023  Time:    21:05               Narrative:    EXAMINATION:  CT ABDOMEN PELVIS WITH CONTRAST    CLINICAL HISTORY:  Abdominal abscess/infection suspected;    TECHNIQUE:  Low dose axial images, sagittal and coronal reformations were obtained from the lung bases to the pubic symphysis.  Contrast was administered.  Images acquired after the administration of 100 mL Omnipaque 350 IV contrast.    COMPARISON:  Multiple priors.    FINDINGS:  Heart: Normal in size. No pericardial effusion.    Lung Bases: Well aerated, without consolidation or pleural fluid.    Liver: Normal in size and attenuation, with no focal hepatic lesions.    Gallbladder: Gallbladder surgically absent.    Bile Ducts: Mild intra and extrahepatic biliary ductal dilation likely related patient status post cholecystectomy.    Pancreas: Fatty atrophy of the pancreas.    Spleen: Unremarkable.    Adrenals: Unremarkable.    Kidneys/ Ureters: Unremarkable.    Bladder: Air within the bladder likely related to recent catheterization.  Correlation is advised.    Reproductive organs: Unremarkable.    GI Tract/Mesentery: Diverticulosis without diverticulitis.  Small bowel appears within normal limits.  No evidence of appendicitis.  Bariatric surgical change.    Peritoneal Space: No ascites. No free air.    Retroperitoneum: No significant  adenopathy.    Abdominal wall: Unremarkable.    Vasculature: No significant atherosclerosis or aneurysm.    Bones: No acute fracture.                                      I have independently reviewed all pertinent labs within the past 24 hours.    I have independently reviewed, visualized and interpreted all pertinent imaging results within the past 24 hours and discussed the findings with the ED physician, Lucy Westbrook MD

## 2023-08-28 NOTE — ASSESSMENT & PLAN NOTE
-CT abdomen with no evidence of acute intra-abdominal pathology.    -symptoms ongoing for the past 5-6 days  -supportive care with IV fluids, IV antiemetics.

## 2023-08-28 NOTE — PROGRESS NOTES
[facility]  [facility]  Progress Note    Patient Name: Divya Bui  MRN: [id number]  Patient Class: OP- Observation   Admission Date: 8/27/2023  Length of Stay: 0 days  Attending Physician: Julio Hernandez MD  Primary Care Provider: Angel Khan MD        Subjective:     Principal Problem:Hyponatremia        HPI:  Ms. Bui is a 63-year-old  female with PMH significant for HTN, GERD, hyperlipidemia, depression/anxiety, presented to the ED complaining of abdominal discomfort, nausea, vomiting for the past 5-6 days.  She denies fever, chills.  Denies sick contacts at home.  Reports 1-2 nonbloody loose bowel movements.  Reports decreased oral intake due to decreased appetite.  In the ED, CT abdomen pelvis reveals no evidence of acute intra-abdominal pathology.  However laboratory workup is remarkable for low sodium of 120, chloride 85, suggesting volume depletion.  Creatinine 1.2, magnesium within normal limits.  Rest of the laboratory workup unremarkable.  Patient received NS 1 L bolus.    Placed in observation for hyponatremia, secondary to volume depletion.  Nausea/vomiting.      Overview/Hospital Course:  Solidum gradually improving, holding home SSRI, continuing IVF and bicarb supplementation.  No further episodes of vomiting overnight, does still have some intermittent nausea.        Interval History: intermittent nausea and some anxiety this am, home xanax as needed     Review of Systems   Gastrointestinal:  Positive for nausea.   Psychiatric/Behavioral:  The patient is nervous/anxious.      Objective:     Vital Signs (Most Recent):  Temp: 97.8 °F (36.6 °C) (08/28/23 0822)  Pulse: 86 (08/28/23 0822)  Resp: 18 (08/28/23 0822)  BP: 134/75 (08/28/23 0822)  SpO2: 95 % (08/28/23 0822) Vital Signs (24h Range):  Temp:  [97.8 °F (36.6 °C)-98.9 °F (37.2 °C)] 97.8 °F (36.6 °C)  Pulse:  [] 86  Resp:  [12-20] 18  SpO2:  [95 %-99 %] 95 %  BP: (122-183)/() 134/75     Weight: 104.3  kg (229 lb 15 oz)  Body mass index is 40.73 kg/m².    Intake/Output Summary (Last 24 hours) at 8/28/2023 1005  Last data filed at 8/28/2023 0828  Gross per 24 hour   Intake 1284.25 ml   Output 400 ml   Net 884.25 ml         Physical Exam  Vitals and nursing note reviewed.   Constitutional:       Appearance: She is obese.   Cardiovascular:      Rate and Rhythm: Normal rate and regular rhythm.      Pulses: Normal pulses.      Heart sounds: Normal heart sounds.   Pulmonary:      Effort: Pulmonary effort is normal.      Breath sounds: Normal breath sounds. No wheezing.   Abdominal:      General: Bowel sounds are normal. There is no distension.      Palpations: Abdomen is soft.      Tenderness: There is no abdominal tenderness.   Musculoskeletal:         General: No swelling. Normal range of motion.   Skin:     General: Skin is warm and dry.      Findings: No bruising.   Neurological:      Mental Status: She is alert and oriented to person, place, and time.         Significant Labs: All pertinent labs within the past 24 hours have been reviewed.    Significant Imaging: I have reviewed all pertinent imaging results/findings within the past 24 hours.      Assessment/Plan:      * Hyponatremia  Patient has hyponatremia which is uncontrolled,We will aim to correct the sodium by 4-6mEq in 24 hours. We will monitor sodium Every 8 hours. The hyponatremia is due to Dehydration/hypovolemia. We will obtain the following studies: Urine sodium, urine osmolality, serum osmolality. We will treat the hyponatremia with IV fluids as follows:   cc/hour. The patient's sodium results have been reviewed and are listed below.  Recent Labs   Lab 08/28/23  0751   *     -sodium 120, chloride 85, suggesting volume depletion.    -continue  cc/hour.    -BNP every 8 hours, avoid over-correction.    -hold SSRIs/SNRIs    Na improving 120>123, will continue IVF and trend Na levels     Nausea & vomiting  -CT abdomen with no evidence of  acute intra-abdominal pathology.    -symptoms ongoing for the past 5-6 days  -supportive care with IV fluids, IV antiemetics.    No further episodes of vomiting, did have some nausea this am, relieved with antiemetics       Chronic prescription benzodiazepine use  -continue Xanax   - follows with psychiatry, Dr. GUZMAN in the outpatient setting       Morbid obesity with BMI of 40.0-44.9, adult  Body mass index is 40.73 kg/m². Morbid obesity complicates all aspects of disease management from diagnostic modalities to treatment. Weight loss encouraged and health benefits explained to patient.         HTN (hypertension)  -continue home medications, except diuretics.    -continue olmesartan, metoprolol, amlodipine.  -IV hydralazine as needed        VTE Risk Mitigation (From admission, onward)         Ordered     enoxaparin injection 40 mg  Daily         08/27/23 2242     Place sequential compression device  Until discontinued         08/27/23 2227                Discharge Planning   ELADIA:      Code Status: Full Code   Is the patient medically ready for discharge?:     Reason for patient still in hospital (select all that apply): Patient trending condition and Laboratory test         Adrianne Seymour NP  Department of Hospital Medicine   [facility]

## 2023-08-28 NOTE — PROGRESS NOTES
Pharmacist Renal Dose Adjustment Note    Divya Bui is a 63 y.o. female being treated with the medication lovenox     Patient Data:    Vital Signs (Most Recent):  Temp: 100 °F (37.8 °C) (08/28/23 1150)  Pulse: 79 (08/28/23 1150)  Resp: 18 (08/28/23 1150)  BP: (!) 146/65 (08/28/23 1150)  SpO2: 97 % (08/28/23 1150) Vital Signs (72h Range):  Temp:  [97.8 °F (36.6 °C)-100 °F (37.8 °C)]   Pulse:  []   Resp:  [12-20]   BP: (122-183)/()   SpO2:  [95 %-99 %]      Recent Labs   Lab 08/28/23  0037 08/28/23  0404 08/28/23  0751   CREATININE 1.1 1.1 1.0     Serum creatinine: 1 mg/dL 08/28/23 0751  Estimated creatinine clearance: 64.8 mL/min    Medication:lovenox 40 mg sq q24hrs will be changed to medication:lovenox 40 mg sq 12hrs per renal dosing protocol and BMI >=40.  Pharmacist's Name: Susan Harris [facility]  Pharmacist's Extension: [phone]

## 2023-08-29 VITALS
OXYGEN SATURATION: 98 % | BODY MASS INDEX: 40.74 KG/M2 | HEART RATE: 75 BPM | TEMPERATURE: 98 F | DIASTOLIC BLOOD PRESSURE: 68 MMHG | SYSTOLIC BLOOD PRESSURE: 129 MMHG | RESPIRATION RATE: 18 BRPM | WEIGHT: 229.94 LBS | HEIGHT: 63 IN

## 2023-08-29 LAB
ANION GAP SERPL CALC-SCNC: 12 MMOL/L (ref 8–16)
ANION GAP SERPL CALC-SCNC: 9 MMOL/L (ref 8–16)
BUN SERPL-MCNC: 17 MG/DL (ref 8–23)
BUN SERPL-MCNC: 19 MG/DL (ref 8–23)
CALCIUM SERPL-MCNC: 7.8 MG/DL (ref 8.7–10.5)
CALCIUM SERPL-MCNC: 7.9 MG/DL (ref 8.7–10.5)
CHLORIDE SERPL-SCNC: 103 MMOL/L (ref 95–110)
CHLORIDE SERPL-SCNC: 105 MMOL/L (ref 95–110)
CO2 SERPL-SCNC: 17 MMOL/L (ref 23–29)
CO2 SERPL-SCNC: 22 MMOL/L (ref 23–29)
CREAT SERPL-MCNC: 0.9 MG/DL (ref 0.5–1.4)
CREAT SERPL-MCNC: 1 MG/DL (ref 0.5–1.4)
EST. GFR  (NO RACE VARIABLE): >60 ML/MIN/1.73 M^2
EST. GFR  (NO RACE VARIABLE): >60 ML/MIN/1.73 M^2
GLUCOSE SERPL-MCNC: 104 MG/DL (ref 70–110)
GLUCOSE SERPL-MCNC: 96 MG/DL (ref 70–110)
POTASSIUM SERPL-SCNC: 3.7 MMOL/L (ref 3.5–5.1)
POTASSIUM SERPL-SCNC: 4 MMOL/L (ref 3.5–5.1)
SODIUM SERPL-SCNC: 134 MMOL/L (ref 136–145)
SODIUM SERPL-SCNC: 134 MMOL/L (ref 136–145)

## 2023-08-29 PROCEDURE — 80048 BASIC METABOLIC PNL TOTAL CA: CPT | Performed by: NURSE PRACTITIONER

## 2023-08-29 PROCEDURE — 25000003 PHARM REV CODE 250: Performed by: INTERNAL MEDICINE

## 2023-08-29 PROCEDURE — 63600175 PHARM REV CODE 636 W HCPCS: Performed by: FAMILY MEDICINE

## 2023-08-29 PROCEDURE — 96372 THER/PROPH/DIAG INJ SC/IM: CPT | Performed by: FAMILY MEDICINE

## 2023-08-29 PROCEDURE — 36415 COLL VENOUS BLD VENIPUNCTURE: CPT | Performed by: NURSE PRACTITIONER

## 2023-08-29 PROCEDURE — G0378 HOSPITAL OBSERVATION PER HR: HCPCS

## 2023-08-29 RX ADMIN — AMLODIPINE BESYLATE 5 MG: 5 TABLET ORAL at 08:08

## 2023-08-29 RX ADMIN — ACETAMINOPHEN 650 MG: 325 TABLET ORAL at 06:08

## 2023-08-29 RX ADMIN — ATORVASTATIN CALCIUM 40 MG: 40 TABLET, FILM COATED ORAL at 08:08

## 2023-08-29 RX ADMIN — ASPIRIN 81 MG CHEWABLE TABLET 81 MG: 81 TABLET CHEWABLE at 08:08

## 2023-08-29 RX ADMIN — ENOXAPARIN SODIUM 40 MG: 40 INJECTION SUBCUTANEOUS at 06:08

## 2023-08-29 RX ADMIN — LOSARTAN POTASSIUM 25 MG: 25 TABLET, FILM COATED ORAL at 08:08

## 2023-08-29 RX ADMIN — ALPRAZOLAM 1 MG: 1 TABLET ORAL at 03:08

## 2023-08-29 NOTE — PLAN OF CARE
Pt remained free of injury. Call bell and personal items within reach. VSS. Pain controlled with PRN meds. Na improved. No complaints of nausea noted. Chart check complete.    Problem: Adult Inpatient Plan of Care  Goal: Plan of Care Review  Outcome: Ongoing, Progressing     Problem: Adult Inpatient Plan of Care  Goal: Absence of Hospital-Acquired Illness or Injury  Outcome: Ongoing, Progressing     Problem: Adult Inpatient Plan of Care  Goal: Optimal Comfort and Wellbeing  Outcome: Ongoing, Progressing

## 2023-08-29 NOTE — PLAN OF CARE
[facility]  Discharge Final Note    Primary Care Provider: Angel Khan MD    Expected Discharge Date: 8/29/2023    Final Discharge Note (most recent)       Final Note - 08/29/23 0947          Final Note    Assessment Type Final Discharge Note     Anticipated Discharge Disposition Home or Self Care     Hospital Resources/Appts/Education Provided Appointments scheduled and added to AVS        Post-Acute Status    Discharge Delays None known at this time                     Contact Info       Angel Khan MD   Specialty: Internal Medicine   Relationship: PCP - General    [address] [place] [address]   Phone: [phone]       Next Steps: Schedule an appointment as soon as possible for a visit in 3 day(s)          No d/c needs at this time.

## 2023-08-29 NOTE — DISCHARGE SUMMARY
[facility]  [facility] Medicine  Discharge Summary      Patient Name: Divya Bui  MRN: [id number]  NEREIDA: [id number]  Patient Class: OP- Observation  Admission Date: 8/27/2023  Hospital Length of Stay: 0 days  Discharge Date and Time:  08/29/2023 11:06 AM  Attending Physician: Julio Hernandez MD   Discharging Provider: Adrianne Seymour NP  Primary Care Provider: Angel Khan MD    Primary Care Team: Networked reference to record PCT     HPI:   Ms. Bui is a 63-year-old  female with PMH significant for HTN, GERD, hyperlipidemia, depression/anxiety, presented to the ED complaining of abdominal discomfort, nausea, vomiting for the past 5-6 days.  She denies fever, chills.  Denies sick contacts at home.  Reports 1-2 nonbloody loose bowel movements.  Reports decreased oral intake due to decreased appetite.  In the ED, CT abdomen pelvis reveals no evidence of acute intra-abdominal pathology.  However laboratory workup is remarkable for low sodium of 120, chloride 85, suggesting volume depletion.  Creatinine 1.2, magnesium within normal limits.  Rest of the laboratory workup unremarkable.  Patient received NS 1 L bolus.    Placed in observation for hyponatremia, secondary to volume depletion.  Nausea/vomiting.      * No surgery found *      Hospital Course:   Ms. Bui is a 63 year old female that was admitted to [facility] for N/V, IVVD, hyponatremia.  CT ab/pelvis with no acute findings.  She was placed on IVF, antiemetics as needed, home SSRI was held.  Sodium gradually improved.  Patient seen and assessed today, VVS, Na 134, no further episodes of N/V while hospitalized and patient is tolerating regular diet.  Hyponatremia likely caused by IVVD, patient has been on Prozac for years without any adverse affects.  Discussed with patient restarting home Prozac and following up with PCP this week for repeat lab work to ensure Na levels stay stable.  Patient will also follow-up with her Psychiatrist as  outpatient.           Goals of Care Treatment Preferences:  Code Status: Full Code      Final Active Diagnoses:    Diagnosis Date Noted POA    PRINCIPAL PROBLEM:  Hyponatremia [E87.1] 08/27/2023 Yes    Nausea & vomiting [R11.2] 05/11/2018 Yes    Chronic prescription benzodiazepine use [Z79.899] 08/21/2021 Not Applicable    Morbid obesity with BMI of 40.0-44.9, adult [E66.01, Z68.41]  Not Applicable    HTN (hypertension) [I10] 08/26/2013 Yes      Problems Resolved During this Admission:       Discharged Condition: good    Disposition: Home or Self Care    Follow Up:   Follow-up Information     Angel Khan MD. Schedule an appointment as soon as possible for a visit in 3 day(s).    Specialty: Internal Medicine  Contact information:  [address] [facility] [address] [phone]                       Patient Instructions:   No discharge procedures on file.    Significant Diagnostic Studies: Labs:   BMP:   Recent Labs   Lab 08/27/23 2010 08/28/23  0037 08/28/23  0404 08/28/23  0751 08/28/23  1549 08/28/23  2351 08/29/23  0808   GLU  --    < > 94   < > 94 104 96   NA  --    < > 123*   < > 132* 134* 134*   K  --    < > 4.2   < > 3.7 4.0 3.7   CL  --    < > 95   < > 104 105 103   CO2  --    < > 16*   < > 17* 17* 22*   BUN  --    < > 10   < > 13 19 17   CREATININE  --    < > 1.1   < > 1.1 1.0 0.9   CALCIUM  --    < > 8.3*   < > 8.3* 7.8* 7.9*   MG 1.5*  --  1.9  --   --   --   --     < > = values in this interval not displayed.       Pending Diagnostic Studies:     None         Medications:  Reconciled Home Medications:      Medication List      CONTINUE taking these medications    ALPRAZolam 1 MG tablet  Commonly known as: XANAX  Take 1 mg by mouth 3 (three) times daily as needed.     amLODIPine 5 MG tablet  Commonly known as: NORVASC  Take 1 tablet (5 mg total) by mouth once daily.     ARIPiprazole 10 MG Tab  Commonly known as: ABILIFY  Take 10 mg by mouth once daily.     aspirin 81 mg Tab  Take 1  tablet by mouth Daily.     atorvastatin 40 MG tablet  Commonly known as: LIPITOR  Take 1 tablet (40 mg total) by mouth once daily.     dicyclomine 20 mg tablet  Commonly known as: BENTYL  Take 1 tablet (20 mg total) by mouth 3 (three) times daily as needed (abdominal pain).     doxepin 50 MG capsule  Commonly known as: SINEQUAN  Take 50 mg by mouth nightly.     FLUoxetine 40 MG capsule  Take 1 capsule (40 mg total) by mouth once daily.     furosemide 20 MG tablet  Commonly known as: LASIX  Take 1 tablet (20 mg total) by mouth daily as needed (swelling).     olmesartan-hydrochlorothiazide 40-12.5 mg Tab  Commonly known as: BENICAR HCT  Take 1 tablet by mouth once daily.     ondansetron 8 MG tablet  Commonly known as: ZOFRAN  Take 1 tablet (8 mg total) by mouth every 8 (eight) hours as needed for Nausea.     pregabalin 75 MG capsule  Commonly known as: LYRICA  Take 1 capsule (75 mg total) by mouth every evening.     promethazine 25 MG tablet  Commonly known as: PHENERGAN  Take 1 tablet (25 mg total) by mouth every 6 (six) hours as needed for Nausea.     sucralfate 1 gram tablet  Commonly known as: CARAFATE  Take 1 tablet (1 g total) by mouth 4 (four) times daily.     URO--10-40.8-36 mg Cap  Generic drug: methen-m.blue-s.phos-phsal-hyo  TAKE 1 CAPSULE BY MOUTH THREE TIMES DAILY AS NEEDED FOR BLADDER PAIN     zolpidem 10 mg Tab  Commonly known as: AMBIEN  Take 10 mg by mouth nightly as needed.        STOP taking these medications    cholestyramine-aspartame 4 gram Pwpk  Commonly known as: CHOLESTYRAMINE LIGHT            Indwelling Lines/Drains at time of discharge:   Lines/Drains/Airways     None                 Time spent on the discharge of patient: 33 minutes         Adrianne Seymour NP  Department of Hospital Medicine  [facility]

## 2023-09-04 ENCOUNTER — NURSE TRIAGE (OUTPATIENT)
Dept: ADMINISTRATIVE | Facility: CLINIC | Age: 63
End: 2023-09-04
Payer: MEDICARE

## 2023-09-04 ENCOUNTER — HOSPITAL ENCOUNTER (EMERGENCY)
Facility: HOSPITAL | Age: 63
Discharge: HOME | End: 2023-09-04
Attending: EMERGENCY MEDICINE
Payer: MEDICARE

## 2023-09-04 VITALS
SYSTOLIC BLOOD PRESSURE: 168 MMHG | HEART RATE: 95 BPM | DIASTOLIC BLOOD PRESSURE: 99 MMHG | TEMPERATURE: 99 F | OXYGEN SATURATION: 99 % | RESPIRATION RATE: 18 BRPM

## 2023-09-04 DIAGNOSIS — K92.2 ACUTE LOWER GI BLEEDING: Primary | ICD-10-CM

## 2023-09-04 DIAGNOSIS — R11.2 NAUSEA VOMITING AND DIARRHEA: ICD-10-CM

## 2023-09-04 DIAGNOSIS — R19.7 NAUSEA VOMITING AND DIARRHEA: ICD-10-CM

## 2023-09-04 LAB
ALBUMIN SERPL BCP-MCNC: 3.6 G/DL (ref 3.5–5.2)
ALP SERPL-CCNC: 95 U/L (ref 55–135)
ALT SERPL W/O P-5'-P-CCNC: 12 U/L (ref 10–44)
ANION GAP SERPL CALC-SCNC: 13 MMOL/L (ref 8–16)
AST SERPL-CCNC: 11 U/L (ref 10–40)
BASOPHILS # BLD AUTO: 0.04 K/UL (ref 0–0.2)
BASOPHILS NFR BLD: 0.7 % (ref 0–1.9)
BILIRUB SERPL-MCNC: 0.3 MG/DL (ref 0.1–1)
BUN SERPL-MCNC: 12 MG/DL (ref 8–23)
C DIFF GDH STL QL: NEGATIVE
C DIFF TOX A+B STL QL IA: NEGATIVE
CALCIUM SERPL-MCNC: 8.2 MG/DL (ref 8.7–10.5)
CHLORIDE SERPL-SCNC: 108 MMOL/L (ref 95–110)
CO2 SERPL-SCNC: 20 MMOL/L (ref 23–29)
CREAT SERPL-MCNC: 1.1 MG/DL (ref 0.5–1.4)
DIFFERENTIAL METHOD: ABNORMAL
EOSINOPHIL # BLD AUTO: 0.2 K/UL (ref 0–0.5)
EOSINOPHIL NFR BLD: 2.8 % (ref 0–8)
ERYTHROCYTE [DISTWIDTH] IN BLOOD BY AUTOMATED COUNT: 13.9 % (ref 11.5–14.5)
EST. GFR  (NO RACE VARIABLE): 56 ML/MIN/1.73 M^2
GLUCOSE SERPL-MCNC: 97 MG/DL (ref 70–110)
HCT VFR BLD AUTO: 38.4 % (ref 37–48.5)
HCV AB SERPL QL IA: NEGATIVE
HEP C VIRUS HOLD SPECIMEN: NORMAL
HGB BLD-MCNC: 12.3 G/DL (ref 12–16)
HIV 1+2 AB+HIV1 P24 AG SERPL QL IA: NEGATIVE
IMM GRANULOCYTES # BLD AUTO: 0.04 K/UL (ref 0–0.04)
IMM GRANULOCYTES NFR BLD AUTO: 0.7 % (ref 0–0.5)
LACTATE SERPL-SCNC: 1.6 MMOL/L (ref 0.5–2.2)
LYMPHOCYTES # BLD AUTO: 1.4 K/UL (ref 1–4.8)
LYMPHOCYTES NFR BLD: 22.9 % (ref 18–48)
MAGNESIUM SERPL-MCNC: 2 MG/DL (ref 1.6–2.6)
MCH RBC QN AUTO: 29.4 PG (ref 27–31)
MCHC RBC AUTO-ENTMCNC: 32 G/DL (ref 32–36)
MCV RBC AUTO: 92 FL (ref 82–98)
MONOCYTES # BLD AUTO: 0.4 K/UL (ref 0.3–1)
MONOCYTES NFR BLD: 6.2 % (ref 4–15)
NEUTROPHILS # BLD AUTO: 4 K/UL (ref 1.8–7.7)
NEUTROPHILS NFR BLD: 66.7 % (ref 38–73)
NRBC BLD-RTO: 0 /100 WBC
OB PNL STL: POSITIVE
PLATELET # BLD AUTO: 187 K/UL (ref 150–450)
PMV BLD AUTO: 8.2 FL (ref 9.2–12.9)
POCT GLUCOSE: 92 MG/DL (ref 70–110)
POTASSIUM SERPL-SCNC: 4 MMOL/L (ref 3.5–5.1)
PROT SERPL-MCNC: 7 G/DL (ref 6–8.4)
RBC # BLD AUTO: 4.18 M/UL (ref 4–5.4)
RV AG STL QL IA.RAPID: NEGATIVE
SODIUM SERPL-SCNC: 141 MMOL/L (ref 136–145)
WBC # BLD AUTO: 5.98 K/UL (ref 3.9–12.7)

## 2023-09-04 PROCEDURE — 25000003 PHARM REV CODE 250: Performed by: EMERGENCY MEDICINE

## 2023-09-04 PROCEDURE — 87329 GIARDIA AG IA: CPT | Performed by: EMERGENCY MEDICINE

## 2023-09-04 PROCEDURE — 82272 OCCULT BLD FECES 1-3 TESTS: CPT | Performed by: EMERGENCY MEDICINE

## 2023-09-04 PROCEDURE — 87427 SHIGA-LIKE TOXIN AG IA: CPT | Mod: 59 | Performed by: EMERGENCY MEDICINE

## 2023-09-04 PROCEDURE — 87209 SMEAR COMPLEX STAIN: CPT | Performed by: EMERGENCY MEDICINE

## 2023-09-04 PROCEDURE — 87045 FECES CULTURE AEROBIC BACT: CPT | Performed by: EMERGENCY MEDICINE

## 2023-09-04 PROCEDURE — 96361 HYDRATE IV INFUSION ADD-ON: CPT

## 2023-09-04 PROCEDURE — 86803 HEPATITIS C AB TEST: CPT | Performed by: EMERGENCY MEDICINE

## 2023-09-04 PROCEDURE — 96365 THER/PROPH/DIAG IV INF INIT: CPT

## 2023-09-04 PROCEDURE — 87449 NOS EACH ORGANISM AG IA: CPT | Performed by: EMERGENCY MEDICINE

## 2023-09-04 PROCEDURE — 82962 GLUCOSE BLOOD TEST: CPT

## 2023-09-04 PROCEDURE — 83605 ASSAY OF LACTIC ACID: CPT | Performed by: EMERGENCY MEDICINE

## 2023-09-04 PROCEDURE — 87425 ROTAVIRUS AG IA: CPT | Performed by: EMERGENCY MEDICINE

## 2023-09-04 PROCEDURE — 80053 COMPREHEN METABOLIC PANEL: CPT | Performed by: EMERGENCY MEDICINE

## 2023-09-04 PROCEDURE — 63600175 PHARM REV CODE 636 W HCPCS: Performed by: EMERGENCY MEDICINE

## 2023-09-04 PROCEDURE — 83735 ASSAY OF MAGNESIUM: CPT | Performed by: EMERGENCY MEDICINE

## 2023-09-04 PROCEDURE — 85025 COMPLETE CBC W/AUTO DIFF WBC: CPT | Performed by: EMERGENCY MEDICINE

## 2023-09-04 PROCEDURE — 87389 HIV-1 AG W/HIV-1&-2 AB AG IA: CPT | Performed by: EMERGENCY MEDICINE

## 2023-09-04 PROCEDURE — 87046 STOOL CULTR AEROBIC BACT EA: CPT | Performed by: EMERGENCY MEDICINE

## 2023-09-04 PROCEDURE — 87449 NOS EACH ORGANISM AG IA: CPT | Mod: 91 | Performed by: EMERGENCY MEDICINE

## 2023-09-04 PROCEDURE — 99284 EMERGENCY DEPT VISIT MOD MDM: CPT

## 2023-09-04 RX ORDER — PROMETHAZINE HYDROCHLORIDE 25 MG/1
25 TABLET ORAL EVERY 6 HOURS PRN
Qty: 20 TABLET | Refills: 0 | Status: ON HOLD | OUTPATIENT
Start: 2023-09-04 | End: 2023-11-25 | Stop reason: HOSPADM

## 2023-09-04 RX ADMIN — PROMETHAZINE HYDROCHLORIDE 12.5 MG: 25 INJECTION INTRAMUSCULAR; INTRAVENOUS at 06:09

## 2023-09-04 RX ADMIN — SODIUM CHLORIDE 1000 ML: 9 INJECTION, SOLUTION INTRAVENOUS at 05:09

## 2023-09-04 NOTE — TELEPHONE ENCOUNTER
Spoke with patient who states she was discharged from the hospital 3 days ago.  Patient states she was inpatient related to low sodium level, vomiting, nausea, and diarrhea.  Patient states she continues to have diarrhea and nausea.  She also reports having blood in her stool (large amount).  Patient has severe weakness that has worsened.  Advised patient to call [id number].  Patient verbalized understanding.     Reason for Disposition   [1] SEVERE weakness (i.e., unable to walk or barely able to walk, requires support) AND [2] new-onset or worsening    Additional Information   Negative: SEVERE difficulty breathing (e.g., struggling for each breath, speaks in single words)   Negative: Shock suspected (e.g., cold/pale/clammy skin, too weak to stand, low BP, rapid pulse)   Negative: Difficult to awaken or acting confused (e.g., disoriented, slurred speech)   Negative: [1] Fainted > 15 minutes ago AND [2] still feels too weak or dizzy to stand    Protocols used: Weakness (Generalized) and Fatigue-A-AH

## 2023-09-04 NOTE — ED PROVIDER NOTES
SCRIBE #1 NOTE: I, Carmen Borjas, am scribing for, and in the presence of, Max Barnes MD. I have scribed the entire note.       History     Chief Complaint   Patient presents with    GI Problem     Pt has hx of diverticulitis and chronic abdominal pain. Pt complaining of weakness and bright red rectal blood     Review of patient's allergies indicates:   Allergen Reactions    Metronidazole hcl Other (See Comments)     Mouth ulcers developed with Flagyl  Oral sores.  Mouth ulcers developed with Flagyl         History of Present Illness     HPI    9/4/2023, 4:27 PM  History obtained from the patient      History of Present Illness: Divya Bui is a 63 y.o. female patient with a PMHx of anemia, bowel incontinence, esophageal stricture, GERD, and hypertension who presents to the Emergency Department for evaluation of GI problem which onset several days PTA. Symptoms are constant and moderate in severity. Pt was recently admitted on 8/27 for hyponatremia, nausea and vomiting and discharged 3 days ago, She states that phenergan helped with her symptoms in the hospital, but was discharged with a prescription for Zofran. She also has a a history of bright red blood in stool typically associated with hemorrhoids. She did note to have frequent bright red blood in stool. Pt states she called her PCP to obtain a prescription for Phenergan but the nurse hotline told her to call 911 due to having blood in stool. Associated sxs include nausea, vomiting, diarrhea, abdominal pain and rectal pain. Patient denies any fever, chest pain, shortness of breath, and all other sxs at this time. Prior Tx includes 2.5 of droperidol en route. No further complaints or concerns at this time.       Arrival mode: EMS    PCP: Angel Khan MD        Past Medical History:  Past Medical History:   Diagnosis Date    Anemia     Anxiety     Basal cell carcinoma (BCC) of face 2/26/2020    Blood transfusion     Bowel incontinence      Depression     Esophageal stricture     GERD (gastroesophageal reflux disease)     Hypertension     Latent tuberculosis by skin test 2001    took INH    Liver nodule 1/6/2014    Morbid obesity with BMI of 45.0-49.9, adult     OA (osteoarthritis) of knee 12/12/2014    Pericardial effusion 9/4/2014       Past Surgical History:  Past Surgical History:   Procedure Laterality Date    CHOLECYSTECTOMY      COLONOSCOPY N/A 3/6/2023    Procedure: COLONOSCOPY;  Surgeon: Beti Diallo MD;  Location: [facility];  Service: Endoscopy;  Laterality: N/A;    GASTRIC BYPASS      HERNIA REPAIR      INJECTION OF ANESTHETIC AGENT AROUND NERVE Bilateral 6/16/2023    Procedure: Bilateral Genicular nerve block with RN IV sedation;  Surgeon: Denis Lai MD;  Location: [facility];  Service: Pain Management;  Laterality: Bilateral;    INJECTION OF ANESTHETIC AGENT AROUND NERVE Bilateral 8/11/2023    Procedure: Bilateral Genicular nerve block with RN IV sedation;  Surgeon: Denis Lai MD;  Location: [facility];  Service: Pain Management;  Laterality: Bilateral;    right sholder surgery      SMALL INTESTINE SURGERY           Family History:  Family History   Problem Relation Age of Onset    Lung cancer Mother         smoker    Liver cancer Father     Diabetes Sister     Crohn's disease Sister     Liver cancer Sister     Diabetes Sister     Crohn's disease Brother     Crohn's disease Other     Breast cancer Neg Hx     Ovarian cancer Neg Hx     Colon cancer Neg Hx        Social History:  Social History     Tobacco Use    Smoking status: Never    Smokeless tobacco: Never   Substance and Sexual Activity    Alcohol use: Not Currently     Alcohol/week: 0.0 standard drinks of alcohol    Drug use: No    Sexual activity: Yes     Partners: Male        Review of Systems     Review of Systems   Constitutional:  Negative for fever.   Respiratory:  Negative for shortness of breath.    Cardiovascular:  Negative for chest pain.    Gastrointestinal:  Positive for abdominal pain, blood in stool, diarrhea, nausea, rectal pain and vomiting.   All other systems reviewed and are negative.       Physical Exam     Initial Vitals [09/04/23 1603]   BP Pulse Resp Temp SpO2   (!) 105/58 106 18 98.5 °F (36.9 °C) 96 %      MAP       --          Physical Exam  Nursing Notes and Vital Signs Reviewed.  Constitutional: Patient is in no acute distress. Well-developed and well-nourished.  Head: Atraumatic. Normocephalic.  Eyes: PERRL. EOM intact. Conjunctivae are not pale. No scleral icterus.  ENT: Mucous membranes are moist.   Neck: Supple. Full ROM. No lymphadenopathy.  Cardiovascular: Regular rate. Regular rhythm. No murmurs, rubs, or gallops. Distal pulses are 2+ and symmetric.  Pulmonary/Chest: No respiratory distress. Clear to auscultation bilaterally. No wheezing or rales.  Abdominal: Soft and non-distended.  There is no tenderness.  No rebound, guarding, or rigidity.   Genitourinary: No CVA tenderness. Patient declined rectal exam.  Musculoskeletal: Moves all extremities. No obvious deformities. No edema.   Skin: Warm and dry.  Neurological:  Alert, awake, and appropriate.  Normal speech.  No acute focal neurological deficits are appreciated.  Psychiatric: Normal affect. Good eye contact. Appropriate in content.     ED Course   Procedures  ED Vital Signs:  Vitals:    09/04/23 1603 09/04/23 1730 09/04/23 1800 09/04/23 1830   BP: (!) 105/58 (!) 160/99 (!) 168/103 (!) 175/100   Pulse: 106 99 95 100   Resp: 18 20 20 18   Temp: 98.5 °F (36.9 °C)      TempSrc: Oral      SpO2: 96% 99% 99% 99%    09/04/23 1920 09/04/23 2010   BP: (!) 168/98 (!) 168/99   Pulse: 95 95   Resp: 20 18   Temp: 98.6 °F (37 °C) 98.6 °F (37 °C)   TempSrc: Oral Oral   SpO2: 99% 99%       Abnormal Lab Results:  Labs Reviewed   CBC W/ AUTO DIFFERENTIAL - Abnormal; Notable for the following components:       Result Value    MPV 8.2 (*)     Immature Granulocytes 0.7 (*)     All other  components within normal limits   COMPREHENSIVE METABOLIC PANEL - Abnormal; Notable for the following components:    CO2 20 (*)     Calcium 8.2 (*)     eGFR 56 (*)     All other components within normal limits   OCCULT BLOOD X 1, STOOL - Abnormal; Notable for the following components:    Occult Blood Positive (*)     All other components within normal limits   CLOSTRIDIUM DIFFICILE   CULTURE, STOOL   ENTEROHEMORRHAGIC E.COLI   HIV 1 / 2 ANTIBODY    Narrative:     Release to patient->Immediate   HEPATITIS C ANTIBODY    Narrative:     Release to patient->Immediate   HEP C VIRUS HOLD SPECIMEN    Narrative:     Release to patient->Immediate   MAGNESIUM   ROTAVIRUS ANTIGEN, STOOL   LACTIC ACID, PLASMA   GIARDIA/CRYPTOSPORIDIUM (EIA)   STOOL EXAM-OVA,CYSTS,PARASITES   POCT GLUCOSE        All Lab Results:  Results for orders placed or performed during the hospital encounter of 09/04/23   Clostridium difficile EIA    Specimen: Stool   Result Value Ref Range    C. diff Antigen Negative Negative    C difficile Toxins A+B, EIA Negative Negative   HIV 1/2 Ag/Ab (4th Gen)   Result Value Ref Range    HIV 1/2 Ag/Ab Negative Negative   Hepatitis C Antibody   Result Value Ref Range    Hepatitis C Ab Negative Negative   HCV Virus Hold Specimen   Result Value Ref Range    HEP C Virus Hold Specimen Hold for HCV sendout    CBC auto differential   Result Value Ref Range    WBC 5.98 3.90 - 12.70 K/uL    RBC 4.18 4.00 - 5.40 M/uL    Hemoglobin 12.3 12.0 - 16.0 g/dL    Hematocrit 38.4 37.0 - 48.5 %    MCV 92 82 - 98 fL    MCH 29.4 27.0 - 31.0 pg    MCHC 32.0 32.0 - 36.0 g/dL    RDW 13.9 11.5 - 14.5 %    Platelets 187 150 - 450 K/uL    MPV 8.2 (L) 9.2 - 12.9 fL    Immature Granulocytes 0.7 (H) 0.0 - 0.5 %    Gran # (ANC) 4.0 1.8 - 7.7 K/uL    Immature Grans (Abs) 0.04 0.00 - 0.04 K/uL    Lymph # 1.4 1.0 - 4.8 K/uL    Mono # 0.4 0.3 - 1.0 K/uL    Eos # 0.2 0.0 - 0.5 K/uL    Baso # 0.04 0.00 - 0.20 K/uL    nRBC 0 0 /100 WBC    Gran % 66.7 38.0  - 73.0 %    Lymph % 22.9 18.0 - 48.0 %    Mono % 6.2 4.0 - 15.0 %    Eosinophil % 2.8 0.0 - 8.0 %    Basophil % 0.7 0.0 - 1.9 %    Differential Method Automated    Comprehensive metabolic panel   Result Value Ref Range    Sodium 141 136 - 145 mmol/L    Potassium 4.0 3.5 - 5.1 mmol/L    Chloride 108 95 - 110 mmol/L    CO2 20 (L) 23 - 29 mmol/L    Glucose 97 70 - 110 mg/dL    BUN 12 8 - 23 mg/dL    Creatinine 1.1 0.5 - 1.4 mg/dL    Calcium 8.2 (L) 8.7 - 10.5 mg/dL    Total Protein 7.0 6.0 - 8.4 g/dL    Albumin 3.6 3.5 - 5.2 g/dL    Total Bilirubin 0.3 0.1 - 1.0 mg/dL    Alkaline Phosphatase 95 55 - 135 U/L    AST 11 10 - 40 U/L    ALT 12 10 - 44 U/L    eGFR 56 (A) >60 mL/min/1.73 m^2    Anion Gap 13 8 - 16 mmol/L   Magnesium   Result Value Ref Range    Magnesium 2.0 1.6 - 2.6 mg/dL   Occult blood x 1, stool    Specimen: Stool   Result Value Ref Range    Occult Blood Positive (A) Negative   Rotavirus antigen, stool   Result Value Ref Range    Rotavirus Negative Negative   Lactic acid, plasma   Result Value Ref Range    Lactate (Lactic Acid) 1.6 0.5 - 2.2 mmol/L   POCT glucose   Result Value Ref Range    POCT Glucose 92 70 - 110 mg/dL         Imaging Results:  Imaging Results    None                 The Emergency Provider reviewed the vital signs and test results, which are outlined above.     ED Discussion     7:35 PM: Re-evaluated pt. Pt is resting comfortably and is in no acute distress.  Pt has stated multiple times that she wants to leave AMA.  D/w pt all pertinent results. D/w pt any concerns expressed at this time. Answered all questions. Pt expresses understanding at this time.     7:43 PM: Reassessed pt at this time. Discussed with pt all pertinent ED information and results. Discussed pt dx and plan of tx. Gave pt all f/u and return to the ED instructions. All questions and concerns were addressed at this time. Pt expresses understanding of information and instructions, and is comfortable with plan to  discharge. Pt is stable for discharge.    I discussed with patient and/or family/caretaker that evaluation in the ED does not suggest any emergent or life threatening medical conditions requiring immediate intervention beyond what was provided in the ED, and I believe patient is safe for discharge.  Regardless, an unremarkable evaluation in the ED does not preclude the development or presence of a serious of life threatening condition. As such, patient was instructed to return immediately for any worsening or change in current symptoms.\       Medical Decision Making  Patient is a 63-year-old female who presents today requesting Phenergan.  She has been dealing with nausea/vomiting/diarrhea and reports that Phenergan was helping when she was recently hospitalized with these symptoms and hyponatremia.  She was discharged home with Zofran, and called medical hotline hoping to get a prescription for Phenergan, but ended up speaking with the nurse and was told to come to the emergency department when she reported bright red stools.  ED workup was started for nausea/vomiting/diarrhea with blood in his stool.  To completion of the workup, patient had mentioned numerous times that she wanted to leave.  I came to bedside to discuss my concerns with patient.  Explained that we wanted to rule out life-threatening causes of GI bleed.  She does have a history of diverticulosis and diverticular bleed is on the differential diagnosis.  I discussed all this with patient.  Patient suspects her symptoms are due to hemorrhoids and states that this is not a new thing for her.  She voiced understanding of all risks, benefits, and alternatives and elects to leave at this time prior to completion of ED workup.  She simply requesting a prescription of Phenergan.  There is family at bedside that was present for this conversation.  Patient has decision-making capacity and elects to leave.    Considered admission, but patient elected to  leave prior to completion of workup    Amount and/or Complexity of Data Reviewed  External Data Reviewed: labs and notes.     Details: Prior notes reviewed from recent admission  Labs: ordered. Decision-making details documented in ED Course.    Risk  Prescription drug management.                ED Medication(s):  Medications   sodium chloride 0.9% bolus 1,000 mL 1,000 mL (0 mLs Intravenous Stopped 9/4/23 2012)   promethazine (PHENERGAN) 12.5 mg in dextrose 5 % (D5W) 50 mL IVPB (0 mg Intravenous Stopped 9/4/23 2012)       Discharge Medication List as of 9/4/2023  7:43 PM        START taking these medications    Details   !! promethazine (PHENERGAN) 25 MG tablet Take 1 tablet (25 mg total) by mouth every 6 (six) hours as needed for Nausea., Starting Mon 9/4/2023, Print       !! - Potential duplicate medications found. Please discuss with provider.           Follow-up Information       Follow-up with your gastroenterologist.               [facility] - Emergency Dept..    Specialty: Emergency Medicine  Why: As needed, If symptoms worsen  Contact information:  [address] [place] [id number] [phone]                               Scribe Attestation:   Scribe #1: I performed the above scribed service and the documentation accurately describes the services I performed. I attest to the accuracy of the note.     Attending:   Physician Attestation Statement for Scribe #1: I, Max Barnes MD, personally performed the services described in this documentation, as scribed by Carmen Borjas, in my presence, and it is both accurate and complete.           Clinical Impression       ICD-10-CM ICD-9-CM   1. Acute lower GI bleeding  K92.2 578.9   2. Nausea vomiting and diarrhea  R11.2 787.91    R19.7 787.01       Disposition:   Disposition: Discharged        Max Barnes MD  09/08/23 0258

## 2023-09-04 NOTE — TELEPHONE ENCOUNTER
Follow up call done with patient .  EMS is in route to her home.  Patient states she has turned off her alarm and the door is open.

## 2023-09-05 ENCOUNTER — PATIENT OUTREACH (OUTPATIENT)
Dept: ADMINISTRATIVE | Facility: HOSPITAL | Age: 63
End: 2023-09-05
Payer: MEDICARE

## 2023-09-05 LAB
CRYPTOSP AG STL QL IA: NEGATIVE
G LAMBLIA AG STL QL IA: NEGATIVE

## 2023-09-05 NOTE — PROGRESS NOTES
Called patient to confirm hospital follow up scheduled on 9/07/2023.   Patient is confirmed. Encouraged patient to bring all medications and BP cuff to follow up visit.

## 2023-09-06 LAB
E COLI SXT1 STL QL IA: NEGATIVE
E COLI SXT2 STL QL IA: NEGATIVE

## 2023-09-07 ENCOUNTER — OFFICE VISIT (OUTPATIENT)
Dept: INTERNAL MEDICINE | Facility: CLINIC | Age: 63
End: 2023-09-07
Payer: MEDICARE

## 2023-09-07 VITALS
OXYGEN SATURATION: 97 % | WEIGHT: 233 LBS | SYSTOLIC BLOOD PRESSURE: 136 MMHG | TEMPERATURE: 98 F | BODY MASS INDEX: 41.29 KG/M2 | HEIGHT: 63 IN | HEART RATE: 98 BPM | DIASTOLIC BLOOD PRESSURE: 80 MMHG

## 2023-09-07 DIAGNOSIS — R11.2 NAUSEA AND VOMITING, UNSPECIFIED VOMITING TYPE: ICD-10-CM

## 2023-09-07 DIAGNOSIS — Z09 HOSPITAL DISCHARGE FOLLOW-UP: Primary | ICD-10-CM

## 2023-09-07 DIAGNOSIS — K62.5 BRBPR (BRIGHT RED BLOOD PER RECTUM): ICD-10-CM

## 2023-09-07 DIAGNOSIS — E87.1 HYPONATREMIA: ICD-10-CM

## 2023-09-07 LAB — BACTERIA STL CULT: NORMAL

## 2023-09-07 PROCEDURE — 99999 PR PBB SHADOW E&M-EST. PATIENT-LVL IV: CPT | Mod: PBBFAC,,, | Performed by: FAMILY MEDICINE

## 2023-09-07 PROCEDURE — 1159F PR MEDICATION LIST DOCUMENTED IN MEDICAL RECORD: ICD-10-PCS | Mod: CPTII,S$GLB,, | Performed by: FAMILY MEDICINE

## 2023-09-07 PROCEDURE — 1159F MED LIST DOCD IN RCRD: CPT | Mod: CPTII,S$GLB,, | Performed by: FAMILY MEDICINE

## 2023-09-07 PROCEDURE — 1101F PR PT FALLS ASSESS DOC 0-1 FALLS W/OUT INJ PAST YR: ICD-10-PCS | Mod: CPTII,S$GLB,, | Performed by: FAMILY MEDICINE

## 2023-09-07 PROCEDURE — 1101F PT FALLS ASSESS-DOCD LE1/YR: CPT | Mod: CPTII,S$GLB,, | Performed by: FAMILY MEDICINE

## 2023-09-07 PROCEDURE — 99999 PR PBB SHADOW E&M-EST. PATIENT-LVL IV: ICD-10-PCS | Mod: PBBFAC,,, | Performed by: FAMILY MEDICINE

## 2023-09-07 PROCEDURE — 3008F BODY MASS INDEX DOCD: CPT | Mod: CPTII,S$GLB,, | Performed by: FAMILY MEDICINE

## 2023-09-07 PROCEDURE — 99213 PR OFFICE/OUTPT VISIT, EST, LEVL III, 20-29 MIN: ICD-10-PCS | Mod: S$GLB,,, | Performed by: FAMILY MEDICINE

## 2023-09-07 PROCEDURE — 3079F DIAST BP 80-89 MM HG: CPT | Mod: CPTII,S$GLB,, | Performed by: FAMILY MEDICINE

## 2023-09-07 PROCEDURE — 3044F PR MOST RECENT HEMOGLOBIN A1C LEVEL <7.0%: ICD-10-PCS | Mod: CPTII,S$GLB,, | Performed by: FAMILY MEDICINE

## 2023-09-07 PROCEDURE — 3079F PR MOST RECENT DIASTOLIC BLOOD PRESSURE 80-89 MM HG: ICD-10-PCS | Mod: CPTII,S$GLB,, | Performed by: FAMILY MEDICINE

## 2023-09-07 PROCEDURE — 3008F PR BODY MASS INDEX (BMI) DOCUMENTED: ICD-10-PCS | Mod: CPTII,S$GLB,, | Performed by: FAMILY MEDICINE

## 2023-09-07 PROCEDURE — 3075F SYST BP GE 130 - 139MM HG: CPT | Mod: CPTII,S$GLB,, | Performed by: FAMILY MEDICINE

## 2023-09-07 PROCEDURE — 3288F PR FALLS RISK ASSESSMENT DOCUMENTED: ICD-10-PCS | Mod: CPTII,S$GLB,, | Performed by: FAMILY MEDICINE

## 2023-09-07 PROCEDURE — 3288F FALL RISK ASSESSMENT DOCD: CPT | Mod: CPTII,S$GLB,, | Performed by: FAMILY MEDICINE

## 2023-09-07 PROCEDURE — 3075F PR MOST RECENT SYSTOLIC BLOOD PRESS GE 130-139MM HG: ICD-10-PCS | Mod: CPTII,S$GLB,, | Performed by: FAMILY MEDICINE

## 2023-09-07 PROCEDURE — 3044F HG A1C LEVEL LT 7.0%: CPT | Mod: CPTII,S$GLB,, | Performed by: FAMILY MEDICINE

## 2023-09-07 PROCEDURE — 99213 OFFICE O/P EST LOW 20 MIN: CPT | Mod: S$GLB,,, | Performed by: FAMILY MEDICINE

## 2023-09-07 NOTE — PROGRESS NOTES
Subjective:      Patient ID: Divya Bui is a 63 y.o. female.    Chief Complaint: Hospital Follow Up      Patient here today for hospital discharge follow up. She was inpatient at [facility] from 8/27/23 to 8.29/23  for nausea and vomiting, was found to be hyponatremic, admitted for correction of the hyponatremia. Was discharged home and then returned to ED on 9/4/23 with episode of BRBPR.   Today she reports still feeling weak however tolerating PO and no longer vomiting.  Denies current abdominal pain, has not seen any further blood from rectum.  She did have colonoscopy in March of this year which noted internal hemorrhoids.      Review of Systems   Constitutional:  Positive for activity change and appetite change.   Respiratory:  Negative for shortness of breath.    Cardiovascular:  Negative for chest pain and leg swelling.   Gastrointestinal:  Negative for abdominal pain.   Neurological:  Positive for weakness.     Past Medical History:   Diagnosis Date    Anemia     Anxiety     Basal cell carcinoma (BCC) of face 2/26/2020    Blood transfusion     Bowel incontinence     Depression     Esophageal stricture     GERD (gastroesophageal reflux disease)     Hypertension     Latent tuberculosis by skin test 2001    took INH    Liver nodule 1/6/2014    Morbid obesity with BMI of 45.0-49.9, adult     OA (osteoarthritis) of knee 12/12/2014    Pericardial effusion 9/4/2014          Past Surgical History:   Procedure Laterality Date    CHOLECYSTECTOMY      COLONOSCOPY N/A 3/6/2023    Procedure: COLONOSCOPY;  Surgeon: Beti Diallo MD;  Location: [facility];  Service: Endoscopy;  Laterality: N/A;    GASTRIC BYPASS      HERNIA REPAIR      INJECTION OF ANESTHETIC AGENT AROUND NERVE Bilateral 6/16/2023    Procedure: Bilateral Genicular nerve block with RN IV sedation;  Surgeon: Denis Lai MD;  Location: [facility];  Service: Pain Management;  Laterality: Bilateral;    INJECTION OF ANESTHETIC AGENT AROUND  "NERVE Bilateral 8/11/2023    Procedure: Bilateral Genicular nerve block with RN IV sedation;  Surgeon: Denis Lai MD;  Location: [facility];  Service: Pain Management;  Laterality: Bilateral;    right sholder surgery      SMALL INTESTINE SURGERY       Family History   Problem Relation Age of Onset    Lung cancer Mother         smoker    Liver cancer Father     Diabetes Sister     Crohn's disease Sister     Liver cancer Sister     Diabetes Sister     Crohn's disease Brother     Crohn's disease Other     Breast cancer Neg Hx     Ovarian cancer Neg Hx     Colon cancer Neg Hx      Social History     Socioeconomic History    Marital status:    Tobacco Use    Smoking status: Never    Smokeless tobacco: Never   Substance and Sexual Activity    Alcohol use: Not Currently     Alcohol/week: 0.0 standard drinks of alcohol    Drug use: No    Sexual activity: Yes     Partners: Male     Review of patient's allergies indicates:   Allergen Reactions    Metronidazole hcl Other (See Comments)     Mouth ulcers developed with Flagyl  Oral sores.  Mouth ulcers developed with Flagyl       Objective:       /80 (BP Location: Right arm, Patient Position: Sitting, BP Method: Large (Manual))   Pulse 98   Temp 98.3 °F (36.8 °C) (Tympanic)   Ht 5' 2.5" (1.588 m)   Wt 105.7 kg (233 lb 0.4 oz)   SpO2 97%   BMI 41.94 kg/m²   Physical Exam  Vitals and nursing note reviewed.   Constitutional:       General: She is not in acute distress.     Appearance: She is well-developed. She is not diaphoretic.   Cardiovascular:      Rate and Rhythm: Normal rate and regular rhythm.      Heart sounds: Normal heart sounds.   Pulmonary:      Effort: Pulmonary effort is normal. No respiratory distress.      Breath sounds: Normal breath sounds.   Abdominal:      General: Bowel sounds are normal.      Palpations: Abdomen is soft.      Tenderness: There is no abdominal tenderness.   Psychiatric:         Behavior: Behavior normal. "       Assessment:     1. Hospital discharge follow-up    2. Hyponatremia    3. Nausea and vomiting, unspecified vomiting type    4. BRBPR (bright red blood per rectum)      Plan:   Hospital discharge follow-up    Hyponatremia  -     CBC Auto Differential; Future; Expected date: 09/07/2023  -     Basic Metabolic Panel; Future; Expected date: 09/07/2023    Nausea and vomiting, unspecified vomiting type  -     CBC Auto Differential; Future; Expected date: 09/07/2023  -     Basic Metabolic Panel; Future; Expected date: 09/07/2023    BRBPR (bright red blood per rectum)    Recheck labs next week  Continue all current medications  Medication List with Changes/Refills   Current Medications    ALPRAZOLAM (XANAX) 1 MG TABLET    Take 1 mg by mouth 3 (three) times daily as needed.    AMLODIPINE (NORVASC) 5 MG TABLET    Take 1 tablet (5 mg total) by mouth once daily.    ARIPIPRAZOLE (ABILIFY) 10 MG TAB    Take 10 mg by mouth once daily.    ASPIRIN 81 MG TAB    Take 1 tablet by mouth Daily.    ATORVASTATIN (LIPITOR) 40 MG TABLET    Take 1 tablet (40 mg total) by mouth once daily.    DICYCLOMINE (BENTYL) 20 MG TABLET    Take 1 tablet (20 mg total) by mouth 3 (three) times daily as needed (abdominal pain).    DOXEPIN (SINEQUAN) 50 MG CAPSULE    Take 50 mg by mouth nightly.    FLUOXETINE (PROZAC) 40 MG CAPSULE    Take 1 capsule (40 mg total) by mouth once daily.    FUROSEMIDE (LASIX) 20 MG TABLET    Take 1 tablet (20 mg total) by mouth daily as needed (swelling).    OLMESARTAN-HYDROCHLOROTHIAZIDE (BENICAR HCT) 40-12.5 MG TAB    Take 1 tablet by mouth once daily.    ONDANSETRON (ZOFRAN) 8 MG TABLET    Take 1 tablet (8 mg total) by mouth every 8 (eight) hours as needed for Nausea.    PREGABALIN (LYRICA) 75 MG CAPSULE    Take 1 capsule (75 mg total) by mouth every evening.    PROMETHAZINE (PHENERGAN) 25 MG TABLET    Take 1 tablet (25 mg total) by mouth every 6 (six) hours as needed for Nausea.    PROMETHAZINE (PHENERGAN) 25 MG TABLET     Take 1 tablet (25 mg total) by mouth every 6 (six) hours as needed for Nausea.    SUCRALFATE (CARAFATE) 1 GRAM TABLET    Take 1 tablet (1 g total) by mouth 4 (four) times daily.    URO--10-40.8-36 MG CAP    TAKE 1 CAPSULE BY MOUTH THREE TIMES DAILY AS NEEDED FOR BLADDER PAIN    ZOLPIDEM (AMBIEN) 10 MG TAB    Take 10 mg by mouth nightly as needed.

## 2023-09-08 LAB — O+P STL MICRO: NORMAL

## 2023-09-18 ENCOUNTER — TELEPHONE (OUTPATIENT)
Dept: INTERNAL MEDICINE | Facility: CLINIC | Age: 63
End: 2023-09-18
Payer: MEDICARE

## 2023-09-18 NOTE — TELEPHONE ENCOUNTER
----- Message from Trever Fox sent at 9/18/2023  3:21 PM CDT -----  Contact: patient  Type:  Sooner Apoointment Request    Caller is requesting a sooner appointment.  Caller declined first available appointment listed below.  Caller will not accept being placed on the waitlist and is requesting a message be sent to doctor.  Name of Caller:Divya Bui   When is the first available appointment? 9/21/23   Symptoms: hospital f/u, feeling weak   Would the patient rather a call back or a [facility]?  Call back   Best Call Back Number: [phone]  Additional Information: if possible the pt would like to be seen on tomorrow.

## 2023-10-09 ENCOUNTER — TELEPHONE (OUTPATIENT)
Dept: PAIN MEDICINE | Facility: CLINIC | Age: 63
End: 2023-10-09
Payer: MEDICARE

## 2023-10-09 ENCOUNTER — OFFICE VISIT (OUTPATIENT)
Dept: PAIN MEDICINE | Facility: CLINIC | Age: 63
End: 2023-10-09
Payer: MEDICARE

## 2023-10-09 DIAGNOSIS — M25.561 CHRONIC PAIN OF BOTH KNEES: Primary | ICD-10-CM

## 2023-10-09 DIAGNOSIS — G89.29 CHRONIC PAIN OF BOTH KNEES: Primary | ICD-10-CM

## 2023-10-09 DIAGNOSIS — Z96.653 HISTORY OF BILATERAL KNEE ARTHROPLASTY: ICD-10-CM

## 2023-10-09 DIAGNOSIS — M25.562 CHRONIC PAIN OF BOTH KNEES: Primary | ICD-10-CM

## 2023-10-09 PROCEDURE — 1160F PR REVIEW ALL MEDS BY PRESCRIBER/CLIN PHARMACIST DOCUMENTED: ICD-10-PCS | Mod: CPTII,95,, | Performed by: ANESTHESIOLOGY

## 2023-10-09 PROCEDURE — 99214 OFFICE O/P EST MOD 30 MIN: CPT | Mod: 95,,, | Performed by: ANESTHESIOLOGY

## 2023-10-09 PROCEDURE — 1159F MED LIST DOCD IN RCRD: CPT | Mod: CPTII,95,, | Performed by: ANESTHESIOLOGY

## 2023-10-09 PROCEDURE — 3044F HG A1C LEVEL LT 7.0%: CPT | Mod: CPTII,95,, | Performed by: ANESTHESIOLOGY

## 2023-10-09 PROCEDURE — 1160F RVW MEDS BY RX/DR IN RCRD: CPT | Mod: CPTII,95,, | Performed by: ANESTHESIOLOGY

## 2023-10-09 PROCEDURE — 3044F PR MOST RECENT HEMOGLOBIN A1C LEVEL <7.0%: ICD-10-PCS | Mod: CPTII,95,, | Performed by: ANESTHESIOLOGY

## 2023-10-09 PROCEDURE — 1159F PR MEDICATION LIST DOCUMENTED IN MEDICAL RECORD: ICD-10-PCS | Mod: CPTII,95,, | Performed by: ANESTHESIOLOGY

## 2023-10-09 PROCEDURE — 99214 PR OFFICE/OUTPT VISIT, EST, LEVL IV, 30-39 MIN: ICD-10-PCS | Mod: 95,,, | Performed by: ANESTHESIOLOGY

## 2023-10-09 NOTE — PROGRESS NOTES
Established Patient Chronic Pain Note     The patient location is: home  The chief complaint leading to consultation is: knee pain  Visit type: Virtual visit with synchronous audio and video  Total time spent with patient: 11-15m  Each patient to whom he or she provides medical services by telemedicine is: (1) informed of the relationship between the physician and patient and the respective role of any other health care provider with respect to management of the patient; and (2) notified that he or she may decline to receive medical services by telemedicine and may withdraw from such care at any time.    Referring Physician: No ref. provider found    PCP: Angel Khan MD    Chief Complaint:   Knee pain      SUBJECTIVE:  Interval history 10/03/2023  Patient presents s/p bilateral genicular injection 08/11/2023.  Patient reports approximately 90% relief lasting one-month in duration following her bilateral genicular procedure.  Today she reports pain is insidiously returned.  Pain is intermittent and today is rated an 8/10.  Pain is described as sharp in nature along the suprapatellar aspect of both knees.  Pain is exacerbated with knee flexion/extension and standing and with ambulation.  Patient has continued Lyrica 75 mg in the evening.  Patient has continued physician directed physical therapy exercises at home over the last 8 weeks with marginal improvement in pain, range of motion and functionality.    Interval history 07/27/2023   Patient presents status post bilateral genicular nerve block 06/16/2023.  Patient reports 90% relief lasting approximately 4 weeks following her procedure.  Today she reports pain has insidiously returned in his 95% back to baseline.  Pain is intermittent and today is rated 7/10.  Patient reports pain is described as sharp in nature and is along the suprapatellar aspect of both knees.  Pain is exacerbated with knee flexion, extension and with standing and with ambulation.   Patient has continued Lyrica 75 mg in the evening with significant somnolence.  She is continued physician directed physical therapy exercises at home over the last 6 weeks without meaningful improvement in her pain.    HPI 06/01/2023  Divya Bui is a 63 y.o. female with past medical history significant for anxiety/depression, hypertension, hearing loss, stage 3 chronic kidney disease, morbid obesity status post bariatric surgery, diverticulosis, GERD, osteoarthritis/osteopenia, status post right total knee arthroplasty, cerebrovascular accident who presents to the clinic for the evaluation of bilateral knee pain.  Patient reports pain has been present for over 5+ years without inciting accident injury or trauma.  Patient reports receiving prior total knee arthroplasty, approximately 5-6 years prior with  at [facility].  Patient reports minimal, approximately 30% relief following knee replacement.  Today she reports pain which is constant which is rated an 8/10.  Pain at its best is a 7/10 and at its worse is a 10/10.  Pain is described as sharp in nature and predominantly over the suprapatellar aspect of both knees.  Pain is exacerbated with prolonged standing and walking.  She does report significant weakness in the lower extremities associated with her pain.  Patient particularly mentions difficulty stepping up on occur or stepping off of a curb without requiring assistance.  Pain is marginally improved with Advil which she takes up to 4 pills daily.  Patient reports she is completed aquatic therapy in the past and has continued physician directed physical therapy exercises at home over the last 6 weeks without meaningful improvement in her pain.    Patient reports significant motor weakness.  Patient denies night fever/night sweats, urinary incontinence, bowel incontinence, significant weight loss, and loss of sensations.      Pain Disability Index Review:         7/27/2023    12:45 PM 6/1/2023      9:10 AM   Last 3 PDI Scores   Pain Disability Index (PDI) 43 45       Non-Pharmacologic Treatments:  Physical Therapy/Home Exercise: yes  Ice/Heat:yes  TENS: no  Acupuncture: no  Massage: no  Chiropractic: no    Other: no      Pain Medications:  - Adjuvant Medications: Ambien (Zolpidem) and Xanax (Alprazolam)  - Anti-Coagulants: Aspirin    Pain Procedures:   -08/11/2023: Bilateral genicular block  -06/16/2023: Bilateral genicular nerve block    Past Medical History:   Diagnosis Date    Anemia     Anxiety     Basal cell carcinoma (BCC) of face 2/26/2020    Blood transfusion     Bowel incontinence     Depression     Esophageal stricture     GERD (gastroesophageal reflux disease)     Hypertension     Latent tuberculosis by skin test 2001    took INH    Liver nodule 1/6/2014    Morbid obesity with BMI of 45.0-49.9, adult     OA (osteoarthritis) of knee 12/12/2014    Pericardial effusion 9/4/2014     Past Surgical History:   Procedure Laterality Date    CHOLECYSTECTOMY      COLONOSCOPY N/A 3/6/2023    Procedure: COLONOSCOPY;  Surgeon: Beti Diallo MD;  Location: [facility];  Service: Endoscopy;  Laterality: N/A;    GASTRIC BYPASS      HERNIA REPAIR      INJECTION OF ANESTHETIC AGENT AROUND NERVE Bilateral 6/16/2023    Procedure: Bilateral Genicular nerve block with RN IV sedation;  Surgeon: Denis Lai MD;  Location: [facility];  Service: Pain Management;  Laterality: Bilateral;    INJECTION OF ANESTHETIC AGENT AROUND NERVE Bilateral 8/11/2023    Procedure: Bilateral Genicular nerve block with RN IV sedation;  Surgeon: Denis Lai MD;  Location: [facility];  Service: Pain Management;  Laterality: Bilateral;    right sholder surgery      SMALL INTESTINE SURGERY       Review of patient's allergies indicates:   Allergen Reactions    Metronidazole hcl Other (See Comments)     Mouth ulcers developed with Flagyl  Oral sores.  Mouth ulcers developed with Flagyl       Current Outpatient Medications    Medication Sig    ALPRAZolam (XANAX) 1 MG tablet Take 1 mg by mouth 3 (three) times daily as needed.    amLODIPine (NORVASC) 5 MG tablet Take 1 tablet (5 mg total) by mouth once daily.    ARIPiprazole (ABILIFY) 10 MG Tab Take 10 mg by mouth once daily.    aspirin 81 mg Tab Take 1 tablet by mouth Daily.    atorvastatin (LIPITOR) 40 MG tablet Take 1 tablet (40 mg total) by mouth once daily.    dicyclomine (BENTYL) 20 mg tablet Take 1 tablet (20 mg total) by mouth 3 (three) times daily as needed (abdominal pain).    doxepin (SINEQUAN) 50 MG capsule Take 50 mg by mouth nightly.    fluoxetine (PROZAC) 40 MG capsule Take 1 capsule (40 mg total) by mouth once daily.    furosemide (LASIX) 20 MG tablet Take 1 tablet (20 mg total) by mouth daily as needed (swelling). (Patient not taking: Reported on 9/7/2023)    olmesartan-hydrochlorothiazide (BENICAR HCT) 40-12.5 mg Tab Take 1 tablet by mouth once daily.    ondansetron (ZOFRAN) 8 MG tablet Take 1 tablet (8 mg total) by mouth every 8 (eight) hours as needed for Nausea. (Patient not taking: Reported on 9/7/2023)    pregabalin (LYRICA) 75 MG capsule Take 1 capsule (75 mg total) by mouth every evening. (Patient not taking: Reported on 9/7/2023)    promethazine (PHENERGAN) 25 MG tablet Take 1 tablet (25 mg total) by mouth every 6 (six) hours as needed for Nausea.    promethazine (PHENERGAN) 25 MG tablet Take 1 tablet (25 mg total) by mouth every 6 (six) hours as needed for Nausea. (Patient not taking: Reported on 9/7/2023)    sucralfate (CARAFATE) 1 gram tablet Take 1 tablet (1 g total) by mouth 4 (four) times daily. (Patient not taking: Reported on 9/7/2023)    URO--10-40.8-36 mg Cap TAKE 1 CAPSULE BY MOUTH THREE TIMES DAILY AS NEEDED FOR BLADDER PAIN (Patient not taking: Reported on 9/7/2023)    zolpidem (AMBIEN) 10 mg Tab Take 10 mg by mouth nightly as needed.     No current facility-administered medications for this visit.       Review of Systems     GENERAL:  No  weight loss, malaise or fevers.  HEENT:   No recent changes in vision or hearing  NECK:  Negative for lumps, no difficulty with swallowing.  RESPIRATORY:  Negative for cough, wheezing or shortness of breath, patient denies any recent URI.  CARDIOVASCULAR:  Negative for chest pain or palpitations.  GI:  Negative for abdominal discomfort, blood in stools or black stools or change in bowel habits.  MUSCULOSKELETAL:  See HPI.  SKIN:  Negative for lesions, rash, and itching.  PSYCH:  No mood disorder or recent psychosocial stressors.   HEMATOLOGY/LYMPHOLOGY:  Negative for prolonged bleeding, bruising easily or swollen nodes.    NEURO:   No history of syncope, paralysis, seizures or tremors.  All other reviewed and negative other than HPI.    OBJECTIVE:    There were no vitals taken for this visit.      Physical Exam    GENERAL: Well appearing, in no acute distress, alert and oriented x3.Obese  PSYCH:  Mood and affect appropriate.  SKIN: Skin color, texture, turgor normal, no rashes or lesions.  HEAD/FACE:  Normocephalic, atraumatic. Cranial nerves grossly intact.      CV: RRR with palpation of the radial artery.  PULM: No evidence of respiratory difficulty, symmetric chest rise.  GI:  Soft and non-tender.    BACK: Straight leg raising in the sitting and supine positions is negative to radicular pain.  pain to palpation over the facet joints of the lumbar spine or spinous processes. Normal range of motion without pain reproduction.  EXTREMITIES:  peripheral joint range of motion is reduced with instability in bilateral lower extremities.. No deformities, edema, or skin discoloration. Good capillary refill.  MUSCULOSKELETAL: Unable to stand on heels & toes.   hip provocative maneuvers are negative.      Knee Exam:  bilateral    Erythema: Absent  Deformity/Swelling: Absent  Effusion: Absent  Chronic Bony Changes: Absent  Creptius: Absent     milddiffuse tenderness palpation of the mediolateral joint lines and patella      ROM: full range with pain     Strength is 5/5 bilateral     Meniscus   Brennan:  Medial - negative Lateral - negative    Stability Lachman: normal  PCL-Posterior Drawer: normal MCL - Valgus: normal LCL - Varus: normal     Patella   Patellar apprehension: negative  Patellar Tracking: normal     Neurovascular intact      Facet loading test is negative bilaterally.   Bilateral upper and lower extremity strength is normal and symmetric.  No atrophy or tone abnormalities are noted.    RIGHT Lower extremity: Hip flexion 5/5, Hip Abduction 5/5, Hip Adduction 5/5, Knee extension 5/5, Knee flexion 5/5, Ankle dorsiflexion5/5, Extensor hallucis longus 5/5, Ankle plantarflexion 5/5  LEFT Lower extremity:  Hip flexion 5/5, Hip Abduction 5/5,Hip Adduction 5/5, Knee extension 5/5, Knee flexion 5/5, Ankle dorsiflexion 5/5, Extensor hallucis longus 5/5, Ankle plantarflexion 5/5  -Normal testing knee (patellar) jerk and ankle (achilles) jerk    NEURO: Bilateral upper and lower extremity coordination and muscle stretch reflexes are physiologic and symmetric. No loss of sensation is noted.  GAIT: normal.    Imaging:   Bilateral knee x-ray 01/10/2023  FINDINGS:  Bilateral total knee arthroplasties are noted.  No hardware failure or loosening.  No periprosthetic fracture.  No joint effusions are suggested.    09/27/21    X-Ray Lumbar Spine Ap And Lateral    Narrative  EXAMINATION:  XR LUMBAR SPINE AP AND LATERAL    CLINICAL HISTORY:  XR LUMBAR SPINE AP AND LATERAL    COMPARISON:  None    FINDINGS:  Multiple radiographic views  were obtained.    No evidence of acute fracture or dislocation.  Mild degenerative joint disease.  Postsurgical changes of prior cholecystectomy.  No spondylolisthesis      ASSESSMENT: 63 y.o. year old female with bilateral knee pain, consistent with     1. Chronic pain of both knees  IR Ablation Neurolytic Agent_Other Peripheral Nerve Unilateral    Case Request-RAD/Other Procedure Area: Left Genicular Nerve RFA  with RN IV sedation    Case Request-RAD/Other Procedure Area: Right Genicular Nerve RFA with RN IV sedation      2. History of bilateral knee arthroplasty  IR Ablation Neurolytic Agent_Other Peripheral Nerve Unilateral    Case Request-RAD/Other Procedure Area: Left Genicular Nerve RFA with RN IV sedation    Case Request-RAD/Other Procedure Area: Right Genicular Nerve RFA with RN IV sedation            PLAN:   - Interventions:  Patient obtained 90% relief lasting for one-month following  genicular block x2 (06/16/2023, 08/11/2023).  Schedule for left-sided genicular radiofrequency ablation, followed by right-sided 2 weeks following for more sustained relief. Explained the risks and benefits of the procedure in detail with the patient today in clinic along with alternative treatment options, and the patient elected to pursue the intervention at this time.      - Anticoagulation use: Yes aspirin  Per GARCÍA guidelines, patient can continue aspirin for her genicular  radiofrequency ablation     report:  Reviewed and consistent with medication use as prescribed.    - Medications:  --Continue Lyrica.  We discussed potential side effects of this medication which may include drowsiness,dizziness, dry mouth, constipation or peripheral edema.  -75 mg q.h.s.  -90 day supply sent in prior      - Therapy:   We discussed continuing physical therapy to help manage the patient/s painful condition. The patient was counseled that muscle strengthening will improve the long term prognosis in regards to pain and may also help increase range of motion and mobility. They were told that one of the goals of physical therapy is that they learn how to do the exercises so that they can do them independently at home daily upon completion. The patient's questions were answered and they were agreeable to this course. A referral for [facility] physical therapy was provided to the patient.    - Imaging: Reviewed available imaging with patient and  answered any questions they had regarding study.    - Records: Obtain old records from outside physicians and imaging:    - Follow up visit: return to clinic in 4-6 weeks post bilateral genicular RFA      The above plan and management options were discussed at length with patient. Patient is in agreement with the above and verbalized understanding.    - I discussed the goals of interventional chronic pain management with the patient on today's visit. We discussed a multimodal and systematic approach to pain.  This includes diagnostic and therapeutic injections, adjuvant pharmacologic treatment, physical therapy, and at times psychiatry.  I emphasized the importance of regular exercise, core strengthening and stretching, diet and weight loss as a cornerstone of long-term pain management.    - This condition does not require this patient to take time off of work, and the primary goal of our Pain Management services is to improve the patient's functional capacity.  - Patient Questions: Answered all of the patient's questions regarding diagnoses, therapy, treatment and next steps        Denis Lai MD  Interventional Pain Management  [facility]    Disclaimer:  This note was prepared using voice recognition system and is likely to have sound alike errors that may have been overlooked even after proof reading.  Please call me with any questions

## 2023-10-09 NOTE — H&P (VIEW-ONLY)
Established Patient Chronic Pain Note     The patient location is: home  The chief complaint leading to consultation is: knee pain  Visit type: Virtual visit with synchronous audio and video  Total time spent with patient: 11-15m  Each patient to whom he or she provides medical services by telemedicine is: (1) informed of the relationship between the physician and patient and the respective role of any other health care provider with respect to management of the patient; and (2) notified that he or she may decline to receive medical services by telemedicine and may withdraw from such care at any time.    Referring Physician: No ref. provider found    PCP: Angel Khan MD    Chief Complaint:   Knee pain      SUBJECTIVE:  Interval history 10/03/2023  Patient presents s/p bilateral genicular injection 08/11/2023.  Patient reports approximately 90% relief lasting one-month in duration following her bilateral genicular procedure.  Today she reports pain is insidiously returned.  Pain is intermittent and today is rated an 8/10.  Pain is described as sharp in nature along the suprapatellar aspect of both knees.  Pain is exacerbated with knee flexion/extension and standing and with ambulation.  Patient has continued Lyrica 75 mg in the evening.  Patient has continued physician directed physical therapy exercises at home over the last 8 weeks with marginal improvement in pain, range of motion and functionality.    Interval history 07/27/2023   Patient presents status post bilateral genicular nerve block 06/16/2023.  Patient reports 90% relief lasting approximately 4 weeks following her procedure.  Today she reports pain has insidiously returned in his 95% back to baseline.  Pain is intermittent and today is rated 7/10.  Patient reports pain is described as sharp in nature and is along the suprapatellar aspect of both knees.  Pain is exacerbated with knee flexion, extension and with standing and with ambulation.   Patient has continued Lyrica 75 mg in the evening with significant somnolence.  She is continued physician directed physical therapy exercises at home over the last 6 weeks without meaningful improvement in her pain.    HPI 06/01/2023  Divya Bui is a 63 y.o. female with past medical history significant for anxiety/depression, hypertension, hearing loss, stage 3 chronic kidney disease, morbid obesity status post bariatric surgery, diverticulosis, GERD, osteoarthritis/osteopenia, status post right total knee arthroplasty, cerebrovascular accident who presents to the clinic for the evaluation of bilateral knee pain.  Patient reports pain has been present for over 5+ years without inciting accident injury or trauma.  Patient reports receiving prior total knee arthroplasty, approximately 5-6 years prior with  at [facility].  Patient reports minimal, approximately 30% relief following knee replacement.  Today she reports pain which is constant which is rated an 8/10.  Pain at its best is a 7/10 and at its worse is a 10/10.  Pain is described as sharp in nature and predominantly over the suprapatellar aspect of both knees.  Pain is exacerbated with prolonged standing and walking.  She does report significant weakness in the lower extremities associated with her pain.  Patient particularly mentions difficulty stepping up on occur or stepping off of a curb without requiring assistance.  Pain is marginally improved with Advil which she takes up to 4 pills daily.  Patient reports she is completed aquatic therapy in the past and has continued physician directed physical therapy exercises at home over the last 6 weeks without meaningful improvement in her pain.    Patient reports significant motor weakness.  Patient denies night fever/night sweats, urinary incontinence, bowel incontinence, significant weight loss, and loss of sensations.      Pain Disability Index Review:         7/27/2023    12:45 PM 6/1/2023      9:10 AM   Last 3 PDI Scores   Pain Disability Index (PDI) 43 45       Non-Pharmacologic Treatments:  Physical Therapy/Home Exercise: yes  Ice/Heat:yes  TENS: no  Acupuncture: no  Massage: no  Chiropractic: no    Other: no      Pain Medications:  - Adjuvant Medications: Ambien (Zolpidem) and Xanax (Alprazolam)  - Anti-Coagulants: Aspirin    Pain Procedures:   -08/11/2023: Bilateral genicular block  -06/16/2023: Bilateral genicular nerve block    Past Medical History:   Diagnosis Date    Anemia     Anxiety     Basal cell carcinoma (BCC) of face 2/26/2020    Blood transfusion     Bowel incontinence     Depression     Esophageal stricture     GERD (gastroesophageal reflux disease)     Hypertension     Latent tuberculosis by skin test 2001    took INH    Liver nodule 1/6/2014    Morbid obesity with BMI of 45.0-49.9, adult     OA (osteoarthritis) of knee 12/12/2014    Pericardial effusion 9/4/2014     Past Surgical History:   Procedure Laterality Date    CHOLECYSTECTOMY      COLONOSCOPY N/A 3/6/2023    Procedure: COLONOSCOPY;  Surgeon: Beti Diallo MD;  Location: [facility];  Service: Endoscopy;  Laterality: N/A;    GASTRIC BYPASS      HERNIA REPAIR      INJECTION OF ANESTHETIC AGENT AROUND NERVE Bilateral 6/16/2023    Procedure: Bilateral Genicular nerve block with RN IV sedation;  Surgeon: Denis Lai MD;  Location: [facility];  Service: Pain Management;  Laterality: Bilateral;    INJECTION OF ANESTHETIC AGENT AROUND NERVE Bilateral 8/11/2023    Procedure: Bilateral Genicular nerve block with RN IV sedation;  Surgeon: Denis Lai MD;  Location: [facility];  Service: Pain Management;  Laterality: Bilateral;    right sholder surgery      SMALL INTESTINE SURGERY       Review of patient's allergies indicates:   Allergen Reactions    Metronidazole hcl Other (See Comments)     Mouth ulcers developed with Flagyl  Oral sores.  Mouth ulcers developed with Flagyl       Current Outpatient Medications    Medication Sig    ALPRAZolam (XANAX) 1 MG tablet Take 1 mg by mouth 3 (three) times daily as needed.    amLODIPine (NORVASC) 5 MG tablet Take 1 tablet (5 mg total) by mouth once daily.    ARIPiprazole (ABILIFY) 10 MG Tab Take 10 mg by mouth once daily.    aspirin 81 mg Tab Take 1 tablet by mouth Daily.    atorvastatin (LIPITOR) 40 MG tablet Take 1 tablet (40 mg total) by mouth once daily.    dicyclomine (BENTYL) 20 mg tablet Take 1 tablet (20 mg total) by mouth 3 (three) times daily as needed (abdominal pain).    doxepin (SINEQUAN) 50 MG capsule Take 50 mg by mouth nightly.    fluoxetine (PROZAC) 40 MG capsule Take 1 capsule (40 mg total) by mouth once daily.    furosemide (LASIX) 20 MG tablet Take 1 tablet (20 mg total) by mouth daily as needed (swelling). (Patient not taking: Reported on 9/7/2023)    olmesartan-hydrochlorothiazide (BENICAR HCT) 40-12.5 mg Tab Take 1 tablet by mouth once daily.    ondansetron (ZOFRAN) 8 MG tablet Take 1 tablet (8 mg total) by mouth every 8 (eight) hours as needed for Nausea. (Patient not taking: Reported on 9/7/2023)    pregabalin (LYRICA) 75 MG capsule Take 1 capsule (75 mg total) by mouth every evening. (Patient not taking: Reported on 9/7/2023)    promethazine (PHENERGAN) 25 MG tablet Take 1 tablet (25 mg total) by mouth every 6 (six) hours as needed for Nausea.    promethazine (PHENERGAN) 25 MG tablet Take 1 tablet (25 mg total) by mouth every 6 (six) hours as needed for Nausea. (Patient not taking: Reported on 9/7/2023)    sucralfate (CARAFATE) 1 gram tablet Take 1 tablet (1 g total) by mouth 4 (four) times daily. (Patient not taking: Reported on 9/7/2023)    URO--10-40.8-36 mg Cap TAKE 1 CAPSULE BY MOUTH THREE TIMES DAILY AS NEEDED FOR BLADDER PAIN (Patient not taking: Reported on 9/7/2023)    zolpidem (AMBIEN) 10 mg Tab Take 10 mg by mouth nightly as needed.     No current facility-administered medications for this visit.       Review of Systems     GENERAL:  No  weight loss, malaise or fevers.  HEENT:   No recent changes in vision or hearing  NECK:  Negative for lumps, no difficulty with swallowing.  RESPIRATORY:  Negative for cough, wheezing or shortness of breath, patient denies any recent URI.  CARDIOVASCULAR:  Negative for chest pain or palpitations.  GI:  Negative for abdominal discomfort, blood in stools or black stools or change in bowel habits.  MUSCULOSKELETAL:  See HPI.  SKIN:  Negative for lesions, rash, and itching.  PSYCH:  No mood disorder or recent psychosocial stressors.   HEMATOLOGY/LYMPHOLOGY:  Negative for prolonged bleeding, bruising easily or swollen nodes.    NEURO:   No history of syncope, paralysis, seizures or tremors.  All other reviewed and negative other than HPI.    OBJECTIVE:    There were no vitals taken for this visit.      Physical Exam    GENERAL: Well appearing, in no acute distress, alert and oriented x3.Obese  PSYCH:  Mood and affect appropriate.  SKIN: Skin color, texture, turgor normal, no rashes or lesions.  HEAD/FACE:  Normocephalic, atraumatic. Cranial nerves grossly intact.      CV: RRR with palpation of the radial artery.  PULM: No evidence of respiratory difficulty, symmetric chest rise.  GI:  Soft and non-tender.    BACK: Straight leg raising in the sitting and supine positions is negative to radicular pain.  pain to palpation over the facet joints of the lumbar spine or spinous processes. Normal range of motion without pain reproduction.  EXTREMITIES:  peripheral joint range of motion is reduced with instability in bilateral lower extremities.. No deformities, edema, or skin discoloration. Good capillary refill.  MUSCULOSKELETAL: Unable to stand on heels & toes.   hip provocative maneuvers are negative.      Knee Exam:  bilateral    Erythema: Absent  Deformity/Swelling: Absent  Effusion: Absent  Chronic Bony Changes: Absent  Creptius: Absent     milddiffuse tenderness palpation of the mediolateral joint lines and patella      ROM: full range with pain     Strength is 5/5 bilateral     Meniscus   Brennan:  Medial - negative Lateral - negative    Stability Lachman: normal  PCL-Posterior Drawer: normal MCL - Valgus: normal LCL - Varus: normal     Patella   Patellar apprehension: negative  Patellar Tracking: normal     Neurovascular intact      Facet loading test is negative bilaterally.   Bilateral upper and lower extremity strength is normal and symmetric.  No atrophy or tone abnormalities are noted.    RIGHT Lower extremity: Hip flexion 5/5, Hip Abduction 5/5, Hip Adduction 5/5, Knee extension 5/5, Knee flexion 5/5, Ankle dorsiflexion5/5, Extensor hallucis longus 5/5, Ankle plantarflexion 5/5  LEFT Lower extremity:  Hip flexion 5/5, Hip Abduction 5/5,Hip Adduction 5/5, Knee extension 5/5, Knee flexion 5/5, Ankle dorsiflexion 5/5, Extensor hallucis longus 5/5, Ankle plantarflexion 5/5  -Normal testing knee (patellar) jerk and ankle (achilles) jerk    NEURO: Bilateral upper and lower extremity coordination and muscle stretch reflexes are physiologic and symmetric. No loss of sensation is noted.  GAIT: normal.    Imaging:   Bilateral knee x-ray 01/10/2023  FINDINGS:  Bilateral total knee arthroplasties are noted.  No hardware failure or loosening.  No periprosthetic fracture.  No joint effusions are suggested.    09/27/21    X-Ray Lumbar Spine Ap And Lateral    Narrative  EXAMINATION:  XR LUMBAR SPINE AP AND LATERAL    CLINICAL HISTORY:  XR LUMBAR SPINE AP AND LATERAL    COMPARISON:  None    FINDINGS:  Multiple radiographic views  were obtained.    No evidence of acute fracture or dislocation.  Mild degenerative joint disease.  Postsurgical changes of prior cholecystectomy.  No spondylolisthesis      ASSESSMENT: 63 y.o. year old female with bilateral knee pain, consistent with     1. Chronic pain of both knees  IR Ablation Neurolytic Agent_Other Peripheral Nerve Unilateral    Case Request-RAD/Other Procedure Area: Left Genicular Nerve RFA  with RN IV sedation    Case Request-RAD/Other Procedure Area: Right Genicular Nerve RFA with RN IV sedation      2. History of bilateral knee arthroplasty  IR Ablation Neurolytic Agent_Other Peripheral Nerve Unilateral    Case Request-RAD/Other Procedure Area: Left Genicular Nerve RFA with RN IV sedation    Case Request-RAD/Other Procedure Area: Right Genicular Nerve RFA with RN IV sedation            PLAN:   - Interventions:  Patient obtained 90% relief lasting for one-month following  genicular block x2 (06/16/2023, 08/11/2023).  Schedule for left-sided genicular radiofrequency ablation, followed by right-sided 2 weeks following for more sustained relief. Explained the risks and benefits of the procedure in detail with the patient today in clinic along with alternative treatment options, and the patient elected to pursue the intervention at this time.      - Anticoagulation use: Yes aspirin  Per GARCÍA guidelines, patient can continue aspirin for her genicular  radiofrequency ablation     report:  Reviewed and consistent with medication use as prescribed.    - Medications:  --Continue Lyrica.  We discussed potential side effects of this medication which may include drowsiness,dizziness, dry mouth, constipation or peripheral edema.  -75 mg q.h.s.  -90 day supply sent in prior      - Therapy:   We discussed continuing physical therapy to help manage the patient/s painful condition. The patient was counseled that muscle strengthening will improve the long term prognosis in regards to pain and may also help increase range of motion and mobility. They were told that one of the goals of physical therapy is that they learn how to do the exercises so that they can do them independently at home daily upon completion. The patient's questions were answered and they were agreeable to this course. A referral for [facility] physical therapy was provided to the patient.    - Imaging: Reviewed available imaging with patient and  answered any questions they had regarding study.    - Records: Obtain old records from outside physicians and imaging:    - Follow up visit: return to clinic in 4-6 weeks post bilateral genicular RFA      The above plan and management options were discussed at length with patient. Patient is in agreement with the above and verbalized understanding.    - I discussed the goals of interventional chronic pain management with the patient on today's visit. We discussed a multimodal and systematic approach to pain.  This includes diagnostic and therapeutic injections, adjuvant pharmacologic treatment, physical therapy, and at times psychiatry.  I emphasized the importance of regular exercise, core strengthening and stretching, diet and weight loss as a cornerstone of long-term pain management.    - This condition does not require this patient to take time off of work, and the primary goal of our Pain Management services is to improve the patient's functional capacity.  - Patient Questions: Answered all of the patient's questions regarding diagnoses, therapy, treatment and next steps        Denis Lai MD  Interventional Pain Management  [facility]    Disclaimer:  This note was prepared using voice recognition system and is likely to have sound alike errors that may have been overlooked even after proof reading.  Please call me with any questions

## 2023-10-09 NOTE — TELEPHONE ENCOUNTER
Attempt to call patient to confirm appointment. Patent did not answer, Left message on patients voice mail to call back at earliest convenience to confirm or reschedule p.t apt.    Called to offer pt a virtual

## 2023-10-19 NOTE — PRE-PROCEDURE INSTRUCTIONS
Spoke with patient regarding procedure scheduled on 10.31     Arrival time 0700     Has patient been sick with fever or on antibiotics within the last 7 days? No     Does the patient have any open wounds, sores or rashes? No     Does the patient have any recent fractures? no     Has patient received a vaccination within the last 7 days? No     Received the COVID vaccination?      Has the patient stopped all medications as directed? na     Does patient have a pacemaker, defibrillator, or implantable stimulator? No     Does the patient have a ride to and from procedure and someone reliable to remain with patient?  son     Is the patient diabetic? no     Does the patient have sleep apnea? Or use O2 at home? No and no     Is the patient receiving sedation?      Is the patient instructed to remain NPO beginning at midnight the night before their procedure? yes     Procedure location confirmed with patient? Yes     Covid- Denies signs/symptoms. Instructed to notify PAT/MD if any changes.

## 2023-10-23 ENCOUNTER — TELEPHONE (OUTPATIENT)
Dept: PAIN MEDICINE | Facility: CLINIC | Age: 63
End: 2023-10-23
Payer: MEDICARE

## 2023-10-23 NOTE — TELEPHONE ENCOUNTER
----- Message from Juanita Garcia sent at 10/23/2023  8:41 AM CDT -----  States she has some questions regarding an upcoming procedure. She would like the nurse to give her a call. Please call pt [phone]. Thank you

## 2023-10-23 NOTE — TELEPHONE ENCOUNTER
Reach out to pt from the messages in regard of her procedure. Unknown the reason. Pt did not answer left v.m

## 2023-10-23 NOTE — TELEPHONE ENCOUNTER
----- Message from Meir Jean Baptiste sent at 10/23/2023 11:00 AM CDT -----  Contact: EVERETT  .Type:  Patient Returning Call    Who Called: Everett  Who Left Message for Patient: nurse   Does the patient know what this is regarding?: yes   Would the patient rather a call back or a response via [facility]?  Call   Best Call Back Number: [phone]

## 2023-10-30 ENCOUNTER — TELEPHONE (OUTPATIENT)
Dept: PAIN MEDICINE | Facility: CLINIC | Age: 63
End: 2023-10-30
Payer: MEDICARE

## 2023-10-30 NOTE — TELEPHONE ENCOUNTER
----- Message from Arthur Castro sent at 10/30/2023  9:42 AM CDT -----  Contact: katie  Patient stated she received a call about upcoming procedure. Please call her back at [phone].      Thanks  DD

## 2023-10-30 NOTE — TELEPHONE ENCOUNTER
Returned pt call regarding upcoming procedures. Pt is wanting to know about post op care and recovery time. Informed pt that she may be sore the day of the procedure but can return to regular activities as soon are the next day. It can take up to 4 weeks for the injection to reach full effectiveness. We recommend the use of over the counter medications. You can alternated between tylenol (do not exceed 3 grams a day) and ibuprofen every 4-6 hours. We also recommend applying heat/ice to the injections site to help with pain and inflammation. Pt verbalized understanding. All questions answered.

## 2023-10-31 ENCOUNTER — HOSPITAL ENCOUNTER (OUTPATIENT)
Facility: HOSPITAL | Age: 63
Discharge: HOME | End: 2023-10-31
Attending: ANESTHESIOLOGY | Admitting: ANESTHESIOLOGY
Payer: MEDICARE

## 2023-10-31 VITALS
HEART RATE: 86 BPM | OXYGEN SATURATION: 99 % | TEMPERATURE: 98 F | DIASTOLIC BLOOD PRESSURE: 84 MMHG | SYSTOLIC BLOOD PRESSURE: 153 MMHG | RESPIRATION RATE: 16 BRPM

## 2023-10-31 DIAGNOSIS — M17.11 OSTEOARTHRITIS OF RIGHT KNEE: ICD-10-CM

## 2023-10-31 PROCEDURE — 63600175 PHARM REV CODE 636 W HCPCS: Performed by: ANESTHESIOLOGY

## 2023-10-31 PROCEDURE — 64624 DSTRJ NULYT AGT GNCLR NRV: CPT | Mod: RT,,, | Performed by: ANESTHESIOLOGY

## 2023-10-31 PROCEDURE — 64624 DSTRJ NULYT AGT GNCLR NRV: CPT | Mod: RT | Performed by: ANESTHESIOLOGY

## 2023-10-31 PROCEDURE — 25000003 PHARM REV CODE 250: Performed by: ANESTHESIOLOGY

## 2023-10-31 PROCEDURE — 64624 PR DESTRUCT, NEUROLYTIC AGENT, GENICULAR NERVE, W/IMG: ICD-10-PCS | Mod: RT,,, | Performed by: ANESTHESIOLOGY

## 2023-10-31 RX ORDER — INDOMETHACIN 25 MG/1
CAPSULE ORAL
Status: DISCONTINUED | OUTPATIENT
Start: 2023-10-31 | End: 2023-10-31 | Stop reason: HOSPADM

## 2023-10-31 RX ORDER — MIDAZOLAM HYDROCHLORIDE 1 MG/ML
INJECTION, SOLUTION INTRAMUSCULAR; INTRAVENOUS
Status: DISCONTINUED | OUTPATIENT
Start: 2023-10-31 | End: 2023-10-31 | Stop reason: HOSPADM

## 2023-10-31 RX ORDER — BUPIVACAINE HYDROCHLORIDE 2.5 MG/ML
INJECTION, SOLUTION EPIDURAL; INFILTRATION; INTRACAUDAL
Status: DISCONTINUED | OUTPATIENT
Start: 2023-10-31 | End: 2023-10-31 | Stop reason: HOSPADM

## 2023-10-31 RX ORDER — METHYLPREDNISOLONE 4 MG/1
TABLET ORAL
Qty: 1 EACH | Refills: 0 | Status: SHIPPED | OUTPATIENT
Start: 2023-10-31 | End: 2023-11-14 | Stop reason: SDUPTHER

## 2023-10-31 RX ORDER — TRIAMCINOLONE ACETONIDE 40 MG/ML
INJECTION, SUSPENSION INTRA-ARTICULAR; INTRAMUSCULAR
Status: DISCONTINUED | OUTPATIENT
Start: 2023-10-31 | End: 2023-10-31 | Stop reason: HOSPADM

## 2023-10-31 RX ORDER — LIDOCAINE HYDROCHLORIDE 20 MG/ML
INJECTION, SOLUTION EPIDURAL; INFILTRATION; INTRACAUDAL; PERINEURAL
Status: DISCONTINUED | OUTPATIENT
Start: 2023-10-31 | End: 2023-10-31 | Stop reason: HOSPADM

## 2023-10-31 RX ORDER — FENTANYL CITRATE 50 UG/ML
INJECTION, SOLUTION INTRAMUSCULAR; INTRAVENOUS
Status: DISCONTINUED | OUTPATIENT
Start: 2023-10-31 | End: 2023-10-31 | Stop reason: HOSPADM

## 2023-10-31 NOTE — OP NOTE
Procedure Note:    Right  Geniculate nerve cooled radiofrequency ablation under fluoroscopy     1) medial superior epicondyle geniculate nerve cooled radiofrequency ablation  2) lateral superior epicondyle geniculate nerve cooled radiofrequency ablation  3) medial tibial metaphysis geniculate nerve cooled radiofrequency ablation  4) xray guidance for needle placement  5) Conscious sedation provided by MD                                Surgeon: Denis Lai MD    Assistant: None    Pre-Op Diagnosis:  Right  Chronic pain of both knees [M25.561, M25.562, G89.29]  History of bilateral knee arthroplasty [Z96.653]    Post-Op Diagnosis: Chronic pain of both knees [M25.561, M25.562, G89.29]  History of bilateral knee arthroplasty [Z96.653]    EBL: None    Complications: None    Specimens: None    Description of procedure:    After written consent was obtained, patient placed in supine position.  The area over the medial and lateral aspect of the superior epi-condyle of the femur and the medial tibial metaphysis were prepped with chlorhexidine.  The area was draped in the usual sterile fashion.  Approximately 3 mL total 1% lidocaine was infiltrated into the skin overlying the 3 predetermined entry points. A 17 gauge 10mm active tip needle was then advanced under fluoroscopy in the AP and lateral views into the positions of the geniculate nerves at these levels. This was followed by motor testing at each of the nerves to ensure that there was no dorsiflexion of the foot.  After negative aspiration and no paresthesias there was injection of 3mL of 2% lidocaine into each of these  3 areas. This was followed by cooled thermal lesioning at 80 degrees celsius for 150 seconds at each site. After negative aspiration and no paresthesias there was injection of  5 mL of 0.25% bupivacaine and 40 mg triamcinolone, divided into each of these  3 areas  Needle was withdrawn and a sterile band-aid applied to the skin.    Conscious sedation  provided by M.D    The patient was monitored with continuous pulse oximetry, EKG, and intermittent blood pressure monitors.  The patient was hemodynamically stable throughout the entire process was responsive to voice, and breathing spontaneously.  Supplemental O2 was provided at 2L/min via nasal cannula.  Patient was comfortable for the duration of the procedure. (See nurse documentation and case log for sedation time)    There was a total of 1mg IV Midazolam and 100mcg fentanyl given for the procedure    Patient tolerated the procedure well, and was reporting improvement of pain symptoms after the injection.  She was discharged from the clinic in stable condition.

## 2023-10-31 NOTE — DISCHARGE SUMMARY
Discharge Note  Short Stay      SUMMARY     Admit Date: 10/31/2023    Attending Physician: Denis Lai MD        Discharge Physician: Denis Lai MD        Discharge Date: 10/31/2023 8:24 AM    Procedure(s) (LRB):  Right Genicular Nerve RFA with RN IV sedation (Right)    Final Diagnosis: Chronic pain of both knees [M25.561, M25.562, G89.29]  History of bilateral knee arthroplasty [Z96.653]    Disposition: Home or self care    Patient Instructions:   Current Discharge Medication List        CONTINUE these medications which have NOT CHANGED    Details   amLODIPine (NORVASC) 5 MG tablet Take 1 tablet (5 mg total) by mouth once daily.  Qty: 90 tablet, Refills: 3    Comments: .      aspirin 81 mg Tab Take 1 tablet by mouth Daily.      olmesartan-hydrochlorothiazide (BENICAR HCT) 40-12.5 mg Tab Take 1 tablet by mouth once daily.  Qty: 90 tablet, Refills: 3    Comments: .      ALPRAZolam (XANAX) 1 MG tablet Take 1 mg by mouth 3 (three) times daily as needed.      ARIPiprazole (ABILIFY) 10 MG Tab Take 10 mg by mouth once daily.      atorvastatin (LIPITOR) 40 MG tablet Take 1 tablet (40 mg total) by mouth once daily.  Qty: 90 tablet, Refills: 3      dicyclomine (BENTYL) 20 mg tablet Take 1 tablet (20 mg total) by mouth 3 (three) times daily as needed (abdominal pain).  Qty: 60 tablet, Refills: 0      doxepin (SINEQUAN) 50 MG capsule Take 50 mg by mouth nightly.      fluoxetine (PROZAC) 40 MG capsule Take 1 capsule (40 mg total) by mouth once daily.  Qty: 30 capsule, Refills: 11      furosemide (LASIX) 20 MG tablet Take 1 tablet (20 mg total) by mouth daily as needed (swelling).  Qty: 30 tablet, Refills: 5      ondansetron (ZOFRAN) 8 MG tablet Take 1 tablet (8 mg total) by mouth every 8 (eight) hours as needed for Nausea.  Qty: 20 tablet, Refills: 0      pregabalin (LYRICA) 75 MG capsule Take 1 capsule (75 mg total) by mouth every evening.  Qty: 90 capsule, Refills: 0      !! promethazine (PHENERGAN) 25 MG tablet Take 1  tablet (25 mg total) by mouth every 6 (six) hours as needed for Nausea.  Qty: 30 tablet, Refills: 0      !! promethazine (PHENERGAN) 25 MG tablet Take 1 tablet (25 mg total) by mouth every 6 (six) hours as needed for Nausea.  Qty: 20 tablet, Refills: 0      sucralfate (CARAFATE) 1 gram tablet Take 1 tablet (1 g total) by mouth 4 (four) times daily.  Qty: 40 tablet, Refills: 0      URO--10-40.8-36 mg Cap TAKE 1 CAPSULE BY MOUTH THREE TIMES DAILY AS NEEDED FOR BLADDER PAIN  Qty: 30 capsule, Refills: 1    Comments: This prescription was filled on 8/4/2023. Any refills authorized will be placed on file.      zolpidem (AMBIEN) 10 mg Tab Take 10 mg by mouth nightly as needed.       !! - Potential duplicate medications found. Please discuss with provider.              Discharge Diagnosis: Chronic pain of both knees [M25.561, M25.562, G89.29]  History of bilateral knee arthroplasty [Z96.653]  Condition on Discharge: Stable with no complications to procedure   Diet on Discharge: Same as before.  Activity: as per instruction sheet.  Discharge to: Home with a responsible adult.  Follow up: 2-4 weeks       Please call the office at [phone] if you experience any weakness or loss of sensation, fever > 101.5, pain uncontrolled with oral medications, persistent nausea/vomiting/or diarrhea, redness or drainage from the incisions, or any other worrisome concerns. If physician on call was not reached or could not communicate with our office for any reason please go to the nearest emergency department

## 2023-10-31 NOTE — DISCHARGE INSTRUCTIONS

## 2023-10-31 NOTE — PLAN OF CARE
Pt and daughter verbalized understanding of discharge instructions. Bandaids x 3 to R knee c/d/i. Patient voiced no complaints, with no further questions at this time. Patient stood at side of bed, walked steps with no new motor deficits. VSS on RA. Recovery care complete.

## 2023-11-09 NOTE — PRE-PROCEDURE INSTRUCTIONS
Spoke with patient regarding procedure scheduled on 11.14     Arrival time 0800     Has patient been sick with fever or on antibiotics within the last 7 days? No     Does the patient have any open wounds, sores or rashes? No     Does the patient have any recent fractures? no     Has patient received a vaccination within the last 7 days? No     Received the COVID vaccination? yes     Has the patient stopped all medications as directed? na     Does patient have a pacemaker, defibrillator, or implantable stimulator? No     Does the patient have a ride to and from procedure and someone reliable to remain with patient?  son     Is the patient diabetic? no     Does the patient have sleep apnea? Or use O2 at home? No and no     Is the patient receiving sedation? yes     Is the patient instructed to remain NPO beginning at midnight the night before their procedure? yes     Procedure location confirmed with patient? Yes     Covid- Denies signs/symptoms. Instructed to notify PAT/MD if any changes.

## 2023-11-14 ENCOUNTER — HOSPITAL ENCOUNTER (OUTPATIENT)
Facility: HOSPITAL | Age: 63
Discharge: HOME | End: 2023-11-14
Attending: ANESTHESIOLOGY | Admitting: ANESTHESIOLOGY
Payer: MEDICARE

## 2023-11-14 VITALS
HEIGHT: 63 IN | RESPIRATION RATE: 20 BRPM | WEIGHT: 219.69 LBS | HEART RATE: 96 BPM | SYSTOLIC BLOOD PRESSURE: 171 MMHG | OXYGEN SATURATION: 98 % | TEMPERATURE: 98 F | DIASTOLIC BLOOD PRESSURE: 105 MMHG | BODY MASS INDEX: 38.93 KG/M2

## 2023-11-14 DIAGNOSIS — M17.9 KNEE OSTEOARTHRITIS: ICD-10-CM

## 2023-11-14 PROCEDURE — 64624 DSTRJ NULYT AGT GNCLR NRV: CPT | Mod: LT | Performed by: ANESTHESIOLOGY

## 2023-11-14 PROCEDURE — 64624 PR DESTRUCT, NEUROLYTIC AGENT, GENICULAR NERVE, W/IMG: ICD-10-PCS | Mod: LT,,, | Performed by: ANESTHESIOLOGY

## 2023-11-14 PROCEDURE — 64624 DSTRJ NULYT AGT GNCLR NRV: CPT | Mod: LT,,, | Performed by: ANESTHESIOLOGY

## 2023-11-14 PROCEDURE — 63600175 PHARM REV CODE 636 W HCPCS: Performed by: ANESTHESIOLOGY

## 2023-11-14 PROCEDURE — 25000003 PHARM REV CODE 250: Performed by: ANESTHESIOLOGY

## 2023-11-14 RX ORDER — METHYLPREDNISOLONE 4 MG/1
4 TABLET ORAL
COMMUNITY
End: 2023-12-28

## 2023-11-14 RX ORDER — MIDAZOLAM HYDROCHLORIDE 1 MG/ML
INJECTION, SOLUTION INTRAMUSCULAR; INTRAVENOUS
Status: DISCONTINUED | OUTPATIENT
Start: 2023-11-14 | End: 2023-11-14 | Stop reason: HOSPADM

## 2023-11-14 RX ORDER — FENTANYL CITRATE 50 UG/ML
INJECTION, SOLUTION INTRAMUSCULAR; INTRAVENOUS
Status: DISCONTINUED | OUTPATIENT
Start: 2023-11-14 | End: 2023-11-14 | Stop reason: HOSPADM

## 2023-11-14 RX ORDER — TRIAMCINOLONE ACETONIDE 40 MG/ML
INJECTION, SUSPENSION INTRA-ARTICULAR; INTRAMUSCULAR
Status: DISCONTINUED | OUTPATIENT
Start: 2023-11-14 | End: 2023-11-14 | Stop reason: HOSPADM

## 2023-11-14 RX ORDER — INDOMETHACIN 25 MG/1
CAPSULE ORAL
Status: DISCONTINUED | OUTPATIENT
Start: 2023-11-14 | End: 2023-11-14 | Stop reason: HOSPADM

## 2023-11-14 RX ORDER — BUPIVACAINE HYDROCHLORIDE 2.5 MG/ML
INJECTION, SOLUTION EPIDURAL; INFILTRATION; INTRACAUDAL
Status: DISCONTINUED | OUTPATIENT
Start: 2023-11-14 | End: 2023-11-14 | Stop reason: HOSPADM

## 2023-11-14 RX ORDER — LIDOCAINE HYDROCHLORIDE 20 MG/ML
INJECTION, SOLUTION EPIDURAL; INFILTRATION; INTRACAUDAL; PERINEURAL
Status: DISCONTINUED | OUTPATIENT
Start: 2023-11-14 | End: 2023-11-14 | Stop reason: HOSPADM

## 2023-11-14 NOTE — DISCHARGE INSTRUCTIONS

## 2023-11-14 NOTE — TELEPHONE ENCOUNTER
Reached out to inform pt that it is common to have pain after receiving an injection due to the numbing medication wearing off. Inform pt that it may take 2-3 weeks for the injection to take full affect.   For your pain, continue taking your medication as prescribed, you can alternate between Tylenol and Ibuprofen every 8 hours do not exceed 3G (3'000mg) a day.. Ice the pain site 20min on 20 min off and rest, if possible.  Generally  we do not prescribe any stronger or new medication as we will not know if it the the procedure working or the medication. Give it some time to subside and check back in with us if pain worsens. Pt understand. All questions answered.

## 2023-11-14 NOTE — TELEPHONE ENCOUNTER
Can you please let her know that it is normal tab increased pain on the day of the injection and this should improve over the next 2 days.  She can alternate Tylenol and ibuprofen as well as use ice.  Can you please send me a Medrol pack request to the pharmacy that she would like please?  Thank you

## 2023-11-14 NOTE — H&P
HPI  Patient presenting for Procedure(s) (LRB):  Left Genicular Nerve RFA with RN IV sedation (Left)     Patient on Anti-coagulation No    No health changes since previous encounter    Past Medical History:   Diagnosis Date    Anemia     Anxiety     Basal cell carcinoma (BCC) of face 2/26/2020    Blood transfusion     Bowel incontinence     Depression     Esophageal stricture     GERD (gastroesophageal reflux disease)     Hypertension     Latent tuberculosis by skin test 2001    took INH    Liver nodule 1/6/2014    Morbid obesity with BMI of 45.0-49.9, adult     OA (osteoarthritis) of knee 12/12/2014    Pericardial effusion 9/4/2014     Past Surgical History:   Procedure Laterality Date    CHOLECYSTECTOMY      COLONOSCOPY N/A 3/6/2023    Procedure: COLONOSCOPY;  Surgeon: Beti Diallo MD;  Location: [facility];  Service: Endoscopy;  Laterality: N/A;    GASTRIC BYPASS      HERNIA REPAIR      INJECTION OF ANESTHETIC AGENT AROUND NERVE Bilateral 6/16/2023    Procedure: Bilateral Genicular nerve block with RN IV sedation;  Surgeon: Denis Lai MD;  Location: [facility];  Service: Pain Management;  Laterality: Bilateral;    INJECTION OF ANESTHETIC AGENT AROUND NERVE Bilateral 8/11/2023    Procedure: Bilateral Genicular nerve block with RN IV sedation;  Surgeon: Denis Lai MD;  Location: [facility];  Service: Pain Management;  Laterality: Bilateral;    RADIOFREQUENCY THERMOCOAGULATION Right 10/31/2023    Procedure: Right Genicular Nerve RFA with RN IV sedation;  Surgeon: Denis Lai MD;  Location: [facility];  Service: Pain Management;  Laterality: Right;    right sholder surgery      SMALL INTESTINE SURGERY       Review of patient's allergies indicates:   Allergen Reactions    Metronidazole hcl Other (See Comments)     Mouth ulcers developed with Flagyl  Oral sores.  Mouth ulcers developed with Flagyl        No current facility-administered medications on file prior to encounter.     Current  Outpatient Medications on File Prior to Encounter   Medication Sig Dispense Refill    amLODIPine (NORVASC) 5 MG tablet Take 1 tablet (5 mg total) by mouth once daily. 90 tablet 3    ARIPiprazole (ABILIFY) 10 MG Tab Take 10 mg by mouth once daily.      aspirin 81 mg Tab Take 1 tablet by mouth Daily.      atorvastatin (LIPITOR) 40 MG tablet Take 1 tablet (40 mg total) by mouth once daily. 90 tablet 3    doxepin (SINEQUAN) 50 MG capsule Take 50 mg by mouth nightly.      olmesartan-hydrochlorothiazide (BENICAR HCT) 40-12.5 mg Tab Take 1 tablet by mouth once daily. 90 tablet 3    ALPRAZolam (XANAX) 1 MG tablet Take 1 mg by mouth 3 (three) times daily as needed.      dicyclomine (BENTYL) 20 mg tablet Take 1 tablet (20 mg total) by mouth 3 (three) times daily as needed (abdominal pain). 60 tablet 0    fluoxetine (PROZAC) 40 MG capsule Take 1 capsule (40 mg total) by mouth once daily. 30 capsule 11    furosemide (LASIX) 20 MG tablet Take 1 tablet (20 mg total) by mouth daily as needed (swelling). (Patient not taking: Reported on 9/7/2023) 30 tablet 5    ondansetron (ZOFRAN) 8 MG tablet Take 1 tablet (8 mg total) by mouth every 8 (eight) hours as needed for Nausea. (Patient not taking: Reported on 9/7/2023) 20 tablet 0    pregabalin (LYRICA) 75 MG capsule Take 1 capsule (75 mg total) by mouth every evening. (Patient not taking: Reported on 9/7/2023) 90 capsule 0    promethazine (PHENERGAN) 25 MG tablet Take 1 tablet (25 mg total) by mouth every 6 (six) hours as needed for Nausea. 30 tablet 0    promethazine (PHENERGAN) 25 MG tablet Take 1 tablet (25 mg total) by mouth every 6 (six) hours as needed for Nausea. (Patient not taking: Reported on 9/7/2023) 20 tablet 0    sucralfate (CARAFATE) 1 gram tablet Take 1 tablet (1 g total) by mouth 4 (four) times daily. (Patient not taking: Reported on 9/7/2023) 40 tablet 0    URO--10-40.8-36 mg Cap TAKE 1 CAPSULE BY MOUTH THREE TIMES DAILY AS NEEDED FOR BLADDER PAIN (Patient not  taking: Reported on 9/7/2023) 30 capsule 1    zolpidem (AMBIEN) 10 mg Tab Take 10 mg by mouth nightly as needed.          PMHx, PSHx, Allergies, Medications reviewed in epic    ROS negative except pain complaints in HPI    OBJECTIVE:    Breastfeeding No     PHYSICAL EXAMINATION:    GENERAL: Well appearing, in no acute distress, alert and oriented x3.  PSYCH:  Mood and affect appropriate.  SKIN: Skin color, texture, turgor normal, no rashes or lesions which will impact the procedure.  CV: RRR with palpation of the radial artery.  PULM: No evidence of respiratory difficulty, symmetric chest rise. Clear to auscultation.  NEURO: Cranial nerves grossly intact.    Plan:    Proceed with procedure as planned Procedure(s) (LRB):  Left Genicular Nerve RFA with RN IV sedation (Left)    Denis Lai MD  11/14/2023

## 2023-11-14 NOTE — TELEPHONE ENCOUNTER
----- Message from Juan Coppola sent at 11/14/2023 11:45 AM CST -----  Contact: Divya  Pt is calling in regards to left knee being in a lot of pain, pt stated that the pain eased up since leaving appt. Pt stated that she is icing her knee but want to know if something can be done about the pain. Pt stated also checking on steroids for inflammation in the knee being called in. Please call back at [phone]                      Thanks  KT

## 2023-11-14 NOTE — ADDENDUM NOTE
Addended by: MELO MILIAN on: 11/14/2023 04:02 PM     Modules accepted: Orders     Depo Lot# [id number] Exp: 11/30/2022 NDC# [address]-5. Rt deltoid. Pt brought. No reaction. YAZMIN

## 2023-11-14 NOTE — DISCHARGE SUMMARY
Discharge Note  Short Stay      SUMMARY     Admit Date: 11/14/2023    Attending Physician: Denis Lai MD        Discharge Physician: Denis Lai MD        Discharge Date: 11/14/2023 9:39 AM    Procedure(s) (LRB):  Left Genicular Nerve RFA with RN IV sedation (Left)    Final Diagnosis: Chronic pain of both knees [M25.561, M25.562, G89.29]  History of bilateral knee arthroplasty [Z96.653]    Disposition: Home or self care    Patient Instructions:   Current Discharge Medication List        CONTINUE these medications which have NOT CHANGED    Details   amLODIPine (NORVASC) 5 MG tablet Take 1 tablet (5 mg total) by mouth once daily.  Qty: 90 tablet, Refills: 3    Comments: .      ARIPiprazole (ABILIFY) 10 MG Tab Take 10 mg by mouth once daily.      aspirin 81 mg Tab Take 1 tablet by mouth Daily.      atorvastatin (LIPITOR) 40 MG tablet Take 1 tablet (40 mg total) by mouth once daily.  Qty: 90 tablet, Refills: 3      doxepin (SINEQUAN) 50 MG capsule Take 50 mg by mouth nightly.      olmesartan-hydrochlorothiazide (BENICAR HCT) 40-12.5 mg Tab Take 1 tablet by mouth once daily.  Qty: 90 tablet, Refills: 3    Comments: .      ALPRAZolam (XANAX) 1 MG tablet Take 1 mg by mouth 3 (three) times daily as needed.      dicyclomine (BENTYL) 20 mg tablet Take 1 tablet (20 mg total) by mouth 3 (three) times daily as needed (abdominal pain).  Qty: 60 tablet, Refills: 0      fluoxetine (PROZAC) 40 MG capsule Take 1 capsule (40 mg total) by mouth once daily.  Qty: 30 capsule, Refills: 11      furosemide (LASIX) 20 MG tablet Take 1 tablet (20 mg total) by mouth daily as needed (swelling).  Qty: 30 tablet, Refills: 5      methylPREDNISolone (MEDROL DOSEPACK) 4 mg tablet use as directed  Qty: 1 each, Refills: 0      ondansetron (ZOFRAN) 8 MG tablet Take 1 tablet (8 mg total) by mouth every 8 (eight) hours as needed for Nausea.  Qty: 20 tablet, Refills: 0      pregabalin (LYRICA) 75 MG capsule Take 1 capsule (75 mg total) by mouth every  evening.  Qty: 90 capsule, Refills: 0      !! promethazine (PHENERGAN) 25 MG tablet Take 1 tablet (25 mg total) by mouth every 6 (six) hours as needed for Nausea.  Qty: 30 tablet, Refills: 0      !! promethazine (PHENERGAN) 25 MG tablet Take 1 tablet (25 mg total) by mouth every 6 (six) hours as needed for Nausea.  Qty: 20 tablet, Refills: 0      sucralfate (CARAFATE) 1 gram tablet Take 1 tablet (1 g total) by mouth 4 (four) times daily.  Qty: 40 tablet, Refills: 0      URO--10-40.8-36 mg Cap TAKE 1 CAPSULE BY MOUTH THREE TIMES DAILY AS NEEDED FOR BLADDER PAIN  Qty: 30 capsule, Refills: 1    Comments: This prescription was filled on 8/4/2023. Any refills authorized will be placed on file.      zolpidem (AMBIEN) 10 mg Tab Take 10 mg by mouth nightly as needed.       !! - Potential duplicate medications found. Please discuss with provider.              Discharge Diagnosis: Chronic pain of both knees [M25.561, M25.562, G89.29]  History of bilateral knee arthroplasty [Z96.653]  Condition on Discharge: Stable with no complications to procedure   Diet on Discharge: Same as before.  Activity: as per instruction sheet.  Discharge to: Home with a responsible adult.  Follow up: 2-4 weeks       Please call the office at [phone] if you experience any weakness or loss of sensation, fever > 101.5, pain uncontrolled with oral medications, persistent nausea/vomiting/or diarrhea, redness or drainage from the incisions, or any other worrisome concerns. If physician on call was not reached or could not communicate with our office for any reason please go to the nearest emergency department

## 2023-11-14 NOTE — OP NOTE
Procedure Note:    Left  Geniculate nerve cooled radiofrequency ablation under fluoroscopy     1) medial superior epicondyle geniculate nerve cooled radiofrequency ablation  2) lateral superior epicondyle geniculate nerve cooled radiofrequency ablation  3) medial tibial metaphysis geniculate nerve cooled radiofrequency ablation  4) xray guidance for needle placement  5) Conscious sedation provided by MD                                Surgeon: Denis Lai MD    Assistant: None    Pre-Op Diagnosis:  Left  Chronic pain of both knees [M25.561, M25.562, G89.29]  History of bilateral knee arthroplasty [Z96.653]    Post-Op Diagnosis: Chronic pain of both knees [M25.561, M25.562, G89.29]  History of bilateral knee arthroplasty [Z96.653]    EBL: None    Complications: None    Specimens: None    Description of procedure:    After written consent was obtained, patient placed in supine position.  The area over the medial and lateral aspect of the superior epi-condyle of the femur and the medial tibial metaphysis were prepped with chlorhexidine.  The area was draped in the usual sterile fashion.  Approximately 3 mL total 1% lidocaine was infiltrated into the skin overlying the 3 predetermined entry points. A 17 gauge 10mm active tip needle was then advanced under fluoroscopy in the AP and lateral views into the positions of the geniculate nerves at these levels. This was followed by motor testing at each of the nerves to ensure that there was no dorsiflexion of the foot.  After negative aspiration and no paresthesias there was injection of 3mL of 2% lidocaine into each of these  3 areas. This was followed by cooled thermal lesioning at 80 degrees celsius for 150 seconds at each site. After negative aspiration and no paresthesias there was injection of  5 mL of 0.25% bupivacaine and 40 mg triamcinolone, divided into each of these  3 areas  Needle was withdrawn and a sterile band-aid applied to the skin.    Conscious sedation  provided by M.D    The patient was monitored with continuous pulse oximetry, EKG, and intermittent blood pressure monitors.  The patient was hemodynamically stable throughout the entire process was responsive to voice, and breathing spontaneously.  Supplemental O2 was provided at 2L/min via nasal cannula.  Patient was comfortable for the duration of the procedure. (See nurse documentation and case log for sedation time)    There was a total of 1 mg IV Midazolam and 100mcg fentanyl given for the procedure    Patient tolerated the procedure well, and was reporting improvement of pain symptoms after the injection.  She was discharged from the clinic in stable condition.

## 2023-11-15 RX ORDER — METHYLPREDNISOLONE 4 MG/1
TABLET ORAL
Qty: 1 EACH | Refills: 0 | Status: SHIPPED | OUTPATIENT
Start: 2023-11-15 | End: 2023-12-28

## 2023-11-23 ENCOUNTER — HOSPITAL ENCOUNTER (OUTPATIENT)
Facility: HOSPITAL | Age: 63
LOS: 1 days | Discharge: HOME | End: 2023-11-26
Attending: EMERGENCY MEDICINE | Admitting: HOSPITALIST
Payer: MEDICARE

## 2023-11-23 DIAGNOSIS — R31.9 URINARY TRACT INFECTION WITH HEMATURIA, SITE UNSPECIFIED: ICD-10-CM

## 2023-11-23 DIAGNOSIS — D62 ACUTE BLOOD LOSS ANEMIA: ICD-10-CM

## 2023-11-23 DIAGNOSIS — N39.0 URINARY TRACT INFECTION WITH HEMATURIA, SITE UNSPECIFIED: ICD-10-CM

## 2023-11-23 DIAGNOSIS — K57.90 DIVERTICULOSIS: ICD-10-CM

## 2023-11-23 DIAGNOSIS — R10.9 ABDOMINAL PAIN: ICD-10-CM

## 2023-11-23 DIAGNOSIS — K92.2 ACUTE GI BLEEDING: Primary | ICD-10-CM

## 2023-11-23 LAB
ALBUMIN SERPL BCP-MCNC: 3.3 G/DL (ref 3.5–5.2)
ALP SERPL-CCNC: 67 U/L (ref 55–135)
ALT SERPL W/O P-5'-P-CCNC: 12 U/L (ref 10–44)
ANION GAP SERPL CALC-SCNC: 11 MMOL/L (ref 8–16)
AST SERPL-CCNC: 7 U/L (ref 10–40)
BACTERIA #/AREA URNS HPF: ABNORMAL /HPF
BASOPHILS # BLD AUTO: 0.03 K/UL (ref 0–0.2)
BASOPHILS NFR BLD: 0.5 % (ref 0–1.9)
BILIRUB SERPL-MCNC: 0.5 MG/DL (ref 0.1–1)
BILIRUB UR QL STRIP: NEGATIVE
BUN SERPL-MCNC: 23 MG/DL (ref 8–23)
CALCIUM SERPL-MCNC: 8.1 MG/DL (ref 8.7–10.5)
CHLORIDE SERPL-SCNC: 110 MMOL/L (ref 95–110)
CLARITY UR: CLEAR
CO2 SERPL-SCNC: 19 MMOL/L (ref 23–29)
COLOR UR: YELLOW
CREAT SERPL-MCNC: 1.4 MG/DL (ref 0.5–1.4)
DIFFERENTIAL METHOD: ABNORMAL
EOSINOPHIL # BLD AUTO: 0.3 K/UL (ref 0–0.5)
EOSINOPHIL NFR BLD: 4 % (ref 0–8)
ERYTHROCYTE [DISTWIDTH] IN BLOOD BY AUTOMATED COUNT: 13.8 % (ref 11.5–14.5)
EST. GFR  (NO RACE VARIABLE): 42 ML/MIN/1.73 M^2
GLUCOSE SERPL-MCNC: 129 MG/DL (ref 70–110)
GLUCOSE UR QL STRIP: NEGATIVE
HCT VFR BLD AUTO: 32.3 % (ref 37–48.5)
HGB BLD-MCNC: 10.5 G/DL (ref 12–16)
HGB UR QL STRIP: NEGATIVE
IMM GRANULOCYTES # BLD AUTO: 0.04 K/UL (ref 0–0.04)
IMM GRANULOCYTES NFR BLD AUTO: 0.6 % (ref 0–0.5)
KETONES UR QL STRIP: NEGATIVE
LEUKOCYTE ESTERASE UR QL STRIP: ABNORMAL
LIPASE SERPL-CCNC: 7 U/L (ref 4–60)
LYMPHOCYTES # BLD AUTO: 0.6 K/UL (ref 1–4.8)
LYMPHOCYTES NFR BLD: 10.2 % (ref 18–48)
MCH RBC QN AUTO: 29.2 PG (ref 27–31)
MCHC RBC AUTO-ENTMCNC: 32.5 G/DL (ref 32–36)
MCV RBC AUTO: 90 FL (ref 82–98)
MICROSCOPIC COMMENT: ABNORMAL
MONOCYTES # BLD AUTO: 0.5 K/UL (ref 0.3–1)
MONOCYTES NFR BLD: 7.3 % (ref 4–15)
NEUTROPHILS # BLD AUTO: 4.8 K/UL (ref 1.8–7.7)
NEUTROPHILS NFR BLD: 77.4 % (ref 38–73)
NITRITE UR QL STRIP: POSITIVE
NRBC BLD-RTO: 0 /100 WBC
PH UR STRIP: 6 [PH] (ref 5–8)
PLATELET # BLD AUTO: 197 K/UL (ref 150–450)
PMV BLD AUTO: 8.3 FL (ref 9.2–12.9)
POTASSIUM SERPL-SCNC: 3.1 MMOL/L (ref 3.5–5.1)
PROT SERPL-MCNC: 6.3 G/DL (ref 6–8.4)
PROT UR QL STRIP: NEGATIVE
RBC # BLD AUTO: 3.59 M/UL (ref 4–5.4)
RBC #/AREA URNS HPF: 4 /HPF (ref 0–4)
SODIUM SERPL-SCNC: 140 MMOL/L (ref 136–145)
SP GR UR STRIP: 1.01 (ref 1–1.03)
SQUAMOUS #/AREA URNS HPF: 2 /HPF
URN SPEC COLLECT METH UR: ABNORMAL
UROBILINOGEN UR STRIP-ACNC: NEGATIVE EU/DL
WBC # BLD AUTO: 6.18 K/UL (ref 3.9–12.7)
WBC #/AREA URNS HPF: 59 /HPF (ref 0–5)
WBC CLUMPS URNS QL MICRO: ABNORMAL

## 2023-11-23 PROCEDURE — 63600175 PHARM REV CODE 636 W HCPCS: Performed by: EMERGENCY MEDICINE

## 2023-11-23 PROCEDURE — 96375 TX/PRO/DX INJ NEW DRUG ADDON: CPT

## 2023-11-23 PROCEDURE — 80053 COMPREHEN METABOLIC PANEL: CPT | Performed by: EMERGENCY MEDICINE

## 2023-11-23 PROCEDURE — 93010 ELECTROCARDIOGRAM REPORT: CPT | Mod: ,,, | Performed by: INTERNAL MEDICINE

## 2023-11-23 PROCEDURE — 25000003 PHARM REV CODE 250: Performed by: EMERGENCY MEDICINE

## 2023-11-23 PROCEDURE — 81000 URINALYSIS NONAUTO W/SCOPE: CPT | Performed by: EMERGENCY MEDICINE

## 2023-11-23 PROCEDURE — 93010 EKG 12-LEAD: ICD-10-PCS | Mod: ,,, | Performed by: INTERNAL MEDICINE

## 2023-11-23 PROCEDURE — 93005 ELECTROCARDIOGRAM TRACING: CPT

## 2023-11-23 PROCEDURE — 99285 EMERGENCY DEPT VISIT HI MDM: CPT | Mod: 25

## 2023-11-23 PROCEDURE — 25500020 PHARM REV CODE 255: Performed by: EMERGENCY MEDICINE

## 2023-11-23 PROCEDURE — 96365 THER/PROPH/DIAG IV INF INIT: CPT

## 2023-11-23 PROCEDURE — 83690 ASSAY OF LIPASE: CPT | Performed by: EMERGENCY MEDICINE

## 2023-11-23 PROCEDURE — 85025 COMPLETE CBC W/AUTO DIFF WBC: CPT | Performed by: EMERGENCY MEDICINE

## 2023-11-23 RX ORDER — ACETAMINOPHEN 500 MG
1000 TABLET ORAL
Status: COMPLETED | OUTPATIENT
Start: 2023-11-23 | End: 2023-11-23

## 2023-11-23 RX ORDER — ONDANSETRON 2 MG/ML
4 INJECTION INTRAMUSCULAR; INTRAVENOUS
Status: DISCONTINUED | OUTPATIENT
Start: 2023-11-23 | End: 2023-11-23

## 2023-11-23 RX ORDER — MORPHINE SULFATE 4 MG/ML
4 INJECTION, SOLUTION INTRAMUSCULAR; INTRAVENOUS
Status: COMPLETED | OUTPATIENT
Start: 2023-11-23 | End: 2023-11-23

## 2023-11-23 RX ADMIN — IOHEXOL 100 ML: 350 INJECTION, SOLUTION INTRAVENOUS at 10:11

## 2023-11-23 RX ADMIN — ACETAMINOPHEN 1000 MG: 500 TABLET ORAL at 11:11

## 2023-11-23 RX ADMIN — CEFTRIAXONE SODIUM 1 G: 1 INJECTION, POWDER, FOR SOLUTION INTRAMUSCULAR; INTRAVENOUS at 11:11

## 2023-11-23 RX ADMIN — PROMETHAZINE HYDROCHLORIDE 12.5 MG: 25 INJECTION INTRAMUSCULAR; INTRAVENOUS at 08:11

## 2023-11-23 RX ADMIN — POTASSIUM BICARBONATE 20 MEQ: 391 TABLET, EFFERVESCENT ORAL at 11:11

## 2023-11-23 RX ADMIN — MORPHINE SULFATE 4 MG: 4 INJECTION INTRAVENOUS at 08:11

## 2023-11-23 NOTE — Clinical Note
Diagnosis: Acute GI bleeding [057006]   Future Attending Provider: TELMA ESCOBAR [[id number]]   Admitting Provider:: TELMA ESCOBAR. [[id number]]

## 2023-11-24 PROBLEM — R21 RASH: Status: ACTIVE | Noted: 2023-11-24

## 2023-11-24 PROBLEM — E66.812 CLASS 2 SEVERE OBESITY DUE TO EXCESS CALORIES WITH SERIOUS COMORBIDITY AND BODY MASS INDEX (BMI) OF 39.0 TO 39.9 IN ADULT: Status: ACTIVE | Noted: 2022-06-20

## 2023-11-24 PROBLEM — N18.31 STAGE 3A CHRONIC KIDNEY DISEASE: Status: RESOLVED | Noted: 2022-05-16 | Resolved: 2023-11-24

## 2023-11-24 PROBLEM — K92.1 HEMATOCHEZIA: Status: RESOLVED | Noted: 2021-08-21 | Resolved: 2023-11-24

## 2023-11-24 PROBLEM — K92.2 LOWER GI BLEED: Status: ACTIVE | Noted: 2023-11-24

## 2023-11-24 PROBLEM — G47.00 INSOMNIA: Status: ACTIVE | Noted: 2023-11-24

## 2023-11-24 PROBLEM — K21.9 GERD (GASTROESOPHAGEAL REFLUX DISEASE): Status: ACTIVE | Noted: 2023-11-24

## 2023-11-24 PROBLEM — N18.30 ACUTE RENAL FAILURE SUPERIMPOSED ON STAGE 3 CHRONIC KIDNEY DISEASE: Status: ACTIVE | Noted: 2020-12-09

## 2023-11-24 PROBLEM — N39.0 UTI (URINARY TRACT INFECTION): Status: ACTIVE | Noted: 2023-11-24

## 2023-11-24 LAB
ABO + RH BLD: NORMAL
ANION GAP SERPL CALC-SCNC: 9 MMOL/L (ref 8–16)
BLD GP AB SCN CELLS X3 SERPL QL: NORMAL
BUN SERPL-MCNC: 18 MG/DL (ref 8–23)
CALCIUM SERPL-MCNC: 7.8 MG/DL (ref 8.7–10.5)
CHLORIDE SERPL-SCNC: 110 MMOL/L (ref 95–110)
CO2 SERPL-SCNC: 20 MMOL/L (ref 23–29)
CREAT SERPL-MCNC: 1.1 MG/DL (ref 0.5–1.4)
EST. GFR  (NO RACE VARIABLE): 56 ML/MIN/1.73 M^2
GLUCOSE SERPL-MCNC: 122 MG/DL (ref 70–110)
HCT VFR BLD AUTO: 31.4 % (ref 37–48.5)
HGB BLD-MCNC: 10 G/DL (ref 12–16)
OB PNL STL: POSITIVE
POTASSIUM SERPL-SCNC: 3.3 MMOL/L (ref 3.5–5.1)
SODIUM SERPL-SCNC: 139 MMOL/L (ref 136–145)
SPECIMEN OUTDATE: NORMAL

## 2023-11-24 PROCEDURE — 96376 TX/PRO/DX INJ SAME DRUG ADON: CPT

## 2023-11-24 PROCEDURE — 96367 TX/PROPH/DG ADDL SEQ IV INF: CPT

## 2023-11-24 PROCEDURE — 36415 COLL VENOUS BLD VENIPUNCTURE: CPT | Performed by: HOSPITALIST

## 2023-11-24 PROCEDURE — G0378 HOSPITAL OBSERVATION PER HR: HCPCS

## 2023-11-24 PROCEDURE — 63600175 PHARM REV CODE 636 W HCPCS: Performed by: HOSPITALIST

## 2023-11-24 PROCEDURE — 25000003 PHARM REV CODE 250: Performed by: HOSPITALIST

## 2023-11-24 PROCEDURE — 99214 PR OFFICE/OUTPT VISIT, EST, LEVL IV, 30-39 MIN: ICD-10-PCS | Mod: ,,, | Performed by: INTERNAL MEDICINE

## 2023-11-24 PROCEDURE — 80048 BASIC METABOLIC PNL TOTAL CA: CPT | Performed by: HOSPITALIST

## 2023-11-24 PROCEDURE — 63600175 PHARM REV CODE 636 W HCPCS: Performed by: INTERNAL MEDICINE

## 2023-11-24 PROCEDURE — 86901 BLOOD TYPING SEROLOGIC RH(D): CPT | Performed by: HOSPITALIST

## 2023-11-24 PROCEDURE — 85018 HEMOGLOBIN: CPT | Performed by: HOSPITALIST

## 2023-11-24 PROCEDURE — 96366 THER/PROPH/DIAG IV INF ADDON: CPT

## 2023-11-24 PROCEDURE — 85014 HEMATOCRIT: CPT | Performed by: HOSPITALIST

## 2023-11-24 PROCEDURE — 25000003 PHARM REV CODE 250: Performed by: INTERNAL MEDICINE

## 2023-11-24 PROCEDURE — 82272 OCCULT BLD FECES 1-3 TESTS: CPT | Performed by: EMERGENCY MEDICINE

## 2023-11-24 PROCEDURE — 99214 OFFICE O/P EST MOD 30 MIN: CPT | Mod: ,,, | Performed by: INTERNAL MEDICINE

## 2023-11-24 RX ORDER — SODIUM CHLORIDE 0.9 % (FLUSH) 0.9 %
10 SYRINGE (ML) INJECTION
Status: DISCONTINUED | OUTPATIENT
Start: 2023-11-24 | End: 2023-11-26 | Stop reason: HOSPADM

## 2023-11-24 RX ORDER — CEFAZOLIN SODIUM 2 G/50ML
2 SOLUTION INTRAVENOUS
Status: DISCONTINUED | OUTPATIENT
Start: 2023-11-24 | End: 2023-11-24

## 2023-11-24 RX ORDER — ATORVASTATIN CALCIUM 40 MG/1
40 TABLET, FILM COATED ORAL DAILY
Status: DISCONTINUED | OUTPATIENT
Start: 2023-11-24 | End: 2023-11-26 | Stop reason: HOSPADM

## 2023-11-24 RX ORDER — FUROSEMIDE 20 MG/1
20 TABLET ORAL DAILY PRN
Status: DISCONTINUED | OUTPATIENT
Start: 2023-11-24 | End: 2023-11-26 | Stop reason: HOSPADM

## 2023-11-24 RX ORDER — ARIPIPRAZOLE 5 MG/1
10 TABLET ORAL DAILY
Status: DISCONTINUED | OUTPATIENT
Start: 2023-11-24 | End: 2023-11-26 | Stop reason: HOSPADM

## 2023-11-24 RX ORDER — AMLODIPINE BESYLATE 5 MG/1
5 TABLET ORAL DAILY
Status: DISCONTINUED | OUTPATIENT
Start: 2023-11-24 | End: 2023-11-26 | Stop reason: HOSPADM

## 2023-11-24 RX ORDER — SODIUM CHLORIDE 9 MG/ML
INJECTION, SOLUTION INTRAVENOUS
Status: DISCONTINUED | OUTPATIENT
Start: 2023-11-24 | End: 2023-11-26 | Stop reason: HOSPADM

## 2023-11-24 RX ORDER — DOXEPIN HYDROCHLORIDE 25 MG/1
50 CAPSULE ORAL NIGHTLY
Status: DISCONTINUED | OUTPATIENT
Start: 2023-11-24 | End: 2023-11-26 | Stop reason: HOSPADM

## 2023-11-24 RX ORDER — MORPHINE SULFATE 4 MG/ML
2 INJECTION, SOLUTION INTRAMUSCULAR; INTRAVENOUS EVERY 4 HOURS PRN
Status: DISCONTINUED | OUTPATIENT
Start: 2023-11-24 | End: 2023-11-26 | Stop reason: HOSPADM

## 2023-11-24 RX ORDER — ACETAMINOPHEN 325 MG/1
650 TABLET ORAL EVERY 4 HOURS PRN
Status: DISCONTINUED | OUTPATIENT
Start: 2023-11-24 | End: 2023-11-26 | Stop reason: HOSPADM

## 2023-11-24 RX ORDER — ONDANSETRON 2 MG/ML
4 INJECTION INTRAMUSCULAR; INTRAVENOUS EVERY 8 HOURS PRN
Status: DISCONTINUED | OUTPATIENT
Start: 2023-11-24 | End: 2023-11-26 | Stop reason: HOSPADM

## 2023-11-24 RX ORDER — DICYCLOMINE HYDROCHLORIDE 20 MG/1
20 TABLET ORAL 3 TIMES DAILY PRN
Status: DISCONTINUED | OUTPATIENT
Start: 2023-11-24 | End: 2023-11-26 | Stop reason: HOSPADM

## 2023-11-24 RX ORDER — ALPRAZOLAM 1 MG/1
1 TABLET ORAL 3 TIMES DAILY PRN
Status: DISCONTINUED | OUTPATIENT
Start: 2023-11-24 | End: 2023-11-26 | Stop reason: HOSPADM

## 2023-11-24 RX ORDER — FLUOXETINE HYDROCHLORIDE 20 MG/1
40 CAPSULE ORAL DAILY
Status: DISCONTINUED | OUTPATIENT
Start: 2023-11-24 | End: 2023-11-26 | Stop reason: HOSPADM

## 2023-11-24 RX ORDER — PROMETHAZINE HYDROCHLORIDE 25 MG/1
25 TABLET ORAL EVERY 6 HOURS PRN
Status: DISCONTINUED | OUTPATIENT
Start: 2023-11-24 | End: 2023-11-26 | Stop reason: HOSPADM

## 2023-11-24 RX ORDER — HYDROCODONE BITARTRATE AND ACETAMINOPHEN 5; 325 MG/1; MG/1
1 TABLET ORAL EVERY 4 HOURS PRN
Status: DISCONTINUED | OUTPATIENT
Start: 2023-11-24 | End: 2023-11-26 | Stop reason: HOSPADM

## 2023-11-24 RX ADMIN — FLUOXETINE 40 MG: 20 CAPSULE ORAL at 08:11

## 2023-11-24 RX ADMIN — ATORVASTATIN CALCIUM 40 MG: 40 TABLET, FILM COATED ORAL at 08:11

## 2023-11-24 RX ADMIN — CEFAZOLIN SODIUM 2 G: 2 SOLUTION INTRAVENOUS at 06:11

## 2023-11-24 RX ADMIN — ARIPIPRAZOLE 10 MG: 5 TABLET ORAL at 08:11

## 2023-11-24 RX ADMIN — DICYCLOMINE HYDROCHLORIDE 20 MG: 20 TABLET ORAL at 08:11

## 2023-11-24 RX ADMIN — HYDROCODONE BITARTRATE AND ACETAMINOPHEN 1 TABLET: 5; 325 TABLET ORAL at 01:11

## 2023-11-24 RX ADMIN — HYDROCODONE BITARTRATE AND ACETAMINOPHEN 1 TABLET: 5; 325 TABLET ORAL at 11:11

## 2023-11-24 RX ADMIN — PIPERACILLIN SODIUM AND TAZOBACTAM SODIUM 4.5 G: 4; .5 INJECTION, POWDER, FOR SOLUTION INTRAVENOUS at 09:11

## 2023-11-24 RX ADMIN — MORPHINE SULFATE 2 MG: 4 INJECTION INTRAVENOUS at 01:11

## 2023-11-24 RX ADMIN — SODIUM CHLORIDE: 9 INJECTION, SOLUTION INTRAVENOUS at 06:11

## 2023-11-24 RX ADMIN — ALPRAZOLAM 1 MG: 1 TABLET ORAL at 09:11

## 2023-11-24 RX ADMIN — DOXEPIN HYDROCHLORIDE 50 MG: 25 CAPSULE ORAL at 03:11

## 2023-11-24 RX ADMIN — AMLODIPINE BESYLATE 5 MG: 5 TABLET ORAL at 08:11

## 2023-11-24 RX ADMIN — DOXEPIN HYDROCHLORIDE 50 MG: 25 CAPSULE ORAL at 09:11

## 2023-11-24 RX ADMIN — HYDROCODONE BITARTRATE AND ACETAMINOPHEN 1 TABLET: 5; 325 TABLET ORAL at 06:11

## 2023-11-24 RX ADMIN — PIPERACILLIN SODIUM AND TAZOBACTAM SODIUM 4.5 G: 4; .5 INJECTION, POWDER, FOR SOLUTION INTRAVENOUS at 02:11

## 2023-11-24 NOTE — CONSULTS
[facility]  Gastroenterology  Consult Note    Patient Name: Divya Bui  MRN: [id number]  Admission Date: 11/23/2023  Hospital Length of Stay: 1 days  Code Status: Full Code   Attending Provider: Malia Moe MD   Consulting Provider: Marina Bukrett MD  Primary Care Physician: Angel Khan MD  Principal Problem:Lower GI bleed    Inpatient consult to Gastroenterology  Consult performed by: Marina Burkett MD  Consult ordered by: Asher Rice MD  Reason for consult: lower GI bleed        Subjective:     HPI:  63 y.o female with obesity, CKD stage 3, JOSE and history of diverticular bleed in the past was admitted last night after passing bloody stools since Wednesday morning. Similar to her last episode in 02/2023 requiring hospitalization, patient was in her usual state of health until Wednesday when she began passing painless hematochezia. It was bright red and continued into Thursday. She believes she may have passed a total of 7 stools. Last episodes was at 7:30pm last night. There was associated weakness but no syncope. In the ED her vitals showed /82, HR 98, afebrile and O2 Sat 98% on RA. Her labs showed a H&H 10.5/32.3. This is a drop from her baseline of 12.3/38.4. Her H&H this morning is 10/31.4. CTA on presentation was negative for acute GI bleeding. No inflammation was appreciated.     Patient is seen this morning. Reports occasional LLQ pain. This pain has been evaluated in the past and thought to be related to her known diverticular disease. Last colonoscopy by Dr. Diallo in march 2023 after her last diverticular bleed showed pan-diverticulosis and hemorrhoids. Denies further episodes of bloody stools.     Past Medical History:   Diagnosis Date    Anemia     Anxiety     Basal cell carcinoma (BCC) of face 2/26/2020    Blood transfusion     Bowel incontinence     Depression     Esophageal stricture     GERD (gastroesophageal reflux disease)     Hypertension      Latent tuberculosis by skin test 2001    took INH    Liver nodule 1/6/2014    Morbid obesity with BMI of 45.0-49.9, adult     OA (osteoarthritis) of knee 12/12/2014    Pericardial effusion 9/4/2014       Past Surgical History:   Procedure Laterality Date    CHOLECYSTECTOMY      COLONOSCOPY N/A 3/6/2023    Procedure: COLONOSCOPY;  Surgeon: Beti Diallo MD;  Location: [facility];  Service: Endoscopy;  Laterality: N/A;    GASTRIC BYPASS      HERNIA REPAIR      INJECTION OF ANESTHETIC AGENT AROUND NERVE Bilateral 6/16/2023    Procedure: Bilateral Genicular nerve block with RN IV sedation;  Surgeon: Denis Lai MD;  Location: [facility];  Service: Pain Management;  Laterality: Bilateral;    INJECTION OF ANESTHETIC AGENT AROUND NERVE Bilateral 8/11/2023    Procedure: Bilateral Genicular nerve block with RN IV sedation;  Surgeon: Denis Lai MD;  Location: [facility];  Service: Pain Management;  Laterality: Bilateral;    RADIOFREQUENCY THERMOCOAGULATION Right 10/31/2023    Procedure: Right Genicular Nerve RFA with RN IV sedation;  Surgeon: Denis Lai MD;  Location: [facility];  Service: Pain Management;  Laterality: Right;    RADIOFREQUENCY THERMOCOAGULATION Left 11/14/2023    Procedure: Left Genicular Nerve RFA with RN IV sedation;  Surgeon: Denis Lai MD;  Location: [facility];  Service: Pain Management;  Laterality: Left;    right sholder surgery      SMALL INTESTINE SURGERY         Review of patient's allergies indicates:   Allergen Reactions    Metronidazole hcl Other (See Comments)     Mouth ulcers developed with Flagyl  Oral sores.  Mouth ulcers developed with Flagyl     Family History       Problem Relation (Age of Onset)    Crohn's disease Sister, Brother, Other    Diabetes Sister, Sister    Liver cancer Father, Sister    Lung cancer Mother          Tobacco Use    Smoking status: Never    Smokeless tobacco: Never   Substance and Sexual Activity    Alcohol use: Not Currently      Alcohol/week: 0.0 standard drinks of alcohol    Drug use: No    Sexual activity: Yes     Partners: Male     Review of Systems   Gastrointestinal:  Positive for abdominal pain and blood in stool.   All other systems reviewed and are negative.    Objective:     Vital Signs (Most Recent):  Temp: 97.1 °F (36.2 °C) (11/24/23 0741)  Pulse: 79 (11/24/23 0802)  Resp: 18 (11/24/23 0741)  BP: 127/87 (11/24/23 0741)  SpO2: 96 % (11/24/23 0741) Vital Signs (24h Range):  Temp:  [97.1 °F (36.2 °C)-98.3 °F (36.8 °C)] 97.1 °F (36.2 °C)  Pulse:  [] 79  Resp:  [17-25] 18  SpO2:  [96 %-99 %] 96 %  BP: (112-184)/(65-96) 127/87     Weight: 100.4 kg (221 lb 5.5 oz) (11/24/23 0159)  Body mass index is 39.21 kg/m².      Intake/Output Summary (Last 24 hours) at 11/24/2023 0900  Last data filed at 11/24/2023 0034  Gross per 24 hour   Intake 150 ml   Output 250 ml   Net -100 ml       Lines/Drains/Airways       Peripheral Intravenous Line  Duration                  Peripheral IV - Single Lumen 11/23/23 1956 20 G Anterior;Right Hand <1 day         Peripheral IV - Single Lumen 11/23/23 2207 18 G Left Antecubital <1 day                     Physical Exam  Vitals and nursing note reviewed.   Constitutional:       Appearance: She is obese. She is ill-appearing.   Abdominal:      General: Abdomen is protuberant. Bowel sounds are decreased.      Palpations: Abdomen is soft.      Tenderness: There is abdominal tenderness in the left lower quadrant.   Neurological:      Mental Status: She is alert.          Significant Labs:  CBC:   Recent Labs   Lab 11/1958 11/24/23  0548   WBC 6.18  --    HGB 10.5* 10.0*   HCT 32.3* 31.4*     --      CMP:   Recent Labs   Lab 11/1958 11/24/23  0548   * 122*   CALCIUM 8.1* 7.8*   ALBUMIN 3.3*  --    PROT 6.3  --     139   K 3.1* 3.3*   CO2 19* 20*    110   BUN 23 18   CREATININE 1.4 1.1   ALKPHOS 67  --    ALT 12  --    AST 7*  --    BILITOT 0.5  --      Coagulation: No  "results for input(s): "PT", "INR", "APTT" in the last 48 hours.    Significant Imaging:  Imaging results within the past 24 hours have been reviewed.  Assessment/Plan:     GI  * Lower GI bleed  Signs and symptoms consistent with diverticular bleed  Last episode 02/2023  Last colonoscopy 03/2023: pan-tics      Recommendations:  Start clear liquids  No plans for colonoscopy    Thank you for your consult. Communicated with Dr. Moe of [facility] medicine. I will follow-up with patient. Please contact us if you have any additional questions.      Marina Burkett MD  Gastroenterology  [facility]  "

## 2023-11-24 NOTE — HPI
Divya Bui is a 63 y.o. female with a PMH  has a past medical history of Anemia, Anxiety, Basal cell carcinoma (BCC) of face (2/26/2020), Blood transfusion, Bowel incontinence, Depression, Esophageal stricture, GERD (gastroesophageal reflux disease), Hypertension, Latent tuberculosis by skin test (2001), Liver nodule (1/6/2014), Morbid obesity with BMI of 45.0-49.9, adult, OA (osteoarthritis) of knee (12/12/2014), and Pericardial effusion (9/4/2014). who presented to the ED for further evaluation of multiple episodes of bright red painless bloody bowel movements which began earlier today.  Patient reports experiencing approximately 7 episodes of hematochezia in which she described filling up the toilet bowl with blood.  Associated symptoms included feeling lightheaded, dizzy, weak, and nauseous. She also reported endorsing bilateral lower extremity swelling, bruising, and rash since undergoing nerve ablation earlier this month in addition to left lower quadrant pain described as achy/nagging in nature, currently rated 6/10 in severity, intermittent, radiating down to her left colon, with no known alleviating or aggravating factors noted.  She reported undergoing colonoscopy earlier this year in which she was found to have evidence of nonbleeding internal hemorrhoids as well as diverticulosis.  She reports taking aspirin 81 mg daily but denies use of other antiplatelet/anticoagulation therapies.  Prior to onset of symptoms, patient reported being in her usual state of health with no other concerns or complaints.  All other review of systems negative except as noted above.  Patient being admitted to Hospital Medicine under observation for continued medical management of lower GI bleed while awaiting further evaluation by GI in the morning.      PCP: Angel Khan

## 2023-11-24 NOTE — SUBJECTIVE & OBJECTIVE
Interval History:  Patient seen and examined at bedside.  Continues to complain of left lower quadrant pain otherwise is better from admission.    Review of Systems   Constitutional:  Positive for chills. Negative for fatigue and fever.   Respiratory:  Negative for cough and shortness of breath.    Cardiovascular:  Negative for chest pain and leg swelling.   Gastrointestinal:  Positive for abdominal pain (Left lower quadrant). Negative for nausea and vomiting.   Neurological:  Positive for weakness.   All other systems reviewed and are negative.    Objective:     Vital Signs (Most Recent):  Temp: 97.5 °F (36.4 °C) (11/24/23 1145)  Pulse: 83 (11/24/23 1517)  Resp: 16 (11/24/23 1145)  BP: 118/65 (11/24/23 1145)  SpO2: 96 % (11/24/23 1145) Vital Signs (24h Range):  Temp:  [97.1 °F (36.2 °C)-98.3 °F (36.8 °C)] 97.5 °F (36.4 °C)  Pulse:  [] 83  Resp:  [16-25] 16  SpO2:  [96 %-99 %] 96 %  BP: (112-184)/(65-96) 118/65     Weight: 100.4 kg (221 lb 5.5 oz)  Body mass index is 39.21 kg/m².    Intake/Output Summary (Last 24 hours) at 11/24/2023 1533  Last data filed at 11/24/2023 0034  Gross per 24 hour   Intake 150 ml   Output 250 ml   Net -100 ml         Physical Exam  Vitals reviewed.   Constitutional:       General: She is not in acute distress.     Appearance: Normal appearance. She is obese. She is not ill-appearing, toxic-appearing or diaphoretic.   HENT:      Head: Normocephalic and atraumatic.      Right Ear: External ear normal.      Left Ear: External ear normal.      Nose: Nose normal. No congestion or rhinorrhea.      Mouth/Throat:      Mouth: Mucous membranes are moist.      Pharynx: Oropharynx is clear. No oropharyngeal exudate or posterior oropharyngeal erythema.   Eyes:      General: No scleral icterus.     Extraocular Movements: Extraocular movements intact.      Conjunctiva/sclera: Conjunctivae normal.      Pupils: Pupils are equal, round, and reactive to light.   Neck:      Vascular: No carotid bruit.    Cardiovascular:      Rate and Rhythm: Normal rate and regular rhythm.      Pulses: Normal pulses.      Heart sounds: Normal heart sounds. No murmur heard.     No friction rub. No gallop.   Pulmonary:      Effort: Pulmonary effort is normal. No respiratory distress.      Breath sounds: Normal breath sounds. No stridor. No wheezing, rhonchi or rales.   Chest:      Chest wall: No tenderness.   Abdominal:      General: Abdomen is flat. Bowel sounds are normal. There is no distension.      Palpations: Abdomen is soft.      Tenderness: There is abdominal tenderness. There is no right CVA tenderness, left CVA tenderness, guarding or rebound.      Hernia: No hernia is present.      Comments: Mild TTP throughout left upper and left lower quadrant without evidence of guarding or rebound tenderness noted.   Musculoskeletal:         General: Swelling present. No tenderness, deformity or signs of injury. Normal range of motion.      Cervical back: Normal range of motion and neck supple. No rigidity or tenderness.      Right lower leg: Edema present.      Left lower leg: Edema present.      Comments: Trace edema noted bilateral lower extremities   Lymphadenopathy:      Cervical: No cervical adenopathy.   Skin:     General: Skin is warm and dry.      Capillary Refill: Capillary refill takes less than 2 seconds.      Coloration: Skin is not jaundiced or pale.      Findings: Bruising and rash present. No erythema or lesion.      Comments: Numerous ecchymoses noted throughout bilateral lower extremities overlying remote insertion site following nerve ablation procedure.  Bilateral erythema noted overlying lower anterior shins.   Neurological:      General: No focal deficit present.      Mental Status: She is alert and oriented to person, place, and time. Mental status is at baseline.      Cranial Nerves: No cranial nerve deficit.      Sensory: No sensory deficit.      Motor: No weakness.      Coordination: Coordination normal.  "  Psychiatric:         Mood and Affect: Mood normal.         Behavior: Behavior normal.         Thought Content: Thought content normal.         Judgment: Judgment normal.             Significant Labs: All pertinent labs within the past 24 hours have been reviewed.  Blood Culture: No results for input(s): "LABBLOO" in the last 48 hours.  CBC:   Recent Labs   Lab 11/1958 11/24/23  0548   WBC 6.18  --    HGB 10.5* 10.0*   HCT 32.3* 31.4*     --      CMP:   Recent Labs   Lab 11/1958 11/24/23  0548    139   K 3.1* 3.3*    110   CO2 19* 20*   * 122*   BUN 23 18   CREATININE 1.4 1.1   CALCIUM 8.1* 7.8*   PROT 6.3  --    ALBUMIN 3.3*  --    BILITOT 0.5  --    ALKPHOS 67  --    AST 7*  --    ALT 12  --    ANIONGAP 11 9       Significant Imaging: I have reviewed all pertinent imaging results/findings within the past 24 hours.  "

## 2023-11-24 NOTE — HPI
63 y.o female with obesity, CKD stage 3, JOSE and history of diverticular bleed in the past was admitted last night after passing bloody stools since Wednesday morning. Similar to her last episode in 02/2023 requiring hospitalization, patient was in her usual state of health until Wednesday when she began passing painless hematochezia. It was bright red and continued into Thursday. She believes she may have passed a total of 7 stools. Last episodes was at 7:30pm last night. There was associated weakness but no syncope. In the ED her vitals showed /82, HR 98, afebrile and O2 Sat 98% on RA. Her labs showed a H&H 10.5/32.3. This is a drop from her baseline of 12.3/38.4. Her H&H this morning is 10/31.4. CTA on presentation was negative for acute GI bleeding. No inflammation was appreciated.     Patient is seen this morning. Reports occasional LLQ pain. This pain has been evaluated in the past and thought to be related to her known diverticular disease. Last colonoscopy by Dr. Diallo in march 2023 after her last diverticular bleed showed pan-diverticulosis and hemorrhoids. Denies further episodes of bloody stools.

## 2023-11-24 NOTE — SUBJECTIVE & OBJECTIVE
Past Medical History:   Diagnosis Date    Anemia     Anxiety     Basal cell carcinoma (BCC) of face 2/26/2020    Blood transfusion     Bowel incontinence     Depression     Esophageal stricture     GERD (gastroesophageal reflux disease)     Hypertension     Latent tuberculosis by skin test 2001    took INH    Liver nodule 1/6/2014    Morbid obesity with BMI of 45.0-49.9, adult     OA (osteoarthritis) of knee 12/12/2014    Pericardial effusion 9/4/2014       Past Surgical History:   Procedure Laterality Date    CHOLECYSTECTOMY      COLONOSCOPY N/A 3/6/2023    Procedure: COLONOSCOPY;  Surgeon: Beti Diallo MD;  Location: [facility];  Service: Endoscopy;  Laterality: N/A;    GASTRIC BYPASS      HERNIA REPAIR      INJECTION OF ANESTHETIC AGENT AROUND NERVE Bilateral 6/16/2023    Procedure: Bilateral Genicular nerve block with RN IV sedation;  Surgeon: Denis Lai MD;  Location: [facility];  Service: Pain Management;  Laterality: Bilateral;    INJECTION OF ANESTHETIC AGENT AROUND NERVE Bilateral 8/11/2023    Procedure: Bilateral Genicular nerve block with RN IV sedation;  Surgeon: Denis Lai MD;  Location: [facility];  Service: Pain Management;  Laterality: Bilateral;    RADIOFREQUENCY THERMOCOAGULATION Right 10/31/2023    Procedure: Right Genicular Nerve RFA with RN IV sedation;  Surgeon: Denis Lai MD;  Location: [facility];  Service: Pain Management;  Laterality: Right;    RADIOFREQUENCY THERMOCOAGULATION Left 11/14/2023    Procedure: Left Genicular Nerve RFA with RN IV sedation;  Surgeon: Denis Lai MD;  Location: [facility];  Service: Pain Management;  Laterality: Left;    right sholder surgery      SMALL INTESTINE SURGERY         Review of patient's allergies indicates:   Allergen Reactions    Metronidazole hcl Other (See Comments)     Mouth ulcers developed with Flagyl  Oral sores.  Mouth ulcers developed with Flagyl       No current facility-administered medications on file  prior to encounter.     Current Outpatient Medications on File Prior to Encounter   Medication Sig    ALPRAZolam (XANAX) 1 MG tablet Take 1 mg by mouth 3 (three) times daily as needed.    amLODIPine (NORVASC) 5 MG tablet Take 1 tablet (5 mg total) by mouth once daily.    ARIPiprazole (ABILIFY) 10 MG Tab Take 10 mg by mouth once daily.    aspirin 81 mg Tab Take 1 tablet by mouth Daily.    atorvastatin (LIPITOR) 40 MG tablet Take 1 tablet (40 mg total) by mouth once daily.    dicyclomine (BENTYL) 20 mg tablet Take 1 tablet (20 mg total) by mouth 3 (three) times daily as needed (abdominal pain).    doxepin (SINEQUAN) 50 MG capsule Take 50 mg by mouth nightly.    fluoxetine (PROZAC) 40 MG capsule Take 1 capsule (40 mg total) by mouth once daily.    furosemide (LASIX) 20 MG tablet Take 1 tablet (20 mg total) by mouth daily as needed (swelling). (Patient not taking: Reported on 9/7/2023)    methylPREDNISolone (MEDROL DOSEPACK) 4 mg tablet Take 4 mg by mouth. use as directed    methylPREDNISolone (MEDROL DOSEPACK) 4 mg tablet use as directed    olmesartan-hydrochlorothiazide (BENICAR HCT) 40-12.5 mg Tab Take 1 tablet by mouth once daily.    ondansetron (ZOFRAN) 8 MG tablet Take 1 tablet (8 mg total) by mouth every 8 (eight) hours as needed for Nausea. (Patient not taking: Reported on 9/7/2023)    pregabalin (LYRICA) 75 MG capsule Take 1 capsule (75 mg total) by mouth every evening. (Patient not taking: Reported on 9/7/2023)    promethazine (PHENERGAN) 25 MG tablet Take 1 tablet (25 mg total) by mouth every 6 (six) hours as needed for Nausea.    promethazine (PHENERGAN) 25 MG tablet Take 1 tablet (25 mg total) by mouth every 6 (six) hours as needed for Nausea. (Patient not taking: Reported on 9/7/2023)    sucralfate (CARAFATE) 1 gram tablet Take 1 tablet (1 g total) by mouth 4 (four) times daily. (Patient not taking: Reported on 9/7/2023)    URO--10-40.8-36 mg Cap TAKE 1 CAPSULE BY MOUTH THREE TIMES DAILY AS NEEDED  FOR BLADDER PAIN (Patient not taking: Reported on 9/7/2023)    zolpidem (AMBIEN) 10 mg Tab Take 10 mg by mouth nightly as needed.     Family History       Problem Relation (Age of Onset)    Crohn's disease Sister, Brother, Other    Diabetes Sister, Sister    Liver cancer Father, Sister    Lung cancer Mother          Tobacco Use    Smoking status: Never    Smokeless tobacco: Never   Substance and Sexual Activity    Alcohol use: Not Currently     Alcohol/week: 0.0 standard drinks of alcohol    Drug use: No    Sexual activity: Yes     Partners: Male     Review of Systems   All other systems reviewed and are negative.    Objective:     Vital Signs (Most Recent):  Temp: 98.1 °F (36.7 °C) (11/23/23 1921)  Pulse: 83 (11/23/23 2332)  Resp: 17 (11/23/23 2103)  BP: (!) 171/87 (11/23/23 2332)  SpO2: 99 % (11/23/23 2332) Vital Signs (24h Range):  Temp:  [98.1 °F (36.7 °C)] 98.1 °F (36.7 °C)  Pulse:  [83-99] 83  Resp:  [17-25] 17  SpO2:  [97 %-99 %] 99 %  BP: (138-182)/(73-96) 171/87     Weight: 100.9 kg (222 lb 7.1 oz)  Body mass index is 40.04 kg/m².     Physical Exam  Vitals reviewed.   Constitutional:       General: She is not in acute distress.     Appearance: Normal appearance. She is obese. She is not ill-appearing, toxic-appearing or diaphoretic.   HENT:      Head: Normocephalic and atraumatic.      Right Ear: External ear normal.      Left Ear: External ear normal.      Nose: Nose normal. No congestion or rhinorrhea.      Mouth/Throat:      Mouth: Mucous membranes are moist.      Pharynx: Oropharynx is clear. No oropharyngeal exudate or posterior oropharyngeal erythema.   Eyes:      General: No scleral icterus.     Extraocular Movements: Extraocular movements intact.      Conjunctiva/sclera: Conjunctivae normal.      Pupils: Pupils are equal, round, and reactive to light.   Neck:      Vascular: No carotid bruit.   Cardiovascular:      Rate and Rhythm: Normal rate and regular rhythm.      Pulses: Normal pulses.       Heart sounds: Normal heart sounds. No murmur heard.     No friction rub. No gallop.   Pulmonary:      Effort: Pulmonary effort is normal. No respiratory distress.      Breath sounds: Normal breath sounds. No stridor. No wheezing, rhonchi or rales.   Chest:      Chest wall: No tenderness.   Abdominal:      General: Abdomen is flat. Bowel sounds are normal. There is no distension.      Palpations: Abdomen is soft.      Tenderness: There is abdominal tenderness. There is no right CVA tenderness, left CVA tenderness, guarding or rebound.      Hernia: No hernia is present.      Comments: Mild TTP throughout left upper and left lower quadrant without evidence of guarding or rebound tenderness noted.   Musculoskeletal:         General: Swelling present. No tenderness, deformity or signs of injury. Normal range of motion.      Cervical back: Normal range of motion and neck supple. No rigidity or tenderness.      Right lower leg: Edema present.      Left lower leg: Edema present.      Comments: Trace edema noted bilateral lower extremities   Lymphadenopathy:      Cervical: No cervical adenopathy.   Skin:     General: Skin is warm and dry.      Capillary Refill: Capillary refill takes less than 2 seconds.      Coloration: Skin is not jaundiced or pale.      Findings: Bruising and rash present. No erythema or lesion.      Comments: Numerous ecchymoses noted throughout bilateral lower extremities overlying remote insertion site following nerve ablation procedure.  Bilateral erythema noted overlying lower anterior shins.   Neurological:      General: No focal deficit present.      Mental Status: She is alert and oriented to person, place, and time. Mental status is at baseline.      Cranial Nerves: No cranial nerve deficit.      Sensory: No sensory deficit.      Motor: No weakness.      Coordination: Coordination normal.   Psychiatric:         Mood and Affect: Mood normal.         Behavior: Behavior normal.         Thought  Content: Thought content normal.         Judgment: Judgment normal.              CRANIAL NERVES     CN III, IV, VI   Pupils are equal, round, and reactive to light.       Significant Labs: All pertinent labs within the past 24 hours have been reviewed.    Significant Imaging: I have reviewed all pertinent imaging results/findings within the past 24 hours.    LABS:  Recent Results (from the past 24 hour(s))   CBC auto differential    Collection Time: 11/23/23  7:58 PM   Result Value Ref Range    WBC 6.18 3.90 - 12.70 K/uL    RBC 3.59 (L) 4.00 - 5.40 M/uL    Hemoglobin 10.5 (L) 12.0 - 16.0 g/dL    Hematocrit 32.3 (L) 37.0 - 48.5 %    MCV 90 82 - 98 fL    MCH 29.2 27.0 - 31.0 pg    MCHC 32.5 32.0 - 36.0 g/dL    RDW 13.8 11.5 - 14.5 %    Platelets 197 150 - 450 K/uL    MPV 8.3 (L) 9.2 - 12.9 fL    Immature Granulocytes 0.6 (H) 0.0 - 0.5 %    Gran # (ANC) 4.8 1.8 - 7.7 K/uL    Immature Grans (Abs) 0.04 0.00 - 0.04 K/uL    Lymph # 0.6 (L) 1.0 - 4.8 K/uL    Mono # 0.5 0.3 - 1.0 K/uL    Eos # 0.3 0.0 - 0.5 K/uL    Baso # 0.03 0.00 - 0.20 K/uL    nRBC 0 0 /100 WBC    Gran % 77.4 (H) 38.0 - 73.0 %    Lymph % 10.2 (L) 18.0 - 48.0 %    Mono % 7.3 4.0 - 15.0 %    Eosinophil % 4.0 0.0 - 8.0 %    Basophil % 0.5 0.0 - 1.9 %    Differential Method Automated    Comprehensive metabolic panel    Collection Time: 11/23/23  7:58 PM   Result Value Ref Range    Sodium 140 136 - 145 mmol/L    Potassium 3.1 (L) 3.5 - 5.1 mmol/L    Chloride 110 95 - 110 mmol/L    CO2 19 (L) 23 - 29 mmol/L    Glucose 129 (H) 70 - 110 mg/dL    BUN 23 8 - 23 mg/dL    Creatinine 1.4 0.5 - 1.4 mg/dL    Calcium 8.1 (L) 8.7 - 10.5 mg/dL    Total Protein 6.3 6.0 - 8.4 g/dL    Albumin 3.3 (L) 3.5 - 5.2 g/dL    Total Bilirubin 0.5 0.1 - 1.0 mg/dL    Alkaline Phosphatase 67 55 - 135 U/L    AST 7 (L) 10 - 40 U/L    ALT 12 10 - 44 U/L    eGFR 42 (A) >60 mL/min/1.73 m^2    Anion Gap 11 8 - 16 mmol/L   Lipase    Collection Time: 11/23/23  7:58 PM   Result Value Ref Range     Lipase 7 4 - 60 U/L   Urinalysis    Collection Time: 11/23/23  7:59 PM   Result Value Ref Range    Specimen UA Urine, Clean Catch     Color, UA Yellow Yellow, Straw, Debra    Appearance, UA Clear Clear    pH, UA 6.0 5.0 - 8.0    Specific Gravity, UA 1.010 1.005 - 1.030    Protein, UA Negative Negative    Glucose, UA Negative Negative    Ketones, UA Negative Negative    Bilirubin (UA) Negative Negative    Occult Blood UA Negative Negative    Nitrite, UA Positive (A) Negative    Urobilinogen, UA Negative <2.0 EU/dL    Leukocytes, UA 3+ (A) Negative   Urinalysis Microscopic    Collection Time: 11/23/23  7:59 PM   Result Value Ref Range    RBC, UA 4 0 - 4 /hpf    WBC, UA 59 (H) 0 - 5 /hpf    WBC Clumps, UA Few (A) None-Rare    Bacteria Moderate (A) None-Occ /hpf    Squam Epithel, UA 2 /hpf    Microscopic Comment SEE COMMENT    Occult blood x 1, stool    Collection Time: 11/24/23 12:06 AM    Specimen: Stool   Result Value Ref Range    Occult Blood Positive (A) Negative       RADIOLOGY  CTA Acute GI [place], Abdomen and Pelvis    Result Date: 11/23/2023  EXAMINATION: CTA ACUTE GI BLEED, ABDOMEN AND PELVIS CLINICAL HISTORY: GI bleed; TECHNIQUE: Low dose axial images, sagittal and coronal reformations were obtained from the lung bases to the pubic symphysis.  Contrast was administered.  Images acquired in a CTA GI bleeding protocol with 3D/MIP reformats before and after the administration 100 mL Omnipaque 350 IV contrast. COMPARISON: Multiple priors. FINDINGS: Heart: Normal in size. No pericardial effusion. Lung Bases: Well aerated, without consolidation or pleural fluid. Liver: Normal in size and attenuation, with no focal hepatic lesions. Gallbladder: Surgically absent. Bile Ducts: Mild extrahepatic biliary ductal dilation likely related patient status post cholecystectomy. Pancreas: Fatty atrophy of the pancreas. Spleen: Unremarkable. Adrenals: Unremarkable. Kidneys/ Ureters: No hydronephrosis.  No obstructive uropathy.   No nonobstructive nephrolithiasis. Bladder: No evidence of wall thickening. Reproductive organs: Unremarkable. GI Tract/Mesentery: No evidence of bowel obstruction or inflammation.  The appendix is surgically absent.  Postsurgical change of the stomach.  No ongoing arterial GI bleeding.  Diverticulosis without diverticulitis. Peritoneal Space: No ascites. No free air. Retroperitoneum: No significant adenopathy. Abdominal wall: Unremarkable. Vasculature: No significant atherosclerosis or aneurysm. Bones: No acute fracture.  Osseous degenerative changes.     No ongoing arterial GI bleeding identified.  Correlation and further evaluation as warranted. Complete findings as above. All CT scans at this facility are performed  using dose modulation techniques as appropriate to performed exam including the following:  automated exposure control; adjustment of mA and/or kV according to the patients size (this includes techniques or standardized protocols for targeted exams where dose is matched to indication/reason for exam: i.e. extremities or head);  iterative reconstruction technique. Electronically signed by: Lucio Romero Date:    11/23/2023 Time:    22:48    [facility]    Result Date: 11/14/2023  See OP Notes for results. IMPRESSION: See OP Notes for results. This procedure was auto-finalized by: [facility]    [place] [facility]    Result Date: 10/31/2023  See OP Notes for results. IMPRESSION: See OP Notes for results. This procedure was auto-finalized by: [facility]      EKG    MICROBIOLOGY    MDM

## 2023-11-24 NOTE — SUBJECTIVE & OBJECTIVE
Past Medical History:   Diagnosis Date    Anemia     Anxiety     Basal cell carcinoma (BCC) of face 2/26/2020    Blood transfusion     Bowel incontinence     Depression     Esophageal stricture     GERD (gastroesophageal reflux disease)     Hypertension     Latent tuberculosis by skin test 2001    took INH    Liver nodule 1/6/2014    Morbid obesity with BMI of 45.0-49.9, adult     OA (osteoarthritis) of knee 12/12/2014    Pericardial effusion 9/4/2014       Past Surgical History:   Procedure Laterality Date    CHOLECYSTECTOMY      COLONOSCOPY N/A 3/6/2023    Procedure: COLONOSCOPY;  Surgeon: Beti Diallo MD;  Location: [facility];  Service: Endoscopy;  Laterality: N/A;    GASTRIC BYPASS      HERNIA REPAIR      INJECTION OF ANESTHETIC AGENT AROUND NERVE Bilateral 6/16/2023    Procedure: Bilateral Genicular nerve block with RN IV sedation;  Surgeon: Denis Lai MD;  [facility];  Service: Pain Management;  Laterality: Bilateral;    INJECTION OF ANESTHETIC AGENT AROUND NERVE Bilateral 8/11/2023    Procedure: Bilateral Genicular nerve block with RN IV sedation;  Surgeon: Denis Lai MD;  Location: [facility];  Service: Pain Management;  Laterality: Bilateral;    RADIOFREQUENCY THERMOCOAGULATION Right 10/31/2023    Procedure: Right Genicular Nerve RFA with RN IV sedation;  Surgeon: Denis Lai MD;  Location: [facility];  Service: Pain Management;  Laterality: Right;    RADIOFREQUENCY THERMOCOAGULATION Left 11/14/2023    Procedure: Left Genicular Nerve RFA with RN IV sedation;  Surgeon: Denis Lai MD;  Location: [facility];  Service: Pain Management;  Laterality: Left;    right sholder surgery      SMALL INTESTINE SURGERY         Review of patient's allergies indicates:   Allergen Reactions    Metronidazole hcl Other (See Comments)     Mouth ulcers developed with Flagyl  Oral sores.  Mouth ulcers developed with Flagyl     Family History       Problem Relation (Age of Onset)     Crohn's disease Sister, Brother, Other    Diabetes Sister, Sister    Liver cancer Father, Sister    Lung cancer Mother          Tobacco Use    Smoking status: Never    Smokeless tobacco: Never   Substance and Sexual Activity    Alcohol use: Not Currently     Alcohol/week: 0.0 standard drinks of alcohol    Drug use: No    Sexual activity: Yes     Partners: Male     Review of Systems   Gastrointestinal:  Positive for abdominal pain and blood in stool.   All other systems reviewed and are negative.    Objective:     Vital Signs (Most Recent):  Temp: 97.1 °F (36.2 °C) (11/24/23 0741)  Pulse: 79 (11/24/23 0802)  Resp: 18 (11/24/23 0741)  BP: 127/87 (11/24/23 0741)  SpO2: 96 % (11/24/23 0741) Vital Signs (24h Range):  Temp:  [97.1 °F (36.2 °C)-98.3 °F (36.8 °C)] 97.1 °F (36.2 °C)  Pulse:  [] 79  Resp:  [17-25] 18  SpO2:  [96 %-99 %] 96 %  BP: (112-184)/(65-96) 127/87     Weight: 100.4 kg (221 lb 5.5 oz) (11/24/23 0159)  Body mass index is 39.21 kg/m².      Intake/Output Summary (Last 24 hours) at 11/24/2023 0900  Last data filed at 11/24/2023 0034  Gross per 24 hour   Intake 150 ml   Output 250 ml   Net -100 ml       Lines/Drains/Airways       Peripheral Intravenous Line  Duration                  Peripheral IV - Single Lumen 11/23/23 1956 20 G Anterior;Right Hand <1 day         Peripheral IV - Single Lumen 11/23/23 2207 18 G Left Antecubital <1 day                     Physical Exam  Vitals and nursing note reviewed.   Constitutional:       Appearance: She is obese. She is ill-appearing.   Abdominal:      General: Abdomen is protuberant. Bowel sounds are decreased.      Palpations: Abdomen is soft.      Tenderness: There is abdominal tenderness in the left lower quadrant.   Neurological:      Mental Status: She is alert.          Significant Labs:  CBC:   Recent Labs   Lab 11/1958 11/24/23  0548   WBC 6.18  --    HGB 10.5* 10.0*   HCT 32.3* 31.4*     --      CMP:   Recent Labs   Lab 11/1958  "11/24/23  0548   * 122*   CALCIUM 8.1* 7.8*   ALBUMIN 3.3*  --    PROT 6.3  --     139   K 3.1* 3.3*   CO2 19* 20*    110   BUN 23 18   CREATININE 1.4 1.1   ALKPHOS 67  --    ALT 12  --    AST 7*  --    BILITOT 0.5  --      Coagulation: No results for input(s): "PT", "INR", "APTT" in the last 48 hours.    Significant Imaging:  Imaging results within the past 24 hours have been reviewed.  "

## 2023-11-24 NOTE — PLAN OF CARE
Remains injury free. Up to bathroom with stand by assist, to independent. No s/s bleeding this shift. Pain managed with oral pain medicine. Tolerating diet. Receiving IV abx. No s/s acute distress.

## 2023-11-24 NOTE — PLAN OF CARE
Discussed poc with pt, verbalized understanding     Purposeful rounding every 2hours    VS wnl  Cardiac monitoring in use  Fall precautions in place, remains injury free  Pt denies c/o n/v   Pain being manage with Prn medication    Accurate I&Os  Abx will be given as prescribed  Bed locked at lowest position  Call light within reach    Chart check complete  Will cont with POC    Problem: Adult Inpatient Plan of Care  Goal: Plan of Care Review  Outcome: Ongoing, Progressing  Goal: Patient-Specific Goal (Individualized)  Outcome: Ongoing, Progressing  Goal: Absence of Hospital-Acquired Illness or Injury  Outcome: Ongoing, Progressing  Goal: Optimal Comfort and Wellbeing  Outcome: Ongoing, Progressing  Goal: Readiness for Transition of Care  Outcome: Ongoing, Progressing     Problem: Bariatric Environmental Safety  Goal: Safety Maintained with Care  Outcome: Ongoing, Progressing     Problem: Skin Injury Risk Increased  Goal: Skin Health and Integrity  Outcome: Ongoing, Progressing     Problem: Fluid and Electrolyte Imbalance (Acute Kidney Injury/Impairment)  Goal: Fluid and Electrolyte Balance  Outcome: Ongoing, Progressing     Problem: Oral Intake Inadequate (Acute Kidney Injury/Impairment)  Goal: Optimal Nutrition Intake  Outcome: Ongoing, Progressing     Problem: Renal Function Impairment (Acute Kidney Injury/Impairment)  Goal: Effective Renal Function  Outcome: Ongoing, Progressing     Problem: Impaired Wound Healing  Goal: Optimal Wound Healing  Outcome: Ongoing, Progressing

## 2023-11-24 NOTE — ED PROVIDER NOTES
SCRIBE #1 NOTE: I, Andres Montenegro, am scribing for, and in the presence of, Max Barnes MD. I have scribed the entire note.       History     Chief Complaint   Patient presents with    Rectal Bleeding     Pt reports bright, red blood in stool and left lower abdominal pain x 2 days.Pt has hx of rectal bleeding.     Review of patient's allergies indicates:   Allergen Reactions    Metronidazole hcl Other (See Comments)     Mouth ulcers developed with Flagyl  Oral sores.  Mouth ulcers developed with Flagyl         History of Present Illness     HPI    11/23/2023, 8:22 PM  History obtained from the patient      History of Present Illness: Divya Bui is a 63 y.o. female patient with a PMHx of anemia, HTN, diverticulitis, hemorrhoids who presents to the Emergency Department for evaluation of hematochezia which onset gradually yesterday. She reports having 6 episodes of bright red bowel movements yesterday and 6 episodes today. Symptoms are constant and moderate in severity. No mitigating or exacerbating factors reported. Associated sxs include LLQ and LUQ abdominal pain, generalized weakness, and nausea. Patient denies any fever, chills, CP, SOB, constipation, diarrhea, dysuria, flank, and all other sxs at this time. No further complaints or concerns at this time.       Arrival mode: Personal vehicle    PCP: Angel Khan MD        Past Medical History:  Past Medical History:   Diagnosis Date    Anemia     Anxiety     Basal cell carcinoma (BCC) of face 2/26/2020    Blood transfusion     Bowel incontinence     Depression     Esophageal stricture     GERD (gastroesophageal reflux disease)     Hypertension     Latent tuberculosis by skin test 2001    took INH    Liver nodule 1/6/2014    Morbid obesity with BMI of 45.0-49.9, adult     OA (osteoarthritis) of knee 12/12/2014    Pericardial effusion 9/4/2014       Past Surgical History:  Past Surgical History:   Procedure Laterality Date    CHOLECYSTECTOMY       COLONOSCOPY N/A 3/6/2023    Procedure: COLONOSCOPY;  Surgeon: Beti Diallo MD;  Location: [facility] END[facility];  Service: Endoscopy;  Laterality: N/A;    GASTRIC BYPASS      HERNIA REPAIR      INJECTION OF ANESTHETIC AGENT AROUND NERVE Bilateral 6/16/2023    Procedure: Bilateral Genicular nerve block with RN IV sedation;  Surgeon: Denis Lai MD;  Location: [facility];  Service: Pain Management;  Laterality: Bilateral;    INJECTION OF ANESTHETIC AGENT AROUND NERVE Bilateral 8/11/2023    Procedure: Bilateral Genicular nerve block with RN IV sedation;  Surgeon: Denis Lai MD;  Location: [facility];  Service: Pain Management;  Laterality: Bilateral;    RADIOFREQUENCY THERMOCOAGULATION Right 10/31/2023    Procedure: Right Genicular Nerve RFA with RN IV sedation;  Surgeon: Denis Lai MD;  Location: [facility];  Service: Pain Management;  Laterality: Right;    RADIOFREQUENCY THERMOCOAGULATION Left 11/14/2023    Procedure: Left Genicular Nerve RFA with RN IV sedation;  Surgeon: Denis Lai MD;  Location: [facility];  Service: Pain Management;  Laterality: Left;    right sholder surgery      SMALL INTESTINE SURGERY           Family History:  Family History   Problem Relation Age of Onset    Lung cancer Mother         smoker    Liver cancer Father     Diabetes Sister     Crohn's disease Sister     Liver cancer Sister     Diabetes Sister     Crohn's disease Brother     Crohn's disease Other     Breast cancer Neg Hx     Ovarian cancer Neg Hx     Colon cancer Neg Hx        Social History:  Social History     Tobacco Use    Smoking status: Never    Smokeless tobacco: Never   Substance and Sexual Activity    Alcohol use: Not Currently     Alcohol/week: 0.0 standard drinks of alcohol    Drug use: No    Sexual activity: Yes     Partners: Male        Review of Systems     Review of Systems   Constitutional:  Negative for chills and fever.   HENT:  Negative for sore throat.    Respiratory:  Negative for cough  and shortness of breath.    Cardiovascular:  Negative for chest pain.   Gastrointestinal:  Positive for abdominal pain (LLQ and LUQ), blood in stool (hematochezia; 6x yesterday and 6x today) and nausea. Negative for constipation, diarrhea and vomiting.   Genitourinary:  Negative for dysuria.   Musculoskeletal:  Negative for back pain.   Skin:  Negative for rash.   Neurological:  Positive for weakness (generalized).   Hematological:  Does not bruise/bleed easily.   All other systems reviewed and are negative.       Physical Exam     Initial Vitals [11/23/23 1921]   BP Pulse Resp Temp SpO2   (!) 182/82 98 20 98.1 °F (36.7 °C) 98 %      MAP       --          Physical Exam   Nursing Notes and Vital Signs Reviewed.  Constitutional: Patient is in no acute distress. Increased BMI.  Head: Atraumatic. Normocephalic.  Eyes: PERRL. EOM intact. Conjunctivae are not pale. No scleral icterus.  ENT: Mucous membranes are moist.   Neck: Supple. Full ROM. No lymphadenopathy.  Cardiovascular: Regular rate. Regular rhythm. No murmurs, rubs, or gallops. Distal pulses are 2+ and symmetric.  Pulmonary/Chest: No respiratory distress. Clear to auscultation bilaterally. No wheezing or rales.  Abdominal: Soft and non-distended.  There is mild LLQ and LUQ tenderness.  No rebound, guarding, or rigidity.  Genitourinary: No CVA tenderness. Bladder scan 250  Musculoskeletal: Moves all extremities. No obvious deformities. No edema. No unilateral leg with discrepancy.  Skin: Warm and dry.  Neurological:  Alert, awake, and appropriate.  Normal speech.  No acute focal neurological deficits are appreciated.  Psychiatric: Normal affect. Good eye contact. Appropriate in content.     ED Course   Procedures  ED Vital Signs:  Vitals:    11/23/23 1921 11/23/23 1942 11/23/23 1947 11/23/23 2002   BP: (!) 182/82 (!) 138/96 (!) 144/81 (!) 149/78   Pulse: 98 99 95 94   Resp: 20 19 (!) 24 (!) 25   Temp: 98.1 °F (36.7 °C)      TempSrc: Oral      SpO2: 98%  97% 97%    Weight: 100.9 kg (222 lb 7.1 oz)       11/23/23 2017 11/23/23 2047 11/23/23 2103 11/23/23 2242   BP: (!) 153/81  (!) 166/73 (!) 171/79   Pulse: 88  90 94   Resp: (!) 25 18 17    Temp:       TempSrc:       SpO2: 98%  98% 98%   Weight:        11/23/23 2332   BP: (!) 171/87   Pulse: 83   Resp:    Temp:    TempSrc:    SpO2: 99%   Weight:        Abnormal Lab Results:  Labs Reviewed   CBC W/ AUTO DIFFERENTIAL - Abnormal; Notable for the following components:       Result Value    RBC 3.59 (*)     Hemoglobin 10.5 (*)     Hematocrit 32.3 (*)     MPV 8.3 (*)     Immature Granulocytes 0.6 (*)     Lymph # 0.6 (*)     Gran % 77.4 (*)     Lymph % 10.2 (*)     All other components within normal limits    Narrative:     For upper or mid abdominal pain.   COMPREHENSIVE METABOLIC PANEL - Abnormal; Notable for the following components:    Potassium 3.1 (*)     CO2 19 (*)     Glucose 129 (*)     Calcium 8.1 (*)     Albumin 3.3 (*)     AST 7 (*)     eGFR 42 (*)     All other components within normal limits    Narrative:     For upper or mid abdominal pain.   URINALYSIS - Abnormal; Notable for the following components:    Nitrite, UA Positive (*)     Leukocytes, UA 3+ (*)     All other components within normal limits    Narrative:     In and Out Cath as needed it patient unable to void   URINALYSIS MICROSCOPIC - Abnormal; Notable for the following components:    WBC, UA 59 (*)     WBC Clumps, UA Few (*)     Bacteria Moderate (*)     All other components within normal limits    Narrative:     In and Out Cath as needed it patient unable to void   OCCULT BLOOD X 1, STOOL - Abnormal; Notable for the following components:    Occult Blood Positive (*)     All other components within normal limits   LIPASE    Narrative:     For upper or mid abdominal pain.        All Lab Results:  Results for orders placed or performed during the hospital encounter of 11/23/23   CBC auto differential   Result Value Ref Range    WBC 6.18 3.90 - 12.70 K/uL     RBC 3.59 (L) 4.00 - 5.40 M/uL    Hemoglobin 10.5 (L) 12.0 - 16.0 g/dL    Hematocrit 32.3 (L) 37.0 - 48.5 %    MCV 90 82 - 98 fL    MCH 29.2 27.0 - 31.0 pg    MCHC 32.5 32.0 - 36.0 g/dL    RDW 13.8 11.5 - 14.5 %    Platelets 197 150 - 450 K/uL    MPV 8.3 (L) 9.2 - 12.9 fL    Immature Granulocytes 0.6 (H) 0.0 - 0.5 %    Gran # (ANC) 4.8 1.8 - 7.7 K/uL    Immature Grans (Abs) 0.04 0.00 - 0.04 K/uL    Lymph # 0.6 (L) 1.0 - 4.8 K/uL    Mono # 0.5 0.3 - 1.0 K/uL    Eos # 0.3 0.0 - 0.5 K/uL    Baso # 0.03 0.00 - 0.20 K/uL    nRBC 0 0 /100 WBC    Gran % 77.4 (H) 38.0 - 73.0 %    Lymph % 10.2 (L) 18.0 - 48.0 %    Mono % 7.3 4.0 - 15.0 %    Eosinophil % 4.0 0.0 - 8.0 %    Basophil % 0.5 0.0 - 1.9 %    Differential Method Automated    Comprehensive metabolic panel   Result Value Ref Range    Sodium 140 136 - 145 mmol/L    Potassium 3.1 (L) 3.5 - 5.1 mmol/L    Chloride 110 95 - 110 mmol/L    CO2 19 (L) 23 - 29 mmol/L    Glucose 129 (H) 70 - 110 mg/dL    BUN 23 8 - 23 mg/dL    Creatinine 1.4 0.5 - 1.4 mg/dL    Calcium 8.1 (L) 8.7 - 10.5 mg/dL    Total Protein 6.3 6.0 - 8.4 g/dL    Albumin 3.3 (L) 3.5 - 5.2 g/dL    Total Bilirubin 0.5 0.1 - 1.0 mg/dL    Alkaline Phosphatase 67 55 - 135 U/L    AST 7 (L) 10 - 40 U/L    ALT 12 10 - 44 U/L    eGFR 42 (A) >60 mL/min/1.73 m^2    Anion Gap 11 8 - 16 mmol/L   Lipase   Result Value Ref Range    Lipase 7 4 - 60 U/L   Urinalysis   Result Value Ref Range    Specimen UA Urine, Clean Catch     Color, UA Yellow Yellow, Straw, Debra    Appearance, UA Clear Clear    pH, UA 6.0 5.0 - 8.0    Specific Gravity, UA 1.010 1.005 - 1.030    Protein, UA Negative Negative    Glucose, UA Negative Negative    Ketones, UA Negative Negative    Bilirubin (UA) Negative Negative    Occult Blood UA Negative Negative    Nitrite, UA Positive (A) Negative    Urobilinogen, UA Negative <2.0 EU/dL    Leukocytes, UA 3+ (A) Negative   Urinalysis Microscopic   Result Value Ref Range    RBC, UA 4 0 - 4 /hpf    WBC, UA 59  (H) 0 - 5 /hpf    WBC Clumps, UA Few (A) None-Rare    Bacteria Moderate (A) None-Occ /hpf    Squam Epithel, UA 2 /hpf    Microscopic Comment SEE COMMENT    Occult blood x 1, stool    Specimen: Stool   Result Value Ref Range    Occult Blood Positive (A) Negative         Imaging Results:  Imaging Results              CTA Acute GI [place], Abdomen and Pelvis (Final result)  Result time 11/23/23 22:48:22      Final result by Lucio Romero MD (11/23/23 22:48:22)                   Impression:      No ongoing arterial GI bleeding identified.  Correlation and further evaluation as warranted.    Complete findings as above.    All CT scans at this facility are performed  using dose modulation techniques as appropriate to performed exam including the following:  automated exposure control; adjustment of mA and/or kV according to the patients size (this includes techniques or standardized protocols for targeted exams where dose is matched to indication/reason for exam: i.e. extremities or head);  iterative reconstruction technique.      Electronically signed by: Lucio Romero  Date:    11/23/2023  Time:    22:48               Narrative:    EXAMINATION:  CTA ACUTE GI BLEED, ABDOMEN AND PELVIS    CLINICAL HISTORY:  GI bleed;    TECHNIQUE:  Low dose axial images, sagittal and coronal reformations were obtained from the lung bases to the pubic symphysis.  Contrast was administered.  Images acquired in a CTA GI bleeding protocol with 3D/MIP reformats before and after the administration 100 mL Omnipaque 350 IV contrast.    COMPARISON:  Multiple priors.    FINDINGS:  Heart: Normal in size. No pericardial effusion.    Lung Bases: Well aerated, without consolidation or pleural fluid.    Liver: Normal in size and attenuation, with no focal hepatic lesions.    Gallbladder: Surgically absent.    Bile Ducts: Mild extrahepatic biliary ductal dilation likely related patient status post cholecystectomy.    Pancreas: Fatty atrophy of the  pancreas.    Spleen: Unremarkable.    Adrenals: Unremarkable.    Kidneys/ Ureters: No hydronephrosis.  No obstructive uropathy.  No nonobstructive nephrolithiasis.    Bladder: No evidence of wall thickening.    Reproductive organs: Unremarkable.    GI Tract/Mesentery: No evidence of bowel obstruction or inflammation.  The appendix is surgically absent.  Postsurgical change of the stomach.  No ongoing arterial GI bleeding.  Diverticulosis without diverticulitis.    Peritoneal Space: No ascites. No free air.    Retroperitoneum: No significant adenopathy.    Abdominal wall: Unremarkable.    Vasculature: No significant atherosclerosis or aneurysm.    Bones: No acute fracture.  Osseous degenerative changes.                                     The EKG was ordered, reviewed, and independently interpreted by the ED provider.  Artifact significantly limits EKG interpretation   Interpretation time: 20:43  Rate: 90 BPM  Rhythm: normal sinus rhythm  Interpretation: No obvious ST changes. No STEMI.      The Emergency Provider reviewed the vital signs and test results, which are outlined above.     ED Discussion          Medical Decision Making  Admitted to Medicine for acute GI bleed with acute blood loss anemia    Problems Addressed:  Urinary tract infection with hematuria, site unspecified:     Details: Rocephin given    Amount and/or Complexity of Data Reviewed  External Data Reviewed: notes.     Details: Past medical history reviewed.  History of diverticulosis and internal hemorrhoids.  Medication list reviewed.  Patient is on aspirin.  No other anticoagulation.  Labs: ordered. Decision-making details documented in ED Course.  Radiology: ordered. Decision-making details documented in ED Course.  ECG/medicine tests: ordered and independent interpretation performed. Decision-making details documented in ED Course.  Discussion of management or test interpretation with external provider(s):     12:08 AM: GI bleed.  Patient said  she has had 6-7 episodes per day past 2 days of bright red blood filling up the toilet bowl. Her hemoglobin has dropped 2 points. She does have a h/o diverticulosis. She hasn't had any bloody Bms here in the ED and CTA is negative for any active bleeding so looks like it may be slowing down on its own. Pt also has UTI. Consulted  for observationa and abx.    12:28 AM: Discussed case with Asher Rice MD ([facility]). Dr. Rice agrees with current care and management of pt and accepts admission.   Admitting Service: [facility]  Admitting Physician: Dr. Rice  Admit to: inpatient med tele    12:28 AM: Re-evaluated pt. I have discussed test results, shared treatment plan, and the need for admission with patient and family at bedside. Pt and family express understanding at this time and agree with all information. All questions answered. Pt and family have no further questions or concerns at this time. Pt is ready for admit.      Risk  OTC drugs.  Prescription drug management.  Decision regarding hospitalization.                 ED Medication(s):  Medications   morphine injection 4 mg (4 mg Intravenous Given 11/23/23 2047)   promethazine (PHENERGAN) 12.5 mg in dextrose 5 % (D5W) 50 mL IVPB (0 mg Intravenous Stopped 11/23/23 2204)   iohexoL (OMNIPAQUE 350) injection 100 mL (100 mLs Intravenous Given 11/23/23 2225)   acetaminophen tablet 1,000 mg (1,000 mg Oral Given 11/23/23 2308)   cefTRIAXone (ROCEPHIN) 1 g in dextrose 5 % in water (D5W) 100 mL IVPB (MB+) (1 g Intravenous New Bag 11/23/23 2340)   potassium bicarbonate disintegrating tablet 20 mEq (20 mEq Oral Given 11/23/23 2340)       New Prescriptions    No medications on file               Scribe Attestation:   Scribe #1: I performed the above scribed service and the documentation accurately describes the services I performed. I attest to the accuracy of the note.     Attending:   Physician Attestation Statement for Scribe #1: Max TIJERINA  MD Molly, personally performed the services described in this documentation, as scribed by Andres Montenegro, in my presence, and it is both accurate and complete.           Clinical Impression       ICD-10-CM ICD-9-CM   1. Acute GI bleeding  K92.2 578.9   2. Abdominal pain  R10.9 789.00   3. Acute blood loss anemia  D62 285.1       Disposition:   Disposition: Admitted  Condition: Stable         Max Barnes MD  11/24/23 0044

## 2023-11-24 NOTE — H&P
[facility] - Emergency Dept.  [facility] Medicine  History & Physical    Patient Name: Divya Bui  MRN: [id number]  Patient Class: OP- Observation  Admission Date: 11/23/2023  Attending Physician: Asher Rice MD   Primary Care Provider: Angel Khan MD         Patient information was obtained from patient, past medical records, and ER records.     Subjective:     Principal Problem:Abdominal pain    Chief Complaint:   Chief Complaint   Patient presents with    Rectal Bleeding     Pt reports bright, red blood in stool and left lower abdominal pain x 2 days.Pt has hx of rectal bleeding.        HPI: Divya Bui is a 63 y.o. female with a PMH  has a past medical history of Anemia, Anxiety, Basal cell carcinoma (BCC) of face (2/26/2020), Blood transfusion, Bowel incontinence, Depression, Esophageal stricture, GERD (gastroesophageal reflux disease), Hypertension, Latent tuberculosis by skin test (2001), Liver nodule (1/6/2014), Morbid obesity with BMI of 45.0-49.9, adult, OA (osteoarthritis) of knee (12/12/2014), and Pericardial effusion (9/4/2014). who presented to the ED for further evaluation of multiple episodes of bright red painless bloody bowel movements which began earlier today.  Patient reports experiencing approximately 7 episodes of hematochezia in which she described filling up the toilet bowl with blood.  Associated symptoms included feeling lightheaded, dizzy, weak, and nauseous. She also reported endorsing bilateral lower extremity swelling, bruising, and rash since undergoing nerve ablation earlier this month in addition to left lower quadrant pain described as achy/nagging in nature, currently rated 6/10 in severity, intermittent, radiating down to her left colon, with no known alleviating or aggravating factors noted.  She reported undergoing colonoscopy earlier this year in which she was found to have evidence of nonbleeding internal hemorrhoids as well as diverticulosis.  She reports  taking aspirin 81 mg daily but denies use of other antiplatelet/anticoagulation therapies.  Prior to onset of symptoms, patient reported being in her usual state of health with no other concerns or complaints.  All other review of systems negative except as noted above.  Patient being admitted to Hospital Medicine under observation for continued medical management of lower GI bleed while awaiting further evaluation by GI in the morning.      PCP: Angel Khan      Past Medical History:   Diagnosis Date    Anemia     Anxiety     Basal cell carcinoma (BCC) of face 2/26/2020    Blood transfusion     Bowel incontinence     Depression     Esophageal stricture     GERD (gastroesophageal reflux disease)     Hypertension     Latent tuberculosis by skin test 2001    took INH    Liver nodule 1/6/2014    Morbid obesity with BMI of 45.0-49.9, adult     OA (osteoarthritis) of knee 12/12/2014    Pericardial effusion 9/4/2014       Past Surgical History:   Procedure Laterality Date    CHOLECYSTECTOMY      COLONOSCOPY N/A 3/6/2023    Procedure: COLONOSCOPY;  Surgeon: Beti Diallo MD;  Location: [facility];  Service: Endoscopy;  Laterality: N/A;    GASTRIC BYPASS      HERNIA REPAIR      INJECTION OF ANESTHETIC AGENT AROUND NERVE Bilateral 6/16/2023    Procedure: Bilateral Genicular nerve block with RN IV sedation;  Surgeon: Denis Lai MD;  Location: [facility] PAIN T;  Service: Pain Management;  Laterality: Bilateral;    INJECTION OF ANESTHETIC AGENT AROUND NERVE Bilateral 8/11/2023    Procedure: Bilateral Genicular nerve block with RN IV sedation;  Surgeon: Denis Lai MD;  Location: [facility] PAIN MGT;  Service: Pain Management;  Laterality: Bilateral;    RADIOFREQUENCY THERMOCOAGULATION Right 10/31/2023    Procedure: Right Genicular Nerve RFA with RN IV sedation;  Surgeon: Denis Lai MD;  Location: [facility] PAIN MGT;  Service: Pain Management;  Laterality: Right;    RADIOFREQUENCY THERMOCOAGULATION Left 11/14/2023     Procedure: Left Genicular Nerve RFA with RN IV sedation;  Surgeon: Denis Lai MD;  Location: [facility];  Service: Pain Management;  Laterality: Left;    right sholder surgery      SMALL INTESTINE SURGERY         Review of patient's allergies indicates:   Allergen Reactions    Metronidazole hcl Other (See Comments)     Mouth ulcers developed with Flagyl  Oral sores.  Mouth ulcers developed with Flagyl       No current facility-administered medications on file prior to encounter.     Current Outpatient Medications on File Prior to Encounter   Medication Sig    ALPRAZolam (XANAX) 1 MG tablet Take 1 mg by mouth 3 (three) times daily as needed.    amLODIPine (NORVASC) 5 MG tablet Take 1 tablet (5 mg total) by mouth once daily.    ARIPiprazole (ABILIFY) 10 MG Tab Take 10 mg by mouth once daily.    aspirin 81 mg Tab Take 1 tablet by mouth Daily.    atorvastatin (LIPITOR) 40 MG tablet Take 1 tablet (40 mg total) by mouth once daily.    dicyclomine (BENTYL) 20 mg tablet Take 1 tablet (20 mg total) by mouth 3 (three) times daily as needed (abdominal pain).    doxepin (SINEQUAN) 50 MG capsule Take 50 mg by mouth nightly.    fluoxetine (PROZAC) 40 MG capsule Take 1 capsule (40 mg total) by mouth once daily.    furosemide (LASIX) 20 MG tablet Take 1 tablet (20 mg total) by mouth daily as needed (swelling). (Patient not taking: Reported on 9/7/2023)    methylPREDNISolone (MEDROL DOSEPACK) 4 mg tablet Take 4 mg by mouth. use as directed    methylPREDNISolone (MEDROL DOSEPACK) 4 mg tablet use as directed    olmesartan-hydrochlorothiazide (BENICAR HCT) 40-12.5 mg Tab Take 1 tablet by mouth once daily.    ondansetron (ZOFRAN) 8 MG tablet Take 1 tablet (8 mg total) by mouth every 8 (eight) hours as needed for Nausea. (Patient not taking: Reported on 9/7/2023)    pregabalin (LYRICA) 75 MG capsule Take 1 capsule (75 mg total) by mouth every evening. (Patient not taking: Reported on 9/7/2023)    promethazine (PHENERGAN) 25 MG  tablet Take 1 tablet (25 mg total) by mouth every 6 (six) hours as needed for Nausea.    promethazine (PHENERGAN) 25 MG tablet Take 1 tablet (25 mg total) by mouth every 6 (six) hours as needed for Nausea. (Patient not taking: Reported on 9/7/2023)    sucralfate (CARAFATE) 1 gram tablet Take 1 tablet (1 g total) by mouth 4 (four) times daily. (Patient not taking: Reported on 9/7/2023)    URO--10-40.8-36 mg Cap TAKE 1 CAPSULE BY MOUTH THREE TIMES DAILY AS NEEDED FOR BLADDER PAIN (Patient not taking: Reported on 9/7/2023)    zolpidem (AMBIEN) 10 mg Tab Take 10 mg by mouth nightly as needed.     Family History       Problem Relation (Age of Onset)    Crohn's disease Sister, Brother, Other    Diabetes Sister, Sister    Liver cancer Father, Sister    Lung cancer Mother          Tobacco Use    Smoking status: Never    Smokeless tobacco: Never   Substance and Sexual Activity    Alcohol use: Not Currently     Alcohol/week: 0.0 standard drinks of alcohol    Drug use: No    Sexual activity: Yes     Partners: Male     Review of Systems   All other systems reviewed and are negative.    Objective:     Vital Signs (Most Recent):  Temp: 98.1 °F (36.7 °C) (11/23/23 1921)  Pulse: 83 (11/23/23 2332)  Resp: 17 (11/23/23 2103)  BP: (!) 171/87 (11/23/23 2332)  SpO2: 99 % (11/23/23 2332) Vital Signs (24h Range):  Temp:  [98.1 °F (36.7 °C)] 98.1 °F (36.7 °C)  Pulse:  [83-99] 83  Resp:  [17-25] 17  SpO2:  [97 %-99 %] 99 %  BP: (138-182)/(73-96) 171/87     Weight: 100.9 kg (222 lb 7.1 oz)  Body mass index is 40.04 kg/m².     Physical Exam  Vitals reviewed.   Constitutional:       General: She is not in acute distress.     Appearance: Normal appearance. She is obese. She is not ill-appearing, toxic-appearing or diaphoretic.   HENT:      Head: Normocephalic and atraumatic.      Right Ear: External ear normal.      Left Ear: External ear normal.      Nose: Nose normal. No congestion or rhinorrhea.      Mouth/Throat:      Mouth: Mucous  membranes are moist.      Pharynx: Oropharynx is clear. No oropharyngeal exudate or posterior oropharyngeal erythema.   Eyes:      General: No scleral icterus.     Extraocular Movements: Extraocular movements intact.      Conjunctiva/sclera: Conjunctivae normal.      Pupils: Pupils are equal, round, and reactive to light.   Neck:      Vascular: No carotid bruit.   Cardiovascular:      Rate and Rhythm: Normal rate and regular rhythm.      Pulses: Normal pulses.      Heart sounds: Normal heart sounds. No murmur heard.     No friction rub. No gallop.   Pulmonary:      Effort: Pulmonary effort is normal. No respiratory distress.      Breath sounds: Normal breath sounds. No stridor. No wheezing, rhonchi or rales.   Chest:      Chest wall: No tenderness.   Abdominal:      General: Abdomen is flat. Bowel sounds are normal. There is no distension.      Palpations: Abdomen is soft.      Tenderness: There is abdominal tenderness. There is no right CVA tenderness, left CVA tenderness, guarding or rebound.      Hernia: No hernia is present.      Comments: Mild TTP throughout left upper and left lower quadrant without evidence of guarding or rebound tenderness noted.   Musculoskeletal:         General: Swelling present. No tenderness, deformity or signs of injury. Normal range of motion.      Cervical back: Normal range of motion and neck supple. No rigidity or tenderness.      Right lower leg: Edema present.      Left lower leg: Edema present.      Comments: Trace edema noted bilateral lower extremities   Lymphadenopathy:      Cervical: No cervical adenopathy.   Skin:     General: Skin is warm and dry.      Capillary Refill: Capillary refill takes less than 2 seconds.      Coloration: Skin is not jaundiced or pale.      Findings: Bruising and rash present. No erythema or lesion.      Comments: Numerous ecchymoses noted throughout bilateral lower extremities overlying remote insertion site following nerve ablation procedure.   Bilateral erythema noted overlying lower anterior shins.   Neurological:      General: No focal deficit present.      Mental Status: She is alert and oriented to person, place, and time. Mental status is at baseline.      Cranial Nerves: No cranial nerve deficit.      Sensory: No sensory deficit.      Motor: No weakness.      Coordination: Coordination normal.   Psychiatric:         Mood and Affect: Mood normal.         Behavior: Behavior normal.         Thought Content: Thought content normal.         Judgment: Judgment normal.              CRANIAL NERVES     CN III, IV, VI   Pupils are equal, round, and reactive to light.       Significant Labs: All pertinent labs within the past 24 hours have been reviewed.    Significant Imaging: I have reviewed all pertinent imaging results/findings within the past 24 hours.    LABS:  Recent Results (from the past 24 hour(s))   CBC auto differential    Collection Time: 11/23/23  7:58 PM   Result Value Ref Range    WBC 6.18 3.90 - 12.70 K/uL    RBC 3.59 (L) 4.00 - 5.40 M/uL    Hemoglobin 10.5 (L) 12.0 - 16.0 g/dL    Hematocrit 32.3 (L) 37.0 - 48.5 %    MCV 90 82 - 98 fL    MCH 29.2 27.0 - 31.0 pg    MCHC 32.5 32.0 - 36.0 g/dL    RDW 13.8 11.5 - 14.5 %    Platelets 197 150 - 450 K/uL    MPV 8.3 (L) 9.2 - 12.9 fL    Immature Granulocytes 0.6 (H) 0.0 - 0.5 %    Gran # (ANC) 4.8 1.8 - 7.7 K/uL    Immature Grans (Abs) 0.04 0.00 - 0.04 K/uL    Lymph # 0.6 (L) 1.0 - 4.8 K/uL    Mono # 0.5 0.3 - 1.0 K/uL    Eos # 0.3 0.0 - 0.5 K/uL    Baso # 0.03 0.00 - 0.20 K/uL    nRBC 0 0 /100 WBC    Gran % 77.4 (H) 38.0 - 73.0 %    Lymph % 10.2 (L) 18.0 - 48.0 %    Mono % 7.3 4.0 - 15.0 %    Eosinophil % 4.0 0.0 - 8.0 %    Basophil % 0.5 0.0 - 1.9 %    Differential Method Automated    Comprehensive metabolic panel    Collection Time: 11/23/23  7:58 PM   Result Value Ref Range    Sodium 140 136 - 145 mmol/L    Potassium 3.1 (L) 3.5 - 5.1 mmol/L    Chloride 110 95 - 110 mmol/L    CO2 19 (L) 23 - 29  mmol/L    Glucose 129 (H) 70 - 110 mg/dL    BUN 23 8 - 23 mg/dL    Creatinine 1.4 0.5 - 1.4 mg/dL    Calcium 8.1 (L) 8.7 - 10.5 mg/dL    Total Protein 6.3 6.0 - 8.4 g/dL    Albumin 3.3 (L) 3.5 - 5.2 g/dL    Total Bilirubin 0.5 0.1 - 1.0 mg/dL    Alkaline Phosphatase 67 55 - 135 U/L    AST 7 (L) 10 - 40 U/L    ALT 12 10 - 44 U/L    eGFR 42 (A) >60 mL/min/1.73 m^2    Anion Gap 11 8 - 16 mmol/L   Lipase    Collection Time: 11/23/23  7:58 PM   Result Value Ref Range    Lipase 7 4 - 60 U/L   Urinalysis    Collection Time: 11/23/23  7:59 PM   Result Value Ref Range    Specimen UA Urine, Clean Catch     Color, UA Yellow Yellow, Straw, Debra    Appearance, UA Clear Clear    pH, UA 6.0 5.0 - 8.0    Specific Gravity, UA 1.010 1.005 - 1.030    Protein, UA Negative Negative    Glucose, UA Negative Negative    Ketones, UA Negative Negative    Bilirubin (UA) Negative Negative    Occult Blood UA Negative Negative    Nitrite, UA Positive (A) Negative    Urobilinogen, UA Negative <2.0 EU/dL    Leukocytes, UA 3+ (A) Negative   Urinalysis Microscopic    Collection Time: 11/23/23  7:59 PM   Result Value Ref Range    RBC, UA 4 0 - 4 /hpf    WBC, UA 59 (H) 0 - 5 /hpf    WBC Clumps, UA Few (A) None-Rare    Bacteria Moderate (A) None-Occ /hpf    Squam Epithel, UA 2 /hpf    Microscopic Comment SEE COMMENT    Occult blood x 1, stool    Collection Time: 11/24/23 12:06 AM    Specimen: Stool   Result Value Ref Range    Occult Blood Positive (A) Negative       RADIOLOGY  CTA Acute GI [place], Abdomen and Pelvis    Result Date: 11/23/2023  EXAMINATION: CTA ACUTE GI BLEED, ABDOMEN AND PELVIS CLINICAL HISTORY: GI bleed; TECHNIQUE: Low dose axial images, sagittal and coronal reformations were obtained from the lung bases to the pubic symphysis.  Contrast was administered.  Images acquired in a CTA GI bleeding protocol with 3D/MIP reformats before and after the administration 100 mL Omnipaque 350 IV contrast. COMPARISON: Multiple priors. FINDINGS:  Heart: Normal in size. No pericardial effusion. Lung Bases: Well aerated, without consolidation or pleural fluid. Liver: Normal in size and attenuation, with no focal hepatic lesions. Gallbladder: Surgically absent. Bile Ducts: Mild extrahepatic biliary ductal dilation likely related patient status post cholecystectomy. Pancreas: Fatty atrophy of the pancreas. Spleen: Unremarkable. Adrenals: Unremarkable. Kidneys/ Ureters: No hydronephrosis.  No obstructive uropathy.  No nonobstructive nephrolithiasis. Bladder: No evidence of wall thickening. Reproductive organs: Unremarkable. GI Tract/Mesentery: No evidence of bowel obstruction or inflammation.  The appendix is surgically absent.  Postsurgical change of the stomach.  No ongoing arterial GI bleeding.  Diverticulosis without diverticulitis. Peritoneal Space: No ascites. No free air. Retroperitoneum: No significant adenopathy. Abdominal wall: Unremarkable. Vasculature: No significant atherosclerosis or aneurysm. Bones: No acute fracture.  Osseous degenerative changes.     No ongoing arterial GI bleeding identified.  Correlation and further evaluation as warranted. Complete findings as above. All CT scans at this facility are performed  using dose modulation techniques as appropriate to performed exam including the following:  automated exposure control; adjustment of mA and/or kV according to the patients size (this includes techniques or standardized protocols for targeted exams where dose is matched to indication/reason for exam: i.e. extremities or head);  iterative reconstruction technique. Electronically signed by: Lucio Romero Date:    11/23/2023 Time:    22:48    [facility]    Result Date: 11/14/2023  See OP Notes for results. IMPRESSION: See OP Notes for results. This procedure was auto-finalized by: Virtual Radiologist    [facility]    Result Date: 10/31/2023  See OP Notes for results. IMPRESSION: See OP Notes for  results. This procedure was auto-finalized by: Virtual Radiologist      EKG    MICROBIOLOGY    [facility]    Assessment/Plan:     * Abdominal pain    Lower GI bleed  Patient presented with acute onset and progressively worsening left upper/left lower quadrant abdominal pain with multiple bouts of painless bright red bloody bowel movements.  CTA abdomen/pelvis negative for active bleed or other acute findings.  Previous colonoscopy noted on 03/06/2023 positive for nonbleeding internal hemorrhoids and diverticulosis.  H/H noted to have 2 point drop from 12.3/38.4 noted on 9/4/23 down to 10.5/32.2 on admission.  Type/screen obtain pending.  Antiplatelet/anticoagulation therapies held.  Patient currently remains hemodynamically stable.  GI consult pending in the morning along with potential blood transfusion pending repeat H/H.  Plan:  -NPO  -Continue current pain regimen, titrate as needed  -Bowel regimen  -Bedrest  -IVFs prn  -Type/screen, transfuse as needed  -Monitor H/H  -Antiemetics prn  -Tylenol as needed for fever   -f/u GI      Rash  Patient presented acute onset bilateral shin rash times 1 day duration in abscess of prior history of cellulitis. Patient s/p nerve ablation therapy and increased lower extremity swelling. Ddx included cellulitis vs insufficiency. Patient remain afebrile and without leukocytosis.   Plan:  -continue antibiotics  -wound care  -elevate lower extremities  -prn lasix        UTI (urinary tract infection)  Patient found to have evidence of UTI on UA.  Patient initiated on antibiotics with cultures obtained and pending.  Plan:  -continue antibiotics  -f/u cultures      HTN (hypertension)  Chronic, uncontrolled. Latest blood pressure and vitals reviewed-     Temp:  [98.1 °F (36.7 °C)-98.3 °F (36.8 °C)]   Pulse:  [77-99]   Resp:  [17-25]   BP: (138-184)/(71-96)   SpO2:  [96 %-99 %] .   Home meds for hypertension were reviewed and noted below.   Hypertension Medications               amLODIPine  (NORVASC) 5 MG tablet Take 1 tablet (5 mg total) by mouth once daily.    furosemide (LASIX) 20 MG tablet Take 1 tablet (20 mg total) by mouth daily as needed (swelling).    olmesartan-hydrochlorothiazide (BENICAR HCT) 40-12.5 mg Tab Take 1 tablet by mouth once daily.          While in the hospital, will manage blood pressure as follows; Continue home antihypertensive regimen    Will utilize p.r.n. blood pressure medication only if patient's blood pressure greater than 160/100 and she develops symptoms such as worsening chest pain or shortness of breath.      Acute renal failure superimposed on stage 3 chronic kidney disease  Patient with acute kidney injury/acute renal failure likely due to pre-renal azotemia due to IVVD ELIZABETH is currently  undergoing corrective therapy . Baseline creatinine  stage 3  - Labs reviewed- Renal function/electrolytes with Estimated Creatinine Clearance: 45.3 mL/min (based on SCr of 1.4 mg/dL). according to latest data. Monitor urine output and serial BMP and adjust therapy as needed. Avoid nephrotoxins and renally dose meds for GFR listed above.      Iron deficiency anemia  Patient's anemia is currently controlled. Has not received any PRBCs to date. Etiology likely d/t Iron deficiency  Current CBC reviewed-   Lab Results   Component Value Date    HGB 10.5 (L) 11/23/2023    HCT 32.3 (L) 11/23/2023     Monitor serial CBC and transfuse if patient becomes hemodynamically unstable, symptomatic or H/H drops below 7/21.      GERD (gastroesophageal reflux disease)  Chronic. Stable. Currently asymptomatic. Home medications include PPI/Antacids as needed.  Plan:  -Continue PPI/Antacids as needed       Hypokalemia  Patient has hypokalemia which is Chronic and currently controlled. Most recent potassium levels reviewed-   Lab Results   Component Value Date    K 3.1 (L) 11/23/2023   Plan:  -Will continue potassium replacement per protocol and recheck repeat levels after replacement completed        Anxiety    Insomnia    Depressive disorder  Chronic. Stable. Not in acute exacerbation and currently denies endorsing any suicidal/homicidal ideations.   Plan:  -Continue home medications       Class 2 severe obesity due to excess calories with serious comorbidity and body mass index (BMI) of 39.0 to 39.9 in adult  Body mass index is 39.21 kg/m². Morbid obesity complicates all aspects of disease management from diagnostic modalities to treatment. Weight loss encouraged and health benefits explained to patient.         VTE Risk Mitigation (From admission, onward)           Ordered     IP VTE HIGH RISK PATIENT  Once         11/24/23 0042     Place sequential compression device  Until discontinued         11/24/23 0042     Reason for No Pharmacological VTE Prophylaxis  Once        Question Answer Comment   Reasons: Risk of Bleeding    Reasons: Active Bleeding        11/24/23 0042                  //Core Measures   -DVT proph: SCDs, withholding anticoagulation pending surgical intervention and active GI bleed  -Code status: Full    -Surrogate: none provided       Components of this note were documented using a voice recognition system and are subject to errors not corrected at the time the document was proof read. Please contact the author for any clarifications.        Asher Rice MD  Department of Hospital Medicine  [facility] - Emergency Dept.

## 2023-11-24 NOTE — PROGRESS NOTES
[facility] Medicine  Progress Note    Patient Name: Divya Bui  MRN: [id number]  Patient Class: OP- Observation   Admission Date: 11/23/2023  Length of Stay: 1 days  Attending Physician: Malia Moe MD  Primary Care Provider: Angel Khan MD        Subjective:     Principal Problem:Lower GI bleed        HPI:  Divya Bui is a 63 y.o. female with a PMH  has a past medical history of Anemia, Anxiety, Basal cell carcinoma (BCC) of face (2/26/2020), Blood transfusion, Bowel incontinence, Depression, Esophageal stricture, GERD (gastroesophageal reflux disease), Hypertension, Latent tuberculosis by skin test (2001), Liver nodule (1/6/2014), Morbid obesity with BMI of 45.0-49.9, adult, OA (osteoarthritis) of knee (12/12/2014), and Pericardial effusion (9/4/2014). who presented to the ED for further evaluation of multiple episodes of bright red painless bloody bowel movements which began earlier today.  Patient reports experiencing approximately 7 episodes of hematochezia in which she described filling up the toilet bowl with blood.  Associated symptoms included feeling lightheaded, dizzy, weak, and nauseous. She also reported endorsing bilateral lower extremity swelling, bruising, and rash since undergoing nerve ablation earlier this month in addition to left lower quadrant pain described as achy/nagging in nature, currently rated 6/10 in severity, intermittent, radiating down to her left colon, with no known alleviating or aggravating factors noted.  She reported undergoing colonoscopy earlier this year in which she was found to have evidence of nonbleeding internal hemorrhoids as well as diverticulosis.  She reports taking aspirin 81 mg daily but denies use of other antiplatelet/anticoagulation therapies.  Prior to onset of symptoms, patient reported being in her usual state of health with no other concerns or complaints.  All other review of systems negative except as noted above.   Patient being admitted to Hospital Medicine under observation for continued medical management of lower GI bleed while awaiting further evaluation by GI in the morning.      PCP: Angel Khan      Overview/Hospital Course:  No notes on file    Interval History:  Patient seen and examined at bedside.  Continues to complain of left lower quadrant pain otherwise is better from admission.    Review of Systems   Constitutional:  Positive for chills. Negative for fatigue and fever.   Respiratory:  Negative for cough and shortness of breath.    Cardiovascular:  Negative for chest pain and leg swelling.   Gastrointestinal:  Positive for abdominal pain (Left lower quadrant). Negative for nausea and vomiting.   Neurological:  Positive for weakness.   All other systems reviewed and are negative.    Objective:     Vital Signs (Most Recent):  Temp: 97.5 °F (36.4 °C) (11/24/23 1145)  Pulse: 83 (11/24/23 1517)  Resp: 16 (11/24/23 1145)  BP: 118/65 (11/24/23 1145)  SpO2: 96 % (11/24/23 1145) Vital Signs (24h Range):  Temp:  [97.1 °F (36.2 °C)-98.3 °F (36.8 °C)] 97.5 °F (36.4 °C)  Pulse:  [] 83  Resp:  [16-25] 16  SpO2:  [96 %-99 %] 96 %  BP: (112-184)/(65-96) 118/65     Weight: 100.4 kg (221 lb 5.5 oz)  Body mass index is 39.21 kg/m².    Intake/Output Summary (Last 24 hours) at 11/24/2023 1533  Last data filed at 11/24/2023 0034  Gross per 24 hour   Intake 150 ml   Output 250 ml   Net -100 ml         Physical Exam  Vitals reviewed.   Constitutional:       General: She is not in acute distress.     Appearance: Normal appearance. She is obese. She is not ill-appearing, toxic-appearing or diaphoretic.   HENT:      Head: Normocephalic and atraumatic.      Right Ear: External ear normal.      Left Ear: External ear normal.      Nose: Nose normal. No congestion or rhinorrhea.      Mouth/Throat:      Mouth: Mucous membranes are moist.      Pharynx: Oropharynx is clear. No oropharyngeal exudate or posterior oropharyngeal  erythema.   Eyes:      General: No scleral icterus.     Extraocular Movements: Extraocular movements intact.      Conjunctiva/sclera: Conjunctivae normal.      Pupils: Pupils are equal, round, and reactive to light.   Neck:      Vascular: No carotid bruit.   Cardiovascular:      Rate and Rhythm: Normal rate and regular rhythm.      Pulses: Normal pulses.      Heart sounds: Normal heart sounds. No murmur heard.     No friction rub. No gallop.   Pulmonary:      Effort: Pulmonary effort is normal. No respiratory distress.      Breath sounds: Normal breath sounds. No stridor. No wheezing, rhonchi or rales.   Chest:      Chest wall: No tenderness.   Abdominal:      General: Abdomen is flat. Bowel sounds are normal. There is no distension.      Palpations: Abdomen is soft.      Tenderness: There is abdominal tenderness. There is no right CVA tenderness, left CVA tenderness, guarding or rebound.      Hernia: No hernia is present.      Comments: Mild TTP throughout left upper and left lower quadrant without evidence of guarding or rebound tenderness noted.   Musculoskeletal:         General: Swelling present. No tenderness, deformity or signs of injury. Normal range of motion.      Cervical back: Normal range of motion and neck supple. No rigidity or tenderness.      Right lower leg: Edema present.      Left lower leg: Edema present.      Comments: Trace edema noted bilateral lower extremities   Lymphadenopathy:      Cervical: No cervical adenopathy.   Skin:     General: Skin is warm and dry.      Capillary Refill: Capillary refill takes less than 2 seconds.      Coloration: Skin is not jaundiced or pale.      Findings: Bruising and rash present. No erythema or lesion.      Comments: Numerous ecchymoses noted throughout bilateral lower extremities overlying remote insertion site following nerve ablation procedure.  Bilateral erythema noted overlying lower anterior shins.   Neurological:      General: No focal deficit  "present.      Mental Status: She is alert and oriented to person, place, and time. Mental status is at baseline.      Cranial Nerves: No cranial nerve deficit.      Sensory: No sensory deficit.      Motor: No weakness.      Coordination: Coordination normal.   Psychiatric:         Mood and Affect: Mood normal.         Behavior: Behavior normal.         Thought Content: Thought content normal.         Judgment: Judgment normal.             Significant Labs: All pertinent labs within the past 24 hours have been reviewed.  Blood Culture: No results for input(s): "LABBLOO" in the last 48 hours.  CBC:   Recent Labs   Lab 11/1958 11/24/23  0548   WBC 6.18  --    HGB 10.5* 10.0*   HCT 32.3* 31.4*     --      CMP:   Recent Labs   Lab 11/1958 11/24/23  0548    139   K 3.1* 3.3*    110   CO2 19* 20*   * 122*   BUN 23 18   CREATININE 1.4 1.1   CALCIUM 8.1* 7.8*   PROT 6.3  --    ALBUMIN 3.3*  --    BILITOT 0.5  --    ALKPHOS 67  --    AST 7*  --    ALT 12  --    ANIONGAP 11 9       Significant Imaging: I have reviewed all pertinent imaging results/findings within the past 24 hours.    Assessment/Plan:      * Lower GI bleed  Patient presented with acute onset and progressively worsening left upper/left lower quadrant abdominal pain with multiple bouts of painless bright red bloody bowel movements.  CTA abdomen/pelvis negative for active bleed or other acute findings.  Previous colonoscopy noted on 03/06/2023 positive for nonbleeding internal hemorrhoids and diverticulosis.  H/H noted to have 2 point drop from 12.3/38.4 noted on 9/4/23 down to 10.5/32.2 on admission.  Type/screen obtain pending.  Antiplatelet/anticoagulation therapies held.  Patient currently remains hemodynamically stable.  GI consult pending in the morning along with potential blood transfusion pending repeat H/H.  Plan:  -clear liquid diet  -Continue current pain regimen, titrate as needed  -Bowel " regimen  -Bedrest  -IVFs prn  -Type/screen, transfuse as needed  -Monitor H/H  -Antiemetics prn  -Tylenol as needed for fever   -f/u GI      GERD (gastroesophageal reflux disease)  Chronic. Stable. Currently asymptomatic. Home medications include PPI/Antacids as needed.  Plan:  -Continue PPI/Antacids as needed       Rash  Patient presented acute onset bilateral shin rash times 1 day duration in abscess of prior history of cellulitis. Patient s/p nerve ablation therapy and increased lower extremity swelling. Ddx included cellulitis vs insufficiency. Patient remain afebrile and without leukocytosis.   Plan:  -continue antibiotics  -wound care  -elevate lower extremities  -prn lasix        UTI (urinary tract infection)  Patient found to have evidence of UTI on UA.  Patient initiated on Rocephin with cultures obtained and pending.  Plan:  -continue Rocephin  -f/u cultures      Insomnia        Depressive disorder  Chronic. Stable. Not in acute exacerbation and currently denies endorsing any suicidal/homicidal ideations.   Plan:  -Continue home medications       Class 2 severe obesity due to excess calories with serious comorbidity and body mass index (BMI) of 39.0 to 39.9 in adult  Body mass index is 39.21 kg/m². Morbid obesity complicates all aspects of disease management from diagnostic modalities to treatment. Weight loss encouraged and health benefits explained to patient.       Acute renal failure superimposed on stage 3 chronic kidney disease  Patient with acute kidney injury/acute renal failure likely due to pre-renal azotemia due to IVVD ELIZABETH is currently  undergoing corrective therapy . Baseline creatinine  stage 3  - Labs reviewed- Renal function/electrolytes with Estimated Creatinine Clearance: 57.6 mL/min (based on SCr of 1.1 mg/dL). according to latest data. Monitor urine output and serial BMP and adjust therapy as needed. Avoid nephrotoxins and renally dose meds for GFR listed  above.      Hypokalemia  Patient has hypokalemia which is Chronic and currently controlled. Most recent potassium levels reviewed-   Lab Results   Component Value Date    K 3.3 (L) 11/24/2023   Plan:  -Will continue potassium replacement per protocol and recheck repeat levels after replacement completed       Abdominal pain  Left lower quadrant still present.    No rebound or guarding.  Change antibiotics to Zosyn      Anxiety        Iron deficiency anemia  Patient's anemia is currently controlled. Has not received any PRBCs to date. Etiology likely d/t Iron deficiency  Current CBC reviewed-   Lab Results   Component Value Date    HGB 10.0 (L) 11/24/2023    HCT 31.4 (L) 11/24/2023     Monitor serial CBC and transfuse if patient becomes hemodynamically unstable, symptomatic or H/H drops below 7/21.      HTN (hypertension)  Chronic, uncontrolled. Latest blood pressure and vitals reviewed-     Temp:  [97.1 °F (36.2 °C)-98.3 °F (36.8 °C)]   Pulse:  []   Resp:  [16-25]   BP: (112-184)/(65-96)   SpO2:  [96 %-99 %] .   Home meds for hypertension were reviewed and noted below.   Hypertension Medications               amLODIPine (NORVASC) 5 MG tablet Take 1 tablet (5 mg total) by mouth once daily.    furosemide (LASIX) 20 MG tablet Take 1 tablet (20 mg total) by mouth daily as needed (swelling).    olmesartan-hydrochlorothiazide (BENICAR HCT) 40-12.5 mg Tab Take 1 tablet by mouth once daily.          While in the hospital, will manage blood pressure as follows; Continue home antihypertensive regimen    Will utilize p.r.n. blood pressure medication only if patient's blood pressure greater than 160/100 and she develops symptoms such as worsening chest pain or shortness of breath.        VTE Risk Mitigation (From admission, onward)           Ordered     IP VTE HIGH RISK PATIENT  Once         11/24/23 0042     Place sequential compression device  Until discontinued         11/24/23 0042     Reason for No Pharmacological  VTE Prophylaxis  Once        Question Answer Comment   Reasons: Risk of Bleeding    Reasons: Active Bleeding        11/24/23 0042                    Discharge Planning   ELADIA:      Code Status: Full Code   Is the patient medically ready for discharge?:     Reason for patient still in hospital (select all that apply): Patient trending condition and Treatment                     Malia Henry MD  Department of Hospital Medicine   '[facility] Med Surg

## 2023-11-25 PROBLEM — K92.2 LOWER GI BLEED: Status: RESOLVED | Noted: 2023-11-24 | Resolved: 2023-11-25

## 2023-11-25 LAB
ALBUMIN SERPL BCP-MCNC: 2.8 G/DL (ref 3.5–5.2)
ANION GAP SERPL CALC-SCNC: 10 MMOL/L (ref 8–16)
BASOPHILS # BLD AUTO: 0.01 K/UL (ref 0–0.2)
BASOPHILS NFR BLD: 0.2 % (ref 0–1.9)
BUN SERPL-MCNC: 15 MG/DL (ref 8–23)
CALCIUM SERPL-MCNC: 7.7 MG/DL (ref 8.7–10.5)
CHLORIDE SERPL-SCNC: 108 MMOL/L (ref 95–110)
CO2 SERPL-SCNC: 20 MMOL/L (ref 23–29)
CREAT SERPL-MCNC: 1.1 MG/DL (ref 0.5–1.4)
DIFFERENTIAL METHOD: ABNORMAL
EOSINOPHIL # BLD AUTO: 0.2 K/UL (ref 0–0.5)
EOSINOPHIL NFR BLD: 5.5 % (ref 0–8)
ERYTHROCYTE [DISTWIDTH] IN BLOOD BY AUTOMATED COUNT: 14.1 % (ref 11.5–14.5)
EST. GFR  (NO RACE VARIABLE): 56 ML/MIN/1.73 M^2
GLUCOSE SERPL-MCNC: 131 MG/DL (ref 70–110)
HCT VFR BLD AUTO: 31.3 % (ref 37–48.5)
HGB BLD-MCNC: 12 G/DL (ref 12–16)
HGB BLD-MCNC: 9.8 G/DL (ref 12–16)
IMM GRANULOCYTES # BLD AUTO: 0.01 K/UL (ref 0–0.04)
IMM GRANULOCYTES NFR BLD AUTO: 0.2 % (ref 0–0.5)
LYMPHOCYTES # BLD AUTO: 0.7 K/UL (ref 1–4.8)
LYMPHOCYTES NFR BLD: 17.7 % (ref 18–48)
MCH RBC QN AUTO: 28.9 PG (ref 27–31)
MCHC RBC AUTO-ENTMCNC: 31.3 G/DL (ref 32–36)
MCV RBC AUTO: 92 FL (ref 82–98)
MONOCYTES # BLD AUTO: 0.5 K/UL (ref 0.3–1)
MONOCYTES NFR BLD: 10.8 % (ref 4–15)
NEUTROPHILS # BLD AUTO: 2.7 K/UL (ref 1.8–7.7)
NEUTROPHILS NFR BLD: 65.6 % (ref 38–73)
NRBC BLD-RTO: 0 /100 WBC
PHOSPHATE SERPL-MCNC: 2.8 MG/DL (ref 2.7–4.5)
PLATELET # BLD AUTO: 181 K/UL (ref 150–450)
PMV BLD AUTO: 8.3 FL (ref 9.2–12.9)
POTASSIUM SERPL-SCNC: 3.6 MMOL/L (ref 3.5–5.1)
RBC # BLD AUTO: 3.39 M/UL (ref 4–5.4)
SODIUM SERPL-SCNC: 138 MMOL/L (ref 136–145)
WBC # BLD AUTO: 4.17 K/UL (ref 3.9–12.7)

## 2023-11-25 PROCEDURE — 36415 COLL VENOUS BLD VENIPUNCTURE: CPT | Performed by: INTERNAL MEDICINE

## 2023-11-25 PROCEDURE — 85025 COMPLETE CBC W/AUTO DIFF WBC: CPT | Performed by: INTERNAL MEDICINE

## 2023-11-25 PROCEDURE — 96365 THER/PROPH/DIAG IV INF INIT: CPT | Mod: 59

## 2023-11-25 PROCEDURE — 85018 HEMOGLOBIN: CPT | Mod: 91 | Performed by: INTERNAL MEDICINE

## 2023-11-25 PROCEDURE — 96366 THER/PROPH/DIAG IV INF ADDON: CPT

## 2023-11-25 PROCEDURE — 99214 PR OFFICE/OUTPT VISIT, EST, LEVL IV, 30-39 MIN: ICD-10-PCS | Mod: ,,, | Performed by: INTERNAL MEDICINE

## 2023-11-25 PROCEDURE — 63600175 PHARM REV CODE 636 W HCPCS: Performed by: INTERNAL MEDICINE

## 2023-11-25 PROCEDURE — 25000003 PHARM REV CODE 250: Performed by: INTERNAL MEDICINE

## 2023-11-25 PROCEDURE — G0378 HOSPITAL OBSERVATION PER HR: HCPCS

## 2023-11-25 PROCEDURE — 80069 RENAL FUNCTION PANEL: CPT | Performed by: INTERNAL MEDICINE

## 2023-11-25 PROCEDURE — 99214 OFFICE O/P EST MOD 30 MIN: CPT | Mod: ,,, | Performed by: INTERNAL MEDICINE

## 2023-11-25 PROCEDURE — 25000003 PHARM REV CODE 250: Performed by: HOSPITALIST

## 2023-11-25 RX ORDER — BISACODYL 10 MG
10 SUPPOSITORY, RECTAL RECTAL DAILY
Status: DISCONTINUED | OUTPATIENT
Start: 2023-11-25 | End: 2023-11-25

## 2023-11-25 RX ORDER — AMOXICILLIN AND CLAVULANATE POTASSIUM 875; 125 MG/1; MG/1
1 TABLET, FILM COATED ORAL 2 TIMES DAILY
Qty: 10 TABLET | Refills: 0 | Status: SHIPPED | OUTPATIENT
Start: 2023-11-25 | End: 2023-12-03

## 2023-11-25 RX ORDER — ONDANSETRON 4 MG/1
4 TABLET, ORALLY DISINTEGRATING ORAL EVERY 8 HOURS PRN
Qty: 30 TABLET | Refills: 0 | Status: SHIPPED | OUTPATIENT
Start: 2023-11-25 | End: 2023-11-26 | Stop reason: HOSPADM

## 2023-11-25 RX ORDER — AMOXICILLIN 250 MG
2 CAPSULE ORAL 2 TIMES DAILY
Status: DISCONTINUED | OUTPATIENT
Start: 2023-11-25 | End: 2023-11-26 | Stop reason: HOSPADM

## 2023-11-25 RX ORDER — POLYETHYLENE GLYCOL 3350 17 G/17G
17 POWDER, FOR SOLUTION ORAL DAILY
Status: DISCONTINUED | OUTPATIENT
Start: 2023-11-25 | End: 2023-11-26 | Stop reason: HOSPADM

## 2023-11-25 RX ORDER — HYDROCORTISONE ACETATE PRAMOXINE HCL 1; 1 G/100G; G/100G
CREAM TOPICAL 3 TIMES DAILY
Status: DISCONTINUED | OUTPATIENT
Start: 2023-11-25 | End: 2023-11-26 | Stop reason: HOSPADM

## 2023-11-25 RX ADMIN — POLYETHYLENE GLYCOL 3350 17 G: 17 POWDER, FOR SOLUTION ORAL at 10:11

## 2023-11-25 RX ADMIN — DOXEPIN HYDROCHLORIDE 50 MG: 25 CAPSULE ORAL at 08:11

## 2023-11-25 RX ADMIN — ALPRAZOLAM 1 MG: 1 TABLET ORAL at 08:11

## 2023-11-25 RX ADMIN — HYDROCODONE BITARTRATE AND ACETAMINOPHEN 1 TABLET: 5; 325 TABLET ORAL at 08:11

## 2023-11-25 RX ADMIN — ARIPIPRAZOLE 10 MG: 5 TABLET ORAL at 09:11

## 2023-11-25 RX ADMIN — HYDROCORTISONE ACETATE PRAMOXINE HCL: 1; 1 CREAM TOPICAL at 08:11

## 2023-11-25 RX ADMIN — HYDROCODONE BITARTRATE AND ACETAMINOPHEN 1 TABLET: 5; 325 TABLET ORAL at 09:11

## 2023-11-25 RX ADMIN — AMLODIPINE BESYLATE 5 MG: 5 TABLET ORAL at 09:11

## 2023-11-25 RX ADMIN — SENNOSIDES AND DOCUSATE SODIUM 2 TABLET: 8.6; 5 TABLET ORAL at 08:11

## 2023-11-25 RX ADMIN — PIPERACILLIN SODIUM AND TAZOBACTAM SODIUM 4.5 G: 4; .5 INJECTION, POWDER, FOR SOLUTION INTRAVENOUS at 09:11

## 2023-11-25 RX ADMIN — PIPERACILLIN SODIUM AND TAZOBACTAM SODIUM 4.5 G: 4; .5 INJECTION, POWDER, FOR SOLUTION INTRAVENOUS at 05:11

## 2023-11-25 RX ADMIN — HYDROCODONE BITARTRATE AND ACETAMINOPHEN 1 TABLET: 5; 325 TABLET ORAL at 03:11

## 2023-11-25 RX ADMIN — PIPERACILLIN SODIUM AND TAZOBACTAM SODIUM 4.5 G: 4; .5 INJECTION, POWDER, FOR SOLUTION INTRAVENOUS at 03:11

## 2023-11-25 RX ADMIN — BISACODYL 10 MG: 10 SUPPOSITORY RECTAL at 03:11

## 2023-11-25 RX ADMIN — ATORVASTATIN CALCIUM 40 MG: 40 TABLET, FILM COATED ORAL at 09:11

## 2023-11-25 RX ADMIN — HYDROCORTISONE ACETATE PRAMOXINE HCL: 1; 1 CREAM TOPICAL at 05:11

## 2023-11-25 RX ADMIN — SENNOSIDES AND DOCUSATE SODIUM 2 TABLET: 8.6; 5 TABLET ORAL at 10:11

## 2023-11-25 RX ADMIN — FLUOXETINE 40 MG: 20 CAPSULE ORAL at 09:11

## 2023-11-25 RX ADMIN — ACETAMINOPHEN 650 MG: 325 TABLET ORAL at 05:11

## 2023-11-25 NOTE — PLAN OF CARE
[facility] Med [facility]urg  Discharge Final Note    Primary Care Provider: Angel Khan MD    Expected Discharge Date: 11/25/2023    Final Discharge Note (most recent)       Final Note - 11/25/23 1109          Final Note    Assessment Type Final Discharge Note     Anticipated Discharge Disposition Home or Self Care        Post-Acute Status    Discharge Delays None known at this time                   No needs per chart review. KM,MSW    Important Message from Medicare

## 2023-11-25 NOTE — SUBJECTIVE & OBJECTIVE
Interval History:  Patient feeling much better.  States she is not had a bowel movement since she is been hospitalized.  Had does have some pain in left lower quadrant.  Seen with family in room    Review of Systems   Constitutional:  Negative for fatigue and fever.   Respiratory:  Negative for cough and shortness of breath.    Cardiovascular:  Negative for chest pain and leg swelling.   Gastrointestinal:  Positive for abdominal pain (Left lower quadrant) and constipation. Negative for nausea and vomiting.   All other systems reviewed and are negative.    Objective:     Vital Signs (Most Recent):  Temp: 98.2 °F (36.8 °C) (11/25/23 1626)  Pulse: 86 (11/25/23 1626)  Resp: 18 (11/25/23 1626)  BP: (!) 179/87 (11/25/23 1626)  SpO2: (!) 94 % (11/25/23 1626) Vital Signs (24h Range):  Temp:  [97.6 °F (36.4 °C)-98.7 °F (37.1 °C)] 98.2 °F (36.8 °C)  Pulse:  [77-96] 86  Resp:  [16-18] 18  SpO2:  [93 %-97 %] 94 %  BP: (115-179)/(62-87) 179/87     Weight: 100.4 kg (221 lb 5.5 oz)  Body mass index is 39.21 kg/m².    Intake/Output Summary (Last 24 hours) at 11/25/2023 1717  Last data filed at 11/25/2023 1619  Gross per 24 hour   Intake 261.48 ml   Output --   Net 261.48 ml         Physical Exam  Vitals reviewed.   Constitutional:       General: She is not in acute distress.     Appearance: Normal appearance. She is obese. She is not ill-appearing, toxic-appearing or diaphoretic.   HENT:      Head: Normocephalic and atraumatic.      Nose: No congestion or rhinorrhea.      Mouth/Throat:      Pharynx: No oropharyngeal exudate or posterior oropharyngeal erythema.   Eyes:      General: No scleral icterus.     Extraocular Movements: Extraocular movements intact.      Conjunctiva/sclera: Conjunctivae normal.   Neck:      Vascular: No carotid bruit.   Cardiovascular:      Rate and Rhythm: Normal rate and regular rhythm.      Heart sounds: No murmur heard.     No friction rub. No gallop.   Pulmonary:      Effort: Pulmonary effort is  normal. No respiratory distress.      Breath sounds: Normal breath sounds. No stridor. No wheezing, rhonchi or rales.   Chest:      Chest wall: No tenderness.   Abdominal:      General: Abdomen is flat. Bowel sounds are normal. There is no distension.      Palpations: Abdomen is soft.      Tenderness: There is abdominal tenderness. There is no right CVA tenderness, left CVA tenderness, guarding or rebound.      Hernia: No hernia is present.      Comments: Mild TTP throughout left upper and left lower quadrant without evidence of guarding or rebound tenderness noted.   Musculoskeletal:         General: Swelling present. No tenderness, deformity or signs of injury. Normal range of motion.      Cervical back: Normal range of motion and neck supple. No rigidity or tenderness.      Right lower leg: Edema present.      Left lower leg: Edema present.      Comments: Trace edema noted bilateral lower extremities   Lymphadenopathy:      Cervical: No cervical adenopathy.   Skin:     General: Skin is warm and dry.      Coloration: Skin is not jaundiced or pale.      Findings: Bruising and rash present. No erythema or lesion.      Comments: Numerous ecchymoses noted throughout bilateral lower extremities overlying remote insertion site following nerve ablation procedure.  Bilateral erythema noted overlying lower anterior shins.   Neurological:      Mental Status: She is alert and oriented to person, place, and time. Mental status is at baseline.      Cranial Nerves: No cranial nerve deficit.      Sensory: No sensory deficit.      Motor: No weakness.      Coordination: Coordination normal.   Psychiatric:         Mood and Affect: Mood normal.         Behavior: Behavior normal.         Thought Content: Thought content normal.         Judgment: Judgment normal.             Significant Labs: All pertinent labs within the past 24 hours have been reviewed.  CBC:   Recent Labs   Lab 11/23/23  1958/23  0548 11/25/23  0634   WBC  6.18  --  4.17   HGB 10.5* 10.0* 9.8*   HCT 32.3* 31.4* 31.3*     --  181     CMP:   Recent Labs   Lab 11/1958 11/24/23  0548 11/25/23  0634    139 138   K 3.1* 3.3* 3.6    110 108   CO2 19* 20* 20*   * 122* 131*   BUN 23 18 15   CREATININE 1.4 1.1 1.1   CALCIUM 8.1* 7.8* 7.7*   PROT 6.3  --   --    ALBUMIN 3.3*  --  2.8*   BILITOT 0.5  --   --    ALKPHOS 67  --   --    AST 7*  --   --    ALT 12  --   --    ANIONGAP 11 9 10       Significant Imaging: I have reviewed all pertinent imaging results/findings within the past 24 hours.

## 2023-11-25 NOTE — PROGRESS NOTES
[facility] S[facility]  Gastroenterology  Progress Note    Patient Name: Divya Bui  MRN: [id number]  Admission Date: 11/23/2023  Hospital Length of Stay: 1 days  Code Status: Full Code   Attending Provider: Malia Moe MD  Consulting Provider: Marina Burkett MD  Primary Care Physician: Angel Khan MD  Principal Problem: Acute lower GI bleeding        Subjective:     Interval History: continues to reports L sided abdominal pain. However able to ambulate, tolerating PO. Denies stools    Review of Systems  Objective:     Vital Signs (Most Recent):  Temp: 98.4 °F (36.9 °C) (11/25/23 1207)  Pulse: 87 (11/25/23 1207)  Resp: 16 (11/25/23 1207)  BP: (!) 167/87 (11/25/23 1207)  SpO2: (!) 93 % (11/25/23 1207) Vital Signs (24h Range):  Temp:  [97.6 °F (36.4 °C)-98.7 °F (37.1 °C)] 98.4 °F (36.9 °C)  Pulse:  [77-96] 87  Resp:  [16-18] 16  SpO2:  [93 %-97 %] 93 %  BP: (115-167)/(62-87) 167/87     Weight: 100.4 kg (221 lb 5.5 oz) (11/24/23 0159)  Body mass index is 39.21 kg/m².      Intake/Output Summary (Last 24 hours) at 11/25/2023 1312  Last data filed at 11/24/2023 1707  Gross per 24 hour   Intake 108.06 ml   Output --   Net 108.06 ml       Lines/Drains/Airways       Peripheral Intravenous Line  Duration                  Peripheral IV - Single Lumen 11/23/23 1956 20 G Anterior;Right Hand 1 day                     Physical Exam  Constitutional:       Appearance: Normal appearance. She is obese.   Abdominal:      General: Bowel sounds are decreased.      Palpations: Abdomen is soft.      Tenderness: There is abdominal tenderness in the left upper quadrant and left lower quadrant.   Neurological:      General: No focal deficit present.      Mental Status: She is alert and oriented to person, place, and time. Mental status is at baseline.   Psychiatric:         Attention and Perception: Attention normal.         Mood and Affect: Mood normal.         Speech: Speech normal.         Behavior: Behavior normal.  "Behavior is cooperative.         Thought Content: Thought content normal.         Cognition and Memory: Cognition normal.         Judgment: Judgment normal.          Significant Labs:  CBC:   Recent Labs   Lab 11/1958 11/24/23 0548 11/25/23  0634   WBC 6.18  --  4.17   HGB 10.5* 10.0* 9.8*   HCT 32.3* 31.4* 31.3*     --  181     CMP:   Recent Labs   Lab 11/1958 11/24/23 0548 11/25/23  0634   *   < > 131*   CALCIUM 8.1*   < > 7.7*   ALBUMIN 3.3*  --  2.8*   PROT 6.3  --   --       < > 138   K 3.1*   < > 3.6   CO2 19*   < > 20*      < > 108   BUN 23   < > 15   CREATININE 1.4   < > 1.1   ALKPHOS 67  --   --    ALT 12  --   --    AST 7*  --   --    BILITOT 0.5  --   --     < > = values in this interval not displayed.     Coagulation: No results for input(s): "PT", "INR", "APTT" in the last 48 hours.      Significant Imaging:  Imaging results within the past 24 hours have been reviewed.  Assessment/Plan:     GI  * Acute lower GI bleeding  Hx diverticular bleed 02/2023 requiring hospitalization  Colonoscopy 0/2023: pan tics  Presented to hematochezia  H&H stable.  No bowel movements since admission      Recommendations:  Continue to hold ASA for additional week  F/U with PPCP    Communicated with hospital medicine. I will sign off. Please contact us if you have any additional questions.      Marina Burkett MD  Gastroenterology  [facility]  "

## 2023-11-25 NOTE — PROGRESS NOTES
[facility]  Progress Note    Patient Name: Divya Bui  MRN: [id number]  Patient Class: OP- Observation   Admission Date: 11/23/2023  Length of Stay: 1 days  Attending Physician: Malia Meo MD  Primary Care Provider: Angel Khan MD        Subjective:     Principal Problem:Acute lower GI bleeding        HPI:  Divya Bui is a 63 y.o. female with a PMH  has a past medical history of Anemia, Anxiety, Basal cell carcinoma (BCC) of face (2/26/2020), Blood transfusion, Bowel incontinence, Depression, Esophageal stricture, GERD (gastroesophageal reflux disease), Hypertension, Latent tuberculosis by skin test (2001), Liver nodule (1/6/2014), Morbid obesity with BMI of 45.0-49.9, adult, OA (osteoarthritis) of knee (12/12/2014), and Pericardial effusion (9/4/2014). who presented to the ED for further evaluation of multiple episodes of bright red painless bloody bowel movements which began earlier today.  Patient reports experiencing approximately 7 episodes of hematochezia in which she described filling up the toilet bowl with blood.  Associated symptoms included feeling lightheaded, dizzy, weak, and nauseous. She also reported endorsing bilateral lower extremity swelling, bruising, and rash since undergoing nerve ablation earlier this month in addition to left lower quadrant pain described as achy/nagging in nature, currently rated 6/10 in severity, intermittent, radiating down to her left colon, with no known alleviating or aggravating factors noted.  She reported undergoing colonoscopy earlier this year in which she was found to have evidence of nonbleeding internal hemorrhoids as well as diverticulosis.  She reports taking aspirin 81 mg daily but denies use of other antiplatelet/anticoagulation therapies.  Prior to onset of symptoms, patient reported being in her usual state of health with no other concerns or complaints.  All other review of systems negative except as noted  above.  Patient being admitted to Hospital Medicine under observation for continued medical management of lower GI bleed while awaiting further evaluation by GI in the morning.      PCP: Angel Khan      Overview/Hospital Course:  No notes on file    Interval History:  Patient feeling much better.  States she is not had a bowel movement since she is been hospitalized.  Had does have some pain in left lower quadrant.  Seen with family in room    Review of Systems   Constitutional:  Negative for fatigue and fever.   Respiratory:  Negative for cough and shortness of breath.    Cardiovascular:  Negative for chest pain and leg swelling.   Gastrointestinal:  Positive for abdominal pain (Left lower quadrant) and constipation. Negative for nausea and vomiting.   All other systems reviewed and are negative.    Objective:     Vital Signs (Most Recent):  Temp: 98.2 °F (36.8 °C) (11/25/23 1626)  Pulse: 86 (11/25/23 1626)  Resp: 18 (11/25/23 1626)  BP: (!) 179/87 (11/25/23 1626)  SpO2: (!) 94 % (11/25/23 1626) Vital Signs (24h Range):  Temp:  [97.6 °F (36.4 °C)-98.7 °F (37.1 °C)] 98.2 °F (36.8 °C)  Pulse:  [77-96] 86  Resp:  [16-18] 18  SpO2:  [93 %-97 %] 94 %  BP: (115-179)/(62-87) 179/87     Weight: 100.4 kg (221 lb 5.5 oz)  Body mass index is 39.21 kg/m².    Intake/Output Summary (Last 24 hours) at 11/25/2023 1717  Last data filed at 11/25/2023 1619  Gross per 24 hour   Intake 261.48 ml   Output --   Net 261.48 ml         Physical Exam  Vitals reviewed.   Constitutional:       General: She is not in acute distress.     Appearance: Normal appearance. She is obese. She is not ill-appearing, toxic-appearing or diaphoretic.   HENT:      Head: Normocephalic and atraumatic.      Nose: No congestion or rhinorrhea.      Mouth/Throat:      Pharynx: No oropharyngeal exudate or posterior oropharyngeal erythema.   Eyes:      General: No scleral icterus.     Extraocular Movements: Extraocular movements intact.       Conjunctiva/sclera: Conjunctivae normal.   Neck:      Vascular: No carotid bruit.   Cardiovascular:      Rate and Rhythm: Normal rate and regular rhythm.      Heart sounds: No murmur heard.     No friction rub. No gallop.   Pulmonary:      Effort: Pulmonary effort is normal. No respiratory distress.      Breath sounds: Normal breath sounds. No stridor. No wheezing, rhonchi or rales.   Chest:      Chest wall: No tenderness.   Abdominal:      General: Abdomen is flat. Bowel sounds are normal. There is no distension.      Palpations: Abdomen is soft.      Tenderness: There is abdominal tenderness. There is no right CVA tenderness, left CVA tenderness, guarding or rebound.      Hernia: No hernia is present.      Comments: Mild TTP throughout left upper and left lower quadrant without evidence of guarding or rebound tenderness noted.   Musculoskeletal:         General: Swelling present. No tenderness, deformity or signs of injury. Normal range of motion.      Cervical back: Normal range of motion and neck supple. No rigidity or tenderness.      Right lower leg: Edema present.      Left lower leg: Edema present.      Comments: Trace edema noted bilateral lower extremities   Lymphadenopathy:      Cervical: No cervical adenopathy.   Skin:     General: Skin is warm and dry.      Coloration: Skin is not jaundiced or pale.      Findings: Bruising and rash present. No erythema or lesion.      Comments: Numerous ecchymoses noted throughout bilateral lower extremities overlying remote insertion site following nerve ablation procedure.  Bilateral erythema noted overlying lower anterior shins.   Neurological:      Mental Status: She is alert and oriented to person, place, and time. Mental status is at baseline.      Cranial Nerves: No cranial nerve deficit.      Sensory: No sensory deficit.      Motor: No weakness.      Coordination: Coordination normal.   Psychiatric:         Mood and Affect: Mood normal.         Behavior:  Behavior normal.         Thought Content: Thought content normal.         Judgment: Judgment normal.             Significant Labs: All pertinent labs within the past 24 hours have been reviewed.  CBC:   Recent Labs   Lab 11/1958 11/24/23 0548 11/25/23  0634   WBC 6.18  --  4.17   HGB 10.5* 10.0* 9.8*   HCT 32.3* 31.4* 31.3*     --  181     CMP:   Recent Labs   Lab 11/1958 11/24/23 0548 11/25/23  0634    139 138   K 3.1* 3.3* 3.6    110 108   CO2 19* 20* 20*   * 122* 131*   BUN 23 18 15   CREATININE 1.4 1.1 1.1   CALCIUM 8.1* 7.8* 7.7*   PROT 6.3  --   --    ALBUMIN 3.3*  --  2.8*   BILITOT 0.5  --   --    ALKPHOS 67  --   --    AST 7*  --   --    ALT 12  --   --    ANIONGAP 11 9 10       Significant Imaging: I have reviewed all pertinent imaging results/findings within the past 24 hours.    Assessment/Plan:      * Acute lower GI bleeding  Patient presented with acute onset and progressively worsening left upper/left lower quadrant abdominal pain with multiple bouts of painless bright red bloody bowel movements.  CTA abdomen/pelvis negative for active bleed or other acute findings.  Previous colonoscopy noted on 03/06/2023 positive for nonbleeding internal hemorrhoids and diverticulosis.  H/H noted to have 2 point drop from 12.3/38.4 noted on 9/4/23 down to 10.5/32.2 on admission.  Type/screen obtain pending.  Antiplatelet/anticoagulation therapies held.  Patient currently remains hemodynamically stable.  GI consult pending in the morning along with potential blood transfusion pending repeat H/H.  Plan:  -clear liquid diet  -Continue current pain regimen, titrate as needed  -Bowel regimen  -Bedrest  -IVFs prn  -Type/screen, transfuse as needed  -Monitor H/H  -Antiemetics prn  -Tylenol as needed for fever   -f/u GI      GERD (gastroesophageal reflux disease)  Chronic. Stable. Currently asymptomatic. Home medications include PPI/Antacids as needed.  Plan:  -Continue  PPI/Antacids as needed       Rash  Patient presented acute onset bilateral shin rash times 1 day duration in abscess of prior history of cellulitis. Patient s/p nerve ablation therapy and increased lower extremity swelling. Ddx included cellulitis vs insufficiency. Patient remain afebrile and without leukocytosis.   Plan:  -continue antibiotics  -wound care  -elevate lower extremities  -prn lasix        UTI (urinary tract infection)  Patient found to have evidence of UTI on UA.  Patient initiated on Rocephin with cultures obtained and pending.  Plan:  -continue Rocephin  -f/u cultures      Insomnia        Depressive disorder  Chronic. Stable. Not in acute exacerbation and currently denies endorsing any suicidal/homicidal ideations.   Plan:  -Continue home medications       Class 2 severe obesity due to excess calories with serious comorbidity and body mass index (BMI) of 39.0 to 39.9 in adult  Body mass index is 39.21 kg/m². Morbid obesity complicates all aspects of disease management from diagnostic modalities to treatment. Weight loss encouraged and health benefits explained to patient.       Acute renal failure superimposed on stage 3 chronic kidney disease  Patient with acute kidney injury/acute renal failure likely due to pre-renal azotemia due to IVVD ELIZABETH is currently  undergoing corrective therapy . Baseline creatinine  stage 3  - Labs reviewed- Renal function/electrolytes with Estimated Creatinine Clearance: 57.6 mL/min (based on SCr of 1.1 mg/dL). according to latest data. Monitor urine output and serial BMP and adjust therapy as needed. Avoid nephrotoxins and renally dose meds for GFR listed above.      Hypokalemia  Patient has hypokalemia which is Chronic and currently controlled. Most recent potassium levels reviewed-   Lab Results   Component Value Date    K 3.6 11/25/2023   Plan:  -Will continue potassium replacement per protocol and recheck repeat levels after replacement completed       Abdominal  pain  Left lower quadrant still present.  Likely secondary to diverticulitis.  No rebound or guarding.  Change antibiotics to Zosyn      Anxiety        Iron deficiency anemia  Patient's anemia is currently controlled. Has not received any PRBCs to date. Etiology likely d/t Iron deficiency  Current CBC reviewed-   Lab Results   Component Value Date    HGB 9.8 (L) 11/25/2023    HCT 31.3 (L) 11/25/2023     Monitor serial CBC and transfuse if patient becomes hemodynamically unstable, symptomatic or H/H drops below 7/21.      HTN (hypertension)  Chronic, uncontrolled. Latest blood pressure and vitals reviewed-     Temp:  [97.6 °F (36.4 °C)-98.7 °F (37.1 °C)]   Pulse:  [77-96]   Resp:  [16-18]   BP: (115-179)/(62-87)   SpO2:  [93 %-97 %] .   Home meds for hypertension were reviewed and noted below.   Hypertension Medications               amLODIPine (NORVASC) 5 MG tablet Take 1 tablet (5 mg total) by mouth once daily.    furosemide (LASIX) 20 MG tablet Take 1 tablet (20 mg total) by mouth daily as needed (swelling).    olmesartan-hydrochlorothiazide (BENICAR HCT) 40-12.5 mg Tab Take 1 tablet by mouth once daily.          While in the hospital, will manage blood pressure as follows; Continue home antihypertensive regimen    Will utilize p.r.n. blood pressure medication only if patient's blood pressure greater than 160/100 and she develops symptoms such as worsening chest pain or shortness of breath.        VTE Risk Mitigation (From admission, onward)           Ordered     IP VTE HIGH RISK PATIENT  Once         11/24/23 0042     Place sequential compression device  Until discontinued         11/24/23 0042     Reason for No Pharmacological VTE Prophylaxis  Once        Question Answer Comment   Reasons: Risk of Bleeding    Reasons: Active Bleeding        11/24/23 0042                We will await BM and if patient has a BM today we will discharge.    Discharge Planning   ELADIA: 11/25/2023     Code Status: Full Code   Is the  patient medically ready for discharge?:     Reason for patient still in hospital (select all that apply): Patient trending condition      Discharge Delays: None known at this time              Malia Henry MD  Department of Hospital Medicine   [facility]

## 2023-11-25 NOTE — SUBJECTIVE & OBJECTIVE
Subjective:     Interval History: continues to reports L sided abdominal pain. However able to ambulate, tolerating PO. Denies stools    Review of Systems  Objective:     Vital Signs (Most Recent):  Temp: 98.4 °F (36.9 °C) (11/25/23 1207)  Pulse: 87 (11/25/23 1207)  Resp: 16 (11/25/23 1207)  BP: (!) 167/87 (11/25/23 1207)  SpO2: (!) 93 % (11/25/23 1207) Vital Signs (24h Range):  Temp:  [97.6 °F (36.4 °C)-98.7 °F (37.1 °C)] 98.4 °F (36.9 °C)  Pulse:  [77-96] 87  Resp:  [16-18] 16  SpO2:  [93 %-97 %] 93 %  BP: (115-167)/(62-87) 167/87     Weight: 100.4 kg (221 lb 5.5 oz) (11/24/23 0159)  Body mass index is 39.21 kg/m².      Intake/Output Summary (Last 24 hours) at 11/25/2023 1312  Last data filed at 11/24/2023 1707  Gross per 24 hour   Intake 108.06 ml   Output --   Net 108.06 ml       Lines/Drains/Airways       Peripheral Intravenous Line  Duration                  Peripheral IV - Single Lumen 11/23/23 1956 20 G Anterior;Right Hand 1 day                     Physical Exam  Constitutional:       Appearance: Normal appearance. She is obese.   Abdominal:      General: Bowel sounds are decreased.      Palpations: Abdomen is soft.      Tenderness: There is abdominal tenderness in the left upper quadrant and left lower quadrant.   Neurological:      General: No focal deficit present.      Mental Status: She is alert and oriented to person, place, and time. Mental status is at baseline.   Psychiatric:         Attention and Perception: Attention normal.         Mood and Affect: Mood normal.         Speech: Speech normal.         Behavior: Behavior normal. Behavior is cooperative.         Thought Content: Thought content normal.         Cognition and Memory: Cognition normal.         Judgment: Judgment normal.          Significant Labs:  CBC:   Recent Labs   Lab 11/1958 11/24/23  0548 11/25/23  0634   WBC 6.18  --  4.17   HGB 10.5* 10.0* 9.8*   HCT 32.3* 31.4* 31.3*     --  181     CMP:   Recent Labs   Lab  "11/23/23  1958/23  0548 11/25/23  0634   *   < > 131*   CALCIUM 8.1*   < > 7.7*   ALBUMIN 3.3*  --  2.8*   PROT 6.3  --   --       < > 138   K 3.1*   < > 3.6   CO2 19*   < > 20*      < > 108   BUN 23   < > 15   CREATININE 1.4   < > 1.1   ALKPHOS 67  --   --    ALT 12  --   --    AST 7*  --   --    BILITOT 0.5  --   --     < > = values in this interval not displayed.     Coagulation: No results for input(s): "PT", "INR", "APTT" in the last 48 hours.      Significant Imaging:  Imaging results within the past 24 hours have been reviewed.  "

## 2023-11-25 NOTE — PLAN OF CARE
Problem: Adult Inpatient Plan of Care  Goal: Plan of Care Review  Outcome: Ongoing, Progressing  Flowsheets (Taken 11/25/2023 0405)  Plan of Care Reviewed With: patient     Problem: Skin Injury Risk Increased  Goal: Skin Health and Integrity  Outcome: Ongoing, Progressing     Problem: Fluid and Electrolyte Imbalance (Acute Kidney Injury/Impairment)  Goal: Fluid and Electrolyte Balance  Outcome: Ongoing, Progressing     Problem: Oral Intake Inadequate (Acute Kidney Injury/Impairment)  Goal: Optimal Nutrition Intake  Outcome: Ongoing, Progressing     Problem: Renal Function Impairment (Acute Kidney Injury/Impairment)  Goal: Effective Renal Function  Outcome: Ongoing, Progressing       POC reviewed with pt and family at bedside. verbalized understanding. NSR on tele. IV Abx., Pain managed with PRNs. I&Os documented. No complaints. all safety measures maintained. Will continue to monitor.

## 2023-11-25 NOTE — PLAN OF CARE
Remains injury free. Pain managed with oral pain medication. Ambulates in room without difficulty. Patient had bright red bowel movement after receiving bisacodyl suppository. Discharge cancelled. No s/s acute distress.

## 2023-11-26 VITALS
WEIGHT: 221.31 LBS | TEMPERATURE: 98 F | HEART RATE: 94 BPM | SYSTOLIC BLOOD PRESSURE: 144 MMHG | BODY MASS INDEX: 39.21 KG/M2 | OXYGEN SATURATION: 94 % | HEIGHT: 63 IN | DIASTOLIC BLOOD PRESSURE: 92 MMHG | RESPIRATION RATE: 20 BRPM

## 2023-11-26 LAB
HGB BLD-MCNC: 10 G/DL (ref 12–16)
HGB BLD-MCNC: 11.2 G/DL (ref 12–16)

## 2023-11-26 PROCEDURE — 85018 HEMOGLOBIN: CPT | Mod: 91 | Performed by: INTERNAL MEDICINE

## 2023-11-26 PROCEDURE — G0008 ADMIN INFLUENZA VIRUS VAC: HCPCS | Performed by: INTERNAL MEDICINE

## 2023-11-26 PROCEDURE — 63600175 PHARM REV CODE 636 W HCPCS: Performed by: INTERNAL MEDICINE

## 2023-11-26 PROCEDURE — 25000003 PHARM REV CODE 250: Performed by: HOSPITALIST

## 2023-11-26 PROCEDURE — 90471 IMMUNIZATION ADMIN: CPT | Performed by: INTERNAL MEDICINE

## 2023-11-26 PROCEDURE — 96366 THER/PROPH/DIAG IV INF ADDON: CPT

## 2023-11-26 PROCEDURE — 25000003 PHARM REV CODE 250: Performed by: INTERNAL MEDICINE

## 2023-11-26 PROCEDURE — 96376 TX/PRO/DX INJ SAME DRUG ADON: CPT

## 2023-11-26 PROCEDURE — 36415 COLL VENOUS BLD VENIPUNCTURE: CPT | Performed by: INTERNAL MEDICINE

## 2023-11-26 PROCEDURE — G0378 HOSPITAL OBSERVATION PER HR: HCPCS

## 2023-11-26 PROCEDURE — 63600175 PHARM REV CODE 636 W HCPCS: Performed by: HOSPITALIST

## 2023-11-26 PROCEDURE — 90694 VACC AIIV4 NO PRSRV 0.5ML IM: CPT | Performed by: INTERNAL MEDICINE

## 2023-11-26 RX ORDER — PROMETHAZINE HYDROCHLORIDE 25 MG/1
25 TABLET ORAL EVERY 6 HOURS PRN
Qty: 20 TABLET | Refills: 0 | Status: SHIPPED | OUTPATIENT
Start: 2023-11-26 | End: 2023-11-26 | Stop reason: SDUPTHER

## 2023-11-26 RX ORDER — LACTULOSE 10 G/15ML
20 SOLUTION ORAL ONCE
Status: COMPLETED | OUTPATIENT
Start: 2023-11-26 | End: 2023-11-26

## 2023-11-26 RX ORDER — PROMETHAZINE HYDROCHLORIDE 25 MG/1
25 TABLET ORAL EVERY 6 HOURS PRN
Qty: 20 TABLET | Refills: 0 | Status: SHIPPED | OUTPATIENT
Start: 2023-11-26 | End: 2024-01-04 | Stop reason: SDUPTHER

## 2023-11-26 RX ORDER — FLUCONAZOLE 150 MG/1
150 TABLET ORAL ONCE
Status: COMPLETED | OUTPATIENT
Start: 2023-11-26 | End: 2023-11-26

## 2023-11-26 RX ADMIN — HYDROCORTISONE ACETATE PRAMOXINE HCL: 1; 1 CREAM TOPICAL at 09:11

## 2023-11-26 RX ADMIN — LACTULOSE 20 G: 20 SOLUTION ORAL at 02:11

## 2023-11-26 RX ADMIN — HYDROCODONE BITARTRATE AND ACETAMINOPHEN 1 TABLET: 5; 325 TABLET ORAL at 03:11

## 2023-11-26 RX ADMIN — INFLUENZA A VIRUS A/VICTORIA/4897/2022 IVR-238 (H1N1) ANTIGEN (FORMALDEHYDE INACTIVATED), INFLUENZA A VIRUS A/DARWIN/6/2021 IVR-227 (H3N2) ANTIGEN (FORMALDEHYDE INACTIVATED), INFLUENZA B VIRUS B/AUSTRIA/1359417/2021 BVR-26 ANTIGEN (FORMALDEHYDE INACTIVATED), INFLUENZA B VIRUS B/PHUKET/3073/2013 BVR-1B ANTIGEN (FORMALDEHYDE INACTIVATED) 0.5 ML: 15; 15; 15; 15 INJECTION, SUSPENSION INTRAMUSCULAR at 06:11

## 2023-11-26 RX ADMIN — FLUCONAZOLE 150 MG: 150 TABLET ORAL at 04:11

## 2023-11-26 RX ADMIN — ARIPIPRAZOLE 10 MG: 5 TABLET ORAL at 09:11

## 2023-11-26 RX ADMIN — HYDROCODONE BITARTRATE AND ACETAMINOPHEN 1 TABLET: 5; 325 TABLET ORAL at 09:11

## 2023-11-26 RX ADMIN — SENNOSIDES AND DOCUSATE SODIUM 2 TABLET: 8.6; 5 TABLET ORAL at 09:11

## 2023-11-26 RX ADMIN — ATORVASTATIN CALCIUM 40 MG: 40 TABLET, FILM COATED ORAL at 09:11

## 2023-11-26 RX ADMIN — DICYCLOMINE HYDROCHLORIDE 20 MG: 20 TABLET ORAL at 05:11

## 2023-11-26 RX ADMIN — AMLODIPINE BESYLATE 5 MG: 5 TABLET ORAL at 09:11

## 2023-11-26 RX ADMIN — FLUOXETINE 40 MG: 20 CAPSULE ORAL at 09:11

## 2023-11-26 RX ADMIN — PIPERACILLIN SODIUM AND TAZOBACTAM SODIUM 4.5 G: 4; .5 INJECTION, POWDER, FOR SOLUTION INTRAVENOUS at 02:11

## 2023-11-26 RX ADMIN — MORPHINE SULFATE 2 MG: 4 INJECTION INTRAVENOUS at 12:11

## 2023-11-26 RX ADMIN — HYDROCORTISONE ACETATE PRAMOXINE HCL: 1; 1 CREAM TOPICAL at 02:11

## 2023-11-26 RX ADMIN — PIPERACILLIN SODIUM AND TAZOBACTAM SODIUM 4.5 G: 4; .5 INJECTION, POWDER, FOR SOLUTION INTRAVENOUS at 05:11

## 2023-11-26 RX ADMIN — POLYETHYLENE GLYCOL 3350 17 G: 17 POWDER, FOR SOLUTION ORAL at 09:11

## 2023-11-26 NOTE — PLAN OF CARE
Patient resting, no distress noted, no BM as of yet per the patient, 3+ ankle edema, bruising noted to the right shin, norco and morphine given for pain today, fall precautions in place

## 2023-11-26 NOTE — PLAN OF CARE
Problem: Adult Inpatient Plan of Care  Goal: Plan of Care Review  Outcome: Ongoing, Progressing  Flowsheets (Taken 11/26/2023 8415)  Plan of Care Reviewed With:   patient   son   daughter     Problem: Skin Injury Risk Increased  Goal: Skin Health and Integrity  Outcome: Ongoing, Progressing     Problem: Fluid and Electrolyte Imbalance (Acute Kidney Injury/Impairment)  Goal: Fluid and Electrolyte Balance  Outcome: Ongoing, Progressing     Problem: Impaired Wound Healing  Goal: Optimal Wound Healing  Outcome: Ongoing, Progressing         POC reviewed with patient and family at bedside. verbalized understanding. IV Abx. Purposeful rounding. No bloody BM overnight. Pain managed with PRNs.  all safety measures maintained. Will continue to monitor.

## 2023-11-27 ENCOUNTER — PATIENT OUTREACH (OUTPATIENT)
Dept: ADMINISTRATIVE | Facility: CLINIC | Age: 63
End: 2023-11-27
Payer: MEDICARE

## 2023-11-27 NOTE — PROGRESS NOTES
2nd Attempt made to reach patient for TCC call. Left voicemail please call [phone] leave first name, last name, and  for Matteo I will return your call.

## 2023-11-27 NOTE — NURSING
Patient given dc instructions, IV removed, Patient given flu shot, patient waited 15 mins no s/s reaction noted, patient wheeled to the front door for family to drive patient home.

## 2023-11-28 ENCOUNTER — HOSPITAL ENCOUNTER (EMERGENCY)
Facility: HOSPITAL | Age: 63
Discharge: HOME | End: 2023-11-28
Attending: EMERGENCY MEDICINE
Payer: MEDICARE

## 2023-11-28 ENCOUNTER — NURSE TRIAGE (OUTPATIENT)
Dept: ADMINISTRATIVE | Facility: CLINIC | Age: 63
End: 2023-11-28
Payer: MEDICARE

## 2023-11-28 VITALS
HEART RATE: 81 BPM | WEIGHT: 221.25 LBS | BODY MASS INDEX: 39.2 KG/M2 | OXYGEN SATURATION: 100 % | SYSTOLIC BLOOD PRESSURE: 172 MMHG | DIASTOLIC BLOOD PRESSURE: 85 MMHG | HEIGHT: 63 IN | TEMPERATURE: 98 F | RESPIRATION RATE: 18 BRPM

## 2023-11-28 DIAGNOSIS — S80.11XA CONTUSION OF RIGHT LEG, INITIAL ENCOUNTER: Primary | ICD-10-CM

## 2023-11-28 DIAGNOSIS — M79.604 RIGHT LEG PAIN: ICD-10-CM

## 2023-11-28 LAB
ALBUMIN SERPL BCP-MCNC: 3.7 G/DL (ref 3.5–5.2)
ALP SERPL-CCNC: 64 U/L (ref 55–135)
ALT SERPL W/O P-5'-P-CCNC: 8 U/L (ref 10–44)
ANION GAP SERPL CALC-SCNC: 12 MMOL/L (ref 8–16)
APTT PPP: 25.8 SEC (ref 21–32)
AST SERPL-CCNC: 9 U/L (ref 10–40)
BASOPHILS # BLD AUTO: 0.03 K/UL (ref 0–0.2)
BASOPHILS NFR BLD: 0.5 % (ref 0–1.9)
BILIRUB SERPL-MCNC: 0.5 MG/DL (ref 0.1–1)
BUN SERPL-MCNC: 9 MG/DL (ref 8–23)
CALCIUM SERPL-MCNC: 8.7 MG/DL (ref 8.7–10.5)
CHLORIDE SERPL-SCNC: 105 MMOL/L (ref 95–110)
CO2 SERPL-SCNC: 20 MMOL/L (ref 23–29)
CREAT SERPL-MCNC: 1 MG/DL (ref 0.5–1.4)
DIFFERENTIAL METHOD: ABNORMAL
EOSINOPHIL # BLD AUTO: 0.2 K/UL (ref 0–0.5)
EOSINOPHIL NFR BLD: 3.7 % (ref 0–8)
ERYTHROCYTE [DISTWIDTH] IN BLOOD BY AUTOMATED COUNT: 13.6 % (ref 11.5–14.5)
EST. GFR  (NO RACE VARIABLE): >60 ML/MIN/1.73 M^2
GLUCOSE SERPL-MCNC: 115 MG/DL (ref 70–110)
HCT VFR BLD AUTO: 34.5 % (ref 37–48.5)
HGB BLD-MCNC: 11.2 G/DL (ref 12–16)
IMM GRANULOCYTES # BLD AUTO: 0.05 K/UL (ref 0–0.04)
IMM GRANULOCYTES NFR BLD AUTO: 0.8 % (ref 0–0.5)
INR PPP: 1 (ref 0.8–1.2)
LYMPHOCYTES # BLD AUTO: 0.8 K/UL (ref 1–4.8)
LYMPHOCYTES NFR BLD: 13.7 % (ref 18–48)
MCH RBC QN AUTO: 29 PG (ref 27–31)
MCHC RBC AUTO-ENTMCNC: 32.5 G/DL (ref 32–36)
MCV RBC AUTO: 89 FL (ref 82–98)
MONOCYTES # BLD AUTO: 0.4 K/UL (ref 0.3–1)
MONOCYTES NFR BLD: 7.2 % (ref 4–15)
NEUTROPHILS # BLD AUTO: 4.4 K/UL (ref 1.8–7.7)
NEUTROPHILS NFR BLD: 74.1 % (ref 38–73)
NRBC BLD-RTO: 0 /100 WBC
PLATELET # BLD AUTO: 209 K/UL (ref 150–450)
PMV BLD AUTO: 8.2 FL (ref 9.2–12.9)
POTASSIUM SERPL-SCNC: 3.3 MMOL/L (ref 3.5–5.1)
PROT SERPL-MCNC: 6.8 G/DL (ref 6–8.4)
PROTHROMBIN TIME: 10.1 SEC (ref 9–12.5)
RBC # BLD AUTO: 3.86 M/UL (ref 4–5.4)
SODIUM SERPL-SCNC: 137 MMOL/L (ref 136–145)
WBC # BLD AUTO: 5.98 K/UL (ref 3.9–12.7)

## 2023-11-28 PROCEDURE — 85610 PROTHROMBIN TIME: CPT | Performed by: NURSE PRACTITIONER

## 2023-11-28 PROCEDURE — 99284 EMERGENCY DEPT VISIT MOD MDM: CPT | Mod: 25

## 2023-11-28 PROCEDURE — 80053 COMPREHEN METABOLIC PANEL: CPT | Performed by: NURSE PRACTITIONER

## 2023-11-28 PROCEDURE — 85730 THROMBOPLASTIN TIME PARTIAL: CPT | Performed by: NURSE PRACTITIONER

## 2023-11-28 PROCEDURE — 85025 COMPLETE CBC W/AUTO DIFF WBC: CPT | Performed by: NURSE PRACTITIONER

## 2023-11-28 RX ORDER — HYDROCODONE BITARTRATE AND ACETAMINOPHEN 5; 325 MG/1; MG/1
1 TABLET ORAL EVERY 6 HOURS PRN
Qty: 12 TABLET | Refills: 0 | Status: SHIPPED | OUTPATIENT
Start: 2023-11-28 | End: 2023-12-08

## 2023-11-28 NOTE — TELEPHONE ENCOUNTER
Spoke with patient to follow up on triage note with the leg swelling and black spot. Instructed patient per Dr Khan she would like her to be seen at the ER.  Patient stated she went to Urgent Care to get her leg looked at.  They Urgent Care) advised her to go to ER.  Patient stated she was on her way to ER.

## 2023-11-28 NOTE — TELEPHONE ENCOUNTER
Spoke with patient who states she was recently discharged from hospital on Sunday.  Patient states her right leg has a black area that is 4 inches in length.  Patient states her right leg has swelling and feels cool compared to the left side.  Advised ED per protocol patient verbalized understanding.     Reason for Disposition   Entire foot is cool or blue in comparison to other side    Additional Information   Negative: Sounds like a life-threatening emergency to the triager   Negative: Difficulty breathing at rest    Protocols used: Leg Swelling and Edema-A-OH

## 2023-11-28 NOTE — FIRST PROVIDER EVALUATION
"Medical screening examination initiated.  I have conducted a focused provider triage encounter, findings are as follows:    Brief history of present illness:  Patient sent to the ER from primary care for discoloration to right lower extremity with swelling and pain.  Recently discharged from the hospital with GI bleed.    Vitals:    11/28/23 1410   BP: 105/73   BP Location: Right arm   Patient Position: Sitting   Pulse: 108   Resp: 18   Temp: 98.3 °F (36.8 °C)   TempSrc: Oral   SpO2: 96%   Weight: 100.4 kg (221 lb 3.7 oz)   Height: 5' 3" (1.6 m)       Pertinent physical exam:  Ecchymosis noted to the lower right leg, tender on palpation.    Brief workup plan:  Labs, imaging    Preliminary workup initiated; this workup will be continued and followed by the physician or advanced practice provider that is assigned to the patient when roomed.  "

## 2023-11-28 NOTE — PROGRESS NOTES
C3 nurse spoke with Divya Bui  for a TCC post hospital discharge follow up call. The patient has a scheduled HOSFU appointment with Angel Khan MD  on 12/1/23 @ 9199.

## 2023-11-29 NOTE — ED PROVIDER NOTES
SCRIBE #1 NOTE: I, Heather Hughes, am scribing for, and in the presence of, Jessica Breaux MD. I have scribed the entire note.       History     Chief Complaint   Patient presents with    Leg Injury     Patient was seen at Urgent Care, recently discharged from Hospital for GIB, has bruising and paint to RLE.      Review of patient's allergies indicates:   Allergen Reactions    Metronidazole hcl Other (See Comments)     Mouth ulcers developed with Flagyl  Oral sores.  Mouth ulcers developed with Flagyl         History of Present Illness     HPI    11/28/2023, 9:38 PM  History obtained from the patient      History of Present Illness: Divya Bui is a 63 y.o. female patient with a PMHx of anemia, HTN, and basal cell carcinoma who presents to the Emergency Department for evaluation of pain and bruising to the RLE which onset this morning. The patient states that she noticed the bruising when she woke up this morning. She denies any injury. She states that she was just discharged from the hospital 2 days ago for a GI bleed. She notes that she has a tendency to bruise easily but is not taking any anticoagulants. Symptoms are constant and moderate in severity. No mitigating or exacerbating factors reported. Patient denies any fever, chills, weakness, dizziness, CP, SOB, and all other sxs at this time. No prior Tx reported. No further complaints or concerns at this time.       Arrival mode: Personal vehicle    PCP: Angel Khan MD        Past Medical History:  Past Medical History:   Diagnosis Date    Anemia     Anxiety     Basal cell carcinoma (BCC) of face 2/26/2020    Blood transfusion     Bowel incontinence     Depression     Esophageal stricture     GERD (gastroesophageal reflux disease)     Hypertension     Latent tuberculosis by skin test 2001    took INH    Liver nodule 1/6/2014    Morbid obesity with BMI of 45.0-49.9, adult     OA (osteoarthritis) of knee 12/12/2014    Pericardial effusion 9/4/2014        Past Surgical History:  Past Surgical History:   Procedure Laterality Date    CHOLECYSTECTOMY      COLONOSCOPY N/A 3/6/2023    Procedure: COLONOSCOPY;  Surgeon: Beti Diallo MD;  Location: [facility];  Service: Endoscopy;  Laterality: N/A;    GASTRIC BYPASS      HERNIA REPAIR      INJECTION OF ANESTHETIC AGENT AROUND NERVE Bilateral 6/16/2023    Procedure: Bilateral Genicular nerve block with RN IV sedation;  Surgeon: Denis Lai MD;  Location: [facility];  Service: Pain Management;  Laterality: Bilateral;    INJECTION OF ANESTHETIC AGENT AROUND NERVE Bilateral 8/11/2023    Procedure: Bilateral Genicular nerve block with RN IV sedation;  Surgeon: Denis Lai MD;  Location: [facility];  Service: Pain Management;  Laterality: Bilateral;    RADIOFREQUENCY THERMOCOAGULATION Right 10/31/2023    Procedure: Right Genicular Nerve RFA with RN IV sedation;  Surgeon: Denis Lai MD;  Location: [facility];  Service: Pain Management;  Laterality: Right;    RADIOFREQUENCY THERMOCOAGULATION Left 11/14/2023    Procedure: Left Genicular Nerve RFA with RN IV sedation;  Surgeon: Denis Lai MD;  Location: [facility];  Service: Pain Management;  Laterality: Left;    right sholder surgery      SMALL INTESTINE SURGERY           Family History:  Family History   Problem Relation Age of Onset    Lung cancer Mother         smoker    Liver cancer Father     Diabetes Sister     Crohn's disease Sister     Liver cancer Sister     Diabetes Sister     Crohn's disease Brother     Crohn's disease Other     Breast cancer Neg Hx     Ovarian cancer Neg Hx     Colon cancer Neg Hx        Social History:  Social History     Tobacco Use    Smoking status: Never    Smokeless tobacco: Never   Substance and Sexual Activity    Alcohol use: Not Currently     Alcohol/week: 0.0 standard drinks of alcohol    Drug use: No    Sexual activity: Yes     Partners: Male        Review of Systems     Review of Systems    Constitutional:  Negative for chills and fever.   HENT:  Negative for sore throat.    Respiratory:  Negative for shortness of breath.    Cardiovascular:  Negative for chest pain.   Gastrointestinal:  Negative for nausea and vomiting.   Genitourinary:  Negative for dysuria.   Musculoskeletal:  Positive for myalgias (RLE). Negative for back pain.   Skin:  Negative for rash.   Neurological:  Negative for dizziness and weakness.   Hematological:  Bruises/bleeds easily.   All other systems reviewed and are negative.     Physical Exam     Initial Vitals [11/28/23 1410]   BP Pulse Resp Temp SpO2   105/73 108 18 98.3 °F (36.8 °C) 96 %      MAP       --          Physical Exam  Nursing Notes and Vital Signs Reviewed.  Constitutional: Patient is in no acute distress. Well-developed and well-nourished.  Head: Atraumatic. Normocephalic.  Eyes: PERRL. EOM intact. Conjunctivae are not pale. No scleral icterus.  ENT: Mucous membranes are moist. Oropharynx is clear and symmetric.    Neck: Supple. Full ROM. No lymphadenopathy.  Cardiovascular: Regular rate. Regular rhythm. No murmurs, rubs, or gallops. Distal pulses are 2+ and symmetric.  Pulmonary/Chest: No respiratory distress. Clear to auscultation bilaterally. No wheezing or rales.  Abdominal: Soft and non-distended.  There is no tenderness.  No rebound, guarding, or rigidity. Good bowel sounds.  Genitourinary: No CVA tenderness  Musculoskeletal: Moves all extremities. No obvious deformities. No edema. No calf tenderness.  Skin: Warm and dry. Ecchymosis to the RLE  Neurological:  Alert, awake, and appropriate.  Normal speech.  No acute focal neurological deficits are appreciated.  Psychiatric: Normal affect. Good eye contact. Appropriate in content.     ED Course   Procedures  ED Vital Signs:  Vitals:    11/28/23 1410 11/28/23 2135 11/28/23 2146 11/28/23 2154   BP: 105/73 (!) 172/85     Pulse: 108 76 74 81   Resp: 18      Temp: 98.3 °F (36.8 °C)      TempSrc: Oral      SpO2:  "96% 100% 99% 100%   Weight: 100.4 kg (221 lb 3.7 oz)      Height: 5' 3" (1.6 m)          Abnormal Lab Results:  Labs Reviewed   CBC W/ AUTO DIFFERENTIAL - Abnormal; Notable for the following components:       Result Value    RBC 3.86 (*)     Hemoglobin 11.2 (*)     Hematocrit 34.5 (*)     MPV 8.2 (*)     Immature Granulocytes 0.8 (*)     Immature Grans (Abs) 0.05 (*)     Lymph # 0.8 (*)     Gran % 74.1 (*)     Lymph % 13.7 (*)     All other components within normal limits   COMPREHENSIVE METABOLIC PANEL - Abnormal; Notable for the following components:    Potassium 3.3 (*)     CO2 20 (*)     Glucose 115 (*)     AST 9 (*)     ALT 8 (*)     All other components within normal limits   PROTIME-INR   APTT        All Lab Results:  Results for orders placed or performed during the hospital encounter of 11/28/23   CBC auto differential   Result Value Ref Range    WBC 5.98 3.90 - 12.70 K/uL    RBC 3.86 (L) 4.00 - 5.40 M/uL    Hemoglobin 11.2 (L) 12.0 - 16.0 g/dL    Hematocrit 34.5 (L) 37.0 - 48.5 %    MCV 89 82 - 98 fL    MCH 29.0 27.0 - 31.0 pg    MCHC 32.5 32.0 - 36.0 g/dL    RDW 13.6 11.5 - 14.5 %    Platelets 209 150 - 450 K/uL    MPV 8.2 (L) 9.2 - 12.9 fL    Immature Granulocytes 0.8 (H) 0.0 - 0.5 %    Gran # (ANC) 4.4 1.8 - 7.7 K/uL    Immature Grans (Abs) 0.05 (H) 0.00 - 0.04 K/uL    Lymph # 0.8 (L) 1.0 - 4.8 K/uL    Mono # 0.4 0.3 - 1.0 K/uL    Eos # 0.2 0.0 - 0.5 K/uL    Baso # 0.03 0.00 - 0.20 K/uL    nRBC 0 0 /100 WBC    Gran % 74.1 (H) 38.0 - 73.0 %    Lymph % 13.7 (L) 18.0 - 48.0 %    Mono % 7.2 4.0 - 15.0 %    Eosinophil % 3.7 0.0 - 8.0 %    Basophil % 0.5 0.0 - 1.9 %    Differential Method Automated    Comprehensive metabolic panel   Result Value Ref Range    Sodium 137 136 - 145 mmol/L    Potassium 3.3 (L) 3.5 - 5.1 mmol/L    Chloride 105 95 - 110 mmol/L    CO2 20 (L) 23 - 29 mmol/L    Glucose 115 (H) 70 - 110 mg/dL    BUN 9 8 - 23 mg/dL    Creatinine 1.0 0.5 - 1.4 mg/dL    Calcium 8.7 8.7 - 10.5 mg/dL    " Total Protein 6.8 6.0 - 8.4 g/dL    Albumin 3.7 3.5 - 5.2 g/dL    Total Bilirubin 0.5 0.1 - 1.0 mg/dL    Alkaline Phosphatase 64 55 - 135 U/L    AST 9 (L) 10 - 40 U/L    ALT 8 (L) 10 - 44 U/L    eGFR >60 >60 mL/min/1.73 m^2    Anion Gap 12 8 - 16 mmol/L   Protime-INR   Result Value Ref Range    Prothrombin Time 10.1 9.0 - 12.5 sec    INR 1.0 0.8 - 1.2   APTT   Result Value Ref Range    aPTT 25.8 21.0 - 32.0 sec         Imaging Results:  Imaging Results              US Lower Extremity Veins Right (Final result)  Result time 11/28/23 16:56:00      Final result by Christopher Garcia MD (11/28/23 16:56:00)                   Impression:      No evidence of deep venous thrombosis right lower extremity.      Electronically signed by: Christopher Garcia MD  Date:    11/28/2023  Time:    16:56               Narrative:    EXAMINATION:  US LOWER EXTREMITY VEINS RIGHT    CLINICAL HISTORY:  Pain in right leg    TECHNIQUE:  Duplex and color flow Doppler and dynamic compression was performed of the right lower extremity veins.    COMPARISON:  None    FINDINGS:  The veins of the right lower extremity demonstrate normal compression, flow, color doppler appearance, and response to augmentation.    No evidence of DVT.    Left common femoral vein patent.                                                The Emergency Provider reviewed the vital signs and test results, which are outlined above.     ED Discussion     9:43 PM: Reassessed pt at this time.  Discussed with pt all pertinent ED information and results. Discussed pt dx and plan of tx. Gave pt all f/u and return to the ED instructions. All questions and concerns were addressed at this time. Pt expresses understanding of information and instructions, and is comfortable with plan to discharge. Pt is stable for discharge.    I discussed with patient and/or family/caretaker that evaluation in the ED does not suggest any emergent or life threatening medical conditions requiring immediate  intervention beyond what was provided in the ED, and I believe patient is safe for discharge.  Regardless, an unremarkable evaluation in the ED does not preclude the development or presence of a serious of life threatening condition. As such, patient was instructed to return immediately for any worsening or change in current symptoms.         Medical Decision Making  Amount and/or Complexity of Data Reviewed  Labs: ordered. Decision-making details documented in ED Course.  Radiology: ordered. Decision-making details documented in ED Course.    Risk  Prescription drug management.                ED Medication(s):  Medications - No data to display    Discharge Medication List as of 11/28/2023  9:40 PM        START taking these medications    Details   HYDROcodone-acetaminophen (NORCO) 5-325 mg per tablet Take 1 tablet by mouth every 6 (six) hours as needed for Pain., Starting Tue 11/28/2023, Until Fri 12/8/2023 at 6744, Print              Follow-up Information       Angel Khan MD In 2 days.    Specialty: Internal Medicine  Contact information:  [address] [facility] [address] [phone]               [facility] - Emergency Dept..    Specialty: Emergency Medicine  Why: As needed, If symptoms worsen  Contact information:  [address] [facility] [address] [phone]                               Scribe Attestation:   Scribe #1: I performed the above scribed service and the documentation accurately describes the services I performed. I attest to the accuracy of the note.     Attending:   Physician Attestation Statement for Scribe #1: I, Jessica Breaux MD, personally performed the services described in this documentation, as scribed by Heather Hughes, in my presence, and it is both accurate and complete.           Clinical Impression       ICD-10-CM ICD-9-CM   1. Contusion of right leg, initial encounter  S80.11XA 924.5   2. Right leg pain  M79.604 729.5       Disposition:    Disposition: Discharged  Condition: Stable         Jessica Breaux MD  11/30/23 0104

## 2023-11-30 ENCOUNTER — PATIENT OUTREACH (OUTPATIENT)
Dept: ADMINISTRATIVE | Facility: HOSPITAL | Age: 63
End: 2023-11-30
Payer: MEDICARE

## 2023-11-30 NOTE — PROGRESS NOTES
Called patient to confirm hospital follow up scheduled on 12/01/2023.   Patient is not confirmed. LM to confirm appt.

## 2023-12-01 ENCOUNTER — LAB VISIT (OUTPATIENT)
Dept: LAB | Facility: HOSPITAL | Age: 63
End: 2023-12-01
Attending: FAMILY MEDICINE
Payer: MEDICARE

## 2023-12-01 ENCOUNTER — OFFICE VISIT (OUTPATIENT)
Dept: INTERNAL MEDICINE | Facility: CLINIC | Age: 63
End: 2023-12-01
Payer: MEDICARE

## 2023-12-01 VITALS
WEIGHT: 223.75 LBS | HEIGHT: 63 IN | TEMPERATURE: 100 F | DIASTOLIC BLOOD PRESSURE: 84 MMHG | OXYGEN SATURATION: 97 % | SYSTOLIC BLOOD PRESSURE: 136 MMHG | BODY MASS INDEX: 39.64 KG/M2 | HEART RATE: 101 BPM

## 2023-12-01 DIAGNOSIS — T14.8XXA BRUISING: ICD-10-CM

## 2023-12-01 DIAGNOSIS — K92.2 GASTROINTESTINAL HEMORRHAGE, UNSPECIFIED GASTROINTESTINAL HEMORRHAGE TYPE: Primary | ICD-10-CM

## 2023-12-01 DIAGNOSIS — E87.1 HYPONATREMIA: ICD-10-CM

## 2023-12-01 DIAGNOSIS — R11.2 NAUSEA AND VOMITING, UNSPECIFIED VOMITING TYPE: ICD-10-CM

## 2023-12-01 DIAGNOSIS — K62.5 BRBPR (BRIGHT RED BLOOD PER RECTUM): ICD-10-CM

## 2023-12-01 DIAGNOSIS — N39.0 URINARY TRACT INFECTION WITHOUT HEMATURIA, SITE UNSPECIFIED: ICD-10-CM

## 2023-12-01 LAB
BASOPHILS # BLD AUTO: 0.04 K/UL (ref 0–0.2)
BASOPHILS NFR BLD: 0.6 % (ref 0–1.9)
DIFFERENTIAL METHOD: ABNORMAL
EOSINOPHIL # BLD AUTO: 0.2 K/UL (ref 0–0.5)
EOSINOPHIL NFR BLD: 3.6 % (ref 0–8)
ERYTHROCYTE [DISTWIDTH] IN BLOOD BY AUTOMATED COUNT: 14 % (ref 11.5–14.5)
HCT VFR BLD AUTO: 34.7 % (ref 37–48.5)
HGB BLD-MCNC: 11.2 G/DL (ref 12–16)
IMM GRANULOCYTES # BLD AUTO: 0.07 K/UL (ref 0–0.04)
IMM GRANULOCYTES NFR BLD AUTO: 1.1 % (ref 0–0.5)
LYMPHOCYTES # BLD AUTO: 1.5 K/UL (ref 1–4.8)
LYMPHOCYTES NFR BLD: 23.4 % (ref 18–48)
MCH RBC QN AUTO: 29 PG (ref 27–31)
MCHC RBC AUTO-ENTMCNC: 32.3 G/DL (ref 32–36)
MCV RBC AUTO: 90 FL (ref 82–98)
MONOCYTES # BLD AUTO: 0.5 K/UL (ref 0.3–1)
MONOCYTES NFR BLD: 7.4 % (ref 4–15)
NEUTROPHILS # BLD AUTO: 4 K/UL (ref 1.8–7.7)
NEUTROPHILS NFR BLD: 63.9 % (ref 38–73)
NRBC BLD-RTO: 0 /100 WBC
PLATELET # BLD AUTO: 244 K/UL (ref 150–450)
PMV BLD AUTO: 8.5 FL (ref 9.2–12.9)
RBC # BLD AUTO: 3.86 M/UL (ref 4–5.4)
WBC # BLD AUTO: 6.32 K/UL (ref 3.9–12.7)

## 2023-12-01 PROCEDURE — 87077 CULTURE AEROBIC IDENTIFY: CPT | Performed by: FAMILY MEDICINE

## 2023-12-01 PROCEDURE — 3075F SYST BP GE 130 - 139MM HG: CPT | Mod: CPTII,S$GLB,, | Performed by: FAMILY MEDICINE

## 2023-12-01 PROCEDURE — 3075F PR MOST RECENT SYSTOLIC BLOOD PRESS GE 130-139MM HG: ICD-10-PCS | Mod: CPTII,S$GLB,, | Performed by: FAMILY MEDICINE

## 2023-12-01 PROCEDURE — 99999 PR PBB SHADOW E&M-EST. PATIENT-LVL V: CPT | Mod: PBBFAC,,, | Performed by: FAMILY MEDICINE

## 2023-12-01 PROCEDURE — 3079F PR MOST RECENT DIASTOLIC BLOOD PRESSURE 80-89 MM HG: ICD-10-PCS | Mod: CPTII,S$GLB,, | Performed by: FAMILY MEDICINE

## 2023-12-01 PROCEDURE — 1101F PT FALLS ASSESS-DOCD LE1/YR: CPT | Mod: CPTII,S$GLB,, | Performed by: FAMILY MEDICINE

## 2023-12-01 PROCEDURE — 87186 SC STD MICRODIL/AGAR DIL: CPT | Performed by: FAMILY MEDICINE

## 2023-12-01 PROCEDURE — 87088 URINE BACTERIA CULTURE: CPT | Performed by: FAMILY MEDICINE

## 2023-12-01 PROCEDURE — 3044F PR MOST RECENT HEMOGLOBIN A1C LEVEL <7.0%: ICD-10-PCS | Mod: CPTII,S$GLB,, | Performed by: FAMILY MEDICINE

## 2023-12-01 PROCEDURE — 87086 URINE CULTURE/COLONY COUNT: CPT | Performed by: FAMILY MEDICINE

## 2023-12-01 PROCEDURE — 85025 COMPLETE CBC W/AUTO DIFF WBC: CPT | Performed by: FAMILY MEDICINE

## 2023-12-01 PROCEDURE — 1159F PR MEDICATION LIST DOCUMENTED IN MEDICAL RECORD: ICD-10-PCS | Mod: CPTII,S$GLB,, | Performed by: FAMILY MEDICINE

## 2023-12-01 PROCEDURE — 3079F DIAST BP 80-89 MM HG: CPT | Mod: CPTII,S$GLB,, | Performed by: FAMILY MEDICINE

## 2023-12-01 PROCEDURE — 3044F HG A1C LEVEL LT 7.0%: CPT | Mod: CPTII,S$GLB,, | Performed by: FAMILY MEDICINE

## 2023-12-01 PROCEDURE — 99999 PR PBB SHADOW E&M-EST. PATIENT-LVL V: ICD-10-PCS | Mod: PBBFAC,,, | Performed by: FAMILY MEDICINE

## 2023-12-01 PROCEDURE — 99213 PR OFFICE/OUTPT VISIT, EST, LEVL III, 20-29 MIN: ICD-10-PCS | Mod: S$GLB,,, | Performed by: FAMILY MEDICINE

## 2023-12-01 PROCEDURE — 1101F PR PT FALLS ASSESS DOC 0-1 FALLS W/OUT INJ PAST YR: ICD-10-PCS | Mod: CPTII,S$GLB,, | Performed by: FAMILY MEDICINE

## 2023-12-01 PROCEDURE — 36415 COLL VENOUS BLD VENIPUNCTURE: CPT | Mod: PO | Performed by: FAMILY MEDICINE

## 2023-12-01 PROCEDURE — 99213 OFFICE O/P EST LOW 20 MIN: CPT | Mod: S$GLB,,, | Performed by: FAMILY MEDICINE

## 2023-12-01 PROCEDURE — 3288F PR FALLS RISK ASSESSMENT DOCUMENTED: ICD-10-PCS | Mod: CPTII,S$GLB,, | Performed by: FAMILY MEDICINE

## 2023-12-01 PROCEDURE — 1111F DSCHRG MED/CURRENT MED MERGE: CPT | Mod: CPTII,S$GLB,, | Performed by: FAMILY MEDICINE

## 2023-12-01 PROCEDURE — 1111F PR DISCHARGE MEDS RECONCILED W/ CURRENT OUTPATIENT MED LIST: ICD-10-PCS | Mod: CPTII,S$GLB,, | Performed by: FAMILY MEDICINE

## 2023-12-01 PROCEDURE — 1159F MED LIST DOCD IN RCRD: CPT | Mod: CPTII,S$GLB,, | Performed by: FAMILY MEDICINE

## 2023-12-01 PROCEDURE — 3288F FALL RISK ASSESSMENT DOCD: CPT | Mod: CPTII,S$GLB,, | Performed by: FAMILY MEDICINE

## 2023-12-01 RX ORDER — FLUCONAZOLE 150 MG/1
TABLET ORAL
Qty: 2 TABLET | Refills: 0 | Status: SHIPPED | OUTPATIENT
Start: 2023-12-01

## 2023-12-01 NOTE — PROGRESS NOTES
Subjective:      Patient ID: Divya Bui is a 63 y.o. female.    Chief Complaint: Hospital Follow Up      Patient here for hospital discharge follow-up.  She was inpatient from November 23rd to 27th for GI bleed, UTI.  The day after her discharge on November 28th had gone back to ER as she would developed large black area on right lower leg.  She has had significant bruising on all extremities but especially the right well leg, no known injury or trauma to the area, all the workup negative.  She reports still seeing some blood in stool, did not have endoscopy done while inpatient although imaging normal      Review of Systems   Constitutional:  Positive for activity change and fatigue. Negative for appetite change.   Respiratory:  Negative for shortness of breath.    Cardiovascular:  Negative for chest pain, palpitations and leg swelling.   Gastrointestinal:  Negative for abdominal pain.   Skin:  Positive for color change.     Past Medical History:   Diagnosis Date    Anemia     Anxiety     Basal cell carcinoma (BCC) of face 2/26/2020    Blood transfusion     Bowel incontinence     Depression     Esophageal stricture     GERD (gastroesophageal reflux disease)     Hypertension     Latent tuberculosis by skin test 2001    took INH    Liver nodule 1/6/2014    Morbid obesity with BMI of 45.0-49.9, adult     OA (osteoarthritis) of knee 12/12/2014    Pericardial effusion 9/4/2014          Past Surgical History:   Procedure Laterality Date    CHOLECYSTECTOMY      COLONOSCOPY N/A 3/6/2023    Procedure: COLONOSCOPY;  Surgeon: Beti Diallo MD;  Location: [facility];  Service: Endoscopy;  Laterality: N/A;    GASTRIC BYPASS      HERNIA REPAIR      INJECTION OF ANESTHETIC AGENT AROUND NERVE Bilateral 6/16/2023    Procedure: Bilateral Genicular nerve block with RN IV sedation;  Surgeon: Denis Lai MD;  Location: [facility];  Service: Pain Management;  Laterality: Bilateral;    INJECTION OF ANESTHETIC AGENT AROUND  "NERVE Bilateral 8/11/2023    Procedure: Bilateral Genicular nerve block with RN IV sedation;  Surgeon: Denis Lai MD;  Location: [facility];  Service: Pain Management;  Laterality: Bilateral;    RADIOFREQUENCY THERMOCOAGULATION Right 10/31/2023    Procedure: Right Genicular Nerve RFA with RN IV sedation;  Surgeon: Denis Lai MD;  Location: [facility];  Service: Pain Management;  Laterality: Right;    RADIOFREQUENCY THERMOCOAGULATION Left 11/14/2023    Procedure: Left Genicular Nerve RFA with RN IV sedation;  Surgeon: Denis Lai MD;  Location: [facility];  Service: Pain Management;  Laterality: Left;    right sholder surgery      SMALL INTESTINE SURGERY       Family History   Problem Relation Age of Onset    Lung cancer Mother         smoker    Liver cancer Father     Diabetes Sister     Crohn's disease Sister     Liver cancer Sister     Diabetes Sister     Crohn's disease Brother     Crohn's disease Other     Breast cancer Neg Hx     Ovarian cancer Neg Hx     Colon cancer Neg Hx      Social History     Socioeconomic History    Marital status:    Tobacco Use    Smoking status: Never    Smokeless tobacco: Never   Substance and Sexual Activity    Alcohol use: Not Currently     Alcohol/week: 0.0 standard drinks of alcohol    Drug use: No    Sexual activity: Yes     Partners: Male     Review of patient's allergies indicates:   Allergen Reactions    Metronidazole hcl Other (See Comments)     Mouth ulcers developed with Flagyl  Oral sores.  Mouth ulcers developed with Flagyl       Objective:       /84 (BP Location: Right arm, Patient Position: Sitting, BP Method: Large (Manual))   Pulse 101   Temp 99.5 °F (37.5 °C) (Tympanic)   Ht 5' 3" (1.6 m)   Wt 101.5 kg (223 lb 12.3 oz)   SpO2 97%   BMI 39.64 kg/m²   Physical Exam  Constitutional:       General: She is not in acute distress.     Appearance: Normal appearance. She is well-developed. She is not ill-appearing or diaphoretic. "   Cardiovascular:      Rate and Rhythm: Normal rate and regular rhythm.      Heart sounds: Normal heart sounds.   Pulmonary:      Effort: Pulmonary effort is normal.      Breath sounds: Normal breath sounds.   Skin:     Findings: Bruising (Significant bruising to right lower leg) present.   Neurological:      Mental Status: She is alert and oriented to person, place, and time.   Psychiatric:         Mood and Affect: Mood normal.         Behavior: Behavior normal.         Thought Content: Thought content normal.         Judgment: Judgment normal.       Assessment:     1. Gastrointestinal hemorrhage, unspecified gastrointestinal hemorrhage type    2. BRBPR (bright red blood per rectum)    3. Bruising    4. Urinary tract infection without hematuria, site unspecified      Plan:   Gastrointestinal hemorrhage, unspecified gastrointestinal hemorrhage type    BRBPR (bright red blood per rectum)  -     CBC Auto Differential; Future; Expected date: 12/01/2023  -     Ambulatory referral/consult to Gastroenterology; Future; Expected date: 12/08/2023    Bruising    Urinary tract infection without hematuria, site unspecified  -     Urine culture; Future; Expected date: 12/01/2023    Other orders  -     fluconazole (DIFLUCAN) 150 MG Tab; Take first tablet today, then repeat in 3 days.  Dispense: 2 tablet; Refill: 0    Requesting antifungal as she is now having vaginal discharge after taking antibiotics.  Plan to recheck urine culture  Schedule with Gastroenterology for further workup if needed  Medication List with Changes/Refills   New Medications    FLUCONAZOLE (DIFLUCAN) 150 MG TAB    Take first tablet today, then repeat in 3 days.   Current Medications    ALPRAZOLAM (XANAX) 1 MG TABLET    Take 1 mg by mouth 3 (three) times daily as needed.    AMLODIPINE (NORVASC) 5 MG TABLET    Take 1 tablet (5 mg total) by mouth once daily.    ARIPIPRAZOLE (ABILIFY) 10 MG TAB    Take 10 mg by mouth once daily.    ASPIRIN 81 MG TAB    Take 1  tablet by mouth Daily.    ATORVASTATIN (LIPITOR) 40 MG TABLET    Take 1 tablet (40 mg total) by mouth once daily.    DICYCLOMINE (BENTYL) 20 MG TABLET    Take 1 tablet (20 mg total) by mouth 3 (three) times daily as needed (abdominal pain).    DOXEPIN (SINEQUAN) 50 MG CAPSULE    Take 50 mg by mouth nightly.    FLUOXETINE (PROZAC) 40 MG CAPSULE    Take 1 capsule (40 mg total) by mouth once daily.    FUROSEMIDE (LASIX) 20 MG TABLET    Take 1 tablet (20 mg total) by mouth daily as needed (swelling).    HYDROCODONE-ACETAMINOPHEN (NORCO) 5-325 MG PER TABLET    Take 1 tablet by mouth every 6 (six) hours as needed for Pain.    METHYLPREDNISOLONE (MEDROL DOSEPACK) 4 MG TABLET    Take 4 mg by mouth. use as directed    METHYLPREDNISOLONE (MEDROL DOSEPACK) 4 MG TABLET    use as directed    OLMESARTAN-HYDROCHLOROTHIAZIDE (BENICAR HCT) 40-12.5 MG TAB    Take 1 tablet by mouth once daily.    PROMETHAZINE (PHENERGAN) 25 MG TABLET    Take 1 tablet (25 mg total) by mouth every 6 (six) hours as needed for Nausea.    URO--10-40.8-36 MG CAP    TAKE 1 CAPSULE BY MOUTH THREE TIMES DAILY AS NEEDED FOR BLADDER PAIN    ZOLPIDEM (AMBIEN) 10 MG TAB    Take 10 mg by mouth nightly as needed.   Discontinued Medications    AMOXICILLIN-CLAVULANATE 875-125MG (AUGMENTIN) 875-125 MG PER TABLET    Take 1 tablet by mouth 2 (two) times daily. for 5 days

## 2023-12-04 ENCOUNTER — TELEPHONE (OUTPATIENT)
Dept: INTERNAL MEDICINE | Facility: CLINIC | Age: 63
End: 2023-12-04
Payer: MEDICARE

## 2023-12-04 LAB — BACTERIA UR CULT: ABNORMAL

## 2023-12-04 RX ORDER — CIPROFLOXACIN 500 MG/1
500 TABLET ORAL 2 TIMES DAILY
Qty: 10 TABLET | Refills: 0 | Status: SHIPPED | OUTPATIENT
Start: 2023-12-04 | End: 2024-01-04

## 2023-12-04 NOTE — TELEPHONE ENCOUNTER
Called to discuss ultrasound results- endometrial polyp. Pt interested in polypectomy and mirena insertion when mirena becomes available. Maybe beginning of Jan 2018    Laura Mora MD, FACOG  OB/GYN  Pager: [phone]     Spoke with pt, let her know information as stated by  in result notes. Pt verbalized understanding and wanted to know should she still follow up with gastro? And should she be concerned about the bruising?

## 2023-12-04 NOTE — TELEPHONE ENCOUNTER
----- Message from Lisa Cyr sent at 12/4/2023 11:29 AM CST -----  Contact: Divya  Patient is calling in regards to questions and concerns. Sees blood work results and urinalysis, is wondering how she proceeds. States seeing she has a uti and wants to speak further about lab work. Please return call to pt at .[phone].

## 2023-12-04 NOTE — TELEPHONE ENCOUNTER
Informed pt to contact her insurance company and see where they accept gastro providers and to call back to let us know where to send referral since [facility] told her she wouldn't be able to be seen til mid next year in gastro.

## 2023-12-04 NOTE — TELEPHONE ENCOUNTER
Spoke with pt, let her know information as stated by  below:          Yes must follow up with gastro because she had the GI bleed. I'm not sure why she had the bruising - labs were normal. Let me know if the bruising continues with more locations

## 2023-12-05 ENCOUNTER — TELEPHONE (OUTPATIENT)
Dept: GASTROENTEROLOGY | Facility: CLINIC | Age: 63
End: 2023-12-05

## 2023-12-05 NOTE — TELEPHONE ENCOUNTER
----- Message from Angela Rizzo sent at 12/4/2023 12:02 PM CST -----  Contact: Divya Stokes is calling to speak to someone regarding a referral from her pcp, she is very demanding and stated she have to be seen asap, she can't bleed out. Please give her a call at     Thanks  LJ

## 2023-12-05 NOTE — TELEPHONE ENCOUNTER
Call returned to ShanaHao, pt states she was recently released for [facility] on 2/03/23 for a GI bleed; pt requesting an appointment per PCP for f/u

## 2023-12-12 ENCOUNTER — TELEPHONE (OUTPATIENT)
Dept: PAIN MEDICINE | Facility: CLINIC | Age: 63
End: 2023-12-12
Payer: MEDICARE

## 2023-12-12 NOTE — PROGRESS NOTES
Established Patient Chronic Pain Note     The patient location is: home  The chief complaint leading to consultation is: knee pain  Visit type: Virtual visit with synchronous audio and video  Total time spent with patient: 11-15m  Each patient to whom he or she provides medical services by telemedicine is: (1) informed of the relationship between the physician and patient and the respective role of any other health care provider with respect to management of the patient; and (2) notified that he or she may decline to receive medical services by telemedicine and may withdraw from such care at any time.    Referring Physician: No ref. provider found    PCP: Angel Khan MD    Chief Complaint:   Knee pain      SUBJECTIVE:  Interval Hx: 12/13/2023  Patient presents s/p R sided genicular RFA 10/31/2023 and L sided genicular RFA 11/14/2023.  Patient reports at least 80% sustained relief in bilateral knee pain following genicular radiofrequency ablation.  She reports right knee instantly felt better and the left knee took a few weeks before pain relief was present.  She reports recent hospitalization, 11/23-27/2023, for suspected GI bleed where Lyrica was discontinued.  Otherwise she reports no side effects from this medication and does report it was helping maintain her pain relief.      Interval history 10/03/2023  Patient presents s/p bilateral genicular injection 08/11/2023.  Patient reports approximately 90% relief lasting one-month in duration following her bilateral genicular procedure.  Today she reports pain is insidiously returned.  Pain is intermittent and today is rated an 8/10.  Pain is described as sharp in nature along the suprapatellar aspect of both knees.  Pain is exacerbated with knee flexion/extension and standing and with ambulation.  Patient has continued Lyrica 75 mg in the evening.  Patient has continued physician directed physical therapy exercises at home over the last 8 weeks with  marginal improvement in pain, range of motion and functionality.    Interval history 07/27/2023   Patient presents status post bilateral genicular nerve block 06/16/2023.  Patient reports 90% relief lasting approximately 4 weeks following her procedure.  Today she reports pain has insidiously returned in his 95% back to baseline.  Pain is intermittent and today is rated 7/10.  Patient reports pain is described as sharp in nature and is along the suprapatellar aspect of both knees.  Pain is exacerbated with knee flexion, extension and with standing and with ambulation.  Patient has continued Lyrica 75 mg in the evening with significant somnolence.  She is continued physician directed physical therapy exercises at home over the last 6 weeks without meaningful improvement in her pain.    HPI 06/01/2023  Divya Bui is a 63 y.o. female with past medical history significant for anxiety/depression, hypertension, hearing loss, stage 3 chronic kidney disease, morbid obesity status post bariatric surgery, diverticulosis, GERD, osteoarthritis/osteopenia, status post right total knee arthroplasty, cerebrovascular accident who presents to the clinic for the evaluation of bilateral knee pain.  Patient reports pain has been present for over 5+ years without inciting accident injury or trauma.  Patient reports receiving prior total knee arthroplasty, approximately 5-6 years prior with  at [facility].  Patient reports minimal, approximately 30% relief following knee replacement.  Today she reports pain which is constant which is rated an 8/10.  Pain at its best is a 7/10 and at its worse is a 10/10.  Pain is described as sharp in nature and predominantly over the suprapatellar aspect of both knees.  Pain is exacerbated with prolonged standing and walking.  She does report significant weakness in the lower extremities associated with her pain.  Patient particularly mentions difficulty stepping up on occur or stepping off  of a curb without requiring assistance.  Pain is marginally improved with Advil which she takes up to 4 pills daily.  Patient reports she is completed aquatic therapy in the past and has continued physician directed physical therapy exercises at home over the last 6 weeks without meaningful improvement in her pain.    Patient reports significant motor weakness.  Patient denies night fever/night sweats, urinary incontinence, bowel incontinence, significant weight loss, and loss of sensations.      Pain Disability Index Review:         7/27/2023    12:45 PM 6/1/2023     9:10 AM   Last 3 PDI Scores   Pain Disability Index (PDI) 43 45       Non-Pharmacologic Treatments:  Physical Therapy/Home Exercise: yes  Ice/Heat:yes  TENS: no  Acupuncture: no  Massage: no  Chiropractic: no    Other: no      Pain Medications:  - Adjuvant Medications: Ambien (Zolpidem) and Xanax (Alprazolam)  - Anti-Coagulants: Aspirin    Pain Procedures:   -11/14/2023: L sided genicular RFA   -10/31/2023: R sided genicular RFA       -08/11/2023: Bilateral genicular block  -06/16/2023: Bilateral genicular nerve block    Past Medical History:   Diagnosis Date    Anemia     Anxiety     Basal cell carcinoma (BCC) of face 2/26/2020    Blood transfusion     Bowel incontinence     Depression     Esophageal stricture     GERD (gastroesophageal reflux disease)     Hypertension     Latent tuberculosis by skin test 2001    took INH    Liver nodule 1/6/2014    Morbid obesity with BMI of 45.0-49.9, adult     OA (osteoarthritis) of knee 12/12/2014    Pericardial effusion 9/4/2014     Past Surgical History:   Procedure Laterality Date    CHOLECYSTECTOMY      COLONOSCOPY N/A 3/6/2023    Procedure: COLONOSCOPY;  Surgeon: Beti Diallo MD;  Location: [facility];  Service: Endoscopy;  Laterality: N/A;    GASTRIC BYPASS      HERNIA REPAIR      INJECTION OF ANESTHETIC AGENT AROUND NERVE Bilateral 6/16/2023    Procedure: Bilateral Genicular nerve block with RN IV  sedation;  Surgeon: Denis Lai MD;  [facility];  Service: Pain Management;  Laterality: Bilateral;    INJECTION OF ANESTHETIC AGENT AROUND NERVE Bilateral 8/11/2023    Procedure: Bilateral Genicular nerve block with RN IV sedation;  Surgeon: Denis Lai MD;  Location: [facility];  Service: Pain Management;  Laterality: Bilateral;    RADIOFREQUENCY THERMOCOAGULATION Right 10/31/2023    Procedure: Right Genicular Nerve RFA with RN IV sedation;  Surgeon: Denis Lai MD;  Location: [facility];  Service: Pain Management;  Laterality: Right;    RADIOFREQUENCY THERMOCOAGULATION Left 11/14/2023    Procedure: Left Genicular Nerve RFA with RN IV sedation;  Surgeon: Denis Lai MD;  Location: [facility];  Service: Pain Management;  Laterality: Left;    right sholder surgery      SMALL INTESTINE SURGERY       Review of patient's allergies indicates:   Allergen Reactions    Metronidazole hcl Other (See Comments)     Mouth ulcers developed with Flagyl  Oral sores.  Mouth ulcers developed with Flagyl       Current Outpatient Medications   Medication Sig    ALPRAZolam (XANAX) 1 MG tablet Take 1 mg by mouth 3 (three) times daily as needed.    amLODIPine (NORVASC) 5 MG tablet Take 1 tablet (5 mg total) by mouth once daily.    ARIPiprazole (ABILIFY) 10 MG Tab Take 10 mg by mouth once daily.    aspirin 81 mg Tab Take 1 tablet by mouth Daily.    atorvastatin (LIPITOR) 40 MG tablet Take 1 tablet (40 mg total) by mouth once daily. (Patient not taking: Reported on 11/28/2023)    ciprofloxacin HCl (CIPRO) 500 MG tablet Take 1 tablet (500 mg total) by mouth 2 (two) times daily.    dicyclomine (BENTYL) 20 mg tablet Take 1 tablet (20 mg total) by mouth 3 (three) times daily as needed (abdominal pain).    doxepin (SINEQUAN) 50 MG capsule Take 50 mg by mouth nightly.    fluconazole (DIFLUCAN) 150 MG Tab Take first tablet today, then repeat in 3 days.    fluoxetine (PROZAC) 40 MG capsule Take 1 capsule (40 mg  total) by mouth once daily.    furosemide (LASIX) 20 MG tablet Take 1 tablet (20 mg total) by mouth daily as needed (swelling). (Patient not taking: Reported on 11/28/2023)    methylPREDNISolone (MEDROL DOSEPACK) 4 mg tablet Take 4 mg by mouth. use as directed    methylPREDNISolone (MEDROL DOSEPACK) 4 mg tablet use as directed (Patient not taking: Reported on 11/28/2023)    olmesartan-hydrochlorothiazide (BENICAR HCT) 40-12.5 mg Tab Take 1 tablet by mouth once daily.    pregabalin (LYRICA) 100 MG capsule Take 1 capsule (100 mg total) by mouth every evening.    promethazine (PHENERGAN) 25 MG tablet Take 1 tablet (25 mg total) by mouth every 6 (six) hours as needed for Nausea.    URO--10-40.8-36 mg Cap TAKE 1 CAPSULE BY MOUTH THREE TIMES DAILY AS NEEDED FOR BLADDER PAIN    zolpidem (AMBIEN) 10 mg Tab Take 10 mg by mouth nightly as needed.     No current facility-administered medications for this visit.       Review of Systems     GENERAL:  No weight loss, malaise or fevers.  HEENT:   No recent changes in vision or hearing  NECK:  Negative for lumps, no difficulty with swallowing.  RESPIRATORY:  Negative for cough, wheezing or shortness of breath, patient denies any recent URI.  CARDIOVASCULAR:  Negative for chest pain or palpitations.  GI:  Negative for abdominal discomfort, blood in stools or black stools or change in bowel habits.  MUSCULOSKELETAL:  See HPI.  SKIN:  Negative for lesions, rash, and itching.  PSYCH:  No mood disorder or recent psychosocial stressors.   HEMATOLOGY/LYMPHOLOGY:  Negative for prolonged bleeding, bruising easily or swollen nodes.    NEURO:   No history of syncope, paralysis, seizures or tremors.  All other reviewed and negative other than HPI.    OBJECTIVE:    There were no vitals taken for this visit.      Physical Exam    GENERAL: Well appearing, in no acute distress, alert and oriented x3.Obese  PSYCH:  Mood and affect appropriate.  SKIN: Skin color, texture, turgor normal, no  rashes or lesions.  HEAD/FACE:  Normocephalic, atraumatic. Cranial nerves grossly intact.      CV: RRR with palpation of the radial artery.  PULM: No evidence of respiratory difficulty, symmetric chest rise.  GI:  Soft and non-tender.    BACK: Straight leg raising in the sitting and supine positions is negative to radicular pain.  pain to palpation over the facet joints of the lumbar spine or spinous processes. Normal range of motion without pain reproduction.  EXTREMITIES:  peripheral joint range of motion is reduced with instability in bilateral lower extremities.. No deformities, edema, or skin discoloration. Good capillary refill.  MUSCULOSKELETAL: Unable to stand on heels & toes.   hip provocative maneuvers are negative.      Knee Exam:  bilateral    Erythema: Absent  Deformity/Swelling: Absent  Effusion: Absent  Chronic Bony Changes: Absent  Creptius: Absent     milddiffuse tenderness palpation of the mediolateral joint lines and patella     ROM: full range with pain     Strength is 5/5 bilateral     Meniscus   Brennan:  Medial - negative Lateral - negative    Stability Lachman: normal  PCL-Posterior Drawer: normal MCL - Valgus: normal LCL - Varus: normal     Patella   Patellar apprehension: negative  Patellar Tracking: normal     Neurovascular intact      Facet loading test is negative bilaterally.   Bilateral upper and lower extremity strength is normal and symmetric.  No atrophy or tone abnormalities are noted.    RIGHT Lower extremity: Hip flexion 5/5, Hip Abduction 5/5, Hip Adduction 5/5, Knee extension 5/5, Knee flexion 5/5, Ankle dorsiflexion5/5, Extensor hallucis longus 5/5, Ankle plantarflexion 5/5  LEFT Lower extremity:  Hip flexion 5/5, Hip Abduction 5/5,Hip Adduction 5/5, Knee extension 5/5, Knee flexion 5/5, Ankle dorsiflexion 5/5, Extensor hallucis longus 5/5, Ankle plantarflexion 5/5  -Normal testing knee (patellar) jerk and ankle (achilles) jerk    NEURO: Bilateral upper and lower extremity  coordination and muscle stretch reflexes are physiologic and symmetric. No loss of sensation is noted.  GAIT: normal.    Imaging:   Bilateral knee x-ray 01/10/2023  FINDINGS:  Bilateral total knee arthroplasties are noted.  No hardware failure or loosening.  No periprosthetic fracture.  No joint effusions are suggested.    09/27/21    X-Ray Lumbar Spine Ap And Lateral    Narrative  EXAMINATION:  XR LUMBAR SPINE AP AND LATERAL    CLINICAL HISTORY:  XR LUMBAR SPINE AP AND LATERAL    COMPARISON:  None    FINDINGS:  Multiple radiographic views  were obtained.    No evidence of acute fracture or dislocation.  Mild degenerative joint disease.  Postsurgical changes of prior cholecystectomy.  No spondylolisthesis      ASSESSMENT: 63 y.o. year old female with bilateral knee pain, consistent with     1. Chronic pain of both knees        2. History of bilateral knee arthroplasty            PLAN:   - Interventions:  Patient has sustained 80% relief in bilateral knee pain following bilateral genicular radiofrequency ablation.  We have discussed repeating this procedure no sooner than 6 months from the original procedure date should symptoms exacerbate.  Explained the risks and benefits of the procedure in detail with the patient today in clinic along with alternative treatment options      - Anticoagulation use: Yes aspirin     report:  Reviewed and consistent with medication use as prescribed.    - Medications:  -Restart & increase Lyrica.  We discussed potential side effects of this medication which may include drowsiness,dizziness, dry mouth, constipation or peripheral edema.  -100 mg q.h.s.  -90 day supply sent in       - Therapy:   We discussed continuing physical therapy to help manage the patient/s painful condition. The patient was counseled that muscle strengthening will improve the long term prognosis in regards to pain and may also help increase range of motion and mobility. They were told that one of the goals of  physical therapy is that they learn how to do the exercises so that they can do them independently at home daily upon completion. The patient's questions were answered and they were agreeable to this course. [facility].    - Imaging: Reviewed available imaging with patient and answered any questions they had regarding study.    - Records: Obtain old records from outside physicians and imaging:    - Follow up visit: return to clinic in  3 mo. Scheduling ticket sent.      The above plan and management options were discussed at length with patient. Patient is in agreement with the above and verbalized understanding.    - I discussed the goals of interventional chronic pain management with the patient on today's visit. We discussed a multimodal and systematic approach to pain.  This includes diagnostic and therapeutic injections, adjuvant pharmacologic treatment, physical therapy, and at times psychiatry.  I emphasized the importance of regular exercise, core strengthening and stretching, diet and weight loss as a cornerstone of long-term pain management.    - This condition does not require this patient to take time off of work, and the primary goal of our Pain Management services is to improve the patient's functional capacity.  - Patient Questions: Answered all of the patient's questions regarding diagnoses, therapy, treatment and next steps        Denis Lai MD  Interventional Pain Management  [facility]    Disclaimer:  This note was prepared using voice recognition system and is likely to have sound alike errors that may have been overlooked even after proof reading.  Please call me with any questions

## 2023-12-13 ENCOUNTER — PATIENT MESSAGE (OUTPATIENT)
Dept: PAIN MEDICINE | Facility: CLINIC | Age: 63
End: 2023-12-13

## 2023-12-13 ENCOUNTER — OFFICE VISIT (OUTPATIENT)
Dept: PAIN MEDICINE | Facility: CLINIC | Age: 63
End: 2023-12-13
Payer: MEDICARE

## 2023-12-13 DIAGNOSIS — M25.561 CHRONIC PAIN OF BOTH KNEES: Primary | ICD-10-CM

## 2023-12-13 DIAGNOSIS — G89.29 CHRONIC PAIN OF BOTH KNEES: Primary | ICD-10-CM

## 2023-12-13 DIAGNOSIS — Z96.653 HISTORY OF BILATERAL KNEE ARTHROPLASTY: ICD-10-CM

## 2023-12-13 DIAGNOSIS — M25.562 CHRONIC PAIN OF BOTH KNEES: Primary | ICD-10-CM

## 2023-12-13 PROCEDURE — 3044F HG A1C LEVEL LT 7.0%: CPT | Mod: CPTII,95,, | Performed by: ANESTHESIOLOGY

## 2023-12-13 PROCEDURE — 1160F PR REVIEW ALL MEDS BY PRESCRIBER/CLIN PHARMACIST DOCUMENTED: ICD-10-PCS | Mod: CPTII,95,, | Performed by: ANESTHESIOLOGY

## 2023-12-13 PROCEDURE — 1111F PR DISCHARGE MEDS RECONCILED W/ CURRENT OUTPATIENT MED LIST: ICD-10-PCS | Mod: CPTII,95,, | Performed by: ANESTHESIOLOGY

## 2023-12-13 PROCEDURE — 1111F DSCHRG MED/CURRENT MED MERGE: CPT | Mod: CPTII,95,, | Performed by: ANESTHESIOLOGY

## 2023-12-13 PROCEDURE — 1159F PR MEDICATION LIST DOCUMENTED IN MEDICAL RECORD: ICD-10-PCS | Mod: CPTII,95,, | Performed by: ANESTHESIOLOGY

## 2023-12-13 PROCEDURE — 1159F MED LIST DOCD IN RCRD: CPT | Mod: CPTII,95,, | Performed by: ANESTHESIOLOGY

## 2023-12-13 PROCEDURE — 99214 PR OFFICE/OUTPT VISIT, EST, LEVL IV, 30-39 MIN: ICD-10-PCS | Mod: 95,,, | Performed by: ANESTHESIOLOGY

## 2023-12-13 PROCEDURE — 1160F RVW MEDS BY RX/DR IN RCRD: CPT | Mod: CPTII,95,, | Performed by: ANESTHESIOLOGY

## 2023-12-13 PROCEDURE — 99214 OFFICE O/P EST MOD 30 MIN: CPT | Mod: 95,,, | Performed by: ANESTHESIOLOGY

## 2023-12-13 PROCEDURE — 3044F PR MOST RECENT HEMOGLOBIN A1C LEVEL <7.0%: ICD-10-PCS | Mod: CPTII,95,, | Performed by: ANESTHESIOLOGY

## 2023-12-13 RX ORDER — PREGABALIN 100 MG/1
100 CAPSULE ORAL NIGHTLY
Qty: 90 CAPSULE | Refills: 0 | Status: SHIPPED | OUTPATIENT
Start: 2023-12-13 | End: 2024-03-12

## 2023-12-18 NOTE — DISCHARGE SUMMARY
[facility]  Discharge Summary      Patient Name: Divya Bui  MRN: [id number]  NEREIDA: [id number]  Patient Class: OP- Observation  Admission Date: 11/23/2023  Hospital Length of Stay: 1 days  Discharge Date and Time: 11/26/2023  6:45 PM  Attending Physician: No att. providers found   Discharging Provider: Malia Henry MD  Primary Care Provider: Angel Khna MD    Primary Care Team: Networked reference to record PCT     HPI:   Divya Bui is a 63 y.o. female with a PMH  has a past medical history of Anemia, Anxiety, Basal cell carcinoma (BCC) of face (2/26/2020), Blood transfusion, Bowel incontinence, Depression, Esophageal stricture, GERD (gastroesophageal reflux disease), Hypertension, Latent tuberculosis by skin test (2001), Liver nodule (1/6/2014), Morbid obesity with BMI of 45.0-49.9, adult, OA (osteoarthritis) of knee (12/12/2014), and Pericardial effusion (9/4/2014). who presented to the ED for further evaluation of multiple episodes of bright red painless bloody bowel movements which began earlier today.  Patient reports experiencing approximately 7 episodes of hematochezia in which she described filling up the toilet bowl with blood.  Associated symptoms included feeling lightheaded, dizzy, weak, and nauseous. She also reported endorsing bilateral lower extremity swelling, bruising, and rash since undergoing nerve ablation earlier this month in addition to left lower quadrant pain described as achy/nagging in nature, currently rated 6/10 in severity, intermittent, radiating down to her left colon, with no known alleviating or aggravating factors noted.  She reported undergoing colonoscopy earlier this year in which she was found to have evidence of nonbleeding internal hemorrhoids as well as diverticulosis.  She reports taking aspirin 81 mg daily but denies use of other antiplatelet/anticoagulation therapies.  Prior to onset of symptoms, patient reported being in her  usual state of health with no other concerns or complaints.  All other review of systems negative except as noted above.  Patient being admitted to Hospital Medicine under observation for continued medical management of lower GI bleed while awaiting further evaluation by GI in the morning.      PCP: Angel Khan      * No surgery found *      Hospital Course:   Pt admitted with diverticular bleed and diverticulitis in a pt with known diverticulosis. Pt without further bleed. Treated with antibiotics po and after GI consult stable for dc home and follow up as outpatient.    Pt seen and examined on day of discharge and stable for dc.      Goals of Care Treatment Preferences:  Code Status: Full Code      Consults:   Consults (From admission, onward)          Status Ordering Provider     Inpatient consult to Gastroenterology  Once        Provider:  Marina Burkett MD    Completed TELMA ESCOBAR            Cardiac/Vascular  HTN (hypertension)  Chronic, uncontrolled. Latest blood pressure and vitals reviewed-      .   Home meds for hypertension were reviewed and noted below.   Hypertension Medications               amLODIPine (NORVASC) 5 MG tablet Take 1 tablet (5 mg total) by mouth once daily.    furosemide (LASIX) 20 MG tablet Take 1 tablet (20 mg total) by mouth daily as needed (swelling).    olmesartan-hydrochlorothiazide (BENICAR HCT) 40-12.5 mg Tab Take 1 tablet by mouth once daily.          While in the hospital, will manage blood pressure as follows; Continue home antihypertensive regimen    Will utilize p.r.n. blood pressure medication only if patient's blood pressure greater than 160/100 and she develops symptoms such as worsening chest pain or shortness of breath.      Renal/  UTI (urinary tract infection)  Patient found to have evidence of UTI on UA.  Patient initiated on Rocephin with cultures obtained and pending.        Acute renal failure superimposed on stage 3 chronic kidney  disease  Patient with acute kidney injury/acute renal failure likely due to pre-renal azotemia due to IVVD ELIZABETH is currently  undergoing corrective therapy . Baseline creatinine  stage 3  - Labs reviewed- Renal function/electrolytes with CrCl cannot be calculated (Patient's most recent lab result is older than the maximum 7 days allowed.). according to latest data. Monitor urine output and serial BMP and adjust therapy as needed. Avoid nephrotoxins and renally dose meds for GFR listed above.      Hypokalemia  Patient has hypokalemia which is Chronic and currently controlled. Most recent potassium levels reviewed-   Lab Results   Component Value Date    K 3.3 (L) 11/28/2023   Plan:  -Will continue potassium replacement per protocol and recheck repeat levels after replacement completed       Oncology  Iron deficiency anemia  Patient's anemia is currently controlled. Has not received any PRBCs to date. Etiology likely d/t Iron deficiency  Current CBC reviewed-   Lab Results   Component Value Date    HGB 11.2 (L) 12/01/2023    HCT 34.7 (L) 12/01/2023     Monitor serial CBC and stable      Endocrine  Class 2 severe obesity due to excess calories with serious comorbidity and body mass index (BMI) of 39.0 to 39.9 in adult  Body mass index is 39.21 kg/m². Morbid obesity complicates all aspects of disease management from diagnostic modalities to treatment. Weight loss encouraged and health benefits explained to patient.       GI  * Acute lower GI bleeding  Patient presented with acute onset and progressively worsening left upper/left lower quadrant abdominal pain with multiple bouts of painless bright red bloody bowel movements.  CTA abdomen/pelvis negative for active bleed or other acute findings.  Previous colonoscopy noted on 03/06/2023 positive for nonbleeding internal hemorrhoids and diverticulosis.  H/H noted to have 2 point drop from 12.3/38.4 noted on 9/4/23 down to 10.5/32.2 on admission.  Type/screen obtain pending.   Antiplatelet/anticoagulation therapies held.  Patient currently remains hemodynamically stable.  GI consult pending in the morning along with potential blood transfusion pending repeat H/H.  Plan:  -clear liquid diet  -Continue current pain regimen, titrate as needed  -Bowel regimen  -Bedrest  -IVFs prn  -Type/screen, transfuse as needed  -Monitor H/H  -Antiemetics prn  -Tylenol as needed for fever   -f/u GI as outptient       GERD (gastroesophageal reflux disease)  Chronic. Stable. Currently asymptomatic. Home medications include PPI/Antacids as needed.  Plan:  -Continue PPI/Antacids as needed       Abdominal pain  Left lower quadrant still present.  Likely secondary to diverticulitis.  No rebound or guarding.  Change antibiotics to Zosyn and transition to po      Other  Rash  Patient presented acute onset bilateral shin rash times 1 day duration in abscess of prior history of cellulitis. Patient s/p nerve ablation therapy and increased lower extremity swelling. Ddx included cellulitis vs insufficiency. Patient remain afebrile and without leukocytosis.   Plan:  -continue antibiotics  -wound care  -elevate lower extremities  -prn lasix        Insomnia        Depressive disorder  Chronic. Stable. Not in acute exacerbation and currently denies endorsing any suicidal/homicidal ideations.   Plan:  -Continue home medications       Anxiety          Final Active Diagnoses:    Diagnosis Date Noted POA    PRINCIPAL PROBLEM:  Acute lower GI bleeding [K92.2] 11/24/2023 Yes    Insomnia [G47.00] 11/24/2023 Yes    UTI (urinary tract infection) [N39.0] 11/24/2023 Yes    Rash [R21] 11/24/2023 Yes    GERD (gastroesophageal reflux disease) [K21.9] 11/24/2023 Yes    Class 2 severe obesity due to excess calories with serious comorbidity and body mass index (BMI) of 39.0 to 39.9 in adult [E66.01, Z68.39] 06/20/2022 Not Applicable    Acute renal failure superimposed on stage 3 chronic kidney disease [N17.9, N18.30] 12/09/2020 Yes     Hypokalemia [E87.6] 06/14/2020 Yes    Abdominal pain [R10.9] 05/11/2018 Yes    Depressive disorder [F32.A] 12/29/2016 Yes    Anxiety [F41.9] 10/08/2014 Yes    Iron deficiency anemia [D50.9] 12/19/2013 Yes    HTN (hypertension) [I10] 08/26/2013 Yes      Problems Resolved During this Admission:    Diagnosis Date Noted Date Resolved POA    Stage 3a chronic kidney disease [N18.31] 05/16/2022 11/24/2023 Yes    Hematochezia [K92.1] 08/21/2021 11/24/2023 Yes       Discharged Condition: fair    Disposition: Home or Self Care    Follow Up:   Follow-up Information       Angel Khan MD Follow up in 3 day(s).    Specialty: Internal Medicine  Contact information:  [address] [facility] [address]  [phone]                           Patient Instructions:      Ambulatory referral/consult to [facility]   Standing Status: Future   Referral Priority: Routine Referral Type: Consultation   Referral Reason: Specialty Services Required   Number of Visits Requested: 1     Ambulatory referral/consult to [facility]   Standing Status: Future   Referral Priority: Routine Referral Type: Consultation   Referral Reason: Specialty Services Required   Number of Visits Requested: 1     Diet Dysphagia Soft   Order Comments: Advance as tolerated     Diet Cardiac     Notify your health care provider if you experience any of the following:  temperature >100.4     Notify your health care provider if you experience any of the following:  persistent nausea and vomiting or diarrhea     Notify your health care provider if you experience any of the following:  severe uncontrolled pain     Notify your health care provider if you experience any of the following:  difficulty breathing or increased cough     Notify your health care provider if you experience any of the following:  temperature >100.4     Notify your health care provider if you experience any of the following:  persistent nausea and vomiting or  diarrhea     Notify your health care provider if you experience any of the following:  severe uncontrolled pain     Activity as tolerated     Activity as tolerated       Significant Diagnostic Studies: Labs: All labs within the past 24 hours have been reviewed    Pending Diagnostic Studies:       None           Medications:  Reconciled Home Medications:      Medication List        CHANGE how you take these medications      promethazine 25 MG tablet  Commonly known as: PHENERGAN  Take 1 tablet (25 mg total) by mouth every 6 (six) hours as needed for Nausea.  What changed: Another medication with the same name was removed. Continue taking this medication, and follow the directions you see here.            CONTINUE taking these medications      ALPRAZolam 1 MG tablet  Commonly known as: XANAX  Take 1 mg by mouth 3 (three) times daily as needed.     amLODIPine 5 MG tablet  Commonly known as: NORVASC  Take 1 tablet (5 mg total) by mouth once daily.     ARIPiprazole 10 MG Tab  Commonly known as: ABILIFY  Take 10 mg by mouth once daily.     aspirin 81 mg Tab  Take 1 tablet by mouth Daily.     atorvastatin 40 MG tablet  Commonly known as: LIPITOR  Take 1 tablet (40 mg total) by mouth once daily.     dicyclomine 20 mg tablet  Commonly known as: BENTYL  Take 1 tablet (20 mg total) by mouth 3 (three) times daily as needed (abdominal pain).     doxepin 50 MG capsule  Commonly known as: SINEQUAN  Take 50 mg by mouth nightly.     FLUoxetine 40 MG capsule  Take 1 capsule (40 mg total) by mouth once daily.     furosemide 20 MG tablet  Commonly known as: LASIX  Take 1 tablet (20 mg total) by mouth daily as needed (swelling).     * methylPREDNISolone 4 mg tablet  Commonly known as: MEDROL DOSEPACK  Take 4 mg by mouth. use as directed     * methylPREDNISolone 4 mg tablet  Commonly known as: MEDROL DOSEPACK  use as directed     olmesartan-hydrochlorothiazide 40-12.5 mg Tab  Commonly known as: BENICAR HCT  Take 1 tablet by mouth once  daily.     URO--10-40.8-36 mg Cap  Generic drug: methen-m.blue-s.phos-phsal-hyo  TAKE 1 CAPSULE BY MOUTH THREE TIMES DAILY AS NEEDED FOR BLADDER PAIN     zolpidem 10 mg Tab  Commonly known as: AMBIEN  Take 10 mg by mouth nightly as needed.           * This list has 2 medication(s) that are the same as other medications prescribed for you. Read the directions carefully, and ask your doctor or other care provider to review them with you.                STOP taking these medications      ondansetron 8 MG tablet  Commonly known as: ZOFRAN     pregabalin 75 MG capsule  Commonly known as: LYRICA     sucralfate 1 gram tablet  Commonly known as: CARAFATE              Indwelling Lines/Drains at time of discharge:   Lines/Drains/Airways       None                   Time spent on the discharge of patient: 36 minutes         Malia Henry MD  Department of Hospital Medicine  [facility]

## 2023-12-18 NOTE — HOSPITAL COURSE
Pt admitted with diverticular bleed and diverticulitis in a pt with known diverticulosis. Pt without further bleed. Treated with antibiotics po and after GI consult stable for dc home and follow up as outpatient.    Pt seen and examined on day of discharge and stable for dc.

## 2023-12-18 NOTE — ASSESSMENT & PLAN NOTE
Body mass index is 39.21 kg/m². Morbid obesity complicates all aspects of disease management from diagnostic modalities to treatment. Weight loss encouraged and health benefits explained to patient.     
Chronic, uncontrolled. Latest blood pressure and vitals reviewed-      .   Home meds for hypertension were reviewed and noted below.   Hypertension Medications               amLODIPine (NORVASC) 5 MG tablet Take 1 tablet (5 mg total) by mouth once daily.    furosemide (LASIX) 20 MG tablet Take 1 tablet (20 mg total) by mouth daily as needed (swelling).    olmesartan-hydrochlorothiazide (BENICAR HCT) 40-12.5 mg Tab Take 1 tablet by mouth once daily.          While in the hospital, will manage blood pressure as follows; Continue home antihypertensive regimen    Will utilize p.r.n. blood pressure medication only if patient's blood pressure greater than 160/100 and she develops symptoms such as worsening chest pain or shortness of breath.    
Chronic, uncontrolled. Latest blood pressure and vitals reviewed-     Temp:  [97.1 °F (36.2 °C)-98.3 °F (36.8 °C)]   Pulse:  []   Resp:  [16-25]   BP: (112-184)/(65-96)   SpO2:  [96 %-99 %] .   Home meds for hypertension were reviewed and noted below.   Hypertension Medications               amLODIPine (NORVASC) 5 MG tablet Take 1 tablet (5 mg total) by mouth once daily.    furosemide (LASIX) 20 MG tablet Take 1 tablet (20 mg total) by mouth daily as needed (swelling).    olmesartan-hydrochlorothiazide (BENICAR HCT) 40-12.5 mg Tab Take 1 tablet by mouth once daily.          While in the hospital, will manage blood pressure as follows; Continue home antihypertensive regimen    Will utilize p.r.n. blood pressure medication only if patient's blood pressure greater than 160/100 and she develops symptoms such as worsening chest pain or shortness of breath.    
Chronic, uncontrolled. Latest blood pressure and vitals reviewed-     Temp:  [97.6 °F (36.4 °C)-98.7 °F (37.1 °C)]   Pulse:  [77-96]   Resp:  [16-18]   BP: (115-179)/(62-87)   SpO2:  [93 %-97 %] .   Home meds for hypertension were reviewed and noted below.   Hypertension Medications               amLODIPine (NORVASC) 5 MG tablet Take 1 tablet (5 mg total) by mouth once daily.    furosemide (LASIX) 20 MG tablet Take 1 tablet (20 mg total) by mouth daily as needed (swelling).    olmesartan-hydrochlorothiazide (BENICAR HCT) 40-12.5 mg Tab Take 1 tablet by mouth once daily.          While in the hospital, will manage blood pressure as follows; Continue home antihypertensive regimen    Will utilize p.r.n. blood pressure medication only if patient's blood pressure greater than 160/100 and she develops symptoms such as worsening chest pain or shortness of breath.    
Chronic, uncontrolled. Latest blood pressure and vitals reviewed-     Temp:  [98.1 °F (36.7 °C)-98.3 °F (36.8 °C)]   Pulse:  [77-99]   Resp:  [17-25]   BP: (138-184)/(71-96)   SpO2:  [96 %-99 %] .   Home meds for hypertension were reviewed and noted below.   Hypertension Medications               amLODIPine (NORVASC) 5 MG tablet Take 1 tablet (5 mg total) by mouth once daily.    furosemide (LASIX) 20 MG tablet Take 1 tablet (20 mg total) by mouth daily as needed (swelling).    olmesartan-hydrochlorothiazide (BENICAR HCT) 40-12.5 mg Tab Take 1 tablet by mouth once daily.          While in the hospital, will manage blood pressure as follows; Continue home antihypertensive regimen    Will utilize p.r.n. blood pressure medication only if patient's blood pressure greater than 160/100 and she develops symptoms such as worsening chest pain or shortness of breath.    
Chronic. Stable. Currently asymptomatic. Home medications include PPI/Antacids as needed.  Plan:  -Continue PPI/Antacids as needed     
Chronic. Stable. Not in acute exacerbation and currently denies endorsing any suicidal/homicidal ideations.   Plan:  -Continue home medications     
Creatine stable for now. BMP reviewed- noted Estimated Creatinine Clearance: 45.3 mL/min (based on SCr of 1.4 mg/dL). according to latest data. Based on current GFR, CKD stage is stage 3 - GFR 30-59.  Monitor UOP and serial BMP and adjust therapy as needed. Renally dose meds. Avoid nephrotoxic medications and procedures.    
Hx diverticular bleed 02/2023 requiring hospitalization  Colonoscopy 0/2023: pan tics  Presented to hematochezia  H&H stable.  No bowel movements since admission  
Left lower quadrant still present.    No rebound or guarding.  Change antibiotics to Zosyn    
Left lower quadrant still present.  Likely secondary to diverticulitis.  No rebound or guarding.  Change antibiotics to Zosyn    
Left lower quadrant still present.  Likely secondary to diverticulitis.  No rebound or guarding.  Change antibiotics to Zosyn and transition to po    
Patient found to have evidence of UTI on UA.  Patient initiated on Rocephin with cultures obtained and pending.      
Patient found to have evidence of UTI on UA.  Patient initiated on Rocephin with cultures obtained and pending.  Plan:  -continue Rocephin  -f/u cultures    
Patient has hypokalemia which is Chronic and currently controlled. Most recent potassium levels reviewed-   Lab Results   Component Value Date    K 3.1 (L) 11/23/2023   Plan:  -Will continue potassium replacement per protocol and recheck repeat levels after replacement completed     
Patient has hypokalemia which is Chronic and currently controlled. Most recent potassium levels reviewed-   Lab Results   Component Value Date    K 3.3 (L) 11/24/2023   Plan:  -Will continue potassium replacement per protocol and recheck repeat levels after replacement completed     
Patient has hypokalemia which is Chronic and currently controlled. Most recent potassium levels reviewed-   Lab Results   Component Value Date    K 3.3 (L) 11/28/2023   Plan:  -Will continue potassium replacement per protocol and recheck repeat levels after replacement completed     
Patient has hypokalemia which is Chronic and currently controlled. Most recent potassium levels reviewed-   Lab Results   Component Value Date    K 3.6 11/25/2023   Plan:  -Will continue potassium replacement per protocol and recheck repeat levels after replacement completed     
Patient presented acute onset bilateral shin rash times 1 day duration in abscess of prior history of cellulitis. Patient s/p nerve ablation therapy and increased lower extremity swelling. Ddx included cellulitis vs insufficiency. Patient remain afebrile and without leukocytosis.   Plan:  -continue antibiotics  -wound care  -elevate lower extremities  -prn lasix      
Patient presented with acute onset and progressively worsening left upper/left lower quadrant abdominal pain with multiple bouts of painless bright red bloody bowel movements.  CTA abdomen/pelvis negative for active bleed or other acute findings.  Previous colonoscopy noted on 03/06/2023 positive for nonbleeding internal hemorrhoids and diverticulosis.  H/H noted to have 2 point drop from 12.3/38.4 noted on 9/4/23 down to 10.5/32.2 on admission.  Type/screen obtain pending.  Antiplatelet/anticoagulation therapies held.  Patient currently remains hemodynamically stable.  GI consult pending in the morning along with potential blood transfusion pending repeat H/H.  Plan:  -NPO  -Continue current pain regimen, titrate as needed  -Bowel regimen  -Bedrest  -IVFs prn  -Type/screen, transfuse as needed  -Monitor H/H  -Antiemetics prn  -Tylenol as needed for fever   -f/u GI    
Patient presented with acute onset and progressively worsening left upper/left lower quadrant abdominal pain with multiple bouts of painless bright red bloody bowel movements.  CTA abdomen/pelvis negative for active bleed or other acute findings.  Previous colonoscopy noted on 03/06/2023 positive for nonbleeding internal hemorrhoids and diverticulosis.  H/H noted to have 2 point drop from 12.3/38.4 noted on 9/4/23 down to 10.5/32.2 on admission.  Type/screen obtain pending.  Antiplatelet/anticoagulation therapies held.  Patient currently remains hemodynamically stable.  GI consult pending in the morning along with potential blood transfusion pending repeat H/H.  Plan:  -clear liquid diet  -Continue current pain regimen, titrate as needed  -Bowel regimen  -Bedrest  -IVFs prn  -Type/screen, transfuse as needed  -Monitor H/H  -Antiemetics prn  -Tylenol as needed for fever   -f/u GI    
Patient presented with acute onset and progressively worsening left upper/left lower quadrant abdominal pain with multiple bouts of painless bright red bloody bowel movements.  CTA abdomen/pelvis negative for active bleed or other acute findings.  Previous colonoscopy noted on 03/06/2023 positive for nonbleeding internal hemorrhoids and diverticulosis.  H/H noted to have 2 point drop from 12.3/38.4 noted on 9/4/23 down to 10.5/32.2 on admission.  Type/screen obtain pending.  Antiplatelet/anticoagulation therapies held.  Patient currently remains hemodynamically stable.  GI consult pending in the morning along with potential blood transfusion pending repeat H/H.  Plan:  -clear liquid diet  -Continue current pain regimen, titrate as needed  -Bowel regimen  -Bedrest  -IVFs prn  -Type/screen, transfuse as needed  -Monitor H/H  -Antiemetics prn  -Tylenol as needed for fever   -f/u GI    
Patient presented with acute onset and progressively worsening left upper/left lower quadrant abdominal pain with multiple bouts of painless bright red bloody bowel movements.  CTA abdomen/pelvis negative for active bleed or other acute findings.  Previous colonoscopy noted on 03/06/2023 positive for nonbleeding internal hemorrhoids and diverticulosis.  H/H noted to have 2 point drop from 12.3/38.4 noted on 9/4/23 down to 10.5/32.2 on admission.  Type/screen obtain pending.  Antiplatelet/anticoagulation therapies held.  Patient currently remains hemodynamically stable.  GI consult pending in the morning along with potential blood transfusion pending repeat H/H.  Plan:  -clear liquid diet  -Continue current pain regimen, titrate as needed  -Bowel regimen  -Bedrest  -IVFs prn  -Type/screen, transfuse as needed  -Monitor H/H  -Antiemetics prn  -Tylenol as needed for fever   -f/u GI as outptient     
Patient with acute kidney injury/acute renal failure likely due to pre-renal azotemia due to IVVD ELIZABETH is currently  undergoing corrective therapy . Baseline creatinine  stage 3  - Labs reviewed- Renal function/electrolytes with CrCl cannot be calculated (Patient's most recent lab result is older than the maximum 7 days allowed.). according to latest data. Monitor urine output and serial BMP and adjust therapy as needed. Avoid nephrotoxins and renally dose meds for GFR listed above.    
Patient with acute kidney injury/acute renal failure likely due to pre-renal azotemia due to IVVD ELIZABETH is currently  undergoing corrective therapy . Baseline creatinine  stage 3  - Labs reviewed- Renal function/electrolytes with Estimated Creatinine Clearance: 45.3 mL/min (based on SCr of 1.4 mg/dL). according to latest data. Monitor urine output and serial BMP and adjust therapy as needed. Avoid nephrotoxins and renally dose meds for GFR listed above.    
Patient with acute kidney injury/acute renal failure likely due to pre-renal azotemia due to IVVD ELIZABETH is currently  undergoing corrective therapy . Baseline creatinine  stage 3  - Labs reviewed- Renal function/electrolytes with Estimated Creatinine Clearance: 57.6 mL/min (based on SCr of 1.1 mg/dL). according to latest data. Monitor urine output and serial BMP and adjust therapy as needed. Avoid nephrotoxins and renally dose meds for GFR listed above.    
Patient with acute kidney injury/acute renal failure likely due to pre-renal azotemia due to IVVD ELIZABETH is currently  undergoing corrective therapy . Baseline creatinine  stage 3  - Labs reviewed- Renal function/electrolytes with Estimated Creatinine Clearance: 57.6 mL/min (based on SCr of 1.1 mg/dL). according to latest data. Monitor urine output and serial BMP and adjust therapy as needed. Avoid nephrotoxins and renally dose meds for GFR listed above.    
Patient's anemia is currently controlled. Has not received any PRBCs to date. Etiology likely d/t Iron deficiency  Current CBC reviewed-   Lab Results   Component Value Date    HGB 10.0 (L) 11/24/2023    HCT 31.4 (L) 11/24/2023     Monitor serial CBC and transfuse if patient becomes hemodynamically unstable, symptomatic or H/H drops below 7/21.    
Patient's anemia is currently controlled. Has not received any PRBCs to date. Etiology likely d/t Iron deficiency  Current CBC reviewed-   Lab Results   Component Value Date    HGB 10.5 (L) 11/23/2023    HCT 32.3 (L) 11/23/2023     Monitor serial CBC and transfuse if patient becomes hemodynamically unstable, symptomatic or H/H drops below 7/21.    
Patient's anemia is currently controlled. Has not received any PRBCs to date. Etiology likely d/t Iron deficiency  Current CBC reviewed-   Lab Results   Component Value Date    HGB 11.2 (L) 12/01/2023    HCT 34.7 (L) 12/01/2023     Monitor serial CBC and stable    
Patient's anemia is currently controlled. Has not received any PRBCs to date. Etiology likely d/t Iron deficiency  Current CBC reviewed-   Lab Results   Component Value Date    HGB 9.8 (L) 11/25/2023    HCT 31.3 (L) 11/25/2023     Monitor serial CBC and transfuse if patient becomes hemodynamically unstable, symptomatic or H/H drops below 7/21.    
Signs and symptoms consistent with diverticular bleed  Last episode 02/2023  Last colonoscopy 03/2023: pan-tics  
BLE at least 3/5 as noted with mobility

## 2023-12-26 ENCOUNTER — NURSE TRIAGE (OUTPATIENT)
Dept: ADMINISTRATIVE | Facility: CLINIC | Age: 63
End: 2023-12-26
Payer: MEDICARE

## 2023-12-26 ENCOUNTER — TELEPHONE (OUTPATIENT)
Dept: INTERNAL MEDICINE | Facility: CLINIC | Age: 63
End: 2023-12-26
Payer: MEDICARE

## 2023-12-26 NOTE — TELEPHONE ENCOUNTER
Patient c/o unexplained bruising that began after a contusion to her right leg. Patient currently reports worsening of bruising that is widespread (left leg and trunk). States that she is not on a blood thinner. Patient was advised per protocol to be seen in the office now. An appointment was scheduled for the patient. Advised the patient to call back with any further questions or if symptoms worsen.        Reason for Disposition   [1] Not caused by an injury AND [2] 5 or more bruises now    Additional Information   Negative: Shock suspected (e.g., cold/pale/clammy skin, too weak to stand, low BP, rapid pulse)   Negative: Sounds like a life-threatening emergency to the triager   Negative: Dizziness or lightheadedness   Negative: [1] Bruise on head, face, chest, or abdomen AND [2] taking Coumadin (warfarin) or other strong blood thinner, or known bleeding disorder (e.g., thrombocytopenia)   Negative: Unexplained bleeding from another site (e.g., gums, nose, urine) as well   Negative: Patient sounds very sick or weak to the triager    Protocols used: Bruises-A-AH

## 2023-12-26 NOTE — TELEPHONE ENCOUNTER
Pt was not at clinic after 30 min of appt time, was notified that she may need to be worked in or scheduled for a later time, pt declined, states she will go to ED     ----- Message from Bhargavi Lopez sent at 12/26/2023 11:05 AM CST -----  Contact: [phone]  Patient is scheduled for pcp  and they called to say they will be 20 minutes late to their appt due to traffic. Patient can be reached at [phone]. Thanks KB

## 2023-12-27 ENCOUNTER — TELEPHONE (OUTPATIENT)
Dept: INTERNAL MEDICINE | Facility: CLINIC | Age: 63
End: 2023-12-27
Payer: MEDICARE

## 2023-12-27 ENCOUNTER — HOSPITAL ENCOUNTER (EMERGENCY)
Facility: HOSPITAL | Age: 63
Discharge: HOME | End: 2023-12-28
Attending: EMERGENCY MEDICINE
Payer: MEDICARE

## 2023-12-27 VITALS
TEMPERATURE: 98 F | HEART RATE: 61 BPM | BODY MASS INDEX: 41.71 KG/M2 | HEIGHT: 63 IN | DIASTOLIC BLOOD PRESSURE: 85 MMHG | WEIGHT: 235.44 LBS | RESPIRATION RATE: 18 BRPM | OXYGEN SATURATION: 96 % | SYSTOLIC BLOOD PRESSURE: 157 MMHG

## 2023-12-27 DIAGNOSIS — S80.11XD CONTUSION OF RIGHT LOWER LEG, SUBSEQUENT ENCOUNTER: ICD-10-CM

## 2023-12-27 DIAGNOSIS — M79.89 LEG SWELLING: ICD-10-CM

## 2023-12-27 DIAGNOSIS — E66.01 MORBID OBESITY: ICD-10-CM

## 2023-12-27 DIAGNOSIS — R23.3 EASY BRUISING: ICD-10-CM

## 2023-12-27 DIAGNOSIS — R60.0 BILATERAL LEG EDEMA: Primary | ICD-10-CM

## 2023-12-27 LAB
ALBUMIN SERPL BCP-MCNC: 3.7 G/DL (ref 3.5–5.2)
ALP SERPL-CCNC: 83 U/L (ref 55–135)
ALT SERPL W/O P-5'-P-CCNC: 11 U/L (ref 10–44)
ANION GAP SERPL CALC-SCNC: 13 MMOL/L (ref 8–16)
APTT PPP: 21.9 SEC (ref 21–32)
AST SERPL-CCNC: 16 U/L (ref 10–40)
BACTERIA #/AREA URNS HPF: NORMAL /HPF
BASOPHILS # BLD AUTO: 0.03 K/UL (ref 0–0.2)
BASOPHILS NFR BLD: 0.5 % (ref 0–1.9)
BILIRUB SERPL-MCNC: 0.3 MG/DL (ref 0.1–1)
BILIRUB UR QL STRIP: NEGATIVE
BNP SERPL-MCNC: 94 PG/ML (ref 0–99)
BUN SERPL-MCNC: 19 MG/DL (ref 8–23)
CALCIUM SERPL-MCNC: 8.7 MG/DL (ref 8.7–10.5)
CHLORIDE SERPL-SCNC: 104 MMOL/L (ref 95–110)
CLARITY UR: CLEAR
CO2 SERPL-SCNC: 23 MMOL/L (ref 23–29)
COLOR UR: YELLOW
CREAT SERPL-MCNC: 1 MG/DL (ref 0.5–1.4)
DIFFERENTIAL METHOD BLD: ABNORMAL
EOSINOPHIL # BLD AUTO: 0.3 K/UL (ref 0–0.5)
EOSINOPHIL NFR BLD: 4 % (ref 0–8)
ERYTHROCYTE [DISTWIDTH] IN BLOOD BY AUTOMATED COUNT: 14.1 % (ref 11.5–14.5)
EST. GFR  (NO RACE VARIABLE): >60 ML/MIN/1.73 M^2
GLUCOSE SERPL-MCNC: 115 MG/DL (ref 70–110)
GLUCOSE UR QL STRIP: NEGATIVE
HCT VFR BLD AUTO: 34.4 % (ref 37–48.5)
HGB BLD-MCNC: 10.5 G/DL (ref 12–16)
HGB UR QL STRIP: NEGATIVE
IMM GRANULOCYTES # BLD AUTO: 0.02 K/UL (ref 0–0.04)
IMM GRANULOCYTES NFR BLD AUTO: 0.3 % (ref 0–0.5)
INR PPP: 0.9 (ref 0.8–1.2)
KETONES UR QL STRIP: NEGATIVE
LEUKOCYTE ESTERASE UR QL STRIP: ABNORMAL
LYMPHOCYTES # BLD AUTO: 1.1 K/UL (ref 1–4.8)
LYMPHOCYTES NFR BLD: 17.2 % (ref 18–48)
MCH RBC QN AUTO: 28.7 PG (ref 27–31)
MCHC RBC AUTO-ENTMCNC: 30.5 G/DL (ref 32–36)
MCV RBC AUTO: 94 FL (ref 82–98)
MICROSCOPIC COMMENT: NORMAL
MONOCYTES # BLD AUTO: 0.4 K/UL (ref 0.3–1)
MONOCYTES NFR BLD: 6.4 % (ref 4–15)
NEUTROPHILS # BLD AUTO: 4.5 K/UL (ref 1.8–7.7)
NEUTROPHILS NFR BLD: 71.6 % (ref 38–73)
NITRITE UR QL STRIP: NEGATIVE
NRBC BLD-RTO: 0 /100 WBC
PH UR STRIP: 7 [PH] (ref 5–8)
PLATELET # BLD AUTO: 246 K/UL (ref 150–450)
PMV BLD AUTO: 8.9 FL (ref 9.2–12.9)
POTASSIUM SERPL-SCNC: 3.9 MMOL/L (ref 3.5–5.1)
PROT SERPL-MCNC: 7.3 G/DL (ref 6–8.4)
PROT UR QL STRIP: NEGATIVE
PROTHROMBIN TIME: 9.8 SEC (ref 9–12.5)
RBC # BLD AUTO: 3.66 M/UL (ref 4–5.4)
RBC #/AREA URNS HPF: 1 /HPF (ref 0–4)
SODIUM SERPL-SCNC: 140 MMOL/L (ref 136–145)
SP GR UR STRIP: 1.01 (ref 1–1.03)
SQUAMOUS #/AREA URNS HPF: 1 /HPF
TROPONIN I SERPL DL<=0.01 NG/ML-MCNC: <0.006 NG/ML (ref 0–0.03)
URN SPEC COLLECT METH UR: ABNORMAL
UROBILINOGEN UR STRIP-ACNC: NEGATIVE EU/DL
WBC # BLD AUTO: 6.22 K/UL (ref 3.9–12.7)
WBC #/AREA URNS HPF: 2 /HPF (ref 0–5)

## 2023-12-27 PROCEDURE — 84484 ASSAY OF TROPONIN QUANT: CPT | Performed by: NURSE PRACTITIONER

## 2023-12-27 PROCEDURE — 85610 PROTHROMBIN TIME: CPT | Performed by: NURSE PRACTITIONER

## 2023-12-27 PROCEDURE — 83880 ASSAY OF NATRIURETIC PEPTIDE: CPT | Performed by: NURSE PRACTITIONER

## 2023-12-27 PROCEDURE — 85025 COMPLETE CBC W/AUTO DIFF WBC: CPT | Performed by: NURSE PRACTITIONER

## 2023-12-27 PROCEDURE — 85730 THROMBOPLASTIN TIME PARTIAL: CPT | Performed by: NURSE PRACTITIONER

## 2023-12-27 PROCEDURE — 80053 COMPREHEN METABOLIC PANEL: CPT | Performed by: NURSE PRACTITIONER

## 2023-12-27 PROCEDURE — 99285 EMERGENCY DEPT VISIT HI MDM: CPT | Mod: 25

## 2023-12-27 PROCEDURE — 81000 URINALYSIS NONAUTO W/SCOPE: CPT | Performed by: NURSE PRACTITIONER

## 2023-12-27 NOTE — TELEPHONE ENCOUNTER
----- Message from Juan Coppola sent at 12/27/2023  8:32 AM CST -----  Contact: Divya  Pt is calling in regards to getting an appt due to being in the ER 12/26 pt stated legs are swollen and painful. Pt stated needing to be seen sooner than 01/19. Pt stated wanting to know what the doctor advise her to do. Please call back at [phone]                    Thanks  KT

## 2023-12-27 NOTE — FIRST PROVIDER EVALUATION
Medical screening examination initiated.  I have conducted a focused provider triage encounter, findings are as follows:    Brief history of present illness:  Patient presents to ER for bilateral lower extremity swelling with + bruising to RLE.    There were no vitals filed for this visit.    Pertinent physical exam:  + nonpitting edema to bilateral lower extremities.     Brief workup plan:  workup    Preliminary workup initiated; this workup will be continued and followed by the physician or advanced practice provider that is assigned to the patient when roomed.

## 2023-12-27 NOTE — TELEPHONE ENCOUNTER
Patient called to get a sooner appt with Dr Khan.  Informed patient doctor was out of the clinic for the holidays.    She stated she was having swelling in both legs, bruised up with black spots, toes are starting to turned black.  I offered patient an appt with another doctor but informed it would be best to be seen at [facility].  At the Er they would be able to complete more imaging test if needed.  Patient verbally understands.  Declined doctor appt and was going to the ED today.

## 2023-12-28 ENCOUNTER — OFFICE VISIT (OUTPATIENT)
Dept: INTERNAL MEDICINE | Facility: CLINIC | Age: 63
End: 2023-12-28
Payer: MEDICARE

## 2023-12-28 DIAGNOSIS — M79.89 LEG SWELLING: Primary | ICD-10-CM

## 2023-12-28 PROCEDURE — 3044F PR MOST RECENT HEMOGLOBIN A1C LEVEL <7.0%: ICD-10-PCS | Mod: CPTII,95,, | Performed by: FAMILY MEDICINE

## 2023-12-28 PROCEDURE — 3288F FALL RISK ASSESSMENT DOCD: CPT | Mod: CPTII,95,, | Performed by: FAMILY MEDICINE

## 2023-12-28 PROCEDURE — 3044F HG A1C LEVEL LT 7.0%: CPT | Mod: CPTII,95,, | Performed by: FAMILY MEDICINE

## 2023-12-28 PROCEDURE — 1101F PT FALLS ASSESS-DOCD LE1/YR: CPT | Mod: CPTII,95,, | Performed by: FAMILY MEDICINE

## 2023-12-28 PROCEDURE — 1159F MED LIST DOCD IN RCRD: CPT | Mod: CPTII,95,, | Performed by: FAMILY MEDICINE

## 2023-12-28 PROCEDURE — 1159F PR MEDICATION LIST DOCUMENTED IN MEDICAL RECORD: ICD-10-PCS | Mod: CPTII,95,, | Performed by: FAMILY MEDICINE

## 2023-12-28 PROCEDURE — 99214 OFFICE O/P EST MOD 30 MIN: CPT | Mod: 95,,, | Performed by: FAMILY MEDICINE

## 2023-12-28 PROCEDURE — 1101F PR PT FALLS ASSESS DOC 0-1 FALLS W/OUT INJ PAST YR: ICD-10-PCS | Mod: CPTII,95,, | Performed by: FAMILY MEDICINE

## 2023-12-28 PROCEDURE — 3288F PR FALLS RISK ASSESSMENT DOCUMENTED: ICD-10-PCS | Mod: CPTII,95,, | Performed by: FAMILY MEDICINE

## 2023-12-28 PROCEDURE — 99214 PR OFFICE/OUTPT VISIT, EST, LEVL IV, 30-39 MIN: ICD-10-PCS | Mod: 95,,, | Performed by: FAMILY MEDICINE

## 2023-12-28 RX ORDER — CEFDINIR 300 MG/1
300 CAPSULE ORAL 2 TIMES DAILY
COMMUNITY
End: 2024-01-04

## 2023-12-28 RX ORDER — HYDROCODONE BITARTRATE AND ACETAMINOPHEN 5; 325 MG/1; MG/1
1 TABLET ORAL EVERY 6 HOURS PRN
Qty: 21 TABLET | Refills: 0 | Status: SHIPPED | OUTPATIENT
Start: 2023-12-28 | End: 2024-01-04 | Stop reason: SDUPTHER

## 2023-12-28 NOTE — PROGRESS NOTES
Divya Bui  12/28/2023  [id number]    Angel Khan MD  Patient Care Team:  Angel Khan MD as PCP - General (Internal Medicine)  Etienne Albarado III, MD (Oncology)      The patient location is: Home  The chief complaint leading to consultation is: ER follow     Visit type: audiovisual    Face to Face time with patient: 3;57  10 minutes of total time spent on the encounter, which includes face to face time and non-face to face time preparing to see the patient (eg, review of tests), Obtaining and/or reviewing separately obtained history, Documenting clinical information in the electronic or other health record, Independently interpreting results (not separately reported) and communicating results to the patient/family/caregiver, or Care coordination (not separately reported).         Each patient to whom he or she provides medical services by telemedicine is:  (1) informed of the relationship between the physician and patient and the respective role of any other health care provider with respect to management of the patient; and (2) notified that he or she may decline to receive medical services by telemedicine and may withdraw from such care at any time.    Notes:      Visit Type:an urgent visit for an existing problem     Chief Complaint:  Chief Complaint   Patient presents with    Edema     Feet up to knee. Started two weeks ago She said she has some black spots on her legs. She went to ER yesterday and they told her that she needs to see Heme. I checked no appointments coming up. She can't put anything on not even shoes or pants       History of Present Illness:  Patient was seen in ER for same compliant yesterday.    She had labs, US done.  Divya Bui is a 63 y.o. female patient with a PMHx of HTN who presents to the Emergency Department for evaluation of bilateral calf swelling and bruising which onset about 2 weeks ago. Pt also notes she was hospitalized just over 1 month ago for a GI  bleed. Symptoms are constant and moderate in severity. No mitigating or exacerbating factors reported     Lab Results   Component Value Date    WBC 6.22 12/27/2023    HGB 10.5 (L) 12/27/2023    HCT 34.4 (L) 12/27/2023    MCV 94 12/27/2023     12/27/2023     BNP for CHF negative  Troponin Negative    Bilateral Leg US was negative for clot  Bleeding time normal    She was referred to hematology    She started with swelling 2 weeks ago, and then started with bruising    She has more redness now, she reports that its getting more erythematous.    She reports that she was sent home, form her initial GI bleeding.  She said she did not need transfusion  She was not scoped  Stayed for a couple of days, and they just watch her blood count.  She didn't see any blood in her stool  She was discharged, she reports that she was not able to get scheduled for colon until 3 months out.  She does not take a blood thinner    She started lasix today to help with swelling  The legs are not weeping    She is on Omnicef  She is having pain in the legs  She was given Norco once in ER  She wants pain medication  She said Tylenol is not helping      History:  Past Medical History:   Diagnosis Date    Anemia     Anxiety     Basal cell carcinoma (BCC) of face 2/26/2020    Blood transfusion     Bowel incontinence     Depression     Esophageal stricture     GERD (gastroesophageal reflux disease)     Hypertension     Latent tuberculosis by skin test 2001    took INH    Liver nodule 1/6/2014    Morbid obesity with BMI of 45.0-49.9, adult     OA (osteoarthritis) of knee 12/12/2014    Pericardial effusion 9/4/2014     Past Surgical History:   Procedure Laterality Date    CHOLECYSTECTOMY      COLONOSCOPY N/A 3/6/2023    Procedure: COLONOSCOPY;  Surgeon: Beti Diallo MD;  Location: [facility];  Service: Endoscopy;  Laterality: N/A;    GASTRIC BYPASS      HERNIA REPAIR      INJECTION OF ANESTHETIC AGENT AROUND NERVE Bilateral 6/16/2023     Procedure: Bilateral Genicular nerve block with RN IV sedation;  Surgeon: Denis Lai MD;  Location: [facility];  Service: Pain Management;  Laterality: Bilateral;    INJECTION OF ANESTHETIC AGENT AROUND NERVE Bilateral 8/11/2023    Procedure: Bilateral Genicular nerve block with RN IV sedation;  Surgeon: Denis Lai MD;  Location: [facility];  Service: Pain Management;  Laterality: Bilateral;    RADIOFREQUENCY THERMOCOAGULATION Right 10/31/2023    Procedure: Right Genicular Nerve RFA with RN IV sedation;  Surgeon: Denis Lai MD;  Location: [facility];  Service: Pain Management;  Laterality: Right;    RADIOFREQUENCY THERMOCOAGULATION Left 11/14/2023    Procedure: Left Genicular Nerve RFA with RN IV sedation;  Surgeon: Denis Lai MD;  Location: [facility];  Service: Pain Management;  Laterality: Left;    right sholder surgery      SMALL INTESTINE SURGERY       Family History   Problem Relation Age of Onset    Lung cancer Mother         smoker    Liver cancer Father     Diabetes Sister     Crohn's disease Sister     Liver cancer Sister     Diabetes Sister     Crohn's disease Brother     Crohn's disease Other     Breast cancer Neg Hx     Ovarian cancer Neg Hx     Colon cancer Neg Hx      Social History     Socioeconomic History    Marital status:    Tobacco Use    Smoking status: Never    Smokeless tobacco: Never   Substance and Sexual Activity    Alcohol use: Not Currently     Alcohol/week: 0.0 standard drinks of alcohol    Drug use: No    Sexual activity: Yes     Partners: Male     Social Determinants of Health     Food Insecurity: No Food Insecurity (12/28/2023)    Hunger Vital Sign     Worried About Running Out of Food in the Last Year: Never true     Ran Out of Food in the Last Year: Never true   Transportation Needs: Unknown (12/28/2023)    PRAPARE - Transportation     Lack of Transportation (Non-Medical): No   Physical Activity: Inactive (12/28/2023)    Exercise Vital Sign      Days of Exercise per Week: 0 days     Minutes of Exercise per Session: 0 min   Social Connections: Unknown (12/28/2023)    Social Connection and Isolation Panel [NHANES]     Frequency of Communication with Friends and Family: More than three times a week     Frequency of Social Gatherings with Friends and Family: Three times a week     Active Member of Clubs or Organizations: No     Attends Club or Organization Meetings: Never     Marital Status:    Housing Stability: Low Risk  (12/28/2023)    Housing Stability Vital Sign     Unable to Pay for Housing in the Last Year: No     Number of Places Lived in the Last Year: 1     Unstable Housing in the Last Year: No     Patient Active Problem List   Diagnosis    B12 deficiency anemia    Gastric bypass status for obesity    Chronic kidney disease, stage I    HTN (hypertension)    Hypothyroid    Iron deficiency anemia    Liver nodule    Pericardial effusion    Anxiety    Morbid obesity with BMI of 40.0-44.9, adult    OA (osteoarthritis) of knee    Osteopenia    OM (osteomalacia)    Esophageal stricture    Nausea & vomiting    External hemorrhoids    Diverticulosis of intestine without bleeding    Abdominal pain    Hiatal hernia    Hypokalemia    Acute renal failure superimposed on stage 3 chronic kidney disease    Basal cell carcinoma (BCC) of face    Chronic hyponatremia    Chronic pain disorder    Chronic prescription benzodiazepine use    Class 2 severe obesity due to excess calories with serious comorbidity and body mass index (BMI) of 39.0 to 39.9 in adult    Depressive disorder    Diplopia    E coli bacteremia    Grief    Hearing loss    Pancreatic insufficiency    Localized edema    Partial obstruction of small intestine    Polypharmacy    Postoperative malabsorption    Recurrent major depressive disorder, in partial remission    S/P appendectomy    Vitamin D deficiency    Weakness of face muscles    Suspected cerebrovascular accident (CVA)    History of right  knee joint replacement    Status post bariatric surgery    ADI (generalized anxiety disorder)    Vitamin D deficient osteomalacia    History of bilateral knee arthroplasty    Chronic pain of both knees    Hyponatremia    Acute lower GI bleeding    Insomnia    UTI (urinary tract infection)    Rash    GERD (gastroesophageal reflux disease)     Review of patient's allergies indicates:   Allergen Reactions    Metronidazole hcl Other (See Comments)     Mouth ulcers developed with Flagyl  Oral sores.  Mouth ulcers developed with Flagyl       The following were reviewed at this visit: active problem list, medication list, allergies, family history, social history, and health maintenance.    Medications:  Current Outpatient Medications on File Prior to Visit   Medication Sig Dispense Refill    ALPRAZolam (XANAX) 1 MG tablet Take 1 mg by mouth 3 (three) times daily as needed.      amLODIPine (NORVASC) 5 MG tablet Take 1 tablet (5 mg total) by mouth once daily. 90 tablet 3    ARIPiprazole (ABILIFY) 10 MG Tab Take 10 mg by mouth once daily.      atorvastatin (LIPITOR) 40 MG tablet Take 1 tablet (40 mg total) by mouth once daily. 90 tablet 3    doxepin (SINEQUAN) 50 MG capsule Take 50 mg by mouth nightly.      fluconazole (DIFLUCAN) 150 MG Tab Take first tablet today, then repeat in 3 days. 2 tablet 0    fluoxetine (PROZAC) 40 MG capsule Take 1 capsule (40 mg total) by mouth once daily. 30 capsule 11    furosemide (LASIX) 20 MG tablet Take 1 tablet (20 mg total) by mouth daily as needed (swelling). 30 tablet 5    olmesartan-hydrochlorothiazide (BENICAR HCT) 40-12.5 mg Tab Take 1 tablet by mouth once daily. 90 tablet 3    pregabalin (LYRICA) 100 MG capsule Take 1 capsule (100 mg total) by mouth every evening. 90 capsule 0    zolpidem (AMBIEN) 10 mg Tab Take 10 mg by mouth nightly as needed.      aspirin 81 mg Tab Take 1 tablet by mouth Daily.      cefdinir (OMNICEF) 300 MG capsule Take 300 mg by mouth 2 (two) times daily.       ciprofloxacin HCl (CIPRO) 500 MG tablet Take 1 tablet (500 mg total) by mouth 2 (two) times daily. (Patient not taking: Reported on 12/28/2023) 10 tablet 0    dicyclomine (BENTYL) 20 mg tablet Take 1 tablet (20 mg total) by mouth 3 (three) times daily as needed (abdominal pain). (Patient not taking: Reported on 12/28/2023) 60 tablet 0    promethazine (PHENERGAN) 25 MG tablet Take 1 tablet (25 mg total) by mouth every 6 (six) hours as needed for Nausea. (Patient not taking: Reported on 12/28/2023) 20 tablet 0    [DISCONTINUED] methylPREDNISolone (MEDROL DOSEPACK) 4 mg tablet Take 4 mg by mouth. use as directed      [DISCONTINUED] methylPREDNISolone (MEDROL DOSEPACK) 4 mg tablet use as directed (Patient not taking: Reported on 11/28/2023) 1 each 0    [DISCONTINUED] URO--10-40.8-36 mg Cap TAKE 1 CAPSULE BY MOUTH THREE TIMES DAILY AS NEEDED FOR BLADDER PAIN 30 capsule 1     No current facility-administered medications on file prior to visit.       Medications have been reviewed and reconciled with patient at this visit.  Barriers to medications reviewed with patient.    Adverse reactions to current medications reviewed with patient..    Over the counter medications reviewed and reconciled with patient.    Exam:  Wt Readings from Last 3 Encounters:   12/27/23 106.8 kg (235 lb 7.2 oz)   12/01/23 101.5 kg (223 lb 12.3 oz)   11/28/23 100.4 kg (221 lb 3.7 oz)     Temp Readings from Last 3 Encounters:   12/27/23 98.1 °F (36.7 °C) (Oral)   12/01/23 99.5 °F (37.5 °C) (Tympanic)   11/28/23 98.3 °F (36.8 °C) (Oral)     BP Readings from Last 3 Encounters:   12/27/23 (!) 157/85   12/01/23 136/84   11/28/23 (!) 172/85     Pulse Readings from Last 3 Encounters:   12/27/23 61   12/01/23 101   11/28/23 81     There is no height or weight on file to calculate BMI.      Review of Systems   Cardiovascular:  Positive for leg swelling.   Endo/Heme/Allergies:  Bruises/bleeds easily.     Physical Exam  Nursing note reviewed.    Pulmonary:      Effort: Pulmonary effort is normal. No respiratory distress.   Neurological:      Mental Status: She is alert and oriented to person, place, and time.   Psychiatric:         Mood and Affect: Mood normal.         Behavior: Behavior normal.         Thought Content: Thought content normal.         Judgment: Judgment normal.         Laboratory Reviewed ({Yes)  Lab Results   Component Value Date    WBC 6.22 12/27/2023    HGB 10.5 (L) 12/27/2023    HCT 34.4 (L) 12/27/2023     12/27/2023    CHOL 173 01/09/2023    TRIG 119 01/09/2023    HDL 50 01/09/2023    ALT 11 12/27/2023    AST 16 12/27/2023     12/27/2023    K 3.9 12/27/2023     12/27/2023    CREATININE 1.0 12/27/2023    BUN 19 12/27/2023    CO2 23 12/27/2023    TSH 1.611 01/09/2023    INR 0.9 12/27/2023    HGBA1C 5.5 01/09/2023       Divya was seen today for edema.    Diagnoses and all orders for this visit:    Leg swelling    Other orders  -     HYDROcodone-acetaminophen (NORCO) 5-325 mg per tablet; Take 1 tablet by mouth every 6 (six) hours as needed for Pain.      I reviewed labs, bleeding time etc with patient  I reviewed US    She is on OMNICEF now  She has Lasix for leg swelling, just started.    Need to elevate, monitor  If redness increases, then go to ER.    This could be capillary bruising from extensive dependent edema. She has some indeterminate diastolic dysfunction, so this could be source, but lasix should help.    PT INR and platelets are fine    Agree she had GI bleed and now is waiting 2 month for GI appt.  If sooner appt, PCP can order PAT colon to try and get done sooner.  Answers submitted by the patient for this visit:  Review of Systems Questionnaire (Submitted on 12/28/2023)  activity change: Yes  unexpected weight change: No  rhinorrhea: No  trouble swallowing: No  visual disturbance: No  chest tightness: No  polyuria: Yes  difficulty urinating: No  menstrual problem: No  joint swelling: Yes  arthralgias:  Yes  confusion: No  dysphoric mood: No            Care Plan/Goals: Reviewed    Goals    None         Follow up: No follow-ups on file.    After visit summary was printed and given to patient upon discharge today.  Patient goals and care plan are included in After Visit Summary.

## 2023-12-28 NOTE — ED PROVIDER NOTES
SCRIBE #1 NOTE: I, Lilo Weber, am scribing for, and in the presence of, Kat Rogers MD. I have scribed the entire note.       History     Chief Complaint   Patient presents with    Leg Swelling     Bilateral leg swelling and redness. Went to [facility] yesterday but they did not have hematology available. Legs are not draining. Started around Thanksgiving.      Review of patient's allergies indicates:   Allergen Reactions    Metronidazole hcl Other (See Comments)     Mouth ulcers developed with Flagyl  Oral sores.  Mouth ulcers developed with Flagyl         History of Present Illness     HPI    12/27/2023, 7:06 PM  History obtained from the patient      History of Present Illness: Divya Bui is a 63 y.o. female patient with a PMHx of HTN who presents to the Emergency Department for evaluation of bilateral calf swelling and bruising which onset about 2 weeks ago. Pt also notes she was hospitalized just over 1 month ago for a GI bleed. She takes a baby aspirin but not on any other blood thinners.  Denies any recent falls or trauma.  She denies any heart failure but legs are grossly edematous.  Upon review of her records she is supposed to be on lasix but was not aware of this. Symptoms are constant and moderate in severity. No mitigating or exacerbating factors reported. No associated sxs reported. Patient denies any fever, chills, nausea, vomiting, and all other sxs at this time. Prior Tx includes advil. No further complaints or concerns at this time.       Arrival mode: Personal vehicle  PCP: Angel Khan MD        Past Medical History:  Past Medical History:   Diagnosis Date    Anemia     Anxiety     Basal cell carcinoma (BCC) of face 2/26/2020    Blood transfusion     Bowel incontinence     Depression     Esophageal stricture     GERD (gastroesophageal reflux disease)     Hypertension     Latent tuberculosis by skin test 2001    took INH    Liver nodule 1/6/2014    Morbid obesity with BMI of  45.0-49.9, adult     OA (osteoarthritis) of knee 12/12/2014    Pericardial effusion 9/4/2014       Past Surgical History:  Past Surgical History:   Procedure Laterality Date    CHOLECYSTECTOMY      COLONOSCOPY N/A 3/6/2023    Procedure: COLONOSCOPY;  Surgeon: Beti Diallo MD;  Location: [facility] END[facility];  Service: Endoscopy;  Laterality: N/A;    GASTRIC BYPASS      HERNIA REPAIR      INJECTION OF ANESTHETIC AGENT AROUND NERVE Bilateral 6/16/2023    Procedure: Bilateral Genicular nerve block with RN IV sedation;  Surgeon: Denis Lai MD;  Location: [facility];  Service: Pain Management;  Laterality: Bilateral;    INJECTION OF ANESTHETIC AGENT AROUND NERVE Bilateral 8/11/2023    Procedure: Bilateral Genicular nerve block with RN IV sedation;  Surgeon: Denis Lai MD;  Location: [facility];  Service: Pain Management;  Laterality: Bilateral;    RADIOFREQUENCY THERMOCOAGULATION Right 10/31/2023    Procedure: Right Genicular Nerve RFA with RN IV sedation;  Surgeon: Denis Lai MD;  Location: [facility];  Service: Pain Management;  Laterality: Right;    RADIOFREQUENCY THERMOCOAGULATION Left 11/14/2023    Procedure: Left Genicular Nerve RFA with RN IV sedation;  Surgeon: Denis Lai MD;  Location: [facility];  Service: Pain Management;  Laterality: Left;    right sholder surgery      SMALL INTESTINE SURGERY           Family History:  Family History   Problem Relation Age of Onset    Lung cancer Mother         smoker    Liver cancer Father     Diabetes Sister     Crohn's disease Sister     Liver cancer Sister     Diabetes Sister     Crohn's disease Brother     Crohn's disease Other     Breast cancer Neg Hx     Ovarian cancer Neg Hx     Colon cancer Neg Hx        Social History:  Social History     Tobacco Use    Smoking status: Never    Smokeless tobacco: Never   Substance and Sexual Activity    Alcohol use: Not Currently     Alcohol/week: 0.0 standard drinks of alcohol    Drug use: No    Sexual  activity: Yes     Partners: Male        Review of Systems     Review of Systems   Constitutional:  Negative for chills and fever.   HENT:  Negative for sore throat.    Respiratory:  Negative for shortness of breath.    Cardiovascular:  Positive for leg swelling. Negative for chest pain.   Gastrointestinal:  Negative for nausea and vomiting.   Genitourinary:  Negative for dysuria.   Musculoskeletal:  Negative for back pain.   Skin:  Negative for rash.        (+) bruising to bilateral lower legs   Neurological:  Negative for weakness and headaches.   Hematological:  Does not bruise/bleed easily.   All other systems reviewed and are negative.     Physical Exam     Initial Vitals [12/27/23 1315]   BP Pulse Resp Temp SpO2   (!) 157/85 61 18 98.1 °F (36.7 °C) 96 %      MAP       --          Physical Exam  Nursing Notes and Vital Signs Reviewed.  Constitutional: Patient is in no apparent distress. Morbidly obese.  Head: Atraumatic. Normocephalic.  Eyes: PERRL. EOM intact. Conjunctivae are not pale. No scleral icterus.  ENT: Mucous membranes are moist. Oropharynx is clear and symmetric.    Neck: Supple. Full ROM. No lymphadenopathy.  Cardiovascular: Regular rate. Regular rhythm. No murmurs, rubs, or gallops. Distal pulses are 2+ and symmetric.  Pulmonary/Chest: No respiratory distress. Clear to auscultation bilaterally. No wheezing or rales.  Abdominal: Soft and non-distended.  There is no tenderness.  No rebound, guarding, or rigidity. Good bowel sounds.  Genitourinary: No CVA tenderness  Musculoskeletal: Moves all extremities. No obvious deformities. Chronic bilateral lower extremity edema to distal legs. Superficial bruising to both distal legs, no secondary signs of infection., no drainage, compartments are soft, NVI.   Skin: Warm and dry.  Neurological:  Alert, awake, and appropriate.  Normal speech.  No acute focal neurological deficits are appreciated.  Psychiatric: Normal affect. Good eye contact. Appropriate in  "content.     ED Course   Procedures  ED Vital Signs:  Vitals:    12/27/23 1315   BP: (!) 157/85   Pulse: 61   Resp: 18   Temp: 98.1 °F (36.7 °C)   TempSrc: Oral   SpO2: 96%   Weight: 106.8 kg (235 lb 7.2 oz)   Height: 5' 3" (1.6 m)       Abnormal Lab Results:  Labs Reviewed   CBC W/ AUTO DIFFERENTIAL - Abnormal; Notable for the following components:       Result Value    RBC 3.66 (*)     Hemoglobin 10.5 (*)     Hematocrit 34.4 (*)     MCHC 30.5 (*)     MPV 8.9 (*)     Lymph % 17.2 (*)     All other components within normal limits   COMPREHENSIVE METABOLIC PANEL - Abnormal; Notable for the following components:    Glucose 115 (*)     All other components within normal limits   URINALYSIS, REFLEX TO URINE CULTURE - Abnormal; Notable for the following components:    Leukocytes, UA Trace (*)     All other components within normal limits    Narrative:     Specimen Source->Urine   TROPONIN I   B-TYPE NATRIURETIC PEPTIDE   APTT   PROTIME-INR   URINALYSIS MICROSCOPIC    Narrative:     Specimen Source->Urine        All Lab Results:  Results for orders placed or performed during the hospital encounter of 12/27/23   CBC auto differential   Result Value Ref Range    WBC 6.22 3.90 - 12.70 K/uL    RBC 3.66 (L) 4.00 - 5.40 M/uL    Hemoglobin 10.5 (L) 12.0 - 16.0 g/dL    Hematocrit 34.4 (L) 37.0 - 48.5 %    MCV 94 82 - 98 fL    MCH 28.7 27.0 - 31.0 pg    MCHC 30.5 (L) 32.0 - 36.0 g/dL    RDW 14.1 11.5 - 14.5 %    Platelets 246 150 - 450 K/uL    MPV 8.9 (L) 9.2 - 12.9 fL    Immature Granulocytes 0.3 0.0 - 0.5 %    Gran # (ANC) 4.5 1.8 - 7.7 K/uL    Immature Grans (Abs) 0.02 0.00 - 0.04 K/uL    Lymph # 1.1 1.0 - 4.8 K/uL    Mono # 0.4 0.3 - 1.0 K/uL    Eos # 0.3 0.0 - 0.5 K/uL    Baso # 0.03 0.00 - 0.20 K/uL    nRBC 0 0 /100 WBC    Gran % 71.6 38.0 - 73.0 %    Lymph % 17.2 (L) 18.0 - 48.0 %    Mono % 6.4 4.0 - 15.0 %    Eosinophil % 4.0 0.0 - 8.0 %    Basophil % 0.5 0.0 - 1.9 %    Differential Method Automated    Comprehensive " metabolic panel   Result Value Ref Range    Sodium 140 136 - 145 mmol/L    Potassium 3.9 3.5 - 5.1 mmol/L    Chloride 104 95 - 110 mmol/L    CO2 23 23 - 29 mmol/L    Glucose 115 (H) 70 - 110 mg/dL    BUN 19 8 - 23 mg/dL    Creatinine 1.0 0.5 - 1.4 mg/dL    Calcium 8.7 8.7 - 10.5 mg/dL    Total Protein 7.3 6.0 - 8.4 g/dL    Albumin 3.7 3.5 - 5.2 g/dL    Total Bilirubin 0.3 0.1 - 1.0 mg/dL    Alkaline Phosphatase 83 55 - 135 U/L    AST 16 10 - 40 U/L    ALT 11 10 - 44 U/L    eGFR >60 >60 mL/min/1.73 m^2    Anion Gap 13 8 - 16 mmol/L   Troponin I   Result Value Ref Range    Troponin I <0.006 0.000 - 0.026 ng/mL   Brain natriuretic peptide   Result Value Ref Range    BNP 94 0 - 99 pg/mL   APTT   Result Value Ref Range    aPTT 21.9 21.0 - 32.0 sec   Protime-INR   Result Value Ref Range    Prothrombin Time 9.8 9.0 - 12.5 sec    INR 0.9 0.8 - 1.2   Urinalysis, Reflex to Urine Culture Urine, Clean Catch    Specimen: Urine   Result Value Ref Range    Specimen UA Urine, Clean Catch     Color, UA Yellow Yellow, Straw, Debra    Appearance, UA Clear Clear    pH, UA 7.0 5.0 - 8.0    Specific Gravity, UA 1.010 1.005 - 1.030    Protein, UA Negative Negative    Glucose, UA Negative Negative    Ketones, UA Negative Negative    Bilirubin (UA) Negative Negative    Occult Blood UA Negative Negative    Nitrite, UA Negative Negative    Urobilinogen, UA Negative <2.0 EU/dL    Leukocytes, UA Trace (A) Negative   Urinalysis Microscopic   Result Value Ref Range    RBC, UA 1 0 - 4 /hpf    WBC, UA 2 0 - 5 /hpf    Bacteria Rare None-Occ /hpf    Squam Epithel, UA 1 /hpf    Microscopic Comment SEE COMMENT          Imaging Results:  Imaging Results              US Lower Extremity Veins Bilateral (Final result)  Result time 12/27/23 14:38:50      Final result by Amada Asif MD (Timothy) (12/27/23 14:38:50)                   Impression:      No evidence of deep venous thrombosis bilateral lower extremities.  Bilateral calf edema  suspected.      Electronically signed by: Amada Asif MD  Date:    12/27/2023  Time:    14:38               Narrative:    EXAMINATION:  US LOWER EXTREMITY VEINS BILATERAL    CLINICAL HISTORY:  Right lower extremity swelling.  Left lower extremity swelling    TECHNIQUE:  Color flow and spectral Doppler vascular ultrasound was performed.    COMPARISON:  Doppler venous ultrasound 11/08/2023    FINDINGS:  There is compressibility and color Doppler flow with normal venous waveforms and good calf augmentation response demonstrated bilateral lower extremities.    Thickening of the subcutaneous fat of the calves bilaterally likely secondary to edema.                                       X-Ray Chest PA And Lateral (Final result)  Result time 12/27/23 13:40:49      Final result by Amada Asif (Rios), MD (12/27/23 13:40:49)                   Impression:      No acute findings.      Electronically signed by: Amada Asif MD  Date:    12/27/2023  Time:    13:40               Narrative:    EXAMINATION:  XR CHEST PA AND LATERAL    CLINICAL HISTORY  Leg swelling,    COMPARISON:  Comparison chest 01/22/2022.    FINDINGS:  Two views.  The heart size is normal.  The lung fields are clear.  No acute cardiopulmonary infiltrative.                                         The Emergency Provider reviewed the vital signs and test results, which are outlined above.     ED Discussion       7:19 PM: Reassessed pt at this time. Discussed with pt all pertinent ED information and results. Discussed pt dx and plan of tx. Gave pt all f/u and return to the ED instructions. All questions and concerns were addressed at this time. Pt expresses understanding of information and instructions, and is comfortable with plan to discharge. Pt is stable for discharge.    I discussed with patient and/or family/caretaker that evaluation in the ED does not suggest any emergent or life threatening medical conditions requiring immediate intervention  beyond what was provided in the ED, and I believe patient is safe for discharge.  Regardless, an unremarkable evaluation in the ED does not preclude the development or presence of a serious of life threatening condition. As such, patient was instructed to return immediately for any worsening or change in current symptoms.         Medical Decision Making  DDX:  1. Platelet disorder  2. Cotting disorder  3. DVT  4. Cellulitis    Patient presents with concerns for bruising to both lower legs which has been going on for several weeks.  Also seen in the ER on 11/28 for the same. She also went to [place] ER yesterday for the same.  Denies any trauma or injury, takes baby aspirin but otherwise not on any blood thinners.  She clinically appears to have lymphedema to both lower legs with superficial bruising, no clinical concerns for infection.  She has no dyspnea or chest pain. Lab work obtained and h/h is stable, platelets are normal, coags are normal, CXR normal, US to rule out DVT is negative.  Old records reviewed she is supposed to be on lasix but was not aware of this.  Will restart her lasix, low salt diet, leg elevation, compression stockings, will refer to hematology but bruising seems superficial, no indication for admission.         Amount and/or Complexity of Data Reviewed  External Data Reviewed: labs, radiology, ECG and notes.  Labs: ordered. Decision-making details documented in ED Course.  Radiology: ordered and independent interpretation performed. Decision-making details documented in ED Course.                ED Medication(s):  Medications - No data to display    Discharge Medication List as of 12/27/2023  7:03 PM           Follow-up Information       Angel Khan MD. Schedule an appointment as soon as possible for a visit in 2 days.    Specialty: Internal Medicine  Contact information:  [address] [facility] [address]  [phone]               [facility] HEMATOLOGY/ONCOLOGY. Schedule an  appointment as soon as possible for a visit in 2 days.    Specialty: Hematology and Oncology  Why: Return to the Emergency Room, If symptoms worsen  Contact information:  [address] [facility] [id number] [phone]                               Scribe Attestation:   Scribe #1: I performed the above scribed service and the documentation accurately describes the services I performed. I attest to the accuracy of the note.     Attending:   Physician Attestation Statement for Scribe #1: I, Kat Rogers MD, personally performed the services described in this documentation, as scribed by Lilo Weber, in my presence, and it is both accurate and complete.           Clinical Impression       ICD-10-CM ICD-9-CM   1. Bilateral leg edema  R60.0 782.3   2. Leg swelling  M79.89 729.81   3. Contusion of right lower leg, subsequent encounter  S80.11XD V58.89     924.10   4. Easy bruising  R23.3 782.7   5. Morbid obesity  E66.01 278.01       Disposition:   Disposition: Discharged  Condition: Stable         Kat Rogers MD  12/29/23 1100

## 2023-12-29 ENCOUNTER — NURSE TRIAGE (OUTPATIENT)
Dept: ADMINISTRATIVE | Facility: CLINIC | Age: 63
End: 2023-12-29
Payer: MEDICARE

## 2023-12-29 ENCOUNTER — TELEPHONE (OUTPATIENT)
Dept: HEMATOLOGY/ONCOLOGY | Facility: CLINIC | Age: 63
End: 2023-12-29
Payer: MEDICARE

## 2023-12-29 ENCOUNTER — PATIENT MESSAGE (OUTPATIENT)
Dept: HEMATOLOGY/ONCOLOGY | Facility: CLINIC | Age: 63
End: 2023-12-29
Payer: MEDICARE

## 2023-12-30 ENCOUNTER — HOSPITAL ENCOUNTER (EMERGENCY)
Facility: HOSPITAL | Age: 63
Discharge: HOME | End: 2023-12-30
Attending: EMERGENCY MEDICINE
Payer: MEDICARE

## 2023-12-30 VITALS
DIASTOLIC BLOOD PRESSURE: 104 MMHG | SYSTOLIC BLOOD PRESSURE: 178 MMHG | HEART RATE: 99 BPM | OXYGEN SATURATION: 97 % | RESPIRATION RATE: 16 BRPM | TEMPERATURE: 99 F

## 2023-12-30 DIAGNOSIS — L03.115 CELLULITIS OF RIGHT LOWER EXTREMITY: Primary | ICD-10-CM

## 2023-12-30 DIAGNOSIS — M79.89 LEG SWELLING: ICD-10-CM

## 2023-12-30 LAB
ALBUMIN SERPL BCP-MCNC: 3.4 G/DL (ref 3.5–5.2)
ALP SERPL-CCNC: 71 U/L (ref 55–135)
ALT SERPL W/O P-5'-P-CCNC: 10 U/L (ref 10–44)
ANION GAP SERPL CALC-SCNC: 13 MMOL/L (ref 8–16)
AST SERPL-CCNC: 12 U/L (ref 10–40)
BACTERIA #/AREA URNS HPF: ABNORMAL /HPF
BASOPHILS # BLD AUTO: 0.04 K/UL (ref 0–0.2)
BASOPHILS NFR BLD: 0.8 % (ref 0–1.9)
BILIRUB SERPL-MCNC: 0.3 MG/DL (ref 0.1–1)
BILIRUB UR QL STRIP: NEGATIVE
BUN SERPL-MCNC: 15 MG/DL (ref 8–23)
CALCIUM SERPL-MCNC: 8.2 MG/DL (ref 8.7–10.5)
CHLORIDE SERPL-SCNC: 101 MMOL/L (ref 95–110)
CLARITY UR: ABNORMAL
CO2 SERPL-SCNC: 26 MMOL/L (ref 23–29)
COLOR UR: YELLOW
CREAT SERPL-MCNC: 1 MG/DL (ref 0.5–1.4)
DIFFERENTIAL METHOD BLD: ABNORMAL
EOSINOPHIL # BLD AUTO: 0.2 K/UL (ref 0–0.5)
EOSINOPHIL NFR BLD: 4.1 % (ref 0–8)
ERYTHROCYTE [DISTWIDTH] IN BLOOD BY AUTOMATED COUNT: 14 % (ref 11.5–14.5)
EST. GFR  (NO RACE VARIABLE): >60 ML/MIN/1.73 M^2
GLUCOSE SERPL-MCNC: 102 MG/DL (ref 70–110)
GLUCOSE UR QL STRIP: NEGATIVE
HCT VFR BLD AUTO: 32.9 % (ref 37–48.5)
HGB BLD-MCNC: 10.3 G/DL (ref 12–16)
HGB UR QL STRIP: NEGATIVE
IMM GRANULOCYTES # BLD AUTO: 0.02 K/UL (ref 0–0.04)
IMM GRANULOCYTES NFR BLD AUTO: 0.4 % (ref 0–0.5)
KETONES UR QL STRIP: NEGATIVE
LEUKOCYTE ESTERASE UR QL STRIP: NEGATIVE
LYMPHOCYTES # BLD AUTO: 1.2 K/UL (ref 1–4.8)
LYMPHOCYTES NFR BLD: 23.9 % (ref 18–48)
MCH RBC QN AUTO: 29 PG (ref 27–31)
MCHC RBC AUTO-ENTMCNC: 31.3 G/DL (ref 32–36)
MCV RBC AUTO: 93 FL (ref 82–98)
MICROSCOPIC COMMENT: ABNORMAL
MONOCYTES # BLD AUTO: 0.4 K/UL (ref 0.3–1)
MONOCYTES NFR BLD: 7.1 % (ref 4–15)
NEUTROPHILS # BLD AUTO: 3.1 K/UL (ref 1.8–7.7)
NEUTROPHILS NFR BLD: 63.7 % (ref 38–73)
NITRITE UR QL STRIP: NEGATIVE
NRBC BLD-RTO: 0 /100 WBC
PH UR STRIP: 8 [PH] (ref 5–8)
PLATELET # BLD AUTO: 254 K/UL (ref 150–450)
PMV BLD AUTO: 8.5 FL (ref 9.2–12.9)
POTASSIUM SERPL-SCNC: 3.6 MMOL/L (ref 3.5–5.1)
PROT SERPL-MCNC: 6.4 G/DL (ref 6–8.4)
PROT UR QL STRIP: ABNORMAL
RBC # BLD AUTO: 3.55 M/UL (ref 4–5.4)
RBC #/AREA URNS HPF: 16 /HPF (ref 0–4)
SODIUM SERPL-SCNC: 140 MMOL/L (ref 136–145)
SP GR UR STRIP: 1.01 (ref 1–1.03)
SQUAMOUS #/AREA URNS HPF: 8 /HPF
URN SPEC COLLECT METH UR: ABNORMAL
UROBILINOGEN UR STRIP-ACNC: NEGATIVE EU/DL
WBC # BLD AUTO: 4.9 K/UL (ref 3.9–12.7)
WBC #/AREA URNS HPF: 16 /HPF (ref 0–5)

## 2023-12-30 PROCEDURE — 85025 COMPLETE CBC W/AUTO DIFF WBC: CPT | Performed by: NURSE PRACTITIONER

## 2023-12-30 PROCEDURE — 99284 EMERGENCY DEPT VISIT MOD MDM: CPT | Mod: 25

## 2023-12-30 PROCEDURE — 87086 URINE CULTURE/COLONY COUNT: CPT | Performed by: NURSE PRACTITIONER

## 2023-12-30 PROCEDURE — 81000 URINALYSIS NONAUTO W/SCOPE: CPT | Performed by: NURSE PRACTITIONER

## 2023-12-30 PROCEDURE — 80053 COMPREHEN METABOLIC PANEL: CPT | Performed by: NURSE PRACTITIONER

## 2023-12-30 RX ORDER — CLINDAMYCIN HYDROCHLORIDE 150 MG/1
450 CAPSULE ORAL EVERY 8 HOURS
Qty: 45 CAPSULE | Refills: 0 | Status: SHIPPED | OUTPATIENT
Start: 2023-12-30 | End: 2024-01-04

## 2023-12-30 NOTE — ED PROVIDER NOTES
SCRIBE #1 NOTE: I, Rona Avendano, am scribing for, and in the presence of, Rocky Kimball MD. I have scribed the entire note.       History     Chief Complaint   Patient presents with    Leg Pain     Right leg pain x 1 week. Sent to ER by MD.       Review of patient's allergies indicates:   Allergen Reactions    Metronidazole hcl Other (See Comments)     Mouth ulcers developed with Flagyl  Oral sores.  Mouth ulcers developed with Flagyl         History of Present Illness     HPI    12/30/2023, 2:52 PM  History obtained from the patient      History of Present Illness: Divya Bui is a 63 y.o. female patient with a PMHx of HTN, anemia, and GERD who presents to the Emergency Department for evaluation of RLE pain x 1 week. Symptoms are constant and moderate in severity. No mitigating or exacerbating factors reported.  No further complaints or concerns at this time.       Arrival mode: Personal vehicle     PCP: Angel Khan MD        Past Medical History:  Past Medical History:   Diagnosis Date    Anemia     Anxiety     Basal cell carcinoma (BCC) of face 2/26/2020    Blood transfusion     Bowel incontinence     Depression     Esophageal stricture     GERD (gastroesophageal reflux disease)     Hypertension     Latent tuberculosis by skin test 2001    took INH    Liver nodule 1/6/2014    Morbid obesity with BMI of 45.0-49.9, adult     OA (osteoarthritis) of knee 12/12/2014    Pericardial effusion 9/4/2014       Past Surgical History:  Past Surgical History:   Procedure Laterality Date    CHOLECYSTECTOMY      COLONOSCOPY N/A 3/6/2023    Procedure: COLONOSCOPY;  Surgeon: Beti Diallo MD;  Location: [facility] ENDO;  Service: Endoscopy;  Laterality: N/A;    GASTRIC BYPASS      HERNIA REPAIR      INJECTION OF ANESTHETIC AGENT AROUND NERVE Bilateral 6/16/2023    Procedure: Bilateral Genicular nerve block with RN IV sedation;  Surgeon: Denis Lai MD;  Location: [facility] MGT;  Service: Pain Management;   Laterality: Bilateral;    INJECTION OF ANESTHETIC AGENT AROUND NERVE Bilateral 8/11/2023    Procedure: Bilateral Genicular nerve block with RN IV sedation;  Surgeon: Denis Lai MD;  Location: [facility] PAIN MGT;  Service: Pain Management;  Laterality: Bilateral;    RADIOFREQUENCY THERMOCOAGULATION Right 10/31/2023    Procedure: Right Genicular Nerve RFA with RN IV sedation;  Surgeon: Denis Lai MD;  Location: [facility] PAIN MGT;  Service: Pain Management;  Laterality: Right;    RADIOFREQUENCY THERMOCOAGULATION Left 11/14/2023    Procedure: Left Genicular Nerve RFA with RN IV sedation;  Surgeon: Denis Lai MD;  Location: [facility] PAIN MGT;  Service: Pain Management;  Laterality: Left;    right sholder surgery      SMALL INTESTINE SURGERY           Family History:  Family History   Problem Relation Age of Onset    Lung cancer Mother         smoker    Liver cancer Father     Diabetes Sister     Crohn's disease Sister     Liver cancer Sister     Diabetes Sister     Crohn's disease Brother     Crohn's disease Other     Breast cancer Neg Hx     Ovarian cancer Neg Hx     Colon cancer Neg Hx        Social History:  Social History     Tobacco Use    Smoking status: Never    Smokeless tobacco: Never   Substance and Sexual Activity    Alcohol use: Not Currently     Alcohol/week: 0.0 standard drinks of alcohol    Drug use: No    Sexual activity: Yes     Partners: Male        Review of Systems     Review of Systems   Musculoskeletal:         (+) RLE pain        Physical Exam     Initial Vitals [12/30/23 1207]   BP Pulse Resp Temp SpO2   (S) (!) 187/109 69 16 98.2 °F (36.8 °C) 97 %      MAP       --          Physical Exam  Nursing Notes and Vital Signs Reviewed.  Constitutional: Patient is in no acute distress. Well-developed and well-nourished.  Head: Atraumatic. Normocephalic.  Eyes: PERRL. EOM intact. Conjunctivae are not pale. No scleral icterus.  ENT: Mucous membranes are moist. Oropharynx is clear and symmetric.    Neck:  Supple. Full ROM. No lymphadenopathy.  Cardiovascular: Regular rate. Regular rhythm. No murmurs, rubs, or gallops. Distal pulses are 2+ and symmetric.  Pulmonary/Chest: No respiratory distress. Clear to auscultation bilaterally. No wheezing or rales.  Abdominal: Soft and non-distended.  There is no tenderness.  No rebound, guarding, or rigidity.   Genitourinary: No CVA tenderness  Musculoskeletal: Moves all extremities. RLE erythema and warmth. No fluctuants.   Skin: Warm and dry.  Neurological:  Alert, awake, and appropriate.  Normal speech.  No acute focal neurological deficits are appreciated.  Psychiatric: Normal affect. Good eye contact. Appropriate in content.     ED Course   Procedures  ED Vital Signs:  Vitals:    12/30/23 1207 12/30/23 1418   BP: (S) (!) 187/109 (!) 178/104   Pulse: 69 99   Resp: 16    Temp: 98.2 °F (36.8 °C) 98.8 °F (37.1 °C)   TempSrc: Oral Oral   SpO2: 97%        Abnormal Lab Results:  Labs Reviewed   CBC W/ AUTO DIFFERENTIAL - Abnormal; Notable for the following components:       Result Value    RBC 3.55 (*)     Hemoglobin 10.3 (*)     Hematocrit 32.9 (*)     MCHC 31.3 (*)     MPV 8.5 (*)     All other components within normal limits   COMPREHENSIVE METABOLIC PANEL - Abnormal; Notable for the following components:    Calcium 8.2 (*)     Albumin 3.4 (*)     All other components within normal limits   URINALYSIS, REFLEX TO URINE CULTURE - Abnormal; Notable for the following components:    Appearance, UA Cloudy (*)     Protein, UA Trace (*)     All other components within normal limits    Narrative:     Specimen Source->Urine   URINALYSIS MICROSCOPIC - Abnormal; Notable for the following components:    RBC, UA 16 (*)     WBC, UA 16 (*)     Bacteria Moderate (*)     All other components within normal limits    Narrative:     Specimen Source->Urine   CULTURE, URINE        All Lab Results:  Results for orders placed or performed during the hospital encounter of 12/30/23   CBC auto differential    Result Value Ref Range    WBC 4.90 3.90 - 12.70 K/uL    RBC 3.55 (L) 4.00 - 5.40 M/uL    Hemoglobin 10.3 (L) 12.0 - 16.0 g/dL    Hematocrit 32.9 (L) 37.0 - 48.5 %    MCV 93 82 - 98 fL    MCH 29.0 27.0 - 31.0 pg    MCHC 31.3 (L) 32.0 - 36.0 g/dL    RDW 14.0 11.5 - 14.5 %    Platelets 254 150 - 450 K/uL    MPV 8.5 (L) 9.2 - 12.9 fL    Immature Granulocytes 0.4 0.0 - 0.5 %    Gran # (ANC) 3.1 1.8 - 7.7 K/uL    Immature Grans (Abs) 0.02 0.00 - 0.04 K/uL    Lymph # 1.2 1.0 - 4.8 K/uL    Mono # 0.4 0.3 - 1.0 K/uL    Eos # 0.2 0.0 - 0.5 K/uL    Baso # 0.04 0.00 - 0.20 K/uL    nRBC 0 0 /100 WBC    Gran % 63.7 38.0 - 73.0 %    Lymph % 23.9 18.0 - 48.0 %    Mono % 7.1 4.0 - 15.0 %    Eosinophil % 4.1 0.0 - 8.0 %    Basophil % 0.8 0.0 - 1.9 %    Differential Method Automated    Comprehensive metabolic panel   Result Value Ref Range    Sodium 140 136 - 145 mmol/L    Potassium 3.6 3.5 - 5.1 mmol/L    Chloride 101 95 - 110 mmol/L    CO2 26 23 - 29 mmol/L    Glucose 102 70 - 110 mg/dL    BUN 15 8 - 23 mg/dL    Creatinine 1.0 0.5 - 1.4 mg/dL    Calcium 8.2 (L) 8.7 - 10.5 mg/dL    Total Protein 6.4 6.0 - 8.4 g/dL    Albumin 3.4 (L) 3.5 - 5.2 g/dL    Total Bilirubin 0.3 0.1 - 1.0 mg/dL    Alkaline Phosphatase 71 55 - 135 U/L    AST 12 10 - 40 U/L    ALT 10 10 - 44 U/L    eGFR >60 >60 mL/min/1.73 m^2    Anion Gap 13 8 - 16 mmol/L   Urinalysis, Reflex to Urine Culture Urine, Clean Catch    Specimen: Urine   Result Value Ref Range    Specimen UA Urine, Clean Catch     Color, UA Yellow Yellow, Straw, Debra    Appearance, UA Cloudy (A) Clear    pH, UA 8.0 5.0 - 8.0    Specific Gravity, UA 1.010 1.005 - 1.030    Protein, UA Trace (A) Negative    Glucose, UA Negative Negative    Ketones, UA Negative Negative    Bilirubin (UA) Negative Negative    Occult Blood UA Negative Negative    Nitrite, UA Negative Negative    Urobilinogen, UA Negative <2.0 EU/dL    Leukocytes, UA Negative Negative   Urinalysis Microscopic   Result Value Ref Range     RBC, UA 16 (H) 0 - 4 /hpf    WBC, UA 16 (H) 0 - 5 /hpf    Bacteria Moderate (A) None-Occ /hpf    Squam Epithel, UA 8 /hpf    Microscopic Comment SEE COMMENT          Imaging Results:  Imaging Results              US Lower Extremity Veins Right (Final result)  Result time 12/30/23 13:31:26      Final result by Jayjay Bradford MD (12/30/23 13:31:26)                   Impression:      No evidence of deep venous thrombosis in the right lower extremity.      Electronically signed by: Jayjay Bradford  Date:    12/30/2023  Time:    13:31               Narrative:    EXAMINATION:  US LOWER EXTREMITY VEINS RIGHT    CLINICAL HISTORY:  Other specified soft tissue disorders    TECHNIQUE:  Duplex and color flow Doppler evaluation and graded compression of the right lower extremity veins was performed.    COMPARISON:  Ultrasound dated 12/27/2023    FINDINGS:  Right thigh veins: The common femoral, femoral, popliteal, upper greater saphenous, and deep femoral veins are patent and free of thrombus. The veins are normally compressible and have normal phasic flow and augmentation response.    Right calf veins: The visualized calf veins are patent.  Anterior tibial vein is not visualized.  Unable to obtain color flow within the peroneal vein although it appears patent and is compressible.    Contralateral CFV: The contralateral (left) common femoral vein is patent and free of thrombus.    Miscellaneous: None                                       X-Ray Chest 1 View (Final result)  Result time 12/30/23 12:07:01      Final result by Christopher Hagen Jr., MD (12/30/23 12:07:01)                   Impression:      No acute cardiopulmonary disease.      Electronically signed by: Christopher Hagen Jr., MD  Date:    12/30/2023  Time:    12:07               Narrative:    EXAMINATION:  XR CHEST 1 VIEW    CLINICAL HISTORY:  Leg swelling.    COMPARISON:  12/27/2023.    FINDINGS:  The lungs are clear. The cardiac silhouette and mediastinum are  within normal limits. The remaining osseous structures and soft tissues are within normal limits.                                                The Emergency Provider reviewed the vital signs and test results, which are outlined above.     ED Discussion     2:54 PM: Reassessed pt at this time.  Discussed with pt all pertinent ED information and results. Discussed pt dx and plan of tx. Gave pt all f/u and return to the ED instructions. All questions and concerns were addressed at this time. Pt expresses understanding of information and instructions, and is comfortable with plan to discharge. Pt is stable for discharge.    I discussed with patient and/or family/caretaker that evaluation in the ED does not suggest any emergent or life threatening medical conditions requiring immediate intervention beyond what was provided in the ED, and I believe patient is safe for discharge.  Regardless, an unremarkable evaluation in the ED does not preclude the development or presence of a serious of life threatening condition. As such, patient was instructed to return immediately for any worsening or change in current symptoms.         Medical Decision Making  Right leg pain swelling and redness for the last week.  Sent here after a virtual visit  DDx: Cellulitis DVT    Amount and/or Complexity of Data Reviewed  Labs: ordered. Decision-making details documented in ED Course.     Details: No acute findings  Radiology: ordered. Decision-making details documented in ED Course.     Details: Negative for DVT  Discussion of management or test interpretation with external provider(s): Rx clindamycin for cellulitis    Risk  Prescription drug management.                ED Medication(s):  Medications - No data to display    New Prescriptions    CLINDAMYCIN (CLEOCIN) 150 MG CAPSULE    Take 3 capsules (450 mg total) by mouth every 8 (eight) hours. for 5 days        Follow-up Information       Angel Khan MD.    Specialty: Internal  Medicine  Contact information:  [address] [facility] [address]  [phone]                                 Scribe Attestation:   Scribe #1: I performed the above scribed service and the documentation accurately describes the services I performed. I attest to the accuracy of the note.     Attending:   Physician Attestation Statement for Scribe #1: I, Rocky Kimball MD, personally performed the services described in this documentation, as scribed by Rona Avendano, in my presence, and it is both accurate and complete.           Clinical Impression       ICD-10-CM ICD-9-CM   1. Cellulitis of right lower extremity  L03.115 682.6   2. Leg swelling  M79.89 729.81       Disposition:   Disposition: Discharged  Condition: Stable         Rocky Kimball MD  12/30/23 2026

## 2023-12-30 NOTE — TELEPHONE ENCOUNTER
Pt reports was seen recently in office for leg swelling and is on lasix, pt states PCP told her if she didn't have improvement to be seen in the ER for possible sepsis on Saturday morning. Pt states she has been elevating the leg but the swelling has worsened as well as the redness, pt wanting to know if she would have to go to an ED to be seen. Pt advised to see a MD within 4 hours per protocol and that the ED would be the best option being they would have all the equipment readily available to evaluate her. Pt states she plans to try to wait until the morning like her PCP directed as it is a long drive for her. Pt encouraged to call back with any worsening symptoms or questions. Pt verbalized understanding.    Reason for Disposition   [1] Red area or streak [2] large (> 2 in. or 5 cm)    Additional Information   Negative: SEVERE difficulty breathing (e.g., struggling for each breath, speaks in single words)   Negative: Looks like a broken bone or dislocated joint (e.g., crooked or deformed)   Negative: Sounds like a life-threatening emergency to the triager   Negative: Difficulty breathing at rest   Negative: Entire foot is cool or blue in comparison to other side   Negative: [1] Can't walk or can barely walk AND [2] new-onset   Negative: [1] Difficulty breathing with exertion (e.g., walking) AND [2] new-onset or worsening   Negative: [1] Red area or streak AND [2] fever   Negative: [1] Swelling is painful to touch AND [2] fever   Negative: [1] Cast on leg or ankle AND [2] now increased pain   Negative: Patient sounds very sick or weak to the triager   Negative: SEVERE leg swelling (e.g., swelling extends above knee, entire leg is swollen, weeping fluid)    Protocols used: Leg Swelling and Edema-A-AH

## 2023-12-30 NOTE — FIRST PROVIDER EVALUATION
Medical screening examination initiated.  I have conducted a focused provider triage encounter, findings are as follows:    Brief history of present illness:  63-year-old female with complaint of bilateral lower leg swelling with pain and swelling worsened right lower leg over the past couple days.    There were no vitals filed for this visit.    Pertinent physical exam:  2+ bilateral LE edema

## 2023-12-31 LAB
BACTERIA UR CULT: NORMAL
BACTERIA UR CULT: NORMAL

## 2024-01-03 ENCOUNTER — TELEPHONE (OUTPATIENT)
Dept: HEMATOLOGY/ONCOLOGY | Facility: CLINIC | Age: 64
End: 2024-01-03
Payer: MEDICARE

## 2024-01-04 ENCOUNTER — OFFICE VISIT (OUTPATIENT)
Dept: INTERNAL MEDICINE | Facility: CLINIC | Age: 64
End: 2024-01-04
Payer: MEDICARE

## 2024-01-04 VITALS
OXYGEN SATURATION: 95 % | DIASTOLIC BLOOD PRESSURE: 81 MMHG | TEMPERATURE: 98 F | HEIGHT: 63 IN | HEART RATE: 75 BPM | BODY MASS INDEX: 40.04 KG/M2 | WEIGHT: 226 LBS | SYSTOLIC BLOOD PRESSURE: 149 MMHG

## 2024-01-04 DIAGNOSIS — K92.2 GASTROINTESTINAL HEMORRHAGE, UNSPECIFIED GASTROINTESTINAL HEMORRHAGE TYPE: ICD-10-CM

## 2024-01-04 DIAGNOSIS — L03.90 CELLULITIS, UNSPECIFIED CELLULITIS SITE: ICD-10-CM

## 2024-01-04 DIAGNOSIS — R11.0 CHRONIC NAUSEA: ICD-10-CM

## 2024-01-04 DIAGNOSIS — R23.3 EASY BRUISING: Primary | ICD-10-CM

## 2024-01-04 DIAGNOSIS — M79.89 LEG SWELLING: ICD-10-CM

## 2024-01-04 PROCEDURE — 3288F FALL RISK ASSESSMENT DOCD: CPT | Mod: CPTII,S$GLB,, | Performed by: FAMILY MEDICINE

## 2024-01-04 PROCEDURE — 1159F MED LIST DOCD IN RCRD: CPT | Mod: CPTII,S$GLB,, | Performed by: FAMILY MEDICINE

## 2024-01-04 PROCEDURE — 99999 PR PBB SHADOW E&M-EST. PATIENT-LVL III: CPT | Mod: PBBFAC,,, | Performed by: FAMILY MEDICINE

## 2024-01-04 PROCEDURE — 3008F BODY MASS INDEX DOCD: CPT | Mod: CPTII,S$GLB,, | Performed by: FAMILY MEDICINE

## 2024-01-04 PROCEDURE — 99214 OFFICE O/P EST MOD 30 MIN: CPT | Mod: S$GLB,,, | Performed by: FAMILY MEDICINE

## 2024-01-04 PROCEDURE — 3077F SYST BP >= 140 MM HG: CPT | Mod: CPTII,S$GLB,, | Performed by: FAMILY MEDICINE

## 2024-01-04 PROCEDURE — 1125F AMNT PAIN NOTED PAIN PRSNT: CPT | Mod: CPTII,S$GLB,, | Performed by: FAMILY MEDICINE

## 2024-01-04 PROCEDURE — 1101F PT FALLS ASSESS-DOCD LE1/YR: CPT | Mod: CPTII,S$GLB,, | Performed by: FAMILY MEDICINE

## 2024-01-04 PROCEDURE — 3079F DIAST BP 80-89 MM HG: CPT | Mod: CPTII,S$GLB,, | Performed by: FAMILY MEDICINE

## 2024-01-04 RX ORDER — FERROUS GLUCONATE 324(38)MG
324 TABLET ORAL
Qty: 30 TABLET | Refills: 5 | Status: SHIPPED | OUTPATIENT
Start: 2024-01-04

## 2024-01-04 RX ORDER — PROMETHAZINE HYDROCHLORIDE 25 MG/1
25 TABLET ORAL EVERY 6 HOURS PRN
Qty: 60 TABLET | Refills: 3 | Status: SHIPPED | OUTPATIENT
Start: 2024-01-04

## 2024-01-04 RX ORDER — HYDROCODONE BITARTRATE AND ACETAMINOPHEN 5; 325 MG/1; MG/1
1 TABLET ORAL EVERY 8 HOURS PRN
Qty: 30 TABLET | Refills: 0 | Status: SHIPPED | OUTPATIENT
Start: 2024-01-04 | End: 2024-02-07

## 2024-01-04 RX ORDER — CLINDAMYCIN HYDROCHLORIDE 150 MG/1
450 CAPSULE ORAL EVERY 8 HOURS
Qty: 45 CAPSULE | Refills: 0 | Status: SHIPPED | OUTPATIENT
Start: 2024-01-04 | End: 2024-01-09

## 2024-01-04 RX ORDER — FUROSEMIDE 40 MG/1
40 TABLET ORAL 2 TIMES DAILY PRN
Qty: 60 TABLET | Refills: 5 | Status: SHIPPED | OUTPATIENT
Start: 2024-01-04 | End: 2025-01-03

## 2024-01-06 NOTE — PROGRESS NOTES
Subjective:      Patient ID: Divya Bui is a 63 y.o. female.    Chief Complaint: Leg Swelling      Patient here for ER follow-up.  She had developed unprovoked bruising and swelling of lower legs at the end of November, had gone to ER several times for this.  Legs had developed cellulitis, has taken course of Omnicef and clindamycin.  Right lower leg again becoming red and warm to touch.  Reports it is very painful for her.  She also has increased fatigue and weakness.  Has known GI bleed diagnosed in early 2023, endoscopy did not show source of the bleed.  She has an appointment on January 17th with her hematologist, Dr. Albarado, to see if there is a cause for her easy bruising and bleeding      Review of Systems   Constitutional:  Positive for activity change and fatigue.   Respiratory:  Negative for shortness of breath.    Cardiovascular:  Negative for chest pain.   Musculoskeletal:  Positive for myalgias.   Skin:  Positive for color change and wound.   Neurological:  Positive for light-headedness (Intermittent).     Past Medical History:   Diagnosis Date    Anemia     Anxiety     Basal cell carcinoma (BCC) of face 2/26/2020    Blood transfusion     Bowel incontinence     Depression     Esophageal stricture     GERD (gastroesophageal reflux disease)     Hypertension     Latent tuberculosis by skin test 2001    took INH    Liver nodule 1/6/2014    Morbid obesity with BMI of 45.0-49.9, adult     OA (osteoarthritis) of knee 12/12/2014    Pericardial effusion 9/4/2014          Past Surgical History:   Procedure Laterality Date    CHOLECYSTECTOMY      COLONOSCOPY N/A 3/6/2023    Procedure: COLONOSCOPY;  Surgeon: Beti Diallo MD;  Location: [facility];  Service: Endoscopy;  Laterality: N/A;    GASTRIC BYPASS      HERNIA REPAIR      INJECTION OF ANESTHETIC AGENT AROUND NERVE Bilateral 6/16/2023    Procedure: Bilateral Genicular nerve block with RN IV sedation;  Surgeon: Denis Lai MD;  Location: [facility];  Service: Pain Management;  Laterality: Bilateral;    INJECTION OF ANESTHETIC AGENT AROUND NERVE Bilateral 8/11/2023    Procedure: Bilateral Genicular nerve block with RN IV sedation;  Surgeon: Denis Lai MD;  Location: [facility];  Service: Pain Management;  Laterality: Bilateral;    RADIOFREQUENCY THERMOCOAGULATION Right 10/31/2023    Procedure: Right Genicular Nerve RFA with RN IV sedation;  Surgeon: Denis Lai MD;  Location: [facility];  Service: Pain Management;  Laterality: Right;    RADIOFREQUENCY THERMOCOAGULATION Left 11/14/2023    Procedure: Left Genicular Nerve RFA with RN IV sedation;  Surgeon: Denis Lai MD;  Location: [facility];  Service: Pain Management;  Laterality: Left;    right sholder surgery      SMALL INTESTINE SURGERY       Family History   Problem Relation Age of Onset    Lung cancer Mother         smoker    Liver cancer Father     Diabetes Sister     Crohn's disease Sister     Liver cancer Sister     Diabetes Sister     Crohn's disease Brother     Crohn's disease Other     Breast cancer Neg Hx     Ovarian cancer Neg Hx     Colon cancer Neg Hx      Social History     Socioeconomic History    Marital status:    Tobacco Use    Smoking status: Never    Smokeless tobacco: Never   Substance and Sexual Activity    Alcohol use: Not Currently     Alcohol/week: 0.0 standard drinks of alcohol    Drug use: No    Sexual activity: Yes     Partners: Male     Social Determinants of Health     Food Insecurity: No Food Insecurity (12/28/2023)    Hunger Vital Sign     Worried About Running Out of Food in the Last Year: Never true     Ran Out of Food in the Last Year: Never true   Transportation Needs: Unknown (12/28/2023)    PRAPARE - Transportation     Lack of Transportation (Non-Medical): No   Physical Activity: Inactive (12/28/2023)    Exercise Vital Sign     Days of Exercise per Week: 0 days     Minutes of Exercise per Session: 0 min   Social Connections: Unknown  "(12/28/2023)    Social Connection and Isolation Panel [NHANES]     Frequency of Communication with Friends and Family: More than three times a week     Frequency of Social Gatherings with Friends and Family: Three times a week     Active Member of Clubs or Organizations: No     Attends Club or Organization Meetings: Never     Marital Status:    Housing Stability: Low Risk  (12/28/2023)    Housing Stability Vital Sign     Unable to Pay for Housing in the Last Year: No     Number of Places Lived in the Last Year: 1     Unstable Housing in the Last Year: No     Review of patient's allergies indicates:   Allergen Reactions    Metronidazole hcl Other (See Comments)     Mouth ulcers developed with Flagyl  Oral sores.  Mouth ulcers developed with Flagyl       Objective:       BP (!) 149/81 (BP Location: Right arm, Patient Position: Sitting, BP Method: Large (Automatic))   Pulse 75   Temp 97.7 °F (36.5 °C) (Tympanic)   Ht 5' 3" (1.6 m)   Wt 102.5 kg (225 lb 15.5 oz)   SpO2 95%   BMI 40.03 kg/m²   Physical Exam  Constitutional:       General: She is not in acute distress.     Appearance: Normal appearance. She is well-developed. She is not ill-appearing or diaphoretic.   Cardiovascular:      Rate and Rhythm: Normal rate and regular rhythm.      Heart sounds: Normal heart sounds.   Pulmonary:      Effort: Pulmonary effort is normal.      Breath sounds: Normal breath sounds.   Musculoskeletal:      Right lower leg: Edema (Nonpitting) present.      Left lower leg: Edema (Nonpitting) present.   Skin:     Findings: Erythema (Right lower leg, warm to touch, very tender) present.   Neurological:      Mental Status: She is alert and oriented to person, place, and time.   Psychiatric:         Mood and Affect: Mood normal.         Behavior: Behavior normal.         Thought Content: Thought content normal.         Judgment: Judgment normal.       Assessment:     1. Easy bruising    2. Gastrointestinal hemorrhage, " unspecified gastrointestinal hemorrhage type    3. Leg swelling    4. Cellulitis, unspecified cellulitis site    5. Chronic nausea      Plan:   Easy bruising    Gastrointestinal hemorrhage, unspecified gastrointestinal hemorrhage type    Leg swelling    Cellulitis, unspecified cellulitis site  -     HYDROcodone-acetaminophen (NORCO) 5-325 mg per tablet; Take 1 tablet by mouth every 8 (eight) hours as needed for Pain.  Dispense: 30 tablet; Refill: 0    Chronic nausea  -     promethazine (PHENERGAN) 25 MG tablet; Take 1 tablet (25 mg total) by mouth every 6 (six) hours as needed for Nausea.  Dispense: 60 tablet; Refill: 3    Other orders  -     furosemide (LASIX) 40 MG tablet; Take 1 tablet (40 mg total) by mouth 2 (two) times daily as needed (swelling).  Dispense: 60 tablet; Refill: 5  -     ferrous gluconate (FERGON) 324 MG tablet; Take 1 tablet (324 mg total) by mouth daily with breakfast.  Dispense: 30 tablet; Refill: 5  -     clindamycin (CLEOCIN) 150 MG capsule; Take 3 capsules (450 mg total) by mouth every 8 (eight) hours. for 5 days  Dispense: 45 capsule; Refill: 0    Will increase dose of Lasix.  Continue all other current medications.   Will try to get her in to see Gastroenterology sooner-needs further workups for GI bleed  Keep appointment with Dr. Albarado later this month  Medication List with Changes/Refills   New Medications    FERROUS GLUCONATE (FERGON) 324 MG TABLET    Take 1 tablet (324 mg total) by mouth daily with breakfast.   Current Medications    ALPRAZOLAM (XANAX) 1 MG TABLET    Take 1 mg by mouth 3 (three) times daily as needed.    AMLODIPINE (NORVASC) 5 MG TABLET    Take 1 tablet (5 mg total) by mouth once daily.    ARIPIPRAZOLE (ABILIFY) 10 MG TAB    Take 10 mg by mouth once daily.    ASPIRIN 81 MG TAB    Take 1 tablet by mouth Daily.    ATORVASTATIN (LIPITOR) 40 MG TABLET    Take 1 tablet (40 mg total) by mouth once daily.    DICYCLOMINE (BENTYL) 20 MG TABLET    Take 1 tablet (20 mg total)  by mouth 3 (three) times daily as needed (abdominal pain).    DOXEPIN (SINEQUAN) 50 MG CAPSULE    Take 50 mg by mouth nightly.    FLUCONAZOLE (DIFLUCAN) 150 MG TAB    Take first tablet today, then repeat in 3 days.    FLUOXETINE (PROZAC) 40 MG CAPSULE    Take 1 capsule (40 mg total) by mouth once daily.    OLMESARTAN-HYDROCHLOROTHIAZIDE (BENICAR HCT) 40-12.5 MG TAB    Take 1 tablet by mouth once daily.    PREGABALIN (LYRICA) 100 MG CAPSULE    Take 1 capsule (100 mg total) by mouth every evening.    ZOLPIDEM (AMBIEN) 10 MG TAB    Take 10 mg by mouth nightly as needed.   Changed and/or Refilled Medications    Modified Medication Previous Medication    CLINDAMYCIN (CLEOCIN) 150 MG CAPSULE clindamycin (CLEOCIN) 150 MG capsule       Take 3 capsules (450 mg total) by mouth every 8 (eight) hours. for 5 days    Take 3 capsules (450 mg total) by mouth every 8 (eight) hours. for 5 days    FUROSEMIDE (LASIX) 40 MG TABLET furosemide (LASIX) 20 MG tablet       Take 1 tablet (40 mg total) by mouth 2 (two) times daily as needed (swelling).    Take 1 tablet (20 mg total) by mouth daily as needed (swelling).    HYDROCODONE-ACETAMINOPHEN (NORCO) 5-325 MG PER TABLET HYDROcodone-acetaminophen (NORCO) 5-325 mg per tablet       Take 1 tablet by mouth every 8 (eight) hours as needed for Pain.    Take 1 tablet by mouth every 6 (six) hours as needed for Pain.    PROMETHAZINE (PHENERGAN) 25 MG TABLET promethazine (PHENERGAN) 25 MG tablet       Take 1 tablet (25 mg total) by mouth every 6 (six) hours as needed for Nausea.    Take 1 tablet (25 mg total) by mouth every 6 (six) hours as needed for Nausea.   Discontinued Medications    CEFDINIR (OMNICEF) 300 MG CAPSULE    Take 300 mg by mouth 2 (two) times daily.    CIPROFLOXACIN HCL (CIPRO) 500 MG TABLET    Take 1 tablet (500 mg total) by mouth 2 (two) times daily.

## 2024-01-08 ENCOUNTER — TELEPHONE (OUTPATIENT)
Dept: INTERNAL MEDICINE | Facility: CLINIC | Age: 64
End: 2024-01-08
Payer: MEDICARE

## 2024-01-08 NOTE — TELEPHONE ENCOUNTER
----- Message from Angel Khan MD sent at 1/6/2024 11:48 AM CST -----  She has appt with GI in Feb - needs sooner appt, please try to reschedule or send message if nothing available sooner.   Has gi bleed that needs further workup

## 2024-01-09 ENCOUNTER — OFFICE VISIT (OUTPATIENT)
Dept: GASTROENTEROLOGY | Facility: CLINIC | Age: 64
End: 2024-01-09
Payer: MEDICARE

## 2024-01-09 ENCOUNTER — TELEPHONE (OUTPATIENT)
Dept: INTERNAL MEDICINE | Facility: CLINIC | Age: 64
End: 2024-01-09
Payer: MEDICARE

## 2024-01-09 ENCOUNTER — TELEPHONE (OUTPATIENT)
Dept: GASTROENTEROLOGY | Facility: CLINIC | Age: 64
End: 2024-01-09
Payer: MEDICARE

## 2024-01-09 VITALS
HEIGHT: 63 IN | HEART RATE: 85 BPM | SYSTOLIC BLOOD PRESSURE: 162 MMHG | BODY MASS INDEX: 40.04 KG/M2 | DIASTOLIC BLOOD PRESSURE: 102 MMHG | WEIGHT: 226 LBS

## 2024-01-09 DIAGNOSIS — K64.9 HEMORRHOIDS, UNSPECIFIED HEMORRHOID TYPE: ICD-10-CM

## 2024-01-09 DIAGNOSIS — D64.9 ANEMIA, UNSPECIFIED TYPE: Primary | ICD-10-CM

## 2024-01-09 DIAGNOSIS — K62.5 RECTAL BLEEDING: ICD-10-CM

## 2024-01-09 PROCEDURE — 3077F SYST BP >= 140 MM HG: CPT | Mod: CPTII,S$GLB,, | Performed by: NURSE PRACTITIONER

## 2024-01-09 PROCEDURE — 3080F DIAST BP >= 90 MM HG: CPT | Mod: CPTII,S$GLB,, | Performed by: NURSE PRACTITIONER

## 2024-01-09 PROCEDURE — 99214 OFFICE O/P EST MOD 30 MIN: CPT | Mod: S$GLB,,, | Performed by: NURSE PRACTITIONER

## 2024-01-09 PROCEDURE — 1100F PTFALLS ASSESS-DOCD GE2>/YR: CPT | Mod: CPTII,S$GLB,, | Performed by: NURSE PRACTITIONER

## 2024-01-09 PROCEDURE — 3008F BODY MASS INDEX DOCD: CPT | Mod: CPTII,S$GLB,, | Performed by: NURSE PRACTITIONER

## 2024-01-09 PROCEDURE — 3288F FALL RISK ASSESSMENT DOCD: CPT | Mod: CPTII,S$GLB,, | Performed by: NURSE PRACTITIONER

## 2024-01-09 PROCEDURE — 1125F AMNT PAIN NOTED PAIN PRSNT: CPT | Mod: CPTII,S$GLB,, | Performed by: NURSE PRACTITIONER

## 2024-01-09 PROCEDURE — 1159F MED LIST DOCD IN RCRD: CPT | Mod: CPTII,S$GLB,, | Performed by: NURSE PRACTITIONER

## 2024-01-09 PROCEDURE — 1160F RVW MEDS BY RX/DR IN RCRD: CPT | Mod: CPTII,S$GLB,, | Performed by: NURSE PRACTITIONER

## 2024-01-09 PROCEDURE — 99999 PR PBB SHADOW E&M-EST. PATIENT-LVL V: CPT | Mod: PBBFAC,,, | Performed by: NURSE PRACTITIONER

## 2024-01-09 NOTE — TELEPHONE ENCOUNTER
----- Message from Colette Ramsay sent at 1/9/2024  8:12 AM CST -----  Contact: patient [phone]  Patient called requesting a urgent call back from Christie Romero or her nurse, regarding rescheduling today's appt to another day before next available 02/12/24

## 2024-01-09 NOTE — PROGRESS NOTES
Clinic Follow Up:  [facility] Follow Up Note    Reason for Follow Up:  The primary encounter diagnosis was Anemia, unspecified type. Diagnoses of Rectal bleeding and Hemorrhoids, unspecified hemorrhoid type were also pertinent to this visit.    PCP: Angel Khan       HPI:  This is a 63 y.o. female here for follow up of rectal bleeding.   She has been having chronic rectal bleeding since the beginning of 2023. Sometimes the bleeding is more than others. Bleeding is red blood. It occurs about every other day. Bleeding is sometimes noticed on the toilet tissue as well as in the toilet bowl other times. She had a colonoscopy in March 2023 for this and was found to have diverticulosis and hemorrhoids.     She was admitted in November 2023 for bleeding and anemia. S/S were consistent with diverticular bleeding. She did not require blood transfusion.     Since discharge, she has continued with intermittent bleeding about every other day with bowel movements as stated above. She is not on ASA 81 mg currently.     Review of Systems   Constitutional:  Negative for activity change and appetite change.        As per interval history above   Respiratory:  Negative for cough and shortness of breath.    Cardiovascular:  Negative for chest pain.   Gastrointestinal:  Positive for anal bleeding. Negative for abdominal pain, constipation, diarrhea, nausea and vomiting.   Skin:  Negative for color change and rash.       Medical History:  Past Medical History:   Diagnosis Date    Anemia     Anxiety     Basal cell carcinoma (BCC) of face 2/26/2020    Blood transfusion     Bowel incontinence     Depression     Esophageal stricture     GERD (gastroesophageal reflux disease)     Hypertension     Latent tuberculosis by skin test 2001    took INH    Liver nodule 1/6/2014    Morbid obesity with BMI of 45.0-49.9, adult     OA (osteoarthritis) of knee 12/12/2014    Pericardial effusion 9/4/2014       Surgical  History:   Past Surgical History:   Procedure Laterality Date    CHOLECYSTECTOMY      COLONOSCOPY N/A 3/6/2023    Procedure: COLONOSCOPY;  Surgeon: Beti Diallo MD;  Location: [facility];  Service: Endoscopy;  Laterality: N/A;    GASTRIC BYPASS      HERNIA REPAIR      INJECTION OF ANESTHETIC AGENT AROUND NERVE Bilateral 6/16/2023    Procedure: Bilateral Genicular nerve block with RN IV sedation;  Surgeon: Denis Lai MD;  Location: [facility];  Service: Pain Management;  Laterality: Bilateral;    INJECTION OF ANESTHETIC AGENT AROUND NERVE Bilateral 8/11/2023    Procedure: Bilateral Genicular nerve block with RN IV sedation;  Surgeon: Denis Lai MD;  Location: [facility];  Service: Pain Management;  Laterality: Bilateral;    RADIOFREQUENCY THERMOCOAGULATION Right 10/31/2023    Procedure: Right Genicular Nerve RFA with RN IV sedation;  Surgeon: Denis Lai MD;  Location: [facility];  Service: Pain Management;  Laterality: Right;    RADIOFREQUENCY THERMOCOAGULATION Left 11/14/2023    Procedure: Left Genicular Nerve RFA with RN IV sedation;  Surgeon: Denis Lai MD;  Location: [facility];  Service: Pain Management;  Laterality: Left;    right sholder surgery      SMALL INTESTINE SURGERY         Family History:   Family History   Problem Relation Age of Onset    Lung cancer Mother         smoker    Liver cancer Father     Diabetes Sister     Crohn's disease Sister     Liver cancer Sister     Diabetes Sister     Crohn's disease Brother     Crohn's disease Other     Breast cancer Neg Hx     Ovarian cancer Neg Hx     Colon cancer Neg Hx        Social History:   Social History     Tobacco Use    Smoking status: Never    Smokeless tobacco: Never   Substance Use Topics    Alcohol use: Not Currently     Alcohol/week: 0.0 standard drinks of alcohol    Drug use: No       Allergies:   Review of patient's allergies indicates:   Allergen Reactions    Metronidazole hcl Other (See Comments)     Mouth  ulcers developed with Flagyl  Oral sores.  Mouth ulcers developed with Flagyl       Home Medications:  Current Outpatient Medications on File Prior to Visit   Medication Sig Dispense Refill    ALPRAZolam (XANAX) 1 MG tablet Take 1 mg by mouth 3 (three) times daily as needed.      amLODIPine (NORVASC) 5 MG tablet Take 1 tablet (5 mg total) by mouth once daily. 90 tablet 3    ARIPiprazole (ABILIFY) 10 MG Tab Take 10 mg by mouth once daily.      atorvastatin (LIPITOR) 40 MG tablet Take 1 tablet (40 mg total) by mouth once daily. 90 tablet 3    clindamycin (CLEOCIN) 150 MG capsule Take 3 capsules (450 mg total) by mouth every 8 (eight) hours. for 5 days 45 capsule 0    doxepin (SINEQUAN) 50 MG capsule Take 50 mg by mouth nightly.      ferrous gluconate (FERGON) 324 MG tablet Take 1 tablet (324 mg total) by mouth daily with breakfast. 30 tablet 5    furosemide (LASIX) 40 MG tablet Take 1 tablet (40 mg total) by mouth 2 (two) times daily as needed (swelling). 60 tablet 5    HYDROcodone-acetaminophen (NORCO) 5-325 mg per tablet Take 1 tablet by mouth every 8 (eight) hours as needed for Pain. 30 tablet 0    pregabalin (LYRICA) 100 MG capsule Take 1 capsule (100 mg total) by mouth every evening. 90 capsule 0    promethazine (PHENERGAN) 25 MG tablet Take 1 tablet (25 mg total) by mouth every 6 (six) hours as needed for Nausea. 60 tablet 3    zolpidem (AMBIEN) 10 mg Tab Take 10 mg by mouth nightly as needed.      aspirin 81 mg Tab Take 1 tablet by mouth Daily.      dicyclomine (BENTYL) 20 mg tablet Take 1 tablet (20 mg total) by mouth 3 (three) times daily as needed (abdominal pain). (Patient not taking: Reported on 12/28/2023) 60 tablet 0    fluconazole (DIFLUCAN) 150 MG Tab Take first tablet today, then repeat in 3 days. (Patient not taking: Reported on 1/4/2024) 2 tablet 0    fluoxetine (PROZAC) 40 MG capsule Take 1 capsule (40 mg total) by mouth once daily. 30 capsule 11    olmesartan-hydrochlorothiazide (BENICAR HCT)  "40-12.5 mg Tab Take 1 tablet by mouth once daily. (Patient not taking: Reported on 1/9/2024) 90 tablet 3     No current facility-administered medications on file prior to visit.       BP (!) 162/102 (BP Location: Left arm, Patient Position: Sitting, BP Method: Large (Manual))   Pulse 85   Ht 5' 3" (1.6 m)   Wt 102.5 kg (225 lb 15.5 oz)   BMI 40.03 kg/m²   Body mass index is 40.03 kg/m².  Physical Exam  Constitutional:       General: She is not in acute distress.  Cardiovascular:      Rate and Rhythm: Normal rate and regular rhythm.      Heart sounds: Normal heart sounds. No murmur heard.  Pulmonary:      Effort: Pulmonary effort is normal. No respiratory distress.      Breath sounds: Normal breath sounds.   Neurological:      Mental Status: She is alert.   Psychiatric:         Mood and Affect: Mood normal.         Behavior: Behavior normal.         Labs: Pertinent labs reviewed.  CRC Screening:     Assessment:   1. Anemia, unspecified type    2. Rectal bleeding    3. Hemorrhoids, unspecified hemorrhoid type    Has chronic GI bleeding. They way she describes her current bleeding could very well be hemorrhoid bleeding.     Recommendations:   - will get her set up to see colorectal surgery for evaluation of hemorrhoid banding vs surgery  - once hemorrhoids have been treated, if she continues with rectal bleeding and/or anemia, will need further endoscopic evaluation.   - will recheck CBC with iron studies next week.     Anemia, unspecified type  -     CBC Auto Differential; Future; Expected date: 01/09/2024  -     IRON AND TIBC; Future; Expected date: 01/09/2024  -     FERRITIN; Future; Expected date: 01/09/2024    Rectal bleeding  -     CBC Auto Differential; Future; Expected date: 01/09/2024  -     IRON AND TIBC; Future; Expected date: 01/09/2024  -     FERRITIN; Future; Expected date: 01/09/2024  -     Ambulatory referral/consult to Colorectal Surgery; Future; Expected date: 01/16/2024    Hemorrhoids, " unspecified hemorrhoid type  -     Ambulatory referral/consult to Colorectal Surgery; Future; Expected date: 01/16/2024        Return to Clinic:  No follow-ups on file.    Thank you for the opportunity to participate in the care of this patient.  GOKUL Kingston

## 2024-01-09 NOTE — TELEPHONE ENCOUNTER
----- Message from Agatha Paz sent at 1/9/2024  7:56 AM CST -----  EVERETT LOPEZ calling regarding Patient Advice for wanting to speak to the nurse with question about her continued care with her leg issues after attending the Gastro appt today, please call back to assist  [phone]

## 2024-01-09 NOTE — TELEPHONE ENCOUNTER
Spoke with pt, let her know to come into clinic tomorrow morning for 920am as scheduled. Pt verbalized understanding.

## 2024-01-09 NOTE — TELEPHONE ENCOUNTER
Spoke with pt, she stated she seen gastro this am but her legs are still very red and swollen; gastro told pt to contact us about the legs. Pt stated she has a pain scale of 7 out of 10 in her legs. Pt would like to know what her next steps are for figuring out her legs. Please review and advise.

## 2024-01-16 ENCOUNTER — TELEPHONE (OUTPATIENT)
Dept: SURGERY | Facility: CLINIC | Age: 64
End: 2024-01-16
Payer: MEDICARE

## 2024-01-16 NOTE — TELEPHONE ENCOUNTER
Spoke with patient. Appointment moved to next week because of weather. Appointment scheduled with Dr. Correa for 1/24/24 at 930 am. Patient verbalized understanding.     ----- Message from Bhargavi Lopez sent at 1/16/2024  8:27 AM CST -----  Contact: [phone]  Patient is calling in regards to rescheduling her appt. Please call pt back at [phone]. Thanks KB

## 2024-01-23 ENCOUNTER — LAB VISIT (OUTPATIENT)
Dept: LAB | Facility: HOSPITAL | Age: 64
End: 2024-01-23
Attending: NURSE PRACTITIONER
Payer: MEDICARE

## 2024-01-23 DIAGNOSIS — K62.5 RECTAL BLEEDING: ICD-10-CM

## 2024-01-23 DIAGNOSIS — D64.9 ANEMIA, UNSPECIFIED TYPE: ICD-10-CM

## 2024-01-23 PROCEDURE — 82728 ASSAY OF FERRITIN: CPT | Performed by: NURSE PRACTITIONER

## 2024-01-23 PROCEDURE — 36415 COLL VENOUS BLD VENIPUNCTURE: CPT | Mod: PO | Performed by: NURSE PRACTITIONER

## 2024-01-23 PROCEDURE — 85025 COMPLETE CBC W/AUTO DIFF WBC: CPT | Performed by: NURSE PRACTITIONER

## 2024-01-23 PROCEDURE — 83540 ASSAY OF IRON: CPT | Performed by: NURSE PRACTITIONER

## 2024-01-24 ENCOUNTER — OFFICE VISIT (OUTPATIENT)
Dept: SURGERY | Facility: CLINIC | Age: 64
End: 2024-01-24
Payer: MEDICARE

## 2024-01-24 VITALS — DIASTOLIC BLOOD PRESSURE: 88 MMHG | SYSTOLIC BLOOD PRESSURE: 136 MMHG

## 2024-01-24 DIAGNOSIS — K64.1 GRADE II INTERNAL HEMORRHOIDS: Primary | ICD-10-CM

## 2024-01-24 DIAGNOSIS — D50.9 IRON DEFICIENCY ANEMIA, UNSPECIFIED IRON DEFICIENCY ANEMIA TYPE: ICD-10-CM

## 2024-01-24 DIAGNOSIS — K57.90 DIVERTICULOSIS OF INTESTINE WITHOUT BLEEDING, UNSPECIFIED INTESTINAL TRACT LOCATION: ICD-10-CM

## 2024-01-24 LAB
BASOPHILS # BLD AUTO: 0.05 K/UL (ref 0–0.2)
BASOPHILS NFR BLD: 0.6 % (ref 0–1.9)
DIFFERENTIAL METHOD BLD: ABNORMAL
EOSINOPHIL # BLD AUTO: 0.3 K/UL (ref 0–0.5)
EOSINOPHIL NFR BLD: 3.6 % (ref 0–8)
ERYTHROCYTE [DISTWIDTH] IN BLOOD BY AUTOMATED COUNT: 14.2 % (ref 11.5–14.5)
FERRITIN SERPL-MCNC: 42 NG/ML (ref 20–300)
HCT VFR BLD AUTO: 36.9 % (ref 37–48.5)
HGB BLD-MCNC: 11.6 G/DL (ref 12–16)
IMM GRANULOCYTES # BLD AUTO: 0.04 K/UL (ref 0–0.04)
IMM GRANULOCYTES NFR BLD AUTO: 0.5 % (ref 0–0.5)
IRON SERPL-MCNC: 78 UG/DL (ref 30–160)
LYMPHOCYTES # BLD AUTO: 1.7 K/UL (ref 1–4.8)
LYMPHOCYTES NFR BLD: 22 % (ref 18–48)
MCH RBC QN AUTO: 28.7 PG (ref 27–31)
MCHC RBC AUTO-ENTMCNC: 31.4 G/DL (ref 32–36)
MCV RBC AUTO: 91 FL (ref 82–98)
MONOCYTES # BLD AUTO: 0.5 K/UL (ref 0.3–1)
MONOCYTES NFR BLD: 6.7 % (ref 4–15)
NEUTROPHILS # BLD AUTO: 5.2 K/UL (ref 1.8–7.7)
NEUTROPHILS NFR BLD: 66.6 % (ref 38–73)
NRBC BLD-RTO: 0 /100 WBC
PLATELET # BLD AUTO: 233 K/UL (ref 150–450)
PMV BLD AUTO: 9 FL (ref 9.2–12.9)
RBC # BLD AUTO: 4.04 M/UL (ref 4–5.4)
SATURATED IRON: 19 % (ref 20–50)
TOTAL IRON BINDING CAPACITY: 401 UG/DL (ref 250–450)
TRANSFERRIN SERPL-MCNC: 271 MG/DL (ref 200–375)
WBC # BLD AUTO: 7.81 K/UL (ref 3.9–12.7)

## 2024-01-24 PROCEDURE — 1125F AMNT PAIN NOTED PAIN PRSNT: CPT | Mod: CPTII,S$GLB,, | Performed by: STUDENT IN AN ORGANIZED HEALTH CARE EDUCATION/TRAINING PROGRAM

## 2024-01-24 PROCEDURE — 3075F SYST BP GE 130 - 139MM HG: CPT | Mod: CPTII,S$GLB,, | Performed by: STUDENT IN AN ORGANIZED HEALTH CARE EDUCATION/TRAINING PROGRAM

## 2024-01-24 PROCEDURE — 99999 PR PBB SHADOW E&M-EST. PATIENT-LVL III: CPT | Mod: PBBFAC,,, | Performed by: STUDENT IN AN ORGANIZED HEALTH CARE EDUCATION/TRAINING PROGRAM

## 2024-01-24 PROCEDURE — 1159F MED LIST DOCD IN RCRD: CPT | Mod: CPTII,S$GLB,, | Performed by: STUDENT IN AN ORGANIZED HEALTH CARE EDUCATION/TRAINING PROGRAM

## 2024-01-24 PROCEDURE — 3079F DIAST BP 80-89 MM HG: CPT | Mod: CPTII,S$GLB,, | Performed by: STUDENT IN AN ORGANIZED HEALTH CARE EDUCATION/TRAINING PROGRAM

## 2024-01-24 PROCEDURE — 46221 LIGATION OF HEMORRHOID(S): CPT | Mod: S$GLB,,, | Performed by: STUDENT IN AN ORGANIZED HEALTH CARE EDUCATION/TRAINING PROGRAM

## 2024-01-24 PROCEDURE — 99204 OFFICE O/P NEW MOD 45 MIN: CPT | Mod: 25,S$GLB,, | Performed by: STUDENT IN AN ORGANIZED HEALTH CARE EDUCATION/TRAINING PROGRAM

## 2024-01-24 NOTE — PROGRESS NOTES
Subjective:       Patient ID: Divya Bui is a 63 y.o. female.    Chief Complaint: Rectal Bleeding and Rectal Pain    Patient is a 63-year-old female who presents with anemia and rectal bleeding.  She is undergone extensive workup without any resolution of her anemia.  At 1 point was thought to have diverticular bleeding but that has been unable to be definitively diagnosed.  She does report blood with bowel movements several times per week.  It was large volume bleeding in the toilet that stopped spontaneously.  She denies constipation.  Reports she has regular soft bowel movements daily without straining.  She does spend a prolonged period of time on the toilet.  She does not use any fiber supplementation.  She does not feel a perianal bulge that requires manual reduction.  She does feel External hemorrhoids.    Rectal Bleeding        Colonoscopy: 3/6/23 diverticulosis, internal hemorrhoids          Objective:      Physical Exam  Constitutional:       Appearance: She is well-developed.   HENT:      Head: Normocephalic and atraumatic.   Eyes:      Conjunctiva/sclera: Conjunctivae normal.      Pupils: Pupils are equal, round, and reactive to light.   Neck:      Thyroid: No thyromegaly.   Cardiovascular:      Rate and Rhythm: Normal rate and regular rhythm.   Pulmonary:      Effort: Pulmonary effort is normal. No respiratory distress.   Abdominal:      General: There is no distension.      Palpations: Abdomen is soft. There is no mass.      Tenderness: There is no abdominal tenderness.   Musculoskeletal:         General: No tenderness. Normal range of motion.      Cervical back: Normal range of motion.   Skin:     General: Skin is warm and dry.      Capillary Refill: Capillary refill takes less than 2 seconds.   Neurological:      General: No focal deficit present.      Mental Status: She is alert and oriented to person, place, and time.       Anoscopy Procedure Note    Pre-procedure diagnosis: Rectal  bleeding    Post-procedure diagnosis: Internal hemorrhoids    Procedure: Anoscopy    Surgeon: Kayleigh Correa MD    Assistant: Joyce Koroma MA      Findings: External anal exam revealed small external hemorrhoids.  A digital rectal exam was performed with no masses identified and no defects.  There was appropriate squeeze of the sphincter complex as well as relaxation of the puborectalis with push.      A lubricated anoscope was inserted in the anus and the anus inspected circumferentially with findings of enlarged G2 RP and RA internal hemorrhoids with recent stigmata of bleeding. LL hemorrhoidal column without bleeding    Using a suction hemorrhoid , hemorrhoidal band ligation was performed on the RA and RP columns. A well placed band was visualized at the base of the hemorrhoid.    Patient tolerated procedure well.  Discharge home.     No follow-ups on file.    Assessment:       1. Grade II internal hemorrhoids    2. Diverticulosis of intestine without bleeding, unspecified intestinal tract location    3. Iron deficiency anemia, unspecified iron deficiency anemia type        Plan:       - would add powder fiber supplementation to aid in stool consistency  - banding performed today as G2 internal hemorrhoids had stigmata of bleeding  - banding instructions given  - RTC 4-6 weeks. Can repeat banding if needed        Kayleigh Correa MD  Colon and Rectal Surgery  [facility]

## 2024-01-29 ENCOUNTER — TELEPHONE (OUTPATIENT)
Dept: SURGERY | Facility: CLINIC | Age: 64
End: 2024-01-29
Payer: MEDICARE

## 2024-01-29 NOTE — TELEPHONE ENCOUNTER
Spoke with patient regarding rectal pain. Patient had hemorrhoid banding procedure last week with Dr. Correa. Patient reports pain that started Saturday night and bleeding. Recommended Tylenol/ sitz baths to alleviate pain. Appointment made with PA for tomorrow. Patient verbalized understanding.     ----- Message from Shireen Sky sent at 1/29/2024  4:21 PM CST -----  Regarding: rx  Name of who is calling:   Divya      What is the request in detail: Pt is requesting a call back in ref to getting a rx for pain due to hemorrhoid banded  and still bleeding / [facility]      Can the clinic reply by [facility]:no      What number to call back if not [facility]: [phone]

## 2024-01-30 ENCOUNTER — TELEPHONE (OUTPATIENT)
Dept: GASTROENTEROLOGY | Facility: CLINIC | Age: 64
End: 2024-01-30
Payer: MEDICARE

## 2024-01-30 NOTE — TELEPHONE ENCOUNTER
----- Message from Chinyere Horvath sent at 1/29/2024  2:50 PM CST -----  Patient is requesting a call back concerning her results.

## 2024-01-31 ENCOUNTER — NURSE TRIAGE (OUTPATIENT)
Dept: ADMINISTRATIVE | Facility: CLINIC | Age: 64
End: 2024-01-31
Payer: MEDICARE

## 2024-01-31 ENCOUNTER — TELEPHONE (OUTPATIENT)
Dept: INTERNAL MEDICINE | Facility: CLINIC | Age: 64
End: 2024-01-31
Payer: MEDICARE

## 2024-01-31 ENCOUNTER — HOSPITAL ENCOUNTER (EMERGENCY)
Facility: HOSPITAL | Age: 64
Discharge: HOME | End: 2024-01-31
Attending: EMERGENCY MEDICINE
Payer: MEDICARE

## 2024-01-31 VITALS
DIASTOLIC BLOOD PRESSURE: 90 MMHG | RESPIRATION RATE: 16 BRPM | BODY MASS INDEX: 39.56 KG/M2 | WEIGHT: 223.31 LBS | TEMPERATURE: 98 F | SYSTOLIC BLOOD PRESSURE: 180 MMHG | HEART RATE: 78 BPM | OXYGEN SATURATION: 100 %

## 2024-01-31 DIAGNOSIS — K62.5 RECTAL BLEEDING: Primary | ICD-10-CM

## 2024-01-31 DIAGNOSIS — I10 CHRONIC HYPERTENSION: ICD-10-CM

## 2024-01-31 LAB
ABO + RH BLD: NORMAL
ALBUMIN SERPL BCP-MCNC: 3.8 G/DL (ref 3.5–5.2)
ALP SERPL-CCNC: 90 U/L (ref 55–135)
ALT SERPL W/O P-5'-P-CCNC: 7 U/L (ref 10–44)
ANION GAP SERPL CALC-SCNC: 12 MMOL/L (ref 8–16)
APTT PPP: 28.5 SEC (ref 21–32)
AST SERPL-CCNC: 11 U/L (ref 10–40)
BASOPHILS # BLD AUTO: 0.03 K/UL (ref 0–0.2)
BASOPHILS NFR BLD: 0.3 % (ref 0–1.9)
BILIRUB SERPL-MCNC: 0.4 MG/DL (ref 0.1–1)
BLD GP AB SCN CELLS X3 SERPL QL: NORMAL
BUN SERPL-MCNC: 11 MG/DL (ref 8–23)
CALCIUM SERPL-MCNC: 8.7 MG/DL (ref 8.7–10.5)
CHLORIDE SERPL-SCNC: 105 MMOL/L (ref 95–110)
CO2 SERPL-SCNC: 22 MMOL/L (ref 23–29)
CREAT SERPL-MCNC: 0.9 MG/DL (ref 0.5–1.4)
DIFFERENTIAL METHOD BLD: ABNORMAL
EOSINOPHIL # BLD AUTO: 0.1 K/UL (ref 0–0.5)
EOSINOPHIL NFR BLD: 0.7 % (ref 0–8)
ERYTHROCYTE [DISTWIDTH] IN BLOOD BY AUTOMATED COUNT: 13.5 % (ref 11.5–14.5)
EST. GFR  (NO RACE VARIABLE): >60 ML/MIN/1.73 M^2
GLUCOSE SERPL-MCNC: 109 MG/DL (ref 70–110)
HCT VFR BLD AUTO: 36.4 % (ref 37–48.5)
HGB BLD-MCNC: 12 G/DL (ref 12–16)
IMM GRANULOCYTES # BLD AUTO: 0.06 K/UL (ref 0–0.04)
IMM GRANULOCYTES NFR BLD AUTO: 0.7 % (ref 0–0.5)
INR PPP: 1 (ref 0.8–1.2)
LYMPHOCYTES # BLD AUTO: 0.7 K/UL (ref 1–4.8)
LYMPHOCYTES NFR BLD: 7.5 % (ref 18–48)
MCH RBC QN AUTO: 29.4 PG (ref 27–31)
MCHC RBC AUTO-ENTMCNC: 33 G/DL (ref 32–36)
MCV RBC AUTO: 89 FL (ref 82–98)
MONOCYTES # BLD AUTO: 0.4 K/UL (ref 0.3–1)
MONOCYTES NFR BLD: 4.1 % (ref 4–15)
NEUTROPHILS # BLD AUTO: 7.5 K/UL (ref 1.8–7.7)
NEUTROPHILS NFR BLD: 86.7 % (ref 38–73)
NRBC BLD-RTO: 0 /100 WBC
PLATELET # BLD AUTO: 241 K/UL (ref 150–450)
PMV BLD AUTO: 8.1 FL (ref 9.2–12.9)
POTASSIUM SERPL-SCNC: 3.3 MMOL/L (ref 3.5–5.1)
PROT SERPL-MCNC: 7.6 G/DL (ref 6–8.4)
PROTHROMBIN TIME: 10.5 SEC (ref 9–12.5)
RBC # BLD AUTO: 4.08 M/UL (ref 4–5.4)
SODIUM SERPL-SCNC: 139 MMOL/L (ref 136–145)
SPECIMEN OUTDATE: NORMAL
WBC # BLD AUTO: 8.7 K/UL (ref 3.9–12.7)

## 2024-01-31 PROCEDURE — 85730 THROMBOPLASTIN TIME PARTIAL: CPT | Performed by: EMERGENCY MEDICINE

## 2024-01-31 PROCEDURE — 85610 PROTHROMBIN TIME: CPT | Performed by: EMERGENCY MEDICINE

## 2024-01-31 PROCEDURE — 99283 EMERGENCY DEPT VISIT LOW MDM: CPT | Mod: 25

## 2024-01-31 PROCEDURE — 80053 COMPREHEN METABOLIC PANEL: CPT | Performed by: EMERGENCY MEDICINE

## 2024-01-31 PROCEDURE — 85025 COMPLETE CBC W/AUTO DIFF WBC: CPT | Performed by: EMERGENCY MEDICINE

## 2024-01-31 PROCEDURE — 25000003 PHARM REV CODE 250: Performed by: EMERGENCY MEDICINE

## 2024-01-31 PROCEDURE — 86850 RBC ANTIBODY SCREEN: CPT | Performed by: EMERGENCY MEDICINE

## 2024-01-31 RX ORDER — METOCLOPRAMIDE 5 MG/1
10 TABLET ORAL
Status: COMPLETED | OUTPATIENT
Start: 2024-01-31 | End: 2024-01-31

## 2024-01-31 RX ORDER — HYDROCODONE BITARTRATE AND ACETAMINOPHEN 10; 325 MG/1; MG/1
1 TABLET ORAL
Status: COMPLETED | OUTPATIENT
Start: 2024-01-31 | End: 2024-01-31

## 2024-01-31 RX ORDER — ONDANSETRON 4 MG/1
4 TABLET, ORALLY DISINTEGRATING ORAL
Status: DISCONTINUED | OUTPATIENT
Start: 2024-01-31 | End: 2024-01-31

## 2024-01-31 RX ADMIN — METOCLOPRAMIDE 10 MG: 5 TABLET ORAL at 05:01

## 2024-01-31 RX ADMIN — HYDROCODONE BITARTRATE AND ACETAMINOPHEN 1 TABLET: 10; 325 TABLET ORAL at 04:01

## 2024-01-31 NOTE — ED PROVIDER NOTES
"SCRIBE #1 NOTE: I, Elijah Sewell, am scribing for, and in the presence of, Kat Rogers MD. I have scribed the entire note.       History     Chief Complaint   Patient presents with    Abdominal Pain     Pt. C/o BL lower abd pain that started this morning, pt also states she had an "explosive" BM this morning with bright and dark red blood. Pt also states she had a Hemorid bounding SX this past Friday       Review of patient's allergies indicates:   Allergen Reactions    Metronidazole hcl Other (See Comments)     Mouth ulcers developed with Flagyl  Oral sores.  Mouth ulcers developed with Flagyl         History of Present Illness     HPI    1/31/2024, 3:53 PM  History obtained from the patient      History of Present Illness: Divya Bui is a 63 y.o. female patient with a PMHx of anemia, bowel incontinence, GERD, HTN, morbid obesity, and hemorrhoids who presents to the Emergency Department for evaluation of lower abd pain which onset 1 week PTA after a procedure for internal hemorrhoids. Pt states that she went grocery shopping today and had to use the bathroom. She notes having a dark red and yellow BM. After the BM, she notes feeling light-headed. Symptoms are constant and moderate in severity. No mitigating or exacerbating factors reported. Additional associated sxs include weakness, malaise, and nausea. Patient denies any CP, SOB, vomiting, and all other sxs at this time. No prior Tx. No further complaints or concerns at this time.       Arrival mode: Personal vehicle    PCP: Angel Khan MD        Past Medical History:  Past Medical History:   Diagnosis Date    Anemia     Anxiety     Basal cell carcinoma (BCC) of face 2/26/2020    Blood transfusion     Bowel incontinence     Depression     Esophageal stricture     GERD (gastroesophageal reflux disease)     Hypertension     Latent tuberculosis by skin test 2001    took INH    Liver nodule 1/6/2014    Morbid obesity with BMI of 45.0-49.9, " adult     OA (osteoarthritis) of knee 12/12/2014    Pericardial effusion 9/4/2014       Past Surgical History:  Past Surgical History:   Procedure Laterality Date    CHOLECYSTECTOMY      COLONOSCOPY N/A 3/6/2023    Procedure: COLONOSCOPY;  Surgeon: Beti Diallo MD;  Location: [facility];  Service: Endoscopy;  Laterality: N/A;    GASTRIC BYPASS      HERNIA REPAIR      INJECTION OF ANESTHETIC AGENT AROUND NERVE Bilateral 6/16/2023    Procedure: Bilateral Genicular nerve block with RN IV sedation;  Surgeon: Denis Lai MD;  Location: [facility];  Service: Pain Management;  Laterality: Bilateral;    INJECTION OF ANESTHETIC AGENT AROUND NERVE Bilateral 8/11/2023    Procedure: Bilateral Genicular nerve block with RN IV sedation;  Surgeon: Denis Lai MD;  Location: [facility];  Service: Pain Management;  Laterality: Bilateral;    RADIOFREQUENCY THERMOCOAGULATION Right 10/31/2023    Procedure: Right Genicular Nerve RFA with RN IV sedation;  Surgeon: Denis Lai MD;  Location: [facility];  Service: Pain Management;  Laterality: Right;    RADIOFREQUENCY THERMOCOAGULATION Left 11/14/2023    Procedure: Left Genicular Nerve RFA with RN IV sedation;  Surgeon: Denis Lai MD;  Location: [facility];  Service: Pain Management;  Laterality: Left;    right sholder surgery      SMALL INTESTINE SURGERY           Family History:  Family History   Problem Relation Age of Onset    Lung cancer Mother         smoker    Liver cancer Father     Diabetes Sister     Crohn's disease Sister     Liver cancer Sister     Diabetes Sister     Crohn's disease Brother     Crohn's disease Other     Breast cancer Neg Hx     Ovarian cancer Neg Hx     Colon cancer Neg Hx        Social History:  Social History     Tobacco Use    Smoking status: Never    Smokeless tobacco: Never   Substance and Sexual Activity    Alcohol use: Not Currently     Alcohol/week: 0.0 standard drinks of alcohol    Drug use: No    Sexual activity: Yes      Partners: Male        Review of Systems     Review of Systems   Constitutional:  Negative for fever.        (+) Malaise   HENT:  Negative for sore throat.    Respiratory:  Negative for shortness of breath.    Cardiovascular:  Negative for chest pain.   Gastrointestinal:  Positive for abdominal pain (lower), blood in stool (dark red and yellow) and nausea. Negative for vomiting.   Genitourinary:  Negative for dysuria.   Musculoskeletal:  Negative for back pain.   Skin:  Negative for rash.   Neurological:  Positive for weakness and light-headedness.   Hematological:  Does not bruise/bleed easily.   All other systems reviewed and are negative.     Physical Exam     Initial Vitals [01/31/24 1436]   BP Pulse Resp Temp SpO2   (!) 150/90 92 18 98.3 °F (36.8 °C) 96 %      MAP       --          Physical Exam  Nursing Notes and Vital Signs Reviewed.  Constitutional: Patient is in no acute distress. Well-developed and well-nourished.  Head: Atraumatic. Normocephalic.  Eyes: PERRL. EOM intact. Conjunctivae are not pale. No scleral icterus.  ENT: Mucous membranes are moist. Oropharynx is clear and symmetric.    Neck: Supple. Full ROM. No lymphadenopathy.  Cardiovascular: Regular rate. Regular rhythm. No murmurs, rubs, or gallops. Distal pulses are 2+ and symmetric.  Pulmonary/Chest: No respiratory distress. Clear to auscultation bilaterally. No wheezing or rales.  Abdominal: Soft and non-distended.  There is no tenderness.  No rebound, guarding, or rigidity. Good bowel sounds.  Genitourinary: No CVA tenderness  Rectal: Female chaperone present for the duration of the rectal exam. No signs of active bleeding. No thrombosed hemorrhoid.   Musculoskeletal: Moves all extremities. No obvious deformities. No edema. No calf tenderness.  Skin: Breakdown in crease of buttocks.   Neurological:  Alert, awake, and appropriate.  Normal speech.  No acute focal neurological deficits are appreciated.  Psychiatric: Normal affect. Good eye  contact. Appropriate in content.     ED Course   Procedures  ED Vital Signs:  Vitals:    01/31/24 1436 01/31/24 1518 01/31/24 1555 01/31/24 1608   BP: (!) 150/90 (!) 198/100  (!) 187/91   Pulse: 92 80  78   Resp: 18 19     Temp: 98.3 °F (36.8 °C)      TempSrc: Oral      SpO2: 96% 99%  99%   Weight:   101.3 kg (223 lb 5.2 oz)     01/31/24 1632 01/31/24 1633 01/31/24 1702   BP: (!) 182/84  (!) 180/90   Pulse:   78   Resp:  16 16   Temp:   98 °F (36.7 °C)   TempSrc:      SpO2:   100%   Weight:          Abnormal Lab Results:  Labs Reviewed   CBC W/ AUTO DIFFERENTIAL - Abnormal; Notable for the following components:       Result Value    Hematocrit 36.4 (*)     MPV 8.1 (*)     Immature Granulocytes 0.7 (*)     Immature Grans (Abs) 0.06 (*)     Lymph # 0.7 (*)     Gran % 86.7 (*)     Lymph % 7.5 (*)     All other components within normal limits   COMPREHENSIVE METABOLIC PANEL - Abnormal; Notable for the following components:    Potassium 3.3 (*)     CO2 22 (*)     ALT 7 (*)     All other components within normal limits   PROTIME-INR   APTT   TYPE & SCREEN        All Lab Results:  Results for orders placed or performed during the hospital encounter of 01/31/24   CBC auto differential   Result Value Ref Range    WBC 8.70 3.90 - 12.70 K/uL    RBC 4.08 4.00 - 5.40 M/uL    Hemoglobin 12.0 12.0 - 16.0 g/dL    Hematocrit 36.4 (L) 37.0 - 48.5 %    MCV 89 82 - 98 fL    MCH 29.4 27.0 - 31.0 pg    MCHC 33.0 32.0 - 36.0 g/dL    RDW 13.5 11.5 - 14.5 %    Platelets 241 150 - 450 K/uL    MPV 8.1 (L) 9.2 - 12.9 fL    Immature Granulocytes 0.7 (H) 0.0 - 0.5 %    Gran # (ANC) 7.5 1.8 - 7.7 K/uL    Immature Grans (Abs) 0.06 (H) 0.00 - 0.04 K/uL    Lymph # 0.7 (L) 1.0 - 4.8 K/uL    Mono # 0.4 0.3 - 1.0 K/uL    Eos # 0.1 0.0 - 0.5 K/uL    Baso # 0.03 0.00 - 0.20 K/uL    nRBC 0 0 /100 WBC    Gran % 86.7 (H) 38.0 - 73.0 %    Lymph % 7.5 (L) 18.0 - 48.0 %    Mono % 4.1 4.0 - 15.0 %    Eosinophil % 0.7 0.0 - 8.0 %    Basophil % 0.3 0.0 - 1.9 %     Differential Method Automated    Comprehensive metabolic panel   Result Value Ref Range    Sodium 139 136 - 145 mmol/L    Potassium 3.3 (L) 3.5 - 5.1 mmol/L    Chloride 105 95 - 110 mmol/L    CO2 22 (L) 23 - 29 mmol/L    Glucose 109 70 - 110 mg/dL    BUN 11 8 - 23 mg/dL    Creatinine 0.9 0.5 - 1.4 mg/dL    Calcium 8.7 8.7 - 10.5 mg/dL    Total Protein 7.6 6.0 - 8.4 g/dL    Albumin 3.8 3.5 - 5.2 g/dL    Total Bilirubin 0.4 0.1 - 1.0 mg/dL    Alkaline Phosphatase 90 55 - 135 U/L    AST 11 10 - 40 U/L    ALT 7 (L) 10 - 44 U/L    eGFR >60 >60 mL/min/1.73 m^2    Anion Gap 12 8 - 16 mmol/L   Protime-INR   Result Value Ref Range    Prothrombin Time 10.5 9.0 - 12.5 sec    INR 1.0 0.8 - 1.2   APTT   Result Value Ref Range    aPTT 28.5 21.0 - 32.0 sec   Type & Screen   Result Value Ref Range    Group & Rh O POS     Indirect Te NEG     Specimen Outdate 02/03/2024 23:59         Imaging Results:  Imaging Results    None               The Emergency Provider reviewed the vital signs and test results, which are outlined above.     ED Discussion       5:08 PM: Reassessed pt at this time. No further episodes of rectal bleeding while roomed in the ED. H/H and vital signs are stable. Discussed with pt all pertinent ED information and results. Discussed pt dx and plan of tx. Gave pt all f/u and return to the ED instructions. All questions and concerns were addressed at this time. Pt expresses understanding of information and instructions, and is comfortable with plan to discharge. Pt is stable for discharge.    I discussed with patient and/or family/caretaker that evaluation in the ED does not suggest any emergent or life threatening medical conditions requiring immediate intervention beyond what was provided in the ED, and I believe patient is safe for discharge.  Regardless, an unremarkable evaluation in the ED does not preclude the development or presence of a serious of life threatening condition. As such, patient was instructed  to return immediately for any worsening or change in current symptoms.         Medical Decision Making  DDx:  1. Post op bleeding  2. Hemorrhoidal bleeding  3. Vasovagal event    2 days post banding of internal hemorrhoids, had an episode of bloody stools while at the grocery store, had near syncopal event but likely vasovagal event, denies any pain, vital signs reviewed and elevated BP noted, on exam there was no active bleeding, no evidence of external or thrombosed hemorrhoid, lab work reviewed and CBC otherwise normal, BMP otherwise normal, patient monitored in the ER and no further bleeding observed, feel she is otherwise stable for discharge home at this time, reasons to return given.     Amount and/or Complexity of Data Reviewed  External Data Reviewed: notes.     Details: Had recent internal hemorrhoid banding outpatient by colorectal Surgery Dr. Correa   Labs: ordered. Decision-making details documented in ED Course.    Risk  Prescription drug management.  Decision regarding hospitalization.                ED Medication(s):  Medications   HYDROcodone-acetaminophen  mg per tablet 1 tablet (1 tablet Oral Given 1/31/24 2633)   metoclopramide HCl tablet 10 mg (10 mg Oral Given 1/31/24 1704)       Discharge Medication List as of 1/31/2024  5:09 PM           Follow-up Information       Kayleigh Correa MD. Schedule an appointment as soon as possible for a visit in 2 days.    Specialty: Colon and Rectal Surgery  Contact information:  [address] [place] [address] Dr Himanshu HASTINGS [id number] [phone]                                 Scribe Attestation:   Scribe #1: I performed the above scribed service and the documentation accurately describes the services I performed. I attest to the accuracy of the note.     Attending:   Physician Attestation Statement for Scribe #1: I, Kat Rogers MD, personally performed the services described in this documentation, as scribed by Elijah Sewell, in my presence,  and it is both accurate and complete.           Clinical Impression       ICD-10-CM ICD-9-CM   1. Rectal bleeding  K62.5 569.3   2. Chronic hypertension  I10 401.9       Disposition:   Disposition: Discharged  Condition: Stable         Kat Rogers MD  02/03/24 8298

## 2024-01-31 NOTE — TELEPHONE ENCOUNTER
Pt called and c/o rectal bleeding and she was in the grocery and had an accident on herself. Pt said that it was 90% blood and she blacked out but didn't fall and she feels like she wouldn't be able to drive. Pt triaged and care advice is to go to the ED. Pt told if she can't drive or doesn't have a ride to hang up and call 911. Pt said that she doesn't feel like she can drive and will call. Pt said that she had some banding of hemorrhoids done on Friday. Pt to call back if any other questions or concerns.                    Reason for Disposition   SEVERE rectal bleeding (large blood clots; constant or on and off bleeding)    Additional Information   Negative: Sounds like a life-threatening emergency to the triager   Blood in or on bowel movement is main symptom   Negative: Passed out (i.e., fainted, collapsed and was not responding)   Negative: Shock suspected (e.g., cold/pale/clammy skin, too weak to stand, low BP, rapid pulse)   Negative: Vomiting red blood or black (coffee ground) material   Negative: Sounds like a life-threatening emergency to the triager    Protocols used: Rectal Symptoms-A-OH, Rectal Bleeding-A-OH

## 2024-01-31 NOTE — TELEPHONE ENCOUNTER
----- Message from Geraldine Rios sent at 1/31/2024  1:12 PM CST -----  Contact: self  Patient is requesting a call back regarding hemorrhoid concerns. Patient stated she is not able to control waste and had an accident at store with most of the waste being bloody. Please call back @ [phone] (home)

## 2024-02-07 ENCOUNTER — TELEPHONE (OUTPATIENT)
Dept: GASTROENTEROLOGY | Facility: CLINIC | Age: 64
End: 2024-02-07
Payer: MEDICARE

## 2024-02-07 ENCOUNTER — OFFICE VISIT (OUTPATIENT)
Dept: GASTROENTEROLOGY | Facility: CLINIC | Age: 64
End: 2024-02-07
Payer: MEDICARE

## 2024-02-07 DIAGNOSIS — E11.9 TYPE 2 DIABETES MELLITUS WITHOUT COMPLICATION: ICD-10-CM

## 2024-02-07 DIAGNOSIS — D64.9 ANEMIA, UNSPECIFIED TYPE: ICD-10-CM

## 2024-02-07 DIAGNOSIS — R10.9 ABDOMINAL CRAMPING: Primary | ICD-10-CM

## 2024-02-07 PROCEDURE — 1160F RVW MEDS BY RX/DR IN RCRD: CPT | Mod: CPTII,95,, | Performed by: NURSE PRACTITIONER

## 2024-02-07 PROCEDURE — 1159F MED LIST DOCD IN RCRD: CPT | Mod: CPTII,95,, | Performed by: NURSE PRACTITIONER

## 2024-02-07 PROCEDURE — 99214 OFFICE O/P EST MOD 30 MIN: CPT | Mod: 95,,, | Performed by: NURSE PRACTITIONER

## 2024-02-07 RX ORDER — DICYCLOMINE HYDROCHLORIDE 20 MG/1
20 TABLET ORAL 3 TIMES DAILY PRN
Qty: 90 TABLET | Refills: 0 | Status: SHIPPED | OUTPATIENT
Start: 2024-02-07

## 2024-02-07 NOTE — TELEPHONE ENCOUNTER
----- Message from Christie Romero NP sent at 2/7/2024  1:20 PM CST -----  Regarding: FW: pt called  Is discharge bleeding?? If so, she should contact colorectal surgeon regarding her previous banding procedure. She didn't mention stomach cramping previously. Recommend she schedule follow up visit to discuss details of this.   ----- Message -----  From: Ludwig Nunn, MA  Sent: 2/7/2024   1:13 PM CST  To: Christie Romero NP  Subject: FW: pt called                                    Please advise  ----- Message -----  From: Violette Flynn  Sent: 2/7/2024  12:40 PM CST  To: Heather Soriano Staff  Subject: pt called                                        Name of Who is Calling: EVERETT LOPEZ [[id number]]      What is the request in detail: Pt is  still having a discharge , cramping of the stomach , but did see the colon rectal  specialist . Pt is asking what to do next. Please advise       Can the clinic reply by [facility]: No      What Number to Call Back [facility] not [facility]: Telephone Information:          [phone]

## 2024-02-07 NOTE — PROGRESS NOTES
Clinic Follow Up:  [facility] Follow Up Note    Reason for Follow Up:  The primary encounter diagnosis was Abdominal cramping. A diagnosis of Anemia, unspecified type was also pertinent to this visit.    PCP: Angel Khan       The patient location is: [place]   The chief complaint leading to consultation is: abdominal cramping     Visit type: audiovisual    Face to Face time with patient: 10 minutes   15 minutes of total time spent on the encounter, which includes face to face time and non-face to face time preparing to see the patient (eg, review of tests), Obtaining and/or reviewing separately obtained history, Documenting clinical information in the electronic or other health record, Independently interpreting results (not separately reported) and communicating results to the patient/family/caregiver, or Care coordination (not separately reported).         Each patient to whom he or she provides medical services by telemedicine is:  (1) informed of the relationship between the physician and patient and the respective role of any other health care provider with respect to management of the patient; and (2) notified that he or she may decline to receive medical services by telemedicine and may withdraw from such care at any time.    Notes:       HPI:  This is a 63 y.o. female here for follow up.   Previously seen for anemia and rectal bleeding. She saw colorectal surgery and had hemorrhoid banding. She did have increase in bleeding 4 days post banding. Went to the ED. Hgb is now normal.   She has been having some abdominal cramping and bloating. Was on narcotic pain medications. Admits to daily bowel movements. Pain does not improve after defecation.     Review of Systems   Constitutional:  Negative for activity change and appetite change.        As per interval history above   Respiratory:  Negative for cough and shortness of breath.    Cardiovascular:  Negative for chest pain.    Gastrointestinal:  Positive for abdominal pain and nausea. Negative for anal bleeding, blood in stool, constipation and diarrhea.   Skin:  Negative for color change and rash.       Medical History:  Past Medical History:   Diagnosis Date    Anemia     Anxiety     Basal cell carcinoma (BCC) of face 2/26/2020    Blood transfusion     Bowel incontinence     Depression     Esophageal stricture     GERD (gastroesophageal reflux disease)     Hypertension     Latent tuberculosis by skin test 2001    took INH    Liver nodule 1/6/2014    Morbid obesity with BMI of 45.0-49.9, adult     OA (osteoarthritis) of knee 12/12/2014    Pericardial effusion 9/4/2014       Surgical History:   Past Surgical History:   Procedure Laterality Date    CHOLECYSTECTOMY      COLONOSCOPY N/A 3/6/2023    Procedure: COLONOSCOPY;  Surgeon: Beti Diallo MD;  Location: [facility];  Service: Endoscopy;  Laterality: N/A;    GASTRIC BYPASS      HERNIA REPAIR      INJECTION OF ANESTHETIC AGENT AROUND NERVE Bilateral 6/16/2023    Procedure: Bilateral Genicular nerve block with RN IV sedation;  Surgeon: Denis Lai MD;  Location: [facility];  Service: Pain Management;  Laterality: Bilateral;    INJECTION OF ANESTHETIC AGENT AROUND NERVE Bilateral 8/11/2023    Procedure: Bilateral Genicular nerve block with RN IV sedation;  Surgeon: Denis Lai MD;  Location: [facility];  Service: Pain Management;  Laterality: Bilateral;    RADIOFREQUENCY THERMOCOAGULATION Right 10/31/2023    Procedure: Right Genicular Nerve RFA with RN IV sedation;  Surgeon: Denis Lai MD;  Location: [facility];  Service: Pain Management;  Laterality: Right;    RADIOFREQUENCY THERMOCOAGULATION Left 11/14/2023    Procedure: Left Genicular Nerve RFA with RN IV sedation;  Surgeon: Denis Lai MD;  Location: [facility];  Service: Pain Management;  Laterality: Left;    right sholder surgery      SMALL INTESTINE SURGERY         Family History:   Family History    Problem Relation Age of Onset    Lung cancer Mother         smoker    Liver cancer Father     Diabetes Sister     Crohn's disease Sister     Liver cancer Sister     Diabetes Sister     Crohn's disease Brother     Crohn's disease Other     Breast cancer Neg Hx     Ovarian cancer Neg Hx     Colon cancer Neg Hx        Social History:   Social History     Tobacco Use    Smoking status: Never    Smokeless tobacco: Never   Substance Use Topics    Alcohol use: Not Currently     Alcohol/week: 0.0 standard drinks of alcohol    Drug use: No       Allergies:   Review of patient's allergies indicates:   Allergen Reactions    Metronidazole hcl Other (See Comments)     Mouth ulcers developed with Flagyl  Oral sores.  Mouth ulcers developed with Flagyl       Home Medications:  Current Outpatient Medications on File Prior to Visit   Medication Sig Dispense Refill    ALPRAZolam (XANAX) 1 MG tablet Take 1 mg by mouth 3 (three) times daily as needed.      amLODIPine (NORVASC) 5 MG tablet Take 1 tablet (5 mg total) by mouth once daily. 90 tablet 3    ARIPiprazole (ABILIFY) 10 MG Tab Take 10 mg by mouth once daily.      aspirin 81 mg Tab Take 1 tablet by mouth Daily.      atorvastatin (LIPITOR) 40 MG tablet Take 1 tablet (40 mg total) by mouth once daily. 90 tablet 3    doxepin (SINEQUAN) 50 MG capsule Take 50 mg by mouth nightly.      ferrous gluconate (FERGON) 324 MG tablet Take 1 tablet (324 mg total) by mouth daily with breakfast. 30 tablet 5    fluconazole (DIFLUCAN) 150 MG Tab Take first tablet today, then repeat in 3 days. (Patient not taking: Reported on 1/4/2024) 2 tablet 0    fluoxetine (PROZAC) 40 MG capsule Take 1 capsule (40 mg total) by mouth once daily. 30 capsule 11    furosemide (LASIX) 40 MG tablet Take 1 tablet (40 mg total) by mouth 2 (two) times daily as needed (swelling). 60 tablet 5    HYDROcodone-acetaminophen (NORCO) 5-325 mg per tablet Take 1 tablet by mouth every 8 (eight) hours as needed for Pain. (Patient  not taking: Reported on 1/24/2024) 30 tablet 0    olmesartan-hydrochlorothiazide (BENICAR HCT) 40-12.5 mg Tab Take 1 tablet by mouth once daily. (Patient not taking: Reported on 1/9/2024) 90 tablet 3    pregabalin (LYRICA) 100 MG capsule Take 1 capsule (100 mg total) by mouth every evening. 90 capsule 0    promethazine (PHENERGAN) 25 MG tablet Take 1 tablet (25 mg total) by mouth every 6 (six) hours as needed for Nausea. 60 tablet 3    zolpidem (AMBIEN) 10 mg Tab Take 10 mg by mouth nightly as needed.      [DISCONTINUED] dicyclomine (BENTYL) 20 mg tablet Take 1 tablet (20 mg total) by mouth 3 (three) times daily as needed (abdominal pain). (Patient not taking: Reported on 12/28/2023) 60 tablet 0     No current facility-administered medications on file prior to visit.       There were no vitals taken for this visit.  There is no height or weight on file to calculate BMI.  Physical Exam  Constitutional:       General: She is not in acute distress.  HENT:      Head: Normocephalic.   Neurological:      General: No focal deficit present.      Mental Status: She is alert.   Psychiatric:         Mood and Affect: Mood normal.         Judgment: Judgment normal.         Labs: Pertinent labs reviewed.  CRC Screening:     Assessment:   1. Abdominal cramping    2. Anemia, unspecified type        Recommendations:   - abdominal xray-- on Friday  - start bentyl prn pain  - blood count now normal. Continue to monitor     Abdominal cramping  -     dicyclomine (BENTYL) 20 mg tablet; Take 1 tablet (20 mg total) by mouth 3 (three) times daily as needed (abdominal pain).  Dispense: 90 tablet; Refill: 0  -     X-Ray Abdomen Flat And Erect; Future; Expected date: 02/07/2024    Anemia, unspecified type    Return to Clinic:  Follow up if symptoms worsen or fail to improve.    Thank you for the opportunity to participate in the care of this patient.  GOKUL Kingston

## 2024-02-08 ENCOUNTER — PATIENT MESSAGE (OUTPATIENT)
Dept: RADIOLOGY | Facility: HOSPITAL | Age: 64
End: 2024-02-08
Payer: MEDICARE

## 2024-02-15 ENCOUNTER — TELEPHONE (OUTPATIENT)
Dept: GASTROENTEROLOGY | Facility: CLINIC | Age: 64
End: 2024-02-15
Payer: MEDICARE

## 2024-02-15 NOTE — TELEPHONE ENCOUNTER
----- Message from Christie Romero NP sent at 2/15/2024 10:07 AM CST -----  Please call patient. Find out color of blood; how many times did it occur since her virtual visit, etc..   ----- Message -----  From: Ludwig Nunn MA  Sent: 2/15/2024   9:57 AM CST  To: Christie Romero NP    Please advise  ----- Message -----  From: Hannah Geronimo  Sent: 2/15/2024   9:02 AM CST  To: Heather Soriano Staff    Type: Needs Medical Advice  Who Called:  patient  Best Call Back Number: [phone] (home)   Additional Information: patient requesting a call back- she is bleeding again, bright red blood, accumulating in toilet when using the restroom, also having stomach cramps

## 2024-02-16 ENCOUNTER — TELEPHONE (OUTPATIENT)
Dept: GASTROENTEROLOGY | Facility: CLINIC | Age: 64
End: 2024-02-16
Payer: MEDICARE

## 2024-02-16 NOTE — TELEPHONE ENCOUNTER
----- Message from Meir Jean Baptiste sent at 2/16/2024 10:23 AM CST -----  Contact: Divya  .Type:  Patient Returning Call    Who Called: Divya  Who Left Message for Patient: nurse   Does the patient know what this is regarding?: yes  Would the patient rather a call back or a response via [facility]?  Call   Best Call Back Number: .[phone]              Thanks

## 2024-02-19 ENCOUNTER — HOSPITAL ENCOUNTER (OUTPATIENT)
Dept: RADIOLOGY | Facility: HOSPITAL | Age: 64
Discharge: HOME | End: 2024-02-19
Attending: NURSE PRACTITIONER
Payer: MEDICARE

## 2024-02-19 DIAGNOSIS — R10.9 ABDOMINAL CRAMPING: ICD-10-CM

## 2024-02-19 DIAGNOSIS — R93.5 ABNORMAL X-RAY OF ABDOMEN: Primary | ICD-10-CM

## 2024-02-19 PROCEDURE — 74019 RADEX ABDOMEN 2 VIEWS: CPT | Mod: TC,PO

## 2024-02-19 PROCEDURE — 74019 RADEX ABDOMEN 2 VIEWS: CPT | Mod: 26,,, | Performed by: RADIOLOGY

## 2024-02-20 ENCOUNTER — TELEPHONE (OUTPATIENT)
Dept: GASTROENTEROLOGY | Facility: CLINIC | Age: 64
End: 2024-02-20
Payer: MEDICARE

## 2024-02-20 NOTE — TELEPHONE ENCOUNTER
----- Message from Aurora Childs sent at 2/20/2024  9:13 AM CST -----  Regarding: pt adivce  Contact: [phone]  Pt calling in regards to needing her results. Pls call

## 2024-02-26 PROBLEM — N39.0 UTI (URINARY TRACT INFECTION): Status: RESOLVED | Noted: 2023-11-24 | Resolved: 2024-02-26

## 2024-02-26 PROBLEM — N17.9 ACUTE RENAL FAILURE SUPERIMPOSED ON STAGE 3 CHRONIC KIDNEY DISEASE: Status: RESOLVED | Noted: 2020-12-09 | Resolved: 2024-02-26

## 2024-02-26 PROBLEM — K92.2 ACUTE LOWER GI BLEEDING: Status: RESOLVED | Noted: 2023-11-24 | Resolved: 2024-02-26

## 2024-02-26 PROBLEM — N18.30 ACUTE RENAL FAILURE SUPERIMPOSED ON STAGE 3 CHRONIC KIDNEY DISEASE: Status: RESOLVED | Noted: 2020-12-09 | Resolved: 2024-02-26

## 2024-02-28 DIAGNOSIS — R73.03 PREDIABETES: ICD-10-CM

## 2024-03-21 NOTE — PROGRESS NOTES
Patient reports urinary frequency upon check in with concern for UTI. We have discussed postponing her procedure for a visit with her PCP for a UA and potential Abx. We also have discussed at length the risk of seeding infection in the joint, potential septic joint, and serious sequelae such as surgery, sepsis, and associated unforseen complications, etc. Patient expresses understanding and is adamant that she does not have an infection and wants to proceed today regardless, understanding all potential risks.     Dr. Denis Lai   Home

## 2024-04-17 DIAGNOSIS — R11.0 CHRONIC NAUSEA: ICD-10-CM

## 2024-04-17 RX ORDER — PROMETHAZINE HYDROCHLORIDE 25 MG/1
25 TABLET ORAL EVERY 6 HOURS PRN
Qty: 60 TABLET | Refills: 3 | Status: SHIPPED | OUTPATIENT
Start: 2024-04-17

## 2024-04-17 NOTE — TELEPHONE ENCOUNTER
Requested Prescriptions     Pending Prescriptions Disp Refills    promethazine (PHENERGAN) 25 MG tablet [Pharmacy Med Name: promethazine 25 mg tablet] 60 tablet 3     Sig: TAKE 1 TABLET BY MOUTH EVERY 6 HOURS AS NEEDED FOR NAUSEA     Last Visit: 01-       Next Visit: None  Last filled 01/04/2024

## 2024-04-17 NOTE — TELEPHONE ENCOUNTER
Care Due:                  Date            Visit Type   Department     Provider  --------------------------------------------------------------------------------                                EP -                              PRIMARY      [facility] INTERNAL  Last Visit: 01-      CARE ([facility])   MEDICINE       Angel Khan  Next Visit: None Scheduled  None         None Found                                                            Last  Test          Frequency    Reason                     Performed    Due Date  --------------------------------------------------------------------------------    Lipid Panel.  12 months..  atorvastatin.............  01- 01-    [facility] Embedded Care Due Messages. Reference number: [id number].   4/17/2024 8:04:11 AM CDT

## 2024-04-25 ENCOUNTER — TELEPHONE (OUTPATIENT)
Dept: INTERNAL MEDICINE | Facility: CLINIC | Age: 64
End: 2024-04-25
Payer: MEDICARE

## 2024-04-25 NOTE — TELEPHONE ENCOUNTER
I called pt in regard to a recent bp reading. Pt stated she has not been taking it because she knows it's high. She did schedule an appointment to come into office to recheck bp on 04/29/2024 due to headaches associated with elevated bp.

## 2024-05-06 ENCOUNTER — NURSE TRIAGE (OUTPATIENT)
Dept: ADMINISTRATIVE | Facility: CLINIC | Age: 64
End: 2024-05-06
Payer: MEDICARE

## 2024-05-07 ENCOUNTER — HOSPITAL ENCOUNTER (EMERGENCY)
Facility: HOSPITAL | Age: 64
Discharge: HOME | End: 2024-05-07
Attending: EMERGENCY MEDICINE
Payer: MEDICARE

## 2024-05-07 VITALS
BODY MASS INDEX: 40.28 KG/M2 | RESPIRATION RATE: 15 BRPM | TEMPERATURE: 98 F | OXYGEN SATURATION: 97 % | WEIGHT: 227.31 LBS | SYSTOLIC BLOOD PRESSURE: 136 MMHG | HEIGHT: 63 IN | HEART RATE: 86 BPM | DIASTOLIC BLOOD PRESSURE: 69 MMHG

## 2024-05-07 DIAGNOSIS — I10 HTN (HYPERTENSION): ICD-10-CM

## 2024-05-07 DIAGNOSIS — I10 HYPERTENSION, UNSPECIFIED TYPE: Primary | ICD-10-CM

## 2024-05-07 DIAGNOSIS — R31.9 URINARY TRACT INFECTION WITH HEMATURIA, SITE UNSPECIFIED: ICD-10-CM

## 2024-05-07 DIAGNOSIS — N39.0 URINARY TRACT INFECTION WITH HEMATURIA, SITE UNSPECIFIED: ICD-10-CM

## 2024-05-07 LAB
ALBUMIN SERPL BCP-MCNC: 3.9 G/DL (ref 3.5–5.2)
ALP SERPL-CCNC: 151 U/L (ref 55–135)
ALT SERPL W/O P-5'-P-CCNC: 7 U/L (ref 10–44)
ANION GAP SERPL CALC-SCNC: 11 MMOL/L (ref 8–16)
AST SERPL-CCNC: 10 U/L (ref 10–40)
BACTERIA #/AREA URNS HPF: ABNORMAL /HPF
BASOPHILS # BLD AUTO: 0.05 K/UL (ref 0–0.2)
BASOPHILS NFR BLD: 0.7 % (ref 0–1.9)
BILIRUB SERPL-MCNC: 0.6 MG/DL (ref 0.1–1)
BILIRUB UR QL STRIP: NEGATIVE
BUN SERPL-MCNC: 13 MG/DL (ref 8–23)
CALCIUM SERPL-MCNC: 8.6 MG/DL (ref 8.7–10.5)
CHLORIDE SERPL-SCNC: 99 MMOL/L (ref 95–110)
CLARITY UR: ABNORMAL
CO2 SERPL-SCNC: 22 MMOL/L (ref 23–29)
COLOR UR: YELLOW
CREAT SERPL-MCNC: 1.1 MG/DL (ref 0.5–1.4)
DIFFERENTIAL METHOD BLD: ABNORMAL
EOSINOPHIL # BLD AUTO: 0.1 K/UL (ref 0–0.5)
EOSINOPHIL NFR BLD: 1.6 % (ref 0–8)
ERYTHROCYTE [DISTWIDTH] IN BLOOD BY AUTOMATED COUNT: 14.1 % (ref 11.5–14.5)
EST. GFR  (NO RACE VARIABLE): 55 ML/MIN/1.73 M^2
GLUCOSE SERPL-MCNC: 135 MG/DL (ref 70–110)
GLUCOSE UR QL STRIP: NEGATIVE
HCT VFR BLD AUTO: 38.4 % (ref 37–48.5)
HGB BLD-MCNC: 12.3 G/DL (ref 12–16)
HGB UR QL STRIP: ABNORMAL
IMM GRANULOCYTES # BLD AUTO: 0.05 K/UL (ref 0–0.04)
IMM GRANULOCYTES NFR BLD AUTO: 0.7 % (ref 0–0.5)
KETONES UR QL STRIP: NEGATIVE
LEUKOCYTE ESTERASE UR QL STRIP: ABNORMAL
LIPASE SERPL-CCNC: 6 U/L (ref 4–60)
LYMPHOCYTES # BLD AUTO: 1.3 K/UL (ref 1–4.8)
LYMPHOCYTES NFR BLD: 18 % (ref 18–48)
MCH RBC QN AUTO: 27.6 PG (ref 27–31)
MCHC RBC AUTO-ENTMCNC: 32 G/DL (ref 32–36)
MCV RBC AUTO: 86 FL (ref 82–98)
MICROSCOPIC COMMENT: ABNORMAL
MONOCYTES # BLD AUTO: 0.5 K/UL (ref 0.3–1)
MONOCYTES NFR BLD: 6.3 % (ref 4–15)
NEUTROPHILS # BLD AUTO: 5.3 K/UL (ref 1.8–7.7)
NEUTROPHILS NFR BLD: 72.7 % (ref 38–73)
NITRITE UR QL STRIP: POSITIVE
NRBC BLD-RTO: 0 /100 WBC
PH UR STRIP: 6 [PH] (ref 5–8)
PLATELET # BLD AUTO: 241 K/UL (ref 150–450)
PMV BLD AUTO: 8.7 FL (ref 9.2–12.9)
POTASSIUM SERPL-SCNC: 4 MMOL/L (ref 3.5–5.1)
PROT SERPL-MCNC: 7.7 G/DL (ref 6–8.4)
PROT UR QL STRIP: ABNORMAL
RBC # BLD AUTO: 4.46 M/UL (ref 4–5.4)
RBC #/AREA URNS HPF: 16 /HPF (ref 0–4)
SODIUM SERPL-SCNC: 132 MMOL/L (ref 136–145)
SP GR UR STRIP: 1.01 (ref 1–1.03)
SQUAMOUS #/AREA URNS HPF: 6 /HPF
TROPONIN I SERPL DL<=0.01 NG/ML-MCNC: <0.006 NG/ML (ref 0–0.03)
URN SPEC COLLECT METH UR: ABNORMAL
UROBILINOGEN UR STRIP-ACNC: NEGATIVE EU/DL
WBC # BLD AUTO: 7.32 K/UL (ref 3.9–12.7)
WBC #/AREA URNS HPF: >100 /HPF (ref 0–5)
WBC CLUMPS URNS QL MICRO: ABNORMAL

## 2024-05-07 PROCEDURE — 87186 SC STD MICRODIL/AGAR DIL: CPT | Performed by: NURSE PRACTITIONER

## 2024-05-07 PROCEDURE — 25000003 PHARM REV CODE 250: Performed by: EMERGENCY MEDICINE

## 2024-05-07 PROCEDURE — 99285 EMERGENCY DEPT VISIT HI MDM: CPT | Mod: 25

## 2024-05-07 PROCEDURE — 93005 ELECTROCARDIOGRAM TRACING: CPT

## 2024-05-07 PROCEDURE — 63600175 PHARM REV CODE 636 W HCPCS: Performed by: EMERGENCY MEDICINE

## 2024-05-07 PROCEDURE — 83690 ASSAY OF LIPASE: CPT | Performed by: NURSE PRACTITIONER

## 2024-05-07 PROCEDURE — 81000 URINALYSIS NONAUTO W/SCOPE: CPT | Performed by: NURSE PRACTITIONER

## 2024-05-07 PROCEDURE — 87088 URINE BACTERIA CULTURE: CPT | Performed by: NURSE PRACTITIONER

## 2024-05-07 PROCEDURE — 84484 ASSAY OF TROPONIN QUANT: CPT | Performed by: NURSE PRACTITIONER

## 2024-05-07 PROCEDURE — 87077 CULTURE AEROBIC IDENTIFY: CPT | Performed by: NURSE PRACTITIONER

## 2024-05-07 PROCEDURE — 96361 HYDRATE IV INFUSION ADD-ON: CPT

## 2024-05-07 PROCEDURE — 85025 COMPLETE CBC W/AUTO DIFF WBC: CPT | Performed by: NURSE PRACTITIONER

## 2024-05-07 PROCEDURE — 87086 URINE CULTURE/COLONY COUNT: CPT | Performed by: NURSE PRACTITIONER

## 2024-05-07 PROCEDURE — 80053 COMPREHEN METABOLIC PANEL: CPT | Performed by: NURSE PRACTITIONER

## 2024-05-07 PROCEDURE — 96375 TX/PRO/DX INJ NEW DRUG ADDON: CPT

## 2024-05-07 PROCEDURE — 25000003 PHARM REV CODE 250: Performed by: NURSE PRACTITIONER

## 2024-05-07 PROCEDURE — 96365 THER/PROPH/DIAG IV INF INIT: CPT

## 2024-05-07 PROCEDURE — 93010 ELECTROCARDIOGRAM REPORT: CPT | Mod: ,,, | Performed by: STUDENT IN AN ORGANIZED HEALTH CARE EDUCATION/TRAINING PROGRAM

## 2024-05-07 RX ORDER — CEFDINIR 300 MG/1
300 CAPSULE ORAL 2 TIMES DAILY
Qty: 20 CAPSULE | Refills: 0 | Status: SHIPPED | OUTPATIENT
Start: 2024-05-07 | End: 2024-05-17

## 2024-05-07 RX ORDER — ONDANSETRON HYDROCHLORIDE 2 MG/ML
4 INJECTION, SOLUTION INTRAVENOUS
Status: COMPLETED | OUTPATIENT
Start: 2024-05-07 | End: 2024-05-07

## 2024-05-07 RX ORDER — ONDANSETRON 4 MG/1
4 TABLET, FILM COATED ORAL EVERY 6 HOURS
Qty: 30 TABLET | Refills: 0 | Status: SHIPPED | OUTPATIENT
Start: 2024-05-07 | End: 2024-06-04

## 2024-05-07 RX ORDER — CLONIDINE HYDROCHLORIDE 0.1 MG/1
0.1 TABLET ORAL
Status: COMPLETED | OUTPATIENT
Start: 2024-05-07 | End: 2024-05-07

## 2024-05-07 RX ORDER — LABETALOL HYDROCHLORIDE 5 MG/ML
20 INJECTION, SOLUTION INTRAVENOUS
Status: DISCONTINUED | OUTPATIENT
Start: 2024-05-07 | End: 2024-05-08 | Stop reason: HOSPADM

## 2024-05-07 RX ORDER — MORPHINE SULFATE 4 MG/ML
4 INJECTION, SOLUTION INTRAMUSCULAR; INTRAVENOUS
Status: COMPLETED | OUTPATIENT
Start: 2024-05-07 | End: 2024-05-07

## 2024-05-07 RX ADMIN — SODIUM CHLORIDE 1000 ML: 9 INJECTION, SOLUTION INTRAVENOUS at 04:05

## 2024-05-07 RX ADMIN — ONDANSETRON 4 MG: 2 INJECTION INTRAMUSCULAR; INTRAVENOUS at 04:05

## 2024-05-07 RX ADMIN — MORPHINE SULFATE 4 MG: 4 INJECTION INTRAVENOUS at 04:05

## 2024-05-07 RX ADMIN — CEFTRIAXONE 1 G: 1 INJECTION, POWDER, FOR SOLUTION INTRAMUSCULAR; INTRAVENOUS at 05:05

## 2024-05-07 RX ADMIN — CLONIDINE HYDROCHLORIDE 0.1 MG: 0.1 TABLET ORAL at 03:05

## 2024-05-07 NOTE — TELEPHONE ENCOUNTER
"Feeling bad all day. "Feeling off".   This afternoon BP, approx 15 mins ago, 200/109.  Has taken all meds today.  Lightheaded and weak upon standing. Headache currently 8/10. Vomiting, phenergan not helping. Bentyl not helping for abdominal pain.   Care advice provided per protocol, with recommendation to go to the ED at this time. Pt VU.     Reason for Disposition   [1] Systolic BP  >= 160 OR Diastolic >= 100 AND [2] cardiac (e.g., breathing difficulty, chest pain) or neurologic symptoms (e.g., new-onset blurred or double vision, unsteady gait)    Additional Information   Negative: Difficult to awaken or acting confused (e.g., disoriented, slurred speech)   Negative: SEVERE difficulty breathing (e.g., struggling for each breath, speaks in single words)   Negative: [1] Weakness of the face, arm or leg on one side of the body AND [2] new-onset   Negative: [1] Numbness (i.e., loss of sensation) of the face, arm or leg on one side of the body AND [2] new-onset   Negative: [1] Chest pain lasts > 5 minutes AND [2] history of heart disease (i.e., heart attack, bypass surgery, angina, angioplasty, CHF)   Negative: [1] Chest pain AND [2] took nitrogylcerin AND [3] pain was not relieved   Negative: Sounds like a life-threatening emergency to the triager    Protocols used: Blood Pressure - High-A-    "

## 2024-05-07 NOTE — FIRST PROVIDER EVALUATION
"Medical screening examination initiated.  I have conducted a focused provider triage encounter, findings are as follows:    Brief history of present illness:  Patient reports elevated systolic blood pressure above 215 at home.  Patient also reports headache.  Patient reports compliant with home medications.  Patient also reports abdominal pain.    Vitals:    05/07/24 1528 05/07/24 1530   BP:  (!) 188/104   Pulse:  110   Resp:  18   Temp:  97.9 °F (36.6 °C)   TempSrc:  Oral   SpO2:  96%   Weight: 103.1 kg (227 lb 4.7 oz)    Height: 5' 3" (1.6 m)        Pertinent physical exam:  No acute distress    Brief workup plan:  Labs, meds, further eval    Preliminary workup initiated; this workup will be continued and followed by the physician or advanced practice provider that is assigned to the patient when roomed.  "

## 2024-05-07 NOTE — ED PROVIDER NOTES
SCRIBE #1 NOTE: I, Sandi Soriano, am scribing for, and in the presence of, Lucy Westbrook MD. I have scribed the entire note.       History     Chief Complaint   Patient presents with    Hypertension     Pt presents w/ headache and high bp. Pt took blood pressure medications and no relief. Pt reported systolic of 215. Pain 8/10.     Review of patient's allergies indicates:   Allergen Reactions    Metronidazole hcl Other (See Comments)     Mouth ulcers developed with Flagyl  Oral sores.  Mouth ulcers developed with Flagyl         History of Present Illness     HPI    5/7/2024, 4:19 PM  History obtained from the patient      History of Present Illness: Divya Bui is a 64 y.o. female patient with a PMHx of HTN, GERD, morbid obesity, basal cell carcinoma of face who presents to the Emergency Department for evaluation of a hypertensive status which was taken yesterday and today. Pt states that she began noticing that her BP was elevated last night. She states that her BP got has high as 215/140. Pt also complains of a headache, abdominal pain, SOB, chest tightness, and n/v. Symptoms are constant and moderate in severity. No mitigating or exacerbating factors reported. Patient denies any fever,chills, diarrhea, dizziness, light-headedness, palpitations, cough, congestion, and all other sxs at this time. Pt states that she is compliant with her medications and has been taking phenergan for treatment of n/v. Pt takes amlodipine and olmesartan for treatment of HTN. No further complaints or concerns at this time.       Arrival mode: Personal vehicle    PCP: Angel Khan MD        Past Medical History:  Past Medical History:   Diagnosis Date    Anemia     Anxiety     Basal cell carcinoma (BCC) of face 2/26/2020    Blood transfusion     Bowel incontinence     Depression     Esophageal stricture     GERD (gastroesophageal reflux disease)     Hypertension     Latent tuberculosis by skin test 2001    took INH     Liver nodule 1/6/2014    Morbid obesity with BMI of 45.0-49.9, adult     OA (osteoarthritis) of knee 12/12/2014    Pericardial effusion 9/4/2014       Past Surgical History:  Past Surgical History:   Procedure Laterality Date    CHOLECYSTECTOMY      COLONOSCOPY N/A 3/6/2023    Procedure: COLONOSCOPY;  Surgeon: Beti Diallo MD;  Location: [facility];  Service: Endoscopy;  Laterality: N/A;    GASTRIC BYPASS      HERNIA REPAIR      INJECTION OF ANESTHETIC AGENT AROUND NERVE Bilateral 6/16/2023    Procedure: Bilateral Genicular nerve block with RN IV sedation;  Surgeon: Denis Lai MD;  Location: [facility];  Service: Pain Management;  Laterality: Bilateral;    INJECTION OF ANESTHETIC AGENT AROUND NERVE Bilateral 8/11/2023    Procedure: Bilateral Genicular nerve block with RN IV sedation;  Surgeon: Denis Lai MD;  Location: [facility];  Service: Pain Management;  Laterality: Bilateral;    RADIOFREQUENCY THERMOCOAGULATION Right 10/31/2023    Procedure: Right Genicular Nerve RFA with RN IV sedation;  Surgeon: Denis Lai MD;  Location: [facility];  Service: Pain Management;  Laterality: Right;    RADIOFREQUENCY THERMOCOAGULATION Left 11/14/2023    Procedure: Left Genicular Nerve RFA with RN IV sedation;  Surgeon: Denis Lai MD;  Location: [facility];  Service: Pain Management;  Laterality: Left;    right sholder surgery      SMALL INTESTINE SURGERY           Family History:  Family History   Problem Relation Name Age of Onset    Lung cancer Mother          smoker    Liver cancer Father      Diabetes Sister      Crohn's disease Sister      Liver cancer Sister      Diabetes Sister oldest     Crohn's disease Brother      Crohn's disease Other nephew     Breast cancer Neg Hx      Ovarian cancer Neg Hx      Colon cancer Neg Hx         Social History:  Social History     Tobacco Use    Smoking status: Never    Smokeless tobacco: Never   Substance and Sexual Activity    Alcohol use: Not  Currently     Alcohol/week: 0.0 standard drinks of alcohol    Drug use: No    Sexual activity: Yes     Partners: Male        Review of Systems     Review of Systems   Constitutional:  Negative for chills and fever.   HENT:  Negative for congestion and sore throat.    Respiratory:  Positive for chest tightness and shortness of breath. Negative for cough.    Cardiovascular:  Negative for chest pain and palpitations.   Gastrointestinal:  Positive for abdominal pain, nausea and vomiting. Negative for diarrhea.   Genitourinary:  Negative for dysuria.   Musculoskeletal:  Negative for back pain.   Skin:  Negative for rash.   Neurological:  Positive for headaches. Negative for dizziness, weakness and light-headedness.   Hematological:  Does not bruise/bleed easily.   All other systems reviewed and are negative.     Physical Exam     Initial Vitals [05/07/24 1530]   BP Pulse Resp Temp SpO2   (!) 188/104 110 18 97.9 °F (36.6 °C) 96 %      MAP       --          Physical Exam  Nursing Notes and Vital Signs Reviewed.  Constitutional: Patient is in no acute distress. Patient is obese, weak, and appears older than her stated age.  Head: Atraumatic. Normocephalic.  Eyes: PERRL. EOM intact. Conjunctivae are not pale. No scleral icterus.  ENT: Mucous membranes are moist. Oropharynx is clear and symmetric.    Neck: Supple. Full ROM. No lymphadenopathy.  Cardiovascular: Regular rate. Regular rhythm. No murmurs, rubs, or gallops. Distal pulses are 2+ and symmetric.  Pulmonary/Chest: No respiratory distress. Clear to auscultation bilaterally. No wheezing or rales.  Abdominal: Soft and non-distended.  There is no tenderness.  No rebound, guarding, or rigidity. Good bowel sounds.  Genitourinary: No CVA tenderness  Musculoskeletal: Moves all extremities. No obvious deformities. No edema. No calf tenderness.  Skin: Warm and dry.  Neurological:  Alert, awake, and appropriate.  Normal speech.  No acute focal neurological deficits are  "appreciated.  Psychiatric: Normal affect. Good eye contact. Appropriate in content.     ED Course   Critical Care    Date/Time: 5/7/2024 5:55 PM    Performed by: Lucy Westbrook MD  Authorized by: Lucy Westbrook MD  Direct patient critical care time: 15 minutes  Additional history critical care time: 10 minutes  Ordering / reviewing critical care time: 5 minutes  Documentation critical care time: 10 minutes  Consulting other physicians critical care time: 5 minutes  Total critical care time (exclusive of procedural time) : 45 minutes  Critical care time was exclusive of separately billable procedures and treating other patients and teaching time.  Critical care was necessary to treat or prevent imminent or life-threatening deterioration of the following conditions: hypertension, UTI.  Critical care was time spent personally by me on the following activities: blood draw for specimens, development of treatment plan with patient or surrogate, discussions with consultants, interpretation of cardiac output measurements, evaluation of patient's response to treatment, examination of patient, obtaining history from patient or surrogate, ordering and performing treatments and interventions, ordering and review of laboratory studies, ordering and review of radiographic studies, pulse oximetry, re-evaluation of patient's condition and review of old charts.        ED Vital Signs:  Vitals:    05/07/24 1528 05/07/24 1530 05/07/24 1619 05/07/24 1632   BP:  (!) 188/104  (!) 147/88   Pulse:  110 105 94   Resp:  18 14 20   Temp:  97.9 °F (36.6 °C)     TempSrc:  Oral     SpO2:  96% 95% 95%   Weight: 103.1 kg (227 lb 4.7 oz)      Height: 5' 3" (1.6 m)       05/07/24 1658 05/07/24 1708 05/07/24 1718 05/07/24 1728   BP:  (!) 146/65 137/63 136/69   Pulse:  93 93 91   Resp: 18 16 12 15   Temp:       TempSrc:       SpO2:  96% 96% 97%   Weight:       Height:        05/07/24 1741   BP:    Pulse: 86   Resp:    Temp:    TempSrc:  "   SpO2:    Weight:    Height:        Abnormal Lab Results:  Labs Reviewed   CBC W/ AUTO DIFFERENTIAL - Abnormal; Notable for the following components:       Result Value    MPV 8.7 (*)     Immature Granulocytes 0.7 (*)     Immature Grans (Abs) 0.05 (*)     All other components within normal limits   COMPREHENSIVE METABOLIC PANEL - Abnormal; Notable for the following components:    Sodium 132 (*)     CO2 22 (*)     Glucose 135 (*)     Calcium 8.6 (*)     Alkaline Phosphatase 151 (*)     ALT 7 (*)     eGFR 55 (*)     All other components within normal limits   URINALYSIS, REFLEX TO URINE CULTURE - Abnormal; Notable for the following components:    Appearance, UA Hazy (*)     Protein, UA Trace (*)     Occult Blood UA Trace (*)     Nitrite, UA Positive (*)     Leukocytes, UA 3+ (*)     All other components within normal limits    Narrative:     Specimen Source->Urine   URINALYSIS MICROSCOPIC - Abnormal; Notable for the following components:    RBC, UA 16 (*)     WBC, UA >100 (*)     WBC Clumps, UA Many (*)     Bacteria Moderate (*)     All other components within normal limits    Narrative:     Specimen Source->Urine   CULTURE, URINE   LIPASE   TROPONIN I        All Lab Results:  Results for orders placed or performed during the hospital encounter of 05/07/24   CBC auto differential   Result Value Ref Range    WBC 7.32 3.90 - 12.70 K/uL    RBC 4.46 4.00 - 5.40 M/uL    Hemoglobin 12.3 12.0 - 16.0 g/dL    Hematocrit 38.4 37.0 - 48.5 %    MCV 86 82 - 98 fL    MCH 27.6 27.0 - 31.0 pg    MCHC 32.0 32.0 - 36.0 g/dL    RDW 14.1 11.5 - 14.5 %    Platelets 241 150 - 450 K/uL    MPV 8.7 (L) 9.2 - 12.9 fL    Immature Granulocytes 0.7 (H) 0.0 - 0.5 %    Gran # (ANC) 5.3 1.8 - 7.7 K/uL    Immature Grans (Abs) 0.05 (H) 0.00 - 0.04 K/uL    Lymph # 1.3 1.0 - 4.8 K/uL    Mono # 0.5 0.3 - 1.0 K/uL    Eos # 0.1 0.0 - 0.5 K/uL    Baso # 0.05 0.00 - 0.20 K/uL    nRBC 0 0 /100 WBC    Gran % 72.7 38.0 - 73.0 %    Lymph % 18.0 18.0 - 48.0 %     Mono % 6.3 4.0 - 15.0 %    Eosinophil % 1.6 0.0 - 8.0 %    Basophil % 0.7 0.0 - 1.9 %    Differential Method Automated    Comprehensive metabolic panel   Result Value Ref Range    Sodium 132 (L) 136 - 145 mmol/L    Potassium 4.0 3.5 - 5.1 mmol/L    Chloride 99 95 - 110 mmol/L    CO2 22 (L) 23 - 29 mmol/L    Glucose 135 (H) 70 - 110 mg/dL    BUN 13 8 - 23 mg/dL    Creatinine 1.1 0.5 - 1.4 mg/dL    Calcium 8.6 (L) 8.7 - 10.5 mg/dL    Total Protein 7.7 6.0 - 8.4 g/dL    Albumin 3.9 3.5 - 5.2 g/dL    Total Bilirubin 0.6 0.1 - 1.0 mg/dL    Alkaline Phosphatase 151 (H) 55 - 135 U/L    AST 10 10 - 40 U/L    ALT 7 (L) 10 - 44 U/L    eGFR 55 (A) >60 mL/min/1.73 m^2    Anion Gap 11 8 - 16 mmol/L   Lipase   Result Value Ref Range    Lipase 6 4 - 60 U/L   Urinalysis, Reflex to Urine Culture Urine, Clean Catch    Specimen: Urine   Result Value Ref Range    Specimen UA Urine, Clean Catch     Color, UA Yellow Yellow, Straw, Debra    Appearance, UA Hazy (A) Clear    pH, UA 6.0 5.0 - 8.0    Specific Gravity, UA 1.010 1.005 - 1.030    Protein, UA Trace (A) Negative    Glucose, UA Negative Negative    Ketones, UA Negative Negative    Bilirubin (UA) Negative Negative    Occult Blood UA Trace (A) Negative    Nitrite, UA Positive (A) Negative    Urobilinogen, UA Negative <2.0 EU/dL    Leukocytes, UA 3+ (A) Negative   Troponin I   Result Value Ref Range    Troponin I <0.006 0.000 - 0.026 ng/mL   Urinalysis Microscopic   Result Value Ref Range    RBC, UA 16 (H) 0 - 4 /hpf    WBC, UA >100 (H) 0 - 5 /hpf    WBC Clumps, UA Many (A) None-Rare    Bacteria Moderate (A) None-Occ /hpf    Squam Epithel, UA 6 /hpf    Microscopic Comment SEE COMMENT         Imaging Results:  Imaging Results              X-Ray Chest 1 View (Final result)  Result time 05/07/24 16:10:32      Final result by Shawn Mcgarry MD (05/07/24 16:10:32)                   Impression:      1.  Negative for acute process involving the chest.    2.  Stable findings as noted  above.      Electronically signed by: Shawn Mcgarry MD  Date:    05/07/2024  Time:    16:10               Narrative:    EXAMINATION:  XR CHEST 1 VIEW    CLINICAL HISTORY:  Essential (primary) hypertension    COMPARISON:  Studies dating back to January 22, 2022    FINDINGS:  Stable scarring or atelectasis in the left lung base.  The lungs are otherwise clear.  The cardiac silhouette size is normal. The trachea is midline and the mediastinal width is normal. Negative for focal infiltrate, effusion or pneumothorax. Pulmonary vasculature is normal. Negative for osseous abnormalities. Ectatic and tortuous aorta with aortic arch calcifications.  Degenerative changes of the spine and both shoulder girdles.  Convex left curvature of the midthoracic spine.                                       The EKG was ordered, reviewed, and independently interpreted by the ED provider.  Interpretation time: 16:11  Rate: 112 BPM  Rhythm: sinus tachycardia  Interpretation: Moderate voltage criteria for LVH, may be normal variant. Inferior infarct, age undetermined. No STEMI.           The Emergency Provider reviewed the vital signs and test results, which are outlined above.     ED Discussion     7:28 PM: Reassessed pt at this time.  Pt states her condition has  at this time. Discussed with pt all pertinent ED information and results. Discussed pt dx and plan of tx. Pt received Rocephin IV while in the ED and will send ceftanir to her pharmacy.  She also got labetalol and clonidine for hypertension.  Currently she is stable.  She was very comfortable.  Reading a book and ready to go home.  She has 2 sons who will check on her.  No CT of the head done as she stabilized after blood pressure medication.  She has no focal deficits.  He reading a book and very cooperative with full neurologic function.  Gave pt all f/u and return to the ED instructions. All questions and concerns were addressed at this time. Pt expresses understanding of  information and instructions, and is comfortable with plan to discharge. Pt is stable for discharge.    I discussed with patient and/or family/caretaker that evaluation in the ED does not suggest any emergent or life threatening medical conditions requiring immediate intervention beyond what was provided in the ED, and I believe patient is safe for discharge.  Regardless, an unremarkable evaluation in the ED does not preclude the development or presence of a serious of life threatening condition. As such, patient was instructed to return immediately for any worsening or change in current symptoms.        Medical Decision Making  Amount and/or Complexity of Data Reviewed  Labs: ordered. Decision-making details documented in ED Course.  Radiology: ordered. Decision-making details documented in ED Course.  ECG/medicine tests: ordered and independent interpretation performed. Decision-making details documented in ED Course.    Risk  Prescription drug management.  Risk Details: Differential diagnosis: Dehydration, electrolyte abnormality, arrhythmia, infection, STEMI, NSTEMI, UTI, hypertensive urgency malignant hypertension                  ED Medication(s):  Medications   labetaloL injection 20 mg (20 mg Intravenous Not Given 5/7/24 1645)   cloNIDine tablet 0.1 mg (0.1 mg Oral Given 5/7/24 1558)   ondansetron injection 4 mg (4 mg Intravenous Given 5/7/24 1653)   morphine injection 4 mg (4 mg Intravenous Given 5/7/24 1658)   sodium chloride 0.9% bolus 1,000 mL 1,000 mL (0 mLs Intravenous Stopped 5/7/24 1840)   cefTRIAXone (Rocephin) 1 g in dextrose 5 % in water (D5W) 100 mL IVPB (MB+) (0 g Intravenous Stopped 5/7/24 1841)       New Prescriptions    CEFDINIR (OMNICEF) 300 MG CAPSULE    Take 1 capsule (300 mg total) by mouth 2 (two) times daily. for 10 days    ONDANSETRON (ZOFRAN) 4 MG TABLET    Take 1 tablet (4 mg total) by mouth every 6 (six) hours.        Follow-up Information       Angel Khan MD. Schedule an  appointment as soon as possible for a visit in 2 days.    Specialty: Internal Medicine  Contact information:  [address] [facility] [address]  [phone]                                 Scribe Attestation:   Scribe #1: I performed the above scribed service and the documentation accurately describes the services I performed. I attest to the accuracy of the note.     Attending:   Physician Attestation Statement for Scribe #1: I, Lucy Westbrook MD, personally performed the services described in this documentation, as scribed by Sandi Soriano, in my presence, and it is both accurate and complete.           Clinical Impression       ICD-10-CM ICD-9-CM   1. Hypertension, unspecified type  I10 401.9   2. HTN (hypertension)  I10 401.9   3. Urinary tract infection with hematuria, site unspecified  N39.0 599.0    R31.9 599.70       Disposition:   Disposition: Discharged  Condition: Stable        Lucy Westbrook MD  05/07/24 1933

## 2024-05-08 NOTE — DISCHARGE INSTRUCTIONS
Cefdinir has been prescribed for your urinary tract infection.  Ondansetron has been prescribed if needed for nausea vomiting    Follow up with your primary care doctor for further management and treatment of your high blood pressure    Return to emergency department if you develop:  - Sustained Fever >100.4 or low temperature <96.8   - Tachycardia (very high heart rate) or Pulse >90  - Hypotension (low blood pressure) a top number (systolic pressure) of <90 or a bottom number (diastolic pressure) of <40 or lower  - Hypertension (high blood pressure) a top number (systolic pressure) of >180 or a bottom number (diastolic pressure) of >120 or higher  - Severe pain not relieved with recommended pain control regimen  - Severe shortness of breath above baseline  - Severe nausea, vomiting, inability to tolerate oral feeding/hydration  - Altered mental status, abnormal interaction or behaviors   - If symptoms acutely worsen or do not improve  - Development of other worrisome symptom

## 2024-05-09 ENCOUNTER — OFFICE VISIT (OUTPATIENT)
Dept: INTERNAL MEDICINE | Facility: CLINIC | Age: 64
End: 2024-05-09
Payer: MEDICARE

## 2024-05-09 ENCOUNTER — LAB VISIT (OUTPATIENT)
Dept: LAB | Facility: HOSPITAL | Age: 64
End: 2024-05-09
Attending: FAMILY MEDICINE
Payer: MEDICARE

## 2024-05-09 VITALS
BODY MASS INDEX: 40.86 KG/M2 | HEIGHT: 63 IN | TEMPERATURE: 99 F | DIASTOLIC BLOOD PRESSURE: 62 MMHG | HEART RATE: 118 BPM | OXYGEN SATURATION: 97 % | WEIGHT: 230.63 LBS | SYSTOLIC BLOOD PRESSURE: 112 MMHG

## 2024-05-09 DIAGNOSIS — H10.9 CONJUNCTIVITIS, UNSPECIFIED CONJUNCTIVITIS TYPE, UNSPECIFIED LATERALITY: ICD-10-CM

## 2024-05-09 DIAGNOSIS — E87.1 HYPONATREMIA: ICD-10-CM

## 2024-05-09 DIAGNOSIS — I10 PRIMARY HYPERTENSION: Primary | ICD-10-CM

## 2024-05-09 DIAGNOSIS — N39.0 URINARY TRACT INFECTION WITHOUT HEMATURIA, SITE UNSPECIFIED: ICD-10-CM

## 2024-05-09 DIAGNOSIS — R73.03 PREDIABETES: ICD-10-CM

## 2024-05-09 LAB
ALBUMIN SERPL BCP-MCNC: 3.5 G/DL (ref 3.5–5.2)
ALP SERPL-CCNC: 130 U/L (ref 55–135)
ALT SERPL W/O P-5'-P-CCNC: 7 U/L (ref 10–44)
ANION GAP SERPL CALC-SCNC: 11 MMOL/L (ref 8–16)
AST SERPL-CCNC: 13 U/L (ref 10–40)
BILIRUB SERPL-MCNC: 0.5 MG/DL (ref 0.1–1)
BUN SERPL-MCNC: 21 MG/DL (ref 8–23)
CALCIUM SERPL-MCNC: 8.4 MG/DL (ref 8.7–10.5)
CHLORIDE SERPL-SCNC: 95 MMOL/L (ref 95–110)
CO2 SERPL-SCNC: 19 MMOL/L (ref 23–29)
CREAT SERPL-MCNC: 1.7 MG/DL (ref 0.5–1.4)
EST. GFR  (NO RACE VARIABLE): 32.9 ML/MIN/1.73 M^2
ESTIMATED AVG GLUCOSE: 117 MG/DL (ref 68–131)
GLUCOSE SERPL-MCNC: 143 MG/DL (ref 70–110)
HBA1C MFR BLD: 5.7 % (ref 4–5.6)
OHS QRS DURATION: 84 MS
OHS QTC CALCULATION: 477 MS
POTASSIUM SERPL-SCNC: 4.3 MMOL/L (ref 3.5–5.1)
PROT SERPL-MCNC: 7 G/DL (ref 6–8.4)
SODIUM SERPL-SCNC: 125 MMOL/L (ref 136–145)

## 2024-05-09 PROCEDURE — 3044F HG A1C LEVEL LT 7.0%: CPT | Mod: CPTII,S$GLB,, | Performed by: FAMILY MEDICINE

## 2024-05-09 PROCEDURE — 3078F DIAST BP <80 MM HG: CPT | Mod: CPTII,S$GLB,, | Performed by: FAMILY MEDICINE

## 2024-05-09 PROCEDURE — 99214 OFFICE O/P EST MOD 30 MIN: CPT | Mod: S$GLB,,, | Performed by: FAMILY MEDICINE

## 2024-05-09 PROCEDURE — 36415 COLL VENOUS BLD VENIPUNCTURE: CPT | Mod: PO | Performed by: FAMILY MEDICINE

## 2024-05-09 PROCEDURE — 3288F FALL RISK ASSESSMENT DOCD: CPT | Mod: CPTII,S$GLB,, | Performed by: FAMILY MEDICINE

## 2024-05-09 PROCEDURE — 80053 COMPREHEN METABOLIC PANEL: CPT | Performed by: FAMILY MEDICINE

## 2024-05-09 PROCEDURE — 1126F AMNT PAIN NOTED NONE PRSNT: CPT | Mod: CPTII,S$GLB,, | Performed by: FAMILY MEDICINE

## 2024-05-09 PROCEDURE — 3008F BODY MASS INDEX DOCD: CPT | Mod: CPTII,S$GLB,, | Performed by: FAMILY MEDICINE

## 2024-05-09 PROCEDURE — 83036 HEMOGLOBIN GLYCOSYLATED A1C: CPT | Performed by: FAMILY MEDICINE

## 2024-05-09 PROCEDURE — 99999 PR PBB SHADOW E&M-EST. PATIENT-LVL IV: CPT | Mod: PBBFAC,,, | Performed by: FAMILY MEDICINE

## 2024-05-09 PROCEDURE — 3074F SYST BP LT 130 MM HG: CPT | Mod: CPTII,S$GLB,, | Performed by: FAMILY MEDICINE

## 2024-05-09 PROCEDURE — 1159F MED LIST DOCD IN RCRD: CPT | Mod: CPTII,S$GLB,, | Performed by: FAMILY MEDICINE

## 2024-05-09 PROCEDURE — 1101F PT FALLS ASSESS-DOCD LE1/YR: CPT | Mod: CPTII,S$GLB,, | Performed by: FAMILY MEDICINE

## 2024-05-09 RX ORDER — TOBRAMYCIN 3 MG/ML
1 SOLUTION/ DROPS OPHTHALMIC EVERY 4 HOURS
Qty: 5 ML | Refills: 0 | Status: SHIPPED | OUTPATIENT
Start: 2024-05-09 | End: 2024-06-04

## 2024-05-10 ENCOUNTER — HOSPITAL ENCOUNTER (OUTPATIENT)
Facility: HOSPITAL | Age: 64
Discharge: HOME | End: 2024-05-11
Attending: EMERGENCY MEDICINE | Admitting: SPECIALIST
Payer: MEDICARE

## 2024-05-10 ENCOUNTER — TELEPHONE (OUTPATIENT)
Dept: INTERNAL MEDICINE | Facility: CLINIC | Age: 64
End: 2024-05-10
Payer: MEDICARE

## 2024-05-10 DIAGNOSIS — E87.1 ACUTE HYPONATREMIA: Primary | ICD-10-CM

## 2024-05-10 DIAGNOSIS — R07.9 CHEST PAIN: ICD-10-CM

## 2024-05-10 DIAGNOSIS — R53.1 WEAKNESS: ICD-10-CM

## 2024-05-10 DIAGNOSIS — N30.00 ACUTE CYSTITIS WITHOUT HEMATURIA: ICD-10-CM

## 2024-05-10 PROBLEM — R42 DIZZINESS: Status: ACTIVE | Noted: 2024-05-10

## 2024-05-10 LAB
ALBUMIN SERPL BCP-MCNC: 3.7 G/DL (ref 3.5–5.2)
ALP SERPL-CCNC: 146 U/L (ref 55–135)
ALT SERPL W/O P-5'-P-CCNC: 9 U/L (ref 10–44)
ANION GAP SERPL CALC-SCNC: 11 MMOL/L (ref 8–16)
ANION GAP SERPL CALC-SCNC: 8 MMOL/L (ref 8–16)
AST SERPL-CCNC: 13 U/L (ref 10–40)
BACTERIA UR CULT: ABNORMAL
BASOPHILS # BLD AUTO: 0.03 K/UL (ref 0–0.2)
BASOPHILS NFR BLD: 0.4 % (ref 0–1.9)
BILIRUB SERPL-MCNC: 0.7 MG/DL (ref 0.1–1)
BILIRUB UR QL STRIP: NEGATIVE
BNP SERPL-MCNC: <10 PG/ML (ref 0–99)
BUN SERPL-MCNC: 17 MG/DL (ref 8–23)
BUN SERPL-MCNC: 19 MG/DL (ref 8–23)
CALCIUM SERPL-MCNC: 7.9 MG/DL (ref 8.7–10.5)
CALCIUM SERPL-MCNC: 8.8 MG/DL (ref 8.7–10.5)
CHLORIDE SERPL-SCNC: 92 MMOL/L (ref 95–110)
CHLORIDE SERPL-SCNC: 98 MMOL/L (ref 95–110)
CLARITY UR: ABNORMAL
CO2 SERPL-SCNC: 20 MMOL/L (ref 23–29)
CO2 SERPL-SCNC: 21 MMOL/L (ref 23–29)
COLOR UR: YELLOW
CREAT SERPL-MCNC: 1.1 MG/DL (ref 0.5–1.4)
CREAT SERPL-MCNC: 1.5 MG/DL (ref 0.5–1.4)
DIFFERENTIAL METHOD BLD: ABNORMAL
EOSINOPHIL # BLD AUTO: 0.1 K/UL (ref 0–0.5)
EOSINOPHIL NFR BLD: 1.8 % (ref 0–8)
ERYTHROCYTE [DISTWIDTH] IN BLOOD BY AUTOMATED COUNT: 14.2 % (ref 11.5–14.5)
EST. GFR  (NO RACE VARIABLE): 38 ML/MIN/1.73 M^2
EST. GFR  (NO RACE VARIABLE): 55 ML/MIN/1.73 M^2
GLUCOSE SERPL-MCNC: 114 MG/DL (ref 70–110)
GLUCOSE SERPL-MCNC: 116 MG/DL (ref 70–110)
GLUCOSE UR QL STRIP: NEGATIVE
HCT VFR BLD AUTO: 35.6 % (ref 37–48.5)
HGB BLD-MCNC: 11.5 G/DL (ref 12–16)
HGB UR QL STRIP: NEGATIVE
HYALINE CASTS #/AREA URNS LPF: 18 /LPF
IMM GRANULOCYTES # BLD AUTO: 0.04 K/UL (ref 0–0.04)
IMM GRANULOCYTES NFR BLD AUTO: 0.6 % (ref 0–0.5)
KETONES UR QL STRIP: NEGATIVE
LEUKOCYTE ESTERASE UR QL STRIP: ABNORMAL
LYMPHOCYTES # BLD AUTO: 1.1 K/UL (ref 1–4.8)
LYMPHOCYTES NFR BLD: 16.2 % (ref 18–48)
MCH RBC QN AUTO: 27.9 PG (ref 27–31)
MCHC RBC AUTO-ENTMCNC: 32.3 G/DL (ref 32–36)
MCV RBC AUTO: 86 FL (ref 82–98)
MICROSCOPIC COMMENT: ABNORMAL
MONOCYTES # BLD AUTO: 0.4 K/UL (ref 0.3–1)
MONOCYTES NFR BLD: 5.8 % (ref 4–15)
NEUTROPHILS # BLD AUTO: 5 K/UL (ref 1.8–7.7)
NEUTROPHILS NFR BLD: 75.2 % (ref 38–73)
NITRITE UR QL STRIP: NEGATIVE
NRBC BLD-RTO: 0 /100 WBC
OSMOLALITY SERPL: 263 MOSM/KG (ref 275–295)
OSMOLALITY UR: 299 MOSM/KG (ref 50–1200)
PH UR STRIP: 5 [PH] (ref 5–8)
PLATELET # BLD AUTO: 214 K/UL (ref 150–450)
PMV BLD AUTO: 8.7 FL (ref 9.2–12.9)
POTASSIUM SERPL-SCNC: 4.1 MMOL/L (ref 3.5–5.1)
POTASSIUM SERPL-SCNC: 4.2 MMOL/L (ref 3.5–5.1)
PROT SERPL-MCNC: 7.5 G/DL (ref 6–8.4)
PROT UR QL STRIP: NEGATIVE
RBC # BLD AUTO: 4.12 M/UL (ref 4–5.4)
RBC #/AREA URNS HPF: 2 /HPF (ref 0–4)
SODIUM SERPL-SCNC: 124 MMOL/L (ref 136–145)
SODIUM SERPL-SCNC: 126 MMOL/L (ref 136–145)
SODIUM UR-SCNC: 35 MMOL/L (ref 20–250)
SP GR UR STRIP: 1.01 (ref 1–1.03)
SQUAMOUS #/AREA URNS HPF: 8 /HPF
TROPONIN I SERPL DL<=0.01 NG/ML-MCNC: 0.01 NG/ML (ref 0–0.03)
TSH SERPL DL<=0.005 MIU/L-ACNC: 1.74 UIU/ML (ref 0.4–4)
URN SPEC COLLECT METH UR: ABNORMAL
UROBILINOGEN UR STRIP-ACNC: NEGATIVE EU/DL
WBC # BLD AUTO: 6.67 K/UL (ref 3.9–12.7)
WBC #/AREA URNS HPF: 64 /HPF (ref 0–5)
WBC CLUMPS URNS QL MICRO: ABNORMAL

## 2024-05-10 PROCEDURE — 96361 HYDRATE IV INFUSION ADD-ON: CPT

## 2024-05-10 PROCEDURE — 84300 ASSAY OF URINE SODIUM: CPT | Performed by: EMERGENCY MEDICINE

## 2024-05-10 PROCEDURE — 63600175 PHARM REV CODE 636 W HCPCS: Performed by: EMERGENCY MEDICINE

## 2024-05-10 PROCEDURE — 25000003 PHARM REV CODE 250: Performed by: NURSE PRACTITIONER

## 2024-05-10 PROCEDURE — 87186 SC STD MICRODIL/AGAR DIL: CPT | Performed by: EMERGENCY MEDICINE

## 2024-05-10 PROCEDURE — 81000 URINALYSIS NONAUTO W/SCOPE: CPT | Performed by: EMERGENCY MEDICINE

## 2024-05-10 PROCEDURE — G0378 HOSPITAL OBSERVATION PER HR: HCPCS

## 2024-05-10 PROCEDURE — 83930 ASSAY OF BLOOD OSMOLALITY: CPT | Performed by: REGISTERED NURSE

## 2024-05-10 PROCEDURE — 84484 ASSAY OF TROPONIN QUANT: CPT | Performed by: REGISTERED NURSE

## 2024-05-10 PROCEDURE — 93010 ELECTROCARDIOGRAM REPORT: CPT | Mod: ,,, | Performed by: STUDENT IN AN ORGANIZED HEALTH CARE EDUCATION/TRAINING PROGRAM

## 2024-05-10 PROCEDURE — 83880 ASSAY OF NATRIURETIC PEPTIDE: CPT | Performed by: REGISTERED NURSE

## 2024-05-10 PROCEDURE — 63600175 PHARM REV CODE 636 W HCPCS: Performed by: SPECIALIST

## 2024-05-10 PROCEDURE — 25000003 PHARM REV CODE 250: Performed by: EMERGENCY MEDICINE

## 2024-05-10 PROCEDURE — 80053 COMPREHEN METABOLIC PANEL: CPT | Performed by: REGISTERED NURSE

## 2024-05-10 PROCEDURE — 87077 CULTURE AEROBIC IDENTIFY: CPT | Performed by: EMERGENCY MEDICINE

## 2024-05-10 PROCEDURE — 85025 COMPLETE CBC W/AUTO DIFF WBC: CPT | Performed by: REGISTERED NURSE

## 2024-05-10 PROCEDURE — 96365 THER/PROPH/DIAG IV INF INIT: CPT

## 2024-05-10 PROCEDURE — 36415 COLL VENOUS BLD VENIPUNCTURE: CPT | Performed by: NURSE PRACTITIONER

## 2024-05-10 PROCEDURE — 87086 URINE CULTURE/COLONY COUNT: CPT | Performed by: EMERGENCY MEDICINE

## 2024-05-10 PROCEDURE — 84443 ASSAY THYROID STIM HORMONE: CPT | Performed by: REGISTERED NURSE

## 2024-05-10 PROCEDURE — 36415 COLL VENOUS BLD VENIPUNCTURE: CPT | Performed by: REGISTERED NURSE

## 2024-05-10 PROCEDURE — 80048 BASIC METABOLIC PNL TOTAL CA: CPT | Mod: XB | Performed by: NURSE PRACTITIONER

## 2024-05-10 PROCEDURE — 87088 URINE BACTERIA CULTURE: CPT | Performed by: EMERGENCY MEDICINE

## 2024-05-10 PROCEDURE — 99285 EMERGENCY DEPT VISIT HI MDM: CPT | Mod: 25

## 2024-05-10 PROCEDURE — 83935 ASSAY OF URINE OSMOLALITY: CPT | Performed by: EMERGENCY MEDICINE

## 2024-05-10 PROCEDURE — 96372 THER/PROPH/DIAG INJ SC/IM: CPT | Performed by: SPECIALIST

## 2024-05-10 PROCEDURE — 93005 ELECTROCARDIOGRAM TRACING: CPT

## 2024-05-10 RX ORDER — ENOXAPARIN SODIUM 100 MG/ML
40 INJECTION SUBCUTANEOUS EVERY 24 HOURS
Status: DISCONTINUED | OUTPATIENT
Start: 2024-05-10 | End: 2024-05-10

## 2024-05-10 RX ORDER — ARIPIPRAZOLE 5 MG/1
10 TABLET ORAL DAILY
Status: DISCONTINUED | OUTPATIENT
Start: 2024-05-11 | End: 2024-05-11 | Stop reason: HOSPADM

## 2024-05-10 RX ORDER — IBUPROFEN 200 MG
24 TABLET ORAL
Status: DISCONTINUED | OUTPATIENT
Start: 2024-05-10 | End: 2024-05-11 | Stop reason: HOSPADM

## 2024-05-10 RX ORDER — PREGABALIN 100 MG/1
100 CAPSULE ORAL NIGHTLY
Status: DISCONTINUED | OUTPATIENT
Start: 2024-05-10 | End: 2024-05-11 | Stop reason: HOSPADM

## 2024-05-10 RX ORDER — NALOXONE HCL 0.4 MG/ML
0.02 VIAL (ML) INJECTION
Status: DISCONTINUED | OUTPATIENT
Start: 2024-05-10 | End: 2024-05-11 | Stop reason: HOSPADM

## 2024-05-10 RX ORDER — HYDROCHLOROTHIAZIDE 12.5 MG/1
12.5 TABLET ORAL DAILY
Status: DISCONTINUED | OUTPATIENT
Start: 2024-05-11 | End: 2024-05-11 | Stop reason: HOSPADM

## 2024-05-10 RX ORDER — SODIUM CHLORIDE 0.9 % (FLUSH) 0.9 %
3 SYRINGE (ML) INJECTION EVERY 12 HOURS PRN
Status: DISCONTINUED | OUTPATIENT
Start: 2024-05-10 | End: 2024-05-11 | Stop reason: HOSPADM

## 2024-05-10 RX ORDER — DICYCLOMINE HYDROCHLORIDE 20 MG/1
20 TABLET ORAL 3 TIMES DAILY PRN
Status: DISCONTINUED | OUTPATIENT
Start: 2024-05-10 | End: 2024-05-11 | Stop reason: HOSPADM

## 2024-05-10 RX ORDER — FERROUS GLUCONATE 324(37.5)
324 TABLET ORAL
Status: DISCONTINUED | OUTPATIENT
Start: 2024-05-11 | End: 2024-05-11 | Stop reason: HOSPADM

## 2024-05-10 RX ORDER — ACETAMINOPHEN 650 MG/1
650 SUPPOSITORY RECTAL EVERY 4 HOURS PRN
Status: DISCONTINUED | OUTPATIENT
Start: 2024-05-10 | End: 2024-05-11 | Stop reason: HOSPADM

## 2024-05-10 RX ORDER — SIMETHICONE 80 MG
1 TABLET,CHEWABLE ORAL 4 TIMES DAILY PRN
Status: DISCONTINUED | OUTPATIENT
Start: 2024-05-10 | End: 2024-05-11 | Stop reason: HOSPADM

## 2024-05-10 RX ORDER — ACETAMINOPHEN 325 MG/1
650 TABLET ORAL EVERY 8 HOURS PRN
Status: DISCONTINUED | OUTPATIENT
Start: 2024-05-10 | End: 2024-05-11 | Stop reason: HOSPADM

## 2024-05-10 RX ORDER — POLYETHYLENE GLYCOL 3350 17 G/17G
17 POWDER, FOR SOLUTION ORAL DAILY PRN
Status: DISCONTINUED | OUTPATIENT
Start: 2024-05-10 | End: 2024-05-11 | Stop reason: HOSPADM

## 2024-05-10 RX ORDER — PREGABALIN 100 MG/1
100 CAPSULE ORAL NIGHTLY
COMMUNITY
End: 2024-05-10 | Stop reason: SDUPTHER

## 2024-05-10 RX ORDER — FLUOXETINE HYDROCHLORIDE 20 MG/1
80 CAPSULE ORAL DAILY
Status: DISCONTINUED | OUTPATIENT
Start: 2024-05-11 | End: 2024-05-11 | Stop reason: HOSPADM

## 2024-05-10 RX ORDER — IBUPROFEN 200 MG
16 TABLET ORAL
Status: DISCONTINUED | OUTPATIENT
Start: 2024-05-10 | End: 2024-05-11 | Stop reason: HOSPADM

## 2024-05-10 RX ORDER — SODIUM CHLORIDE 9 MG/ML
INJECTION, SOLUTION INTRAVENOUS CONTINUOUS
Status: DISCONTINUED | OUTPATIENT
Start: 2024-05-10 | End: 2024-05-11

## 2024-05-10 RX ORDER — GLUCAGON 1 MG
1 KIT INJECTION
Status: DISCONTINUED | OUTPATIENT
Start: 2024-05-10 | End: 2024-05-11 | Stop reason: HOSPADM

## 2024-05-10 RX ORDER — ZOLPIDEM TARTRATE 5 MG/1
10 TABLET ORAL NIGHTLY PRN
Status: DISCONTINUED | OUTPATIENT
Start: 2024-05-10 | End: 2024-05-11 | Stop reason: HOSPADM

## 2024-05-10 RX ORDER — ATORVASTATIN CALCIUM 40 MG/1
40 TABLET, FILM COATED ORAL DAILY
Status: DISCONTINUED | OUTPATIENT
Start: 2024-05-11 | End: 2024-05-11 | Stop reason: HOSPADM

## 2024-05-10 RX ORDER — DOXEPIN HYDROCHLORIDE 25 MG/1
50 CAPSULE ORAL NIGHTLY
Status: DISCONTINUED | OUTPATIENT
Start: 2024-05-10 | End: 2024-05-11 | Stop reason: HOSPADM

## 2024-05-10 RX ORDER — AMLODIPINE BESYLATE 5 MG/1
5 TABLET ORAL DAILY
Status: DISCONTINUED | OUTPATIENT
Start: 2024-05-11 | End: 2024-05-11 | Stop reason: HOSPADM

## 2024-05-10 RX ORDER — NAPROXEN SODIUM 220 MG/1
81 TABLET, FILM COATED ORAL DAILY
Status: DISCONTINUED | OUTPATIENT
Start: 2024-05-11 | End: 2024-05-11 | Stop reason: HOSPADM

## 2024-05-10 RX ORDER — ONDANSETRON HYDROCHLORIDE 2 MG/ML
4 INJECTION, SOLUTION INTRAVENOUS EVERY 8 HOURS PRN
Status: DISCONTINUED | OUTPATIENT
Start: 2024-05-10 | End: 2024-05-11 | Stop reason: HOSPADM

## 2024-05-10 RX ORDER — PROMETHAZINE HYDROCHLORIDE 25 MG/1
25 TABLET ORAL EVERY 6 HOURS PRN
Status: DISCONTINUED | OUTPATIENT
Start: 2024-05-10 | End: 2024-05-11 | Stop reason: HOSPADM

## 2024-05-10 RX ORDER — ALPRAZOLAM 1 MG/1
1 TABLET ORAL 3 TIMES DAILY PRN
Status: DISCONTINUED | OUTPATIENT
Start: 2024-05-10 | End: 2024-05-11 | Stop reason: HOSPADM

## 2024-05-10 RX ORDER — TALC
6 POWDER (GRAM) TOPICAL NIGHTLY PRN
Status: DISCONTINUED | OUTPATIENT
Start: 2024-05-10 | End: 2024-05-10

## 2024-05-10 RX ORDER — ENOXAPARIN SODIUM 100 MG/ML
40 INJECTION SUBCUTANEOUS EVERY 12 HOURS
Status: DISCONTINUED | OUTPATIENT
Start: 2024-05-10 | End: 2024-05-11 | Stop reason: HOSPADM

## 2024-05-10 RX ORDER — ALUMINUM HYDROXIDE, MAGNESIUM HYDROXIDE, AND SIMETHICONE 1200; 120; 1200 MG/30ML; MG/30ML; MG/30ML
30 SUSPENSION ORAL 4 TIMES DAILY PRN
Status: DISCONTINUED | OUTPATIENT
Start: 2024-05-10 | End: 2024-05-11 | Stop reason: HOSPADM

## 2024-05-10 RX ADMIN — SODIUM CHLORIDE 1000 ML: 0.9 INJECTION, SOLUTION INTRAVENOUS at 12:05

## 2024-05-10 RX ADMIN — ENOXAPARIN SODIUM 40 MG: 40 INJECTION SUBCUTANEOUS at 08:05

## 2024-05-10 RX ADMIN — SODIUM CHLORIDE: 9 INJECTION, SOLUTION INTRAVENOUS at 05:05

## 2024-05-10 RX ADMIN — PREGABALIN 100 MG: 100 CAPSULE ORAL at 08:05

## 2024-05-10 RX ADMIN — CEFTRIAXONE SODIUM 1 G: 1 INJECTION, POWDER, FOR SOLUTION INTRAMUSCULAR; INTRAVENOUS at 01:05

## 2024-05-10 RX ADMIN — DOXEPIN HYDROCHLORIDE 50 MG: 25 CAPSULE ORAL at 08:05

## 2024-05-10 RX ADMIN — ZOLPIDEM TARTRATE 10 MG: 5 TABLET ORAL at 08:05

## 2024-05-10 NOTE — PHARMACY MED REC
"Admission Medication History     The home medication history was taken by Sundar Lopez.    You may go to "Admission" then "Reconcile Home Medications" tabs to review and/or act upon these items.     The home medication list has been updated by the Pharmacy department.   Please read ALL comments highlighted in yellow.   Please address this information as you see fit.    Feel free to contact us if you have any questions or require assistance.      Medications listed below were obtained from: Analytic software- [employer], Medical records, and Patient's pharmacy  (Not in a hospital admission)    Sundar Lopez  [id number]    Current Outpatient Medications on File Prior to Encounter   Medication Sig Dispense Refill Last Dose    ALPRAZolam (XANAX) 1 MG tablet Take 1 mg by mouth 3 (three) times daily as needed.   5/10/2024    amLODIPine (NORVASC) 5 MG tablet Take 1 tablet (5 mg total) by mouth once daily. 90 tablet 3 5/10/2024    ARIPiprazole (ABILIFY) 10 MG Tab Take 10 mg by mouth once daily.   5/9/2024    aspirin 81 MG Chew Take 81 mg by mouth Daily.   5/10/2024    atorvastatin (LIPITOR) 40 MG tablet Take 1 tablet (40 mg total) by mouth once daily. 90 tablet 3     cefdinir (OMNICEF) 300 MG capsule Take 1 capsule (300 mg total) by mouth 2 (two) times daily. for 10 days 20 capsule 0 5/10/2024    dicyclomine (BENTYL) 20 mg tablet Take 1 tablet (20 mg total) by mouth 3 (three) times daily as needed (abdominal pain). 90 tablet 0 5/10/2024    doxepin (SINEQUAN) 50 MG capsule Take 50 mg by mouth nightly.   5/9/2024    ferrous gluconate (FERGON) 324 MG tablet Take 1 tablet (324 mg total) by mouth daily with breakfast. 30 tablet 5 Past Month    furosemide (LASIX) 40 MG tablet Take 1 tablet (40 mg total) by mouth 2 (two) times daily as needed (swelling). 60 tablet 5 Past Week    olmesartan-hydrochlorothiazide (BENICAR HCT) 40-12.5 mg Tab Take 1 tablet by mouth once daily. 90 tablet 3 5/10/2024    pregabalin (LYRICA) 100 MG " capsule Take 1 capsule (100 mg total) by mouth every evening. 90 capsule 0 5/9/2024    zolpidem (AMBIEN) 10 mg Tab Take 10 mg by mouth nightly as needed.   5/10/2024    fluoxetine (PROZAC) 40 MG capsule Take 1 capsule (40 mg total) by mouth once daily. (Patient taking differently: Take 80 mg by mouth once daily.) 30 capsule 11     ondansetron (ZOFRAN) 4 MG tablet Take 1 tablet (4 mg total) by mouth every 6 (six) hours. 30 tablet 0     promethazine (PHENERGAN) 25 MG tablet TAKE 1 TABLET BY MOUTH EVERY 6 HOURS AS NEEDED FOR NAUSEA (Patient not taking: Reported on 5/9/2024) 60 tablet 3     tobramycin sulfate 0.3% (TOBREX) 0.3 % ophthalmic solution Place 1 drop into both eyes every 4 (four) hours. 5 mL 0                        .

## 2024-05-10 NOTE — ASSESSMENT & PLAN NOTE
Urinalysis revealed:   Lab Results   Component Value Date    COLORU Yellow 05/10/2024    APPEARANCEUA Hazy (A) 05/10/2024    SPECGRAV 1.010 05/10/2024    PHUR 5.0 05/10/2024    PROTEINUA Negative 05/10/2024    GLUCUA Negative 05/10/2024    KETONESU Negative 05/10/2024    NITRITE Negative 05/10/2024    UROBILINOGEN Negative 05/10/2024    BILIRUBINUA Negative 05/10/2024    LEUKOCYTESUR 2+ (A) 05/10/2024    RBCUA 2 05/10/2024    WBCUA 64 (H) 05/10/2024    BACTERIA Moderate (A) 05/07/2024    HYALINECASTS 18 (A) 05/10/2024   Currently on Cefdinir as outpatient. Received 1g Rocephin in ED. Urine Cx on 5/7/24 was positive for Gram (-) Rods.  Plan:  -UA culture sensitivity pending  -Continue Abx (Rocephin IV)

## 2024-05-10 NOTE — ASSESSMENT & PLAN NOTE
Patient's anemia is currently controlled. Has not received any PRBCs to date. Etiology likely d/t Iron deficiency  Current CBC reviewed-   Lab Results   Component Value Date    HGB 11.5 (L) 05/10/2024    HCT 35.6 (L) 05/10/2024     Monitor serial CBC and transfuse if patient becomes hemodynamically unstable, symptomatic or H/H drops below 7/21.

## 2024-05-10 NOTE — ASSESSMENT & PLAN NOTE
Chronic. Stable. Not in acute exacerbation and currently denies endorsing any suicidal/homicidal ideations.   Plan:  -Continue home medications (prozac, doxepin and abilify)

## 2024-05-10 NOTE — H&P
Critical access hospital - Emergency Dept.  [facility] Medicine  History & Physical    Patient Name: Divya Bui  MRN: [id number]  Patient Class: OP- Observation  Admission Date: 5/10/2024  Attending Physician: Sara Moreira MD   Primary Care Provider: Angel Khan MD         Patient information was obtained from patient, past medical records, and ER records.     Subjective:     Principal Problem:Hyponatremia    Chief Complaint:   Chief Complaint   Patient presents with    Abnormal Labs     Patient presents to ED after call from PCP for Low Sodium level.        HPI: Divya Bui is a 64 y.o. female with a PMH  has a past medical history of Anemia, Anxiety, Basal cell carcinoma (BCC) of face (2/26/2020), Blood transfusion, Bowel incontinence, Depression, Esophageal stricture, GERD (gastroesophageal reflux disease), Hypertension, Latent tuberculosis by skin test (2001), Liver nodule (1/6/2014), Morbid obesity with BMI of 45.0-49.9, adult, OA (osteoarthritis) of knee (12/12/2014), and Pericardial effusion (9/4/2014).  Presented to the ER after being notified by her PCP of abnormal lab work.  Patient recently seen in ER on 05/07/2024 for similar symptoms of generalized weakness/fatigue and dizziness.  Patient noted to have UTI and sodium level of 132 with BUN/creatinine of 19/1.5.  Patient was treated with IV fluids as well as 1 g Rocephin and discharged home with cefdinir.  Patient had follow-up appointment with PCP yesterday (5/9/24) and had repeat blood work performed.  Sodium level noted to be 125.  Patient reports that she has been staying hydrated and compliant with her antibiotic for UTI.  Patient reports dizziness worsens with position change/movement.  Symptoms improve with rest.  Denies any fevers, aches, chills, sweats, nausea or vomiting, chest pain, palpitations, shortness breath, dyspnea exertion, abdominal pain, dysuria, hematuria, constipation, diarrhea, or any other symptoms at this time.    Repeat blood  work performed in ER revealed sodium level of 124, BUN/creatinine 13/1.1, and UA showing improvement from sample collected on 05/07/2024.  BNP and troponin negative.  Chest x-ray performed on 05/07/2024 was negative for acute findings.  EKG performed today reveals sinus tachycardia with a ventricular rate of 101 beats per minute with a QT/QTC of 358/464.  Patient received 1 g of Rocephin IV and 1 L of normal saline in ED. hospital Medicine consulted to admit patient for treatment of hyponatremia, dizziness, and UTI.  Patient in agreement with treatment plan.  Patient will be admitted under observation status.    PCP: Angel Khan      Past Medical History:   Diagnosis Date    Anemia     Anxiety     Basal cell carcinoma (BCC) of face 2/26/2020    Blood transfusion     Bowel incontinence     Depression     Esophageal stricture     GERD (gastroesophageal reflux disease)     Hypertension     Latent tuberculosis by skin test 2001    took INH    Liver nodule 1/6/2014    Morbid obesity with BMI of 45.0-49.9, adult     OA (osteoarthritis) of knee 12/12/2014    Pericardial effusion 9/4/2014       Past Surgical History:   Procedure Laterality Date    CHOLECYSTECTOMY      COLONOSCOPY N/A 3/6/2023    Procedure: COLONOSCOPY;  Surgeon: Beti Diallo MD;  Location: [facility];  Service: Endoscopy;  Laterality: N/A;    GASTRIC BYPASS      HERNIA REPAIR      INJECTION OF ANESTHETIC AGENT AROUND NERVE Bilateral 6/16/2023    Procedure: Bilateral Genicular nerve block with RN IV sedation;  Surgeon: Denis Lai MD;  Location: [facility];  Service: Pain Management;  Laterality: Bilateral;    INJECTION OF ANESTHETIC AGENT AROUND NERVE Bilateral 8/11/2023    Procedure: Bilateral Genicular nerve block with RN IV sedation;  Surgeon: Denis Lai MD;  Location: [facility];  Service: Pain Management;  Laterality: Bilateral;    RADIOFREQUENCY THERMOCOAGULATION Right 10/31/2023    Procedure: Right Genicular Nerve RFA  with RN IV sedation;  Surgeon: Denis Lai MD;  Location: [facility];  Service: Pain Management;  Laterality: Right;    RADIOFREQUENCY THERMOCOAGULATION Left 11/14/2023    Procedure: Left Genicular Nerve RFA with RN IV sedation;  Surgeon: Denis Lai MD;  Location: [facility];  Service: Pain Management;  Laterality: Left;    right sholder surgery      SMALL INTESTINE SURGERY         Review of patient's allergies indicates:   Allergen Reactions    Metronidazole hcl Other (See Comments)     Mouth ulcers developed with Flagyl  Oral sores.  Mouth ulcers developed with Flagyl       No current facility-administered medications on file prior to encounter.     Current Outpatient Medications on File Prior to Encounter   Medication Sig    ALPRAZolam (XANAX) 1 MG tablet Take 1 mg by mouth 3 (three) times daily as needed.    amLODIPine (NORVASC) 5 MG tablet Take 1 tablet (5 mg total) by mouth once daily.    ARIPiprazole (ABILIFY) 10 MG Tab Take 10 mg by mouth once daily.    aspirin 81 MG Chew Take 81 mg by mouth Daily.    atorvastatin (LIPITOR) 40 MG tablet Take 1 tablet (40 mg total) by mouth once daily.    cefdinir (OMNICEF) 300 MG capsule Take 1 capsule (300 mg total) by mouth 2 (two) times daily. for 10 days    dicyclomine (BENTYL) 20 mg tablet Take 1 tablet (20 mg total) by mouth 3 (three) times daily as needed (abdominal pain).    doxepin (SINEQUAN) 50 MG capsule Take 50 mg by mouth nightly.    ferrous gluconate (FERGON) 324 MG tablet Take 1 tablet (324 mg total) by mouth daily with breakfast.    furosemide (LASIX) 40 MG tablet Take 1 tablet (40 mg total) by mouth 2 (two) times daily as needed (swelling).    olmesartan-hydrochlorothiazide (BENICAR HCT) 40-12.5 mg Tab Take 1 tablet by mouth once daily.    pregabalin (LYRICA) 100 MG capsule Take 1 capsule (100 mg total) by mouth every evening.    zolpidem (AMBIEN) 10 mg Tab Take 10 mg by mouth nightly as needed.    [DISCONTINUED] pregabalin (LYRICA) 100 MG  capsule Take 100 mg by mouth nightly.    fluoxetine (PROZAC) 40 MG capsule Take 1 capsule (40 mg total) by mouth once daily. (Patient taking differently: Take 80 mg by mouth once daily.)    ondansetron (ZOFRAN) 4 MG tablet Take 1 tablet (4 mg total) by mouth every 6 (six) hours.    promethazine (PHENERGAN) 25 MG tablet TAKE 1 TABLET BY MOUTH EVERY 6 HOURS AS NEEDED FOR NAUSEA (Patient not taking: Reported on 5/9/2024)    tobramycin sulfate 0.3% (TOBREX) 0.3 % ophthalmic solution Place 1 drop into both eyes every 4 (four) hours.     Family History       Problem Relation (Age of Onset)    Crohn's disease Sister, Brother, Other    Diabetes Sister, Sister    Liver cancer Father, Sister    Lung cancer Mother          Tobacco Use    Smoking status: Never    Smokeless tobacco: Never   Substance and Sexual Activity    Alcohol use: Not Currently     Alcohol/week: 0.0 standard drinks of alcohol    Drug use: No    Sexual activity: Yes     Partners: Male     Review of Systems   Constitutional:  Positive for fatigue. Negative for chills, diaphoresis and fever.   Genitourinary:  Negative for difficulty urinating, dysuria, flank pain, frequency and hematuria.   Neurological:  Positive for dizziness, weakness (generalized) and light-headedness. Negative for syncope, speech difficulty, numbness and headaches.   All other systems reviewed and are negative.    Objective:     Vital Signs (Most Recent):  Temp: 97.7 °F (36.5 °C) (05/10/24 1130)  Pulse: 86 (05/10/24 1400)  Resp: 15 (05/10/24 1400)  BP: (!) 104/59 (05/10/24 1400)  SpO2: 99 % (05/10/24 1400) Vital Signs (24h Range):  Temp:  [97.7 °F (36.5 °C)] 97.7 °F (36.5 °C)  Pulse:  [] 86  Resp:  [14-18] 15  SpO2:  [98 %-99 %] 99 %  BP: (103-116)/(54-68) 104/59     Weight: 104.3 kg (230 lb)  Body mass index is 40.74 kg/m².     Physical Exam  Vitals and nursing note reviewed.   Constitutional:       General: She is awake. She is not in acute distress.     Appearance: Normal  appearance. She is well-developed and well-groomed. She is not ill-appearing, toxic-appearing or diaphoretic.   HENT:      Head: Normocephalic and atraumatic.   Eyes:      Extraocular Movements: Extraocular movements intact.      Conjunctiva/sclera: Conjunctivae normal.   Cardiovascular:      Rate and Rhythm: Normal rate and regular rhythm.      Heart sounds: Normal heart sounds. No murmur heard.  Pulmonary:      Effort: Pulmonary effort is normal.      Breath sounds: Normal breath sounds. No decreased breath sounds, wheezing, rhonchi or rales.   Abdominal:      General: Bowel sounds are normal.      Palpations: Abdomen is soft.      Tenderness: There is no abdominal tenderness.   Musculoskeletal:      Cervical back: Normal range of motion and neck supple.      Right lower leg: No edema.      Left lower leg: No edema.      Comments: 5/5 strength throughout   Skin:     General: Skin is warm and dry.      Capillary Refill: Capillary refill takes less than 2 seconds.   Neurological:      General: No focal deficit present.      Mental Status: She is alert and oriented to person, place, and time. Mental status is at baseline.      GCS: GCS eye subscore is 4. GCS verbal subscore is 5. GCS motor subscore is 6.      Cranial Nerves: Cranial nerves 2-12 are intact.      Sensory: Sensation is intact.      Motor: Motor function is intact.   Psychiatric:         Mood and Affect: Mood normal.         Speech: Speech normal.         Behavior: Behavior normal. Behavior is cooperative.              LABS:  Recent Results (from the past 24 hour(s))   CBC auto differential    Collection Time: 05/10/24 11:49 AM   Result Value Ref Range    WBC 6.67 3.90 - 12.70 K/uL    RBC 4.12 4.00 - 5.40 M/uL    Hemoglobin 11.5 (L) 12.0 - 16.0 g/dL    Hematocrit 35.6 (L) 37.0 - 48.5 %    MCV 86 82 - 98 fL    MCH 27.9 27.0 - 31.0 pg    MCHC 32.3 32.0 - 36.0 g/dL    RDW 14.2 11.5 - 14.5 %    Platelets 214 150 - 450 K/uL    MPV 8.7 (L) 9.2 - 12.9 fL     Immature Granulocytes 0.6 (H) 0.0 - 0.5 %    Gran # (ANC) 5.0 1.8 - 7.7 K/uL    Immature Grans (Abs) 0.04 0.00 - 0.04 K/uL    Lymph # 1.1 1.0 - 4.8 K/uL    Mono # 0.4 0.3 - 1.0 K/uL    Eos # 0.1 0.0 - 0.5 K/uL    Baso # 0.03 0.00 - 0.20 K/uL    nRBC 0 0 /100 WBC    Gran % 75.2 (H) 38.0 - 73.0 %    Lymph % 16.2 (L) 18.0 - 48.0 %    Mono % 5.8 4.0 - 15.0 %    Eosinophil % 1.8 0.0 - 8.0 %    Basophil % 0.4 0.0 - 1.9 %    Differential Method Automated    Comprehensive metabolic panel    Collection Time: 05/10/24 11:49 AM   Result Value Ref Range    Sodium 124 (L) 136 - 145 mmol/L    Potassium 4.2 3.5 - 5.1 mmol/L    Chloride 92 (L) 95 - 110 mmol/L    CO2 21 (L) 23 - 29 mmol/L    Glucose 116 (H) 70 - 110 mg/dL    BUN 19 8 - 23 mg/dL    Creatinine 1.5 (H) 0.5 - 1.4 mg/dL    Calcium 8.8 8.7 - 10.5 mg/dL    Total Protein 7.5 6.0 - 8.4 g/dL    Albumin 3.7 3.5 - 5.2 g/dL    Total Bilirubin 0.7 0.1 - 1.0 mg/dL    Alkaline Phosphatase 146 (H) 55 - 135 U/L    AST 13 10 - 40 U/L    ALT 9 (L) 10 - 44 U/L    eGFR 38 (A) >60 mL/min/1.73 m^2    Anion Gap 11 8 - 16 mmol/L   Brain natriuretic peptide    Collection Time: 05/10/24 11:49 AM   Result Value Ref Range    BNP <10 0 - 99 pg/mL   Troponin I    Collection Time: 05/10/24 11:49 AM   Result Value Ref Range    Troponin I 0.006 0.000 - 0.026 ng/mL   TSH    Collection Time: 05/10/24 11:49 AM   Result Value Ref Range    TSH 1.736 0.400 - 4.000 uIU/mL   EKG 12-lead    Collection Time: 05/10/24 12:33 PM   Result Value Ref Range    QRS Duration 94 ms    OHS QTC Calculation 464 ms   Urinalysis, Reflex to Urine Culture Urine, Clean Catch    Collection Time: 05/10/24 12:39 PM    Specimen: Urine   Result Value Ref Range    Specimen UA Urine, Clean Catch     Color, UA Yellow Yellow, Straw, Debra    Appearance, UA Hazy (A) Clear    pH, UA 5.0 5.0 - 8.0    Specific Gravity, UA 1.010 1.005 - 1.030    Protein, UA Negative Negative    Glucose, UA Negative Negative    Ketones, UA Negative Negative     Bilirubin (UA) Negative Negative    Occult Blood UA Negative Negative    Nitrite, UA Negative Negative    Urobilinogen, UA Negative <2.0 EU/dL    Leukocytes, UA 2+ (A) Negative   Sodium, urine, random    Collection Time: 05/10/24 12:39 PM   Result Value Ref Range    Sodium, Urine 35 20 - 250 mmol/L   Urinalysis Microscopic    Collection Time: 05/10/24 12:39 PM   Result Value Ref Range    RBC, UA 2 0 - 4 /hpf    WBC, UA 64 (H) 0 - 5 /hpf    WBC Clumps, UA Many (A) None-Rare    Squam Epithel, UA 8 /hpf    Hyaline Casts, UA 18 (A) 0-1/lpf /lpf    Microscopic Comment SEE COMMENT        RADIOLOGY  X-Ray Chest 1 View    Result Date: 5/7/2024  EXAMINATION: XR CHEST 1 VIEW CLINICAL HISTORY: Essential (primary) hypertension COMPARISON: Studies dating back to January 22, 2022 FINDINGS: Stable scarring or atelectasis in the left lung base.  The lungs are otherwise clear.  The cardiac silhouette size is normal. The trachea is midline and the mediastinal width is normal. Negative for focal infiltrate, effusion or pneumothorax. Pulmonary vasculature is normal. Negative for osseous abnormalities. Ectatic and tortuous aorta with aortic arch calcifications.  Degenerative changes of the spine and both shoulder girdles.  Convex left curvature of the midthoracic spine.     1.  Negative for acute process involving the chest. 2.  Stable findings as noted above. Electronically signed by: Shawn Mcgarry MD Date:    05/07/2024 Time:    16:10      EKG    MICROBIOLOGY    MDM     Amount and/or Complexity of Data Reviewed  Clinical lab tests: reviewed  Tests in the radiology section of CPT®: reviewed  Tests in the medicine section of CPT®: reviewed  Discussion of test results with the performing providers: yes  Decide to obtain previous medical records or to obtain history from someone other than the patient: yes  Obtain history from someone other than the patient: yes  Review and summarize past medical records: yes  Discuss the patient with  other providers: yes  Independent visualization of images, tracings, or specimens: yes        Assessment/Plan:     * Hyponatremia  Patient has hyponatremia which is uncontrolled,We will aim to correct the sodium by 4-6mEq in 24 hours. We will monitor sodium Every 8 hours. The hyponatremia is due to Dehydration/hypovolemia and Medications: Thiazide diuretics or SSRIs. We will obtain the following studies: Urine sodium, urine osmolality, serum osmolality or TSH, T4. We will treat the hyponatremia with IV fluids as follows: NS. The patient's sodium results have been reviewed and are listed below.  Recent Labs   Lab 05/10/24  1149   *       Dizziness  Likely secondary to electrolyte derangement with sodium level of 124 as well as symptoms of concurrent UTI. Orthostatic bp reading negative in ER.  Plan:  -IVFs  -BMP q8 hrs to trend sodium level  -Meclizine prn      Acute cystitis  Urinalysis revealed:   Lab Results   Component Value Date    COLORU Yellow 05/10/2024    APPEARANCEUA Hazy (A) 05/10/2024    SPECGRAV 1.010 05/10/2024    PHUR 5.0 05/10/2024    PROTEINUA Negative 05/10/2024    GLUCUA Negative 05/10/2024    KETONESU Negative 05/10/2024    NITRITE Negative 05/10/2024    UROBILINOGEN Negative 05/10/2024    BILIRUBINUA Negative 05/10/2024    LEUKOCYTESUR 2+ (A) 05/10/2024    RBCUA 2 05/10/2024    WBCUA 64 (H) 05/10/2024    BACTERIA Moderate (A) 05/07/2024    HYALINECASTS 18 (A) 05/10/2024   Currently on Cefdinir as outpatient. Received 1g Rocephin in ED. Urine Cx on 5/7/24 was positive for Gram (-) Rods.  Plan:  -UA culture sensitivity pending  -Continue Abx (Rocephin IV)      HTN (hypertension)  Chronic, controlled. Latest blood pressure and vitals reviewed-     Temp:  [97.7 °F (36.5 °C)-98.7 °F (37.1 °C)]   Pulse:  []   Resp:  [14-18]   BP: (103-116)/(54-68)   SpO2:  [97 %-99 %] .   Home meds for hypertension were reviewed and noted below.   Hypertension Medications               amLODIPine (NORVASC)  5 MG tablet Take 1 tablet (5 mg total) by mouth once daily.    furosemide (LASIX) 40 MG tablet Take 1 tablet (40 mg total) by mouth 2 (two) times daily as needed (swelling).    olmesartan-hydrochlorothiazide (BENICAR HCT) 40-12.5 mg Tab Take 1 tablet by mouth once daily.            While in the hospital, will manage blood pressure as follows; Continue home antihypertensive regimen    Will utilize p.r.n. blood pressure medication only if patient's blood pressure greater than 160/100 and she develops symptoms such as worsening chest pain or shortness of breath.    Iron deficiency anemia  Patient's anemia is currently controlled. Has not received any PRBCs to date. Etiology likely d/t Iron deficiency  Current CBC reviewed-   Lab Results   Component Value Date    HGB 11.5 (L) 05/10/2024    HCT 35.6 (L) 05/10/2024     Monitor serial CBC and transfuse if patient becomes hemodynamically unstable, symptomatic or H/H drops below 7/21.    Anxiety  Chronic. Stable. Not in acute exacerbation and currently denies endorsing any suicidal/homicidal ideations.   Plan:  -Continue home medications (xanax and doxepin)        Depressive disorder  Chronic. Stable. Not in acute exacerbation and currently denies endorsing any suicidal/homicidal ideations.   Plan:  -Continue home medications (prozac, doxepin and abilify)      Insomnia  Plan:  -continue ambien qhs prn      Chronic pain disorder  Plan:  -continue lyrica      GERD (gastroesophageal reflux disease)  Chronic. Stable. Currently asymptomatic. Home medications include PPI/Antacids as needed.  Plan:  -Continue PPI/Antacids as needed         Morbid obesity with BMI of 40.0-44.9, adult  Body mass index is 40.74 kg/m². Morbid obesity complicates all aspects of disease management from diagnostic modalities to treatment. Weight loss encouraged and health benefits explained to patient.           VTE Risk Mitigation (From admission, onward)           Ordered     enoxaparin injection 40 mg   Every 12 hours         05/10/24 1350     IP VTE HIGH RISK PATIENT  Once         05/10/24 1348     Place sequential compression device  Until discontinued         05/10/24 1348                       //Core Measures   -DVT proph: SCDs, lovenox  -Code status Full    -Surrogate:none present    Components of this note were documented using a voice recognition system and are subject to errors not corrected at the time the document was proof read. Please contact the author for any clarifications.       Pharmacist Renal Dose Adjustment Note    Divya Bui is a 64 y.o. female being treated with the medication enoxaparin.    Patient Data:    Vital Signs (Most Recent):  Temp: 97.7 °F (36.5 °C) (05/10/24 1130)  Pulse: 97 (05/10/24 1250)  Resp: 14 (05/10/24 1250)  BP: 116/68 (05/10/24 1250)  SpO2: 98 % (05/10/24 1250) Vital Signs (72h Range):  Temp:  [97.7 °F (36.5 °C)-98.7 °F (37.1 °C)]   Pulse:  []   Resp:  [14-18]   BP: (103-116)/(54-68)   SpO2:  [97 %-99 %]      Recent Labs   Lab 05/07/24  1557 05/09/24  1521 05/10/24  1149   CREATININE 1.1 1.7* 1.5*     Serum creatinine: 1.5 mg/dL (H) 05/10/24 1149  Estimated creatinine clearance: 42.6 mL/min (A)    Enoxaparin 40 mg every 24 hours was adjusted to every 12 hours according to [facility]'s renal dose adjustment policy for BMI >40.    Pharmacist's Name: Marisabel Teran PharmD 5/10/2024 1:51 PM  Pharmacist's Extension: [phone]      Yoel Rice NP  Department of Hospital Medicine  '[place] - Emergency Dept.

## 2024-05-10 NOTE — PROGRESS NOTES
Pharmacist Renal Dose Adjustment Note    Divya Bui is a 64 y.o. female being treated with the medication enoxaparin.    Patient Data:    Vital Signs (Most Recent):  Temp: 97.7 °F (36.5 °C) (05/10/24 1130)  Pulse: 97 (05/10/24 1250)  Resp: 14 (05/10/24 1250)  BP: 116/68 (05/10/24 1250)  SpO2: 98 % (05/10/24 1250) Vital Signs (72h Range):  Temp:  [97.7 °F (36.5 °C)-98.7 °F (37.1 °C)]   Pulse:  []   Resp:  [14-18]   BP: (103-116)/(54-68)   SpO2:  [97 %-99 %]      Recent Labs   Lab 05/07/24  1557 05/09/24  1521 05/10/24  1149   CREATININE 1.1 1.7* 1.5*     Serum creatinine: 1.5 mg/dL (H) 05/10/24 1149  Estimated creatinine clearance: 42.6 mL/min (A)    Enoxaparin 40 mg every 24 hours was adjusted to every 12 hours according to [facility]'s renal dose adjustment policy for BMI >40.    Pharmacist's Name: Marisabel Teran, PharmD 5/10/2024 1:51 PM  Pharmacist's Extension: [phone]

## 2024-05-10 NOTE — ASSESSMENT & PLAN NOTE
Patient has hyponatremia which is uncontrolled,We will aim to correct the sodium by 4-6mEq in 24 hours. We will monitor sodium Every 8 hours. The hyponatremia is due to Dehydration/hypovolemia and Medications: Thiazide diuretics or SSRIs. We will obtain the following studies: Urine sodium, urine osmolality, serum osmolality or TSH, T4. We will treat the hyponatremia with IV fluids as follows: NS. The patient's sodium results have been reviewed and are listed below.  Recent Labs   Lab 05/10/24  1149   *

## 2024-05-10 NOTE — ED PROVIDER NOTES
SCRIBE #1 NOTE: I, Elijah Sewell, am scribing for, and in the presence of, Kat Rogers MD. I have scribed the entire note.       History     Chief Complaint   Patient presents with    Abnormal Labs     Patient presents to ED after call from PCP for Low Sodium level.     Review of patient's allergies indicates:   Allergen Reactions    Metronidazole hcl Other (See Comments)     Mouth ulcers developed with Flagyl  Oral sores.  Mouth ulcers developed with Flagyl         History of Present Illness     HPI    5/10/2024, 12:22 PM  History obtained from the patient      History of Present Illness: Divya Bui is a 64 y.o. female patient with a PMHx of anemia, anxiety, depression, GERD,HTN, morbid obesity, OA, and pericardial effusion who presents to the Emergency Department for evaluation of low sodium levels which were drawn yesterday. Pt felt dizzy and light-headed yesterday; she called her PCP and had labs drawn. She was told to present to the ED today for low sodium. Symptoms are constant and moderate in severity. No mitigating or exacerbating factors reported. Associated sxs include decreased appetite and nausea. Patient denies any V/D, CP, SOB, falls/trauma, and all other sxs at this time. No prior Tx. Pt is noncompliant with Lasix but takes her Olmesartan and Amlodipine. She is still taking abx for a UTI dx 05/07/24. No further complaints or concerns at this time.       Arrival mode: Personal vehicle     PCP: Angel Khan MD        Past Medical History:  Past Medical History:   Diagnosis Date    Anemia     Anxiety     Basal cell carcinoma (BCC) of face 2/26/2020    Blood transfusion     Bowel incontinence     Depression     Esophageal stricture     GERD (gastroesophageal reflux disease)     Hypertension     Latent tuberculosis by skin test 2001    took INH    Liver nodule 1/6/2014    Morbid obesity with BMI of 45.0-49.9, adult     OA (osteoarthritis) of knee 12/12/2014    Pericardial effusion  9/4/2014       Past Surgical History:  Past Surgical History:   Procedure Laterality Date    CHOLECYSTECTOMY      COLONOSCOPY N/A 3/6/2023    Procedure: COLONOSCOPY;  Surgeon: Beti Diallo MD;  Location: [facility];  Service: Endoscopy;  Laterality: N/A;    GASTRIC BYPASS      HERNIA REPAIR      INJECTION OF ANESTHETIC AGENT AROUND NERVE Bilateral 6/16/2023    Procedure: Bilateral Genicular nerve block with RN IV sedation;  Surgeon: eDnis Lai MD;  Location: [facility];  Service: Pain Management;  Laterality: Bilateral;    INJECTION OF ANESTHETIC AGENT AROUND NERVE Bilateral 8/11/2023    Procedure: Bilateral Genicular nerve block with RN IV sedation;  Surgeon: Denis Lai MD;  Location: [facility];  Service: Pain Management;  Laterality: Bilateral;    RADIOFREQUENCY THERMOCOAGULATION Right 10/31/2023    Procedure: Right Genicular Nerve RFA with RN IV sedation;  Surgeon: Denis Lai MD;  Location: [facility];  Service: Pain Management;  Laterality: Right;    RADIOFREQUENCY THERMOCOAGULATION Left 11/14/2023    Procedure: Left Genicular Nerve RFA with RN IV sedation;  Surgeon: Denis Lai MD;  Location: [facility];  Service: Pain Management;  Laterality: Left;    right sholder surgery      SMALL INTESTINE SURGERY           Family History:  Family History   Problem Relation Name Age of Onset    Lung cancer Mother          smoker    Liver cancer Father      Diabetes Sister      Crohn's disease Sister      Liver cancer Sister      Diabetes Sister oldest     Crohn's disease Brother      Crohn's disease Other nephew     Breast cancer Neg Hx      Ovarian cancer Neg Hx      Colon cancer Neg Hx         Social History:  Social History     Tobacco Use    Smoking status: Never    Smokeless tobacco: Never   Substance and Sexual Activity    Alcohol use: Not Currently     Alcohol/week: 0.0 standard drinks of alcohol    Drug use: No    Sexual activity: Yes     Partners: Male        Review of Systems      Review of Systems   Constitutional:  Positive for appetite change (decreased). Negative for fever.   HENT:  Negative for sore throat.    Respiratory:  Negative for shortness of breath.    Cardiovascular:  Negative for chest pain.   Gastrointestinal:  Positive for nausea. Negative for diarrhea and vomiting.   Genitourinary:  Negative for dysuria.   Musculoskeletal:  Negative for back pain.   Skin:  Negative for rash.   Neurological:  Positive for dizziness and light-headedness. Negative for weakness.   Hematological:  Does not bruise/bleed easily.   All other systems reviewed and are negative.     Physical Exam     Initial Vitals [05/10/24 1130]   BP Pulse Resp Temp SpO2   114/65 102 18 97.7 °F (36.5 °C) 99 %      MAP       --          Physical Exam  Nursing Notes and Vital Signs Reviewed.  Constitutional: Patient is in no acute distress. Well-developed and well-nourished.  Head: Atraumatic. Normocephalic.  Eyes: PERRL. EOM intact. Conjunctivae are not pale. No scleral icterus.  ENT: Mucous membranes are moist. Oropharynx is clear and symmetric.    Neck: Supple. Full ROM. No lymphadenopathy.  Cardiovascular: Regular rate. Regular rhythm. No murmurs, rubs, or gallops. Distal pulses are 2+ and symmetric.  Pulmonary/Chest: No respiratory distress. Clear to auscultation bilaterally. No wheezing or rales.  Abdominal: Soft and non-distended.  There is no tenderness.  No rebound, guarding, or rigidity. Good bowel sounds.  Genitourinary: No CVA tenderness  Musculoskeletal: Moves all extremities. No obvious deformities. No edema. No calf tenderness.  Skin: Warm and dry.  Neurological:  Alert, awake, and appropriate.  Normal speech.  No acute focal neurological deficits are appreciated.  Psychiatric: Normal affect. Good eye contact. Appropriate in content.     ED Course   Critical Care    Date/Time: 5/10/2024 5:26 PM    Performed by: Kat Rogers MD  Authorized by: Sara Moreira MD  Direct patient critical  "care time: 35 minutes  Additional history critical care time: 8 minutes  Ordering / reviewing critical care time: 6 minutes  Documentation critical care time: 6 minutes  Consulting other physicians critical care time: 5 minutes  Total critical care time (exclusive of procedural time) : 60 minutes  Critical care was necessary to treat or prevent imminent or life-threatening deterioration of the following conditions: dehydration (hyponatremia, dehydration).  Critical care was time spent personally by me on the following activities: blood draw for specimens, development of treatment plan with patient or surrogate, discussions with consultants, interpretation of cardiac output measurements, evaluation of patient's response to treatment, examination of patient, obtaining history from patient or surrogate, ordering and performing treatments and interventions, ordering and review of laboratory studies, pulse oximetry, re-evaluation of patient's condition and review of old charts.        ED Vital Signs:  Vitals:    05/10/24 1130 05/10/24 1238 05/10/24 1240 05/10/24 1242   BP: 114/65 (!) 103/54 (!) 112/55 116/68   Pulse: 102 92 95 104   Resp: 18      Temp: 97.7 °F (36.5 °C)      TempSrc: Oral      SpO2: 99%      Weight:       Height: (S) 5' 3" (1.6 m)       05/10/24 1250 05/10/24 1317 05/10/24 1400 05/10/24 1600   BP: 116/68  (!) 104/59 (!) 111/58   Pulse: 97  86 83   Resp: 14  15 (!) 21   Temp:       TempSrc:       SpO2: 98%  99% 98%   Weight:  104.3 kg (230 lb)     Height:        05/10/24 1627   BP: (!) 126/59   Pulse: 96   Resp: 15   Temp: 97.7 °F (36.5 °C)   TempSrc: Oral   SpO2: 95%   Weight:    Height:        Abnormal Lab Results:  Labs Reviewed   CBC W/ AUTO DIFFERENTIAL - Abnormal; Notable for the following components:       Result Value    Hemoglobin 11.5 (*)     Hematocrit 35.6 (*)     MPV 8.7 (*)     Immature Granulocytes 0.6 (*)     Gran % 75.2 (*)     Lymph % 16.2 (*)     All other components within normal " limits   COMPREHENSIVE METABOLIC PANEL - Abnormal; Notable for the following components:    Sodium 124 (*)     Chloride 92 (*)     CO2 21 (*)     Glucose 116 (*)     Creatinine 1.5 (*)     Alkaline Phosphatase 146 (*)     ALT 9 (*)     eGFR 38 (*)     All other components within normal limits   URINALYSIS, REFLEX TO URINE CULTURE - Abnormal; Notable for the following components:    Appearance, UA Hazy (*)     Leukocytes, UA 2+ (*)     All other components within normal limits    Narrative:     Specimen Source->Urine   URINALYSIS MICROSCOPIC - Abnormal; Notable for the following components:    WBC, UA 64 (*)     WBC Clumps, UA Many (*)     Hyaline Casts, UA 18 (*)     All other components within normal limits    Narrative:     Specimen Source->Urine   CULTURE, URINE   B-TYPE NATRIURETIC PEPTIDE   TROPONIN I   SODIUM, URINE, RANDOM    Narrative:     Specimen Source->Urine   OSMOLALITY, URINE RANDOM   SODIUM, URINE, RANDOM   OSMOLALITY, SERUM   TSH   TSH   OSMOLALITY, SERUM   OSMOLALITY, URINE RANDOM        All Lab Results:  Results for orders placed or performed during the hospital encounter of 05/10/24   CBC auto differential   Result Value Ref Range    WBC 6.67 3.90 - 12.70 K/uL    RBC 4.12 4.00 - 5.40 M/uL    Hemoglobin 11.5 (L) 12.0 - 16.0 g/dL    Hematocrit 35.6 (L) 37.0 - 48.5 %    MCV 86 82 - 98 fL    MCH 27.9 27.0 - 31.0 pg    MCHC 32.3 32.0 - 36.0 g/dL    RDW 14.2 11.5 - 14.5 %    Platelets 214 150 - 450 K/uL    MPV 8.7 (L) 9.2 - 12.9 fL    Immature Granulocytes 0.6 (H) 0.0 - 0.5 %    Gran # (ANC) 5.0 1.8 - 7.7 K/uL    Immature Grans (Abs) 0.04 0.00 - 0.04 K/uL    Lymph # 1.1 1.0 - 4.8 K/uL    Mono # 0.4 0.3 - 1.0 K/uL    Eos # 0.1 0.0 - 0.5 K/uL    Baso # 0.03 0.00 - 0.20 K/uL    nRBC 0 0 /100 WBC    Gran % 75.2 (H) 38.0 - 73.0 %    Lymph % 16.2 (L) 18.0 - 48.0 %    Mono % 5.8 4.0 - 15.0 %    Eosinophil % 1.8 0.0 - 8.0 %    Basophil % 0.4 0.0 - 1.9 %    Differential Method Automated    Comprehensive  metabolic panel   Result Value Ref Range    Sodium 124 (L) 136 - 145 mmol/L    Potassium 4.2 3.5 - 5.1 mmol/L    Chloride 92 (L) 95 - 110 mmol/L    CO2 21 (L) 23 - 29 mmol/L    Glucose 116 (H) 70 - 110 mg/dL    BUN 19 8 - 23 mg/dL    Creatinine 1.5 (H) 0.5 - 1.4 mg/dL    Calcium 8.8 8.7 - 10.5 mg/dL    Total Protein 7.5 6.0 - 8.4 g/dL    Albumin 3.7 3.5 - 5.2 g/dL    Total Bilirubin 0.7 0.1 - 1.0 mg/dL    Alkaline Phosphatase 146 (H) 55 - 135 U/L    AST 13 10 - 40 U/L    ALT 9 (L) 10 - 44 U/L    eGFR 38 (A) >60 mL/min/1.73 m^2    Anion Gap 11 8 - 16 mmol/L   Brain natriuretic peptide   Result Value Ref Range    BNP <10 0 - 99 pg/mL   Troponin I   Result Value Ref Range    Troponin I 0.006 0.000 - 0.026 ng/mL   Urinalysis, Reflex to Urine Culture Urine, Clean Catch    Specimen: Urine   Result Value Ref Range    Specimen UA Urine, Clean Catch     Color, UA Yellow Yellow, Straw, Debra    Appearance, UA Hazy (A) Clear    pH, UA 5.0 5.0 - 8.0    Specific Gravity, UA 1.010 1.005 - 1.030    Protein, UA Negative Negative    Glucose, UA Negative Negative    Ketones, UA Negative Negative    Bilirubin (UA) Negative Negative    Occult Blood UA Negative Negative    Nitrite, UA Negative Negative    Urobilinogen, UA Negative <2.0 EU/dL    Leukocytes, UA 2+ (A) Negative   Sodium, urine, random   Result Value Ref Range    Sodium, Urine 35 20 - 250 mmol/L   Urinalysis Microscopic   Result Value Ref Range    RBC, UA 2 0 - 4 /hpf    WBC, UA 64 (H) 0 - 5 /hpf    WBC Clumps, UA Many (A) None-Rare    Squam Epithel, UA 8 /hpf    Hyaline Casts, UA 18 (A) 0-1/lpf /lpf    Microscopic Comment SEE COMMENT    TSH   Result Value Ref Range    TSH 1.736 0.400 - 4.000 uIU/mL   EKG 12-lead   Result Value Ref Range    QRS Duration 94 ms    [facility] QTC Calculation 464 ms        Imaging Results:  Imaging Results    None          The EKG was ordered, reviewed, and independently interpreted by the ED provider.  Interpretation time: 12:33  Rate: 101  BPM  Rhythm: Sinus tachycardia   Interpretation: Minimal voltage criteria for LVH, may be normal variant Inferior infarct (cited on or before 27-AUG-2023). No STEMI.           The Emergency Provider reviewed the vital signs and test results, which are outlined above.     ED Discussion       1:41 PM: Discussed case with Yoel Rice NP ([facility]). Dr. Moreira agrees with current care and management of pt and accepts admission.   Admitting Service: Hospital Medicine  Admitting Physician: Dr. Moreira  Admit to: Obs Med/Tele    1:41 PM: Re-evaluated pt. I have discussed test results, shared treatment plan, and the need for admission with patient and family at bedside. Pt and family express understanding at this time and agree with all information. All questions answered. Pt and family have no further questions or concerns at this time. Pt is ready for admit.         Medical Decision Making  DDX: 1. Electrolyte dysfunction 2. Infection 3. Dehydration 4. Medication side effect    Lab work repeated and sodium 124 with mild renal insufficiency noted, also has UTI still and currently on antibiotics, reviewed urine culture results from 5/7 positive for e.coli no sensitivities, bnp and troponin are normal, hydration initiated, ECG reviewed and no acute ischemic changes noted, hospital med consulted for admission.         Amount and/or Complexity of Data Reviewed  External Data Reviewed: labs and notes.     Details: Had outpatient lab work done to recheck sodium for recent ER visit sodium was 132 in the ER on 5/7 and repeat was 125, patient on BP meds/diuretics.     Labs: ordered. Decision-making details documented in ED Course.  ECG/medicine tests: ordered and independent interpretation performed. Decision-making details documented in ED Course.    Risk  Decision regarding hospitalization.  Diagnosis or treatment significantly limited by social determinants of health.                ED  Medication(s):  Medications   cefTRIAXone (Rocephin) 1 g in dextrose 5 % in water (D5W) 100 mL IVPB (MB+) (0 g Intravenous Stopped 5/10/24 1415)   sodium chloride 0.9% flush 3 mL (has no administration in time range)   ondansetron injection 4 mg (has no administration in time range)   promethazine tablet 25 mg (has no administration in time range)   polyethylene glycol packet 17 g (has no administration in time range)   acetaminophen tablet 650 mg (has no administration in time range)   simethicone chewable tablet 80 mg (has no administration in time range)   aluminum-magnesium hydroxide-simethicone 200-200-20 mg/5 mL suspension 30 mL (has no administration in time range)   acetaminophen suppository 650 mg (has no administration in time range)   naloxone 0.4 mg/mL injection 0.02 mg (has no administration in time range)   glucose chewable tablet 16 g (has no administration in time range)   glucose chewable tablet 24 g (has no administration in time range)   glucagon (human recombinant) injection 1 mg (has no administration in time range)   0.9%  NaCl infusion ( Intravenous New Bag 5/10/24 1703)   dextrose 10% bolus 125 mL 125 mL (has no administration in time range)   dextrose 10% bolus 250 mL 250 mL (has no administration in time range)   enoxaparin injection 40 mg (has no administration in time range)   ALPRAZolam tablet 1 mg (has no administration in time range)   amLODIPine tablet 5 mg (has no administration in time range)   ARIPiprazole tablet 10 mg (has no administration in time range)   aspirin chewable tablet 81 mg (has no administration in time range)   atorvastatin tablet 40 mg (has no administration in time range)   dicyclomine tablet 20 mg (has no administration in time range)   doxepin capsule 50 mg (has no administration in time range)   ferrous gluconate tablet 324 mg (has no administration in time range)   FLUoxetine capsule 80 mg (has no administration in time range)   pregabalin capsule 100 mg (has  no administration in time range)   zolpidem tablet 10 mg (has no administration in time range)   hydroCHLOROthiazide tablet 12.5 mg (has no administration in time range)   sodium chloride 0.9% bolus 1,000 mL 1,000 mL (0 mLs Intravenous Stopped 5/10/24 1300)       Current Discharge Medication List                  Scribe Attestation:   Scribe #1: I performed the above scribed service and the documentation accurately describes the services I performed. I attest to the accuracy of the note.     Attending:   Physician Attestation Statement for Scribe #1: I, Kat Rogers MD, personally performed the services described in this documentation, as scribed by Elijah Sewell, in my presence, and it is both accurate and complete.           Clinical Impression       ICD-10-CM ICD-9-CM   1. Acute hyponatremia  E87.1 276.1   2. Weakness  R53.1 780.79   3. Acute cystitis without hematuria  N30.00 595.0   4. Chest pain  R07.9 786.50       Disposition:   Disposition: Placed in Observation  Condition: Stable         Kat Rogers MD  05/10/24 6387

## 2024-05-10 NOTE — PROGRESS NOTES
"Subjective:      Patient ID: Divya Bui is a 64 y.o. female.    Chief Complaint: Dizziness and Gait Problem ("Woozy, unstable" )      Patient here for ER [id number]-[id number] to ER 2 days ago because she was feeling lightheaded and dizzy.  She was diagnosed with a UTI, denies any dysuria or bladder pain, culture still pending, currently taking cefdinir..  She was mildly hyponatremic on labs, everything else was stable. Blood pressure very elevated which did resolve with clonidine, blood pressure well controlled today.  She is still feeling very dizzy and unstable today.  She also reports right eye irritation, drainage with crusting in the morning    Dizziness:    Associated symptoms: headaches and light-headedness.    Review of Systems   Constitutional:  Negative for activity change and appetite change.   Eyes:  Positive for discharge, redness and itching.   Respiratory:  Negative for shortness of breath.    Gastrointestinal:  Negative for abdominal pain.   Genitourinary:  Negative for dysuria, frequency and urgency.   Neurological:  Positive for dizziness, light-headedness and headaches.     Past Medical History:   Diagnosis Date    Anemia     Anxiety     Basal cell carcinoma (BCC) of face 2/26/2020    Blood transfusion     Bowel incontinence     Depression     Esophageal stricture     GERD (gastroesophageal reflux disease)     Hypertension     Latent tuberculosis by skin test 2001    took INH    Liver nodule 1/6/2014    Morbid obesity with BMI of 45.0-49.9, adult     OA (osteoarthritis) of knee 12/12/2014    Pericardial effusion 9/4/2014          Past Surgical History:   Procedure Laterality Date    CHOLECYSTECTOMY      COLONOSCOPY N/A 3/6/2023    Procedure: COLONOSCOPY;  Surgeon: Beti Diallo MD;  Location: [facility];  Service: Endoscopy;  Laterality: N/A;    GASTRIC BYPASS      HERNIA REPAIR      INJECTION OF ANESTHETIC AGENT AROUND NERVE Bilateral 6/16/2023    Procedure: Bilateral Genicular nerve block with " RN IV sedation;  Surgeon: Denis Lai MD;  Location: [facility];  Service: Pain Management;  Laterality: Bilateral;    INJECTION OF ANESTHETIC AGENT AROUND NERVE Bilateral 8/11/2023    Procedure: Bilateral Genicular nerve block with RN IV sedation;  Surgeon: Denis Lai MD;  [facility];  Service: Pain Management;  Laterality: Bilateral;    RADIOFREQUENCY THERMOCOAGULATION Right 10/31/2023    Procedure: Right Genicular Nerve RFA with RN IV sedation;  Surgeon: Denis Lai MD;  [facility];  Service: Pain Management;  Laterality: Right;    RADIOFREQUENCY THERMOCOAGULATION Left 11/14/2023    Procedure: Left Genicular Nerve RFA with RN IV sedation;  Surgeon: Denis Lai MD;  Location: [facility];  Service: Pain Management;  Laterality: Left;    right sholder surgery      SMALL INTESTINE SURGERY       Family History   Problem Relation Name Age of Onset    Lung cancer Mother          smoker    Liver cancer Father      Diabetes Sister      Crohn's disease Sister      Liver cancer Sister      Diabetes Sister oldest     Crohn's disease Brother      Crohn's disease Other nephew     Breast cancer Neg Hx      Ovarian cancer Neg Hx      Colon cancer Neg Hx       Social History     Socioeconomic History    Marital status:    Tobacco Use    Smoking status: Never    Smokeless tobacco: Never   Substance and Sexual Activity    Alcohol use: Not Currently     Alcohol/week: 0.0 standard drinks of alcohol    Drug use: No    Sexual activity: Yes     Partners: Male     Social Determinants of Health     Food Insecurity: No Food Insecurity (12/28/2023)    Hunger Vital Sign     Worried About Running Out of Food in the Last Year: Never true     Ran Out of Food in the Last Year: Never true   Transportation Needs: Unknown (12/28/2023)    PRAPARE - Transportation     Lack of Transportation (Non-Medical): No   Physical Activity: Inactive (12/28/2023)    Exercise Vital Sign     Days of Exercise  "per Week: 0 days     Minutes of Exercise per Session: 0 min   Housing Stability: Low Risk  (12/28/2023)    Housing Stability Vital Sign     Unable to Pay for Housing in the Last Year: No     Number of Places Lived in the Last Year: 1     Unstable Housing in the Last Year: No     Review of patient's allergies indicates:   Allergen Reactions    Metronidazole hcl Other (See Comments)     Mouth ulcers developed with Flagyl  Oral sores.  Mouth ulcers developed with Flagyl       Objective:       /62 (BP Location: Right arm, Patient Position: Sitting, BP Method: Large (Manual))   Pulse (!) 118   Temp 98.7 °F (37.1 °C) (Tympanic)   Ht 5' 3" (1.6 m)   Wt 104.6 kg (230 lb 9.6 oz)   SpO2 97%   BMI 40.85 kg/m²   Physical Exam  Constitutional:       General: She is not in acute distress.     Appearance: Normal appearance. She is well-developed. She is not ill-appearing or diaphoretic.   Eyes:      Comments: Conjunctiva erythematous, drainage noted   Cardiovascular:      Rate and Rhythm: Normal rate and regular rhythm.      Heart sounds: Normal heart sounds.   Pulmonary:      Effort: Pulmonary effort is normal.      Breath sounds: Normal breath sounds.   Abdominal:      Palpations: Abdomen is soft.      Tenderness: There is no abdominal tenderness.   Neurological:      Mental Status: She is alert and oriented to person, place, and time.   Psychiatric:         Mood and Affect: Mood normal.         Behavior: Behavior normal.         Thought Content: Thought content normal.         Judgment: Judgment normal.       Assessment:     1. Primary hypertension    2. Hyponatremia    3. Urinary tract infection without hematuria, site unspecified    4. Conjunctivitis, unspecified conjunctivitis type, unspecified laterality      Plan:   Primary hypertension    Hyponatremia  -     Comprehensive Metabolic Panel; Future; Expected date: 11/05/2024    Urinary tract infection without hematuria, site unspecified    Conjunctivitis, " unspecified conjunctivitis type, unspecified laterality    Other orders  -     tobramycin sulfate 0.3% (TOBREX) 0.3 % ophthalmic solution; Place 1 drop into both eyes every 4 (four) hours.  Dispense: 5 mL; Refill: 0    Will follow culture results-sensitivity still pending  Continue all other current medications.     Medication List with Changes/Refills   New Medications    TOBRAMYCIN SULFATE 0.3% (TOBREX) 0.3 % OPHTHALMIC SOLUTION    Place 1 drop into both eyes every 4 (four) hours.   Current Medications    ALPRAZOLAM (XANAX) 1 MG TABLET    Take 1 mg by mouth 3 (three) times daily as needed.    AMLODIPINE (NORVASC) 5 MG TABLET    Take 1 tablet (5 mg total) by mouth once daily.    ARIPIPRAZOLE (ABILIFY) 10 MG TAB    Take 10 mg by mouth once daily.    ASPIRIN 81 MG TAB    Take 1 tablet by mouth Daily.    ATORVASTATIN (LIPITOR) 40 MG TABLET    Take 1 tablet (40 mg total) by mouth once daily.    CEFDINIR (OMNICEF) 300 MG CAPSULE    Take 1 capsule (300 mg total) by mouth 2 (two) times daily. for 10 days    DICYCLOMINE (BENTYL) 20 MG TABLET    Take 1 tablet (20 mg total) by mouth 3 (three) times daily as needed (abdominal pain).    DOXEPIN (SINEQUAN) 50 MG CAPSULE    Take 50 mg by mouth nightly.    FERROUS GLUCONATE (FERGON) 324 MG TABLET    Take 1 tablet (324 mg total) by mouth daily with breakfast.    FLUOXETINE (PROZAC) 40 MG CAPSULE    Take 1 capsule (40 mg total) by mouth once daily.    FUROSEMIDE (LASIX) 40 MG TABLET    Take 1 tablet (40 mg total) by mouth 2 (two) times daily as needed (swelling).    OLMESARTAN-HYDROCHLOROTHIAZIDE (BENICAR HCT) 40-12.5 MG TAB    Take 1 tablet by mouth once daily.    ONDANSETRON (ZOFRAN) 4 MG TABLET    Take 1 tablet (4 mg total) by mouth every 6 (six) hours.    PREGABALIN (LYRICA) 100 MG CAPSULE    Take 1 capsule (100 mg total) by mouth every evening.    PROMETHAZINE (PHENERGAN) 25 MG TABLET    TAKE 1 TABLET BY MOUTH EVERY 6 HOURS AS NEEDED FOR NAUSEA    ZOLPIDEM (AMBIEN) 10 MG TAB     Take 10 mg by mouth nightly as needed.   Discontinued Medications    FLUCONAZOLE (DIFLUCAN) 150 MG TAB    Take first tablet today, then repeat in 3 days.

## 2024-05-10 NOTE — ASSESSMENT & PLAN NOTE
Chronic, controlled. Latest blood pressure and vitals reviewed-     Temp:  [97.7 °F (36.5 °C)-98.7 °F (37.1 °C)]   Pulse:  []   Resp:  [14-18]   BP: (103-116)/(54-68)   SpO2:  [97 %-99 %] .   Home meds for hypertension were reviewed and noted below.   Hypertension Medications               amLODIPine (NORVASC) 5 MG tablet Take 1 tablet (5 mg total) by mouth once daily.    furosemide (LASIX) 40 MG tablet Take 1 tablet (40 mg total) by mouth 2 (two) times daily as needed (swelling).    olmesartan-hydrochlorothiazide (BENICAR HCT) 40-12.5 mg Tab Take 1 tablet by mouth once daily.            While in the hospital, will manage blood pressure as follows; Continue home antihypertensive regimen    Will utilize p.r.n. blood pressure medication only if patient's blood pressure greater than 160/100 and she develops symptoms such as worsening chest pain or shortness of breath.

## 2024-05-10 NOTE — HPI
Divya Bui is a 64 y.o. female with a PMH  has a past medical history of Anemia, Anxiety, Basal cell carcinoma (BCC) of face (2/26/2020), Blood transfusion, Bowel incontinence, Depression, Esophageal stricture, GERD (gastroesophageal reflux disease), Hypertension, Latent tuberculosis by skin test (2001), Liver nodule (1/6/2014), Morbid obesity with BMI of 45.0-49.9, adult, OA (osteoarthritis) of knee (12/12/2014), and Pericardial effusion (9/4/2014).  Presented to the ER after being notified by her PCP of abnormal lab work.  Patient recently seen in ER on 05/07/2024 for similar symptoms of generalized weakness/fatigue and dizziness.  Patient noted to have UTI and sodium level of 132 with BUN/creatinine of 19/1.5.  Patient was treated with IV fluids as well as 1 g Rocephin and discharged home with cefdinir.  Patient had follow-up appointment with PCP yesterday (5/9/24) and had repeat blood work performed.  Sodium level noted to be 125.  Patient reports that she has been staying hydrated and compliant with her antibiotic for UTI.  Patient reports dizziness worsens with position change/movement.  Symptoms improve with rest.  Denies any fevers, aches, chills, sweats, nausea or vomiting, chest pain, palpitations, shortness breath, dyspnea exertion, abdominal pain, dysuria, hematuria, constipation, diarrhea, or any other symptoms at this time.    Repeat blood work performed in ER revealed sodium level of 124, BUN/creatinine 13/1.1, and UA showing improvement from sample collected on 05/07/2024.  BNP and troponin negative.  Chest x-ray performed on 05/07/2024 was negative for acute findings.  EKG performed today reveals sinus tachycardia with a ventricular rate of 101 beats per minute with a QT/QTC of 358/464.  Patient received 1 g of Rocephin IV and 1 L of normal saline in ED. hospital Medicine consulted to admit patient for treatment of hyponatremia, dizziness, and UTI.  Patient in agreement with treatment plan.   Patient will be admitted under observation status.    PCP: Angel Khan

## 2024-05-10 NOTE — ASSESSMENT & PLAN NOTE
Likely secondary to electrolyte derangement with sodium level of 124 as well as symptoms of concurrent UTI. Orthostatic bp reading negative in ER.  Plan:  -IVFs  -BMP q8 hrs to trend sodium level  -Meclizine prn

## 2024-05-10 NOTE — FIRST PROVIDER EVALUATION
"Medical screening examination initiated.  I have conducted a focused provider triage encounter, findings are as follows:    Brief history of present illness:  Weakness and dizziness with standing, Na abnormal and sent from PCP    Vitals:    05/10/24 1130   BP: 114/65   BP Location: Left arm   Patient Position: Sitting   Pulse: 102   Resp: 18   SpO2: 99%   Weight: (S)   Comment: Need bed weight   Height: (S) 5' 3" (1.6 m)       Pertinent physical exam:  no acute distress, patient alert and oriented     Brief workup plan:  workup    Preliminary workup initiated; this workup will be continued and followed by the physician or advanced practice provider that is assigned to the patient when roomed.  "

## 2024-05-10 NOTE — ASSESSMENT & PLAN NOTE
Chronic. Stable. Not in acute exacerbation and currently denies endorsing any suicidal/homicidal ideations.   Plan:  -Continue home medications (xanax and doxepin)

## 2024-05-11 VITALS
TEMPERATURE: 98 F | RESPIRATION RATE: 18 BRPM | OXYGEN SATURATION: 100 % | WEIGHT: 241.88 LBS | SYSTOLIC BLOOD PRESSURE: 122 MMHG | DIASTOLIC BLOOD PRESSURE: 66 MMHG | HEIGHT: 63 IN | HEART RATE: 83 BPM | BODY MASS INDEX: 42.86 KG/M2

## 2024-05-11 LAB
ANION GAP SERPL CALC-SCNC: 10 MMOL/L (ref 8–16)
BUN SERPL-MCNC: 17 MG/DL (ref 8–23)
CALCIUM SERPL-MCNC: 8.5 MG/DL (ref 8.7–10.5)
CHLORIDE SERPL-SCNC: 101 MMOL/L (ref 95–110)
CO2 SERPL-SCNC: 21 MMOL/L (ref 23–29)
CREAT SERPL-MCNC: 1 MG/DL (ref 0.5–1.4)
EST. GFR  (NO RACE VARIABLE): >60 ML/MIN/1.73 M^2
GLUCOSE SERPL-MCNC: 100 MG/DL (ref 70–110)
POTASSIUM SERPL-SCNC: 4.1 MMOL/L (ref 3.5–5.1)
SODIUM SERPL-SCNC: 132 MMOL/L (ref 136–145)

## 2024-05-11 PROCEDURE — G0378 HOSPITAL OBSERVATION PER HR: HCPCS

## 2024-05-11 PROCEDURE — 80048 BASIC METABOLIC PNL TOTAL CA: CPT | Performed by: INTERNAL MEDICINE

## 2024-05-11 PROCEDURE — 94761 N-INVAS EAR/PLS OXIMETRY MLT: CPT

## 2024-05-11 PROCEDURE — 96372 THER/PROPH/DIAG INJ SC/IM: CPT | Performed by: SPECIALIST

## 2024-05-11 PROCEDURE — 96361 HYDRATE IV INFUSION ADD-ON: CPT

## 2024-05-11 PROCEDURE — 63600175 PHARM REV CODE 636 W HCPCS: Performed by: SPECIALIST

## 2024-05-11 PROCEDURE — 36415 COLL VENOUS BLD VENIPUNCTURE: CPT | Performed by: INTERNAL MEDICINE

## 2024-05-11 PROCEDURE — 25000003 PHARM REV CODE 250: Performed by: NURSE PRACTITIONER

## 2024-05-11 RX ADMIN — ARIPIPRAZOLE 10 MG: 5 TABLET ORAL at 08:05

## 2024-05-11 RX ADMIN — Medication 324 MG: at 07:05

## 2024-05-11 RX ADMIN — HYDROCHLOROTHIAZIDE 12.5 MG: 12.5 TABLET ORAL at 08:05

## 2024-05-11 RX ADMIN — ATORVASTATIN CALCIUM 40 MG: 40 TABLET, FILM COATED ORAL at 08:05

## 2024-05-11 RX ADMIN — AMLODIPINE BESYLATE 5 MG: 5 TABLET ORAL at 08:05

## 2024-05-11 RX ADMIN — ASPIRIN 81 MG CHEWABLE TABLET 81 MG: 81 TABLET CHEWABLE at 08:05

## 2024-05-11 RX ADMIN — SODIUM CHLORIDE: 9 INJECTION, SOLUTION INTRAVENOUS at 02:05

## 2024-05-11 RX ADMIN — ENOXAPARIN SODIUM 40 MG: 40 INJECTION SUBCUTANEOUS at 08:05

## 2024-05-11 RX ADMIN — FLUOXETINE 80 MG: 20 CAPSULE ORAL at 08:05

## 2024-05-11 NOTE — PLAN OF CARE
Fall precautions maintained, bed alarm set. No acute distress noted. Wound care consult placed. IV fluids infusing . Chart check complete.     Problem: Adult Inpatient Plan of Care  Goal: Plan of Care Review  Outcome: Progressing     Problem: Adult Inpatient Plan of Care  Goal: Patient-Specific Goal (Individualized)  Outcome: Progressing  Flowsheets (Taken 5/11/2024 0338)  Individualized Care Needs: none stated  Anxieties, Fears or Concerns: none stated     Problem: Adult Inpatient Plan of Care  Goal: Absence of Hospital-Acquired Illness or Injury  Outcome: Progressing

## 2024-05-11 NOTE — HOSPITAL COURSE
Patient was sent to the ER for hyponatremia.  She was she was given some IV fluids and sodium began to improve.  Her sodium improved to 132 on day of discharge.  She was counseled on maintaining her oral intake of sodium to improve to 135.  She was also counseled on following up with her primary care early next week to repeat blood work to confirm her sodium is stable.  Patient states that she has had this previously happened to her but has not happened in a while.  She has not had any issues in the recent past.  Patient is stable for discharge at this time all questions answered.

## 2024-05-11 NOTE — PLAN OF CARE
[facility] Med [facility]  Discharge Final Note    Primary Care Provider: Angel Khan MD    Expected Discharge Date: 5/11/2024    Final Discharge Note (most recent)       Final Note - 05/11/24 1301          Final Note    Assessment Type Final Discharge Note     Anticipated Discharge Disposition Home or Self Care        Post-Acute Status    Discharge Delays None known at this time                     Important Message from Medicare             Contact Info       Angel Khan MD   Specialty: Internal Medicine   Relationship: PCP - General    [address] [place] [address]   Phone: [phone]       Next Steps: Follow up in 3 day(s)    Instructions: follow up early next week for repeat bloodwork          Discharge home, no home health or dme orders noted.

## 2024-05-11 NOTE — DISCHARGE SUMMARY
[facility]  Discharge Summary      Patient Name: Divya Bui  MRN: [id number]  NEREIDA: [id number]  Patient Class: OP- Observation  Admission Date: 5/10/2024  Hospital Length of Stay: 0 days  Discharge Date and Time: 5/11/2024  2:09 PM  Attending Physician: No att. providers found   Discharging Provider: Julio Hernandez MD  Primary Care Provider: Angel Khan MD    Primary Care Team: Networked reference to record PCT     HPI:   Divya Bui is a 64 y.o. female with a PMH  has a past medical history of Anemia, Anxiety, Basal cell carcinoma (BCC) of face (2/26/2020), Blood transfusion, Bowel incontinence, Depression, Esophageal stricture, GERD (gastroesophageal reflux disease), Hypertension, Latent tuberculosis by skin test (2001), Liver nodule (1/6/2014), Morbid obesity with BMI of 45.0-49.9, adult, OA (osteoarthritis) of knee (12/12/2014), and Pericardial effusion (9/4/2014).  Presented to the ER after being notified by her PCP of abnormal lab work.  Patient recently seen in ER on 05/07/2024 for similar symptoms of generalized weakness/fatigue and dizziness.  Patient noted to have UTI and sodium level of 132 with BUN/creatinine of 19/1.5.  Patient was treated with IV fluids as well as 1 g Rocephin and discharged home with cefdinir.  Patient had follow-up appointment with PCP yesterday (5/9/24) and had repeat blood work performed.  Sodium level noted to be 125.  Patient reports that she has been staying hydrated and compliant with her antibiotic for UTI.  Patient reports dizziness worsens with position change/movement.  Symptoms improve with rest.  Denies any fevers, aches, chills, sweats, nausea or vomiting, chest pain, palpitations, shortness breath, dyspnea exertion, abdominal pain, dysuria, hematuria, constipation, diarrhea, or any other symptoms at this time.    Repeat blood work performed in ER revealed sodium level of 124, BUN/creatinine 13/1.1, and UA showing improvement from  sample collected on 05/07/2024.  BNP and troponin negative.  Chest x-ray performed on 05/07/2024 was negative for acute findings.  EKG performed today reveals sinus tachycardia with a ventricular rate of 101 beats per minute with a QT/QTC of 358/464.  Patient received 1 g of Rocephin IV and 1 L of normal saline in ED. hospital Medicine consulted to admit patient for treatment of hyponatremia, dizziness, and UTI.  Patient in agreement with treatment plan.  Patient will be admitted under observation status.    PCP: Angel Khan      * No surgery found *      Hospital Course:   Patient was sent to the ER for hyponatremia.  She was she was given some IV fluids and sodium began to improve.  Her sodium improved to 132 on day of discharge.  She was counseled on maintaining her oral intake of sodium to improve to 135.  She was also counseled on following up with her primary care early next week to repeat blood work to confirm her sodium is stable.  Patient states that she has had this previously happened to her but has not happened in a while.  She has not had any issues in the recent past.  Patient is stable for discharge at this time all questions answered.     Goals of Care Treatment Preferences:  Code Status: Full Code      Consults:     No new Assessment & Plan notes have been filed under this hospital service since the last note was generated.  Service: Hospital Medicine    Final Active Diagnoses:    Diagnosis Date Noted POA    PRINCIPAL PROBLEM:  Hyponatremia [E87.1] 08/27/2023 Yes    Dizziness [R42] 05/10/2024 Unknown    Acute cystitis [N30.00] 05/10/2024 Unknown    GERD (gastroesophageal reflux disease) [K21.9] 11/24/2023 Yes    Insomnia [G47.00] 11/24/2023 Yes    Chronic pain disorder [G89.4] 01/10/2019 Yes    Depressive disorder [F32.A] 12/29/2016 Yes    Morbid obesity with BMI of 40.0-44.9, adult [E66.01, Z68.41]  Not Applicable    Anxiety [F41.9] 10/08/2014 Yes    Iron deficiency anemia [D50.9]  12/19/2013 Yes    HTN (hypertension) [I10] 08/26/2013 Yes      Problems Resolved During this Admission:       Discharged Condition: stable    Disposition: Home or Self Care    Follow Up:   Follow-up Information       Angel Khan MD Follow up in 3 day(s).    Specialty: Internal Medicine  Why: follow up early next week for repeat bloodwork  Contact information:  [address] [facility] [address] [phone]                           Patient Instructions:      Diet Cardiac     Activity as tolerated       Significant Diagnostic Studies: Labs: BMP:   Recent Labs   Lab 05/10/24  1149 05/10/24  1945 05/11/24  0820   * 114* 100   * 126* 132*   K 4.2 4.1 4.1   CL 92* 98 101   CO2 21* 20* 21*   BUN 19 17 17   CREATININE 1.5* 1.1 1.0   CALCIUM 8.8 7.9* 8.5*    and CBC   Recent Labs   Lab 05/10/24  1149   WBC 6.67   HGB 11.5*   HCT 35.6*        Radiology:   No orders to display        Pending Diagnostic Studies:       Procedure Component Value Units Date/Time    Basic metabolic panel [[id number]]     Order Status: Sent Lab Status: No result     Specimen: Blood            Medications:  Reconciled Home Medications:      Medication List        CHANGE how you take these medications      FLUoxetine 40 MG capsule  Take 1 capsule (40 mg total) by mouth once daily.  What changed: how much to take            CONTINUE taking these medications      ALPRAZolam 1 MG tablet  Commonly known as: XANAX  Take 1 mg by mouth 3 (three) times daily as needed.     amLODIPine 5 MG tablet  Commonly known as: NORVASC  Take 1 tablet (5 mg total) by mouth once daily.     ARIPiprazole 10 MG Tab  Commonly known as: ABILIFY  Take 10 mg by mouth once daily.     aspirin 81 MG Chew  Take 81 mg by mouth Daily.     atorvastatin 40 MG tablet  Commonly known as: LIPITOR  Take 1 tablet (40 mg total) by mouth once daily.     cefdinir 300 MG capsule  Commonly known as: OMNICEF  Take 1 capsule (300 mg total) by mouth 2 (two) times  daily. for 10 days     dicyclomine 20 mg tablet  Commonly known as: BENTYL  Take 1 tablet (20 mg total) by mouth 3 (three) times daily as needed (abdominal pain).     doxepin 50 MG capsule  Commonly known as: SINEQUAN  Take 50 mg by mouth nightly.     ferrous gluconate 324 MG tablet  Commonly known as: FERGON  Take 1 tablet (324 mg total) by mouth daily with breakfast.     furosemide 40 MG tablet  Commonly known as: LASIX  Take 1 tablet (40 mg total) by mouth 2 (two) times daily as needed (swelling).     olmesartan-hydrochlorothiazide 40-12.5 mg Tab  Commonly known as: BENICAR HCT  Take 1 tablet by mouth once daily.     ondansetron 4 MG tablet  Commonly known as: ZOFRAN  Take 1 tablet (4 mg total) by mouth every 6 (six) hours.     pregabalin 100 MG capsule  Commonly known as: LYRICA  Take 1 capsule (100 mg total) by mouth every evening.     tobramycin sulfate 0.3% 0.3 % ophthalmic solution  Commonly known as: TOBREX  Place 1 drop into both eyes every 4 (four) hours.     zolpidem 10 mg Tab  Commonly known as: AMBIEN  Take 10 mg by mouth nightly as needed.            ASK your doctor about these medications      promethazine 25 MG tablet  Commonly known as: PHENERGAN  TAKE 1 TABLET BY MOUTH EVERY 6 HOURS AS NEEDED FOR NAUSEA              Indwelling Lines/Drains at time of discharge:   Lines/Drains/Airways       None                   Time spent on the discharge of patient: 40 minutes         Julio Hernandez MD  Department of Hospital Medicine  '[facility]

## 2024-05-12 LAB
OHS QRS DURATION: 94 MS
OHS QTC CALCULATION: 464 MS

## 2024-05-13 LAB — BACTERIA UR CULT: ABNORMAL

## 2024-05-25 ENCOUNTER — NURSE TRIAGE (OUTPATIENT)
Dept: ADMINISTRATIVE | Facility: CLINIC | Age: 64
End: 2024-05-25
Payer: MEDICARE

## 2024-05-25 NOTE — TELEPHONE ENCOUNTER
1st iron infusion yesterday via midline. Flushing fine, woke up & noticed blood under tegaderm & gauze is saturated in blood, notices leaks more with flushing.     Dispo-be seen within 4 hrs. Pt vu.     Reason for Disposition   [1] Dressing needs to be changed (e.g., wet, soiled, loose, or open to air) AND [2] unable or unwilling to perform dressing change    Additional Information   Negative: SEVERE difficulty breathing (e.g., struggling for each breath, speaks in single words)   Negative: Shock suspected (e.g., cold/pale/clammy skin, too weak to stand, low BP, rapid pulse)   Negative: Cracked or broken central line or PICC Line   Negative: Sounds like a life-threatening emergency to the triager   Negative: [1] Difficulty breathing AND [2] not severe   Negative: Moderate bleeding around IV site  (Exception: Mild bleeding that stops quickly with direct pressure.)   Negative: [1] Chest pain AND [2] has central or PICC line   Negative: [1] Chest or neck swelling AND [2] has a central or PICC line  (Exception: Mild puffiness or swelling just around IV site.)   Negative: [1] Arm or leg swelling AND [2] has a central or PICC line  (Exception: Mild puffiness or swelling just around IV site.)   Negative: Fever > 100.4 F (38.0 C)   Negative: Patient sounds very sick or weak to the triager   Negative: Central or PICC line has moved out of position (or can see cuff slipping out of skin; or more line externally exposed than before)   Negative: Pus or cloudy fluid from IV site   Negative: [1] Red streak at IV site AND [2] palpable cord   Negative: [1] Red streak at IV site AND [2] longer than 1 inch (2.5 cm)   Negative: [1] Skin redness AND [2] extends > 1 inch (2.5 cm) from IV site   Negative: Skin swelling at IV site  (Exception: IV recently removed and has small lump or small amount of skin swelling.)   Negative: [1] Raised bruise at IV site AND [2] getting larger (or size > 2 inches (5 cm)   Negative: [1] Mild bleeding at  IV site AND [2] not stopped after following Care Advice   Negative: IV fluid leaking out of insertion site (skin hole where IV catheter enters skin)   Negative: [1] Pain at IV site or shooting up arm AND [2] IV running slowly    Protocols used: IV Site and Other Symptoms-A-AH

## 2024-06-04 ENCOUNTER — HOSPITAL ENCOUNTER (INPATIENT)
Facility: HOSPITAL | Age: 64
LOS: 3 days | Discharge: HOME | DRG: 378 | End: 2024-06-08
Attending: EMERGENCY MEDICINE | Admitting: INTERNAL MEDICINE
Payer: MEDICARE

## 2024-06-04 ENCOUNTER — NURSE TRIAGE (OUTPATIENT)
Dept: ADMINISTRATIVE | Facility: CLINIC | Age: 64
End: 2024-06-04
Payer: MEDICARE

## 2024-06-04 DIAGNOSIS — M25.561 CHRONIC PAIN OF BOTH KNEES: ICD-10-CM

## 2024-06-04 DIAGNOSIS — G89.29 CHRONIC PAIN OF BOTH KNEES: ICD-10-CM

## 2024-06-04 DIAGNOSIS — N30.01 ACUTE CYSTITIS WITH HEMATURIA: Primary | ICD-10-CM

## 2024-06-04 DIAGNOSIS — E66.01 MORBID OBESITY WITH BMI OF 40.0-44.9, ADULT: ICD-10-CM

## 2024-06-04 DIAGNOSIS — M25.562 CHRONIC PAIN OF BOTH KNEES: ICD-10-CM

## 2024-06-04 DIAGNOSIS — D64.9 SYMPTOMATIC ANEMIA: ICD-10-CM

## 2024-06-04 DIAGNOSIS — K62.5 RECTAL BLEEDING: ICD-10-CM

## 2024-06-04 DIAGNOSIS — N30.00 ACUTE CYSTITIS WITHOUT HEMATURIA: ICD-10-CM

## 2024-06-04 DIAGNOSIS — R07.9 CHEST PAIN: ICD-10-CM

## 2024-06-04 DIAGNOSIS — Z96.651 HISTORY OF RIGHT KNEE JOINT REPLACEMENT: ICD-10-CM

## 2024-06-04 LAB
ALBUMIN SERPL BCP-MCNC: 3.6 G/DL (ref 3.5–5.2)
ALP SERPL-CCNC: 140 U/L (ref 55–135)
ALT SERPL W/O P-5'-P-CCNC: 8 U/L (ref 10–44)
ANION GAP SERPL CALC-SCNC: 13 MMOL/L (ref 8–16)
APTT PPP: 28.9 SEC (ref 21–32)
AST SERPL-CCNC: 14 U/L (ref 10–40)
BACTERIA #/AREA URNS HPF: ABNORMAL /HPF
BASOPHILS # BLD AUTO: 0.02 K/UL (ref 0–0.2)
BASOPHILS # BLD AUTO: 0.05 K/UL (ref 0–0.2)
BASOPHILS NFR BLD: 0.4 % (ref 0–1.9)
BASOPHILS NFR BLD: 0.9 % (ref 0–1.9)
BILIRUB SERPL-MCNC: 0.3 MG/DL (ref 0.1–1)
BILIRUB UR QL STRIP: NEGATIVE
BUN SERPL-MCNC: 8 MG/DL (ref 8–23)
CALCIUM SERPL-MCNC: 8.6 MG/DL (ref 8.7–10.5)
CHLORIDE SERPL-SCNC: 99 MMOL/L (ref 95–110)
CLARITY UR: ABNORMAL
CO2 SERPL-SCNC: 20 MMOL/L (ref 23–29)
COLOR UR: YELLOW
CREAT SERPL-MCNC: 0.9 MG/DL (ref 0.5–1.4)
DIFFERENTIAL METHOD BLD: ABNORMAL
DIFFERENTIAL METHOD BLD: ABNORMAL
EOSINOPHIL # BLD AUTO: 0.1 K/UL (ref 0–0.5)
EOSINOPHIL # BLD AUTO: 0.1 K/UL (ref 0–0.5)
EOSINOPHIL NFR BLD: 1.7 % (ref 0–8)
EOSINOPHIL NFR BLD: 2.3 % (ref 0–8)
ERYTHROCYTE [DISTWIDTH] IN BLOOD BY AUTOMATED COUNT: 14.4 % (ref 11.5–14.5)
ERYTHROCYTE [DISTWIDTH] IN BLOOD BY AUTOMATED COUNT: 14.6 % (ref 11.5–14.5)
EST. GFR  (NO RACE VARIABLE): >60 ML/MIN/1.73 M^2
FOLATE SERPL-MCNC: 3.4 NG/ML (ref 4–24)
GLUCOSE SERPL-MCNC: 136 MG/DL (ref 70–110)
GLUCOSE UR QL STRIP: NEGATIVE
HCT VFR BLD AUTO: 35.9 % (ref 37–48.5)
HCT VFR BLD AUTO: 36.6 % (ref 37–48.5)
HCV AB SERPL QL IA: NEGATIVE
HEP C VIRUS HOLD SPECIMEN: NORMAL
HGB BLD-MCNC: 11.7 G/DL (ref 12–16)
HGB BLD-MCNC: 11.7 G/DL (ref 12–16)
HGB UR QL STRIP: ABNORMAL
HIV 1+2 AB+HIV1 P24 AG SERPL QL IA: NEGATIVE
IMM GRANULOCYTES # BLD AUTO: 0.05 K/UL (ref 0–0.04)
IMM GRANULOCYTES # BLD AUTO: 0.06 K/UL (ref 0–0.04)
IMM GRANULOCYTES NFR BLD AUTO: 0.9 % (ref 0–0.5)
IMM GRANULOCYTES NFR BLD AUTO: 1.3 % (ref 0–0.5)
INR PPP: 0.9 (ref 0.8–1.2)
IRON SERPL-MCNC: 122 UG/DL (ref 30–160)
KETONES UR QL STRIP: NEGATIVE
LEUKOCYTE ESTERASE UR QL STRIP: ABNORMAL
LYMPHOCYTES # BLD AUTO: 0.9 K/UL (ref 1–4.8)
LYMPHOCYTES # BLD AUTO: 1 K/UL (ref 1–4.8)
LYMPHOCYTES NFR BLD: 18 % (ref 18–48)
LYMPHOCYTES NFR BLD: 18.1 % (ref 18–48)
MCH RBC QN AUTO: 27.9 PG (ref 27–31)
MCH RBC QN AUTO: 28 PG (ref 27–31)
MCHC RBC AUTO-ENTMCNC: 32 G/DL (ref 32–36)
MCHC RBC AUTO-ENTMCNC: 32.6 G/DL (ref 32–36)
MCV RBC AUTO: 86 FL (ref 82–98)
MCV RBC AUTO: 87 FL (ref 82–98)
MICROSCOPIC COMMENT: ABNORMAL
MONOCYTES # BLD AUTO: 0.5 K/UL (ref 0.3–1)
MONOCYTES # BLD AUTO: 0.5 K/UL (ref 0.3–1)
MONOCYTES NFR BLD: 10.1 % (ref 4–15)
MONOCYTES NFR BLD: 9.6 % (ref 4–15)
NEUTROPHILS # BLD AUTO: 3.2 K/UL (ref 1.8–7.7)
NEUTROPHILS # BLD AUTO: 3.7 K/UL (ref 1.8–7.7)
NEUTROPHILS NFR BLD: 67.8 % (ref 38–73)
NEUTROPHILS NFR BLD: 68.9 % (ref 38–73)
NITRITE UR QL STRIP: POSITIVE
NRBC BLD-RTO: 0 /100 WBC
NRBC BLD-RTO: 0 /100 WBC
PH UR STRIP: 7 [PH] (ref 5–8)
PLATELET # BLD AUTO: 193 K/UL (ref 150–450)
PLATELET # BLD AUTO: 199 K/UL (ref 150–450)
PMV BLD AUTO: 8.2 FL (ref 9.2–12.9)
PMV BLD AUTO: 8.5 FL (ref 9.2–12.9)
POTASSIUM SERPL-SCNC: 3.5 MMOL/L (ref 3.5–5.1)
PROT SERPL-MCNC: 7.3 G/DL (ref 6–8.4)
PROT UR QL STRIP: ABNORMAL
PROTHROMBIN TIME: 10.9 SEC (ref 9–12.5)
RBC # BLD AUTO: 4.18 M/UL (ref 4–5.4)
RBC # BLD AUTO: 4.2 M/UL (ref 4–5.4)
RBC #/AREA URNS HPF: 35 /HPF (ref 0–4)
SATURATED IRON: 30 % (ref 20–50)
SODIUM SERPL-SCNC: 132 MMOL/L (ref 136–145)
SP GR UR STRIP: 1.02 (ref 1–1.03)
SQUAMOUS #/AREA URNS HPF: 1 /HPF
TOTAL IRON BINDING CAPACITY: 411 UG/DL (ref 250–450)
TRANSFERRIN SERPL-MCNC: 278 MG/DL (ref 200–375)
TSH SERPL DL<=0.005 MIU/L-ACNC: 1.68 UIU/ML (ref 0.4–4)
URN SPEC COLLECT METH UR: ABNORMAL
UROBILINOGEN UR STRIP-ACNC: NEGATIVE EU/DL
VIT B12 SERPL-MCNC: 1263 PG/ML (ref 210–950)
WBC # BLD AUTO: 4.74 K/UL (ref 3.9–12.7)
WBC # BLD AUTO: 5.43 K/UL (ref 3.9–12.7)
WBC #/AREA URNS HPF: >100 /HPF (ref 0–5)
WBC CLUMPS URNS QL MICRO: ABNORMAL

## 2024-06-04 PROCEDURE — 25000003 PHARM REV CODE 250: Performed by: EMERGENCY MEDICINE

## 2024-06-04 PROCEDURE — 36415 COLL VENOUS BLD VENIPUNCTURE: CPT | Performed by: EMERGENCY MEDICINE

## 2024-06-04 PROCEDURE — 96375 TX/PRO/DX INJ NEW DRUG ADDON: CPT

## 2024-06-04 PROCEDURE — G0378 HOSPITAL OBSERVATION PER HR: HCPCS

## 2024-06-04 PROCEDURE — 87077 CULTURE AEROBIC IDENTIFY: CPT | Performed by: EMERGENCY MEDICINE

## 2024-06-04 PROCEDURE — 25500020 PHARM REV CODE 255: Performed by: EMERGENCY MEDICINE

## 2024-06-04 PROCEDURE — 81000 URINALYSIS NONAUTO W/SCOPE: CPT | Performed by: EMERGENCY MEDICINE

## 2024-06-04 PROCEDURE — 87088 URINE BACTERIA CULTURE: CPT | Performed by: EMERGENCY MEDICINE

## 2024-06-04 PROCEDURE — 96366 THER/PROPH/DIAG IV INF ADDON: CPT

## 2024-06-04 PROCEDURE — 85730 THROMBOPLASTIN TIME PARTIAL: CPT | Performed by: EMERGENCY MEDICINE

## 2024-06-04 PROCEDURE — 82607 VITAMIN B-12: CPT | Performed by: EMERGENCY MEDICINE

## 2024-06-04 PROCEDURE — 84443 ASSAY THYROID STIM HORMONE: CPT | Performed by: EMERGENCY MEDICINE

## 2024-06-04 PROCEDURE — 96365 THER/PROPH/DIAG IV INF INIT: CPT

## 2024-06-04 PROCEDURE — 85610 PROTHROMBIN TIME: CPT | Performed by: EMERGENCY MEDICINE

## 2024-06-04 PROCEDURE — 25000003 PHARM REV CODE 250: Performed by: STUDENT IN AN ORGANIZED HEALTH CARE EDUCATION/TRAINING PROGRAM

## 2024-06-04 PROCEDURE — 87389 HIV-1 AG W/HIV-1&-2 AB AG IA: CPT | Performed by: EMERGENCY MEDICINE

## 2024-06-04 PROCEDURE — 63600175 PHARM REV CODE 636 W HCPCS: Performed by: STUDENT IN AN ORGANIZED HEALTH CARE EDUCATION/TRAINING PROGRAM

## 2024-06-04 PROCEDURE — 99285 EMERGENCY DEPT VISIT HI MDM: CPT | Mod: 25

## 2024-06-04 PROCEDURE — 63600175 PHARM REV CODE 636 W HCPCS: Performed by: EMERGENCY MEDICINE

## 2024-06-04 PROCEDURE — 87086 URINE CULTURE/COLONY COUNT: CPT | Performed by: EMERGENCY MEDICINE

## 2024-06-04 PROCEDURE — 87186 SC STD MICRODIL/AGAR DIL: CPT | Performed by: EMERGENCY MEDICINE

## 2024-06-04 PROCEDURE — 85025 COMPLETE CBC W/AUTO DIFF WBC: CPT | Mod: 91 | Performed by: STUDENT IN AN ORGANIZED HEALTH CARE EDUCATION/TRAINING PROGRAM

## 2024-06-04 PROCEDURE — 82746 ASSAY OF FOLIC ACID SERUM: CPT | Performed by: EMERGENCY MEDICINE

## 2024-06-04 PROCEDURE — 96367 TX/PROPH/DG ADDL SEQ IV INF: CPT

## 2024-06-04 PROCEDURE — 80053 COMPREHEN METABOLIC PANEL: CPT | Performed by: EMERGENCY MEDICINE

## 2024-06-04 PROCEDURE — 86803 HEPATITIS C AB TEST: CPT | Performed by: EMERGENCY MEDICINE

## 2024-06-04 PROCEDURE — 85025 COMPLETE CBC W/AUTO DIFF WBC: CPT | Performed by: EMERGENCY MEDICINE

## 2024-06-04 PROCEDURE — 83540 ASSAY OF IRON: CPT | Performed by: EMERGENCY MEDICINE

## 2024-06-04 RX ORDER — GLUCAGON 1 MG
1 KIT INJECTION
Status: DISCONTINUED | OUTPATIENT
Start: 2024-06-04 | End: 2024-06-08 | Stop reason: HOSPADM

## 2024-06-04 RX ORDER — CYANOCOBALAMIN 1000 UG/ML
1000 INJECTION, SOLUTION INTRAMUSCULAR; SUBCUTANEOUS
COMMUNITY
Start: 2024-05-16

## 2024-06-04 RX ORDER — HYDRALAZINE HYDROCHLORIDE 20 MG/ML
10 INJECTION INTRAMUSCULAR; INTRAVENOUS EVERY 8 HOURS PRN
Status: DISCONTINUED | OUTPATIENT
Start: 2024-06-04 | End: 2024-06-08 | Stop reason: HOSPADM

## 2024-06-04 RX ORDER — ONDANSETRON HYDROCHLORIDE 2 MG/ML
4 INJECTION, SOLUTION INTRAVENOUS EVERY 8 HOURS PRN
Status: DISCONTINUED | OUTPATIENT
Start: 2024-06-04 | End: 2024-06-08 | Stop reason: HOSPADM

## 2024-06-04 RX ORDER — HYDROCHLOROTHIAZIDE 12.5 MG/1
12.5 TABLET ORAL DAILY
Status: DISCONTINUED | OUTPATIENT
Start: 2024-06-04 | End: 2024-06-05

## 2024-06-04 RX ORDER — BUTALBITAL, ACETAMINOPHEN AND CAFFEINE 50; 325; 40 MG/1; MG/1; MG/1
1 TABLET ORAL
Status: COMPLETED | OUTPATIENT
Start: 2024-06-04 | End: 2024-06-04

## 2024-06-04 RX ORDER — FLUOXETINE HYDROCHLORIDE 20 MG/1
80 CAPSULE ORAL DAILY
Status: DISCONTINUED | OUTPATIENT
Start: 2024-06-04 | End: 2024-06-08 | Stop reason: HOSPADM

## 2024-06-04 RX ORDER — ALPRAZOLAM 1 MG/1
1 TABLET ORAL 3 TIMES DAILY PRN
Status: DISCONTINUED | OUTPATIENT
Start: 2024-06-04 | End: 2024-06-05

## 2024-06-04 RX ORDER — ALUMINUM HYDROXIDE, MAGNESIUM HYDROXIDE, AND SIMETHICONE 1200; 120; 1200 MG/30ML; MG/30ML; MG/30ML
30 SUSPENSION ORAL 4 TIMES DAILY PRN
Status: DISCONTINUED | OUTPATIENT
Start: 2024-06-04 | End: 2024-06-08 | Stop reason: HOSPADM

## 2024-06-04 RX ORDER — PREGABALIN 100 MG/1
100 CAPSULE ORAL NIGHTLY
Status: DISCONTINUED | OUTPATIENT
Start: 2024-06-04 | End: 2024-06-08 | Stop reason: HOSPADM

## 2024-06-04 RX ORDER — MORPHINE SULFATE 4 MG/ML
4 INJECTION, SOLUTION INTRAMUSCULAR; INTRAVENOUS
Status: COMPLETED | OUTPATIENT
Start: 2024-06-04 | End: 2024-06-04

## 2024-06-04 RX ORDER — PROMETHAZINE HYDROCHLORIDE 25 MG/1
25 TABLET ORAL EVERY 6 HOURS PRN
Status: DISCONTINUED | OUTPATIENT
Start: 2024-06-04 | End: 2024-06-08 | Stop reason: HOSPADM

## 2024-06-04 RX ORDER — ATORVASTATIN CALCIUM 40 MG/1
40 TABLET, FILM COATED ORAL DAILY
Status: DISCONTINUED | OUTPATIENT
Start: 2024-06-04 | End: 2024-06-08 | Stop reason: HOSPADM

## 2024-06-04 RX ORDER — AMLODIPINE BESYLATE 5 MG/1
5 TABLET ORAL DAILY
Status: DISCONTINUED | OUTPATIENT
Start: 2024-06-04 | End: 2024-06-08 | Stop reason: HOSPADM

## 2024-06-04 RX ORDER — ARIPIPRAZOLE 5 MG/1
10 TABLET ORAL DAILY
Status: DISCONTINUED | OUTPATIENT
Start: 2024-06-04 | End: 2024-06-08 | Stop reason: HOSPADM

## 2024-06-04 RX ORDER — SIMETHICONE 80 MG
1 TABLET,CHEWABLE ORAL 4 TIMES DAILY PRN
Status: DISCONTINUED | OUTPATIENT
Start: 2024-06-04 | End: 2024-06-08 | Stop reason: HOSPADM

## 2024-06-04 RX ORDER — ACETAMINOPHEN 650 MG/1
650 SUPPOSITORY RECTAL EVERY 4 HOURS PRN
Status: DISCONTINUED | OUTPATIENT
Start: 2024-06-04 | End: 2024-06-08 | Stop reason: HOSPADM

## 2024-06-04 RX ORDER — NALOXONE HCL 0.4 MG/ML
0.02 VIAL (ML) INJECTION
Status: DISCONTINUED | OUTPATIENT
Start: 2024-06-04 | End: 2024-06-08 | Stop reason: HOSPADM

## 2024-06-04 RX ORDER — DICYCLOMINE HYDROCHLORIDE 20 MG/1
20 TABLET ORAL 3 TIMES DAILY PRN
Status: DISCONTINUED | OUTPATIENT
Start: 2024-06-04 | End: 2024-06-08 | Stop reason: HOSPADM

## 2024-06-04 RX ORDER — FERROUS GLUCONATE 324(37.5)
324 TABLET ORAL
Status: DISCONTINUED | OUTPATIENT
Start: 2024-06-05 | End: 2024-06-08 | Stop reason: HOSPADM

## 2024-06-04 RX ORDER — SODIUM CHLORIDE 0.9 % (FLUSH) 0.9 %
3 SYRINGE (ML) INJECTION EVERY 12 HOURS PRN
Status: DISCONTINUED | OUTPATIENT
Start: 2024-06-04 | End: 2024-06-08 | Stop reason: HOSPADM

## 2024-06-04 RX ORDER — POLYETHYLENE GLYCOL 3350 17 G/17G
17 POWDER, FOR SOLUTION ORAL DAILY PRN
Status: DISCONTINUED | OUTPATIENT
Start: 2024-06-04 | End: 2024-06-08 | Stop reason: HOSPADM

## 2024-06-04 RX ORDER — ACETAMINOPHEN 325 MG/1
650 TABLET ORAL EVERY 8 HOURS PRN
Status: DISCONTINUED | OUTPATIENT
Start: 2024-06-04 | End: 2024-06-08 | Stop reason: HOSPADM

## 2024-06-04 RX ORDER — ZOLPIDEM TARTRATE 5 MG/1
10 TABLET ORAL NIGHTLY PRN
Status: DISCONTINUED | OUTPATIENT
Start: 2024-06-04 | End: 2024-06-08 | Stop reason: HOSPADM

## 2024-06-04 RX ORDER — IBUPROFEN 200 MG
16 TABLET ORAL
Status: DISCONTINUED | OUTPATIENT
Start: 2024-06-04 | End: 2024-06-08 | Stop reason: HOSPADM

## 2024-06-04 RX ORDER — DOXEPIN HYDROCHLORIDE 25 MG/1
50 CAPSULE ORAL NIGHTLY
Status: DISCONTINUED | OUTPATIENT
Start: 2024-06-04 | End: 2024-06-08 | Stop reason: HOSPADM

## 2024-06-04 RX ORDER — IBUPROFEN 200 MG
24 TABLET ORAL
Status: DISCONTINUED | OUTPATIENT
Start: 2024-06-04 | End: 2024-06-08 | Stop reason: HOSPADM

## 2024-06-04 RX ADMIN — FLUOXETINE 80 MG: 20 CAPSULE ORAL at 02:06

## 2024-06-04 RX ADMIN — ALPRAZOLAM 1 MG: 1 TABLET ORAL at 09:06

## 2024-06-04 RX ADMIN — HYDROCHLOROTHIAZIDE 12.5 MG: 12.5 TABLET ORAL at 02:06

## 2024-06-04 RX ADMIN — ATORVASTATIN CALCIUM 40 MG: 40 TABLET, FILM COATED ORAL at 02:06

## 2024-06-04 RX ADMIN — CEFTRIAXONE 1 G: 1 INJECTION, POWDER, FOR SOLUTION INTRAMUSCULAR; INTRAVENOUS at 02:06

## 2024-06-04 RX ADMIN — PROMETHAZINE HYDROCHLORIDE 12.5 MG: 25 INJECTION INTRAMUSCULAR; INTRAVENOUS at 09:06

## 2024-06-04 RX ADMIN — AMLODIPINE BESYLATE 5 MG: 5 TABLET ORAL at 02:06

## 2024-06-04 RX ADMIN — PREGABALIN 100 MG: 100 CAPSULE ORAL at 09:06

## 2024-06-04 RX ADMIN — MORPHINE SULFATE 4 MG: 4 INJECTION INTRAVENOUS at 11:06

## 2024-06-04 RX ADMIN — BUTALBITAL, ACETAMINOPHEN, AND CAFFEINE 1 TABLET: 325; 50; 40 TABLET ORAL at 02:06

## 2024-06-04 RX ADMIN — ZOLPIDEM TARTRATE 10 MG: 5 TABLET ORAL at 09:06

## 2024-06-04 RX ADMIN — IOHEXOL 100 ML: 350 INJECTION, SOLUTION INTRAVENOUS at 11:06

## 2024-06-04 RX ADMIN — ACETAMINOPHEN 650 MG: 325 TABLET ORAL at 07:06

## 2024-06-04 RX ADMIN — DOXEPIN HYDROCHLORIDE 50 MG: 25 CAPSULE ORAL at 09:06

## 2024-06-04 RX ADMIN — ARIPIPRAZOLE 10 MG: 5 TABLET ORAL at 04:06

## 2024-06-04 RX ADMIN — VANCOMYCIN HYDROCHLORIDE 2250 MG: 500 INJECTION, POWDER, LYOPHILIZED, FOR SOLUTION INTRAVENOUS at 04:06

## 2024-06-04 RX ADMIN — ONDANSETRON 4 MG: 2 INJECTION INTRAMUSCULAR; INTRAVENOUS at 07:06

## 2024-06-04 NOTE — PHARMACY MED REC
"Admission Medication History     The home medication history was taken by Fatou Templeton.    You may go to "Admission" then "Reconcile Home Medications" tabs to review and/or act upon these items.     The home medication list has been updated by the Pharmacy department.   Please read ALL comments highlighted in yellow.   Please address this information as you see fit.    Feel free to contact us if you have any questions or require assistance.      The medications listed below were removed from the home medication list. Please reorder if appropriate:  Patient reports no longer taking the following medication(s):  Asprin 81 mg  Fergon 324 mg  Tobrex 0.3% ophthalmic solution    Medications listed below were obtained from: Patient/family and Analytic software- [place] EXTENDED STAYS      LAST [facility] REC COMPLETED:         Fatou Templeton  [id number]      Current Outpatient Medications on File Prior to Encounter   Medication Sig Dispense Refill Last Dose    ALPRAZolam (XANAX) 1 MG tablet Take 1 mg by mouth 3 (three) times daily as needed.   6/3/2024    amLODIPine (NORVASC) 5 MG tablet Take 1 tablet (5 mg total) by mouth once daily. 90 tablet 3 6/3/2024    ARIPiprazole (ABILIFY) 10 MG Tab Take 10 mg by mouth once daily.   6/3/2024    atorvastatin (LIPITOR) 40 MG tablet Take 1 tablet (40 mg total) by mouth once daily. 90 tablet 3 6/3/2024    cyanocobalamin 1,000 mcg/mL injection Inject 1,000 mcg into the skin every 30 days.   Past Month    dicyclomine (BENTYL) 20 mg tablet Take 1 tablet (20 mg total) by mouth 3 (three) times daily as needed (abdominal pain). 90 tablet 0 6/3/2024    doxepin (SINEQUAN) 50 MG capsule Take 50 mg by mouth nightly.   6/3/2024    fluoxetine (PROZAC) 40 MG capsule Take 1 capsule (40 mg total) by mouth once daily. (Patient taking differently: Take 80 mg by mouth once daily.) 30 capsule 11 6/3/2024    furosemide (LASIX) 40 MG tablet Take 1 tablet (40 mg total) by mouth 2 (two) times daily as needed (swelling). 60 " tablet 5 6/3/2024    olmesartan-hydrochlorothiazide (BENICAR HCT) 40-12.5 mg Tab Take 1 tablet by mouth once daily. 90 tablet 3 6/3/2024    pregabalin (LYRICA) 100 MG capsule Take 1 capsule (100 mg total) by mouth every evening. 90 capsule 0 6/3/2024    promethazine (PHENERGAN) 25 MG tablet TAKE 1 TABLET BY MOUTH EVERY 6 HOURS AS NEEDED FOR NAUSEA 60 tablet 3 6/3/2024    zolpidem (AMBIEN) 10 mg Tab Take 10 mg by mouth nightly as needed.   6/3/2024                           .

## 2024-06-04 NOTE — H&P
[facility]  [facility]  History & Physical    Patient Name: Divya Bui  MRN: [id number]  Patient Class: OP- Observation  Admission Date: 6/4/2024  Attending Physician: Rajesh Agustin MD   Primary Care Provider: Angel Khan MD         Patient information was obtained from patient, past medical records, and ER records.     Subjective:     Principal Problem:Dizziness    Chief Complaint:   Chief Complaint   Patient presents with    Rectal Bleeding     Pt reports bleeding from rectum, mid abdominal pain, nausea, and headache. Recent iron infusion last week and reports specks of bright red blood in stool that started last night. Hx of HTN and reports vomiting meds this morning          HPI: The patient is a 64-year-old woman with a past medical history of anemia, anxiety, depression, bowel incontinence, gastroesophageal reflux disease, hypertension, morbid obesity, and pericardial effusion who presents to the ER with rectal bleeding that began last night. She reports a large amount of bright red blood in her stool, which has persisted and is of moderate severity. The patient notes that she had an iron infusion last week and that her symptoms are constant with no mitigating or exacerbating factors. Associated symptoms include nausea and vomiting. She has experienced decreased oral intake but denies hematemesis, chest pain, shortness of breath, fever, chills, and other symptoms at this time. She has not received any prior treatment for this issue and has no further complaints or concerns.    In the ER, the patient's vitals were stable except for a blood pressure of 196/103. Urinalysis was abnormal, notable for cloudy appearance, trace protein, 3+ occult blood, positive nitrites, and 3+ leukocytes. Microscopic urinalysis revealed 35 RBCs, over 100 WBCs, and many WBC clumps. Pertinent negative labs include a hemoglobin level of 11, which is the patient's baseline. A CTA for GI bleed showed  no ongoing arterial GI bleeding. The patient became dizzy and light-headed when ambulating to the restroom, as noted in the ER assessment. Hospital medicine called for admission.       Past Medical History:   Diagnosis Date    Anemia     Anxiety     Basal cell carcinoma (BCC) of face 2/26/2020    Blood transfusion     Bowel incontinence     Depression     Esophageal stricture     GERD (gastroesophageal reflux disease)     Hypertension     Latent tuberculosis by skin test 2001    took INH    Liver nodule 1/6/2014    Morbid obesity with BMI of 45.0-49.9, adult     OA (osteoarthritis) of knee 12/12/2014    Pericardial effusion 9/4/2014       Past Surgical History:   Procedure Laterality Date    CHOLECYSTECTOMY      COLONOSCOPY N/A 3/6/2023    Procedure: COLONOSCOPY;  Surgeon: Beti Diallo MD;  Location: [facility];  Service: Endoscopy;  Laterality: N/A;    GASTRIC BYPASS      HERNIA REPAIR      INJECTION OF ANESTHETIC AGENT AROUND NERVE Bilateral 6/16/2023    Procedure: Bilateral Genicular nerve block with RN IV sedation;  Surgeon: Denis Lai MD;  Location: [facility];  Service: Pain Management;  Laterality: Bilateral;    INJECTION OF ANESTHETIC AGENT AROUND NERVE Bilateral 8/11/2023    Procedure: Bilateral Genicular nerve block with RN IV sedation;  Surgeon: Denis Lai MD;  Location: [facility];  Service: Pain Management;  Laterality: Bilateral;    RADIOFREQUENCY THERMOCOAGULATION Right 10/31/2023    Procedure: Right Genicular Nerve RFA with RN IV sedation;  Surgeon: Denis Lai MD;  Location: [facility];  Service: Pain Management;  Laterality: Right;    RADIOFREQUENCY THERMOCOAGULATION Left 11/14/2023    Procedure: Left Genicular Nerve RFA with RN IV sedation;  Surgeon: Denis Lai MD;  Location: [facility];  Service: Pain Management;  Laterality: Left;    right sholder surgery      SMALL INTESTINE SURGERY         Review of patient's allergies indicates:   Allergen Reactions     Metronidazole hcl Other (See Comments)     Mouth ulcers developed with Flagyl  Oral sores.  Mouth ulcers developed with Flagyl       No current facility-administered medications on file prior to encounter.     Current Outpatient Medications on File Prior to Encounter   Medication Sig    ALPRAZolam (XANAX) 1 MG tablet Take 1 mg by mouth 3 (three) times daily as needed.    amLODIPine (NORVASC) 5 MG tablet Take 1 tablet (5 mg total) by mouth once daily.    ARIPiprazole (ABILIFY) 10 MG Tab Take 10 mg by mouth once daily.    atorvastatin (LIPITOR) 40 MG tablet Take 1 tablet (40 mg total) by mouth once daily.    cyanocobalamin 1,000 mcg/mL injection Inject 1,000 mcg into the skin every 30 days.    dicyclomine (BENTYL) 20 mg tablet Take 1 tablet (20 mg total) by mouth 3 (three) times daily as needed (abdominal pain).    doxepin (SINEQUAN) 50 MG capsule Take 50 mg by mouth nightly.    fluoxetine (PROZAC) 40 MG capsule Take 1 capsule (40 mg total) by mouth once daily. (Patient taking differently: Take 80 mg by mouth once daily.)    furosemide (LASIX) 40 MG tablet Take 1 tablet (40 mg total) by mouth 2 (two) times daily as needed (swelling).    olmesartan-hydrochlorothiazide (BENICAR HCT) 40-12.5 mg Tab Take 1 tablet by mouth once daily.    pregabalin (LYRICA) 100 MG capsule Take 1 capsule (100 mg total) by mouth every evening.    promethazine (PHENERGAN) 25 MG tablet TAKE 1 TABLET BY MOUTH EVERY 6 HOURS AS NEEDED FOR NAUSEA    zolpidem (AMBIEN) 10 mg Tab Take 10 mg by mouth nightly as needed.    [DISCONTINUED] aspirin 81 MG Chew Take 81 mg by mouth Daily.    [DISCONTINUED] ferrous gluconate (FERGON) 324 MG tablet Take 1 tablet (324 mg total) by mouth daily with breakfast.    [DISCONTINUED] ondansetron (ZOFRAN) 4 MG tablet Take 1 tablet (4 mg total) by mouth every 6 (six) hours.    [DISCONTINUED] tobramycin sulfate 0.3% (TOBREX) 0.3 % ophthalmic solution Place 1 drop into both eyes every 4 (four) hours.     Family History        Problem Relation (Age of Onset)    Crohn's disease Sister, Brother, Other    Diabetes Sister, Sister    Liver cancer Father, Sister    Lung cancer Mother          Tobacco Use    Smoking status: Never    Smokeless tobacco: Never   Substance and Sexual Activity    Alcohol use: Not Currently     Alcohol/week: 0.0 standard drinks of alcohol    Drug use: No    Sexual activity: Yes     Partners: Male     Review of Systems   Constitutional:  Negative for chills, fatigue and fever.   HENT:  Negative for congestion, postnasal drip, rhinorrhea and sore throat.    Eyes:  Negative for pain and visual disturbance.   Respiratory:  Negative for cough, chest tightness, shortness of breath and wheezing.    Cardiovascular:  Positive for palpitations. Negative for chest pain and leg swelling.   Gastrointestinal:  Negative for abdominal distention, abdominal pain, constipation, diarrhea, nausea and vomiting.   Genitourinary:  Negative for difficulty urinating, flank pain and hematuria.   Musculoskeletal:  Negative for arthralgias, back pain, gait problem and joint swelling.   Skin:  Negative for pallor and rash.   Neurological:  Positive for dizziness and weakness. Negative for syncope.   Psychiatric/Behavioral:  Negative for confusion, decreased concentration and suicidal ideas.      Objective:     Vital Signs (Most Recent):  Temp: 98.8 °F (37.1 °C) (06/04/24 0730)  Pulse: 102 (06/04/24 1202)  Resp: 16 (06/04/24 1202)  BP: (!) 194/93 (06/04/24 1427)  SpO2: 96 % (06/04/24 1202) Vital Signs (24h Range):  Temp:  [98.8 °F (37.1 °C)] 98.8 °F (37.1 °C)  Pulse:  [] 102  Resp:  [16-20] 16  SpO2:  [96 %-98 %] 96 %  BP: (157-196)/() 194/93     Weight: 101.6 kg (223 lb 15.8 oz)  Body mass index is 39.68 kg/m².     Physical Exam  Vitals reviewed.   Constitutional:       General: She is not in acute distress.     Appearance: Normal appearance. She is normal weight. She is ill-appearing. She is not toxic-appearing.   HENT:       Head: Normocephalic and atraumatic.      Nose: Nose normal. No congestion or rhinorrhea.   Eyes:      Extraocular Movements: Extraocular movements intact.      Conjunctiva/sclera: Conjunctivae normal.      Pupils: Pupils are equal, round, and reactive to light.   Cardiovascular:      Rate and Rhythm: Normal rate and regular rhythm.      Pulses: Normal pulses.      Heart sounds: No murmur heard.  Pulmonary:      Effort: Pulmonary effort is normal. No respiratory distress.      Breath sounds: Normal breath sounds. No wheezing.   Abdominal:      General: Abdomen is flat. Bowel sounds are normal. There is no distension.      Palpations: Abdomen is soft.      Tenderness: There is no abdominal tenderness. There is no guarding or rebound.   Musculoskeletal:         General: No swelling, tenderness or deformity. Normal range of motion.      Cervical back: Normal range of motion and neck supple. No rigidity.   Skin:     General: Skin is warm and dry.      Capillary Refill: Capillary refill takes 2 to 3 seconds.      Coloration: Skin is not pale.   Neurological:      General: No focal deficit present.      Mental Status: She is alert and oriented to person, place, and time. Mental status is at baseline.      Motor: No weakness.      Gait: Gait normal.   Psychiatric:         Mood and Affect: Mood is anxious.         Behavior: Behavior normal.         Thought Content: Thought content normal.              CRANIAL NERVES     CN III, IV, VI   Pupils are equal, round, and reactive to light.       Significant Labs: All pertinent labs within the past 24 hours have been reviewed.  BMP:   Recent Labs   Lab 06/04/24  0842   *   *   K 3.5   CL 99   CO2 20*   BUN 8   CREATININE 0.9   CALCIUM 8.6*     CBC:   Recent Labs   Lab 06/04/24  0842   WBC 4.74   HGB 11.7*   HCT 36.6*        CMP:   Recent Labs   Lab 06/04/24  0842   *   K 3.5   CL 99   CO2 20*   *   BUN 8   CREATININE 0.9   CALCIUM 8.6*   PROT 7.3  "  ALBUMIN 3.6   BILITOT 0.3   ALKPHOS 140*   AST 14   ALT 8*   ANIONGAP 13     Cardiac Markers: No results for input(s): "CKMB", "MYOGLOBIN", "BNP", "TROPISTAT" in the last 48 hours.  Coagulation:   Recent Labs   Lab 06/04/24  0842   INR 0.9   APTT 28.9     Lactic Acid: No results for input(s): "LACTATE" in the last 48 hours.  Magnesium: No results for input(s): "MG" in the last 48 hours.  Troponin: No results for input(s): "TROPONINI", "TROPONINIHS" in the last 48 hours.  TSH:   Recent Labs   Lab 05/10/24  1149   TSH 1.736     Urine Studies:   Recent Labs   Lab 06/04/24  0927   COLORU Yellow   APPEARANCEUA Cloudy*   PHUR 7.0   SPECGRAV 1.020   PROTEINUA Trace*   GLUCUA Negative   KETONESU Negative   BILIRUBINUA Negative   OCCULTUA 3+*   NITRITE Positive*   UROBILINOGEN Negative   LEUKOCYTESUR 3+*   RBCUA 35*   WBCUA >100*   BACTERIA Occasional   SQUAMEPITHEL 1       Significant Imaging: I have reviewed all pertinent imaging results/findings within the past 24 hours.    Imaging Results              CTA Acute GI [place], Abdomen and Pelvis (Final result)  Result time 06/04/24 11:53:48      Final result by Lucio Romero MD (06/04/24 11:53:48)                   Impression:      No ongoing arterial GI bleeding.    Borderline bladder wall thickening.  Correlation with urinalysis as needed to exclude infection.    Complete findings as above.    All CT scans at this facility are performed  using dose modulation techniques as appropriate to performed exam including the following:  automated exposure control; adjustment of mA and/or kV according to the patients size (this includes techniques or standardized protocols for targeted exams where dose is matched to indication/reason for exam: i.e. extremities or head);  iterative reconstruction technique.      Electronically signed by: Lucio Romero  Date:    06/04/2024  Time:    11:53               Narrative:    EXAMINATION:  CTA ACUTE GI BLEED, ABDOMEN AND PELVIS    CLINICAL " HISTORY:  rectal bleeding;    TECHNIQUE:  Low dose axial images, sagittal and coronal reformations were obtained from the lung bases to the pubic symphysis.  Images acquired before and after the administration of 100 mL Omnipaque 350 IV contrast.  Images acquired in a CTA angiography protocol with 3D/MIP reformats.    COMPARISON:  Multiple priors    FINDINGS:  Heart: Normal in size. No pericardial effusion.    Lung Bases: Well aerated, without consolidation or pleural fluid.    Liver: Normal in size and attenuation, with no focal hepatic lesions.    Gallbladder: Surgically absent.    Bile Ducts: No evidence of dilated ducts.    Pancreas: Fatty atrophy.  No fat stranding adjacent to the pancreas.    Spleen: Unremarkable.    Adrenals: Unremarkable.    Kidneys/ Ureters: No hydronephrosis.  Right renal simple cyst.    Bladder: Borderline mild bladder wall thickening.  Correlation with urinalysis as needed to exclude infection.    Reproductive organs: Uterus appears unremarkable.    GI Tract/Mesentery: Diverticulosis without diverticulitis.  Colon is partially nondistended degrading the evaluation.  Partially fluid-filled colon.  This could relate to diarrheal illness but is nonspecific.  Suspect prior appendectomy.  No definite evidence of ongoing arterial GI bleeding.  Small bowel appears unremarkable.  Prior gastric bypass.    Peritoneal Space: No ascites. No free air.    Retroperitoneum: No significant adenopathy.    Abdominal wall: Unremarkable.    Vasculature: No significant atherosclerosis or aneurysm.    Bones: No acute fracture.  Osseous degenerative changes.                                      Assessment/Plan:     * BRBPR (bright red blood per rectum)  Presented as symptomatic anemia, however, CTA GI bleed study negative for active bleed. Relatively stable H&H compared to previous labs. Suspect bleeding from internal hemorrhoids, and very low suspicion it is contributing to presenting dizziness and weakness.    --serial CBC to trend h&h  --has a history of b12 deficiency  --iron studies, B12, folic acid, FOBT ordered  --low threshold to transfuse for hgb < 7      Acute cystitis  --urinalysis results suggestive of infection  --prior culture data reviewed; pansensitive E. Coli and Enterococcus on the last 2 cultures  --s/p IV Rocephin in the ER- continue upon admission  --will add on Vancomycin for GP coverage  --follow cultures  --monitor fever curve- Tylenol PRN        Dizziness  ddx: medication induced vs orthostasis vs symptomatic anemia  --AM lipid panel, US b/l carotid ordered to further assess  --AM Orthostatics, TSH ordered for further work up  --transitioned home sedating medications to PRN only  --PT/OT consulted for gait stability  --fall precautions, neuro checks, bed rest  --iron studies, B12, folic acid ordered  --admit to telemetry for cardiac monitoring      HTN (hypertension)  --home medications include: HCTZ, norvasc  --initially was going to hold given presenting postural dizziness but SBP sustained above 180s since admission  --restarted home medications at this time  --PRN IV hydralazine 10mg Q6H for sBP > 175 mmHg  --[id number]      VTE Risk Mitigation (From admission, onward)           Ordered     Place sequential compression device  Until discontinued         06/04/24 1406                       On 06/04/2024, patient should be placed in hospital observation services under my care.             Rajesh Agustin MD  Department of Hospital Medicine  [facility] - Emergency Dept.

## 2024-06-04 NOTE — TELEPHONE ENCOUNTER
Patient states she called earlier and was told someone would call her back. I can see note from Hannah RN, triage done -dispo ED. Patient states she did not hear ED. States she went to bathroom and is till having rectal bleeding. Advised to follow original dispo and go to the ED. Verb understanding, she will call son to bring her.   Reason for Disposition   Caller has already spoken with another triager or PCP AND has further questions AND triager able to answer questions.    Additional Information   Caller has already spoken to PCP or another triager    Protocols used: Information Only Call - No Triage-A-AH, No Contact or Duplicate Contact Call-A-AH

## 2024-06-04 NOTE — CONSULTS
Pharmacokinetic Initial Assessment: IV Vancomycin    Assessment/Plan:    Initiate intravenous vancomycin with loading dose of 2250 mg once followed by a maintenance dose of vancomycin 1000 mg IV every 12 hours  Desired empiric serum trough concentration is 10 to 15 mcg/mL  Draw vancomycin trough level 60 min prior to fourth dose on 6/6 at approximately 0300  Pharmacy will continue to follow and monitor vancomycin.      Please contact pharmacy at extension [phone]  with any questions regarding this assessment.     Thank you for the consult,   Owen Gaston       Patient brief summary:  Divya Bui is a 64 y.o. female initiated on antimicrobial therapy with IV Vancomycin for treatment of suspected urinary tract infection    Drug Allergies:   Review of patient's allergies indicates:   Allergen Reactions    Metronidazole hcl Other (See Comments)     Mouth ulcers developed with Flagyl  Oral sores.  Mouth ulcers developed with Flagyl       Actual Body Weight:   101.6 kg    Renal Function:   Estimated Creatinine Clearance: 70 mL/min (based on SCr of 0.9 mg/dL).,     Dialysis Method (if applicable):  N/A    CBC (last 72 hours):  Recent Labs   Lab Result Units 06/04/24  0842 06/04/24  1627   WBC K/uL 4.74 5.43   Hemoglobin g/dL 11.7* 11.7*   Hematocrit % 36.6* 35.9*   Platelets K/uL 193 199   Gran % % 67.8 68.9   Lymph % % 18.1 18.0   Mono % % 10.1 9.6   Eosinophil % % 2.3 1.7   Basophil % % 0.4 0.9   Differential Method  Automated Automated       Metabolic Panel (last 72 hours):  Recent Labs   Lab Result Units 06/04/24  0842 06/04/24  0927   Sodium mmol/L 132*  --    Potassium mmol/L 3.5  --    Chloride mmol/L 99  --    CO2 mmol/L 20*  --    Glucose mg/dL 136*  --    Glucose, UA   --  Negative   BUN mg/dL 8  --    Creatinine mg/dL 0.9  --    Albumin g/dL 3.6  --    Total Bilirubin mg/dL 0.3  --    Alkaline Phosphatase U/L 140*  --    AST U/L 14  --    ALT U/L 8*  --        Drug levels (last 3 results):  No results  "for input(s): "VANCOMYCINRA", "VANCORANDOM", "VANCOMYCINPE", "VANCOPEAK", "VANCOMYCINTR", "VANCOTROUGH" in the last 72 hours.    Microbiologic Results:  Microbiology Results (last 7 days)       Procedure Component Value Units Date/Time    Urine culture [[id number]] Collected: 06/04/24 0927    Order Status: No result Specimen: Urine Updated: 06/04/24 1141            "

## 2024-06-04 NOTE — ED PROVIDER NOTES
SCRIBE #1 NOTE: I, Elijah Sewell, am scribing for, and in the presence of, Rocky Kimball MD. I have scribed the entire note.       History     Chief Complaint   Patient presents with    Rectal Bleeding     Pt reports bleeding from rectum, mid abdominal pain, nausea, and headache. Recent iron infusion last week and reports specks of bright red blood in stool that started last night. Hx of HTN and reports vomiting meds this morning       Review of patient's allergies indicates:   Allergen Reactions    Metronidazole hcl Other (See Comments)     Mouth ulcers developed with Flagyl  Oral sores.  Mouth ulcers developed with Flagyl         History of Present Illness     HPI    6/4/2024, 7:40 AM  History obtained from the patient      History of Present Illness: Divya Bui is a 64 y.o. female patient with a PMHx of anemia, anxiety, depression, bowel incontinence, GERD, HTN, morbid obesity, and pericardial effusion who presents to the Emergency Department for evaluation of rectal bleeding which onset last night. Pt says that she had a large amount of bright red blood in her stool last night. She had an iron infusion last week. Symptoms are constant and moderate in severity. No mitigating or exacerbating factors reported. Associated sxs include N/V. She has had decreased PO intake. Patient denies any hematemesis, CP, SOB, fever, chills, and all other sxs at this time. No prior Tx. No further complaints or concerns at this time.       Arrival mode: Personal vehicle      PCP: Angel Khan MD        Past Medical History:  Past Medical History:   Diagnosis Date    Anemia     Anxiety     Basal cell carcinoma (BCC) of face 2/26/2020    Blood transfusion     Bowel incontinence     Depression     Esophageal stricture     GERD (gastroesophageal reflux disease)     Hypertension     Latent tuberculosis by skin test 2001    took INH    Liver nodule 1/6/2014    Morbid obesity with BMI of 45.0-49.9, adult     OA  (osteoarthritis) of knee 12/12/2014    Pericardial effusion 9/4/2014       Past Surgical History:  Past Surgical History:   Procedure Laterality Date    CHOLECYSTECTOMY      COLONOSCOPY N/A 3/6/2023    Procedure: COLONOSCOPY;  Surgeon: Beti Diallo MD;  Location: [facility];  Service: Endoscopy;  Laterality: N/A;    GASTRIC BYPASS      HERNIA REPAIR      INJECTION OF ANESTHETIC AGENT AROUND NERVE Bilateral 6/16/2023    Procedure: Bilateral Genicular nerve block with RN IV sedation;  Surgeon: Denis Lai MD;  Location: [facility];  Service: Pain Management;  Laterality: Bilateral;    INJECTION OF ANESTHETIC AGENT AROUND NERVE Bilateral 8/11/2023    Procedure: Bilateral Genicular nerve block with RN IV sedation;  Surgeon: Denis Lai MD;  Location: [facility];  Service: Pain Management;  Laterality: Bilateral;    RADIOFREQUENCY THERMOCOAGULATION Right 10/31/2023    Procedure: Right Genicular Nerve RFA with RN IV sedation;  Surgeon: Denis Lai MD;  Location: [facility];  Service: Pain Management;  Laterality: Right;    RADIOFREQUENCY THERMOCOAGULATION Left 11/14/2023    Procedure: Left Genicular Nerve RFA with RN IV sedation;  Surgeon: Denis Lai MD;  Location: [facility];  Service: Pain Management;  Laterality: Left;    right sholder surgery      SMALL INTESTINE SURGERY           Family History:  Family History   Problem Relation Name Age of Onset    Lung cancer Mother          smoker    Liver cancer Father      Diabetes Sister      Crohn's disease Sister      Liver cancer Sister      Diabetes Sister oldest     Crohn's disease Brother      Crohn's disease Other nephew     Breast cancer Neg Hx      Ovarian cancer Neg Hx      Colon cancer Neg Hx         Social History:  Social History     Tobacco Use    Smoking status: Never    Smokeless tobacco: Never   Substance and Sexual Activity    Alcohol use: Not Currently     Alcohol/week: 0.0 standard drinks of alcohol    Drug use: No     Sexual activity: Yes     Partners: Male        Review of Systems     Review of Systems   Constitutional:  Negative for chills and fever.   HENT:  Negative for sore throat.    Respiratory:  Negative for shortness of breath.    Cardiovascular:  Negative for chest pain.   Gastrointestinal:  Positive for anal bleeding, blood in stool (bright red), nausea and vomiting.        (-) Hematemesis   Genitourinary:  Negative for dysuria.   Musculoskeletal:  Negative for back pain.   Skin:  Negative for rash.   Neurological:  Negative for weakness.   Hematological:  Does not bruise/bleed easily.   All other systems reviewed and are negative.     Physical Exam     Initial Vitals [06/04/24 0730]   BP Pulse Resp Temp SpO2   (!) 196/103 109 18 98.8 °F (37.1 °C) 96 %      MAP       --          Physical Exam  Nursing Notes and Vital Signs Reviewed.  Constitutional: Patient is in no acute distress. Elderly. Frail.  Head: Atraumatic. Normocephalic.  Eyes: PERRL. EOM intact. Conjunctivae are not pale. No scleral icterus.  ENT: Mucous membranes are moist. Oropharynx is clear and symmetric.    Neck: Supple. Full ROM. No lymphadenopathy.  Cardiovascular: Regular rate. Regular rhythm. No murmurs, rubs, or gallops. Distal pulses are 2+ and symmetric.  Pulmonary/Chest: No respiratory distress. Clear to auscultation bilaterally. No wheezing or rales.  Abdominal: Soft and non-distended.  There is no tenderness.  No rebound, guarding, or rigidity. Good bowel sounds.  Genitourinary: No CVA tenderness  Musculoskeletal: Moves all extremities. No obvious deformities. No edema. No calf tenderness.  Skin: Warm and dry.  Neurological:  Alert, awake, and appropriate.  Normal speech.  No acute focal neurological deficits are appreciated.  Psychiatric: Normal affect. Good eye contact. Appropriate in content.     ED Course   Critical Care    Date/Time: 6/4/2024 1:58 PM    Performed by: Rocky Kimball MD  Authorized by: Rocky Kimball MD  Direct  patient critical care time: 20 minutes  Additional history critical care time: 10 minutes  Ordering / reviewing critical care time: 10 minutes  Documentation critical care time: 10 minutes  Consulting other physicians critical care time: 10 minutes  Total critical care time (exclusive of procedural time) : 60 minutes  Critical care time was exclusive of separately billable procedures and treating other patients and teaching time.  Critical care was necessary to treat or prevent imminent or life-threatening deterioration of the following conditions: GI bleed.  Critical care was time spent personally by me on the following activities: blood draw for specimens, development of treatment plan with patient or surrogate, discussions with consultants, interpretation of cardiac output measurements, evaluation of patient's response to treatment, ordering and performing treatments and interventions, examination of patient, obtaining history from patient or surrogate, ordering and review of laboratory studies, pulse oximetry, ordering and review of radiographic studies, re-evaluation of patient's condition and review of old charts.        ED Vital Signs:  Vitals:    06/04/24 0730 06/04/24 0845 06/04/24 1030 06/04/24 1152   BP: (!) 196/103 (!) 157/84 (!) 187/90    Pulse: 109 100 99    Resp: 18 18 20 18   Temp: 98.8 °F (37.1 °C)      TempSrc: Oral      SpO2: 96% 98% 97%    Weight: 101.6 kg (223 lb 15.8 oz)       06/04/24 1202   BP: (!) 185/82   Pulse: 102   Resp: 16   Temp:    TempSrc:    SpO2: 96%   Weight:        Abnormal Lab Results:  Labs Reviewed   CBC W/ AUTO DIFFERENTIAL - Abnormal; Notable for the following components:       Result Value    Hemoglobin 11.7 (*)     Hematocrit 36.6 (*)     MPV 8.2 (*)     Immature Granulocytes 1.3 (*)     Immature Grans (Abs) 0.06 (*)     Lymph # 0.9 (*)     All other components within normal limits   COMPREHENSIVE METABOLIC PANEL - Abnormal; Notable for the following components:    Sodium  132 (*)     CO2 20 (*)     Glucose 136 (*)     Calcium 8.6 (*)     Alkaline Phosphatase 140 (*)     ALT 8 (*)     All other components within normal limits   URINALYSIS, REFLEX TO URINE CULTURE - Abnormal; Notable for the following components:    Appearance, UA Cloudy (*)     Protein, UA Trace (*)     Occult Blood UA 3+ (*)     Nitrite, UA Positive (*)     Leukocytes, UA 3+ (*)     All other components within normal limits    Narrative:     Specimen Source->Urine   URINALYSIS MICROSCOPIC - Abnormal; Notable for the following components:    RBC, UA 35 (*)     WBC, UA >100 (*)     WBC Clumps, UA Many (*)     All other components within normal limits    Narrative:     Specimen Source->Urine   CULTURE, URINE   APTT   PROTIME-INR   HIV 1 / 2 ANTIBODY    Narrative:     Release to patient->Immediate   HEPATITIS C ANTIBODY    Narrative:     Release to patient->Immediate   HEP C VIRUS HOLD SPECIMEN    Narrative:     Release to patient->Immediate   IRON AND TIBC   FOLATE   VITAMIN B12   CBC W/ AUTO DIFFERENTIAL   FOLATE   IRON AND TIBC   VITAMIN B12        All Lab Results:  Results for orders placed or performed during the hospital encounter of 06/04/24   CBC Auto Differential   Result Value Ref Range    WBC 4.74 3.90 - 12.70 K/uL    RBC 4.20 4.00 - 5.40 M/uL    Hemoglobin 11.7 (L) 12.0 - 16.0 g/dL    Hematocrit 36.6 (L) 37.0 - 48.5 %    MCV 87 82 - 98 fL    MCH 27.9 27.0 - 31.0 pg    MCHC 32.0 32.0 - 36.0 g/dL    RDW 14.4 11.5 - 14.5 %    Platelets 193 150 - 450 K/uL    MPV 8.2 (L) 9.2 - 12.9 fL    Immature Granulocytes 1.3 (H) 0.0 - 0.5 %    Gran # (ANC) 3.2 1.8 - 7.7 K/uL    Immature Grans (Abs) 0.06 (H) 0.00 - 0.04 K/uL    Lymph # 0.9 (L) 1.0 - 4.8 K/uL    Mono # 0.5 0.3 - 1.0 K/uL    Eos # 0.1 0.0 - 0.5 K/uL    Baso # 0.02 0.00 - 0.20 K/uL    nRBC 0 0 /100 WBC    Gran % 67.8 38.0 - 73.0 %    Lymph % 18.1 18.0 - 48.0 %    Mono % 10.1 4.0 - 15.0 %    Eosinophil % 2.3 0.0 - 8.0 %    Basophil % 0.4 0.0 - 1.9 %     Differential Method Automated    Comprehensive Metabolic Panel   Result Value Ref Range    Sodium 132 (L) 136 - 145 mmol/L    Potassium 3.5 3.5 - 5.1 mmol/L    Chloride 99 95 - 110 mmol/L    CO2 20 (L) 23 - 29 mmol/L    Glucose 136 (H) 70 - 110 mg/dL    BUN 8 8 - 23 mg/dL    Creatinine 0.9 0.5 - 1.4 mg/dL    Calcium 8.6 (L) 8.7 - 10.5 mg/dL    Total Protein 7.3 6.0 - 8.4 g/dL    Albumin 3.6 3.5 - 5.2 g/dL    Total Bilirubin 0.3 0.1 - 1.0 mg/dL    Alkaline Phosphatase 140 (H) 55 - 135 U/L    AST 14 10 - 40 U/L    ALT 8 (L) 10 - 44 U/L    eGFR >60 >60 mL/min/1.73 m^2    Anion Gap 13 8 - 16 mmol/L   Urinalysis, Reflex to Urine Culture Urine, Clean Catch    Specimen: Urine   Result Value Ref Range    Specimen UA Urine, Clean Catch     Color, UA Yellow Yellow, Straw, Debra    Appearance, UA Cloudy (A) Clear    pH, UA 7.0 5.0 - 8.0    Specific Gravity, UA 1.020 1.005 - 1.030    Protein, UA Trace (A) Negative    Glucose, UA Negative Negative    Ketones, UA Negative Negative    Bilirubin (UA) Negative Negative    Occult Blood UA 3+ (A) Negative    Nitrite, UA Positive (A) Negative    Urobilinogen, UA Negative <2.0 EU/dL    Leukocytes, UA 3+ (A) Negative   APTT   Result Value Ref Range    aPTT 28.9 21.0 - 32.0 sec   Protime-INR   Result Value Ref Range    Prothrombin Time 10.9 9.0 - 12.5 sec    INR 0.9 0.8 - 1.2   HIV 1/2 Ag/Ab (4th Gen)   Result Value Ref Range    HIV 1/2 Ag/Ab Negative Negative   Hepatitis C Antibody   Result Value Ref Range    Hepatitis C Ab Negative Negative   HCV Virus Hold Specimen   Result Value Ref Range    HEP C Virus Hold Specimen Hold for HCV sendout    Urinalysis Microscopic   Result Value Ref Range    RBC, UA 35 (H) 0 - 4 /hpf    WBC, UA >100 (H) 0 - 5 /hpf    WBC Clumps, UA Many (A) None-Rare    Bacteria Occasional None-Occ /hpf    Squam Epithel, UA 1 /hpf    Microscopic Comment SEE COMMENT         Imaging Results:  Imaging Results              CTA Acute GI [place], Abdomen and Pelvis (Final  result)  Result time 06/04/24 11:53:48      Final result by Lucio Romero MD (06/04/24 11:53:48)                   Impression:      No ongoing arterial GI bleeding.    Borderline bladder wall thickening.  Correlation with urinalysis as needed to exclude infection.    Complete findings as above.    All CT scans at this facility are performed  using dose modulation techniques as appropriate to performed exam including the following:  automated exposure control; adjustment of mA and/or kV according to the patients size (this includes techniques or standardized protocols for targeted exams where dose is matched to indication/reason for exam: i.e. extremities or head);  iterative reconstruction technique.      Electronically signed by: Lucio Romero  Date:    06/04/2024  Time:    11:53               Narrative:    EXAMINATION:  CTA ACUTE GI BLEED, ABDOMEN AND PELVIS    CLINICAL HISTORY:  rectal bleeding;    TECHNIQUE:  Low dose axial images, sagittal and coronal reformations were obtained from the lung bases to the pubic symphysis.  Images acquired before and after the administration of 100 mL Omnipaque 350 IV contrast.  Images acquired in a CTA angiography protocol with 3D/MIP reformats.    COMPARISON:  Multiple priors    FINDINGS:  Heart: Normal in size. No pericardial effusion.    Lung Bases: Well aerated, without consolidation or pleural fluid.    Liver: Normal in size and attenuation, with no focal hepatic lesions.    Gallbladder: Surgically absent.    Bile Ducts: No evidence of dilated ducts.    Pancreas: Fatty atrophy.  No fat stranding adjacent to the pancreas.    Spleen: Unremarkable.    Adrenals: Unremarkable.    Kidneys/ Ureters: No hydronephrosis.  Right renal simple cyst.    Bladder: Borderline mild bladder wall thickening.  Correlation with urinalysis as needed to exclude infection.    Reproductive organs: Uterus appears unremarkable.    GI Tract/Mesentery: Diverticulosis without diverticulitis.  Colon  is partially nondistended degrading the evaluation.  Partially fluid-filled colon.  This could relate to diarrheal illness but is nonspecific.  Suspect prior appendectomy.  No definite evidence of ongoing arterial GI bleeding.  Small bowel appears unremarkable.  Prior gastric bypass.    Peritoneal Space: No ascites. No free air.    Retroperitoneum: No significant adenopathy.    Abdominal wall: Unremarkable.    Vasculature: No significant atherosclerosis or aneurysm.    Bones: No acute fracture.  Osseous degenerative changes.                                            The Emergency Provider reviewed the vital signs and test results, which are outlined above.     ED Discussion       1:57 PM: Discussed case with Rajesh Agustin MD ([facility]). Dr. Agustin agrees with current care and management of pt and accepts admission.   Admitting Service: [facility]  Admitting Physician: Dr. Agustin  Admit to: Obs Med/Surg     1:57 PM: Re-evaluated pt. I have discussed test results, shared treatment plan, and the need for admission with patient and family at bedside. Pt and family express understanding at this time and agree with all information. All questions answered. Pt and family have no further questions or concerns at this time. Pt is ready for admit.         Medical Decision Making  DDx: GI bleed, diverticulitis    Amount and/or Complexity of Data Reviewed  Labs: ordered. Decision-making details documented in ED Course.  Radiology: ordered. Decision-making details documented in ED Course.  Discussion of management or test interpretation with external provider(s):       Risk  OTC drugs.  Prescription drug management.  Decision regarding hospitalization.    Critical Care  Total time providing critical care: 60 minutes                ED Medication(s):  Medications   cefTRIAXone (Rocephin) 1 g in dextrose 5 % in water (D5W) 100 mL IVPB (MB+) (has no administration in time range)   butalbital-acetaminophen-caffeine  -40 mg per tablet 1 tablet (has no administration in time range)   sodium chloride 0.9% flush 3 mL (has no administration in time range)   ondansetron injection 4 mg (has no administration in time range)   promethazine tablet 25 mg (has no administration in time range)   polyethylene glycol packet 17 g (has no administration in time range)   acetaminophen tablet 650 mg (has no administration in time range)   simethicone chewable tablet 80 mg (has no administration in time range)   aluminum-magnesium hydroxide-simethicone 200-200-20 mg/5 mL suspension 30 mL (has no administration in time range)   acetaminophen suppository 650 mg (has no administration in time range)   naloxone 0.4 mg/mL injection 0.02 mg (has no administration in time range)   glucose chewable tablet 16 g (has no administration in time range)   glucose chewable tablet 24 g (has no administration in time range)   glucagon (human recombinant) injection 1 mg (has no administration in time range)   dextrose 10% bolus 125 mL 125 mL (has no administration in time range)   dextrose 10% bolus 250 mL 250 mL (has no administration in time range)   ALPRAZolam tablet 1 mg (has no administration in time range)   amLODIPine tablet 5 mg (has no administration in time range)   ARIPiprazole tablet 10 mg (has no administration in time range)   atorvastatin tablet 40 mg (has no administration in time range)   dicyclomine tablet 20 mg (has no administration in time range)   doxepin capsule 50 mg (has no administration in time range)   ferrous gluconate tablet 324 mg (has no administration in time range)   FLUoxetine capsule 80 mg (has no administration in time range)   pregabalin capsule 100 mg (has no administration in time range)   zolpidem tablet 10 mg (has no administration in time range)   hydroCHLOROthiazide tablet 12.5 mg (has no administration in time range)   hydrALAZINE injection 10 mg (has no administration in time range)   promethazine (PHENERGAN) 12.5  mg in dextrose 5 % (D5W) 50 mL IVPB (0 mg Intravenous Stopped 6/4/24 1003)   iohexoL (OMNIPAQUE 350) injection 100 mL (100 mLs Intravenous Given 6/4/24 1102)   morphine injection 4 mg (4 mg Intravenous Given 6/4/24 1152)       New Prescriptions    No medications on file               Scribe Attestation:   Scribe #1: I performed the above scribed service and the documentation accurately describes the services I performed. I attest to the accuracy of the note.     Attending:   Physician Attestation Statement for Scribe #1: I, Rocky Kimball MD, personally performed the services described in this documentation, as scribed by Elijah Sewell, in my presence, and it is both accurate and complete.           Clinical Impression       ICD-10-CM ICD-9-CM   1. Acute cystitis with hematuria  N30.01 595.0   2. Symptomatic anemia  D64.9 285.9   3. Chest pain  R07.9 786.50   4. Rectal bleeding  K62.5 569.3       Disposition:   Disposition: Placed in Observation  Condition: Rocky Waddell MD  06/04/24 6437

## 2024-06-04 NOTE — SUBJECTIVE & OBJECTIVE
Past Medical History:   Diagnosis Date    Anemia     Anxiety     Basal cell carcinoma (BCC) of face 2/26/2020    Blood transfusion     Bowel incontinence     Depression     Esophageal stricture     GERD (gastroesophageal reflux disease)     Hypertension     Latent tuberculosis by skin test 2001    took INH    Liver nodule 1/6/2014    Morbid obesity with BMI of 45.0-49.9, adult     OA (osteoarthritis) of knee 12/12/2014    Pericardial effusion 9/4/2014       Past Surgical History:   Procedure Laterality Date    CHOLECYSTECTOMY      COLONOSCOPY N/A 3/6/2023    Procedure: COLONOSCOPY;  Surgeon: Beti Diallo MD;  Location: [facility];  Service: Endoscopy;  Laterality: N/A;    GASTRIC BYPASS      HERNIA REPAIR      INJECTION OF ANESTHETIC AGENT AROUND NERVE Bilateral 6/16/2023    Procedure: Bilateral Genicular nerve block with RN IV sedation;  Surgeon: Denis Lai MD;  Location: [facility];  Service: Pain Management;  Laterality: Bilateral;    INJECTION OF ANESTHETIC AGENT AROUND NERVE Bilateral 8/11/2023    Procedure: Bilateral Genicular nerve block with RN IV sedation;  Surgeon: Denis Lia MD;  Location: [facility];  Service: Pain Management;  Laterality: Bilateral;    RADIOFREQUENCY THERMOCOAGULATION Right 10/31/2023    Procedure: Right Genicular Nerve RFA with RN IV sedation;  Surgeon: Denis Lai MD;  Location: [facility];  Service: Pain Management;  Laterality: Right;    RADIOFREQUENCY THERMOCOAGULATION Left 11/14/2023    Procedure: Left Genicular Nerve RFA with RN IV sedation;  Surgeon: Denis Lai MD;  Location: [facility];  Service: Pain Management;  Laterality: Left;    right sholder surgery      SMALL INTESTINE SURGERY         Review of patient's allergies indicates:   Allergen Reactions    Metronidazole hcl Other (See Comments)     Mouth ulcers developed with Flagyl  Oral sores.  Mouth ulcers developed with Flagyl       No current facility-administered medications on file  prior to encounter.     Current Outpatient Medications on File Prior to Encounter   Medication Sig    ALPRAZolam (XANAX) 1 MG tablet Take 1 mg by mouth 3 (three) times daily as needed.    amLODIPine (NORVASC) 5 MG tablet Take 1 tablet (5 mg total) by mouth once daily.    ARIPiprazole (ABILIFY) 10 MG Tab Take 10 mg by mouth once daily.    atorvastatin (LIPITOR) 40 MG tablet Take 1 tablet (40 mg total) by mouth once daily.    cyanocobalamin 1,000 mcg/mL injection Inject 1,000 mcg into the skin every 30 days.    dicyclomine (BENTYL) 20 mg tablet Take 1 tablet (20 mg total) by mouth 3 (three) times daily as needed (abdominal pain).    doxepin (SINEQUAN) 50 MG capsule Take 50 mg by mouth nightly.    fluoxetine (PROZAC) 40 MG capsule Take 1 capsule (40 mg total) by mouth once daily. (Patient taking differently: Take 80 mg by mouth once daily.)    furosemide (LASIX) 40 MG tablet Take 1 tablet (40 mg total) by mouth 2 (two) times daily as needed (swelling).    olmesartan-hydrochlorothiazide (BENICAR HCT) 40-12.5 mg Tab Take 1 tablet by mouth once daily.    pregabalin (LYRICA) 100 MG capsule Take 1 capsule (100 mg total) by mouth every evening.    promethazine (PHENERGAN) 25 MG tablet TAKE 1 TABLET BY MOUTH EVERY 6 HOURS AS NEEDED FOR NAUSEA    zolpidem (AMBIEN) 10 mg Tab Take 10 mg by mouth nightly as needed.    [DISCONTINUED] aspirin 81 MG Chew Take 81 mg by mouth Daily.    [DISCONTINUED] ferrous gluconate (FERGON) 324 MG tablet Take 1 tablet (324 mg total) by mouth daily with breakfast.    [DISCONTINUED] ondansetron (ZOFRAN) 4 MG tablet Take 1 tablet (4 mg total) by mouth every 6 (six) hours.    [DISCONTINUED] tobramycin sulfate 0.3% (TOBREX) 0.3 % ophthalmic solution Place 1 drop into both eyes every 4 (four) hours.     Family History       Problem Relation (Age of Onset)    Crohn's disease Sister, Brother, Other    Diabetes Sister, Sister    Liver cancer Father, Sister    Lung cancer Mother          Tobacco Use     Smoking status: Never    Smokeless tobacco: Never   Substance and Sexual Activity    Alcohol use: Not Currently     Alcohol/week: 0.0 standard drinks of alcohol    Drug use: No    Sexual activity: Yes     Partners: Male     Review of Systems   Constitutional:  Negative for chills, fatigue and fever.   HENT:  Negative for congestion, postnasal drip, rhinorrhea and sore throat.    Eyes:  Negative for pain and visual disturbance.   Respiratory:  Negative for cough, chest tightness, shortness of breath and wheezing.    Cardiovascular:  Positive for palpitations. Negative for chest pain and leg swelling.   Gastrointestinal:  Negative for abdominal distention, abdominal pain, constipation, diarrhea, nausea and vomiting.   Genitourinary:  Negative for difficulty urinating, flank pain and hematuria.   Musculoskeletal:  Negative for arthralgias, back pain, gait problem and joint swelling.   Skin:  Negative for pallor and rash.   Neurological:  Positive for dizziness and weakness. Negative for syncope.   Psychiatric/Behavioral:  Negative for confusion, decreased concentration and suicidal ideas.      Objective:     Vital Signs (Most Recent):  Temp: 98.8 °F (37.1 °C) (06/04/24 0730)  Pulse: 102 (06/04/24 1202)  Resp: 16 (06/04/24 1202)  BP: (!) 194/93 (06/04/24 1427)  SpO2: 96 % (06/04/24 1202) Vital Signs (24h Range):  Temp:  [98.8 °F (37.1 °C)] 98.8 °F (37.1 °C)  Pulse:  [] 102  Resp:  [16-20] 16  SpO2:  [96 %-98 %] 96 %  BP: (157-196)/() 194/93     Weight: 101.6 kg (223 lb 15.8 oz)  Body mass index is 39.68 kg/m².     Physical Exam  Vitals reviewed.   Constitutional:       General: She is not in acute distress.     Appearance: Normal appearance. She is normal weight. She is ill-appearing. She is not toxic-appearing.   HENT:      Head: Normocephalic and atraumatic.      Nose: Nose normal. No congestion or rhinorrhea.   Eyes:      Extraocular Movements: Extraocular movements intact.      Conjunctiva/sclera:  "Conjunctivae normal.      Pupils: Pupils are equal, round, and reactive to light.   Cardiovascular:      Rate and Rhythm: Normal rate and regular rhythm.      Pulses: Normal pulses.      Heart sounds: No murmur heard.  Pulmonary:      Effort: Pulmonary effort is normal. No respiratory distress.      Breath sounds: Normal breath sounds. No wheezing.   Abdominal:      General: Abdomen is flat. Bowel sounds are normal. There is no distension.      Palpations: Abdomen is soft.      Tenderness: There is no abdominal tenderness. There is no guarding or rebound.   Musculoskeletal:         General: No swelling, tenderness or deformity. Normal range of motion.      Cervical back: Normal range of motion and neck supple. No rigidity.   Skin:     General: Skin is warm and dry.      Capillary Refill: Capillary refill takes 2 to 3 seconds.      Coloration: Skin is not pale.   Neurological:      General: No focal deficit present.      Mental Status: She is alert and oriented to person, place, and time. Mental status is at baseline.      Motor: No weakness.      Gait: Gait normal.   Psychiatric:         Mood and Affect: Mood is anxious.         Behavior: Behavior normal.         Thought Content: Thought content normal.              CRANIAL NERVES     CN III, IV, VI   Pupils are equal, round, and reactive to light.       Significant Labs: All pertinent labs within the past 24 hours have been reviewed.  BMP:   Recent Labs   Lab 06/04/24  0842   *   *   K 3.5   CL 99   CO2 20*   BUN 8   CREATININE 0.9   CALCIUM 8.6*     CBC:   Recent Labs   Lab 06/04/24  0842   WBC 4.74   HGB 11.7*   HCT 36.6*        CMP:   Recent Labs   Lab 06/04/24  0842   *   K 3.5   CL 99   CO2 20*   *   BUN 8   CREATININE 0.9   CALCIUM 8.6*   PROT 7.3   ALBUMIN 3.6   BILITOT 0.3   ALKPHOS 140*   AST 14   ALT 8*   ANIONGAP 13     Cardiac Markers: No results for input(s): "CKMB", "MYOGLOBIN", "BNP", "TROPISTAT" in the last 48 " "hours.  Coagulation:   Recent Labs   Lab 06/04/24  0842   INR 0.9   APTT 28.9     Lactic Acid: No results for input(s): "LACTATE" in the last 48 hours.  Magnesium: No results for input(s): "MG" in the last 48 hours.  Troponin: No results for input(s): "TROPONINI", "TROPONINIHS" in the last 48 hours.  TSH:   Recent Labs   Lab 05/10/24  1149   TSH 1.736     Urine Studies:   Recent Labs   Lab 06/04/24  0927   COLORU Yellow   APPEARANCEUA Cloudy*   PHUR 7.0   SPECGRAV 1.020   PROTEINUA Trace*   GLUCUA Negative   KETONESU Negative   BILIRUBINUA Negative   OCCULTUA 3+*   NITRITE Positive*   UROBILINOGEN Negative   LEUKOCYTESUR 3+*   RBCUA 35*   WBCUA >100*   BACTERIA Occasional   SQUAMEPITHEL 1       Significant Imaging: I have reviewed all pertinent imaging results/findings within the past 24 hours.    Imaging Results              CTA Acute GI [place]agon, Abdomen and Pelvis (Final result)  Result time 06/04/24 11:53:48      Final result by Lucio Romero MD (06/04/24 11:53:48)                   Impression:      No ongoing arterial GI bleeding.    Borderline bladder wall thickening.  Correlation with urinalysis as needed to exclude infection.    Complete findings as above.    All CT scans at this facility are performed  using dose modulation techniques as appropriate to performed exam including the following:  automated exposure control; adjustment of mA and/or kV according to the patients size (this includes techniques or standardized protocols for targeted exams where dose is matched to indication/reason for exam: i.e. extremities or head);  iterative reconstruction technique.      Electronically signed by: Lucio Romero  Date:    06/04/2024  Time:    11:53               Narrative:    EXAMINATION:  CTA ACUTE GI BLEED, ABDOMEN AND PELVIS    CLINICAL HISTORY:  rectal bleeding;    TECHNIQUE:  Low dose axial images, sagittal and coronal reformations were obtained from the lung bases to the pubic symphysis.  Images acquired " before and after the administration of 100 mL Omnipaque 350 IV contrast.  Images acquired in a CTA angiography protocol with 3D/MIP reformats.    COMPARISON:  Multiple priors    FINDINGS:  Heart: Normal in size. No pericardial effusion.    Lung Bases: Well aerated, without consolidation or pleural fluid.    Liver: Normal in size and attenuation, with no focal hepatic lesions.    Gallbladder: Surgically absent.    Bile Ducts: No evidence of dilated ducts.    Pancreas: Fatty atrophy.  No fat stranding adjacent to the pancreas.    Spleen: Unremarkable.    Adrenals: Unremarkable.    Kidneys/ Ureters: No hydronephrosis.  Right renal simple cyst.    Bladder: Borderline mild bladder wall thickening.  Correlation with urinalysis as needed to exclude infection.    Reproductive organs: Uterus appears unremarkable.    GI Tract/Mesentery: Diverticulosis without diverticulitis.  Colon is partially nondistended degrading the evaluation.  Partially fluid-filled colon.  This could relate to diarrheal illness but is nonspecific.  Suspect prior appendectomy.  No definite evidence of ongoing arterial GI bleeding.  Small bowel appears unremarkable.  Prior gastric bypass.    Peritoneal Space: No ascites. No free air.    Retroperitoneum: No significant adenopathy.    Abdominal wall: Unremarkable.    Vasculature: No significant atherosclerosis or aneurysm.    Bones: No acute fracture.  Osseous degenerative changes.

## 2024-06-04 NOTE — TELEPHONE ENCOUNTER
"Stools expected to be dark from iron but large amount of blood noted in the toilet.  Reason for Disposition   SEVERE rectal bleeding (large blood clots; constant or on and off bleeding)    Additional Information   Negative: Shock suspected (e.g., cold/pale/clammy skin, too weak to stand, low BP, rapid pulse)   Negative: Difficult to awaken or acting confused (e.g., disoriented, slurred speech)   Negative: Passed out (i.e., lost consciousness, collapsed and was not responding)   Negative: [1] Vomiting AND [2] contains red blood or black ("coffee ground") material  (Exception: Few red streaks in vomit that only happened once.)   Negative: Sounds like a life-threatening emergency to the triager   Negative: Diarrhea is main symptom   Negative: Stool color other than brown or tan is main concern  (no bleeding and no melena)    Protocols used: Rectal Bleeding-A-AH    "

## 2024-06-04 NOTE — HPI
The patient is a 64-year-old woman with a past medical history of anemia, anxiety, depression, bowel incontinence, gastroesophageal reflux disease, hypertension, morbid obesity, and pericardial effusion who presents to the ER with rectal bleeding that began last night. She reports a large amount of bright red blood in her stool, which has persisted and is of moderate severity. The patient notes that she had an iron infusion last week and that her symptoms are constant with no mitigating or exacerbating factors. Associated symptoms include nausea and vomiting. She has experienced decreased oral intake but denies hematemesis, chest pain, shortness of breath, fever, chills, and other symptoms at this time. She has not received any prior treatment for this issue and has no further complaints or concerns.    In the ER, the patient's vitals were stable except for a blood pressure of 196/103. Urinalysis was abnormal, notable for cloudy appearance, trace protein, 3+ occult blood, positive nitrites, and 3+ leukocytes. Microscopic urinalysis revealed 35 RBCs, over 100 WBCs, and many WBC clumps. Pertinent negative labs include a hemoglobin level of 11, which is the patient's baseline. A CTA for GI bleed showed no ongoing arterial GI bleeding. The patient became dizzy and light-headed when ambulating to the restroom, as noted in the ER assessment. Hospital medicine called for admission.

## 2024-06-05 DIAGNOSIS — K62.5 RECTAL BLEEDING: ICD-10-CM

## 2024-06-05 DIAGNOSIS — E87.1 HYPONATREMIA: Primary | ICD-10-CM

## 2024-06-05 LAB
ANION GAP SERPL CALC-SCNC: 12 MMOL/L (ref 8–16)
BASOPHILS # BLD AUTO: 0.06 K/UL (ref 0–0.2)
BASOPHILS NFR BLD: 1.2 % (ref 0–1.9)
BUN SERPL-MCNC: 9 MG/DL (ref 8–23)
CALCIUM SERPL-MCNC: 7.9 MG/DL (ref 8.7–10.5)
CHLORIDE SERPL-SCNC: 99 MMOL/L (ref 95–110)
CHOLEST SERPL-MCNC: 182 MG/DL (ref 120–199)
CHOLEST/HDLC SERPL: 3.3 {RATIO} (ref 2–5)
CO2 SERPL-SCNC: 21 MMOL/L (ref 23–29)
CREAT SERPL-MCNC: 0.9 MG/DL (ref 0.5–1.4)
DIFFERENTIAL METHOD BLD: ABNORMAL
EOSINOPHIL # BLD AUTO: 0.3 K/UL (ref 0–0.5)
EOSINOPHIL NFR BLD: 5.2 % (ref 0–8)
ERYTHROCYTE [DISTWIDTH] IN BLOOD BY AUTOMATED COUNT: 14.8 % (ref 11.5–14.5)
EST. GFR  (NO RACE VARIABLE): >60 ML/MIN/1.73 M^2
GLUCOSE SERPL-MCNC: 126 MG/DL (ref 70–110)
HCT VFR BLD AUTO: 36.1 % (ref 37–48.5)
HDLC SERPL-MCNC: 55 MG/DL (ref 40–75)
HDLC SERPL: 30.2 % (ref 20–50)
HGB BLD-MCNC: 11.5 G/DL (ref 12–16)
IMM GRANULOCYTES # BLD AUTO: 0.05 K/UL (ref 0–0.04)
IMM GRANULOCYTES NFR BLD AUTO: 1 % (ref 0–0.5)
LDLC SERPL CALC-MCNC: 109.8 MG/DL (ref 63–159)
LYMPHOCYTES # BLD AUTO: 1 K/UL (ref 1–4.8)
LYMPHOCYTES NFR BLD: 19.8 % (ref 18–48)
MCH RBC QN AUTO: 27.4 PG (ref 27–31)
MCHC RBC AUTO-ENTMCNC: 31.9 G/DL (ref 32–36)
MCV RBC AUTO: 86 FL (ref 82–98)
MONOCYTES # BLD AUTO: 0.6 K/UL (ref 0.3–1)
MONOCYTES NFR BLD: 10.7 % (ref 4–15)
NEUTROPHILS # BLD AUTO: 3.2 K/UL (ref 1.8–7.7)
NEUTROPHILS NFR BLD: 62.1 % (ref 38–73)
NONHDLC SERPL-MCNC: 127 MG/DL
NRBC BLD-RTO: 0 /100 WBC
PLATELET # BLD AUTO: 202 K/UL (ref 150–450)
PMV BLD AUTO: 8.3 FL (ref 9.2–12.9)
POTASSIUM SERPL-SCNC: 3.4 MMOL/L (ref 3.5–5.1)
RBC # BLD AUTO: 4.2 M/UL (ref 4–5.4)
SODIUM SERPL-SCNC: 132 MMOL/L (ref 136–145)
TRIGL SERPL-MCNC: 86 MG/DL (ref 30–150)
WBC # BLD AUTO: 5.15 K/UL (ref 3.9–12.7)

## 2024-06-05 PROCEDURE — 97162 PT EVAL MOD COMPLEX 30 MIN: CPT

## 2024-06-05 PROCEDURE — 85025 COMPLETE CBC W/AUTO DIFF WBC: CPT | Performed by: STUDENT IN AN ORGANIZED HEALTH CARE EDUCATION/TRAINING PROGRAM

## 2024-06-05 PROCEDURE — 97165 OT EVAL LOW COMPLEX 30 MIN: CPT

## 2024-06-05 PROCEDURE — 25000003 PHARM REV CODE 250: Performed by: STUDENT IN AN ORGANIZED HEALTH CARE EDUCATION/TRAINING PROGRAM

## 2024-06-05 PROCEDURE — 80061 LIPID PANEL: CPT | Performed by: STUDENT IN AN ORGANIZED HEALTH CARE EDUCATION/TRAINING PROGRAM

## 2024-06-05 PROCEDURE — 97530 THERAPEUTIC ACTIVITIES: CPT

## 2024-06-05 PROCEDURE — 96376 TX/PRO/DX INJ SAME DRUG ADON: CPT

## 2024-06-05 PROCEDURE — 11000001 HC ACUTE MED/SURG PRIVATE ROOM

## 2024-06-05 PROCEDURE — 36415 COLL VENOUS BLD VENIPUNCTURE: CPT | Performed by: STUDENT IN AN ORGANIZED HEALTH CARE EDUCATION/TRAINING PROGRAM

## 2024-06-05 PROCEDURE — 96365 THER/PROPH/DIAG IV INF INIT: CPT | Mod: 59

## 2024-06-05 PROCEDURE — 80048 BASIC METABOLIC PNL TOTAL CA: CPT | Performed by: STUDENT IN AN ORGANIZED HEALTH CARE EDUCATION/TRAINING PROGRAM

## 2024-06-05 PROCEDURE — 63600175 PHARM REV CODE 636 W HCPCS: Mod: JZ,JG | Performed by: INTERNAL MEDICINE

## 2024-06-05 PROCEDURE — 96366 THER/PROPH/DIAG IV INF ADDON: CPT

## 2024-06-05 PROCEDURE — 63600175 PHARM REV CODE 636 W HCPCS: Performed by: STUDENT IN AN ORGANIZED HEALTH CARE EDUCATION/TRAINING PROGRAM

## 2024-06-05 PROCEDURE — 25000003 PHARM REV CODE 250: Performed by: INTERNAL MEDICINE

## 2024-06-05 RX ORDER — MORPHINE SULFATE 2 MG/ML
2 INJECTION, SOLUTION INTRAMUSCULAR; INTRAVENOUS ONCE
Status: COMPLETED | OUTPATIENT
Start: 2024-06-05 | End: 2024-06-05

## 2024-06-05 RX ORDER — HYDROCODONE BITARTRATE AND ACETAMINOPHEN 5; 325 MG/1; MG/1
1 TABLET ORAL EVERY 6 HOURS PRN
Status: DISCONTINUED | OUTPATIENT
Start: 2024-06-05 | End: 2024-06-08 | Stop reason: HOSPADM

## 2024-06-05 RX ORDER — ALPRAZOLAM 1 MG/1
1 TABLET ORAL 3 TIMES DAILY
Status: DISCONTINUED | OUTPATIENT
Start: 2024-06-05 | End: 2024-06-08 | Stop reason: HOSPADM

## 2024-06-05 RX ORDER — PANTOPRAZOLE SODIUM 40 MG/1
40 TABLET, DELAYED RELEASE ORAL DAILY
Status: DISCONTINUED | OUTPATIENT
Start: 2024-06-05 | End: 2024-06-08 | Stop reason: HOSPADM

## 2024-06-05 RX ORDER — POLYETHYLENE GLYCOL 3350 17 G/17G
17 POWDER, FOR SOLUTION ORAL 2 TIMES DAILY
Status: DISCONTINUED | OUTPATIENT
Start: 2024-06-05 | End: 2024-06-08 | Stop reason: HOSPADM

## 2024-06-05 RX ORDER — FOLIC ACID 1 MG/1
1 TABLET ORAL DAILY
Status: DISCONTINUED | OUTPATIENT
Start: 2024-06-05 | End: 2024-06-08 | Stop reason: HOSPADM

## 2024-06-05 RX ADMIN — DOXEPIN HYDROCHLORIDE 50 MG: 25 CAPSULE ORAL at 08:06

## 2024-06-05 RX ADMIN — PANTOPRAZOLE SODIUM 40 MG: 40 TABLET, DELAYED RELEASE ORAL at 08:06

## 2024-06-05 RX ADMIN — ALUMINUM HYDROXIDE, MAGNESIUM HYDROXIDE, AND SIMETHICONE 30 ML: 1200; 120; 1200 SUSPENSION ORAL at 07:06

## 2024-06-05 RX ADMIN — FLUOXETINE 80 MG: 20 CAPSULE ORAL at 08:06

## 2024-06-05 RX ADMIN — ALPRAZOLAM 1 MG: 1 TABLET ORAL at 08:06

## 2024-06-05 RX ADMIN — CEFTRIAXONE 1 G: 1 INJECTION, POWDER, FOR SOLUTION INTRAMUSCULAR; INTRAVENOUS at 02:06

## 2024-06-05 RX ADMIN — VANCOMYCIN HYDROCHLORIDE 1000 MG: 1 INJECTION, POWDER, LYOPHILIZED, FOR SOLUTION INTRAVENOUS at 05:06

## 2024-06-05 RX ADMIN — POTASSIUM BICARBONATE 25 MEQ: 978 TABLET, EFFERVESCENT ORAL at 10:06

## 2024-06-05 RX ADMIN — POLYETHYLENE GLYCOL 3350 17 G: 17 POWDER, FOR SOLUTION ORAL at 08:06

## 2024-06-05 RX ADMIN — MORPHINE SULFATE 2 MG: 2 INJECTION, SOLUTION INTRAMUSCULAR; INTRAVENOUS at 12:06

## 2024-06-05 RX ADMIN — ZOLPIDEM TARTRATE 10 MG: 5 TABLET ORAL at 08:06

## 2024-06-05 RX ADMIN — ATORVASTATIN CALCIUM 40 MG: 40 TABLET, FILM COATED ORAL at 08:06

## 2024-06-05 RX ADMIN — ALPRAZOLAM 1 MG: 1 TABLET ORAL at 02:06

## 2024-06-05 RX ADMIN — VANCOMYCIN HYDROCHLORIDE 1000 MG: 1 INJECTION, POWDER, LYOPHILIZED, FOR SOLUTION INTRAVENOUS at 04:06

## 2024-06-05 RX ADMIN — AMLODIPINE BESYLATE 5 MG: 5 TABLET ORAL at 08:06

## 2024-06-05 RX ADMIN — FOLIC ACID 1 MG: 1 TABLET ORAL at 08:06

## 2024-06-05 RX ADMIN — HYDROCODONE BITARTRATE AND ACETAMINOPHEN 1 TABLET: 5; 325 TABLET ORAL at 06:06

## 2024-06-05 RX ADMIN — ONDANSETRON 4 MG: 2 INJECTION INTRAMUSCULAR; INTRAVENOUS at 06:06

## 2024-06-05 RX ADMIN — POTASSIUM BICARBONATE 25 MEQ: 978 TABLET, EFFERVESCENT ORAL at 05:06

## 2024-06-05 RX ADMIN — Medication 324 MG: at 07:06

## 2024-06-05 RX ADMIN — ONDANSETRON 4 MG: 2 INJECTION INTRAMUSCULAR; INTRAVENOUS at 08:06

## 2024-06-05 RX ADMIN — ARIPIPRAZOLE 10 MG: 5 TABLET ORAL at 08:06

## 2024-06-05 RX ADMIN — PREGABALIN 100 MG: 100 CAPSULE ORAL at 08:06

## 2024-06-05 RX ADMIN — ACETAMINOPHEN 650 MG: 325 TABLET ORAL at 06:06

## 2024-06-05 RX ADMIN — SIMETHICONE 80 MG: 80 TABLET, CHEWABLE ORAL at 08:06

## 2024-06-05 RX ADMIN — ONDANSETRON 4 MG: 2 INJECTION INTRAMUSCULAR; INTRAVENOUS at 12:06

## 2024-06-05 NOTE — CONSULTS
Consulted on Ms. Bui due to present on admission wounds to skin folds. Patient is awake and alert. Skin assessed. Bilateral heels intact.  Lower abdominal fold, pubis, bilateral groin folds with moderately severe intertrigo: erythema and scattered areas of partial thickness tissue loss. Cleansed with bath wipes and InterDry sheets applied into folds.   Bilateral lower breast folds with intertrigo noted in creases, partial thickness tissue loss. Cleansed with bath wipes and InterDry sheets applied into folds.  Patient may benefit from oral treatment of yeast in additional to Interdry sheets.      06/05/24 1140   WOCN Assessment   WOCN Total Time (mins) 45   Visit Date 06/05/24   Visit Time 1140   Consult Type New   WOCN Speciality Wound   Wound moisture   Intervention assessed;applied;chart review;coordination of care;orders;team conference   Teaching on-going;complication;discharge        Wound 05/10/24 2000 Moisture associated dermatitis Right medial Lower quadrant   Date First Assessed/Time First Assessed: 05/10/24 2000   Present on Original Admission: Yes  Primary Wound Type: Moisture associated dermatitis  Side: (c) Right  Orientation: medial  Location: Lower quadrant   Dressing Appearance Open to air   Drainage Amount None   Appearance Red   Care Cleansed with:;Soap and water   Dressing Applied  (InterDry)   Dressing Change Due 06/10/24        Wound 05/10/24 2000 Intertrigo Right lateral;lower Breast   Date First Assessed/Time First Assessed: 05/10/24 2000   Present on Original Admission: Yes  Primary Wound Type: Intertrigo  Side: Right  Orientation: lateral;lower  Location: Breast   Wound Image    Dressing Appearance Open to air   Drainage Amount Scant   Drainage Characteristics/Odor Serous   Appearance Red   Tissue loss description Partial thickness   Periwound Area Intact   Wound Edges Open   Care Cleansed with:;Soap and water   Dressing Applied  (InterDry)   Dressing Change Due 06/10/24        Wound  05/10/24 2000 Moisture associated dermatitis Left lateral;lower Abdomen   Date First Assessed/Time First Assessed: 05/10/24 2000   Present on Original Admission: Yes  Primary Wound Type: Moisture associated dermatitis  Side: Left  Orientation: lateral;lower  Location: Abdomen   Dressing Appearance Open to air   Drainage Amount Scant   Drainage Characteristics/Odor Serous   Appearance Red   Tissue loss description Partial thickness   Care Cleansed with:;Soap and water   Dressing Applied  (InterDry)   Dressing Change Due 06/10/24        Wound 05/10/24 2000 Moisture associated dermatitis Left lateral;lower Breast   Date First Assessed/Time First Assessed: 05/10/24 2000   Present on Original Admission: Yes  Primary Wound Type: Moisture associated dermatitis  Side: Left  Orientation: lateral;lower  Location: Breast   Wound Image    Dressing Appearance Open to air   Drainage Amount Scant   Drainage Characteristics/Odor Serous   Appearance Red;Moist   Tissue loss description Partial thickness   Care Cleansed with:;Soap and water   Dressing Applied  (InterDry)   Dressing Change Due 06/10/24

## 2024-06-05 NOTE — PT/OT/SLP EVAL
Physical Therapy Evaluation and Treatment    Patient Name: Divya Bui   MRN: [id number]  Recent Surgery: * No surgery found *      Recommendations:     Discharge Recommendations: Low Intensity Therapy   Discharge Equipment Recommendations: walker, rolling   Barriers to discharge: None    Assessment:     Divya Bui is a 64 y.o. female admitted with a medical diagnosis of BRBPR (bright red blood per rectum). She presents with the following impairments/functional limitations: weakness, impaired endurance, impaired functional mobility, gait instability, impaired balance, pain, decreased safety awareness, decreased lower extremity function.    The mobility limitation cannot be sufficiently resolved by the use of a cane.   Patient's functional mobility deficit can be sufficiently resolved with the use of a rolling walker. Patient's mobility limitation significantly impairs their ability to participate in one of more activities of daily living. The use of a rolling walker will significantly improve the patient's ability to participate in MRADLS and the patient will use it on regular basis in the home.     Rehab Prognosis: Good; patient would benefit from acute PT services to address these deficits and reach maximum level of function.    Plan:     During this hospitalization, patient to be seen 3 x/week to address the above listed problems via gait training, therapeutic activities, therapeutic exercises    Plan of Care Expires: 06/05/24    Subjective     Chief Complaint: Pt is motivated to participate, IV bleeding, nurse notified  Patient Comments/Goals: none stated  Pain/Comfort:  Pain Rating 1: 8/10  Location - Side 1: Bilateral  Location - Orientation 1: generalized  Location 1: abdomen  Pain Addressed 1: Reposition, Distraction  Pain Rating Post-Intervention 1: 8/10    Social History:  Living Environment: Patient lives alone in a single story home with number of outside stair(s): 0  Prior Level of Function: Prior to  "admission, patient was independent and ambulated household and community distances using no AD  Equipment Used at Home: grab bar, raised toilet (walk in tub)  DME owned (not currently used): single point cane  Assistance Upon Discharge: family    Objective:     Communicated with nurse Richardson and epic chart review prior to session. Patient found supine with peripheral IV, telemetry upon PT entry to room.    General Precautions: Standard, fall   Orthopedic Precautions: N/A   Braces: N/A    Respiratory Status: Room air    Exams:  Cognition: Patient is oriented to Person, Place, Time, Situation  RLE ROM: WFL  RLE Strength:  Grossly 4/5  LLE ROM: WFL  LLE Strength:  Grossly 4/5  Sensation:    -       Reports Intact  Skin Integrity/Edema:     -       Skin integrity: Visible skin intact    Functional Mobility:  Gait belt applied - Yes  Bed Mobility  Rolling Right: stand by assistance  Scooting: stand by assistance  Supine to Sit: stand by assistance  Transfers  Sit to Stand: contact guard assistance with rolling walker  Bed to Chair: contact guard assistance with rolling walker using Step Transfer  Gait  Patient ambulated 220ft CGA using RW with rolling walker and contact guard assistance. Patient demonstrates occasional unsteady gait. No c/o dizziness or SOB, no gross LOB. All lines remained intact throughout ambulation trail.  Balance  Sitting: stand by assistance  Standing: contact guard assistance    Therapeutic Activities and Exercises:   Pt educated on role of PT in acute care and POC. Educated on importance of OOB activities, activity pacing, and HEP (marching/hip flex, hip abd, heel slides/LAQ, quad sets, ankle pumps) in order to maintain/regain strength. Encouraged to sit up in chair for all meals. Educated on proper use of RW for safety and to reduce risk of falling. Educated on "call don't fall" policy and increased risk of falling due to weakness, instructed to utilize call bell for assistance with all " transfers. Pt agreeable to all requests.    AM-PAC 6 CLICK MOBILITY  Total Score:16    Patient left up in chair with all lines intact, call button in reach, bed alarm on, and RN notified and present.    GOALS:   Multidisciplinary Problems       Physical Therapy Goals          Problem: Physical Therapy    Goal Priority Disciplines Outcome Goal Variances Interventions   Physical Therapy Goal     PT, PT/OT      Description: Goals to be met by 6/19/24.  1. Pt will complete bed mobility MOD I.  2. Pt will complete sit to stand MOD I.  3. Pt will ambulate 200ft MOD I using RW.  4. Pt will increase AMPAC score by 2 points to progress functional mobility.                       History:     Past Medical History:   Diagnosis Date    Anemia     Anxiety     Basal cell carcinoma (BCC) of face 2/26/2020    Blood transfusion     Bowel incontinence     Depression     Esophageal stricture     GERD (gastroesophageal reflux disease)     Hypertension     Latent tuberculosis by skin test 2001    took INH    Liver nodule 1/6/2014    Morbid obesity with BMI of 45.0-49.9, adult     OA (osteoarthritis) of knee 12/12/2014    Pericardial effusion 9/4/2014       Past Surgical History:   Procedure Laterality Date    CHOLECYSTECTOMY      COLONOSCOPY N/A 3/6/2023    Procedure: COLONOSCOPY;  Surgeon: Beti Diallo MD;  Location: [facility];  Service: Endoscopy;  Laterality: N/A;    GASTRIC BYPASS      HERNIA REPAIR      INJECTION OF ANESTHETIC AGENT AROUND NERVE Bilateral 6/16/2023    Procedure: Bilateral Genicular nerve block with RN IV sedation;  Surgeon: Denis Lai MD;  Location: [facility];  Service: Pain Management;  Laterality: Bilateral;    INJECTION OF ANESTHETIC AGENT AROUND NERVE Bilateral 8/11/2023    Procedure: Bilateral Genicular nerve block with RN IV sedation;  Surgeon: Denis Lai MD;  Location: [facility];  Service: Pain Management;  Laterality: Bilateral;    RADIOFREQUENCY THERMOCOAGULATION Right 10/31/2023     Procedure: Right Genicular Nerve RFA with RN IV sedation;  Surgeon: Denis Lai MD;  Location: [facility];  Service: Pain Management;  Laterality: Right;    RADIOFREQUENCY THERMOCOAGULATION Left 11/14/2023    Procedure: Left Genicular Nerve RFA with RN IV sedation;  Surgeon: Denis Lai MD;  Location: [facility];  Service: Pain Management;  Laterality: Left;    right sholder surgery      SMALL INTESTINE SURGERY         Time Tracking:     PT Received On: 06/05/24  PT Start Time: 0930  PT Stop Time: 0955  PT Total Time (min): 25 min     Billable Minutes: Evaluation 15min and Therapeutic Activity 10min    6/5/2024

## 2024-06-05 NOTE — PLAN OF CARE
[facility]  Initial Discharge Assessment       Primary Care Provider: Angel Khan MD    Admission Diagnosis: Rectal bleeding [K62.5]  Acute cystitis with hematuria [N30.01]  Chest pain [R07.9]  Symptomatic anemia [D64.9]    Admission Date: 6/4/2024  Expected Discharge Date:     Transition of Care Barriers: None    Payor: [employer] MANAGED MEDICARE / Plan: [employer] MEDICARE PLAN PPO / Product Type: Medicare Advantage /     Extended Emergency Contact Information  Primary Emergency Contact: Ashtyn Bui  Mobile Phone: [phone]  Relation: Daughter  Preferred language: English   needed? No  Secondary Emergency Contact: Herbert Bui  Mobile Phone: [phone]  Relation: Son  Preferred language: English   needed? No    Discharge Plan A: Home with family, Home Health         [facility] [place] - [address]  [address]  [place] [address]  Phone: [phone] Fax: [phone]    [facility] - [place], [place] - [address]  [address] [facility] [address]  Phone: [phone] Fax: [phone]    [id number] [place] [facility] [address]  [address]  [facility] [address]  Phone: [phone] Fax: [phone]    [facility] [address] [facility] [place] [facility] [address]  [address]  [facility] [address]  Phone: [phone] Fax: [phone]    [facility] [id number] [place] - [address] AT [facility]  [address]  [facility] [address]  Phone: [phone] Fax: [phone]      Initial Assessment (most recent)       Adult Discharge Assessment - 06/05/24 1148          Discharge Assessment    Assessment Type Discharge Planning Assessment     Confirmed/corrected address, phone number and insurance Yes     Confirmed Demographics Correct on Facesheet     Communicated ELADIA with patient/caregiver Date not available/Unable to determine     Reason For  Admission rectal bleeding     People in Home alone     Facility Arrived From: home     Do you expect to return to your current living situation? Yes     Do you have help at home or someone to help you manage your care at home? Yes     Who are your caregiver(s) and their phone number(s)? daughter and son can assist     Prior to hospitilization cognitive status: Alert/Oriented     Current cognitive status: Alert/Oriented     Walking or Climbing Stairs Difficulty no     Dressing/Bathing Difficulty no     Equipment Currently Used at Home walker, rolling;cane, straight;blood pressure machine   has cane/walker but does not use    Readmission within 30 days? No     Patient currently being followed by outpatient case management? No     Do you currently have service(s) that help you manage your care at home? No     Do you take prescription medications? Yes     Do you have prescription coverage? Yes     Do you have any problems affording any of your prescribed medications? TBD     Is the patient taking medications as prescribed? yes     Who is going to help you get home at discharge? son or daughter     How do you get to doctors appointments? car, drives self     Are you on dialysis? No     Discharge Plan A Home with family;Home Health     DME Needed Upon Discharge  none     Discharge Plan discussed with: Patient     Transition of Care Barriers None                   Anticipated DC dispo: home  Prior Level of Function: independent, drives self  PCP: Dr. Khan  Comments:  CM met with patient at bedside to introduce role and discuss d/c planning. Patient  is independent, lives alone. States daughter/son can assist if needed. Patient would like a rollator.  CM following for needs.

## 2024-06-05 NOTE — PLAN OF CARE
PT EVAL complete. Required SBA for bed mobility, ambulated 220ft CGA using RW. Recommending low intensity therapy upon d/c.

## 2024-06-05 NOTE — PT/OT/SLP EVAL
Occupational Therapy   Evaluation and Discharge Note    Name: Divya Bui  MRN: [id number]  Admitting Diagnosis: BRBPR (bright red blood per rectum)  Recent Surgery: * No surgery found *      Recommendations:     Discharge Recommendations: No Therapy Indicated  Discharge Equipment Recommendations: to be determined by next level of care (Simultaneous filing. User may not have seen previous data.)  Barriers to discharge:  Decreased caregiver support    Assessment:     Divya Bui is a 64 y.o. female with a medical diagnosis of BRBPR (bright red blood per rectum). At this time, patient is functioning at their prior level of function and does not require further acute OT services.     Plan:     During this hospitalization, patient does not require further acute OT services.  Please re-consult if situation changes.    Plan of Care Reviewed with: patient    Subjective     Chief Complaint: ABDOMEN PAIN   Patient/Family Comments/goals: RETURN HOME SAFER AND STRONGER    Occupational Profile:  Living Environment: LIVES ALONE 1 STORY HOUSE WITH NO STEPS  Previous level of function: (I) WITH ADL'S AND FUNCTIONAL MOBILITY  Roles and Routines:   Equipment Used at home:  (DOES NOT USE ROLLING WALKER OR SPC  Simultaneous filing. User may not have seen previous data.)  Assistance upon Discharge:     Pain/Comfort:  Pain Addressed 1: Reposition, Distraction, Nurse notified    Patients cultural, spiritual, Jain conflicts given the current situation:      Objective:     Communicated with: NURSE MATHEW AND [facility] CHART REVIEW prior to session.  Patient found HOB elevated with   upon OT entry to room.    General Precautions: Standard,    Orthopedic Precautions:    Braces:    Respiratory Status: Room air     Occupational Performance:    Bed Mobility:    Patient completed Rolling/Turning to Right with modified independence  Patient completed Scooting/Bridging with modified independence  Patient completed Supine to Sit with modified  independence    Functional Mobility/Transfers:  Patient completed Sit <> Stand Transfer with modified independence  with  rolling walker   Patient completed Bed <> Chair Transfer using Step Transfer technique with stand by assistance with rolling walker  Functional Mobility: PT AMBULATED 220 FEET WITH CGA AND ROLLING  WALKER    Activities of Daily Living:  Upper Body Dressing: modified independence .  Lower Body Dressing: modified independence .    Cognitive/Visual Perceptual:  Cognitive/Psychosocial Skills:  -       Oriented to: Person, Place, Time, and Situation   -       Follows Commands/attention:Follows multistep  commands    Physical Exam:  Upper Extremity Range of Motion:     -       Right Upper Extremity: WFL  -       Left Upper Extremity: WFL  Upper Extremity Strength:    -       Right Upper Extremity: WFL  -       Left Upper Extremity: WFL   Strength:    -       Right Upper Extremity: WFL  -       Left Upper Extremity: WFL    AMPAC 6 Click ADL:  AMPAC Total Score: 24    Treatment & Education:  PT REFERRED TO P.T. FOR CONTINOUS GAIT TRAINING.  Patient educated on role of OT in acute setting and benefits of OOB activity. Encouraged OOB throughout the course of hospitalization with staff due to syncope episode to decrease risk of hospital related debility. Patient stated understanding and in agreement with POC.      Patient left up in chair with all lines intact, call button in reach, chair alarm on, NURSE MATHEW notified, and NURSE MATHEW present    GOALS:   Multidisciplinary Problems       Occupational Therapy Goals       Not on file                    History:     Past Medical History:   Diagnosis Date    Anemia     Anxiety     Basal cell carcinoma (BCC) of face 2/26/2020    Blood transfusion     Bowel incontinence     Depression     Esophageal stricture     GERD (gastroesophageal reflux disease)     Hypertension     Latent tuberculosis by skin test 2001    took INH    Liver nodule 1/6/2014    Morbid  obesity with BMI of 45.0-49.9, adult     OA (osteoarthritis) of knee 12/12/2014    Pericardial effusion 9/4/2014         Past Surgical History:   Procedure Laterality Date    CHOLECYSTECTOMY      COLONOSCOPY N/A 3/6/2023    Procedure: COLONOSCOPY;  Surgeon: Beti Diallo MD;  Location: [facility] ENDO;  Service: Endoscopy;  Laterality: N/A;    GASTRIC BYPASS      HERNIA REPAIR      INJECTION OF ANESTHETIC AGENT AROUND NERVE Bilateral 6/16/2023    Procedure: Bilateral Genicular nerve block with RN IV sedation;  Surgeon: Denis Lai MD;  Location: [facility] PAIN MGT;  Service: Pain Management;  Laterality: Bilateral;    INJECTION OF ANESTHETIC AGENT AROUND NERVE Bilateral 8/11/2023    Procedure: Bilateral Genicular nerve block with RN IV sedation;  Surgeon: Denis Lai MD;  Location: [facility] PAIN MGT;  Service: Pain Management;  Laterality: Bilateral;    RADIOFREQUENCY THERMOCOAGULATION Right 10/31/2023    Procedure: Right Genicular Nerve RFA with RN IV sedation;  Surgeon: Denis Lai MD;  Location: [facility] PAIN MGT;  Service: Pain Management;  Laterality: Right;    RADIOFREQUENCY THERMOCOAGULATION Left 11/14/2023    Procedure: Left Genicular Nerve RFA with RN IV sedation;  Surgeon: Denis Lai MD;  Location: [facility] PAIN MGT;  Service: Pain Management;  Laterality: Left;    right sholder surgery      SMALL INTESTINE SURGERY         Time Tracking:     OT Date of Treatment: 06/05/24  OT Start Time: 0935  OT Stop Time: 1000  OT Total Time (min): 25 min    Billable Minutes:Evaluation 15 MINUTES  Therapeutic Activity 10 MINUTES    6/5/2024

## 2024-06-05 NOTE — PROGRESS NOTES
[facility]  Hospital Medicine  Progress Note    Patient Name: Divya Bui  MRN: [id number]  Patient Class: IP- Inpatient   Admission Date: 6/4/2024  Length of Stay: 0 days  Attending Physician: Malia Moe MD  Primary Care Provider: Angel Khan MD        Subjective:     Principal Problem:BRBPR (bright red blood per rectum)        HPI:  The patient is a 64-year-old woman with a past medical history of anemia, anxiety, depression, bowel incontinence, gastroesophageal reflux disease, hypertension, morbid obesity, and pericardial effusion who presents to the ER with rectal bleeding that began last night. She reports a large amount of bright red blood in her stool, which has persisted and is of moderate severity. The patient notes that she had an iron infusion last week and that her symptoms are constant with no mitigating or exacerbating factors. Associated symptoms include nausea and vomiting. She has experienced decreased oral intake but denies hematemesis, chest pain, shortness of breath, fever, chills, and other symptoms at this time. She has not received any prior treatment for this issue and has no further complaints or concerns.    In the ER, the patient's vitals were stable except for a blood pressure of 196/103. Urinalysis was abnormal, notable for cloudy appearance, trace protein, 3+ occult blood, positive nitrites, and 3+ leukocytes. Microscopic urinalysis revealed 35 RBCs, over 100 WBCs, and many WBC clumps. Pertinent negative labs include a hemoglobin level of 11, which is the patient's baseline. A CTA for GI bleed showed no ongoing arterial GI bleeding. The patient became dizzy and light-headed when ambulating to the restroom, as noted in the ER assessment. Hospital medicine called for admission.       Overview/Hospital Course:  No notes on file    Interval History:  Patient seen and examined at bedside while wound care eval weaned of generalized abdominal pain.  Tenderness to  palpation throughout the abdomen worse in the left lower quadrant no rebound and no guarding.    Review of Systems   Constitutional:  Positive for fatigue. Negative for fever.   Respiratory:  Negative for cough and shortness of breath.    Cardiovascular:  Negative for chest pain and leg swelling.   Gastrointestinal:  Positive for abdominal pain, anal bleeding and blood in stool. Negative for nausea and vomiting.   Musculoskeletal:  Positive for arthralgias.   Neurological:  Positive for weakness.   All other systems reviewed and are negative.    Objective:     Vital Signs (Most Recent):  Temp: 98.1 °F (36.7 °C) (06/05/24 1126)  Pulse: 94 (06/05/24 1126)  Resp: 16 (06/05/24 1250)  BP: 130/70 (06/05/24 1126)  SpO2: 96 % (06/05/24 1126) Vital Signs (24h Range):  Temp:  [97.5 °F (36.4 °C)-98.5 °F (36.9 °C)] 98.1 °F (36.7 °C)  Pulse:  [] 94  Resp:  [14-18] 16  SpO2:  [94 %-98 %] 96 %  BP: (126-179)/(66-88) 130/70     Weight: 101.6 kg (223 lb 15.8 oz)  Body mass index is 39.68 kg/m².    Intake/Output Summary (Last 24 hours) at 6/5/2024 1503  Last data filed at 6/4/2024 1746  Gross per 24 hour   Intake 623.35 ml   Output --   Net 623.35 ml         Physical Exam  Vitals reviewed.   Constitutional:       General: She is not in acute distress.     Appearance: Normal appearance. She is obese. She is ill-appearing. She is not toxic-appearing.   HENT:      Head: Normocephalic and atraumatic.      Nose: Nose normal. No congestion or rhinorrhea.   Eyes:      Extraocular Movements: Extraocular movements intact.      Conjunctiva/sclera: Conjunctivae normal.      Pupils: Pupils are equal, round, and reactive to light.   Cardiovascular:      Rate and Rhythm: Normal rate and regular rhythm.      Pulses: Normal pulses.      Heart sounds: No murmur heard.  Pulmonary:      Effort: Pulmonary effort is normal. No respiratory distress.      Breath sounds: Normal breath sounds. No wheezing.   Abdominal:      General: Abdomen is flat.  Bowel sounds are normal. There is no distension.      Palpations: Abdomen is soft.      Tenderness: There is abdominal tenderness. There is no guarding or rebound.   Musculoskeletal:         General: No swelling, tenderness or deformity. Normal range of motion.      Cervical back: Normal range of motion and neck supple. No rigidity.   Skin:     General: Skin is warm and dry.      Capillary Refill: Capillary refill takes 2 to 3 seconds.      Coloration: Skin is not pale.   Neurological:      General: No focal deficit present.      Mental Status: She is alert and oriented to person, place, and time. Mental status is at baseline.      Motor: No weakness.      Gait: Gait normal.   Psychiatric:         Mood and Affect: Mood is anxious.         Behavior: Behavior normal.         Thought Content: Thought content normal.             Significant Labs: All pertinent labs within the past 24 hours have been reviewed.    CBC:   Recent Labs   Lab 06/04/24  0842 06/04/24  1627 06/05/24  0543   WBC 4.74 5.43 5.15   HGB 11.7* 11.7* 11.5*   HCT 36.6* 35.9* 36.1*    199 202     CMP:   Recent Labs   Lab 06/04/24  0842 06/05/24  0543   * 132*   K 3.5 3.4*   CL 99 99   CO2 20* 21*   * 126*   BUN 8 9   CREATININE 0.9 0.9   CALCIUM 8.6* 7.9*   PROT 7.3  --    ALBUMIN 3.6  --    BILITOT 0.3  --    ALKPHOS 140*  --    AST 14  --    ALT 8*  --    ANIONGAP 13 12       Significant Imaging: I have reviewed all pertinent imaging results/findings within the past 24 hours.    Assessment/Plan:      * BRBPR (bright red blood per rectum)  Presented as symptomatic anemia, however, CTA GI bleed study negative for active bleed. Relatively stable H&H compared to previous labs. Suspect bleeding from internal hemorrhoids, and very low suspicion it is contributing to presenting dizziness and weakness.   --serial CBC to trend h&h  --has a history of b12 deficiency  --iron sat 30%, folate 3.4, B12 high  --low threshold to transfuse for hgb  < 7  --no further bleeding at this point will continue to monitor      Acute cystitis  --urine culture pending  --prior culture data reviewed; pansensitive E. Coli and Enterococcus on the last 2 cultures  --s/p IV Rocephin in the ER- continue upon admission  --will add on Vancomycin for GP coverage  --follow cultures  --monitor fever curve- Tylenol PRN        Dizziness  ddx: medication induced vs orthostasis vs symptomatic anemia  --\US b/l carotid no significant disease  --TSH 1.675  --PT/OT consult appreciated  --fall precautions, neuro checks  --iron studies ok, folate low and B12 high  --admit to telemetry for cardiac monitoring      HTN (hypertension)  --home medications include: HCTZ, norvasc  --initially was going to hold given presenting postural dizziness but SBP sustained above 180s since admission  --restarted home medications at this time  --PRN IV hydralazine 10mg Q6H for sBP > 175 mmHg  --Q4HVS      VTE Risk Mitigation (From admission, onward)           Ordered     Place sequential compression device  Until discontinued         06/04/24 1406                    Discharge Planning   ELADIA:      Code Status: Full Code   Is the patient medically ready for discharge?:     Reason for patient still in hospital (select all that apply): Patient trending condition, Laboratory test, Treatment, and Imaging  Discharge Plan A: Home with family, Home Health                  Malia Henry MD  Department of Hospital Medicine   [facility]

## 2024-06-05 NOTE — PROGRESS NOTES
06/05/24 1140   WOCN Assessment   WOCN Total Time (mins) 45   Visit Date 06/05/24   Visit Time 1140   Consult Type New   WOCN Speciality Wound   Wound moisture   Intervention assessed;applied;chart review;coordination of care;orders;team conference   Teaching on-going;complication;discharge        Wound 05/10/24 2000 Moisture associated dermatitis Right medial Lower quadrant   Date First Assessed/Time First Assessed: 05/10/24 2000   Present on Original Admission: Yes  Primary Wound Type: Moisture associated dermatitis  Side: (c) Right  Orientation: medial  Location: Lower quadrant   Dressing Appearance Open to air   Drainage Amount None   Appearance Red   Care Cleansed with:;Soap and water   Dressing Applied  (InterDry)   Dressing Change Due 06/10/24        Wound 05/10/24 2000 Intertrigo Right lateral;lower Breast   Date First Assessed/Time First Assessed: 05/10/24 2000   Present on Original Admission: Yes  Primary Wound Type: Intertrigo  Side: Right  Orientation: lateral;lower  Location: Breast   Wound Image    Dressing Appearance Open to air   Drainage Amount Scant   Drainage Characteristics/Odor Serous   Appearance Red   Tissue loss description Partial thickness   Periwound Area Intact   Wound Edges Open   Care Cleansed with:;Soap and water   Dressing Applied  (InterDry)   Dressing Change Due 06/10/24        Wound 05/10/24 2000 Moisture associated dermatitis Left lateral;lower Abdomen   Date First Assessed/Time First Assessed: 05/10/24 2000   Present on Original Admission: Yes  Primary Wound Type: Moisture associated dermatitis  Side: Left  Orientation: lateral;lower  Location: Abdomen   Dressing Appearance Open to air   Drainage Amount Scant   Drainage Characteristics/Odor Serous   Appearance Red   Tissue loss description Partial thickness   Care Cleansed with:;Soap and water   Dressing Applied  (InterDry)   Dressing Change Due 06/10/24        Wound 05/10/24 2000 Moisture associated dermatitis Left  lateral;lower Breast   Date First Assessed/Time First Assessed: 05/10/24 2000   Present on Original Admission: Yes  Primary Wound Type: Moisture associated dermatitis  Side: Left  Orientation: lateral;lower  Location: Breast   Wound Image    Dressing Appearance Open to air   Drainage Amount Scant   Drainage Characteristics/Odor Serous   Appearance Red;Moist   Tissue loss description Partial thickness   Care Cleansed with:;Soap and water   Dressing Applied  (InterDry)   Dressing Change Due 06/10/24

## 2024-06-05 NOTE — PLAN OF CARE
Discussed poc with pt, pt verbalized understanding    Purposeful rounding    VS wnl  Cardiac monitoring in use, pt is NSR  Fall precautions in place, remains injury free  Pain and nausea under control with PRN meds    Accurate I&Os  Abx given as prescribed  Bed locked at lowest position  Call light within reach    Chart check complete  Will cont with POC

## 2024-06-05 NOTE — PLAN OF CARE
Inpatient Upgrade Note    Divya Bui has warranted treatment spanning two or more midnights of hospital level care for the management of hypertensive urgency and bright red blood per rectum, UTI, and dizziness . She continues to require IV antibiotics, daily labs, further testing/imaging, monitoring of vital signs, and IV antiemetics . Her condition is also complicated by the following comorbidities: Hypertension, Obesity, and Anemia, and Anxiety.  .

## 2024-06-05 NOTE — SUBJECTIVE & OBJECTIVE
Interval History:  Patient seen and examined at bedside while wound care eval weaned of generalized abdominal pain.  Tenderness to palpation throughout the abdomen worse in the left lower quadrant no rebound and no guarding.    Review of Systems   Constitutional:  Positive for fatigue. Negative for fever.   Respiratory:  Negative for cough and shortness of breath.    Cardiovascular:  Negative for chest pain and leg swelling.   Gastrointestinal:  Positive for abdominal pain, anal bleeding and blood in stool. Negative for nausea and vomiting.   Musculoskeletal:  Positive for arthralgias.   Neurological:  Positive for weakness.   All other systems reviewed and are negative.    Objective:     Vital Signs (Most Recent):  Temp: 98.1 °F (36.7 °C) (06/05/24 1126)  Pulse: 94 (06/05/24 1126)  Resp: 16 (06/05/24 1250)  BP: 130/70 (06/05/24 1126)  SpO2: 96 % (06/05/24 1126) Vital Signs (24h Range):  Temp:  [97.5 °F (36.4 °C)-98.5 °F (36.9 °C)] 98.1 °F (36.7 °C)  Pulse:  [] 94  Resp:  [14-18] 16  SpO2:  [94 %-98 %] 96 %  BP: (126-179)/(66-88) 130/70     Weight: 101.6 kg (223 lb 15.8 oz)  Body mass index is 39.68 kg/m².    Intake/Output Summary (Last 24 hours) at 6/5/2024 1503  Last data filed at 6/4/2024 1746  Gross per 24 hour   Intake 623.35 ml   Output --   Net 623.35 ml         Physical Exam  Vitals reviewed.   Constitutional:       General: She is not in acute distress.     Appearance: Normal appearance. She is obese. She is ill-appearing. She is not toxic-appearing.   HENT:      Head: Normocephalic and atraumatic.      Nose: Nose normal. No congestion or rhinorrhea.   Eyes:      Extraocular Movements: Extraocular movements intact.      Conjunctiva/sclera: Conjunctivae normal.      Pupils: Pupils are equal, round, and reactive to light.   Cardiovascular:      Rate and Rhythm: Normal rate and regular rhythm.      Pulses: Normal pulses.      Heart sounds: No murmur heard.  Pulmonary:      Effort: Pulmonary effort is  normal. No respiratory distress.      Breath sounds: Normal breath sounds. No wheezing.   Abdominal:      General: Abdomen is flat. Bowel sounds are normal. There is no distension.      Palpations: Abdomen is soft.      Tenderness: There is abdominal tenderness. There is no guarding or rebound.   Musculoskeletal:         General: No swelling, tenderness or deformity. Normal range of motion.      Cervical back: Normal range of motion and neck supple. No rigidity.   Skin:     General: Skin is warm and dry.      Capillary Refill: Capillary refill takes 2 to 3 seconds.      Coloration: Skin is not pale.   Neurological:      General: No focal deficit present.      Mental Status: She is alert and oriented to person, place, and time. Mental status is at baseline.      Motor: No weakness.      Gait: Gait normal.   Psychiatric:         Mood and Affect: Mood is anxious.         Behavior: Behavior normal.         Thought Content: Thought content normal.             Significant Labs: All pertinent labs within the past 24 hours have been reviewed.    CBC:   Recent Labs   Lab 06/04/24  0842 06/04/24  1627 06/05/24  0543   WBC 4.74 5.43 5.15   HGB 11.7* 11.7* 11.5*   HCT 36.6* 35.9* 36.1*    199 202     CMP:   Recent Labs   Lab 06/04/24  0842 06/05/24  0543   * 132*   K 3.5 3.4*   CL 99 99   CO2 20* 21*   * 126*   BUN 8 9   CREATININE 0.9 0.9   CALCIUM 8.6* 7.9*   PROT 7.3  --    ALBUMIN 3.6  --    BILITOT 0.3  --    ALKPHOS 140*  --    AST 14  --    ALT 8*  --    ANIONGAP 13 12       Significant Imaging: I have reviewed all pertinent imaging results/findings within the past 24 hours.

## 2024-06-06 LAB
ANION GAP SERPL CALC-SCNC: 12 MMOL/L (ref 8–16)
BACTERIA UR CULT: ABNORMAL
BASOPHILS # BLD AUTO: 0.06 K/UL (ref 0–0.2)
BASOPHILS NFR BLD: 1.4 % (ref 0–1.9)
BUN SERPL-MCNC: 13 MG/DL (ref 8–23)
CALCIUM SERPL-MCNC: 8.3 MG/DL (ref 8.7–10.5)
CHLORIDE SERPL-SCNC: 94 MMOL/L (ref 95–110)
CO2 SERPL-SCNC: 25 MMOL/L (ref 23–29)
CREAT SERPL-MCNC: 1.3 MG/DL (ref 0.5–1.4)
DIFFERENTIAL METHOD BLD: ABNORMAL
EOSINOPHIL # BLD AUTO: 0.3 K/UL (ref 0–0.5)
EOSINOPHIL NFR BLD: 7.6 % (ref 0–8)
ERYTHROCYTE [DISTWIDTH] IN BLOOD BY AUTOMATED COUNT: 14.6 % (ref 11.5–14.5)
EST. GFR  (NO RACE VARIABLE): 45 ML/MIN/1.73 M^2
GLUCOSE SERPL-MCNC: 106 MG/DL (ref 70–110)
HCT VFR BLD AUTO: 38.5 % (ref 37–48.5)
HGB BLD-MCNC: 12 G/DL (ref 12–16)
IMM GRANULOCYTES # BLD AUTO: 0.06 K/UL (ref 0–0.04)
IMM GRANULOCYTES NFR BLD AUTO: 1.4 % (ref 0–0.5)
LYMPHOCYTES # BLD AUTO: 1.1 K/UL (ref 1–4.8)
LYMPHOCYTES NFR BLD: 25.7 % (ref 18–48)
MCH RBC QN AUTO: 27.9 PG (ref 27–31)
MCHC RBC AUTO-ENTMCNC: 31.2 G/DL (ref 32–36)
MCV RBC AUTO: 90 FL (ref 82–98)
MONOCYTES # BLD AUTO: 0.4 K/UL (ref 0.3–1)
MONOCYTES NFR BLD: 8.8 % (ref 4–15)
NEUTROPHILS # BLD AUTO: 2.3 K/UL (ref 1.8–7.7)
NEUTROPHILS NFR BLD: 55.1 % (ref 38–73)
NRBC BLD-RTO: 0 /100 WBC
PLATELET # BLD AUTO: 187 K/UL (ref 150–450)
PMV BLD AUTO: 8.4 FL (ref 9.2–12.9)
POTASSIUM SERPL-SCNC: 3.9 MMOL/L (ref 3.5–5.1)
RBC # BLD AUTO: 4.3 M/UL (ref 4–5.4)
SODIUM SERPL-SCNC: 131 MMOL/L (ref 136–145)
VANCOMYCIN TROUGH SERPL-MCNC: 29.9 UG/ML (ref 10–22)
WBC # BLD AUTO: 4.21 K/UL (ref 3.9–12.7)

## 2024-06-06 PROCEDURE — 36415 COLL VENOUS BLD VENIPUNCTURE: CPT | Performed by: STUDENT IN AN ORGANIZED HEALTH CARE EDUCATION/TRAINING PROGRAM

## 2024-06-06 PROCEDURE — 80202 ASSAY OF VANCOMYCIN: CPT | Performed by: INTERNAL MEDICINE

## 2024-06-06 PROCEDURE — 63600175 PHARM REV CODE 636 W HCPCS: Performed by: STUDENT IN AN ORGANIZED HEALTH CARE EDUCATION/TRAINING PROGRAM

## 2024-06-06 PROCEDURE — 80048 BASIC METABOLIC PNL TOTAL CA: CPT | Performed by: STUDENT IN AN ORGANIZED HEALTH CARE EDUCATION/TRAINING PROGRAM

## 2024-06-06 PROCEDURE — 11000001 HC ACUTE MED/SURG PRIVATE ROOM

## 2024-06-06 PROCEDURE — 85025 COMPLETE CBC W/AUTO DIFF WBC: CPT | Performed by: STUDENT IN AN ORGANIZED HEALTH CARE EDUCATION/TRAINING PROGRAM

## 2024-06-06 PROCEDURE — 97110 THERAPEUTIC EXERCISES: CPT

## 2024-06-06 PROCEDURE — 25000003 PHARM REV CODE 250: Performed by: INTERNAL MEDICINE

## 2024-06-06 PROCEDURE — 97530 THERAPEUTIC ACTIVITIES: CPT

## 2024-06-06 PROCEDURE — 25000003 PHARM REV CODE 250: Performed by: STUDENT IN AN ORGANIZED HEALTH CARE EDUCATION/TRAINING PROGRAM

## 2024-06-06 RX ORDER — AMLODIPINE BESYLATE 5 MG/1
5 TABLET ORAL
Qty: 90 TABLET | Refills: 3 | Status: SHIPPED | OUTPATIENT
Start: 2024-06-06

## 2024-06-06 RX ORDER — HYDROCODONE BITARTRATE AND ACETAMINOPHEN 10; 325 MG/1; MG/1
1 TABLET ORAL EVERY 6 HOURS PRN
Status: DISCONTINUED | OUTPATIENT
Start: 2024-06-06 | End: 2024-06-08 | Stop reason: HOSPADM

## 2024-06-06 RX ORDER — OLMESARTAN MEDOXOMIL AND HYDROCHLOROTHIAZIDE 40/12.5 40; 12.5 MG/1; MG/1
1 TABLET ORAL DAILY
Qty: 90 TABLET | Refills: 3 | Status: SHIPPED | OUTPATIENT
Start: 2024-06-06

## 2024-06-06 RX ADMIN — POLYETHYLENE GLYCOL 3350 17 G: 17 POWDER, FOR SOLUTION ORAL at 08:06

## 2024-06-06 RX ADMIN — ARIPIPRAZOLE 10 MG: 5 TABLET ORAL at 08:06

## 2024-06-06 RX ADMIN — HYDROCODONE BITARTRATE AND ACETAMINOPHEN 1 TABLET: 10; 325 TABLET ORAL at 10:06

## 2024-06-06 RX ADMIN — FOLIC ACID 1 MG: 1 TABLET ORAL at 08:06

## 2024-06-06 RX ADMIN — POTASSIUM BICARBONATE 25 MEQ: 978 TABLET, EFFERVESCENT ORAL at 02:06

## 2024-06-06 RX ADMIN — AMLODIPINE BESYLATE 5 MG: 5 TABLET ORAL at 08:06

## 2024-06-06 RX ADMIN — Medication 324 MG: at 08:06

## 2024-06-06 RX ADMIN — HYDROCODONE BITARTRATE AND ACETAMINOPHEN 1 TABLET: 5; 325 TABLET ORAL at 11:06

## 2024-06-06 RX ADMIN — HYDROCODONE BITARTRATE AND ACETAMINOPHEN 1 TABLET: 5; 325 TABLET ORAL at 05:06

## 2024-06-06 RX ADMIN — ONDANSETRON 4 MG: 2 INJECTION INTRAMUSCULAR; INTRAVENOUS at 05:06

## 2024-06-06 RX ADMIN — DOXEPIN HYDROCHLORIDE 50 MG: 25 CAPSULE ORAL at 08:06

## 2024-06-06 RX ADMIN — ALPRAZOLAM 1 MG: 1 TABLET ORAL at 08:06

## 2024-06-06 RX ADMIN — ALPRAZOLAM 1 MG: 1 TABLET ORAL at 02:06

## 2024-06-06 RX ADMIN — ATORVASTATIN CALCIUM 40 MG: 40 TABLET, FILM COATED ORAL at 08:06

## 2024-06-06 RX ADMIN — PANTOPRAZOLE SODIUM 40 MG: 40 TABLET, DELAYED RELEASE ORAL at 08:06

## 2024-06-06 RX ADMIN — CEFTRIAXONE 1 G: 1 INJECTION, POWDER, FOR SOLUTION INTRAMUSCULAR; INTRAVENOUS at 02:06

## 2024-06-06 RX ADMIN — PREGABALIN 100 MG: 100 CAPSULE ORAL at 08:06

## 2024-06-06 RX ADMIN — FLUOXETINE 80 MG: 20 CAPSULE ORAL at 08:06

## 2024-06-06 RX ADMIN — HYDROCODONE BITARTRATE AND ACETAMINOPHEN 1 TABLET: 10; 325 TABLET ORAL at 04:06

## 2024-06-06 RX ADMIN — ZOLPIDEM TARTRATE 10 MG: 5 TABLET ORAL at 10:06

## 2024-06-06 NOTE — HOSPITAL COURSE
Patient is a 64-year-old female with a history anemia, GE reflux, morbid obesity, hypertension who presented to the emergency department complain of rectal bleeding.  She has a history of iron-deficiency anemia has been getting iron infusions.  Upon presentation patient's hemoglobin was stable at 11 her blood pressure was stable.  She has had no further bright red blood per rectum.  She has however had abdominal pain and  Dysuria.  CT scan of the abdomen revealed borderline bladder thickening, diverticulosis without diverticulitis partially fluid-filled colon.  Patient was admitted for potential GI bleed but her hemoglobin and blood pressure remained stable she was also noted to have significant dysuria urinalysis positive for UTI.  Patient was admitted for IV antibiotic therapy.      Pt had no evidence of GI bleed. She had constipation with pains in abdomen and placed on bowel protocol. Urine culture + for e coli. Treated with rocephin.   With BM pt's pain improved. Pt initiallhy planned to go stay with sister for a while but then son decided to stay with her. Pt referred to outpatient OT/PT.   Pt seen and examined on day of discharge and stable for dc. See orders.

## 2024-06-06 NOTE — PROGRESS NOTES
Therapy with vancomycin complete and/or consult discontinued by provider.    Pharmacy will sign off, please re-consult as needed.  /Susan Harris [facility] 6/6/2024 7:33 AM

## 2024-06-06 NOTE — PROGRESS NOTES
[facility]  Hospital Medicine  Progress Note    Patient Name: Divya Bui  MRN: [id number]  Patient Class: IP- Inpatient   Admission Date: 6/4/2024  Length of Stay: 1 days  Attending Physician: Malia Moe MD  Primary Care Provider: Angel Khan MD        Subjective:     Principal Problem:BRBPR (bright red blood per rectum)        HPI:  The patient is a 64-year-old woman with a past medical history of anemia, anxiety, depression, bowel incontinence, gastroesophageal reflux disease, hypertension, morbid obesity, and pericardial effusion who presents to the ER with rectal bleeding that began last night. She reports a large amount of bright red blood in her stool, which has persisted and is of moderate severity. The patient notes that she had an iron infusion last week and that her symptoms are constant with no mitigating or exacerbating factors. Associated symptoms include nausea and vomiting. She has experienced decreased oral intake but denies hematemesis, chest pain, shortness of breath, fever, chills, and other symptoms at this time. She has not received any prior treatment for this issue and has no further complaints or concerns.    In the ER, the patient's vitals were stable except for a blood pressure of 196/103. Urinalysis was abnormal, notable for cloudy appearance, trace protein, 3+ occult blood, positive nitrites, and 3+ leukocytes. Microscopic urinalysis revealed 35 RBCs, over 100 WBCs, and many WBC clumps. Pertinent negative labs include a hemoglobin level of 11, which is the patient's baseline. A CTA for GI bleed showed no ongoing arterial GI bleeding. The patient became dizzy and light-headed when ambulating to the restroom, as noted in the ER assessment. Hospital medicine called for admission.       Overview/Hospital Course:  Patient is a 64-year-old female with a history anemia, GE reflux, morbid obesity, hypertension who presented to the emergency department complain of  rectal bleeding.  She has a history of iron-deficiency anemia has been getting iron infusions.  Upon presentation patient's hemoglobin was stable at 11 her blood pressure was stable.  She has had no further bright red blood per rectum.  She has however had abdominal pain and  Dysuria.  CT scan of the abdomen revealed borderline bladder thickening, diverticulosis without diverticulitis partially fluid-filled colon.  Patient was admitted for potential GI bleed but her hemoglobin and blood pressure remained stable she was also noted to have significant dysuria urinalysis positive for UTI.  Patient was admitted for IV antibiotic therapy.    Interval History:  Patient seen and examined.  She states she was feeling better but still having significant abdominal pain.  Dysuria has resolved.    Review of Systems   Constitutional:  Positive for fatigue. Negative for fever.   Respiratory:  Negative for cough and shortness of breath.    Cardiovascular:  Negative for chest pain and leg swelling.   Gastrointestinal:  Positive for abdominal pain and nausea. Negative for vomiting.   Neurological:  Positive for weakness.   All other systems reviewed and are negative.    Objective:     Vital Signs (Most Recent):  Temp: 97.5 °F (36.4 °C) (06/06/24 1159)  Pulse: 85 (06/06/24 1305)  Resp: 19 (06/06/24 1159)  BP: 126/62 (06/06/24 1159)  SpO2: (!) 93 % (06/06/24 1159) Vital Signs (24h Range):  Temp:  [97.5 °F (36.4 °C)-97.9 °F (36.6 °C)] 97.5 °F (36.4 °C)  Pulse:  [74-90] 85  Resp:  [15-19] 19  SpO2:  [93 %-96 %] 93 %  BP: (105-144)/(59-72) 126/62     Weight: 101.6 kg (223 lb 15.8 oz)  Body mass index is 39.68 kg/m².    Intake/Output Summary (Last 24 hours) at 6/6/2024 1405  Last data filed at 6/5/2024 1508  Gross per 24 hour   Intake 99.29 ml   Output --   Net 99.29 ml         Physical Exam  Vitals reviewed.   Constitutional:       Appearance: Normal appearance. She is obese.   HENT:      Head: Normocephalic and atraumatic.       Mouth/Throat:      Mouth: Mucous membranes are moist.      Pharynx: Oropharynx is clear.   Eyes:      Extraocular Movements: Extraocular movements intact.      Conjunctiva/sclera: Conjunctivae normal.   Cardiovascular:      Rate and Rhythm: Normal rate and regular rhythm.      Pulses: Normal pulses.      Heart sounds: Normal heart sounds.   Pulmonary:      Effort: Pulmonary effort is normal.      Breath sounds: Normal breath sounds.   Abdominal:      General: Bowel sounds are normal.      Palpations: Abdomen is soft.      Tenderness: There is abdominal tenderness. There is no guarding or rebound.   Musculoskeletal:         General: Normal range of motion.      Cervical back: Normal range of motion and neck supple.   Skin:     General: Skin is warm and dry.   Neurological:      General: No focal deficit present.      Mental Status: She is alert and oriented to person, place, and time. Mental status is at baseline.   Psychiatric:         Mood and Affect: Mood normal.         Behavior: Behavior normal.         Thought Content: Thought content normal.             Significant Labs: All pertinent labs within the past 24 hours have been reviewed.  CBC:   Recent Labs   Lab 06/04/24  1627 06/05/24  0543 06/06/24  0442   WBC 5.43 5.15 4.21   HGB 11.7* 11.5* 12.0   HCT 35.9* 36.1* 38.5    202 187     CMP:   Recent Labs   Lab 06/05/24  0543 06/06/24  0443   * 131*   K 3.4* 3.9   CL 99 94*   CO2 21* 25   * 106   BUN 9 13   CREATININE 0.9 1.3   CALCIUM 7.9* 8.3*   ANIONGAP 12 12   Urine culture positive for Gram-negative sudhir on known sensitivity bacteria    Significant Imaging: I have reviewed all pertinent imaging results/findings within the past 24 hours.    Assessment/Plan:      * BRBPR (bright red blood per rectum)  Presented as symptomatic anemia, however, CTA GI bleed study negative for active bleed. Relatively stable H&H compared to previous labs. Suspect bleeding from internal hemorrhoids, and very low  suspicion it is contributing to presenting dizziness and weakness.   --serial CBC stable  --has a history of b12 deficiency  --iron sat 30%, folate 3.4, B12 high  --low threshold to transfuse for hgb < 7  --no further bleeding at this point will continue to monitor      Acute cystitis  --urine culture positive Gram-negative rods  --prior culture data reviewed; pansensitive E. Coli and Enterococcus on the last 2 cultures  --s/p IV Rocephin in the ER- continue upon admission    --follow cultures  --monitor fever curve- Tylenol PRN        Dizziness  Resolved    ddx: medication induced vs orthostasis vs symptomatic anemia  --\US b/l carotid no significant disease  --TSH 1.675  --PT/OT consult appreciated  --fall precautions, neuro checks  --iron studies ok, folate low and B12 high  --no dysrythmia on tele will stop      HTN (hypertension)  --home medications include: HCTZ, norvasc    --restarted home medications at this time  --PRN IV hydralazine 10mg Q6H for sBP > 175 mmHg  --Q4HVS      VTE Risk Mitigation (From admission, onward)           Ordered     Place sequential compression device  Until discontinued         06/04/24 1406                    Discharge Planning   ELADIA:      Code Status: Full Code   Is the patient medically ready for discharge?:     Reason for patient still in hospital (select all that apply): Patient trending condition and Laboratory test  Discharge Plan A: Home with family, Home Health                  Malia Henry MD  Department of Hospital Medicine   [facility]

## 2024-06-06 NOTE — SUBJECTIVE & OBJECTIVE
Interval History:  Patient seen and examined.  She states she was feeling better but still having significant abdominal pain.  Dysuria has resolved.    Review of Systems   Constitutional:  Positive for fatigue. Negative for fever.   Respiratory:  Negative for cough and shortness of breath.    Cardiovascular:  Negative for chest pain and leg swelling.   Gastrointestinal:  Positive for abdominal pain and nausea. Negative for vomiting.   Neurological:  Positive for weakness.   All other systems reviewed and are negative.    Objective:     Vital Signs (Most Recent):  Temp: 97.5 °F (36.4 °C) (06/06/24 1159)  Pulse: 85 (06/06/24 1305)  Resp: 19 (06/06/24 1159)  BP: 126/62 (06/06/24 1159)  SpO2: (!) 93 % (06/06/24 1159) Vital Signs (24h Range):  Temp:  [97.5 °F (36.4 °C)-97.9 °F (36.6 °C)] 97.5 °F (36.4 °C)  Pulse:  [74-90] 85  Resp:  [15-19] 19  SpO2:  [93 %-96 %] 93 %  BP: (105-144)/(59-72) 126/62     Weight: 101.6 kg (223 lb 15.8 oz)  Body mass index is 39.68 kg/m².    Intake/Output Summary (Last 24 hours) at 6/6/2024 1405  Last data filed at 6/5/2024 1508  Gross per 24 hour   Intake 99.29 ml   Output --   Net 99.29 ml         Physical Exam  Vitals reviewed.   Constitutional:       Appearance: Normal appearance. She is obese.   HENT:      Head: Normocephalic and atraumatic.      Mouth/Throat:      Mouth: Mucous membranes are moist.      Pharynx: Oropharynx is clear.   Eyes:      Extraocular Movements: Extraocular movements intact.      Conjunctiva/sclera: Conjunctivae normal.   Cardiovascular:      Rate and Rhythm: Normal rate and regular rhythm.      Pulses: Normal pulses.      Heart sounds: Normal heart sounds.   Pulmonary:      Effort: Pulmonary effort is normal.      Breath sounds: Normal breath sounds.   Abdominal:      General: Bowel sounds are normal.      Palpations: Abdomen is soft.      Tenderness: There is abdominal tenderness. There is no guarding or rebound.   Musculoskeletal:         General: Normal range  of motion.      Cervical back: Normal range of motion and neck supple.   Skin:     General: Skin is warm and dry.   Neurological:      General: No focal deficit present.      Mental Status: She is alert and oriented to person, place, and time. Mental status is at baseline.   Psychiatric:         Mood and Affect: Mood normal.         Behavior: Behavior normal.         Thought Content: Thought content normal.             Significant Labs: All pertinent labs within the past 24 hours have been reviewed.  CBC:   Recent Labs   Lab 06/04/24  1627 06/05/24  0543 06/06/24  0442   WBC 5.43 5.15 4.21   HGB 11.7* 11.5* 12.0   HCT 35.9* 36.1* 38.5    202 187     CMP:   Recent Labs   Lab 06/05/24  0543 06/06/24  0443   * 131*   K 3.4* 3.9   CL 99 94*   CO2 21* 25   * 106   BUN 9 13   CREATININE 0.9 1.3   CALCIUM 7.9* 8.3*   ANIONGAP 12 12   Urine culture positive for Gram-negative sudhir on known sensitivity bacteria    Significant Imaging: I have reviewed all pertinent imaging results/findings within the past 24 hours.

## 2024-06-06 NOTE — PROGRESS NOTES
Pharmacokinetic Assessment Follow Up: IV Vancomycin    Vancomycin serum concentration assessment(s):    The trough level was drawn correctly and can be used to guide therapy at this time. The measurement is above the desired definitive target range of 15 to 20 mcg/mL.    Vancomycin Regimen Plan:    Discontinue the scheduled vancomycin regimen and re-dose when the random level is less than 20 mcg/mL, next level to be drawn at 1630 on 06/07.    Drug levels (last 3 results):  Recent Labs   Lab Result Units 06/06/24  0442   Vancomycin-Trough ug/mL 29.9*       Pharmacy will continue to follow and monitor vancomycin.    Please contact pharmacy at extension [phone] for questions regarding this assessment.    Thank you for the consult,   Brandi Cardenas       Patient brief summary:  Divya Bui is a 64 y.o. female initiated on antimicrobial therapy with IV Vancomycin for treatment of urinary tract infection    The patient's current regimen is pulse dosing.    Drug Allergies:   Review of patient's allergies indicates:   Allergen Reactions    Metronidazole hcl Other (See Comments)     Mouth ulcers developed with Flagyl  Oral sores.  Mouth ulcers developed with Flagyl       Actual Body Weight:   101.6 kg    Renal Function:   Estimated Creatinine Clearance: 48.5 mL/min (based on SCr of 1.3 mg/dL).,     Dialysis Method (if applicable):  N/A    CBC (last 72 hours):  Recent Labs   Lab Result Units 06/04/24  0842 06/04/24  1627 06/05/24  0543 06/06/24  0442   WBC K/uL 4.74 5.43 5.15 4.21   Hemoglobin g/dL 11.7* 11.7* 11.5* 12.0   Hematocrit % 36.6* 35.9* 36.1* 38.5   Platelets K/uL 193 199 202 187   Gran % % 67.8 68.9 62.1 55.1   Lymph % % 18.1 18.0 19.8 25.7   Mono % % 10.1 9.6 10.7 8.8   Eosinophil % % 2.3 1.7 5.2 7.6   Basophil % % 0.4 0.9 1.2 1.4   Differential Method  Automated Automated Automated Automated       Metabolic Panel (last 72 hours):  Recent Labs   Lab Result Units 06/04/24  0842 06/04/24  0927 06/05/24  0543  06/06/24  0443   Sodium mmol/L 132*  --  132* 131*   Potassium mmol/L 3.5  --  3.4* 3.9   Chloride mmol/L 99  --  99 94*   CO2 mmol/L 20*  --  21* 25   Glucose mg/dL 136*  --  126* 106   Glucose, UA   --  Negative  --   --    BUN mg/dL 8  --  9 13   Creatinine mg/dL 0.9  --  0.9 1.3   Albumin g/dL 3.6  --   --   --    Total Bilirubin mg/dL 0.3  --   --   --    Alkaline Phosphatase U/L 140*  --   --   --    AST U/L 14  --   --   --    ALT U/L 8*  --   --   --        Vancomycin Administrations:  vancomycin given in the last 96 hours                     vancomycin (VANCOCIN) 1,000 mg in dextrose 5 % (D5W) 250 mL IVPB (Vial-Mate) (mg) 1,000 mg New Bag 06/05/24 1633     1,000 mg New Bag  0536    vancomycin (VANCOCIN) 2,250 mg in dextrose 5 % (D5W) 500 mL IVPB ()  Restarted 06/04/24 1617     2,250 mg New Bag  1614                    Microbiologic Results:  Microbiology Results (last 7 days)       Procedure Component Value Units Date/Time    Urine culture [[id number]]  (Abnormal) Collected: 06/04/24 0927    Order Status: Completed Specimen: Urine Updated: 06/05/24 1653     Urine Culture, Routine GRAM NEGATIVE JUSTINE  >100,000 cfu/ml  Identification and susceptibility pending      Narrative:      Specimen Source->Urine

## 2024-06-06 NOTE — TELEPHONE ENCOUNTER
No care due was identified.  [facility] Embedded Care Due Messages. Reference number: [id number].   6/06/2024 8:57:29 AM CDT

## 2024-06-06 NOTE — PT/OT/SLP PROGRESS
"Physical Therapy  Treatment    Divya Bui   MRN: [id number]   Admitting Diagnosis: BRBPR (bright red blood per rectum)    PT Received On: 06/06/24  PT Start Time: 0755     PT Stop Time: 0820    PT Total Time (min): 25 min       Billable Minutes:  Therapeutic Activity 15 and Therapeutic Exercise 10    Treatment Type: Treatment  PT/PTA: PT     Number of PTA visits since last PT visit: 0       General Precautions: Standard, fall  Orthopedic Precautions: N/A  Braces: N/A  Respiratory Status: Room air    Spiritual, Cultural Beliefs, Confucianism Practices, Values that Affect Care: no    Subjective:  Communicated with RN Farzana prior to session.  Pt reports that she hopes that she is going home today. She states that she feels that she needs a RW for home to help with her balance.     Pain/Comfort  Pain Rating 1: 0/10    Objective:   Patient found with: peripheral IV    Treatment and Education:  Treatment performed. Pt performed bed mobility mod I with increased time for task. STS mod I with use of RW. Pt ambulated over 100 feet within her room with RW and SBA approaching mod I with no LOB. Pt transferred to chair with RW mod I. Pt performed seated LE exercises seated in chair for 10 reps each of marches, heel rocks, LAQ, and glute sets.     Pt educated on role of PT in acute care and POC. Educated on importance of OOB activities, activity pacing, and HEP (marching/hip flex, hip abd, heel slides/LAQ, quad sets, ankle pumps) in order to maintain/regain strength. Encouraged to sit up in chair for all meals. Educated on proper use of RW for safety and to reduce risk of falling. Educated on "call don't fall" policy and increased risk of falling due to weakness, instructed to utilize call bell for assistance with all transfers. Pt agreeable to all requests.       AM-PAC 6 CLICK MOBILITY  How much help from another person does this patient currently need?   1 = Unable, Total/Dependent Assistance  2 = A lot, Maximum/Moderate " Assistance  3 = A little, Minimum/Contact Guard/Supervision  4 = None, Modified [place]/Independent    Turning over in bed (including adjusting bedclothes, sheets and blankets)?: 4  Sitting down on and standing up from a chair with arms (e.g., wheelchair, bedside commode, etc.): 4  Moving from lying on back to sitting on the side of the bed?: 4  Moving to and from a bed to a chair (including a wheelchair)?: 4  Need to walk in hospital room?: 3  Climbing 3-5 steps with a railing?: 2  Basic Mobility Total Score: 21    AM-PAC Raw Score CMS G-Code Modifier Level of Impairment Assistance   6 % Total / Unable   7 - 9 CM 80 - 100% Maximal Assist   10 - 14 CL 60 - 80% Moderate Assist   15 - 19 CK 40 - 60% Moderate Assist   20 - 22 CJ 20 - 40% Minimal Assist   23 CI 1-20% SBA / CGA   24 CH 0% Independent/ Mod I     Patient left up in chair with all lines intact, call button in reach, and RN notified.    Assessment:  Divya Bui is a 64 y.o. female with a medical diagnosis of BRBPR (bright red blood per rectum) and presents with weakness, impaired endurance, impaired functional mobility, gait instability, decreased lower extremity function, impaired cardiopulmonary response to activity, impaired coordination. Pt with improved independence noted today with mobility assessment and great improvements in functional mobility and safety of gait with use of RW. Pt will continue to benefit from skilled PT intervention in order to address remaining mobility deficits and promote return to optimal QOL.      The mobility limitation cannot be sufficiently resolved by the use of a cane.   Patient's functional mobility deficit can be sufficiently resolved with the use of a rolling walker. Patient's mobility limitation significantly impairs their ability to participate in one of more activities of daily living. The use of a rolling walker will significantly improve the patient's ability to participate in MRADLS and the patient  will use it on regular basis in the home.     This patient has a mobility limitation that   Prevents them entirely  from accomplishing MRADL's in customary locations in the home   Places them at reasonable determined heightened risk of mobility or mortality secondary to attempts to perform an MRADL   Prevents them from completing MRADL's in a reasonable time frame      Rehab potential is good.    Activity tolerance: Good    Discharge recommendations: Low Intensity Therapy      Barriers to discharge:      Equipment recommendations: walker, rolling     GOALS:   Multidisciplinary Problems       Physical Therapy Goals          Problem: Physical Therapy    Goal Priority Disciplines Outcome Goal Variances Interventions   Physical Therapy Goal     PT, PT/OT Progressing     Description: Goals to be met by 6/19/24.  1. Pt will complete bed mobility MOD I.  2. Pt will complete sit to stand MOD I.  3. Pt will ambulate 200ft MOD I using RW.  4. Pt will increase AMPAC score by 2 points to progress functional mobility.                       PLAN:    Patient to be seen 3 x/week to address the above listed problems via gait training, therapeutic activities, therapeutic exercises, neuromuscular re-education  Plan of Care expires: 06/19/24  Plan of Care reviewed with: patient         06/06/2024

## 2024-06-06 NOTE — NURSING
Pt remains free of falls/injury. Safety precautions maintained. Dr stein changed to pt pain medication. Pt believe modification will help with pain.  IV abx given.  Cardiac diet tolerated 1500mL fluid restriction adhered to. VSS. No S/S of distress noted at this time. Pt ambulated in room w/o any noted issues.  Pt gate is steady and expressed no complaints while walking in room.  Pt sat in chair for breakfast and lunch and tolerated well.  Bed alarm set.12 hour chart check complete. Will continue to monitor.

## 2024-06-06 NOTE — TELEPHONE ENCOUNTER
Requested Prescriptions     Pending Prescriptions Disp Refills    amLODIPine (NORVASC) 5 MG tablet [Pharmacy Med Name: amlodipine 5 mg tablet] 90 tablet 3     Sig: TAKE 1 TABLET BY MOUTH ONCE DAILY    olmesartan-hydrochlorothiazide (BENICAR HCT) 40-12.5 mg Tab [Pharmacy Med Name: olmesartan 40 mg-hydrochlorothiazide 12.5 mg tablet] 90 tablet 3     Sig: TAKE 1 TABLET BY MOUTH ONCE DAILY     LV 05/09/2024  NV none scheduled.   LF 05/17/2023

## 2024-06-06 NOTE — PLAN OF CARE
Treatment performed. Pt performed bed mobility mod I with increased time for task. STS mod I with use of RW. Pt ambulated over 100 feet within her room with RW and SBA approaching mod I with no LOB. Pt transferred to chair with RW mod I. Pt performed seated LE exercises seated in chair for 10 reps each of marches, heel rocks, LAQ, and glute sets.

## 2024-06-07 PROBLEM — R42 DIZZINESS: Status: RESOLVED | Noted: 2024-05-10 | Resolved: 2024-06-07

## 2024-06-07 PROBLEM — K62.5 BRBPR (BRIGHT RED BLOOD PER RECTUM): Status: RESOLVED | Noted: 2024-06-04 | Resolved: 2024-06-07

## 2024-06-07 LAB
ANION GAP SERPL CALC-SCNC: 10 MMOL/L (ref 8–16)
ANION GAP SERPL CALC-SCNC: 12 MMOL/L (ref 8–16)
BILIRUB UR QL STRIP: NEGATIVE
BUN SERPL-MCNC: 22 MG/DL (ref 8–23)
BUN SERPL-MCNC: 28 MG/DL (ref 8–23)
CALCIUM SERPL-MCNC: 7.9 MG/DL (ref 8.7–10.5)
CALCIUM SERPL-MCNC: 8.1 MG/DL (ref 8.7–10.5)
CHLORIDE SERPL-SCNC: 96 MMOL/L (ref 95–110)
CHLORIDE SERPL-SCNC: 97 MMOL/L (ref 95–110)
CLARITY UR: CLEAR
CO2 SERPL-SCNC: 24 MMOL/L (ref 23–29)
CO2 SERPL-SCNC: 25 MMOL/L (ref 23–29)
COLOR UR: YELLOW
CREAT SERPL-MCNC: 1.4 MG/DL (ref 0.5–1.4)
CREAT SERPL-MCNC: 1.8 MG/DL (ref 0.5–1.4)
EST. GFR  (NO RACE VARIABLE): 31 ML/MIN/1.73 M^2
EST. GFR  (NO RACE VARIABLE): 41 ML/MIN/1.73 M^2
GLUCOSE SERPL-MCNC: 105 MG/DL (ref 70–110)
GLUCOSE SERPL-MCNC: 148 MG/DL (ref 70–110)
GLUCOSE UR QL STRIP: NEGATIVE
HGB UR QL STRIP: NEGATIVE
KETONES UR QL STRIP: NEGATIVE
LEUKOCYTE ESTERASE UR QL STRIP: ABNORMAL
MICROSCOPIC COMMENT: ABNORMAL
NITRITE UR QL STRIP: NEGATIVE
PH UR STRIP: 6 [PH] (ref 5–8)
POTASSIUM SERPL-SCNC: 3.9 MMOL/L (ref 3.5–5.1)
POTASSIUM SERPL-SCNC: 4.2 MMOL/L (ref 3.5–5.1)
PROT UR QL STRIP: NEGATIVE
SODIUM SERPL-SCNC: 132 MMOL/L (ref 136–145)
SODIUM SERPL-SCNC: 132 MMOL/L (ref 136–145)
SP GR UR STRIP: 1.01 (ref 1–1.03)
SQUAMOUS #/AREA URNS HPF: 3 /HPF
UNIDENT CRYS URNS QL MICRO: ABNORMAL
URATE SERPL-MCNC: 6.5 MG/DL (ref 2.4–5.7)
URN SPEC COLLECT METH UR: ABNORMAL
UROBILINOGEN UR STRIP-ACNC: NEGATIVE EU/DL
WBC #/AREA URNS HPF: 28 /HPF (ref 0–5)
WBC CLUMPS URNS QL MICRO: ABNORMAL

## 2024-06-07 PROCEDURE — 81000 URINALYSIS NONAUTO W/SCOPE: CPT | Performed by: INTERNAL MEDICINE

## 2024-06-07 PROCEDURE — 97116 GAIT TRAINING THERAPY: CPT | Mod: CQ

## 2024-06-07 PROCEDURE — 97530 THERAPEUTIC ACTIVITIES: CPT | Mod: CQ

## 2024-06-07 PROCEDURE — 63600175 PHARM REV CODE 636 W HCPCS: Performed by: STUDENT IN AN ORGANIZED HEALTH CARE EDUCATION/TRAINING PROGRAM

## 2024-06-07 PROCEDURE — 84550 ASSAY OF BLOOD/URIC ACID: CPT | Performed by: INTERNAL MEDICINE

## 2024-06-07 PROCEDURE — 36415 COLL VENOUS BLD VENIPUNCTURE: CPT | Performed by: INTERNAL MEDICINE

## 2024-06-07 PROCEDURE — 25000003 PHARM REV CODE 250: Performed by: INTERNAL MEDICINE

## 2024-06-07 PROCEDURE — 11000001 HC ACUTE MED/SURG PRIVATE ROOM

## 2024-06-07 PROCEDURE — 51798 US URINE CAPACITY MEASURE: CPT

## 2024-06-07 PROCEDURE — 36415 COLL VENOUS BLD VENIPUNCTURE: CPT | Performed by: STUDENT IN AN ORGANIZED HEALTH CARE EDUCATION/TRAINING PROGRAM

## 2024-06-07 PROCEDURE — 80048 BASIC METABOLIC PNL TOTAL CA: CPT | Performed by: STUDENT IN AN ORGANIZED HEALTH CARE EDUCATION/TRAINING PROGRAM

## 2024-06-07 PROCEDURE — 25000003 PHARM REV CODE 250: Performed by: STUDENT IN AN ORGANIZED HEALTH CARE EDUCATION/TRAINING PROGRAM

## 2024-06-07 PROCEDURE — 80048 BASIC METABOLIC PNL TOTAL CA: CPT | Mod: 91 | Performed by: INTERNAL MEDICINE

## 2024-06-07 RX ORDER — FOLIC ACID 1 MG/1
1 TABLET ORAL DAILY
Qty: 30 TABLET | Refills: 0 | Status: SHIPPED | OUTPATIENT
Start: 2024-06-08 | End: 2024-07-08

## 2024-06-07 RX ORDER — CEFDINIR 300 MG/1
300 CAPSULE ORAL EVERY 12 HOURS
Qty: 8 CAPSULE | Refills: 0 | Status: SHIPPED | OUTPATIENT
Start: 2024-06-07 | End: 2024-06-11

## 2024-06-07 RX ORDER — BISACODYL 10 MG/1
10 SUPPOSITORY RECTAL ONCE
Status: COMPLETED | OUTPATIENT
Start: 2024-06-07 | End: 2024-06-07

## 2024-06-07 RX ADMIN — ATORVASTATIN CALCIUM 40 MG: 40 TABLET, FILM COATED ORAL at 08:06

## 2024-06-07 RX ADMIN — PROMETHAZINE HYDROCHLORIDE 25 MG: 25 TABLET ORAL at 12:06

## 2024-06-07 RX ADMIN — ONDANSETRON 4 MG: 2 INJECTION INTRAMUSCULAR; INTRAVENOUS at 08:06

## 2024-06-07 RX ADMIN — ARIPIPRAZOLE 10 MG: 5 TABLET ORAL at 08:06

## 2024-06-07 RX ADMIN — PANTOPRAZOLE SODIUM 40 MG: 40 TABLET, DELAYED RELEASE ORAL at 08:06

## 2024-06-07 RX ADMIN — PROMETHAZINE HYDROCHLORIDE 25 MG: 25 TABLET ORAL at 08:06

## 2024-06-07 RX ADMIN — PREGABALIN 100 MG: 100 CAPSULE ORAL at 08:06

## 2024-06-07 RX ADMIN — HYDROCODONE BITARTRATE AND ACETAMINOPHEN 1 TABLET: 10; 325 TABLET ORAL at 08:06

## 2024-06-07 RX ADMIN — ZOLPIDEM TARTRATE 10 MG: 5 TABLET ORAL at 11:06

## 2024-06-07 RX ADMIN — DOXEPIN HYDROCHLORIDE 50 MG: 25 CAPSULE ORAL at 08:06

## 2024-06-07 RX ADMIN — BISACODYL 10 MG: 10 SUPPOSITORY RECTAL at 02:06

## 2024-06-07 RX ADMIN — AMLODIPINE BESYLATE 5 MG: 5 TABLET ORAL at 08:06

## 2024-06-07 RX ADMIN — FOLIC ACID 1 MG: 1 TABLET ORAL at 08:06

## 2024-06-07 RX ADMIN — ACETAMINOPHEN 650 MG: 325 TABLET ORAL at 12:06

## 2024-06-07 RX ADMIN — ALPRAZOLAM 1 MG: 1 TABLET ORAL at 02:06

## 2024-06-07 RX ADMIN — SIMETHICONE 80 MG: 80 TABLET, CHEWABLE ORAL at 08:06

## 2024-06-07 RX ADMIN — HYDROCODONE BITARTRATE AND ACETAMINOPHEN 1 TABLET: 10; 325 TABLET ORAL at 02:06

## 2024-06-07 RX ADMIN — ONDANSETRON 4 MG: 2 INJECTION INTRAMUSCULAR; INTRAVENOUS at 06:06

## 2024-06-07 RX ADMIN — POLYETHYLENE GLYCOL 3350 17 G: 17 POWDER, FOR SOLUTION ORAL at 08:06

## 2024-06-07 RX ADMIN — CEFTRIAXONE 1 G: 1 INJECTION, POWDER, FOR SOLUTION INTRAMUSCULAR; INTRAVENOUS at 01:06

## 2024-06-07 RX ADMIN — ALPRAZOLAM 1 MG: 1 TABLET ORAL at 08:06

## 2024-06-07 RX ADMIN — Medication 324 MG: at 08:06

## 2024-06-07 RX ADMIN — FLUOXETINE 80 MG: 20 CAPSULE ORAL at 08:06

## 2024-06-07 NOTE — PT/OT/SLP PROGRESS
Physical Therapy  Treatment    Divya Bui   MRN: [id number]   Admitting Diagnosis: BRBPR (bright red blood per rectum)    PT Received On: 06/07/24  PT Start Time: 0820     PT Stop Time: 0845    PT Total Time (min): 25 min       Billable Minutes:  Gait Training 15 and Therapeutic Activity 10    Treatment Type: Treatment  PT/PTA: PTA     Number of PTA visits since last PT visit: 1       General Precautions: Standard, fall  Orthopedic Precautions: N/A  Braces: N/A  Respiratory Status: Room air    Spiritual, Cultural Beliefs, Temple Practices, Values that Affect Care: no    Subjective:  Communicated with patient's nurse, Farzana, and completed Epic chart review prior to session.  Patient agreed to PT session.     Pain/Comfort  Pain Rating 1: 0/10  Pain Rating Post-Intervention 1: 0/10    Objective:   Patient found with: peripheral IV    Supine > sit EOB: Modified Independent    Forward scoot towards EOB: Modified Independent    STS from EOB > RW: SPV    100ft w/ RW SPV    Stand pivot T/F to chair w/ RW: SPV    Completed x15 reps AROM TE to BLE: LAQ, Hip Flex, AP   Intermittent cues given as needed to maintain correct form during repetitions    Educated patient on importance of increased tolerance to upright position and direct impact on CV endurance and strength. Patient encouraged to sit up in chair/ EOB, for a minimum of 2 consecutive hours, 3x per day. Encouraged patient to perform AROM TE to BLE throughout the day within all available planes of motion. Re enforced importance of utilizing call light to meet needs in room and not attempt to get up without staff assistance. Patient verbalized understanding and agreed to comply.       AM-PAC 6 CLICK MOBILITY  How much help from another person does this patient currently need?   1 = Unable, Total/Dependent Assistance  2 = A lot, Maximum/Moderate Assistance  3 = A little, Minimum/Contact Guard/Supervision  4 = None, Modified Thompson/Independent    Turning over in  bed (including adjusting bedclothes, sheets and blankets)?: 4  Sitting down on and standing up from a chair with arms (e.g., wheelchair, bedside commode, etc.): 3  Moving from lying on back to sitting on the side of the bed?: 4  Moving to and from a bed to a chair (including a wheelchair)?: 3  Need to walk in hospital room?: 3  Climbing 3-5 steps with a railing?: 1 (NT)  Basic Mobility Total Score: 18    AM-PAC Raw Score CMS G-Code Modifier Level of Impairment Assistance   6 % Total / Unable   7 - 9 CM 80 - 100% Maximal Assist   10 - 14 CL 60 - 80% Moderate Assist   15 - 19 CK 40 - 60% Moderate Assist   20 - 22 CJ 20 - 40% Minimal Assist   23 CI 1-20% SBA / CGA   24 CH 0% Independent/ Mod I     Patient left up in chair with call button in reach.    Assessment:  Divya Bui is a 64 y.o. female with a medical diagnosis of BRBPR (bright red blood per rectum) and presents with overall decline in functional mobility. Patient would continue to benefit from skilled PT to address functional limitations listed below in order to return to PLOF/decrease caregiver burden.     Rehab identified problem list/impairments: weakness, impaired endurance, gait instability, impaired balance, impaired cardiopulmonary response to activity    Rehab potential is good.    Activity tolerance: Fair    Discharge recommendations: Low Intensity Therapy      Barriers to discharge:      Equipment recommendations: walker, rolling (PATIENT REQUESTING A ROLLATOR)     GOALS:   Multidisciplinary Problems       Physical Therapy Goals          Problem: Physical Therapy    Goal Priority Disciplines Outcome Goal Variances Interventions   Physical Therapy Goal     PT, PT/OT Progressing     Description: Goals to be met by 6/19/24.  1. Pt will complete bed mobility MOD I.  2. Pt will complete sit to stand MOD I.  3. Pt will ambulate 200ft MOD I using RW.  4. Pt will increase AMPAC score by 2 points to progress functional mobility.                        PLAN:    Patient to be seen 3 x/week to address the above listed problems via gait training, therapeutic activities, therapeutic exercises  Plan of Care expires: 06/19/24  Plan of Care reviewed with: patient         06/07/2024

## 2024-06-07 NOTE — SUBJECTIVE & OBJECTIVE
Interval History: pt had crampy abdominal pain and constipation. Cr up on morning labs.     Review of Systems   Constitutional:  Positive for fatigue. Negative for fever.   Respiratory:  Negative for cough and shortness of breath.    Cardiovascular:  Negative for chest pain and leg swelling.   Gastrointestinal:  Positive for abdominal pain and nausea. Negative for vomiting.   Neurological:  Positive for weakness.   All other systems reviewed and are negative.    Objective:     Vital Signs (Most Recent):  Temp: 97.9 °F (36.6 °C) (06/07/24 1658)  Pulse: 87 (06/07/24 1658)  Resp: 19 (06/07/24 1658)  BP: 118/60 (06/07/24 1658)  SpO2: (!) 91 % (06/07/24 1658) Vital Signs (24h Range):  Temp:  [97.5 °F (36.4 °C)-98.4 °F (36.9 °C)] 97.9 °F (36.6 °C)  Pulse:  [] 87  Resp:  [15-19] 19  SpO2:  [91 %-97 %] 91 %  BP: ()/(58-70) 118/60     Weight: 101.6 kg (223 lb 15.8 oz)  Body mass index is 39.68 kg/m².    Intake/Output Summary (Last 24 hours) at 6/7/2024 1746  Last data filed at 6/7/2024 0940  Gross per 24 hour   Intake --   Output 300 ml   Net -300 ml         Physical Exam  Vitals reviewed.   Constitutional:       Appearance: Normal appearance.   HENT:      Head: Normocephalic and atraumatic.      Mouth/Throat:      Mouth: Mucous membranes are moist.      Pharynx: Oropharynx is clear.   Eyes:      Extraocular Movements: Extraocular movements intact.      Conjunctiva/sclera: Conjunctivae normal.   Cardiovascular:      Rate and Rhythm: Normal rate and regular rhythm.      Pulses: Normal pulses.      Heart sounds: Normal heart sounds.   Pulmonary:      Effort: Pulmonary effort is normal.      Breath sounds: Normal breath sounds.   Abdominal:      General: Bowel sounds are normal.      Palpations: Abdomen is soft.      Tenderness: There is abdominal tenderness.   Musculoskeletal:         General: Normal range of motion.      Cervical back: Normal range of motion and neck supple.   Skin:     General: Skin is warm and  dry.   Neurological:      General: No focal deficit present.      Mental Status: She is alert and oriented to person, place, and time. Mental status is at baseline.   Psychiatric:         Mood and Affect: Mood normal.         Behavior: Behavior normal.         Thought Content: Thought content normal.             Significant Labs: All pertinent labs within the past 24 hours have been reviewed.  CBC:   Recent Labs   Lab 06/06/24  0442   WBC 4.21   HGB 12.0   HCT 38.5        CMP:   Recent Labs   Lab 06/06/24  0443 06/07/24  0529 06/07/24  1359   * 132* 132*   K 3.9 4.2 3.9   CL 94* 96 97   CO2 25 24 25    105 148*   BUN 13 28* 22   CREATININE 1.3 1.8* 1.4   CALCIUM 8.3* 7.9* 8.1*   ANIONGAP 12 12 10       Significant Imaging: I have reviewed all pertinent imaging results/findings within the past 24 hours.

## 2024-06-07 NOTE — CONSULTS
SW met with patient at bedside regarding consult for home health. Pt agreeable to service with no preference for agency. Pt reports she will discharge to her home and her son, Herbert Bui #[phone], will be staying with her.   SW discussed request for rollator. MD notified to order. SW will need to outsource DME provider d/t [facility] being out of network. SW to follow.   Anticipated DC pending.

## 2024-06-07 NOTE — PLAN OF CARE
06/07/24 1602   Post-Acute Status   Post-Acute Authorization HME;Home Health   HME Status Set-up Complete/Auth obtained   Home Health Status Set-up Complete/Auth obtained   Discharge Delays None known at this time   Discharge Plan   Discharge Plan A Home Health;Home with family   Discharge Plan B Home with family;Home Health     [facility] to deliver pt's rollator to bedside today.  Pt referred to [facility]; acceptance pending.

## 2024-06-07 NOTE — NURSING
Pt remains free of falls/injury. Safety precautions maintained. Pt ambulated in room with stand by assistance w/o any noted issues.  Pt IV abx given.  Cardiac diet tolerated. VSS. Pt had moderate formed bowel movement.  No S/S of distress noted at this time. Bed alarm set.12 hour chart check complete. Will continue to monitor.

## 2024-06-07 NOTE — PROGRESS NOTES
[facility]  Hospital Medicine  Progress Note    Patient Name: Divya Bui  MRN: [id number]  Patient Class: IP- Inpatient   Admission Date: 6/4/2024  Length of Stay: 2 days  Attending Physician: Malia Moe MD  Primary Care Provider: Angel Khan MD        Subjective:     Principal Problem:Acute cystitis        HPI:  The patient is a 64-year-old woman with a past medical history of anemia, anxiety, depression, bowel incontinence, gastroesophageal reflux disease, hypertension, morbid obesity, and pericardial effusion who presents to the ER with rectal bleeding that began last night. She reports a large amount of bright red blood in her stool, which has persisted and is of moderate severity. The patient notes that she had an iron infusion last week and that her symptoms are constant with no mitigating or exacerbating factors. Associated symptoms include nausea and vomiting. She has experienced decreased oral intake but denies hematemesis, chest pain, shortness of breath, fever, chills, and other symptoms at this time. She has not received any prior treatment for this issue and has no further complaints or concerns.    In the ER, the patient's vitals were stable except for a blood pressure of 196/103. Urinalysis was abnormal, notable for cloudy appearance, trace protein, 3+ occult blood, positive nitrites, and 3+ leukocytes. Microscopic urinalysis revealed 35 RBCs, over 100 WBCs, and many WBC clumps. Pertinent negative labs include a hemoglobin level of 11, which is the patient's baseline. A CTA for GI bleed showed no ongoing arterial GI bleeding. The patient became dizzy and light-headed when ambulating to the restroom, as noted in the ER assessment. Hospital medicine called for admission.       Overview/Hospital Course:  Patient is a 64-year-old female with a history anemia, GE reflux, morbid obesity, hypertension who presented to the emergency department complain of rectal bleeding.  She has  a history of iron-deficiency anemia has been getting iron infusions.  Upon presentation patient's hemoglobin was stable at 11 her blood pressure was stable.  She has had no further bright red blood per rectum.  She has however had abdominal pain and  Dysuria.  CT scan of the abdomen revealed borderline bladder thickening, diverticulosis without diverticulitis partially fluid-filled colon.  Patient was admitted for potential GI bleed but her hemoglobin and blood pressure remained stable she was also noted to have significant dysuria urinalysis positive for UTI.  Patient was admitted for IV antibiotic therapy.      Pt had no evidence of GI bleed. She had constipation with pains in abdomen and placed on bowel protocol. Urine culture + for e coli. Treated with rocephin.     Interval History: pt had crampy abdominal pain and constipation. Cr up on morning labs.     Review of Systems   Constitutional:  Positive for fatigue. Negative for fever.   Respiratory:  Negative for cough and shortness of breath.    Cardiovascular:  Negative for chest pain and leg swelling.   Gastrointestinal:  Positive for abdominal pain and nausea. Negative for vomiting.   Neurological:  Positive for weakness.   All other systems reviewed and are negative.    Objective:     Vital Signs (Most Recent):  Temp: 97.9 °F (36.6 °C) (06/07/24 1658)  Pulse: 87 (06/07/24 1658)  Resp: 19 (06/07/24 1658)  BP: 118/60 (06/07/24 1658)  SpO2: (!) 91 % (06/07/24 1658) Vital Signs (24h Range):  Temp:  [97.5 °F (36.4 °C)-98.4 °F (36.9 °C)] 97.9 °F (36.6 °C)  Pulse:  [] 87  Resp:  [15-19] 19  SpO2:  [91 %-97 %] 91 %  BP: ()/(58-70) 118/60     Weight: 101.6 kg (223 lb 15.8 oz)  Body mass index is 39.68 kg/m².    Intake/Output Summary (Last 24 hours) at 6/7/2024 1033  Last data filed at 6/7/2024 0940  Gross per 24 hour   Intake --   Output 300 ml   Net -300 ml         Physical Exam  Vitals reviewed.   Constitutional:       Appearance: Normal appearance.    HENT:      Head: Normocephalic and atraumatic.      Mouth/Throat:      Mouth: Mucous membranes are moist.      Pharynx: Oropharynx is clear.   Eyes:      Extraocular Movements: Extraocular movements intact.      Conjunctiva/sclera: Conjunctivae normal.   Cardiovascular:      Rate and Rhythm: Normal rate and regular rhythm.      Pulses: Normal pulses.      Heart sounds: Normal heart sounds.   Pulmonary:      Effort: Pulmonary effort is normal.      Breath sounds: Normal breath sounds.   Abdominal:      General: Bowel sounds are normal.      Palpations: Abdomen is soft.      Tenderness: There is abdominal tenderness.   Musculoskeletal:         General: Normal range of motion.      Cervical back: Normal range of motion and neck supple.   Skin:     General: Skin is warm and dry.   Neurological:      General: No focal deficit present.      Mental Status: She is alert and oriented to person, place, and time. Mental status is at baseline.   Psychiatric:         Mood and Affect: Mood normal.         Behavior: Behavior normal.         Thought Content: Thought content normal.             Significant Labs: All pertinent labs within the past 24 hours have been reviewed.  CBC:   Recent Labs   Lab 06/06/24  0442   WBC 4.21   HGB 12.0   HCT 38.5        CMP:   Recent Labs   Lab 06/06/24  0443 06/07/24  0529 06/07/24  1359   * 132* 132*   K 3.9 4.2 3.9   CL 94* 96 97   CO2 25 24 25    105 148*   BUN 13 28* 22   CREATININE 1.3 1.8* 1.4   CALCIUM 8.3* 7.9* 8.1*   ANIONGAP 12 12 10       Significant Imaging: I have reviewed all pertinent imaging results/findings within the past 24 hours.    Assessment/Plan:      * Acute cystitis  --urine culture positive Gram-negative rods  --prior culture data reviewed; pansensitive E. Coli and Enterococcus on the last 2 cultures  --s/p IV Rocephin in the ER- continue upon admission    --cultures + for ecoli.  --monitor fever curve- Tylenol PRN        Abdominal pain    Diffuse on  admission with some improvement  No BM since admission  Suppisority     Anxiety  Follows with psych - on ativan po tid  Will continue      HTN (hypertension)  --home medications include: HCTZ, norvasc    --restarted home medications at this time  --PRN IV hydralazine 10mg Q6H for sBP > 175 mmHg  --Q4HVS    Chronic kidney disease, stage I  Creatine stable for now. BMP reviewed- noted Estimated Creatinine Clearance: 45 mL/min (based on SCr of 1.4 mg/dL). according to latest data. Based on current GFR, CKD stage is stage 2 - GFR 60-89.  Monitor UOP and serial BMP and adjust therapy as needed. Renally dose meds. Avoid nephrotoxic medications and procedures.    Gastric bypass status for obesity  Body mass index is 39.68 kg/m². Morbid obesity complicates all aspects of disease management from diagnostic modalities to treatment. Weight loss encouraged and health benefits explained to patient.           VTE Risk Mitigation (From admission, onward)           Ordered     Place sequential compression device  Until discontinued         06/04/24 1406                    Discharge Planning   ELADIA:      Code Status: Full Code   Is the patient medically ready for discharge?:     Reason for patient still in hospital (select all that apply): Patient trending condition, Laboratory test, and Treatment  Discharge Plan A: Home Health, Home with family   Discharge Delays: None known at this time              Malia Henry MD  Department of Hospital Medicine   [facility]

## 2024-06-08 VITALS
BODY MASS INDEX: 39.69 KG/M2 | OXYGEN SATURATION: 94 % | WEIGHT: 224 LBS | SYSTOLIC BLOOD PRESSURE: 144 MMHG | HEIGHT: 63 IN | DIASTOLIC BLOOD PRESSURE: 72 MMHG | TEMPERATURE: 98 F | RESPIRATION RATE: 16 BRPM | HEART RATE: 88 BPM

## 2024-06-08 LAB
ANION GAP SERPL CALC-SCNC: 12 MMOL/L (ref 8–16)
BASOPHILS # BLD AUTO: 0.05 K/UL (ref 0–0.2)
BASOPHILS NFR BLD: 1.2 % (ref 0–1.9)
BUN SERPL-MCNC: 14 MG/DL (ref 8–23)
CALCIUM SERPL-MCNC: 8.7 MG/DL (ref 8.7–10.5)
CHLORIDE SERPL-SCNC: 100 MMOL/L (ref 95–110)
CO2 SERPL-SCNC: 21 MMOL/L (ref 23–29)
CREAT SERPL-MCNC: 1.1 MG/DL (ref 0.5–1.4)
DIFFERENTIAL METHOD BLD: ABNORMAL
EOSINOPHIL # BLD AUTO: 0.2 K/UL (ref 0–0.5)
EOSINOPHIL NFR BLD: 3.7 % (ref 0–8)
ERYTHROCYTE [DISTWIDTH] IN BLOOD BY AUTOMATED COUNT: 14.8 % (ref 11.5–14.5)
EST. GFR  (NO RACE VARIABLE): 55 ML/MIN/1.73 M^2
GLUCOSE SERPL-MCNC: 108 MG/DL (ref 70–110)
HCT VFR BLD AUTO: 41.6 % (ref 37–48.5)
HGB BLD-MCNC: 12.5 G/DL (ref 12–16)
IMM GRANULOCYTES # BLD AUTO: 0.05 K/UL (ref 0–0.04)
IMM GRANULOCYTES NFR BLD AUTO: 1.2 % (ref 0–0.5)
LYMPHOCYTES # BLD AUTO: 0.9 K/UL (ref 1–4.8)
LYMPHOCYTES NFR BLD: 22.1 % (ref 18–48)
MCH RBC QN AUTO: 28.2 PG (ref 27–31)
MCHC RBC AUTO-ENTMCNC: 30 G/DL (ref 32–36)
MCV RBC AUTO: 94 FL (ref 82–98)
MONOCYTES # BLD AUTO: 0.3 K/UL (ref 0.3–1)
MONOCYTES NFR BLD: 8.4 % (ref 4–15)
NEUTROPHILS # BLD AUTO: 2.6 K/UL (ref 1.8–7.7)
NEUTROPHILS NFR BLD: 63.4 % (ref 38–73)
NRBC BLD-RTO: 0 /100 WBC
PLATELET # BLD AUTO: 180 K/UL (ref 150–450)
PMV BLD AUTO: 11.5 FL (ref 9.2–12.9)
POTASSIUM SERPL-SCNC: 4.4 MMOL/L (ref 3.5–5.1)
RBC # BLD AUTO: 4.43 M/UL (ref 4–5.4)
SODIUM SERPL-SCNC: 133 MMOL/L (ref 136–145)
WBC # BLD AUTO: 4.07 K/UL (ref 3.9–12.7)

## 2024-06-08 PROCEDURE — 85025 COMPLETE CBC W/AUTO DIFF WBC: CPT | Performed by: INTERNAL MEDICINE

## 2024-06-08 PROCEDURE — 25000003 PHARM REV CODE 250: Performed by: STUDENT IN AN ORGANIZED HEALTH CARE EDUCATION/TRAINING PROGRAM

## 2024-06-08 PROCEDURE — 36415 COLL VENOUS BLD VENIPUNCTURE: CPT | Performed by: STUDENT IN AN ORGANIZED HEALTH CARE EDUCATION/TRAINING PROGRAM

## 2024-06-08 PROCEDURE — 63600175 PHARM REV CODE 636 W HCPCS: Performed by: STUDENT IN AN ORGANIZED HEALTH CARE EDUCATION/TRAINING PROGRAM

## 2024-06-08 PROCEDURE — 80048 BASIC METABOLIC PNL TOTAL CA: CPT | Performed by: STUDENT IN AN ORGANIZED HEALTH CARE EDUCATION/TRAINING PROGRAM

## 2024-06-08 PROCEDURE — 25000003 PHARM REV CODE 250: Performed by: INTERNAL MEDICINE

## 2024-06-08 RX ADMIN — ONDANSETRON 4 MG: 2 INJECTION INTRAMUSCULAR; INTRAVENOUS at 02:06

## 2024-06-08 RX ADMIN — FLUOXETINE 80 MG: 20 CAPSULE ORAL at 08:06

## 2024-06-08 RX ADMIN — PANTOPRAZOLE SODIUM 40 MG: 40 TABLET, DELAYED RELEASE ORAL at 08:06

## 2024-06-08 RX ADMIN — POLYETHYLENE GLYCOL 3350 17 G: 17 POWDER, FOR SOLUTION ORAL at 08:06

## 2024-06-08 RX ADMIN — HYDROCODONE BITARTRATE AND ACETAMINOPHEN 1 TABLET: 10; 325 TABLET ORAL at 08:06

## 2024-06-08 RX ADMIN — ALPRAZOLAM 1 MG: 1 TABLET ORAL at 08:06

## 2024-06-08 RX ADMIN — Medication 324 MG: at 08:06

## 2024-06-08 RX ADMIN — ATORVASTATIN CALCIUM 40 MG: 40 TABLET, FILM COATED ORAL at 08:06

## 2024-06-08 RX ADMIN — AMLODIPINE BESYLATE 5 MG: 5 TABLET ORAL at 08:06

## 2024-06-08 RX ADMIN — ARIPIPRAZOLE 10 MG: 5 TABLET ORAL at 08:06

## 2024-06-08 RX ADMIN — FOLIC ACID 1 MG: 1 TABLET ORAL at 08:06

## 2024-06-08 RX ADMIN — PROMETHAZINE HYDROCHLORIDE 25 MG: 25 TABLET ORAL at 05:06

## 2024-06-08 NOTE — PLAN OF CARE
[facility]  Discharge Final Note    Primary Care Provider: Angel Khan MD    Expected Discharge Date: 6/8/2024    Final Discharge Note (most recent)       Final Note - 06/08/24 1055          Final Note    Assessment Type Final Discharge Note     Anticipated Discharge Disposition Home or Self Care        Post-Acute Status    Home Health Status Patient declined/refused     Other Status See Comments     Discharge Delays None known at this time                     Important Message from Medicare             Contact Info       Angel Khan MD   Specialty: Internal Medicine   Relationship: PCP - General    [address] [facility] [address]   Phone: [phone]       Next Steps: Follow up in 3 day(s)    [facility]   Specialty: Physical Therapy    [address] [facility] [address]   Phone: [phone]       Next Steps: Call    Instructions: Outpatient Services  referral faxed 6/8          [facility].  Referral for outpt PT/OT sent to [facility] per patient request.

## 2024-06-08 NOTE — DISCHARGE INSTRUCTIONS
Slowly advance diet as tolerated  Take MiraLax 17 g twice a day to prevent constipation  Complete all antibiotics  Patient will be referred to [facility] for outpatient occupational therapy and physical therapy

## 2024-06-08 NOTE — PLAN OF CARE
Problem: Adult Inpatient Plan of Care  Goal: Plan of Care Review  Outcome: Progressing  Goal: Patient-Specific Goal (Individualized)  Outcome: Progressing  Goal: Absence of Hospital-Acquired Illness or Injury  Outcome: Progressing  Goal: Optimal Comfort and Wellbeing  Outcome: Progressing  Goal: Readiness for Transition of Care  Outcome: Progressing     Problem: Wound  Goal: Optimal Coping  Outcome: Progressing  Goal: Optimal Pain Control and Function  Outcome: Progressing

## 2024-06-08 NOTE — NURSING
Pt remains free of falls/injury. Safety precautions maintained. Pt denies pain/discomfort. Cardiac diet tolerated. IV removed per orders.  Pt education completed.  VSS. No S/S of distress noted at this time.

## 2024-06-08 NOTE — PLAN OF CARE
06/08/24 1054   Post-Acute Status   Post-Acute Authorization Other  (outpt theray)   Other Status See Comments  (outpt PT/OT)   Discharge Plan   Discharge Plan A Home with family       Patient declined home health wants oupt therapy.  MD aware.   Referral to [facility].

## 2024-06-12 NOTE — DISCHARGE SUMMARY
[facility]  Hospital Medicine  Discharge Summary      Patient Name: Divya Bui  MRN: [id number]  [facility]: [id number]  Patient Class: IP- Inpatient  Admission Date: 6/4/2024  Hospital Length of Stay: 3 days  Discharge Date and Time: 6/8/2024 12:26 PM  Attending Physician: No att. providers found   Discharging Provider: Malia Henry MD  Primary Care Provider: Angel Khan MD    Primary Care Team: Networked reference to record PCT     HPI:   The patient is a 64-year-old woman with a past medical history of anemia, anxiety, depression, bowel incontinence, gastroesophageal reflux disease, hypertension, morbid obesity, and pericardial effusion who presents to the ER with rectal bleeding that began last night. She reports a large amount of bright red blood in her stool, which has persisted and is of moderate severity. The patient notes that she had an iron infusion last week and that her symptoms are constant with no mitigating or exacerbating factors. Associated symptoms include nausea and vomiting. She has experienced decreased oral intake but denies hematemesis, chest pain, shortness of breath, fever, chills, and other symptoms at this time. She has not received any prior treatment for this issue and has no further complaints or concerns.    In the ER, the patient's vitals were stable except for a blood pressure of 196/103. Urinalysis was abnormal, notable for cloudy appearance, trace protein, 3+ occult blood, positive nitrites, and 3+ leukocytes. Microscopic urinalysis revealed 35 RBCs, over 100 WBCs, and many WBC clumps. Pertinent negative labs include a hemoglobin level of 11, which is the patient's baseline. A CTA for GI bleed showed no ongoing arterial GI bleeding. The patient became dizzy and light-headed when ambulating to the restroom, as noted in the ER assessment. Hospital medicine called for admission.       * No surgery found *      Hospital Course:   Patient is a 64-year-old female with  a history anemia, GE reflux, morbid obesity, hypertension who presented to the emergency department complain of rectal bleeding.  She has a history of iron-deficiency anemia has been getting iron infusions.  Upon presentation patient's hemoglobin was stable at 11 her blood pressure was stable.  She has had no further bright red blood per rectum.  She has however had abdominal pain and  Dysuria.  CT scan of the abdomen revealed borderline bladder thickening, diverticulosis without diverticulitis partially fluid-filled colon.  Patient was admitted for potential GI bleed but her hemoglobin and blood pressure remained stable she was also noted to have significant dysuria urinalysis positive for UTI.  Patient was admitted for IV antibiotic therapy.      Pt had no evidence of GI bleed. She had constipation with pains in abdomen and placed on bowel protocol. Urine culture + for e coli. Treated with rocephin.   With BM pt's pain improved. Pt initiallhy planned to go stay with sister for a while but then son decided to stay with her. Pt referred to outpatient OT/PT.   Pt seen and examined on day of discharge and stable for dc. See orders.     Goals of Care Treatment Preferences:  Code Status: Full Code      Consults:   Consults (From admission, onward)          Status Ordering Provider     Inpatient consult to Social Work  Once        Provider:  (Not yet assigned)    Completed MARY MAURICIO            Renal/  * Acute cystitis  --urine culture positive Gram-negative rods  --prior culture data reviewed; pansensitive E. Coli and Enterococcus on the last 2 cultures  --s/p IV Rocephin in the ER- continue upon admission    --cultures + for ecoli.  --monitor fever curve- Tylenol PRN        Chronic kidney disease, stage I  Creatine stable for now. BMP reviewed- noted Estimated Creatinine Clearance: 57.3 mL/min (based on SCr of 1.1 mg/dL). according to latest data. Based on current GFR, CKD stage is stage 2 - GFR 60-89.   "Monitor UOP and serial BMP and adjust therapy as needed. Renally dose meds. Avoid nephrotoxic medications and procedures.    Endocrine  Gastric bypass status for obesity  Body mass index is 39.68 kg/m². Morbid obesity complicates all aspects of disease management from diagnostic modalities to treatment. Weight loss encouraged and health benefits explained to patient.     Discussed need for small meals and vitamin replacement .      GI  Abdominal pain    Diffuse on admission with some improvement  No BM since admission  Suppisority     Other  Anxiety  Follows with psych - on ativan po tid  Will continue        Final Active Diagnoses:    Diagnosis Date Noted POA    PRINCIPAL PROBLEM:  Acute cystitis [N30.00] 05/10/2024 Yes    Abdominal pain [R10.9] 05/11/2018 Yes    Anxiety [F41.9] 10/08/2014 Yes    Gastric bypass status for obesity [Z98.84] 07/19/2013 Not Applicable    Chronic kidney disease, stage I [N18.1] 07/02/2012 Yes      Problems Resolved During this Admission:    Diagnosis Date Noted Date Resolved POA    BRBPR (bright red blood per rectum) [K62.5] 06/04/2024 06/07/2024 Yes    Dizziness [R42] 05/10/2024 06/07/2024 Yes       Discharged Condition: fair    Disposition: Home or Self Care    Follow Up:   Follow-up Information       Angel Khan MD Follow up in 3 day(s).    Specialty: Internal Medicine  Contact information:  [id number] [address]  [phone]               [facility]. Call.    Specialty: Physical Therapy  Why: Outpatient Services  referral faxed 6/8  Contact information:  [address] [place] [place] [address]  [phone]                           Patient Instructions:      WALKER FOR HOME USE     Order Specific Question Answer Comments   Type of Walker: Rollator with brakes and/or seat    With wheels? Yes    Height: 5' 3" (1.6 m)    Weight: 101.6 kg (223 lb 15.8 oz)    Length of need (1-99 months): 99    Does patient have medical equipment at home? grab bar " WALK IN TUB / WALK IN TUB   Does patient have medical equipment at home? raised toilet    Please check all that apply: Patient is unable to safely ambulate without equipment.      Ambulatory referral/consult to Physical/Occupational Therapy   Standing Status: Future   Referral Priority: Routine Referral Type: Physical Medicine   Referral Reason: Specialty Services Required   Requested Specialty: Physical Therapy   Number of Visits Requested: 1     Ambulatory referral/consult to [facility]   Standing Status: Future   Referral Priority: Routine Referral Type: Consultation   Referral Reason: Specialty Services Required   Number of Visits Requested: 1     Notify your health care provider if you experience any of the following:  persistent nausea and vomiting or diarrhea     Notify your health care provider if you experience any of the following:  severe uncontrolled pain     Notify your health care provider if you experience any of the following:  persistent dizziness, light-headedness, or visual disturbances     Activity as tolerated       Significant Diagnostic Studies: Labs: All labs within the past 24 hours have been reviewed    Pending Diagnostic Studies:       None           Medications:  Reconciled Home Medications:      Medication List        START taking these medications      folic acid 1 MG tablet  Commonly known as: FOLVITE  Take 1 tablet (1 mg total) by mouth once daily.            CHANGE how you take these medications      amLODIPine 5 MG tablet  Commonly known as: NORVASC  TAKE 1 TABLET BY MOUTH ONCE DAILY  What changed: when to take this     FLUoxetine 40 MG capsule  Take 1 capsule (40 mg total) by mouth once daily.  What changed: how much to take            CONTINUE taking these medications      ALPRAZolam 1 MG tablet  Commonly known as: XANAX  Take 1 mg by mouth 3 (three) times daily as needed.     ARIPiprazole 10 MG Tab  Commonly known as: ABILIFY  Take 10 mg by mouth once daily.      atorvastatin 40 MG tablet  Commonly known as: LIPITOR  Take 1 tablet (40 mg total) by mouth once daily.     cyanocobalamin 1,000 mcg/mL injection  Inject 1,000 mcg into the skin every 30 days.     dicyclomine 20 mg tablet  Commonly known as: BENTYL  Take 1 tablet (20 mg total) by mouth 3 (three) times daily as needed (abdominal pain).     doxepin 50 MG capsule  Commonly known as: SINEQUAN  Take 50 mg by mouth nightly.     furosemide 40 MG tablet  Commonly known as: LASIX  Take 1 tablet (40 mg total) by mouth 2 (two) times daily as needed (swelling).     olmesartan-hydrochlorothiazide 40-12.5 mg Tab  Commonly known as: BENICAR HCT  TAKE 1 TABLET BY MOUTH ONCE DAILY     pregabalin 100 MG capsule  Commonly known as: LYRICA  Take 1 capsule (100 mg total) by mouth every evening.     promethazine 25 MG tablet  Commonly known as: PHENERGAN  TAKE 1 TABLET BY MOUTH EVERY 6 HOURS AS NEEDED FOR NAUSEA     zolpidem 10 mg Tab  Commonly known as: AMBIEN  Take 10 mg by mouth nightly as needed.            ASK your doctor about these medications      cefdinir 300 MG capsule  Commonly known as: OMNICEF  Take 1 capsule (300 mg total) by mouth every 12 (twelve) hours. for 4 days  Ask about: Should I take this medication?              Indwelling Lines/Drains at time of discharge:   Lines/Drains/Airways       None                   Time spent on the discharge of patient: 38 minutes         Malia Henry MD  Department of Hospital Medicine  [facility]

## 2024-06-12 NOTE — ASSESSMENT & PLAN NOTE
Diffuse on admission with some improvement  No BM since admission  Suppisority   
  Diffuse on admission with some improvement  No BM since admission  Suppisority   
--home medications include: HCTZ, norvasc    --restarted home medications at this time  --PRN IV hydralazine 10mg Q6H for sBP > 175 mmHg  --Q4HVS  
--home medications include: HCTZ, norvasc    --restarted home medications at this time  --PRN IV hydralazine 10mg Q6H for sBP > 175 mmHg  --Q4HVS  
--home medications include: HCTZ, norvasc  --initially was going to hold given presenting postural dizziness but SBP sustained above 180s since admission  --restarted home medications at this time  --PRN IV hydralazine 10mg Q6H for sBP > 175 mmHg  --Q4HVS  
--home medications include: HCTZ, norvasc  --initially was going to hold given presenting postural dizziness but SBP sustained above 180s since admission  --restarted home medications at this time  --PRN IV hydralazine 10mg Q6H for sBP > 175 mmHg  --Q4HVS  
--urinalysis results suggestive of infection  --prior culture data reviewed; pansensitive E. Coli and Enterococcus on the last 2 cultures  --s/p IV Rocephin in the ER- continue upon admission  --will add on Vancomycin for GP coverage  --follow cultures  --monitor fever curve- Tylenol PRN      
--urine culture pending  --prior culture data reviewed; pansensitive E. Coli and Enterococcus on the last 2 cultures  --s/p IV Rocephin in the ER- continue upon admission  --will add on Vancomycin for GP coverage  --follow cultures  --monitor fever curve- Tylenol PRN      
--urine culture positive Gram-negative rods  --prior culture data reviewed; pansensitive E. Coli and Enterococcus on the last 2 cultures  --s/p IV Rocephin in the ER- continue upon admission    --cultures + for ecoli.  --monitor fever curve- Tylenol PRN      
--urine culture positive Gram-negative rods  --prior culture data reviewed; pansensitive E. Coli and Enterococcus on the last 2 cultures  --s/p IV Rocephin in the ER- continue upon admission    --cultures + for ecoli.  --monitor fever curve- Tylenol PRN      
--urine culture positive Gram-negative rods  --prior culture data reviewed; pansensitive E. Coli and Enterococcus on the last 2 cultures  --s/p IV Rocephin in the ER- continue upon admission    --follow cultures  --monitor fever curve- Tylenol PRN      
Body mass index is 39.68 kg/m². Morbid obesity complicates all aspects of disease management from diagnostic modalities to treatment. Weight loss encouraged and health benefits explained to patient.       
Body mass index is 39.68 kg/m². Morbid obesity complicates all aspects of disease management from diagnostic modalities to treatment. Weight loss encouraged and health benefits explained to patient.     Discussed need for small meals and vitamin replacement .    
Creatine stable for now. BMP reviewed- noted Estimated Creatinine Clearance: 45 mL/min (based on SCr of 1.4 mg/dL). according to latest data. Based on current GFR, CKD stage is stage 2 - GFR 60-89.  Monitor UOP and serial BMP and adjust therapy as needed. Renally dose meds. Avoid nephrotoxic medications and procedures.  
Creatine stable for now. BMP reviewed- noted Estimated Creatinine Clearance: 57.3 mL/min (based on SCr of 1.1 mg/dL). according to latest data. Based on current GFR, CKD stage is stage 2 - GFR 60-89.  Monitor UOP and serial BMP and adjust therapy as needed. Renally dose meds. Avoid nephrotoxic medications and procedures.  
Follows with psych - on ativan po tid  Will continue    
Follows with psych - on ativan po tid  Will continue    
Presented as symptomatic anemia, however, CTA GI bleed study negative for active bleed. Relatively stable H&H compared to previous labs. Suspect bleeding from internal hemorrhoids, and very low suspicion it is contributing to presenting dizziness and weakness.   --serial CBC stable  --has a history of b12 deficiency  --iron sat 30%, folate 3.4, B12 high  --low threshold to transfuse for hgb < 7  --no further bleeding at this point will continue to monitor    
Presented as symptomatic anemia, however, CTA GI bleed study negative for active bleed. Relatively stable H&H compared to previous labs. Suspect bleeding from internal hemorrhoids, and very low suspicion it is contributing to presenting dizziness and weakness.   --serial CBC to trend h&h  --has a history of b12 deficiency  --iron sat 30%, folate 3.4, B12 high  --low threshold to transfuse for hgb < 7  --no further bleeding at this point will continue to monitor    
Presented as symptomatic anemia, however, CTA GI bleed study negative for active bleed. Relatively stable H&H compared to previous labs. Suspect bleeding from internal hemorrhoids, and very low suspicion it is contributing to presenting dizziness and weakness.   --serial CBC to trend h&h  --has a history of b12 deficiency  --iron studies, B12, folic acid, FOBT ordered  --low threshold to transfuse for hgb < 7    
Resolved    ddx: medication induced vs orthostasis vs symptomatic anemia  --\US b/l carotid no significant disease  --TSH 1.675  --PT/OT consult appreciated  --fall precautions, neuro checks  --iron studies ok, folate low and B12 high  --no dysrythmia on tele will stop    
ddx: medication induced vs orthostasis vs symptomatic anemia  --AM lipid panel, US b/l carotid ordered to further assess  --AM Orthostatics, TSH ordered for further work up  --transitioned home sedating medications to PRN only  --PT/OT consulted for gait stability  --fall precautions, neuro checks, bed rest  --iron studies, B12, folic acid ordered  --admit to telemetry for cardiac monitoring    
ddx: medication induced vs orthostasis vs symptomatic anemia  --\US b/l carotid no significant disease  --TSH 1.675  --PT/OT consult appreciated  --fall precautions, neuro checks  --iron studies ok, folate low and B12 high  --admit to telemetry for cardiac monitoring    
no

## 2024-06-13 ENCOUNTER — TELEPHONE (OUTPATIENT)
Dept: INTERNAL MEDICINE | Facility: CLINIC | Age: 64
End: 2024-06-13
Payer: MEDICARE

## 2024-06-13 NOTE — TELEPHONE ENCOUNTER
Ms. Bui had PT orders sent to [facility] from the physician at discharge from the hospital. The only diagnosis code that the doctor put was rectal bleeding and [facility] is not sure what kind of therapy the patient is in need of.

## 2024-06-13 NOTE — PLAN OF CARE
received call from Scarlet with [facility] regarding outpt therapy referral requesting more info. States agency only received facesheet and ambulatory referral for PT order. They need to know what exactly to treat.     faxed PT/OT evals and d/c summary to assist ([phone]), Scarlet is also following up with patient's PCP.

## 2024-06-13 NOTE — TELEPHONE ENCOUNTER
Please call them back and let them know I do not know-it does not see anything in her discharge summary.  She was supposed to come in today but canceled her appointment

## 2024-06-17 ENCOUNTER — TELEPHONE (OUTPATIENT)
Dept: INTERNAL MEDICINE | Facility: CLINIC | Age: 64
End: 2024-06-17

## 2024-06-17 ENCOUNTER — OFFICE VISIT (OUTPATIENT)
Dept: INTERNAL MEDICINE | Facility: CLINIC | Age: 64
End: 2024-06-17
Payer: MEDICARE

## 2024-06-17 ENCOUNTER — LAB VISIT (OUTPATIENT)
Dept: LAB | Facility: HOSPITAL | Age: 64
End: 2024-06-17
Attending: FAMILY MEDICINE
Payer: MEDICARE

## 2024-06-17 VITALS
WEIGHT: 234.38 LBS | HEART RATE: 85 BPM | DIASTOLIC BLOOD PRESSURE: 74 MMHG | TEMPERATURE: 98 F | OXYGEN SATURATION: 98 % | BODY MASS INDEX: 41.53 KG/M2 | SYSTOLIC BLOOD PRESSURE: 110 MMHG | HEIGHT: 63 IN

## 2024-06-17 DIAGNOSIS — Z78.0 POSTMENOPAUSAL: ICD-10-CM

## 2024-06-17 DIAGNOSIS — G62.9 NEUROPATHY: ICD-10-CM

## 2024-06-17 DIAGNOSIS — K62.5 BRBPR (BRIGHT RED BLOOD PER RECTUM): ICD-10-CM

## 2024-06-17 DIAGNOSIS — E87.1 HYPONATREMIA: ICD-10-CM

## 2024-06-17 DIAGNOSIS — Z12.31 ENCOUNTER FOR SCREENING MAMMOGRAM FOR MALIGNANT NEOPLASM OF BREAST: ICD-10-CM

## 2024-06-17 DIAGNOSIS — I10 PRIMARY HYPERTENSION: ICD-10-CM

## 2024-06-17 DIAGNOSIS — N30.01 ACUTE CYSTITIS WITH HEMATURIA: ICD-10-CM

## 2024-06-17 DIAGNOSIS — Z09 HOSPITAL DISCHARGE FOLLOW-UP: Primary | ICD-10-CM

## 2024-06-17 DIAGNOSIS — Z09 HOSPITAL DISCHARGE FOLLOW-UP: ICD-10-CM

## 2024-06-17 DIAGNOSIS — E78.5 HYPERLIPIDEMIA, UNSPECIFIED HYPERLIPIDEMIA TYPE: ICD-10-CM

## 2024-06-17 LAB
ANION GAP SERPL CALC-SCNC: 10 MMOL/L (ref 8–16)
BASOPHILS # BLD AUTO: 0.05 K/UL (ref 0–0.2)
BASOPHILS NFR BLD: 0.8 % (ref 0–1.9)
BUN SERPL-MCNC: 20 MG/DL (ref 8–23)
CALCIUM SERPL-MCNC: 8.4 MG/DL (ref 8.7–10.5)
CHLORIDE SERPL-SCNC: 96 MMOL/L (ref 95–110)
CO2 SERPL-SCNC: 21 MMOL/L (ref 23–29)
CREAT SERPL-MCNC: 0.9 MG/DL (ref 0.5–1.4)
DIFFERENTIAL METHOD BLD: ABNORMAL
EOSINOPHIL # BLD AUTO: 0.2 K/UL (ref 0–0.5)
EOSINOPHIL NFR BLD: 3.8 % (ref 0–8)
ERYTHROCYTE [DISTWIDTH] IN BLOOD BY AUTOMATED COUNT: 14.7 % (ref 11.5–14.5)
EST. GFR  (NO RACE VARIABLE): >60 ML/MIN/1.73 M^2
GLUCOSE SERPL-MCNC: 124 MG/DL (ref 70–110)
HCT VFR BLD AUTO: 34.2 % (ref 37–48.5)
HGB BLD-MCNC: 11 G/DL (ref 12–16)
IMM GRANULOCYTES # BLD AUTO: 0.04 K/UL (ref 0–0.04)
IMM GRANULOCYTES NFR BLD AUTO: 0.6 % (ref 0–0.5)
LYMPHOCYTES # BLD AUTO: 1.2 K/UL (ref 1–4.8)
LYMPHOCYTES NFR BLD: 18.4 % (ref 18–48)
MCH RBC QN AUTO: 28.4 PG (ref 27–31)
MCHC RBC AUTO-ENTMCNC: 32.2 G/DL (ref 32–36)
MCV RBC AUTO: 88 FL (ref 82–98)
MONOCYTES # BLD AUTO: 0.5 K/UL (ref 0.3–1)
MONOCYTES NFR BLD: 8.1 % (ref 4–15)
NEUTROPHILS # BLD AUTO: 4.4 K/UL (ref 1.8–7.7)
NEUTROPHILS NFR BLD: 68.3 % (ref 38–73)
NRBC BLD-RTO: 0 /100 WBC
PLATELET # BLD AUTO: 245 K/UL (ref 150–450)
PMV BLD AUTO: 8.9 FL (ref 9.2–12.9)
POTASSIUM SERPL-SCNC: 4 MMOL/L (ref 3.5–5.1)
RBC # BLD AUTO: 3.88 M/UL (ref 4–5.4)
SODIUM SERPL-SCNC: 127 MMOL/L (ref 136–145)
WBC # BLD AUTO: 6.4 K/UL (ref 3.9–12.7)

## 2024-06-17 PROCEDURE — 1101F PT FALLS ASSESS-DOCD LE1/YR: CPT | Mod: CPTII,S$GLB,, | Performed by: FAMILY MEDICINE

## 2024-06-17 PROCEDURE — 80048 BASIC METABOLIC PNL TOTAL CA: CPT | Performed by: FAMILY MEDICINE

## 2024-06-17 PROCEDURE — 99999 PR PBB SHADOW E&M-EST. PATIENT-LVL IV: CPT | Mod: PBBFAC,,, | Performed by: FAMILY MEDICINE

## 2024-06-17 PROCEDURE — 36415 COLL VENOUS BLD VENIPUNCTURE: CPT | Mod: PO | Performed by: FAMILY MEDICINE

## 2024-06-17 PROCEDURE — 3078F DIAST BP <80 MM HG: CPT | Mod: CPTII,S$GLB,, | Performed by: FAMILY MEDICINE

## 2024-06-17 PROCEDURE — 1125F AMNT PAIN NOTED PAIN PRSNT: CPT | Mod: CPTII,S$GLB,, | Performed by: FAMILY MEDICINE

## 2024-06-17 PROCEDURE — 3074F SYST BP LT 130 MM HG: CPT | Mod: CPTII,S$GLB,, | Performed by: FAMILY MEDICINE

## 2024-06-17 PROCEDURE — 87088 URINE BACTERIA CULTURE: CPT | Performed by: FAMILY MEDICINE

## 2024-06-17 PROCEDURE — 87086 URINE CULTURE/COLONY COUNT: CPT | Performed by: FAMILY MEDICINE

## 2024-06-17 PROCEDURE — 3288F FALL RISK ASSESSMENT DOCD: CPT | Mod: CPTII,S$GLB,, | Performed by: FAMILY MEDICINE

## 2024-06-17 PROCEDURE — 1159F MED LIST DOCD IN RCRD: CPT | Mod: CPTII,S$GLB,, | Performed by: FAMILY MEDICINE

## 2024-06-17 PROCEDURE — 87077 CULTURE AEROBIC IDENTIFY: CPT | Performed by: FAMILY MEDICINE

## 2024-06-17 PROCEDURE — 87186 SC STD MICRODIL/AGAR DIL: CPT | Performed by: FAMILY MEDICINE

## 2024-06-17 PROCEDURE — 3044F HG A1C LEVEL LT 7.0%: CPT | Mod: CPTII,S$GLB,, | Performed by: FAMILY MEDICINE

## 2024-06-17 PROCEDURE — 85025 COMPLETE CBC W/AUTO DIFF WBC: CPT | Performed by: FAMILY MEDICINE

## 2024-06-17 PROCEDURE — 1111F DSCHRG MED/CURRENT MED MERGE: CPT | Mod: CPTII,S$GLB,, | Performed by: FAMILY MEDICINE

## 2024-06-17 PROCEDURE — 99214 OFFICE O/P EST MOD 30 MIN: CPT | Mod: S$GLB,,, | Performed by: FAMILY MEDICINE

## 2024-06-17 RX ORDER — PREGABALIN 100 MG/1
100 CAPSULE ORAL NIGHTLY
Qty: 90 CAPSULE | Refills: 0 | Status: SHIPPED | OUTPATIENT
Start: 2024-06-17 | End: 2024-09-15

## 2024-06-17 RX ORDER — ATORVASTATIN CALCIUM 40 MG/1
40 TABLET, FILM COATED ORAL DAILY
Qty: 90 TABLET | Refills: 3 | Status: SHIPPED | OUTPATIENT
Start: 2024-06-17 | End: 2025-06-17

## 2024-06-18 ENCOUNTER — TELEPHONE (OUTPATIENT)
Dept: INTERNAL MEDICINE | Facility: CLINIC | Age: 64
End: 2024-06-18
Payer: MEDICARE

## 2024-06-18 NOTE — TELEPHONE ENCOUNTER
----- Message from Codi Conner sent at 6/18/2024  8:46 AM CDT -----  Contact: Divya  .Patient is calling to speak with the nurse regarding results . Reports wanting to know the results from yesterdays test . Please give patient a call back at   [phone]

## 2024-06-18 NOTE — PROGRESS NOTES
Subjective:      Patient ID: Divya Bui is a 64 y.o. female.    Chief Complaint: Hospital Follow Up      Patient here for hospital discharge [id number] inpatient at [facility] from June 4th through 8th.  She had initially gone to ER for copious rectal bleeding-bright red blood with bowel movements, no abdominal pain.  She was observed during her stay, bleeding seemed to have resolved.  Diagnosed with UTI which she did complete antibiotics.  Patient reports she was doing well until today, again had large amount of rectal bleeding just prior to today's visit  Did not realize she had UTI-unsure if this has resolved  Does have home health with PT and nursing.  Needing refill on some chronic medications      Review of Systems   Constitutional:  Positive for fatigue.   Respiratory:  Negative for shortness of breath.    Cardiovascular:  Negative for leg swelling.   Gastrointestinal:  Positive for anal bleeding. Negative for abdominal pain.     Past Medical History:   Diagnosis Date    Anemia     Anxiety     Basal cell carcinoma (BCC) of face 2/26/2020    Blood transfusion     Bowel incontinence     Depression     Esophageal stricture     GERD (gastroesophageal reflux disease)     Hypertension     Latent tuberculosis by skin test 2001    took INH    Liver nodule 1/6/2014    Morbid obesity with BMI of 45.0-49.9, adult     OA (osteoarthritis) of knee 12/12/2014    Pericardial effusion 9/4/2014          Past Surgical History:   Procedure Laterality Date    CHOLECYSTECTOMY      COLONOSCOPY N/A 3/6/2023    Procedure: COLONOSCOPY;  Surgeon: Beti Diallo MD;  Location: [facility];  Service: Endoscopy;  Laterality: N/A;    GASTRIC BYPASS      HERNIA REPAIR      INJECTION OF ANESTHETIC AGENT AROUND NERVE Bilateral 6/16/2023    Procedure: Bilateral Genicular nerve block with RN IV sedation;  Surgeon: Denis Lai MD;  Location: [facility];  Service: Pain Management;  Laterality: Bilateral;    INJECTION OF  ANESTHETIC AGENT AROUND NERVE Bilateral 8/11/2023    Procedure: Bilateral Genicular nerve block with RN IV sedation;  Surgeon: Denis Lai MD;  Location: [facility];  Service: Pain Management;  Laterality: Bilateral;    RADIOFREQUENCY THERMOCOAGULATION Right 10/31/2023    Procedure: Right Genicular Nerve RFA with RN IV sedation;  Surgeon: Denis Lai MD;  Location: [facility];  Service: Pain Management;  Laterality: Right;    RADIOFREQUENCY THERMOCOAGULATION Left 11/14/2023    Procedure: Left Genicular Nerve RFA with RN IV sedation;  Surgeon: Denis Lai MD;  Location: [facility];  Service: Pain Management;  Laterality: Left;    right sholder surgery      SMALL INTESTINE SURGERY       Family History   Problem Relation Name Age of Onset    Lung cancer Mother          smoker    Liver cancer Father      Diabetes Sister      Crohn's disease Sister      Liver cancer Sister      Diabetes Sister oldest     Crohn's disease Brother      Crohn's disease Other nephew     Breast cancer Neg Hx      Ovarian cancer Neg Hx      Colon cancer Neg Hx       Social History     Socioeconomic History    Marital status:    Tobacco Use    Smoking status: Never    Smokeless tobacco: Never   Substance and Sexual Activity    Alcohol use: Not Currently     Alcohol/week: 0.0 standard drinks of alcohol    Drug use: No    Sexual activity: Yes     Partners: Male     Social Determinants of Health     Food Insecurity: No Food Insecurity (12/28/2023)    Hunger Vital Sign     Worried About Running Out of Food in the Last Year: Never true     Ran Out of Food in the Last Year: Never true   Transportation Needs: Unknown (12/28/2023)    PRAPARE - Transportation     Lack of Transportation (Non-Medical): No   Physical Activity: Inactive (12/28/2023)    Exercise Vital Sign     Days of Exercise per Week: 0 days     Minutes of Exercise per Session: 0 min   Housing Stability: Low Risk  (12/28/2023)    Housing Stability Vital Sign      "Unable to Pay for Housing in the Last Year: No     Number of Places Lived in the Last Year: 1     Unstable Housing in the Last Year: No     Review of patient's allergies indicates:   Allergen Reactions    Metronidazole hcl Other (See Comments)     Mouth ulcers developed with Flagyl  Oral sores.  Mouth ulcers developed with Flagyl       Objective:       /74 (BP Location: Right arm, Patient Position: Sitting, BP Method: Large (Manual))   Pulse 85   Temp 97.8 °F (36.6 °C) (Tympanic)   Ht 5' 3" (1.6 m)   Wt 106.3 kg (234 lb 5.6 oz)   SpO2 98%   BMI 41.51 kg/m²   Physical Exam  Constitutional:       General: She is not in acute distress.     Appearance: Normal appearance. She is well-developed. She is not ill-appearing or diaphoretic.   Cardiovascular:      Rate and Rhythm: Normal rate and regular rhythm.      Heart sounds: Normal heart sounds.   Pulmonary:      Effort: Pulmonary effort is normal.      Breath sounds: Normal breath sounds.   Abdominal:      Palpations: Abdomen is soft.      Tenderness: There is no abdominal tenderness.   Neurological:      Mental Status: She is alert and oriented to person, place, and time.   Psychiatric:         Mood and Affect: Mood normal.         Behavior: Behavior normal.         Thought Content: Thought content normal.         Judgment: Judgment normal.       Assessment:     1. Hospital discharge follow-up    2. Acute cystitis with hematuria    3. BRBPR (bright red blood per rectum)    4. Hyponatremia    5. Primary hypertension    6. Hyperlipidemia, unspecified hyperlipidemia type    7. Neuropathy    8. Encounter for screening mammogram for malignant neoplasm of breast    9. Postmenopausal      Plan:   Hospital discharge follow-up  -     Urine culture; Future; Expected date: 06/17/2024  -     CBC Auto Differential; Future; Expected date: 06/17/2024  -     Basic Metabolic Panel; Future; Expected date: 06/17/2024    Acute cystitis with hematuria  -     Urine culture; " Future; Expected date: 06/17/2024  -     CBC Auto Differential; Future; Expected date: 06/17/2024  -     Basic Metabolic Panel; Future; Expected date: 06/17/2024    BRBPR (bright red blood per rectum)    Hyponatremia    Primary hypertension    Hyperlipidemia, unspecified hyperlipidemia type    Neuropathy    Encounter for screening mammogram for malignant neoplasm of breast  -     Mammo Digital Screening Bilat w/ Stepan; Future; Expected date: 06/17/2024    Postmenopausal  -     DXA Bone Density Axial Skeleton 1 or more sites; Future; Expected date: 06/17/2024    Other orders  -     atorvastatin (LIPITOR) 40 MG tablet; Take 1 tablet (40 mg total) by mouth once daily.  Dispense: 90 tablet; Refill: 3  -     pregabalin (LYRICA) 100 MG capsule; Take 1 capsule (100 mg total) by mouth every evening.  Dispense: 90 capsule; Refill: 0    Will recheck urine culture today, above labs  Will follow-up with gastro regarding her rectal bleeding  Continue all other current medications.     Medication List with Changes/Refills   Current Medications    ALPRAZOLAM (XANAX) 1 MG TABLET    Take 1 mg by mouth 3 (three) times daily as needed.    AMLODIPINE (NORVASC) 5 MG TABLET    TAKE 1 TABLET BY MOUTH ONCE DAILY    ARIPIPRAZOLE (ABILIFY) 10 MG TAB    Take 10 mg by mouth once daily.    CYANOCOBALAMIN 1,000 MCG/ML INJECTION    Inject 1,000 mcg into the skin every 30 days.    DICYCLOMINE (BENTYL) 20 MG TABLET    Take 1 tablet (20 mg total) by mouth 3 (three) times daily as needed (abdominal pain).    DOXEPIN (SINEQUAN) 50 MG CAPSULE    Take 50 mg by mouth nightly.    FLUOXETINE (PROZAC) 40 MG CAPSULE    Take 1 capsule (40 mg total) by mouth once daily.    FOLIC ACID (FOLVITE) 1 MG TABLET    Take 1 tablet (1 mg total) by mouth once daily.    FUROSEMIDE (LASIX) 40 MG TABLET    Take 1 tablet (40 mg total) by mouth 2 (two) times daily as needed (swelling).    OLMESARTAN-HYDROCHLOROTHIAZIDE (BENICAR HCT) 40-12.5 MG TAB    TAKE 1 TABLET BY MOUTH  ONCE DAILY    PROMETHAZINE (PHENERGAN) 25 MG TABLET    TAKE 1 TABLET BY MOUTH EVERY 6 HOURS AS NEEDED FOR NAUSEA    ZOLPIDEM (AMBIEN) 10 MG TAB    Take 10 mg by mouth nightly as needed.   Changed and/or Refilled Medications    Modified Medication Previous Medication    ATORVASTATIN (LIPITOR) 40 MG TABLET atorvastatin (LIPITOR) 40 MG tablet       Take 1 tablet (40 mg total) by mouth once daily.    Take 1 tablet (40 mg total) by mouth once daily.    PREGABALIN (LYRICA) 100 MG CAPSULE pregabalin (LYRICA) 100 MG capsule       Take 1 capsule (100 mg total) by mouth every evening.    Take 1 capsule (100 mg total) by mouth every evening.

## 2024-06-19 LAB — BACTERIA UR CULT: ABNORMAL

## 2024-06-20 RX ORDER — CIPROFLOXACIN 500 MG/1
500 TABLET ORAL 2 TIMES DAILY
Qty: 10 TABLET | Refills: 0 | Status: SHIPPED | OUTPATIENT
Start: 2024-06-20

## 2024-06-24 ENCOUNTER — TELEPHONE (OUTPATIENT)
Dept: INTERNAL MEDICINE | Facility: CLINIC | Age: 64
End: 2024-06-24
Payer: MEDICARE

## 2024-06-24 NOTE — TELEPHONE ENCOUNTER
----- Message from Fabricio Liu sent at 6/24/2024  4:13 PM CDT -----  Contact: self   .Type: Patient Call Back        Who called:   patient      What is the request in detail:    Patient has been dealing with pain and bleeding from rectum . Patient is trying to be seen tomorrow when she comes in for labs. Patient expressed that she doesn't want to be seen by the ER   Can the clinic reply by [facility]?           Would the patient rather a call back or a response via [facility]?      call back   Best call back number:  [phone] e  
Left message on machine to call the clinic.            Has patient been seen in gastro?  No appts available at this time.  Schedule is booked for the rest of the week. Can place on waiting list for if anything becomes available tomorrow, Tuesday 6/25/24  
No

## 2024-06-25 ENCOUNTER — HOSPITAL ENCOUNTER (EMERGENCY)
Facility: HOSPITAL | Age: 64
Discharge: HOME | End: 2024-06-26
Attending: EMERGENCY MEDICINE
Payer: MEDICARE

## 2024-06-25 ENCOUNTER — TELEPHONE (OUTPATIENT)
Dept: INTERNAL MEDICINE | Facility: CLINIC | Age: 64
End: 2024-06-25
Payer: MEDICARE

## 2024-06-25 ENCOUNTER — TELEPHONE (OUTPATIENT)
Dept: SURGERY | Facility: HOSPITAL | Age: 64
End: 2024-06-25
Payer: MEDICARE

## 2024-06-25 DIAGNOSIS — K62.5 RECTAL BLEEDING: Primary | ICD-10-CM

## 2024-06-25 DIAGNOSIS — K64.9 HEMORRHOIDS, UNSPECIFIED HEMORRHOID TYPE: ICD-10-CM

## 2024-06-25 LAB
ABO + RH BLD: NORMAL
ALBUMIN SERPL BCP-MCNC: 3.8 G/DL (ref 3.5–5.2)
ALP SERPL-CCNC: 103 U/L (ref 55–135)
ALT SERPL W/O P-5'-P-CCNC: 12 U/L (ref 10–44)
ANION GAP SERPL CALC-SCNC: 9 MMOL/L (ref 8–16)
APTT PPP: 26.4 SEC (ref 21–32)
AST SERPL-CCNC: 12 U/L (ref 10–40)
BASOPHILS # BLD AUTO: 0.02 K/UL (ref 0–0.2)
BASOPHILS NFR BLD: 0.5 % (ref 0–1.9)
BILIRUB SERPL-MCNC: 0.3 MG/DL (ref 0.1–1)
BLD GP AB SCN CELLS X3 SERPL QL: NORMAL
BUN SERPL-MCNC: 14 MG/DL (ref 8–23)
CALCIUM SERPL-MCNC: 9.2 MG/DL (ref 8.7–10.5)
CHLORIDE SERPL-SCNC: 110 MMOL/L (ref 95–110)
CO2 SERPL-SCNC: 20 MMOL/L (ref 23–29)
CREAT SERPL-MCNC: 1.1 MG/DL (ref 0.5–1.4)
DIFFERENTIAL METHOD BLD: ABNORMAL
EOSINOPHIL # BLD AUTO: 0.2 K/UL (ref 0–0.5)
EOSINOPHIL NFR BLD: 4.4 % (ref 0–8)
ERYTHROCYTE [DISTWIDTH] IN BLOOD BY AUTOMATED COUNT: 15.2 % (ref 11.5–14.5)
EST. GFR  (NO RACE VARIABLE): 55 ML/MIN/1.73 M^2
GLUCOSE SERPL-MCNC: 97 MG/DL (ref 70–110)
HCT VFR BLD AUTO: 35.7 % (ref 37–48.5)
HGB BLD-MCNC: 11.4 G/DL (ref 12–16)
IMM GRANULOCYTES # BLD AUTO: 0.02 K/UL (ref 0–0.04)
IMM GRANULOCYTES NFR BLD AUTO: 0.5 % (ref 0–0.5)
INR PPP: 0.9 (ref 0.8–1.2)
LYMPHOCYTES # BLD AUTO: 0.9 K/UL (ref 1–4.8)
LYMPHOCYTES NFR BLD: 21.4 % (ref 18–48)
MCH RBC QN AUTO: 28.4 PG (ref 27–31)
MCHC RBC AUTO-ENTMCNC: 31.9 G/DL (ref 32–36)
MCV RBC AUTO: 89 FL (ref 82–98)
MONOCYTES # BLD AUTO: 0.3 K/UL (ref 0.3–1)
MONOCYTES NFR BLD: 8 % (ref 4–15)
NEUTROPHILS # BLD AUTO: 2.7 K/UL (ref 1.8–7.7)
NEUTROPHILS NFR BLD: 65.2 % (ref 38–73)
NRBC BLD-RTO: 0 /100 WBC
OB PNL STL: POSITIVE
PLATELET # BLD AUTO: 194 K/UL (ref 150–450)
PMV BLD AUTO: 8.1 FL (ref 9.2–12.9)
POTASSIUM SERPL-SCNC: 3.8 MMOL/L (ref 3.5–5.1)
PROT SERPL-MCNC: 7.5 G/DL (ref 6–8.4)
PROTHROMBIN TIME: 10.5 SEC (ref 9–12.5)
RBC # BLD AUTO: 4.02 M/UL (ref 4–5.4)
SODIUM SERPL-SCNC: 139 MMOL/L (ref 136–145)
SPECIMEN OUTDATE: NORMAL
WBC # BLD AUTO: 4.11 K/UL (ref 3.9–12.7)

## 2024-06-25 PROCEDURE — 63600175 PHARM REV CODE 636 W HCPCS: Performed by: EMERGENCY MEDICINE

## 2024-06-25 PROCEDURE — 85730 THROMBOPLASTIN TIME PARTIAL: CPT | Performed by: NURSE PRACTITIONER

## 2024-06-25 PROCEDURE — 86900 BLOOD TYPING SEROLOGIC ABO: CPT | Performed by: NURSE PRACTITIONER

## 2024-06-25 PROCEDURE — 85610 PROTHROMBIN TIME: CPT | Performed by: NURSE PRACTITIONER

## 2024-06-25 PROCEDURE — 96374 THER/PROPH/DIAG INJ IV PUSH: CPT | Mod: 59

## 2024-06-25 PROCEDURE — 80053 COMPREHEN METABOLIC PANEL: CPT | Performed by: NURSE PRACTITIONER

## 2024-06-25 PROCEDURE — 96365 THER/PROPH/DIAG IV INF INIT: CPT | Mod: 59

## 2024-06-25 PROCEDURE — 85025 COMPLETE CBC W/AUTO DIFF WBC: CPT | Performed by: NURSE PRACTITIONER

## 2024-06-25 PROCEDURE — 63600175 PHARM REV CODE 636 W HCPCS: Performed by: STUDENT IN AN ORGANIZED HEALTH CARE EDUCATION/TRAINING PROGRAM

## 2024-06-25 PROCEDURE — 82272 OCCULT BLD FECES 1-3 TESTS: CPT | Performed by: EMERGENCY MEDICINE

## 2024-06-25 PROCEDURE — 86850 RBC ANTIBODY SCREEN: CPT | Performed by: NURSE PRACTITIONER

## 2024-06-25 PROCEDURE — 96375 TX/PRO/DX INJ NEW DRUG ADDON: CPT | Mod: 59

## 2024-06-25 PROCEDURE — 25000003 PHARM REV CODE 250: Performed by: EMERGENCY MEDICINE

## 2024-06-25 PROCEDURE — 25500020 PHARM REV CODE 255: Performed by: STUDENT IN AN ORGANIZED HEALTH CARE EDUCATION/TRAINING PROGRAM

## 2024-06-25 PROCEDURE — 99284 EMERGENCY DEPT VISIT MOD MDM: CPT | Mod: 25

## 2024-06-25 RX ORDER — MORPHINE SULFATE 4 MG/ML
4 INJECTION, SOLUTION INTRAMUSCULAR; INTRAVENOUS
Status: COMPLETED | OUTPATIENT
Start: 2024-06-25 | End: 2024-06-25

## 2024-06-25 RX ORDER — ONDANSETRON HYDROCHLORIDE 2 MG/ML
4 INJECTION, SOLUTION INTRAVENOUS
Status: COMPLETED | OUTPATIENT
Start: 2024-06-25 | End: 2024-06-25

## 2024-06-25 RX ADMIN — MORPHINE SULFATE 4 MG: 4 INJECTION INTRAVENOUS at 09:06

## 2024-06-25 RX ADMIN — PROMETHAZINE HYDROCHLORIDE 12.5 MG: 25 INJECTION INTRAMUSCULAR; INTRAVENOUS at 07:06

## 2024-06-25 RX ADMIN — ONDANSETRON 4 MG: 2 INJECTION INTRAMUSCULAR; INTRAVENOUS at 10:06

## 2024-06-25 RX ADMIN — IOHEXOL 100 ML: 350 INJECTION, SOLUTION INTRAVENOUS at 10:06

## 2024-06-25 NOTE — TELEPHONE ENCOUNTER
----- Message from Shireen Sky sent at 6/25/2024 10:34 AM CDT -----  Regarding: concerns  Name of who is calling:   Divya      What is the request in detail: Pt I requesting a call back in ref to issue from using the restroom / blood bright red  / down the leg / 2nd request /very concerned      Can the clinic reply by [facility]:yes      What number to call back if not [facility]: [phone]

## 2024-06-25 NOTE — TELEPHONE ENCOUNTER
----- Message from Marianne Livingston sent at 6/25/2024  9:14 AM CDT -----  Contact: Divya  Type:  Patient Returning Call    Who Called:Divya  Who Left Message for Patient:Farzana Wakefield MA  Does the patient know what this is regarding?:Bleeding  Would the patient rather a call back or a response via [facility]? Callback  Best Call Back Number:[phone]  Additional Information: Missed Call

## 2024-06-25 NOTE — ED PROVIDER NOTES
SCRIBE #1 NOTE: I, Eddy Barnett, am scribing for, and in the presence of, Lucy Westbrook MD. I have scribed the HPI, ROS, and PE.    SCRIBE #2 NOTE: IHeather, am scribing for, and in the presence of,  Etienne Wilcox MD. I have scribed the remaining portions of the note not scribed by Scribe #1.      History     Chief Complaint   Patient presents with    Rectal Bleeding     Constant Rectal bleeding x 3 days. +lower abd pain. Hx rectal bleeds, send by pcp.      Review of patient's allergies indicates:   Allergen Reactions    Metronidazole hcl Other (See Comments)     Mouth ulcers developed with Flagyl  Oral sores.  Mouth ulcers developed with Flagyl         History of Present Illness     HPI    6/25/2024, 5:58 PM  History obtained from the patient      History of Present Illness: Divya Bui is a 64 y.o. female patient with a PMHx of HTN, GERD, anxiety, basal cell carcinoma, depression, anemia, and tuberculosis who presents to the Emergency Department for evaluation of worsening rectal bleeding which onset after surgery in January for external hemorrhoid banding. Pt has some lower abd pain. Pt reports her clothes become drenched with blood after bowel movements. Pt had a colonoscopy in February. Symptoms are constant and moderate in severity. No mitigating or exacerbating factors reported. Associated sxs include lower abd pain. Patient denies any CP, SOB, fever, weakness, and all other sxs at this time. Pt received an iron infusion recently. No prior Tx. No further complaints or concerns at this time.       Arrival mode: Personal vehicle     PCP: Angel Khan MD        Past Medical History:  Past Medical History:   Diagnosis Date    Anemia     Anxiety     Basal cell carcinoma (BCC) of face 2/26/2020    Blood transfusion     Bowel incontinence     Depression     Esophageal stricture     GERD (gastroesophageal reflux disease)     Hypertension     Latent tuberculosis by skin test 2001    took INH     Liver nodule 1/6/2014    Morbid obesity with BMI of 45.0-49.9, adult     OA (osteoarthritis) of knee 12/12/2014    Pericardial effusion 9/4/2014       Past Surgical History:  Past Surgical History:   Procedure Laterality Date    CHOLECYSTECTOMY      COLONOSCOPY N/A 3/6/2023    Procedure: COLONOSCOPY;  Surgeon: Beti Diallo MD;  Location: [facility];  Service: Endoscopy;  Laterality: N/A;    GASTRIC BYPASS      HERNIA REPAIR      INJECTION OF ANESTHETIC AGENT AROUND NERVE Bilateral 6/16/2023    Procedure: Bilateral Genicular nerve block with RN IV sedation;  Surgeon: Denis Lai MD;  Location: [facility];  Service: Pain Management;  Laterality: Bilateral;    INJECTION OF ANESTHETIC AGENT AROUND NERVE Bilateral 8/11/2023    Procedure: Bilateral Genicular nerve block with RN IV sedation;  Surgeon: Denis Lai MD;  Location: [facility];  Service: Pain Management;  Laterality: Bilateral;    RADIOFREQUENCY THERMOCOAGULATION Right 10/31/2023    Procedure: Right Genicular Nerve RFA with RN IV sedation;  Surgeon: Denis Lai MD;  Location: [facility];  Service: Pain Management;  Laterality: Right;    RADIOFREQUENCY THERMOCOAGULATION Left 11/14/2023    Procedure: Left Genicular Nerve RFA with RN IV sedation;  Surgeon: Denis Lai MD;  Location: [facility];  Service: Pain Management;  Laterality: Left;    right sholder surgery      SMALL INTESTINE SURGERY           Family History:  Family History   Problem Relation Name Age of Onset    Lung cancer Mother          smoker    Liver cancer Father      Diabetes Sister      Crohn's disease Sister      Liver cancer Sister      Diabetes Sister oldest     Crohn's disease Brother      Crohn's disease Other nephew     Breast cancer Neg Hx      Ovarian cancer Neg Hx      Colon cancer Neg Hx         Social History:  Social History     Tobacco Use    Smoking status: Never    Smokeless tobacco: Never   Substance and Sexual Activity    Alcohol use: Not  Currently     Alcohol/week: 0.0 standard drinks of alcohol    Drug use: No    Sexual activity: Yes     Partners: Male        Review of Systems     Review of Systems   Constitutional:  Negative for chills and fever.   HENT:  Negative for congestion and sore throat.    Respiratory:  Negative for cough and shortness of breath.    Cardiovascular:  Negative for chest pain.   Gastrointestinal:  Positive for abdominal pain (lower). Negative for nausea and vomiting.        (+) rectal bleeding   Genitourinary:  Negative for dysuria.   Musculoskeletal:  Negative for back pain.   Skin:  Negative for rash.   Neurological:  Negative for dizziness, weakness, light-headedness and headaches.   Hematological:  Does not bruise/bleed easily.   All other systems reviewed and are negative.       Physical Exam     Initial Vitals [06/25/24 1444]   BP Pulse Resp Temp SpO2   (!) 167/85 110 18 98 °F (36.7 °C) 98 %      MAP       --          Physical Exam  Nursing Notes and Vital Signs Reviewed.  Constitutional: Patient is in no acute distress. Obese. Chronically ill appearing.  Head: Atraumatic. Normocephalic.  Eyes: PERRL. EOM intact. Conjunctivae are not pale. No scleral icterus.  ENT: Mucous membranes are moist. Oropharynx is clear and symmetric.    Neck: Supple. Full ROM. No lymphadenopathy.  Cardiovascular: Regular rate. Regular rhythm. No murmurs, rubs, or gallops. Distal pulses are 2+ and symmetric.  Pulmonary/Chest: No respiratory distress. Clear to auscultation bilaterally. No wheezing or rales.  Abdominal: Soft and non-distended.  There is no tenderness.  No rebound, guarding, or rigidity. Good bowel sounds.  Genitourinary: No CVA tenderness  Rectal Exam: No gross blood. No hemorrhoids.  Musculoskeletal: Moves all extremities. No obvious deformities. No edema. No calf tenderness.  Skin: Warm and dry.  Neurological:  Alert, awake, and appropriate.  Normal speech.  No acute focal neurological deficits are appreciated.  Psychiatric:  Normal affect. Good eye contact. Appropriate in content.     ED Course   Procedures  ED Vital Signs:  Vitals:    06/25/24 1442 06/25/24 1444 06/25/24 1802 06/25/24 1817   BP:  (!) 167/85 (!) 180/87 (!) 163/83   Pulse:  110 84 78   Resp:  18 (!) 24 16   Temp:  98 °F (36.7 °C)     TempSrc:  Oral     SpO2:  98% 100% 100%   Weight: 105 kg (231 lb 7.7 oz)       06/25/24 1832 06/25/24 2000 06/25/24 2100 06/25/24 2230   BP: (!) 141/81 (!) 167/80  (!) 169/88   Pulse: 76 82 83 84   Resp: 18 (!) 24 (!) 22 15   Temp:       TempSrc:       SpO2: 100% 100% 98% 98%   Weight:        06/25/24 2345 06/26/24 0000   BP:  (!) 156/76   Pulse:  81   Resp: (!) 23    Temp:     TempSrc:     SpO2:  99%   Weight:         Abnormal Lab Results:  Labs Reviewed   CBC W/ AUTO DIFFERENTIAL - Abnormal; Notable for the following components:       Result Value    Hemoglobin 11.4 (*)     Hematocrit 35.7 (*)     MCHC 31.9 (*)     RDW 15.2 (*)     MPV 8.1 (*)     Lymph # 0.9 (*)     All other components within normal limits   COMPREHENSIVE METABOLIC PANEL - Abnormal; Notable for the following components:    CO2 20 (*)     eGFR 55 (*)     All other components within normal limits   OCCULT BLOOD X 1, STOOL - Abnormal; Notable for the following components:    Occult Blood Positive (*)     All other components within normal limits   PROTIME-INR   APTT   TYPE & SCREEN        All Lab Results:  Results for orders placed or performed during the hospital encounter of 06/25/24   CBC auto differential   Result Value Ref Range    WBC 4.11 3.90 - 12.70 K/uL    RBC 4.02 4.00 - 5.40 M/uL    Hemoglobin 11.4 (L) 12.0 - 16.0 g/dL    Hematocrit 35.7 (L) 37.0 - 48.5 %    MCV 89 82 - 98 fL    MCH 28.4 27.0 - 31.0 pg    MCHC 31.9 (L) 32.0 - 36.0 g/dL    RDW 15.2 (H) 11.5 - 14.5 %    Platelets 194 150 - 450 K/uL    MPV 8.1 (L) 9.2 - 12.9 fL    Immature Granulocytes 0.5 0.0 - 0.5 %    Gran # (ANC) 2.7 1.8 - 7.7 K/uL    Immature Grans (Abs) 0.02 0.00 - 0.04 K/uL    Lymph # 0.9  (L) 1.0 - 4.8 K/uL    Mono # 0.3 0.3 - 1.0 K/uL    Eos # 0.2 0.0 - 0.5 K/uL    Baso # 0.02 0.00 - 0.20 K/uL    nRBC 0 0 /100 WBC    Gran % 65.2 38.0 - 73.0 %    Lymph % 21.4 18.0 - 48.0 %    Mono % 8.0 4.0 - 15.0 %    Eosinophil % 4.4 0.0 - 8.0 %    Basophil % 0.5 0.0 - 1.9 %    Differential Method Automated    Comprehensive metabolic panel   Result Value Ref Range    Sodium 139 136 - 145 mmol/L    Potassium 3.8 3.5 - 5.1 mmol/L    Chloride 110 95 - 110 mmol/L    CO2 20 (L) 23 - 29 mmol/L    Glucose 97 70 - 110 mg/dL    BUN 14 8 - 23 mg/dL    Creatinine 1.1 0.5 - 1.4 mg/dL    Calcium 9.2 8.7 - 10.5 mg/dL    Total Protein 7.5 6.0 - 8.4 g/dL    Albumin 3.8 3.5 - 5.2 g/dL    Total Bilirubin 0.3 0.1 - 1.0 mg/dL    Alkaline Phosphatase 103 55 - 135 U/L    AST 12 10 - 40 U/L    ALT 12 10 - 44 U/L    eGFR 55 (A) >60 mL/min/1.73 m^2    Anion Gap 9 8 - 16 mmol/L   Protime-INR   Result Value Ref Range    Prothrombin Time 10.5 9.0 - 12.5 sec    INR 0.9 0.8 - 1.2   APTT   Result Value Ref Range    aPTT 26.4 21.0 - 32.0 sec   Occult blood x 1, stool    Specimen: Stool   Result Value Ref Range    Occult Blood Positive (A) Negative   Type & Screen   Result Value Ref Range    Group & Rh O POS     Indirect Te NEG     Specimen Outdate 06/28/2024 23:59          Imaging Results:  Imaging Results              CTA Acute GI [place], Abdomen and Pelvis (Final result)  Result time 06/25/24 23:27:00      Final result by Beatriz Bergeron MD (06/25/24 23:27:00)                   Impression:      Perirectal hemorrhoids suspected.  No convincing active gastrointestinal hemorrhage or adverse change since recent prior exam.    All CT scans at this facility use dose modulation, iterative reconstruction and/or weight based dosing when appropriate to reduce radiation dose to as low as reasonably achievable.      Electronically signed by: Beatriz Bergeron  Date:    06/25/2024  Time:    23:27               Narrative:    EXAMINATION:  CTA ACUTE  GI BLEED, ABDOMEN AND PELVIS    CLINICAL HISTORY:  Rectal bleeding;    TECHNIQUE:  Using 100 cc of  Omnipaque 350 IV, and multi-detector helical CT technique, axial CT angiogram images of the abdomen were obtained from the lung bases through the pelvis. Precontrast and portal venous phase images of the abdomen and pelvis also done. 2D post-processing coronal and sagittal reconstructions of the abdominal aorta and visceral arteries performed.    COMPARISON:  June 4, 2024                                                The Emergency Provider reviewed the vital signs and test results, which are outlined above.     ED Discussion     8:00 PM: Dr. Westbrook transfers care of patient to Dr. Wilcox pending lab results.  Patient with heme-positive stool.  She has not grossly anemic on it labs.  She does have a history of getting iron transfusions in the past.  In her baseline hemoglobin trends between 10-11.5 over the past few years.  She is awaiting CT scan to see if there is a bleed.  We determine if she needs GI consult for rectal bleeding intermittently for over a year.  She did have a colonoscopy in February of this year which showed diverticulosis but no diverticulitis or other pathology    8:18 PM: Dr. Wilcox agrees with Dr. Westbrook's assessment and plan of care. Evaluated pt. Pt is resting comfortably and is in no acute distress.  D/w pt all pertinent results. D/w pt any concerns expressed at this time. Answered all questions. Pt expresses understanding at this time.    12:01 AM: Reassessed pt at this time. Discussed with pt all pertinent ED information and results. Discussed pt dx and plan of tx. Gave pt all f/u and return to the ED instructions. All questions and concerns were addressed at this time. Pt expresses understanding of information and instructions, and is comfortable with plan to discharge. Pt is stable for discharge.    I discussed with patient and/or family/caretaker that evaluation in the ED does not suggest any  emergent or life threatening medical conditions requiring immediate intervention beyond what was provided in the ED, and I believe patient is safe for discharge.  Regardless, an unremarkable evaluation in the ED does not preclude the development or presence of a serious of life threatening condition. As such, patient was instructed to return immediately for any worsening or change in current symptoms.      ED Course as of 06/29/24 1413   Wed Jun 26, 2024   0002 Cardiac Monitor Interpretation:  This is my independent interpretation.   Rate: 81  Rhythm: NSR  Indication: GI bleeding eval   []   0002 Discussed findings with the patient.  We will discharge home to follow up with primary care physician and GI.  []      ED Course User Index  [] Etienne Wilcox MD     Medical Decision Making  Differential diagnosis includes upper GI bleed, including duodenal ulcer, gastric ulcer, lower abdominal bleeding such as AVM, diverticulitis, hemorrhoids, among others.    Patient presents to the emergency department as documented.  CT scan here demonstrates hemorrhoids, no other a versus finding in the abdomen and pelvis.  Bleeding stopped here in the emergency department.  She was here for approximately 10 hours without significant/life-threatening bleeding.  Workup demonstrates a normal hemoglobin for the patient.  Patient has no symptoms of anemia.  We will discharge her home to follow up with the primary care physician and Colorectal surgery if necessary.    Amount and/or Complexity of Data Reviewed  External Data Reviewed: notes.     Details: Reviewed a discharge summary 06/12/2024 documenting the patient's visit for rectal bleeding at that time.  She had a full hospital workup, was found to have a urinary tract infection.  Had no significant bleeding in the hospital, therefore did not have a scope performed in the hospital.  This note provided insight into the chronicity of the patient's symptoms, the treatment that  had already been performed with the patient, provided recommendations, and impacted my medical decision-making today.  Labs: ordered. Decision-making details documented in ED Course.  Radiology: ordered. Decision-making details documented in ED Course.    Risk  Prescription drug management.                ED Medication(s):  Medications   promethazine (PHENERGAN) 12.5 mg in 0.9% NaCl 50 mL IVPB (0 mg Intravenous Stopped 6/25/24 2315)   morphine injection 4 mg (4 mg Intravenous Given 6/25/24 2100)   ondansetron injection 4 mg (4 mg Intravenous Given 6/25/24 2251)   iohexoL (OMNIPAQUE 350) injection 100 mL (100 mLs Intravenous Given 6/25/24 2236)       Discharge Medication List as of 6/26/2024 12:00 AM           Follow-up Information       Call  Angel Khan MD.    Specialty: Internal Medicine  Contact information:  [address] [address]  [phone]                                 Scribe Attestation:   Scribe #1: I performed the above scribed service and the documentation accurately describes the services I performed. I attest to the accuracy of the note.     Attending:   Physician Attestation Statement for Scribe #1: I, Lucy Westbrook MD, personally performed the services described in this documentation, as scribed by Eddy Barnett, in my presence, and it is both accurate and complete.       Scribe Attestation:   Scribe #2: I performed the above scribed service and the documentation accurately describes the services I performed. I attest to the accuracy of the note.    Attending Attestation:           Physician Attestation for Scribe:    Physician Attestation Statement for Scribe #2: I, Etienne Wilcox MD, reviewed documentation, as scribed by Heather Hughes in my presence, and it is both accurate and complete. I also acknowledge and confirm the content of the note done by Scribe #1.           Clinical Impression       ICD-10-CM ICD-9-CM   1. Rectal bleeding  K62.5 569.3   2. Hemorrhoids,  unspecified hemorrhoid type  K64.9 455.6       Disposition:   Disposition: Discharged  Condition: Stable        Etienne Wilcox MD  06/29/24 1410

## 2024-06-25 NOTE — FIRST PROVIDER EVALUATION
Medical screening examination initiated.  I have conducted a focused provider triage encounter, findings are as follows:    Brief history of present illness:  Patient reports sent by PCP for rectal bleeding    Vitals:    06/25/24 1442   Weight: 105 kg (231 lb 7.7 oz)       Pertinent physical exam:  No acute distress    Brief workup plan:  Labs, further eval    Preliminary workup initiated; this workup will be continued and followed by the physician or advanced practice provider that is assigned to the patient when roomed.

## 2024-06-25 NOTE — TELEPHONE ENCOUNTER
Patient was calling to check for an appt d/t rectum bleeding.  Informed provider was out of the clinic and she would need to proceed to the ER.  Patient has an appt with the colon rectal doctor today at 2:30p.m.

## 2024-06-26 ENCOUNTER — PATIENT MESSAGE (OUTPATIENT)
Dept: SURGERY | Facility: CLINIC | Age: 64
End: 2024-06-26
Payer: MEDICARE

## 2024-06-26 VITALS
DIASTOLIC BLOOD PRESSURE: 76 MMHG | SYSTOLIC BLOOD PRESSURE: 156 MMHG | BODY MASS INDEX: 41.01 KG/M2 | TEMPERATURE: 98 F | OXYGEN SATURATION: 99 % | HEART RATE: 81 BPM | WEIGHT: 231.5 LBS | RESPIRATION RATE: 23 BRPM

## 2024-06-26 NOTE — DISCHARGE INSTRUCTIONS
Please follow-up with your PCP. Please call on the next business day to schedule this appointment.    You have been referred to gastroenterology. Please use this referral to arrange an outpatient appointment.     Return to the ER with new/worsening symptoms, fevers/chills, nausea/vomiting, chest pain, shortness of breath, or any other concerns.

## 2024-07-05 ENCOUNTER — EXTERNAL HOME HEALTH (OUTPATIENT)
Dept: HOME HEALTH SERVICES | Facility: HOSPITAL | Age: 64
End: 2024-07-05
Payer: MEDICARE

## 2024-07-10 ENCOUNTER — OFFICE VISIT (OUTPATIENT)
Dept: SURGERY | Facility: CLINIC | Age: 64
End: 2024-07-10
Payer: MEDICARE

## 2024-07-10 VITALS
DIASTOLIC BLOOD PRESSURE: 87 MMHG | HEART RATE: 98 BPM | OXYGEN SATURATION: 94 % | WEIGHT: 237.44 LBS | TEMPERATURE: 98 F | HEIGHT: 63 IN | BODY MASS INDEX: 42.07 KG/M2 | SYSTOLIC BLOOD PRESSURE: 135 MMHG

## 2024-07-10 DIAGNOSIS — K57.90 DIVERTICULOSIS OF INTESTINE WITHOUT BLEEDING, UNSPECIFIED INTESTINAL TRACT LOCATION: ICD-10-CM

## 2024-07-10 DIAGNOSIS — K64.1 GRADE II INTERNAL HEMORRHOIDS: Primary | ICD-10-CM

## 2024-07-10 PROCEDURE — 1125F AMNT PAIN NOTED PAIN PRSNT: CPT | Mod: CPTII,S$GLB,, | Performed by: STUDENT IN AN ORGANIZED HEALTH CARE EDUCATION/TRAINING PROGRAM

## 2024-07-10 PROCEDURE — 46221 LIGATION OF HEMORRHOID(S): CPT | Mod: S$GLB,,, | Performed by: STUDENT IN AN ORGANIZED HEALTH CARE EDUCATION/TRAINING PROGRAM

## 2024-07-10 PROCEDURE — 3044F HG A1C LEVEL LT 7.0%: CPT | Mod: CPTII,S$GLB,, | Performed by: STUDENT IN AN ORGANIZED HEALTH CARE EDUCATION/TRAINING PROGRAM

## 2024-07-10 PROCEDURE — 3075F SYST BP GE 130 - 139MM HG: CPT | Mod: CPTII,S$GLB,, | Performed by: STUDENT IN AN ORGANIZED HEALTH CARE EDUCATION/TRAINING PROGRAM

## 2024-07-10 PROCEDURE — 3008F BODY MASS INDEX DOCD: CPT | Mod: CPTII,S$GLB,, | Performed by: STUDENT IN AN ORGANIZED HEALTH CARE EDUCATION/TRAINING PROGRAM

## 2024-07-10 PROCEDURE — 3079F DIAST BP 80-89 MM HG: CPT | Mod: CPTII,S$GLB,, | Performed by: STUDENT IN AN ORGANIZED HEALTH CARE EDUCATION/TRAINING PROGRAM

## 2024-07-10 PROCEDURE — 99999 PR PBB SHADOW E&M-EST. PATIENT-LVL III: CPT | Mod: PBBFAC,,, | Performed by: STUDENT IN AN ORGANIZED HEALTH CARE EDUCATION/TRAINING PROGRAM

## 2024-07-10 PROCEDURE — 1159F MED LIST DOCD IN RCRD: CPT | Mod: CPTII,S$GLB,, | Performed by: STUDENT IN AN ORGANIZED HEALTH CARE EDUCATION/TRAINING PROGRAM

## 2024-07-10 PROCEDURE — 99214 OFFICE O/P EST MOD 30 MIN: CPT | Mod: 25,S$GLB,, | Performed by: STUDENT IN AN ORGANIZED HEALTH CARE EDUCATION/TRAINING PROGRAM

## 2024-07-10 RX ORDER — OXYCODONE HYDROCHLORIDE 5 MG/1
5 TABLET ORAL EVERY 4 HOURS PRN
Qty: 5 TABLET | Refills: 0 | Status: SHIPPED | OUTPATIENT
Start: 2024-07-10

## 2024-07-10 NOTE — PROGRESS NOTES
CRS Office Visit    SUBJECTIVE:     Chief Complaint: rectal bleeding    History of Present Illness:  Patient is a 64 y.o. female presents as a follow up of bleeding internal hemorrhoids.  She underwent hemorrhoidal banding 02/2024 and had good relief of her symptoms.  However a proximally 1 month ago she began rebleeding.  She presented to the emergency department for this as she has had a history of diverticular bleeding in the past.  Reports her bleeding comes after bowel movements and it was associated with bright red blood on the toilet paper and dripping into the toilet.  Her hemoglobin has been stable for the past several months.  She also endorses a perianal rash.  She has had more frequent episodes of diarrhea.  Is not compliant with fiber supplementation.  Denies straining or prolonged amount of time on the toilet.      Review of patient's allergies indicates:   Allergen Reactions    Metronidazole hcl Other (See Comments)     Mouth ulcers developed with Flagyl  Oral sores.  Mouth ulcers developed with Flagyl       Past Medical History:   Diagnosis Date    Anemia     Anxiety     Basal cell carcinoma (BCC) of face 2/26/2020    Blood transfusion     Bowel incontinence     Depression     Esophageal stricture     GERD (gastroesophageal reflux disease)     Hypertension     Latent tuberculosis by skin test 2001    took INH    Liver nodule 1/6/2014    Morbid obesity with BMI of 45.0-49.9, adult     OA (osteoarthritis) of knee 12/12/2014    Pericardial effusion 9/4/2014     Past Surgical History:   Procedure Laterality Date    CHOLECYSTECTOMY      COLONOSCOPY N/A 3/6/2023    Procedure: COLONOSCOPY;  Surgeon: Beti Diallo MD;  Location: [facility];  Service: Endoscopy;  Laterality: N/A;    GASTRIC BYPASS      HERNIA REPAIR      INJECTION OF ANESTHETIC AGENT AROUND NERVE Bilateral 6/16/2023    Procedure: Bilateral Genicular nerve block with RN IV sedation;  Surgeon: Denis Lai MD;  Location: [facility];   Service: Pain Management;  Laterality: Bilateral;    INJECTION OF ANESTHETIC AGENT AROUND NERVE Bilateral 8/11/2023    Procedure: Bilateral Genicular nerve block with RN IV sedation;  Surgeon: Denis Lai MD;  Location: [facility];  Service: Pain Management;  Laterality: Bilateral;    RADIOFREQUENCY THERMOCOAGULATION Right 10/31/2023    Procedure: Right Genicular Nerve RFA with RN IV sedation;  Surgeon: Denis aLi MD;  Location: [facility];  Service: Pain Management;  Laterality: Right;    RADIOFREQUENCY THERMOCOAGULATION Left 11/14/2023    Procedure: Left Genicular Nerve RFA with RN IV sedation;  Surgeon: Denis Lai MD;  Location: [facility];  Service: Pain Management;  Laterality: Left;    right sholder surgery      SMALL INTESTINE SURGERY       Family History   Problem Relation Name Age of Onset    Lung cancer Mother          smoker    Liver cancer Father      Diabetes Sister      Crohn's disease Sister      Liver cancer Sister      Diabetes Sister oldest     Crohn's disease Brother      Crohn's disease Other nephew     Breast cancer Neg Hx      Ovarian cancer Neg Hx      Colon cancer Neg Hx       Social History     Tobacco Use    Smoking status: Never    Smokeless tobacco: Never   Substance Use Topics    Alcohol use: Not Currently     Alcohol/week: 0.0 standard drinks of alcohol    Drug use: No        OBJECTIVE:     Vital Signs (Most Recent)  Temp: 98.2 °F (36.8 °C) (07/10/24 1332)  Pulse: 98 (07/10/24 1332)  BP: 135/87 (07/10/24 1332)  SpO2: (!) 94 % (07/10/24 1332)    Physical Exam:  Physical Exam  Constitutional:       Appearance: She is well-developed.   HENT:      Head: Normocephalic and atraumatic.   Eyes:      Conjunctiva/sclera: Conjunctivae normal.      Pupils: Pupils are equal, round, and reactive to light.   Neck:      Thyroid: No thyromegaly.   Cardiovascular:      Rate and Rhythm: Normal rate and regular rhythm.   Pulmonary:      Effort: Pulmonary effort is normal. No  respiratory distress.   Abdominal:      General: There is no distension.      Palpations: Abdomen is soft. There is no mass.      Tenderness: There is no abdominal tenderness.   Musculoskeletal:         General: No tenderness. Normal range of motion.      Cervical back: Normal range of motion.   Skin:     General: Skin is warm and dry.      Capillary Refill: Capillary refill takes less than 2 seconds.   Neurological:      General: No focal deficit present.      Mental Status: She is alert and oriented to person, place, and time.         Anoscopy Procedure Note    Pre-procedure diagnosis: Rectal bleeding    Post-procedure diagnosis: Internal hemorrhoids    Procedure: Anoscopy    Surgeon: Kayleigh Correa MD    Assistant: MESSI Wong MA      Findings:   External anal exam revealed diffuse skin excoriation of the perianal area.  Circumferential external hemorrhoids.  A digital rectal exam was performed with no masses identified and no defects.     A lubricated anoscope was inserted in the anus and the anus inspected circumferentially with findings of grade 2 right posterior hemorrhoid with ulceration and recent stigmata of bleeding.  Right anterior hemorrhoid banding site well healed.  Left lateral grade 2 hemorrhoid with recent stigmata of bleed    Using a suction hemorrhoid , hemorrhoidal band ligation was performed on the RP and LL columns. A well placed band was visualized at the base of the hemorrhoid.    Patient tolerated procedure well.  Discharge home.     No follow-ups on file.    ASSESSMENT/PLAN:     Diagnoses and all orders for this visit:    Grade II internal hemorrhoids    Diverticulosis of intestine without bleeding, unspecified intestinal tract location    Other orders  -     oxyCODONE (ROXICODONE) 5 MG immediate release tablet; Take 1 tablet (5 mg total) by mouth every 4 (four) hours as needed for Pain.      - Calmoseptine samples given for perianal rash.  Recommend cleansing after bowel movements  with joanie bottle.  Recommend compliance with powder fiber supplementation for stool bulking.  - discussed that bleeding is likely coming from hemorrhoids as her bleeding improved after banding last time.  Okay to repeat banding.  However bleeding was not coming from hemorrhoids a diverticular bleed is more challenging to diagnose and would require hospitalization with further diagnostic workup at the time of bleeding  - banding handout given  - RTC 4-6 weeks

## 2024-07-13 DIAGNOSIS — R11.0 CHRONIC NAUSEA: ICD-10-CM

## 2024-07-13 NOTE — TELEPHONE ENCOUNTER
No care due was identified.  Health [facility] Embedded Care Due Messages. Reference number: [id number].   7/13/2024 8:04:58 AM CDT

## 2024-07-15 RX ORDER — PROMETHAZINE HYDROCHLORIDE 25 MG/1
25 TABLET ORAL EVERY 6 HOURS PRN
Qty: 60 TABLET | Refills: 3 | Status: SHIPPED | OUTPATIENT
Start: 2024-07-15

## 2024-09-10 DIAGNOSIS — R11.0 CHRONIC NAUSEA: ICD-10-CM

## 2024-09-10 RX ORDER — PROMETHAZINE HYDROCHLORIDE 25 MG/1
25 TABLET ORAL EVERY 6 HOURS PRN
Qty: 60 TABLET | Refills: 3 | Status: SHIPPED | OUTPATIENT
Start: 2024-09-10

## 2024-09-10 NOTE — TELEPHONE ENCOUNTER
----- Message from Rita Spencer sent at 9/10/2024 10:42 AM CDT -----  Contact: self  ..Type:  RX Refill Request    Who Called: .Divya Bui  Refill or New Rx:Refill   RX Name and Strength: promethazine (PHENERGAN) 25 MG tablet  How is the patient currently taking it? (ex. 1XDay):as needed   Is this a 30 day or 90 day RX:90  Preferred Pharmacy with phone number:..  [facility] [address] [address]  Phone: [phone] Fax: [phone]  Local or Mail Order:local   Ordering Provider:Bill   Would the patient rather a call back or a response via [facility]? Call back   Best Call Back Number:.[phone]   Additional Information:

## 2024-09-10 NOTE — TELEPHONE ENCOUNTER
No care due was identified.  [facility] Embedded Care Due Messages. Reference number: [id number].   9/10/2024 10:51:32 AM CDT

## 2024-11-13 ENCOUNTER — TELEPHONE (OUTPATIENT)
Dept: INTERNAL MEDICINE | Facility: CLINIC | Age: 64
End: 2024-11-13
Payer: MEDICARE

## 2024-11-13 NOTE — TELEPHONE ENCOUNTER
----- Message from Delores sent at 11/13/2024 11:22 AM CST -----  Who called:   Home Health  What is the request in detail:   Pt have a bad yeast infection under her breast, abdominal fold, and areola area. Pt already have the powder for the infection but home health wants to know if she can be given something by mouth, due to the severity of the infection. Pt heart rate is also elevated between 115-120 bpm.   Can the clinic reply by [facility]? No     Would the patient rather a call back or a response via [facility]? Call back     Best call back number:   Telephone Information:  [place]  [phone]  Additional Information:   Home will send a list of the meds the pt is currently missing as well.  Thank you.

## 2024-11-14 ENCOUNTER — OFFICE VISIT (OUTPATIENT)
Dept: INTERNAL MEDICINE | Facility: CLINIC | Age: 64
End: 2024-11-14
Payer: MEDICARE

## 2024-11-14 ENCOUNTER — TELEPHONE (OUTPATIENT)
Dept: INTERNAL MEDICINE | Facility: CLINIC | Age: 64
End: 2024-11-14
Payer: MEDICARE

## 2024-11-14 VITALS
DIASTOLIC BLOOD PRESSURE: 74 MMHG | SYSTOLIC BLOOD PRESSURE: 112 MMHG | HEIGHT: 63 IN | BODY MASS INDEX: 41.79 KG/M2 | OXYGEN SATURATION: 95 % | HEART RATE: 116 BPM | WEIGHT: 235.88 LBS

## 2024-11-14 DIAGNOSIS — E87.6 HYPOKALEMIA: ICD-10-CM

## 2024-11-14 DIAGNOSIS — R65.20 SEPSIS WITH ACUTE RENAL FAILURE, DUE TO UNSPECIFIED ORGANISM, UNSPECIFIED ACUTE RENAL FAILURE TYPE, UNSPECIFIED WHETHER SEPTIC SHOCK PRESENT: ICD-10-CM

## 2024-11-14 DIAGNOSIS — Z98.84 STATUS POST BARIATRIC SURGERY: ICD-10-CM

## 2024-11-14 DIAGNOSIS — A41.9 SEPSIS WITH ACUTE RENAL FAILURE, DUE TO UNSPECIFIED ORGANISM, UNSPECIFIED ACUTE RENAL FAILURE TYPE, UNSPECIFIED WHETHER SEPTIC SHOCK PRESENT: ICD-10-CM

## 2024-11-14 DIAGNOSIS — R19.7 DIARRHEA, UNSPECIFIED TYPE: ICD-10-CM

## 2024-11-14 DIAGNOSIS — N17.9 SEPSIS WITH ACUTE RENAL FAILURE, DUE TO UNSPECIFIED ORGANISM, UNSPECIFIED ACUTE RENAL FAILURE TYPE, UNSPECIFIED WHETHER SEPTIC SHOCK PRESENT: ICD-10-CM

## 2024-11-14 DIAGNOSIS — B37.2 CANDIDAL SKIN INFECTION: ICD-10-CM

## 2024-11-14 DIAGNOSIS — N30.01 ACUTE CYSTITIS WITH HEMATURIA: ICD-10-CM

## 2024-11-14 DIAGNOSIS — Z09 HOSPITAL DISCHARGE FOLLOW-UP: Primary | ICD-10-CM

## 2024-11-14 DIAGNOSIS — Z79.899 CHRONIC PRESCRIPTION BENZODIAZEPINE USE: ICD-10-CM

## 2024-11-14 DIAGNOSIS — I10 PRIMARY HYPERTENSION: ICD-10-CM

## 2024-11-14 PROBLEM — M79.605 BILATERAL LOWER EXTREMITY PAIN: Status: RESOLVED | Noted: 2024-11-14 | Resolved: 2024-11-14

## 2024-11-14 PROBLEM — Z96.651 HISTORY OF RIGHT KNEE JOINT REPLACEMENT: Status: RESOLVED | Noted: 2022-05-16 | Resolved: 2024-11-14

## 2024-11-14 PROBLEM — E87.1 HYPONATREMIA: Status: RESOLVED | Noted: 2023-08-27 | Resolved: 2024-11-14

## 2024-11-14 PROBLEM — G89.29 CHRONIC PAIN OF BOTH KNEES: Status: RESOLVED | Noted: 2023-06-16 | Resolved: 2024-11-14

## 2024-11-14 PROBLEM — M79.605 BILATERAL LOWER EXTREMITY PAIN: Status: ACTIVE | Noted: 2024-11-14

## 2024-11-14 PROBLEM — N30.00 ACUTE CYSTITIS: Status: RESOLVED | Noted: 2024-05-10 | Resolved: 2024-11-14

## 2024-11-14 PROBLEM — M25.561 CHRONIC PAIN OF BOTH KNEES: Status: RESOLVED | Noted: 2023-06-16 | Resolved: 2024-11-14

## 2024-11-14 PROBLEM — M79.604 BILATERAL LOWER EXTREMITY PAIN: Status: ACTIVE | Noted: 2024-11-14

## 2024-11-14 PROBLEM — R10.9 ABDOMINAL PAIN: Status: RESOLVED | Noted: 2018-05-11 | Resolved: 2024-11-14

## 2024-11-14 PROBLEM — E66.01 CLASS 2 SEVERE OBESITY DUE TO EXCESS CALORIES WITH SERIOUS COMORBIDITY AND BODY MASS INDEX (BMI) OF 39.0 TO 39.9 IN ADULT: Status: RESOLVED | Noted: 2022-06-20 | Resolved: 2024-11-14

## 2024-11-14 PROBLEM — R60.0 LOCALIZED EDEMA: Status: RESOLVED | Noted: 2019-01-10 | Resolved: 2024-11-14

## 2024-11-14 PROBLEM — M25.562 CHRONIC PAIN OF BOTH KNEES: Status: RESOLVED | Noted: 2023-06-16 | Resolved: 2024-11-14

## 2024-11-14 PROBLEM — M79.604 BILATERAL LOWER EXTREMITY PAIN: Status: RESOLVED | Noted: 2024-11-14 | Resolved: 2024-11-14

## 2024-11-14 PROBLEM — R60.0 BILATERAL LOWER EXTREMITY EDEMA: Status: ACTIVE | Noted: 2024-11-14

## 2024-11-14 PROBLEM — E66.812 CLASS 2 SEVERE OBESITY DUE TO EXCESS CALORIES WITH SERIOUS COMORBIDITY AND BODY MASS INDEX (BMI) OF 39.0 TO 39.9 IN ADULT: Status: RESOLVED | Noted: 2022-06-20 | Resolved: 2024-11-14

## 2024-11-14 PROBLEM — K25.9 GASTRIC ULCER: Status: ACTIVE | Noted: 2024-11-14

## 2024-11-14 PROCEDURE — 99999 PR PBB SHADOW E&M-EST. PATIENT-LVL III: CPT | Mod: PBBFAC,,, | Performed by: PHYSICIAN ASSISTANT

## 2024-11-14 RX ORDER — NYSTATIN 100000 [USP'U]/G
POWDER TOPICAL
COMMUNITY
Start: 2024-11-12 | End: 2024-11-14 | Stop reason: SDUPTHER

## 2024-11-14 RX ORDER — HYDROCODONE BITARTRATE AND ACETAMINOPHEN 5; 325 MG/1; MG/1
1 TABLET ORAL EVERY 6 HOURS PRN
Qty: 20 TABLET | Refills: 0 | Status: SHIPPED | OUTPATIENT
Start: 2024-11-14

## 2024-11-14 RX ORDER — NYSTATIN 100000 U/G
CREAM TOPICAL 2 TIMES DAILY
Qty: 30 G | Refills: 3 | Status: SHIPPED | OUTPATIENT
Start: 2024-11-14

## 2024-11-14 RX ORDER — FLUCONAZOLE 150 MG/1
150 TABLET ORAL
Qty: 7 TABLET | Refills: 0 | Status: SHIPPED | OUTPATIENT
Start: 2024-11-14 | End: 2024-12-27

## 2024-11-14 RX ORDER — POTASSIUM CHLORIDE 20 MEQ/1
40 TABLET, EXTENDED RELEASE ORAL
COMMUNITY
Start: 2024-11-12 | End: 2024-11-15

## 2024-11-14 RX ORDER — ONDANSETRON 4 MG/1
4 TABLET, ORALLY DISINTEGRATING ORAL EVERY 8 HOURS PRN
COMMUNITY
Start: 2024-11-12 | End: 2024-11-19

## 2024-11-14 RX ORDER — AMOXICILLIN AND CLAVULANATE POTASSIUM 875; 125 MG/1; MG/1
1 TABLET, FILM COATED ORAL
COMMUNITY
Start: 2024-11-12 | End: 2024-11-16

## 2024-11-14 NOTE — Clinical Note
Labs ordered and to be collected by [facility], can you fax to Johanna [facility] so they can draw her blood at next visit?

## 2024-11-14 NOTE — PROGRESS NOTES
Transitional Care Note  Subjective:      Patient ID: Divya Bui is a 64 y.o. female.  Chief Complaint: Hospital Follow Up      Patient presents to clinic today for hospital TCC visit. Patient was recently hospitalized for sepsis with ELIZABETH        Family and/or Caretaker present at visit?  No.  Diagnostic tests reviewed/disposition: No diagnosic tests pending after this hospitalization.  Disease/illness education: See A&P  Home health/community services discussion/referrals: Patient has home health established at [facility] .   Establishment or re-establishment of referral orders for community resources: No other necessary community resources.   Discussion with other health care providers: No discussion with other health care providers necessary.     Admit Date 11/9/2024   Discharge Date 11/12/2024   Location [facility]/   Treatment Team Primary Care Physician: MILLIE Amanda MD  Discharging Physician: Juan Montenegro MD  Treatment Team:   Attending Provider: Juan Montenegro MD  Hospitalist: 6a-6p, [facility] Hospitalist Team #2  Admitting Provider: Kang Mora MD            Reason for Hospitalization   Sepsis with ELIZABETH     * Sepsis with acute renal failure (HCC)  64-year-old lady with a past medical history of hypertension, GERD, anxiety, depression, obesity with a BMI of 47.4 kg/m² who presents to the emergency department with generalized weakness, unable to get out of bed with multiple episodes of diarrhea.  She was found to be hypotensive, tachycardic, hypothermic.  UA was checked and was dirty but the patient was in a brief and recently had diarrhea.  She was initiated on IV Rocephin and was given IV fluids.  She had significant improvement in her renal function and normalized prior to discharge.  Urine cultures with polymicrobial growth of Klebsiella, Proteus and Enterococcus.  She states that her urine has cleared up but to ensure no intra-abdominal issues going on, a CT scan was done which was unrevealing.  Urine appears  to be a clean-catch but perhaps was contaminated with stool as she did have copious amounts of diarrhea.  Fortunately, her diarrhea stopped upon admission to [facility] as I suspect she may have had a gastroenteritis.  Patient sepsis criteria resolved, her blood pressure improved and her white blood cell count normalized prior to discharge.        Hypokalemia  supplemented with oral, IV and also IV magnesium.        Chronic prescription benzodiazepine use  continue benzodiazepines.        Acute cystitis  patient received Rocephin during her hospital stay.  Transition to Augmentin upon discharge        Diarrhea  GI panel ordered but the patient stopped having diarrhea.        HTN (hypertension)  holding Benicar until she follows up with her PCP              Final Medication List          ACTIVE       ALPRAZolam 1 mg tablet  Commonly known as: XANAX  1 mg, Oral, 3 times daily      amLODIPine 5 mg tablet  Commonly known as: NORVASC  5 mg, Oral, Daily      amoxicillin-pot clavulanate 875-125 mg per tablet  Commonly known as: AUGMENTIN  1 tablet, Oral, 2 times daily      ARIPiprazole 10 mg tablet  Commonly known as: ABILIFY  10 mg, Oral, Daily      aspirin 81 MG chewable tablet  81 mg, Oral, Daily      atorvastatin 40 mg tablet  Commonly known as: LIPITOR  40 mg, Oral, Nightly      cyanocobalamin 1,000 mcg/mL injection  INJECT 1ML SUBCUTANEOUSLY EVERY DAY FOR 4 DAYS THEN WEEKLY FOR 4 WEEKS THEN ONCE A MONTH      dicyclomine 20 mg tablet  Commonly known as: BENTYL  20 mg, Oral, Every 8 hours, FOR CRAMPS       doxepin 50 mg capsule  Commonly known as: SINEquan  50 mg, Oral, Nightly      FLUoxetine 40 MG capsule  Commonly known as: PROzac  80 mg, Oral, Daily      nystatin powder  Commonly known as: MYCOSTATIN  Topical, 2 times daily, To intertriginous areas      olmesartan-hydrochlorothiazide 40-12.5 mg per tablet  Commonly known as: BENICAR HCT  1 tablet, Oral, Daily, HOLD until you see your pcp      ondansetron ODT 4  mg disintegrating tablet  Commonly known as: ZOFRAN-ODT  4 mg, Oral, Every 8 hours PRN      pantoprazole 40 mg tablet  Commonly known as: PROTONIX  40 mg, Oral, Every morning      potassium chloride 20 mEq tablet  Commonly known as: KLOR-CON M20  40 mEq, Oral, Daily      zolpidem 10 mg tablet  Commonly known as: AMBIEN  TAKE 1 TABLET BY MOUTH AT BEDTIME AS NEEDED                Significant Medication Changes:   START taking:  amoxicillin-pot clavulanate (AUGMENTIN)   nystatin (MYCOSTATIN)   potassium chloride (KLOR-CON M20)    CHANGE how you take:  olmesartan-hydrochlorothiazide (BENICAR HCT)     Review of Systems   Constitutional:  Negative for chills, fatigue, fever and unexpected weight change.   Eyes:  Negative for visual disturbance.   Respiratory:  Negative for shortness of breath.    Cardiovascular:  Negative for chest pain.   Musculoskeletal:  Positive for myalgias.   Skin:  Positive for rash (extensive, noted under both breasts, under abdominal folds and buttocks fold, some ulcerations noted, diffuse erythema, +tender, no drainage).   Neurological:  Negative for headaches.        Objective:      Physical Exam  Vitals and nursing note reviewed.   Constitutional:       General: She is not in acute distress.     Appearance: She is well-developed. She is obese.          Comments: Seated in wheelchair   HENT:      Head: Normocephalic and atraumatic.   Eyes:      General: Lids are normal. No scleral icterus.     Extraocular Movements: Extraocular movements intact.      Conjunctiva/sclera: Conjunctivae normal.   Cardiovascular:      Rate and Rhythm: Regular rhythm. Tachycardia present.      Comments:   Pulmonary:      Effort: Pulmonary effort is normal.      Breath sounds: Normal breath sounds. No decreased breath sounds, wheezing, rhonchi or rales.   Neurological:      Mental Status: She is alert.      Cranial Nerves: No cranial nerve deficit.   Psychiatric:         Mood and Affect: Mood and affect normal.          Assessment:       1. Hospital discharge follow-up    2. Sepsis with acute renal failure, due to unspecified organism, unspecified acute renal failure type, unspecified whether septic shock present    3. Hypokalemia    4. Chronic prescription benzodiazepine use    5. Acute cystitis with hematuria    6. Diarrhea, unspecified type    7. Primary hypertension    8. Candidal skin infection    9. Status post bariatric surgery        Plan:   1. Hospital discharge follow-up    2. Sepsis with acute renal failure, due to unspecified organism, unspecified acute renal failure type, unspecified whether septic shock present  -     Basic Metabolic Panel; Future; Expected date: 11/14/2024  -     CBC Auto Differential; Future; Expected date: 11/14/2024    3. Hypokalemia    4. Chronic prescription benzodiazepine use  Overview:  Followed by Psychiatry, continue current treatment plan       5. Acute cystitis with hematuria    6. Diarrhea, unspecified type  Comments:  Resolved    7. Primary hypertension    8. Candidal skin infection  -     fluconazole (DIFLUCAN) 150 MG Tab; Take 1 tablet (150 mg total) by mouth every 7 days. for 7 doses  Dispense: 7 tablet; Refill: 0  -     HYDROcodone-acetaminophen (NORCO) 5-325 mg per tablet; Take 1 tablet by mouth every 6 (six) hours as needed for Pain.  Dispense: 20 tablet; Refill: 0  -     nystatin (MYCOSTATIN) cream; Apply topically 2 (two) times daily.  Dispense: 30 g; Refill: 3    9. Status post bariatric surgery  Overview:  Gastric bypass        Labs reviewed at time of discharge, repeat CBC and BMP   Imaging reviewed, CT Abd noted diverticulosis, fatty atrophy of pancreas, h/o cholecystectomy  Reviewed medication list with patient, continue olmesartan-HCTZ and monitor BP at home  ER precautions given  F/U 1 week virtually to see how candidal rash is doing  Advised on wound care and keeping skin from touching skin, prevention of secondary bacterial infection

## 2024-11-14 NOTE — TELEPHONE ENCOUNTER
Faxed orders to [phone] for [facility] to draw.

## 2024-11-14 NOTE — TELEPHONE ENCOUNTER
----- Message from Estee Valdez PA-C sent at 11/14/2024  1:58 PM CST -----  Labs ordered and to be collected by [facility], can you fax to Johanna BUST[facility] so they can draw her blood at next visit?

## 2024-11-19 ENCOUNTER — LAB VISIT (OUTPATIENT)
Dept: LAB | Facility: HOSPITAL | Age: 64
End: 2024-11-19
Attending: PHYSICIAN ASSISTANT
Payer: MEDICARE

## 2024-11-19 DIAGNOSIS — A41.9 SEPSIS WITH ACUTE RENAL FAILURE, DUE TO UNSPECIFIED ORGANISM, UNSPECIFIED ACUTE RENAL FAILURE TYPE, UNSPECIFIED WHETHER SEPTIC SHOCK PRESENT: ICD-10-CM

## 2024-11-19 DIAGNOSIS — R65.20 SEPSIS WITH ACUTE RENAL FAILURE, DUE TO UNSPECIFIED ORGANISM, UNSPECIFIED ACUTE RENAL FAILURE TYPE, UNSPECIFIED WHETHER SEPTIC SHOCK PRESENT: ICD-10-CM

## 2024-11-19 DIAGNOSIS — B37.2 CANDIDAL SKIN INFECTION: ICD-10-CM

## 2024-11-19 DIAGNOSIS — N17.9 SEPSIS WITH ACUTE RENAL FAILURE, DUE TO UNSPECIFIED ORGANISM, UNSPECIFIED ACUTE RENAL FAILURE TYPE, UNSPECIFIED WHETHER SEPTIC SHOCK PRESENT: ICD-10-CM

## 2024-11-19 LAB
ANION GAP SERPL CALC-SCNC: 14 MMOL/L (ref 8–16)
BASOPHILS # BLD AUTO: 0.04 K/UL (ref 0–0.2)
BASOPHILS NFR BLD: 0.7 % (ref 0–1.9)
BUN SERPL-MCNC: 24 MG/DL (ref 8–23)
CALCIUM SERPL-MCNC: 8.4 MG/DL (ref 8.7–10.5)
CHLORIDE SERPL-SCNC: 115 MMOL/L (ref 95–110)
CO2 SERPL-SCNC: 10 MMOL/L (ref 23–29)
CREAT SERPL-MCNC: 1 MG/DL (ref 0.5–1.4)
DIFFERENTIAL METHOD BLD: ABNORMAL
EOSINOPHIL # BLD AUTO: 0.1 K/UL (ref 0–0.5)
EOSINOPHIL NFR BLD: 1.7 % (ref 0–8)
ERYTHROCYTE [DISTWIDTH] IN BLOOD BY AUTOMATED COUNT: 17 % (ref 11.5–14.5)
EST. GFR  (NO RACE VARIABLE): >60 ML/MIN/1.73 M^2
GLUCOSE SERPL-MCNC: 82 MG/DL (ref 70–110)
HCT VFR BLD AUTO: 33.7 % (ref 37–48.5)
HGB BLD-MCNC: 10.9 G/DL (ref 12–16)
IMM GRANULOCYTES # BLD AUTO: 0.16 K/UL (ref 0–0.04)
IMM GRANULOCYTES NFR BLD AUTO: 2.9 % (ref 0–0.5)
LYMPHOCYTES # BLD AUTO: 0.9 K/UL (ref 1–4.8)
LYMPHOCYTES NFR BLD: 16.5 % (ref 18–48)
MCH RBC QN AUTO: 29.5 PG (ref 27–31)
MCHC RBC AUTO-ENTMCNC: 32.3 G/DL (ref 32–36)
MCV RBC AUTO: 91 FL (ref 82–98)
MONOCYTES # BLD AUTO: 0.3 K/UL (ref 0.3–1)
MONOCYTES NFR BLD: 6.3 % (ref 4–15)
NEUTROPHILS # BLD AUTO: 3.9 K/UL (ref 1.8–7.7)
NEUTROPHILS NFR BLD: 71.9 % (ref 38–73)
NRBC BLD-RTO: 0 /100 WBC
PLATELET # BLD AUTO: 313 K/UL (ref 150–450)
PLATELET BLD QL SMEAR: ABNORMAL
PMV BLD AUTO: 9 FL (ref 9.2–12.9)
POTASSIUM SERPL-SCNC: 3.9 MMOL/L (ref 3.5–5.1)
RBC # BLD AUTO: 3.69 M/UL (ref 4–5.4)
SODIUM SERPL-SCNC: 139 MMOL/L (ref 136–145)
WBC # BLD AUTO: 5.44 K/UL (ref 3.9–12.7)

## 2024-11-19 PROCEDURE — 85025 COMPLETE CBC W/AUTO DIFF WBC: CPT | Performed by: PHYSICIAN ASSISTANT

## 2024-11-19 PROCEDURE — 80048 BASIC METABOLIC PNL TOTAL CA: CPT | Performed by: PHYSICIAN ASSISTANT

## 2024-11-19 RX ORDER — HYDROCODONE BITARTRATE AND ACETAMINOPHEN 5; 325 MG/1; MG/1
1 TABLET ORAL EVERY 6 HOURS PRN
Qty: 20 TABLET | Refills: 0 | Status: ON HOLD | OUTPATIENT
Start: 2024-11-19

## 2024-11-23 ENCOUNTER — HOSPITAL ENCOUNTER (INPATIENT)
Facility: HOSPITAL | Age: 64
LOS: 11 days | Discharge: NURSING, REHAB OR LONG-TERM CARE | DRG: 871 | End: 2024-12-04
Attending: EMERGENCY MEDICINE | Admitting: STUDENT IN AN ORGANIZED HEALTH CARE EDUCATION/TRAINING PROGRAM
Payer: MEDICARE

## 2024-11-23 DIAGNOSIS — N30.01 ACUTE CYSTITIS WITH HEMATURIA: Primary | ICD-10-CM

## 2024-11-23 DIAGNOSIS — R07.9 CHEST PAIN: ICD-10-CM

## 2024-11-23 DIAGNOSIS — A41.9 SEPSIS: ICD-10-CM

## 2024-11-23 DIAGNOSIS — N17.9 AKI (ACUTE KIDNEY INJURY): ICD-10-CM

## 2024-11-23 DIAGNOSIS — B37.2 CANDIDAL INTERTRIGO: ICD-10-CM

## 2024-11-23 DIAGNOSIS — A04.72 C. DIFFICILE DIARRHEA: ICD-10-CM

## 2024-11-23 DIAGNOSIS — G93.41 ENCEPHALOPATHY, METABOLIC: ICD-10-CM

## 2024-11-23 DIAGNOSIS — R00.0 TACHYCARDIA: ICD-10-CM

## 2024-11-23 DIAGNOSIS — R41.82 ALTERED MENTAL STATUS: ICD-10-CM

## 2024-11-23 PROBLEM — R73.9 HYPERGLYCEMIA: Status: ACTIVE | Noted: 2024-11-23

## 2024-11-23 PROBLEM — E87.20 METABOLIC ACIDOSIS: Status: ACTIVE | Noted: 2024-11-23

## 2024-11-23 LAB
ALBUMIN SERPL BCP-MCNC: 2.3 G/DL (ref 3.5–5.2)
ALP SERPL-CCNC: 110 U/L (ref 40–150)
ALT SERPL W/O P-5'-P-CCNC: 13 U/L (ref 10–44)
ANION GAP SERPL CALC-SCNC: 17 MMOL/L (ref 8–16)
AST SERPL-CCNC: 8 U/L (ref 10–40)
BACTERIA #/AREA URNS HPF: ABNORMAL /HPF
BASOPHILS # BLD AUTO: 0.17 K/UL (ref 0–0.2)
BASOPHILS NFR BLD: 0.7 % (ref 0–1.9)
BILIRUB SERPL-MCNC: 0.5 MG/DL (ref 0.1–1)
BILIRUB UR QL STRIP: NEGATIVE
BUN SERPL-MCNC: 43 MG/DL (ref 8–23)
CALCIUM SERPL-MCNC: 8.3 MG/DL (ref 8.7–10.5)
CHLORIDE SERPL-SCNC: 108 MMOL/L (ref 95–110)
CLARITY UR: ABNORMAL
CO2 SERPL-SCNC: 11 MMOL/L (ref 23–29)
COLOR UR: YELLOW
CREAT SERPL-MCNC: 2.5 MG/DL (ref 0.5–1.4)
DACRYOCYTES BLD QL SMEAR: ABNORMAL
DIFFERENTIAL METHOD BLD: ABNORMAL
EOSINOPHIL # BLD AUTO: 0.1 K/UL (ref 0–0.5)
EOSINOPHIL NFR BLD: 0.4 % (ref 0–8)
ERYTHROCYTE [DISTWIDTH] IN BLOOD BY AUTOMATED COUNT: 17.2 % (ref 11.5–14.5)
EST. GFR  (NO RACE VARIABLE): 21 ML/MIN/1.73 M^2
ESTIMATED AVG GLUCOSE: 126 MG/DL (ref 68–131)
GLUCOSE SERPL-MCNC: 234 MG/DL (ref 70–110)
GLUCOSE UR QL STRIP: NEGATIVE
HBA1C MFR BLD: 6 % (ref 4–5.6)
HCT VFR BLD AUTO: 39.4 % (ref 37–48.5)
HGB BLD-MCNC: 13 G/DL (ref 12–16)
HGB UR QL STRIP: ABNORMAL
HYALINE CASTS #/AREA URNS LPF: 0 /LPF
IMM GRANULOCYTES # BLD AUTO: 0.7 K/UL (ref 0–0.04)
IMM GRANULOCYTES NFR BLD AUTO: 2.8 % (ref 0–0.5)
KETONES UR QL STRIP: NEGATIVE
LACTATE SERPL-SCNC: 2.1 MMOL/L (ref 0.5–2.2)
LACTATE SERPL-SCNC: 2.2 MMOL/L (ref 0.5–2.2)
LACTATE SERPL-SCNC: 2.5 MMOL/L (ref 0.5–2.2)
LEUKOCYTE ESTERASE UR QL STRIP: ABNORMAL
LYMPHOCYTES # BLD AUTO: 0.8 K/UL (ref 1–4.8)
LYMPHOCYTES NFR BLD: 3.1 % (ref 18–48)
MCH RBC QN AUTO: 29 PG (ref 27–31)
MCHC RBC AUTO-ENTMCNC: 33 G/DL (ref 32–36)
MCV RBC AUTO: 88 FL (ref 82–98)
MICROSCOPIC COMMENT: ABNORMAL
MONOCYTES # BLD AUTO: 1 K/UL (ref 0.3–1)
MONOCYTES NFR BLD: 4 % (ref 4–15)
NEUTROPHILS # BLD AUTO: 21.9 K/UL (ref 1.8–7.7)
NEUTROPHILS NFR BLD: 89 % (ref 38–73)
NITRITE UR QL STRIP: POSITIVE
NRBC BLD-RTO: 0 /100 WBC
PH UR STRIP: 6 [PH] (ref 5–8)
PLATELET # BLD AUTO: 316 K/UL (ref 150–450)
PLATELET BLD QL SMEAR: ABNORMAL
PMV BLD AUTO: 8.9 FL (ref 9.2–12.9)
POCT GLUCOSE: 167 MG/DL (ref 70–110)
POCT GLUCOSE: 251 MG/DL (ref 70–110)
POIKILOCYTOSIS BLD QL SMEAR: SLIGHT
POTASSIUM SERPL-SCNC: 3.5 MMOL/L (ref 3.5–5.1)
PROCALCITONIN SERPL IA-MCNC: 1.55 NG/ML
PROT SERPL-MCNC: 6.7 G/DL (ref 6–8.4)
PROT UR QL STRIP: ABNORMAL
RBC # BLD AUTO: 4.49 M/UL (ref 4–5.4)
RBC #/AREA URNS HPF: 2 /HPF (ref 0–4)
RV AG STL QL IA.RAPID: NEGATIVE
SODIUM SERPL-SCNC: 136 MMOL/L (ref 136–145)
SP GR UR STRIP: 1.01 (ref 1–1.03)
SQUAMOUS #/AREA URNS HPF: 2 /HPF
T4 FREE SERPL-MCNC: 0.82 NG/DL (ref 0.71–1.51)
TSH SERPL DL<=0.005 MIU/L-ACNC: 0.83 UIU/ML (ref 0.4–4)
UNIDENT CRYS URNS QL MICRO: ABNORMAL
URN SPEC COLLECT METH UR: ABNORMAL
UROBILINOGEN UR STRIP-ACNC: NEGATIVE EU/DL
WBC # BLD AUTO: 24.61 K/UL (ref 3.9–12.7)
WBC #/AREA URNS HPF: >100 /HPF (ref 0–5)
WBC CLUMPS URNS QL MICRO: ABNORMAL

## 2024-11-23 PROCEDURE — 25000003 PHARM REV CODE 250: Performed by: STUDENT IN AN ORGANIZED HEALTH CARE EDUCATION/TRAINING PROGRAM

## 2024-11-23 PROCEDURE — 85025 COMPLETE CBC W/AUTO DIFF WBC: CPT | Performed by: EMERGENCY MEDICINE

## 2024-11-23 PROCEDURE — 84145 PROCALCITONIN (PCT): CPT | Performed by: NURSE PRACTITIONER

## 2024-11-23 PROCEDURE — 83605 ASSAY OF LACTIC ACID: CPT | Mod: 91 | Performed by: NURSE PRACTITIONER

## 2024-11-23 PROCEDURE — 87154 CUL TYP ID BLD PTHGN 6+ TRGT: CPT | Performed by: EMERGENCY MEDICINE

## 2024-11-23 PROCEDURE — 63600175 PHARM REV CODE 636 W HCPCS: Performed by: EMERGENCY MEDICINE

## 2024-11-23 PROCEDURE — 87449 NOS EACH ORGANISM AG IA: CPT | Performed by: NURSE PRACTITIONER

## 2024-11-23 PROCEDURE — 93010 ELECTROCARDIOGRAM REPORT: CPT | Mod: ,,, | Performed by: STUDENT IN AN ORGANIZED HEALTH CARE EDUCATION/TRAINING PROGRAM

## 2024-11-23 PROCEDURE — 96361 HYDRATE IV INFUSION ADD-ON: CPT

## 2024-11-23 PROCEDURE — 99285 EMERGENCY DEPT VISIT HI MDM: CPT | Mod: 25

## 2024-11-23 PROCEDURE — 25000003 PHARM REV CODE 250: Performed by: EMERGENCY MEDICINE

## 2024-11-23 PROCEDURE — 87449 NOS EACH ORGANISM AG IA: CPT | Mod: 59 | Performed by: EMERGENCY MEDICINE

## 2024-11-23 PROCEDURE — 63600175 PHARM REV CODE 636 W HCPCS: Performed by: STUDENT IN AN ORGANIZED HEALTH CARE EDUCATION/TRAINING PROGRAM

## 2024-11-23 PROCEDURE — 96374 THER/PROPH/DIAG INJ IV PUSH: CPT

## 2024-11-23 PROCEDURE — 87329 GIARDIA AG IA: CPT | Performed by: NURSE PRACTITIONER

## 2024-11-23 PROCEDURE — 11000001 HC ACUTE MED/SURG PRIVATE ROOM

## 2024-11-23 PROCEDURE — 80053 COMPREHEN METABOLIC PANEL: CPT | Performed by: EMERGENCY MEDICINE

## 2024-11-23 PROCEDURE — 81000 URINALYSIS NONAUTO W/SCOPE: CPT | Performed by: EMERGENCY MEDICINE

## 2024-11-23 PROCEDURE — 96375 TX/PRO/DX INJ NEW DRUG ADDON: CPT

## 2024-11-23 PROCEDURE — 63600175 PHARM REV CODE 636 W HCPCS: Performed by: NURSE PRACTITIONER

## 2024-11-23 PROCEDURE — 27000207 HC ISOLATION

## 2024-11-23 PROCEDURE — 87040 BLOOD CULTURE FOR BACTERIA: CPT | Performed by: EMERGENCY MEDICINE

## 2024-11-23 PROCEDURE — 83605 ASSAY OF LACTIC ACID: CPT | Performed by: EMERGENCY MEDICINE

## 2024-11-23 PROCEDURE — 84439 ASSAY OF FREE THYROXINE: CPT | Performed by: NURSE PRACTITIONER

## 2024-11-23 PROCEDURE — 87425 ROTAVIRUS AG IA: CPT | Performed by: NURSE PRACTITIONER

## 2024-11-23 PROCEDURE — 87209 SMEAR COMPLEX STAIN: CPT | Performed by: NURSE PRACTITIONER

## 2024-11-23 PROCEDURE — 87045 FECES CULTURE AEROBIC BACT: CPT | Performed by: NURSE PRACTITIONER

## 2024-11-23 PROCEDURE — 25000003 PHARM REV CODE 250: Performed by: NURSE PRACTITIONER

## 2024-11-23 PROCEDURE — 21400001 HC TELEMETRY ROOM

## 2024-11-23 PROCEDURE — 83036 HEMOGLOBIN GLYCOSYLATED A1C: CPT | Performed by: NURSE PRACTITIONER

## 2024-11-23 PROCEDURE — 84443 ASSAY THYROID STIM HORMONE: CPT | Performed by: NURSE PRACTITIONER

## 2024-11-23 PROCEDURE — 93005 ELECTROCARDIOGRAM TRACING: CPT

## 2024-11-23 PROCEDURE — 36415 COLL VENOUS BLD VENIPUNCTURE: CPT | Performed by: NURSE PRACTITIONER

## 2024-11-23 PROCEDURE — 87046 STOOL CULTR AEROBIC BACT EA: CPT | Performed by: NURSE PRACTITIONER

## 2024-11-23 PROCEDURE — A4217 STERILE WATER/SALINE, 500 ML: HCPCS | Performed by: STUDENT IN AN ORGANIZED HEALTH CARE EDUCATION/TRAINING PROGRAM

## 2024-11-23 PROCEDURE — 87086 URINE CULTURE/COLONY COUNT: CPT | Performed by: EMERGENCY MEDICINE

## 2024-11-23 PROCEDURE — 87427 SHIGA-LIKE TOXIN AG IA: CPT | Mod: 59 | Performed by: NURSE PRACTITIONER

## 2024-11-23 RX ORDER — ATORVASTATIN CALCIUM 40 MG/1
40 TABLET, FILM COATED ORAL DAILY
Status: DISCONTINUED | OUTPATIENT
Start: 2024-11-24 | End: 2024-12-04 | Stop reason: HOSPADM

## 2024-11-23 RX ORDER — SODIUM CHLORIDE 0.9 % (FLUSH) 0.9 %
10 SYRINGE (ML) INJECTION EVERY 8 HOURS PRN
Status: DISCONTINUED | OUTPATIENT
Start: 2024-11-23 | End: 2024-12-04 | Stop reason: HOSPADM

## 2024-11-23 RX ORDER — NALOXONE HCL 0.4 MG/ML
0.02 VIAL (ML) INJECTION
Status: DISCONTINUED | OUTPATIENT
Start: 2024-11-23 | End: 2024-12-04 | Stop reason: HOSPADM

## 2024-11-23 RX ORDER — ONDANSETRON HYDROCHLORIDE 2 MG/ML
4 INJECTION, SOLUTION INTRAVENOUS
Status: COMPLETED | OUTPATIENT
Start: 2024-11-23 | End: 2024-11-23

## 2024-11-23 RX ORDER — MEROPENEM 1 G/1
1 INJECTION, POWDER, FOR SOLUTION INTRAVENOUS
Status: DISCONTINUED | OUTPATIENT
Start: 2024-11-23 | End: 2024-11-23

## 2024-11-23 RX ORDER — ONDANSETRON HYDROCHLORIDE 2 MG/ML
4 INJECTION, SOLUTION INTRAVENOUS EVERY 6 HOURS PRN
Status: DISCONTINUED | OUTPATIENT
Start: 2024-11-23 | End: 2024-12-04 | Stop reason: HOSPADM

## 2024-11-23 RX ORDER — ARIPIPRAZOLE 5 MG/1
10 TABLET ORAL DAILY
Status: DISCONTINUED | OUTPATIENT
Start: 2024-11-24 | End: 2024-12-04 | Stop reason: HOSPADM

## 2024-11-23 RX ORDER — FAMOTIDINE 20 MG/1
20 TABLET, FILM COATED ORAL EVERY OTHER DAY
Status: DISCONTINUED | OUTPATIENT
Start: 2024-11-24 | End: 2024-11-24

## 2024-11-23 RX ORDER — PREGABALIN 100 MG/1
100 CAPSULE ORAL NIGHTLY
Status: DISCONTINUED | OUTPATIENT
Start: 2024-11-23 | End: 2024-11-24

## 2024-11-23 RX ORDER — ALPRAZOLAM 0.25 MG/1
0.25 TABLET ORAL NIGHTLY PRN
Status: DISCONTINUED | OUTPATIENT
Start: 2024-11-23 | End: 2024-12-04 | Stop reason: HOSPADM

## 2024-11-23 RX ORDER — PROCHLORPERAZINE EDISYLATE 5 MG/ML
5 INJECTION INTRAMUSCULAR; INTRAVENOUS EVERY 6 HOURS PRN
Status: DISCONTINUED | OUTPATIENT
Start: 2024-11-23 | End: 2024-12-04 | Stop reason: HOSPADM

## 2024-11-23 RX ORDER — CEFEPIME HYDROCHLORIDE 1 G/1
1 INJECTION, POWDER, FOR SOLUTION INTRAMUSCULAR; INTRAVENOUS
Status: DISCONTINUED | OUTPATIENT
Start: 2024-11-23 | End: 2024-11-24

## 2024-11-23 RX ORDER — FLUCONAZOLE 2 MG/ML
150 INJECTION, SOLUTION INTRAVENOUS ONCE
Status: COMPLETED | OUTPATIENT
Start: 2024-11-23 | End: 2024-11-23

## 2024-11-23 RX ORDER — CEFEPIME HYDROCHLORIDE 1 G/1
1 INJECTION, POWDER, FOR SOLUTION INTRAMUSCULAR; INTRAVENOUS
Status: DISCONTINUED | OUTPATIENT
Start: 2024-11-23 | End: 2024-11-23 | Stop reason: DRUGHIGH

## 2024-11-23 RX ORDER — MORPHINE SULFATE 4 MG/ML
2 INJECTION, SOLUTION INTRAMUSCULAR; INTRAVENOUS
Status: COMPLETED | OUTPATIENT
Start: 2024-11-23 | End: 2024-11-23

## 2024-11-23 RX ORDER — GLUCAGON 1 MG
1 KIT INJECTION
Status: DISCONTINUED | OUTPATIENT
Start: 2024-11-23 | End: 2024-12-04 | Stop reason: HOSPADM

## 2024-11-23 RX ORDER — CEFTRIAXONE 1 G/1
1 INJECTION, POWDER, FOR SOLUTION INTRAMUSCULAR; INTRAVENOUS
Status: COMPLETED | OUTPATIENT
Start: 2024-11-23 | End: 2024-11-23

## 2024-11-23 RX ORDER — HYDROCODONE BITARTRATE AND ACETAMINOPHEN 5; 325 MG/1; MG/1
1 TABLET ORAL EVERY 6 HOURS PRN
Status: DISCONTINUED | OUTPATIENT
Start: 2024-11-23 | End: 2024-11-24

## 2024-11-23 RX ORDER — IPRATROPIUM BROMIDE AND ALBUTEROL SULFATE 2.5; .5 MG/3ML; MG/3ML
3 SOLUTION RESPIRATORY (INHALATION) EVERY 6 HOURS PRN
Status: DISCONTINUED | OUTPATIENT
Start: 2024-11-23 | End: 2024-12-04 | Stop reason: HOSPADM

## 2024-11-23 RX ORDER — HEPARIN SODIUM 5000 [USP'U]/ML
5000 INJECTION, SOLUTION INTRAVENOUS; SUBCUTANEOUS EVERY 8 HOURS
Status: DISCONTINUED | OUTPATIENT
Start: 2024-11-23 | End: 2024-12-04 | Stop reason: HOSPADM

## 2024-11-23 RX ORDER — FAMOTIDINE 20 MG/1
20 TABLET, FILM COATED ORAL DAILY
Status: DISCONTINUED | OUTPATIENT
Start: 2024-11-24 | End: 2024-11-23

## 2024-11-23 RX ORDER — IBUPROFEN 200 MG
16 TABLET ORAL
Status: DISCONTINUED | OUTPATIENT
Start: 2024-11-23 | End: 2024-12-04 | Stop reason: HOSPADM

## 2024-11-23 RX ORDER — MICONAZOLE NITRATE 2 G/100G
POWDER TOPICAL 2 TIMES DAILY
Status: DISCONTINUED | OUTPATIENT
Start: 2024-11-23 | End: 2024-12-04 | Stop reason: HOSPADM

## 2024-11-23 RX ORDER — LINEZOLID 2 MG/ML
600 INJECTION, SOLUTION INTRAVENOUS
Status: DISCONTINUED | OUTPATIENT
Start: 2024-11-23 | End: 2024-11-26

## 2024-11-23 RX ORDER — INSULIN ASPART 100 [IU]/ML
0-5 INJECTION, SOLUTION INTRAVENOUS; SUBCUTANEOUS
Status: DISCONTINUED | OUTPATIENT
Start: 2024-11-23 | End: 2024-12-04 | Stop reason: HOSPADM

## 2024-11-23 RX ORDER — ALUMINUM HYDROXIDE, MAGNESIUM HYDROXIDE, AND SIMETHICONE 1200; 120; 1200 MG/30ML; MG/30ML; MG/30ML
30 SUSPENSION ORAL 4 TIMES DAILY PRN
Status: DISCONTINUED | OUTPATIENT
Start: 2024-11-23 | End: 2024-12-04 | Stop reason: HOSPADM

## 2024-11-23 RX ORDER — ACETAMINOPHEN 325 MG/1
650 TABLET ORAL EVERY 4 HOURS PRN
Status: DISCONTINUED | OUTPATIENT
Start: 2024-11-23 | End: 2024-12-04 | Stop reason: HOSPADM

## 2024-11-23 RX ORDER — SIMETHICONE 80 MG
1 TABLET,CHEWABLE ORAL 4 TIMES DAILY PRN
Status: DISCONTINUED | OUTPATIENT
Start: 2024-11-23 | End: 2024-12-04 | Stop reason: HOSPADM

## 2024-11-23 RX ORDER — IBUPROFEN 200 MG
24 TABLET ORAL
Status: DISCONTINUED | OUTPATIENT
Start: 2024-11-23 | End: 2024-12-04 | Stop reason: HOSPADM

## 2024-11-23 RX ADMIN — MICONAZOLE NITRATE: 20 POWDER TOPICAL at 06:11

## 2024-11-23 RX ADMIN — MORPHINE SULFATE 2 MG: 4 INJECTION INTRAVENOUS at 01:11

## 2024-11-23 RX ADMIN — SODIUM BICARBONATE: 84 INJECTION, SOLUTION INTRAVENOUS at 03:11

## 2024-11-23 RX ADMIN — PREGABALIN 100 MG: 100 CAPSULE ORAL at 09:11

## 2024-11-23 RX ADMIN — CEFEPIME 1 G: 1 INJECTION, POWDER, FOR SOLUTION INTRAMUSCULAR; INTRAVENOUS at 09:11

## 2024-11-23 RX ADMIN — CEFTRIAXONE 1 G: 1 INJECTION, POWDER, FOR SOLUTION INTRAMUSCULAR; INTRAVENOUS at 02:11

## 2024-11-23 RX ADMIN — ONDANSETRON 4 MG: 2 INJECTION INTRAMUSCULAR; INTRAVENOUS at 11:11

## 2024-11-23 RX ADMIN — LINEZOLID 600 MG: 600 INJECTION, SOLUTION INTRAVENOUS at 07:11

## 2024-11-23 RX ADMIN — SODIUM CHLORIDE 1572 ML: 9 INJECTION, SOLUTION INTRAVENOUS at 11:11

## 2024-11-23 RX ADMIN — HEPARIN SODIUM 5000 UNITS: 5000 INJECTION INTRAVENOUS; SUBCUTANEOUS at 09:11

## 2024-11-23 RX ADMIN — FLUCONAZOLE 150 MG: 2 INJECTION, SOLUTION INTRAVENOUS at 06:11

## 2024-11-23 RX ADMIN — ONDANSETRON 4 MG: 2 INJECTION INTRAMUSCULAR; INTRAVENOUS at 09:11

## 2024-11-23 RX ADMIN — MEROPENEM 1 G: 1 INJECTION, POWDER, FOR SOLUTION INTRAVENOUS at 03:11

## 2024-11-23 NOTE — PROGRESS NOTES
Pharmacist Renal Dose Adjustment Note    Divya Bui is a 64 y.o. female being treated with the medication Cefepime    Patient Data:    Vital Signs (Most Recent):  Temp: 98.6 °F (37 °C) (11/23/24 1544)  Pulse: (!) 127 (11/23/24 1544)  Resp: 18 (11/23/24 1544)  BP: 126/77 (11/23/24 1544)  SpO2: 97 % (11/23/24 1544) Vital Signs (72h Range):  Temp:  [97.6 °F (36.4 °C)-98.6 °F (37 °C)]   Pulse:  [122-139]   Resp:  [18-26]   BP: ()/(42-80)   SpO2:  [96 %-100 %]      Recent Labs   Lab 11/19/24  1037 11/23/24  1145   CREATININE 1.0 2.5*     Serum creatinine: 2.5 mg/dL (H) 11/23/24 1145  Estimated creatinine clearance: 26.3 mL/min (A)    Medication:Cefepime dose: 1 gram frequency every 8 hours will be changed to medication:Cefepime dose:1 gram frequency:every 12 hours    Pharmacist's Name: Owen Gaston  Pharmacist's Extension: [phone]

## 2024-11-23 NOTE — ASSESSMENT & PLAN NOTE
Patient has persistent depression which is mild and is currently controlled. Will Continue anti-depressant medications. We will not consult psychiatry at this time. Patient does not display psychosis at this time. Continue to monitor closely and adjust plan of care as needed.    Cont Abilify and Xanax prn   Hold Doxepin for now while on Zyvox

## 2024-11-23 NOTE — PROGRESS NOTES
Pharmacist Renal Dose Adjustment Note    Divya Bui is a 64 y.o. female being treated with the medication Famotidine    Patient Data:    Vital Signs (Most Recent):  Temp: 98.6 °F (37 °C) (11/23/24 1544)  Pulse: (!) 127 (11/23/24 1544)  Resp: 18 (11/23/24 1544)  BP: 126/77 (11/23/24 1544)  SpO2: 97 % (11/23/24 1544) Vital Signs (72h Range):  Temp:  [97.6 °F (36.4 °C)-98.6 °F (37 °C)]   Pulse:  [122-139]   Resp:  [18-26]   BP: ()/(42-80)   SpO2:  [96 %-100 %]      Recent Labs   Lab 11/19/24  1037 11/23/24  1145   CREATININE 1.0 2.5*     Serum creatinine: 2.5 mg/dL (H) 11/23/24 1145  Estimated creatinine clearance: 26.3 mL/min (A)    Medication:Famotidine dose: 20 mg frequency every 24 hours will be changed to medication:Famotidine dose:20 mg frequency:every 48 hours    Pharmacist's Name: Owen Gaston  Pharmacist's Extension: [phone]

## 2024-11-23 NOTE — HPI
Divya Bui, a 64-year-old female with a PMHx of HTN, GERD, morbid obesity s/p gastric bypass, depression, anxiety, esophageal stricture, and recurrent ESBL UTIs, was admitted on 11/23 for a UTI with sepsis. She was transferred to the ICU after exhibiting signs of septic shock, including recurrent hypotension unresponsive to initial fluid resuscitation. Upon admission, she presented with acute cystitis. ID was consulted by  for antibiotic therapy recommendations. She was continued on ertapenem (Invanz) and linezolid (Zyvox). Urine cultures grew GNRs, and blood cultures were positive for coagulase-negative staphylococci; repeat cultures were pending as the initial result was likely a contaminant. She showed evidence of sepsis with organ dysfunction indicated by acute kidney injury and encephalopathy. A fluid challenge was provided in the ED on 11/23. Her latest lactate level on 11/25 was 1.2. Despite marginal improvement with Midodrine and albumin, she required initiation of levophed overnight, but this was weaned off on 11/26. After stabilization, she was stepped down to telemetry, and  was re-consulted to assume care.

## 2024-11-23 NOTE — ASSESSMENT & PLAN NOTE
ELIZABETH is likely due to pre-renal azotemia due to dehydration. Baseline creatinine is  1.0 . Most recent creatinine and eGFR are listed below.  Recent Labs     11/23/24  1145   CREATININE 2.5*   EGFRNORACEVR 21*      Plan  - ELIZABETH is worsening. Will adjust treatment as follows: Hold  Lasix, Olmesartan/HCTZ,  - Avoid nephrotoxins and renally dose meds for GFR listed above  - Monitor urine output, serial BMP, and adjust therapy as needed  - Hold Olmesartan/HCTZ and Lasix   Gentle IV hydration with Bicarb drip

## 2024-11-23 NOTE — SUBJECTIVE & OBJECTIVE
Past Medical History:   Diagnosis Date    Anemia     Anxiety     Basal cell carcinoma (BCC) of face 2/26/2020    Blood transfusion     Bowel incontinence     Depression     Esophageal stricture     GERD (gastroesophageal reflux disease)     Hypertension     Latent tuberculosis by skin test 2001    took INH    Liver nodule 1/6/2014    Morbid obesity with BMI of 45.0-49.9, adult     OA (osteoarthritis) of knee 12/12/2014    Pericardial effusion 9/4/2014       Past Surgical History:   Procedure Laterality Date    CHOLECYSTECTOMY      COLONOSCOPY N/A 3/6/2023    Procedure: COLONOSCOPY;  Surgeon: Beti Diallo MD;  Location: [facility];  Service: Endoscopy;  Laterality: N/A;    GASTRIC BYPASS      HERNIA REPAIR      INJECTION OF ANESTHETIC AGENT AROUND NERVE Bilateral 6/16/2023    Procedure: Bilateral Genicular nerve block with RN IV sedation;  Surgeon: Denis Lai MD;  Location: [facility];  Service: Pain Management;  Laterality: Bilateral;    INJECTION OF ANESTHETIC AGENT AROUND NERVE Bilateral 8/11/2023    Procedure: Bilateral Genicular nerve block with RN IV sedation;  Surgeon: Denis Lai MD;  Location: [facility];  Service: Pain Management;  Laterality: Bilateral;    RADIOFREQUENCY THERMOCOAGULATION Right 10/31/2023    Procedure: Right Genicular Nerve RFA with RN IV sedation;  Surgeon: Denis Lai MD;  Location: [facility];  Service: Pain Management;  Laterality: Right;    RADIOFREQUENCY THERMOCOAGULATION Left 11/14/2023    Procedure: Left Genicular Nerve RFA with RN IV sedation;  Surgeon: Denis Lai MD;  Location: [facility];  Service: Pain Management;  Laterality: Left;    right sholder surgery      SMALL INTESTINE SURGERY         Review of patient's allergies indicates:   Allergen Reactions    Metronidazole hcl Other (See Comments)     Mouth ulcers developed with Flagyl  Oral sores.  Mouth ulcers developed with Flagyl       Current Facility-Administered Medications on File Prior  to Encounter   Medication    [DISCONTINUED] GENERIC EXTERNAL MEDICATION    [DISCONTINUED] GENERIC EXTERNAL MEDICATION    [DISCONTINUED] GENERIC EXTERNAL MEDICATION     Current Outpatient Medications on File Prior to Encounter   Medication Sig    ALPRAZolam (XANAX) 1 MG tablet Take 1 mg by mouth 3 (three) times daily as needed.    amLODIPine (NORVASC) 5 MG tablet TAKE 1 TABLET BY MOUTH ONCE DAILY    ARIPiprazole (ABILIFY) 10 MG Tab Take 10 mg by mouth once daily.    atorvastatin (LIPITOR) 40 MG tablet Take 1 tablet (40 mg total) by mouth once daily.    cyanocobalamin 1,000 mcg/mL injection Inject 1,000 mcg into the skin every 30 days.    doxepin (SINEQUAN) 50 MG capsule Take 50 mg by mouth nightly.    fluconazole (DIFLUCAN) 150 MG Tab Take 1 tablet (150 mg total) by mouth every 7 days. for 7 doses    furosemide (LASIX) 40 MG tablet Take 1 tablet (40 mg total) by mouth 2 (two) times daily as needed (swelling).    HYDROcodone-acetaminophen (NORCO) 5-325 mg per tablet Take 1 tablet by mouth every 6 (six) hours as needed for Pain.    nystatin (MYCOSTATIN) cream Apply topically 2 (two) times daily.    olmesartan-hydrochlorothiazide (BENICAR HCT) 40-12.5 mg Tab TAKE 1 TABLET BY MOUTH ONCE DAILY    pregabalin (LYRICA) 100 MG capsule Take 1 capsule (100 mg total) by mouth every evening.    promethazine (PHENERGAN) 25 MG tablet Take 1 tablet (25 mg total) by mouth every 6 (six) hours as needed.    zolpidem (AMBIEN) 10 mg Tab Take 10 mg by mouth nightly as needed.     Family History       Problem Relation (Age of Onset)    Crohn's disease Sister, Brother, Other    Diabetes Sister, Sister    Liver cancer Father, Sister    Lung cancer Mother          Tobacco Use    Smoking status: Never    Smokeless tobacco: Never   Substance and Sexual Activity    Alcohol use: Not Currently    Drug use: No    Sexual activity: Not Currently     Partners: Male     Birth control/protection: Post-menopausal     Review of Systems   Constitutional:   Positive for activity change, appetite change, chills and fatigue. Negative for diaphoresis, fever and unexpected weight change.   HENT:  Negative for congestion, nosebleeds, sinus pressure and sore throat.    Eyes:  Negative for pain, discharge and visual disturbance.   Respiratory:  Negative for cough, chest tightness, shortness of breath, wheezing and stridor.    Cardiovascular:  Negative for chest pain, palpitations and leg swelling.   Gastrointestinal:  Positive for abdominal pain (generalized cramping), diarrhea and vomiting. Negative for abdominal distention, blood in stool, constipation and nausea.   Endocrine: Negative for cold intolerance and heat intolerance.   Genitourinary:  Negative for difficulty urinating, dysuria, flank pain, frequency and urgency.   Musculoskeletal:  Negative for arthralgias, back pain, joint swelling, myalgias, neck pain and neck stiffness.   Skin:  Positive for wound. Negative for rash.   Allergic/Immunologic: Negative for food allergies and immunocompromised state.   Neurological:  Positive for weakness (generalized). Negative for dizziness, seizures, syncope, facial asymmetry, speech difficulty, light-headedness, numbness and headaches.   Hematological:  Negative for adenopathy.   Psychiatric/Behavioral:  Positive for confusion. Negative for agitation and hallucinations.      Objective:     Vital Signs (Most Recent):  Temp: 98.6 °F (37 °C) (11/23/24 1544)  Pulse: (!) 127 (11/23/24 1544)  Resp: 18 (11/23/24 1544)  BP: 126/77 (11/23/24 1544)  SpO2: 97 % (11/23/24 1544) Vital Signs (24h Range):  Temp:  [97.6 °F (36.4 °C)-98.6 °F (37 °C)] 98.6 °F (37 °C)  Pulse:  [122-139] 127  Resp:  [18-26] 18  SpO2:  [96 %-100 %] 97 %  BP: ()/(42-80) 126/77     Weight: 109.3 kg (241 lb)  Body mass index is 42.69 kg/m².     Physical Exam  Vitals and nursing note reviewed.   Constitutional:       General: She is not in acute distress.     Appearance: She is well-developed. She is obese. She  is not diaphoretic.   HENT:      Head: Normocephalic and atraumatic.      Nose: Nose normal.   Eyes:      General: No scleral icterus.     Conjunctiva/sclera: Conjunctivae normal.   Neck:      Trachea: No tracheal deviation.   Cardiovascular:      Rate and Rhythm: Regular rhythm. Tachycardia present.      Heart sounds: Normal heart sounds. No murmur heard.     No friction rub. No gallop.   Pulmonary:      Effort: Pulmonary effort is normal. No respiratory distress.      Breath sounds: Normal breath sounds. No stridor. No wheezing or rales.   Chest:      Chest wall: No tenderness.   Abdominal:      General: Bowel sounds are normal. There is no distension.      Palpations: Abdomen is soft. There is no mass.      Tenderness: There is abdominal tenderness (generalized, diffuse). There is no guarding or rebound.   Musculoskeletal:         General: No tenderness or deformity. Normal range of motion.      Cervical back: Normal range of motion and neck supple.   Skin:     General: Skin is warm and dry.      Coloration: Skin is not pale.      Findings: Bruising (BUE, BLE in various stages of healing), erythema and rash present.      Comments: Large amount of erythema to skin folds to breasts, pannus, perineum, bilateral groin, and buttock. Some skin break down noted.   Stage 2 Decubitus to buttock   See pictures    Neurological:      Mental Status: She is alert and oriented to person, place, and time.      Cranial Nerves: No cranial nerve deficit.      Motor: No abnormal muscle tone.      Coordination: Coordination normal.   Psychiatric:         Behavior: Behavior normal.         Thought Content: Thought content normal.                              Significant Labs: All pertinent labs within the past 24 hours have been reviewed.    Significant Imaging: I have reviewed all pertinent imaging results/findings within the past 24 hours.

## 2024-11-23 NOTE — ED PROVIDER NOTES
SCRIBE #1 NOTE: I, Alexamay Escobar, am scribing for, and in the presence of, Rocky Kimball MD. I have scribed the entire note.       History     Chief Complaint   Patient presents with    Altered Mental Status     AMS per family. She is currently on abx for UTI and c/o generalized abdominal pain x1 day. She is tachycardic and hypotensive. Denies CP, SOB, fever, n/v/d. .     Review of patient's allergies indicates:   Allergen Reactions    Metronidazole hcl Other (See Comments)     Mouth ulcers developed with Flagyl  Oral sores.  Mouth ulcers developed with Flagyl         History of Present Illness     HPI  HPI may be limited due to altered mental status.    11/23/2024, 11:26 AM  History obtained from the  family and patient      History of Present Illness: Divya Bui is a 64 y.o. female patient with a PMHx of bowel incontinence, HTN, anemia, depression, GERD, anxiety, latent tuberculosis by skin test, liver nodule, pericardial effusion, morbid obesity, OA of knee, esophageal stricture, and basal cell carcinoma of face who presents to the Emergency Department for evaluation of AMS which onset gradually PTA. Per pt's family, pt has had AMS, and she is currently on abx for a UTI. Pt c/o middle abdominal pain which onset last night. Upon evaluation, pt is tachycardic and hypotensive. Symptoms are constant and moderate in severity. No mitigating or exacerbating factors reported. Associated sxs include N/V/D. Patient denies any CP, SOB, and fever. No other prior Tx reported. No further complaints or concerns at this time.       Arrival mode: Ambulance Services     PCP: Angel Khan MD        Past Medical History:  Past Medical History:   Diagnosis Date    Anemia     Anxiety     Basal cell carcinoma (BCC) of face 2/26/2020    Blood transfusion     Bowel incontinence     Depression     Esophageal stricture     GERD (gastroesophageal reflux disease)     Hypertension     Latent tuberculosis by skin test  2001    took INH    Liver nodule 1/6/2014    Morbid obesity with BMI of 45.0-49.9, adult     OA (osteoarthritis) of knee 12/12/2014    Pericardial effusion 9/4/2014       Past Surgical History:  Past Surgical History:   Procedure Laterality Date    CHOLECYSTECTOMY      COLONOSCOPY N/A 3/6/2023    Procedure: COLONOSCOPY;  Surgeon: Beti Diallo MD;  Location: [facility];  Service: Endoscopy;  Laterality: N/A;    GASTRIC BYPASS      HERNIA REPAIR      INJECTION OF ANESTHETIC AGENT AROUND NERVE Bilateral 6/16/2023    Procedure: Bilateral Genicular nerve block with RN IV sedation;  Surgeon: Denis Lai MD;  Location: [facility];  Service: Pain Management;  Laterality: Bilateral;    INJECTION OF ANESTHETIC AGENT AROUND NERVE Bilateral 8/11/2023    Procedure: Bilateral Genicular nerve block with RN IV sedation;  Surgeon: Denis Lai MD;  Location: [facility];  Service: Pain Management;  Laterality: Bilateral;    RADIOFREQUENCY THERMOCOAGULATION Right 10/31/2023    Procedure: Right Genicular Nerve RFA with RN IV sedation;  Surgeon: Denis Lai MD;  Location: [facility];  Service: Pain Management;  Laterality: Right;    RADIOFREQUENCY THERMOCOAGULATION Left 11/14/2023    Procedure: Left Genicular Nerve RFA with RN IV sedation;  Surgeon: Denis Lai MD;  Location: [facility];  Service: Pain Management;  Laterality: Left;    right sholder surgery      SMALL INTESTINE SURGERY           Family History:  Family History   Problem Relation Name Age of Onset    Lung cancer Mother          smoker    Liver cancer Father      Diabetes Sister Sara     Crohn's disease Sister Sara     Liver cancer Sister Sara     Diabetes Sister oldest     Crohn's disease Brother      Crohn's disease Other nephew     Breast cancer Neg Hx      Ovarian cancer Neg Hx      Colon cancer Neg Hx         Social History:  Social History     Tobacco Use    Smoking status: Never    Smokeless tobacco: Never   Substance and Sexual  Activity    Alcohol use: Not Currently    Drug use: No    Sexual activity: Not Currently     Partners: Male     Birth control/protection: Post-menopausal        Review of Systems     Review of Systems   Unable to perform ROS: Mental status change   Constitutional:  Negative for fever.   Respiratory:  Negative for shortness of breath.    Cardiovascular:  Negative for chest pain.        (+) tachycardic  (+) hypotensive   Gastrointestinal:  Positive for abdominal pain (middle), diarrhea, nausea and vomiting.   Skin:  Negative for rash.   Psychiatric/Behavioral:          (+) Altered Mental Status      Physical Exam     Initial Vitals [11/23/24 1120]   BP Pulse Resp Temp SpO2   (!) 76/42 (!) 139 (!) 26 97.6 °F (36.4 °C) 96 %      MAP       --          Physical Exam  Nursing Notes and Vital Signs Reviewed.  Constitutional: Patient is in no apparent distress. Pt is elderly and appears frail.  Head: Atraumatic. Normocephalic.  Eyes: PERRL. EOM intact. Conjunctivae are not pale. No scleral icterus.  ENT: Mucous membranes are moist. Oropharynx is clear and symmetric.    Neck: Supple. Full ROM. No lymphadenopathy.  Cardiovascular: Tachycardic. Regular rhythm. No murmurs, rubs, or gallops. Distal pulses are 2+ and symmetric.  Pulmonary/Chest: No respiratory distress. Clear to auscultation bilaterally. No wheezing or rales.  Abdominal: Soft and non-distended.  There is no tenderness.  No rebound, guarding, or rigidity. Good bowel sound.  Genitourinary: No CVA tenderness  Musculoskeletal: Moves all extremities. No obvious deformities. No edema. No calf tenderness.  Skin: Warm and dry.  Neurological:  Alert, awake, and appropriate.  Normal speech.  No acute focal neurological deficits are appreciated.  Psychiatric: Normal affect. Good eye contact. Appropriate in content.     ED Course   Critical Care    Date/Time: 11/23/2024 2:23 PM    Performed by: Rocky Kimball MD  Authorized by: Rocky Kimball MD  Direct patient  "critical care time: 20 minutes  Additional history critical care time: 10 minutes  Ordering / reviewing critical care time: 8 minutes  Documentation critical care time: 8 minutes  Consulting other physicians critical care time: 8 minutes  Other critical care time: 6 minutes  Total critical care time (exclusive of procedural time) : 60 minutes  Critical care time was exclusive of separately billable procedures and treating other patients and teaching time.  Critical care was necessary to treat or prevent imminent or life-threatening deterioration of the following conditions: sepsis.  Critical care was time spent personally by me on the following activities: blood draw for specimens, development of treatment plan with patient or surrogate, discussions with consultants, interpretation of cardiac output measurements, evaluation of patient's response to treatment, examination of patient, obtaining history from patient or surrogate, ordering and performing treatments and interventions, ordering and review of laboratory studies, ordering and review of radiographic studies, pulse oximetry, re-evaluation of patient's condition and review of old charts.        ED Vital Signs:  Vitals:    11/23/24 1120 11/23/24 1130 11/23/24 1134 11/23/24 1200   BP: (!) 76/42 108/80 108/80 (!) 142/73   Pulse: (!) 139 (!) 133 (!) 132 (!) 124   Resp: (!) 26 20 20 20   Temp: 97.6 °F (36.4 °C)      TempSrc: Oral      SpO2: 96% 98% 97% 96%   Weight:  109.3 kg (240 lb 15.4 oz) 109.3 kg (241 lb)    Height:  5' 3" (1.6 m)      11/23/24 1315 11/23/24 1334   BP: 132/73    Pulse: (!) 128    Resp: 20 18   Temp:     TempSrc:     SpO2: 96%    Weight:     Height:         Abnormal Lab Results:  Labs Reviewed   CBC W/ AUTO DIFFERENTIAL - Abnormal       Result Value    WBC 24.61 (*)     RBC 4.49      Hemoglobin 13.0      Hematocrit 39.4      MCV 88      MCH 29.0      MCHC 33.0      RDW 17.2 (*)     Platelets 316      MPV 8.9 (*)     Immature Granulocytes 2.8 " (*)     Gran # (ANC) 21.9 (*)     Immature Grans (Abs) 0.70 (*)     Lymph # 0.8 (*)     Mono # 1.0      Eos # 0.1      Baso # 0.17      nRBC 0      Gran % 89.0 (*)     Lymph % 3.1 (*)     Mono % 4.0      Eosinophil % 0.4      Basophil % 0.7      Platelet Estimate Appears normal      Poik Slight      Tear Drop Cells Occasional      Differential Method Automated     COMPREHENSIVE METABOLIC PANEL - Abnormal    Sodium 136      Potassium 3.5      Chloride 108      CO2 11 (*)     Glucose 234 (*)     BUN 43 (*)     Creatinine 2.5 (*)     Calcium 8.3 (*)     Total Protein 6.7      Albumin 2.3 (*)     Total Bilirubin 0.5      Alkaline Phosphatase 110      AST 8 (*)     ALT 13      eGFR 21 (*)     Anion Gap 17 (*)    URINALYSIS, REFLEX TO URINE CULTURE - Abnormal    Specimen UA Urine, Catheterized      Color, UA Yellow      Appearance, UA Cloudy (*)     pH, UA 6.0      Specific Gravity, UA 1.015      Protein, UA 1+ (*)     Glucose, UA Negative      Ketones, UA Negative      Bilirubin (UA) Negative      Occult Blood UA 1+ (*)     Nitrite, UA Positive (*)     Urobilinogen, UA Negative      Leukocytes, UA 3+ (*)     Narrative:     Specimen Source->Urine   URINALYSIS MICROSCOPIC - Abnormal    RBC, UA 2      WBC, UA >100 (*)     WBC Clumps, UA Many (*)     Bacteria Moderate (*)     Squam Epithel, UA 2      Hyaline Casts, UA 0      Unclass Elly UA Few      Microscopic Comment SEE COMMENT      Narrative:     Specimen Source->Urine   POCT GLUCOSE - Abnormal    POCT Glucose 251 (*)    CULTURE, BLOOD   CULTURE, BLOOD   CLOSTRIDIUM DIFFICILE   CULTURE, STOOL   CULTURE, URINE   CULTURE, STOOL   LACTIC ACID, PLASMA    Lactate (Lactic Acid) 2.1     LACTIC ACID, PLASMA   LACTIC ACID, PLASMA   ROTAVIRUS ANTIGEN, STOOL   GIARDIA/CRYPTOSPORIDIUM (EIA)   STOOL EXAM-OVA,CYSTS,PARASITES        All Lab Results:  Results for orders placed or performed during the hospital encounter of 11/23/24   POCT glucose    Collection Time: 11/23/24 11:18 AM    Result Value Ref Range    POCT Glucose 251 (H) 70 - 110 mg/dL   CBC auto differential    Collection Time: 11/23/24 11:45 AM   Result Value Ref Range    WBC 24.61 (H) 3.90 - 12.70 K/uL    RBC 4.49 4.00 - 5.40 M/uL    Hemoglobin 13.0 12.0 - 16.0 g/dL    Hematocrit 39.4 37.0 - 48.5 %    MCV 88 82 - 98 fL    MCH 29.0 27.0 - 31.0 pg    MCHC 33.0 32.0 - 36.0 g/dL    RDW 17.2 (H) 11.5 - 14.5 %    Platelets 316 150 - 450 K/uL    MPV 8.9 (L) 9.2 - 12.9 fL    Immature Granulocytes 2.8 (H) 0.0 - 0.5 %    Gran # (ANC) 21.9 (H) 1.8 - 7.7 K/uL    Immature Grans (Abs) 0.70 (H) 0.00 - 0.04 K/uL    Lymph # 0.8 (L) 1.0 - 4.8 K/uL    Mono # 1.0 0.3 - 1.0 K/uL    Eos # 0.1 0.0 - 0.5 K/uL    Baso # 0.17 0.00 - 0.20 K/uL    nRBC 0 0 /100 WBC    Gran % 89.0 (H) 38.0 - 73.0 %    Lymph % 3.1 (L) 18.0 - 48.0 %    Mono % 4.0 4.0 - 15.0 %    Eosinophil % 0.4 0.0 - 8.0 %    Basophil % 0.7 0.0 - 1.9 %    Platelet Estimate Appears normal     Poik Slight     Tear Drop Cells Occasional     Differential Method Automated    Comprehensive metabolic panel    Collection Time: 11/23/24 11:45 AM   Result Value Ref Range    Sodium 136 136 - 145 mmol/L    Potassium 3.5 3.5 - 5.1 mmol/L    Chloride 108 95 - 110 mmol/L    CO2 11 (L) 23 - 29 mmol/L    Glucose 234 (H) 70 - 110 mg/dL    BUN 43 (H) 8 - 23 mg/dL    Creatinine 2.5 (H) 0.5 - 1.4 mg/dL    Calcium 8.3 (L) 8.7 - 10.5 mg/dL    Total Protein 6.7 6.0 - 8.4 g/dL    Albumin 2.3 (L) 3.5 - 5.2 g/dL    Total Bilirubin 0.5 0.1 - 1.0 mg/dL    Alkaline Phosphatase 110 40 - 150 U/L    AST 8 (L) 10 - 40 U/L    ALT 13 10 - 44 U/L    eGFR 21 (A) >60 mL/min/1.73 m^2    Anion Gap 17 (H) 8 - 16 mmol/L   Lactic acid, plasma #1    Collection Time: 11/23/24 11:45 AM   Result Value Ref Range    Lactate (Lactic Acid) 2.1 0.5 - 2.2 mmol/L   Urinalysis, Reflex to Urine Culture Urine, Catheterized    Collection Time: 11/23/24 12:51 PM    Specimen: Urine   Result Value Ref Range    Specimen UA Urine, Catheterized     Color, UA  Yellow Yellow, Straw, Debra    Appearance, UA Cloudy (A) Clear    pH, UA 6.0 5.0 - 8.0    Specific Gravity, UA 1.015 1.005 - 1.030    Protein, UA 1+ (A) Negative    Glucose, UA Negative Negative    Ketones, UA Negative Negative    Bilirubin (UA) Negative Negative    Occult Blood UA 1+ (A) Negative    Nitrite, UA Positive (A) Negative    Urobilinogen, UA Negative <2.0 EU/dL    Leukocytes, UA 3+ (A) Negative   Urinalysis Microscopic    Collection Time: 11/23/24 12:51 PM   Result Value Ref Range    RBC, UA 2 0 - 4 /hpf    WBC, UA >100 (H) 0 - 5 /hpf    WBC Clumps, UA Many (A) None-Rare    Bacteria Moderate (A) None-Occ /hpf    Squam Epithel, UA 2 /hpf    Hyaline Casts, UA 0 0-1/lpf /lpf    Unclass Elly UA Few None-Moderate    Microscopic Comment SEE COMMENT          Imaging Results:  Imaging Results              CT Head Without Contrast (Final result)  Result time 11/23/24 14:05:51      Final result by Lucio Romero MD (11/23/24 14:05:51)                   Impression:      No acute intracranial CT abnormality.    All CT scans at this facility are performed  using dose modulation techniques as appropriate to performed exam including the following:  automated exposure control; adjustment of mA and/or kV according to the patients size (this includes techniques or standardized protocols for targeted exams where dose is matched to indication/reason for exam: i.e. extremities or head);  iterative reconstruction technique.      Electronically signed by: Lucio Romero  Date:    11/23/2024  Time:    14:05               Narrative:    EXAMINATION:  CT HEAD WITHOUT CONTRAST    CLINICAL HISTORY:  Mental status change, unknown cause;    TECHNIQUE:  Low dose axial CT images obtained throughout the head without intravenous contrast. Sagittal and coronal reconstructions were performed.    COMPARISON:  Multiple prior reports    FINDINGS:  Intracranial compartment:    Ventricles and sulci are normal in size for age without evidence  of hydrocephalus. No extra-axial blood or fluid collections.    The brain parenchyma appears normal. No parenchymal mass, hemorrhage, edema or major vascular distribution infarct.    Skull/extracranial contents (limited evaluation): No fracture. Mastoid air cells and paranasal sinuses are essentially clear.                                       X-Ray Chest AP Portable (Final result)  Result time 11/23/24 12:10:43      Final result by Lucio Romero MD (11/23/24 12:10:43)                   Impression:      No acute abnormality.      Electronically signed by: Lucio Romero  Date:    11/23/2024  Time:    12:10               Narrative:    EXAMINATION:  XR CHEST AP PORTABLE    CLINICAL HISTORY:  Sepsis;    TECHNIQUE:  Single frontal view of the chest was performed.    COMPARISON:  Multiple    FINDINGS:  The lungs are clear, with normal appearance of pulmonary vasculature and no pleural effusion or pneumothorax.    The cardiac silhouette is normal in size. The hilar and mediastinal contours are unremarkable.    Bones are intact.                                       The EKG was ordered, reviewed, and independently interpreted by the ED provider.  Interpretation time: 11:57  Rate: 128 BPM  Rhythm: sinus tachycardia  Interpretation: No significant ST changes. No STEMI.           The Emergency Provider reviewed the vital signs and test results, which are outlined above.     ED Discussion       2:22 PM: Discussed case with Christopher Borden MD (Hospital Medicine). Dr. Borden agrees with current care and management of pt and accepts admission.   Admitting Service: Hospitalist   Admitting Physician: Dr. Borden  Admit to: Inpatient, Med/Tele    2:23 PM: Re-evaluated pt. I have discussed test results, shared treatment plan, and the need for admission with patient and family at bedside. Pt and family express understanding at this time and agree with all information. All questions answered. Pt and family have no further questions  or concerns at this time. Pt is ready for admit.         Medical Decision Making  Ddx: sepsis, UTI, weakness    Amount and/or Complexity of Data Reviewed  Labs: ordered. Decision-making details documented in ED Course.     Details: Wbc 24. Lactic 2.1, creatinine 2.5  Radiology: ordered. Decision-making details documented in ED Course.  ECG/medicine tests: ordered and independent interpretation performed. Decision-making details documented in ED Course.  Discussion of management or test interpretation with external provider(s): Fluids IV abx, consult HM for admission.     Risk  Prescription drug management.  Decision regarding hospitalization.    Critical Care  Total time providing critical care: 60 minutes                ED Medication(s):  Medications   sodium bicarbonate 150 mEq in sterile water 1,000 mL infusion (has no administration in time range)   meropenem injection 1 g (has no administration in time range)   sodium chloride 0.9% bolus 1,572 mL 1,572 mL (0 mLs Intravenous Stopped 11/23/24 1249)   ondansetron injection 4 mg (4 mg Intravenous Given 11/23/24 1142)   morphine injection 2 mg (2 mg Intravenous Given 11/23/24 1334)   cefTRIAXone injection 1 g (1 g Intravenous Given 11/23/24 1414)       New Prescriptions    No medications on file               Scribe Attestation:   Scribe #1: I performed the above scribed service and the documentation accurately describes the services I performed. I attest to the accuracy of the note.     Attending:   Physician Attestation Statement for Scribe #1: I, Rocky Kimball MD, personally performed the services described in this documentation, as scribed by Abundio Escobar, in my presence, and it is both accurate and complete.           Clinical Impression       ICD-10-CM ICD-9-CM   1. Acute cystitis with hematuria  N30.01 595.0   2. Altered mental status  R41.82 780.97   3. Tachycardia  R00.0 785.0   4. ELIZABETH (acute kidney injury)  N17.9 584.9       Disposition:    Disposition: Admitted  Condition: Rocky Waddell MD  11/23/24 6071

## 2024-11-23 NOTE — H&P
[facility] Medicine  History & Physical    Patient Name: Divya Bui  MRN: [id number]  Patient Class: IP- Inpatient  Admission Date: 11/23/2024  Attending Physician: Christopher Borden MD   Primary Care Provider: Angel Khan MD         Patient information was obtained from patient, relative(s), and ER records.     Subjective:     Principal Problem:Sepsis    Chief Complaint:   Chief Complaint   Patient presents with    Altered Mental Status     AMS per family. She is currently on abx for UTI and c/o generalized abdominal pain x1 day. She is tachycardic and hypotensive. Denies CP, SOB, fever, n/v/d. .        HPI: The patient is a 63 yo female with ESBL UTI, HTN, GERD, Morbid Obesity s/p Gastric bypass surgery, Depression, GERD, Anxiety, Esophageal stricture who presented to the ED with Mild confusion, drowsiness, decreased appetite, and generalized weakness over the past 3 days. Today, she was so weak that she was incontinent to urine and stool. She reports diarrhea since discharge form the hospital on 11/12 which has gradually worsened. +vomiting x 1 episode. Patient denies any fever, CP, SOB. +dry cough    Pt was hospitalized with Sepsis due to UTI and ELIZABETH from 11/9-11/12/24 on IV Rocephin. CT abd was unrevealing. Urine culture grew Klebsiella, Proteus and Enterococcus and she was discharged on Augmentin x 7 days which she completed.     In the ED, pt was afebrile, hypotensive (BP 76/49) and tachycardic (130s). Labs revealed WBC 24, Bicarb 11, anion gap 17, sCr 2.5, Glucose 234. Procalcitonin 1.55. + UTI. CT head showed nothing acute. CXR-clear.   The patient was give 30ml/kg sepsis bolus and BP and HR improved. She was also given IV Rocephin     Pt is a full code. Her son, Herbert Bui is her SDM    Past Medical History:   Diagnosis Date    Anemia     Anxiety     Basal cell carcinoma (BCC) of face 2/26/2020    Blood transfusion     Bowel incontinence     Depression     Esophageal  stricture     GERD (gastroesophageal reflux disease)     Hypertension     Latent tuberculosis by skin test 2001    took INH    Liver nodule 1/6/2014    Morbid obesity with BMI of 45.0-49.9, adult     OA (osteoarthritis) of knee 12/12/2014    Pericardial effusion 9/4/2014       Past Surgical History:   Procedure Laterality Date    CHOLECYSTECTOMY      COLONOSCOPY N/A 3/6/2023    Procedure: COLONOSCOPY;  Surgeon: Beti Diallo MD;  Location: [facility];  Service: Endoscopy;  Laterality: N/A;    GASTRIC BYPASS      HERNIA REPAIR      INJECTION OF ANESTHETIC AGENT AROUND NERVE Bilateral 6/16/2023    Procedure: Bilateral Genicular nerve block with RN IV sedation;  Surgeon: Denis Lai MD;  Location: [facility];  Service: Pain Management;  Laterality: Bilateral;    INJECTION OF ANESTHETIC AGENT AROUND NERVE Bilateral 8/11/2023    Procedure: Bilateral Genicular nerve block with RN IV sedation;  Surgeon: Denis Lai MD;  Location: [facility];  Service: Pain Management;  Laterality: Bilateral;    RADIOFREQUENCY THERMOCOAGULATION Right 10/31/2023    Procedure: Right Genicular Nerve RFA with RN IV sedation;  Surgeon: Denis Lai MD;  Location: [facility];  Service: Pain Management;  Laterality: Right;    RADIOFREQUENCY THERMOCOAGULATION Left 11/14/2023    Procedure: Left Genicular Nerve RFA with RN IV sedation;  Surgeon: Denis Lai MD;  Location: [facility];  Service: Pain Management;  Laterality: Left;    right sholder surgery      SMALL INTESTINE SURGERY         Review of patient's allergies indicates:   Allergen Reactions    Metronidazole hcl Other (See Comments)     Mouth ulcers developed with Flagyl  Oral sores.  Mouth ulcers developed with Flagyl       Current Facility-Administered Medications on File Prior to Encounter   Medication    [DISCONTINUED] GENERIC EXTERNAL MEDICATION    [DISCONTINUED] GENERIC EXTERNAL MEDICATION    [DISCONTINUED] GENERIC EXTERNAL MEDICATION     Current  Outpatient Medications on File Prior to Encounter   Medication Sig    ALPRAZolam (XANAX) 1 MG tablet Take 1 mg by mouth 3 (three) times daily as needed.    amLODIPine (NORVASC) 5 MG tablet TAKE 1 TABLET BY MOUTH ONCE DAILY    ARIPiprazole (ABILIFY) 10 MG Tab Take 10 mg by mouth once daily.    atorvastatin (LIPITOR) 40 MG tablet Take 1 tablet (40 mg total) by mouth once daily.    cyanocobalamin 1,000 mcg/mL injection Inject 1,000 mcg into the skin every 30 days.    doxepin (SINEQUAN) 50 MG capsule Take 50 mg by mouth nightly.    fluconazole (DIFLUCAN) 150 MG Tab Take 1 tablet (150 mg total) by mouth every 7 days. for 7 doses    furosemide (LASIX) 40 MG tablet Take 1 tablet (40 mg total) by mouth 2 (two) times daily as needed (swelling).    HYDROcodone-acetaminophen (NORCO) 5-325 mg per tablet Take 1 tablet by mouth every 6 (six) hours as needed for Pain.    nystatin (MYCOSTATIN) cream Apply topically 2 (two) times daily.    olmesartan-hydrochlorothiazide (BENICAR HCT) 40-12.5 mg Tab TAKE 1 TABLET BY MOUTH ONCE DAILY    pregabalin (LYRICA) 100 MG capsule Take 1 capsule (100 mg total) by mouth every evening.    promethazine (PHENERGAN) 25 MG tablet Take 1 tablet (25 mg total) by mouth every 6 (six) hours as needed.    zolpidem (AMBIEN) 10 mg Tab Take 10 mg by mouth nightly as needed.     Family History       Problem Relation (Age of Onset)    Crohn's disease Sister, Brother, Other    Diabetes Sister, Sister    Liver cancer Father, Sister    Lung cancer Mother          Tobacco Use    Smoking status: Never    Smokeless tobacco: Never   Substance and Sexual Activity    Alcohol use: Not Currently    Drug use: No    Sexual activity: Not Currently     Partners: Male     Birth control/protection: Post-menopausal     Review of Systems   Constitutional:  Positive for activity change, appetite change, chills and fatigue. Negative for diaphoresis, fever and unexpected weight change.   HENT:  Negative for congestion, nosebleeds,  sinus pressure and sore throat.    Eyes:  Negative for pain, discharge and visual disturbance.   Respiratory:  Negative for cough, chest tightness, shortness of breath, wheezing and stridor.    Cardiovascular:  Negative for chest pain, palpitations and leg swelling.   Gastrointestinal:  Positive for abdominal pain (generalized cramping), diarrhea and vomiting. Negative for abdominal distention, blood in stool, constipation and nausea.   Endocrine: Negative for cold intolerance and heat intolerance.   Genitourinary:  Negative for difficulty urinating, dysuria, flank pain, frequency and urgency.   Musculoskeletal:  Negative for arthralgias, back pain, joint swelling, myalgias, neck pain and neck stiffness.   Skin:  Positive for wound. Negative for rash.   Allergic/Immunologic: Negative for food allergies and immunocompromised state.   Neurological:  Positive for weakness (generalized). Negative for dizziness, seizures, syncope, facial asymmetry, speech difficulty, light-headedness, numbness and headaches.   Hematological:  Negative for adenopathy.   Psychiatric/Behavioral:  Positive for confusion. Negative for agitation and hallucinations.      Objective:     Vital Signs (Most Recent):  Temp: 98.6 °F (37 °C) (11/23/24 1544)  Pulse: (!) 127 (11/23/24 1544)  Resp: 18 (11/23/24 1544)  BP: 126/77 (11/23/24 1544)  SpO2: 97 % (11/23/24 1544) Vital Signs (24h Range):  Temp:  [97.6 °F (36.4 °C)-98.6 °F (37 °C)] 98.6 °F (37 °C)  Pulse:  [122-139] 127  Resp:  [18-26] 18  SpO2:  [96 %-100 %] 97 %  BP: ()/(42-80) 126/77     Weight: 109.3 kg (241 lb)  Body mass index is 42.69 kg/m².     Physical Exam  Vitals and nursing note reviewed.   Constitutional:       General: She is not in acute distress.     Appearance: She is well-developed. She is obese. She is not diaphoretic.   HENT:      Head: Normocephalic and atraumatic.      Nose: Nose normal.   Eyes:      General: No scleral icterus.     Conjunctiva/sclera: Conjunctivae  normal.   Neck:      Trachea: No tracheal deviation.   Cardiovascular:      Rate and Rhythm: Regular rhythm. Tachycardia present.      Heart sounds: Normal heart sounds. No murmur heard.     No friction rub. No gallop.   Pulmonary:      Effort: Pulmonary effort is normal. No respiratory distress.      Breath sounds: Normal breath sounds. No stridor. No wheezing or rales.   Chest:      Chest wall: No tenderness.   Abdominal:      General: Bowel sounds are normal. There is no distension.      Palpations: Abdomen is soft. There is no mass.      Tenderness: There is abdominal tenderness (generalized, diffuse). There is no guarding or rebound.   Musculoskeletal:         General: No tenderness or deformity. Normal range of motion.      Cervical back: Normal range of motion and neck supple.   Skin:     General: Skin is warm and dry.      Coloration: Skin is not pale.      Findings: Bruising (BUE, BLE in various stages of healing), erythema and rash present.      Comments: Large amount of erythema to skin folds to breasts, pannus, perineum, bilateral groin, and buttock. Some skin break down noted.   Stage 2 Decubitus to buttock   See pictures    Neurological:      Mental Status: She is alert and oriented to person, place, and time.      Cranial Nerves: No cranial nerve deficit.      Motor: No abnormal muscle tone.      Coordination: Coordination normal.   Psychiatric:         Behavior: Behavior normal.         Thought Content: Thought content normal.                              Significant Labs: All pertinent labs within the past 24 hours have been reviewed.    Significant Imaging: I have reviewed all pertinent imaging results/findings within the past 24 hours.  Assessment/Plan:     * Sepsis  This patient does have evidence of infective focus  My overall impression is sepsis.  Source: Urinary Tract  Antibiotics given-   Antibiotics (72h ago, onward)      Start     Stop Route Frequency Ordered    11/23/24 1163  ceFEPIme  injection 1 g         -- IV Every 8 hours (non-standard times) 11/23/24 1640    11/23/24 1745  linezolid 600 mg/300 mL IVPB 600 mg         -- IV Every 12 hours (non-standard times) 11/23/24 1640          Latest lactate reviewed-  Recent Labs   Lab 11/23/24  1145   LACTATE 2.1     Organ dysfunction indicated by Acute kidney injury and Encephalopathy    Fluid challenge Actual Body weight- Patient will receive 30ml/kg actual body weight to calculate fluid bolus for treatment of septic shock.     Post- resuscitation assessment No - Post resuscitation assessment not needed       Will Not start Pressors- Levophed for MAP of 65  Source control achieved by: IV Cefepime and Zyvox for now    Acute cystitis  Hx ESBL   Iv Cefepime and Zyvox  Blood and urine cultures   Consult ID      ELIZABETH (acute kidney injury)  ELIZABETH is likely due to pre-renal azotemia due to dehydration. Baseline creatinine is  1.0 . Most recent creatinine and eGFR are listed below.  Recent Labs     11/23/24  1145   CREATININE 2.5*   EGFRNORACEVR 21*      Plan  - ELIZABETH is worsening. Will adjust treatment as follows: Hold  Lasix, Olmesartan/HCTZ,  - Avoid nephrotoxins and renally dose meds for GFR listed above  - Monitor urine output, serial BMP, and adjust therapy as needed  - Hold Olmesartan/HCTZ and Lasix   Gentle IV hydration with Bicarb drip     Candidal intertrigo and dermatitis  Severe erythema to breasts, pannus, groin, and buttock with some skin breakdown   Appears to be candida overgrowth   IV Diflucan       Metabolic acidosis  IV bicarb drip   monitor       Diarrhea  Check for C-diff and Stool culture and Stool studies       Hyperglycemia       Check Hgb Aic      Diabetic diet      NISS    Recurrent major depressive disorder, in partial remission  Patient has persistent depression which is mild and is currently controlled. Will Continue anti-depressant medications. We will not consult psychiatry at this time. Patient does not display psychosis at this  time. Continue to monitor closely and adjust plan of care as needed.    Cont Abilify and Xanax prn   Hold Doxepin for now while on Zyvox         Chronic pain disorder  Continue home meds Lyrica HS, Norco prn       Morbid obesity with BMI of 40.0-44.9, adult  Body mass index is 42.69 kg/m². Morbid obesity complicates all aspects of disease management from diagnostic modalities to treatment. Weight loss encouraged and health benefits explained to patient.         HTN (hypertension)  Patients blood pressure range in the last 24 hours was: BP  Min: 76/42  Max: 142/73.The patient's inpatient anti-hypertensive regimen is listed below       Plan  Hold antihypertensives for now due to low normal BP       VTE Risk Mitigation (From admission, onward)           Ordered     heparin (porcine) injection 5,000 Units  Every 8 hours         11/23/24 1441     IP VTE HIGH RISK PATIENT  Once         11/23/24 1441     Place sequential compression device  Until discontinued         11/23/24 1441                               Pharmacist Renal Dose Adjustment Note    Divya Bui is a 64 y.o. female being treated with the medication meropenem.    Patient Data:    Vital Signs (Most Recent):  Temp: 97.6 °F (36.4 °C) (11/23/24 1120)  Pulse: (!) 128 (11/23/24 1315)  Resp: 18 (11/23/24 1334)  BP: 132/73 (11/23/24 1315)  SpO2: 96 % (11/23/24 1315) Vital Signs (72h Range):  Temp:  [97.6 °F (36.4 °C)]   Pulse:  [124-139]   Resp:  [18-26]   BP: ()/(42-80)   SpO2:  [96 %-98 %]      Recent Labs   Lab 11/19/24  1037 11/23/24  1145   CREATININE 1.0 2.5*     Serum creatinine: 2.5 mg/dL (H) 11/23/24 1145  Estimated creatinine clearance: 26.3 mL/min (A)    Medication: meropenem dose: 1 g frequency every 8 hours will be changed to medication: meropenem dose: 1 g frequency: every 12 hours for CrCl 25-50 mL/min    Pharmacist's Name: Maude Angelo NP  Department of Hospital Medicine  [place] - Med Surg

## 2024-11-23 NOTE — PLAN OF CARE
Problem: Adult Inpatient Plan of Care  Goal: Plan of Care Review  Outcome: Progressing  Goal: Patient-Specific Goal (Individualized)  Outcome: Progressing  Goal: Absence of Hospital-Acquired Illness or Injury  Outcome: Progressing  Goal: Optimal Comfort and Wellbeing  Outcome: Progressing  Goal: Readiness for Transition of Care  Outcome: Progressing     Problem: Bariatric Environmental Safety  Goal: Safety Maintained with Care  Outcome: Progressing     Problem: Infection  Goal: Absence of Infection Signs and Symptoms  Outcome: Progressing     Problem: Sepsis/Septic Shock  Goal: Optimal Coping  Outcome: Progressing  Goal: Absence of Bleeding  Outcome: Progressing  Goal: Blood Glucose Level Within Targeted Range  Outcome: Progressing  Goal: Absence of Infection Signs and Symptoms  Outcome: Progressing  Goal: Optimal Nutrition Intake  Outcome: Progressing     Problem: Acute Kidney Injury/Impairment  Goal: Fluid and Electrolyte Balance  Outcome: Progressing  Goal: Improved Oral Intake  Outcome: Progressing  Goal: Effective Renal Function  Outcome: Progressing     Problem: Wound  Goal: Optimal Coping  Outcome: Progressing  Goal: Optimal Functional Ability  Outcome: Progressing  Goal: Absence of Infection Signs and Symptoms  Outcome: Progressing  Goal: Improved Oral Intake  Outcome: Progressing  Goal: Optimal Pain Control and Function  Outcome: Progressing  Goal: Skin Health and Integrity  Outcome: Progressing  Goal: Optimal Wound Healing  Outcome: Progressing     Problem: Skin Injury Risk Increased  Goal: Skin Health and Integrity  Outcome: Progressing

## 2024-11-23 NOTE — ASSESSMENT & PLAN NOTE
This patient does have evidence of infective focus  My overall impression is sepsis.  Source: Urinary Tract  Antibiotics given-   Antibiotics (72h ago, onward)      Start     Stop Route Frequency Ordered    11/23/24 1745  ceFEPIme injection 1 g         -- IV Every 8 hours (non-standard times) 11/23/24 1640    11/23/24 1745  linezolid 600 mg/300 mL IVPB 600 mg         -- IV Every 12 hours (non-standard times) 11/23/24 1640          Latest lactate reviewed-  Recent Labs   Lab 11/23/24  1145   LACTATE 2.1     Organ dysfunction indicated by Acute kidney injury and Encephalopathy    Fluid challenge Actual Body weight- Patient will receive 30ml/kg actual body weight to calculate fluid bolus for treatment of septic shock.     Post- resuscitation assessment No - Post resuscitation assessment not needed       Will Not start Pressors- Levophed for MAP of 65  Source control achieved by: IV Cefepime and Zyvox for now

## 2024-11-23 NOTE — ASSESSMENT & PLAN NOTE
Patients blood pressure range in the last 24 hours was: BP  Min: 76/42  Max: 142/73.The patient's inpatient anti-hypertensive regimen is listed below       Plan  Hold antihypertensives for now due to low normal BP

## 2024-11-23 NOTE — PROGRESS NOTES
Pharmacist Renal Dose Adjustment Note    Divya Bui is a 64 y.o. female being treated with the medication meropenem.    Patient Data:    Vital Signs (Most Recent):  Temp: 97.6 °F (36.4 °C) (11/23/24 1120)  Pulse: (!) 128 (11/23/24 1315)  Resp: 18 (11/23/24 1334)  BP: 132/73 (11/23/24 1315)  SpO2: 96 % (11/23/24 1315) Vital Signs (72h Range):  Temp:  [97.6 °F (36.4 °C)]   Pulse:  [124-139]   Resp:  [18-26]   BP: ()/(42-80)   SpO2:  [96 %-98 %]      Recent Labs   Lab 11/19/24  1037 11/23/24  1145   CREATININE 1.0 2.5*     Serum creatinine: 2.5 mg/dL (H) 11/23/24 1145  Estimated creatinine clearance: 26.3 mL/min (A)    Medication: meropenem dose: 1 g frequency every 8 hours will be changed to medication: meropenem dose: 1 g frequency: every 12 hours for CrCl 25-50 mL/min    Pharmacist's Name: Maude Sanchez

## 2024-11-23 NOTE — ASSESSMENT & PLAN NOTE
Severe erythema to breasts, pannus, groin, and buttock with some skin breakdown   Appears to be candida overgrowth   IV Diflucan

## 2024-11-23 NOTE — ASSESSMENT & PLAN NOTE
Body mass index is 42.69 kg/m². Morbid obesity complicates all aspects of disease management from diagnostic modalities to treatment. Weight loss encouraged and health benefits explained to patient.

## 2024-11-23 NOTE — ASSESSMENT & PLAN NOTE
>>ASSESSMENT AND PLAN FOR DIARRHEA WRITTEN ON 11/23/2024  4:38 PM BY CRISTOFER BRANDT, NP    Check for C-diff and Stool culture and Stool studies

## 2024-11-24 LAB
ACANTHOCYTES BLD QL SMEAR: PRESENT
ACANTHOCYTES BLD QL SMEAR: PRESENT
ALBUMIN SERPL BCP-MCNC: 1.4 G/DL (ref 3.5–5.2)
ALP SERPL-CCNC: 69 U/L (ref 40–150)
ALT SERPL W/O P-5'-P-CCNC: 7 U/L (ref 10–44)
ANION GAP SERPL CALC-SCNC: 12 MMOL/L (ref 8–16)
ANION GAP SERPL CALC-SCNC: 12 MMOL/L (ref 8–16)
AST SERPL-CCNC: 7 U/L (ref 10–40)
BASOPHILS # BLD AUTO: 0.01 K/UL (ref 0–0.2)
BASOPHILS # BLD AUTO: ABNORMAL K/UL (ref 0–0.2)
BASOPHILS NFR BLD: 0 % (ref 0–1.9)
BASOPHILS NFR BLD: 0.1 % (ref 0–1.9)
BILIRUB SERPL-MCNC: 0.3 MG/DL (ref 0.1–1)
BUN SERPL-MCNC: 47 MG/DL (ref 8–23)
BUN SERPL-MCNC: 51 MG/DL (ref 8–23)
CALCIUM SERPL-MCNC: 6.8 MG/DL (ref 8.7–10.5)
CALCIUM SERPL-MCNC: 6.9 MG/DL (ref 8.7–10.5)
CHLORIDE SERPL-SCNC: 107 MMOL/L (ref 95–110)
CHLORIDE SERPL-SCNC: 109 MMOL/L (ref 95–110)
CO2 SERPL-SCNC: 13 MMOL/L (ref 23–29)
CO2 SERPL-SCNC: 14 MMOL/L (ref 23–29)
CREAT SERPL-MCNC: 2.2 MG/DL (ref 0.5–1.4)
CREAT SERPL-MCNC: 2.7 MG/DL (ref 0.5–1.4)
DACRYOCYTES BLD QL SMEAR: ABNORMAL
DACRYOCYTES BLD QL SMEAR: ABNORMAL
DIFFERENTIAL METHOD BLD: ABNORMAL
DIFFERENTIAL METHOD BLD: ABNORMAL
EOSINOPHIL # BLD AUTO: 0 K/UL (ref 0–0.5)
EOSINOPHIL # BLD AUTO: ABNORMAL K/UL (ref 0–0.5)
EOSINOPHIL NFR BLD: 0 % (ref 0–8)
EOSINOPHIL NFR BLD: 0 % (ref 0–8)
ERYTHROCYTE [DISTWIDTH] IN BLOOD BY AUTOMATED COUNT: 16.8 % (ref 11.5–14.5)
ERYTHROCYTE [DISTWIDTH] IN BLOOD BY AUTOMATED COUNT: 16.9 % (ref 11.5–14.5)
EST. GFR  (NO RACE VARIABLE): 19 ML/MIN/1.73 M^2
EST. GFR  (NO RACE VARIABLE): 24 ML/MIN/1.73 M^2
GLUCOSE SERPL-MCNC: 159 MG/DL (ref 70–110)
GLUCOSE SERPL-MCNC: 223 MG/DL (ref 70–110)
HCT VFR BLD AUTO: 33.2 % (ref 37–48.5)
HCT VFR BLD AUTO: 33.2 % (ref 37–48.5)
HGB BLD-MCNC: 10.8 G/DL (ref 12–16)
HGB BLD-MCNC: 10.9 G/DL (ref 12–16)
IMM GRANULOCYTES # BLD AUTO: 0.38 K/UL (ref 0–0.04)
IMM GRANULOCYTES # BLD AUTO: ABNORMAL K/UL (ref 0–0.04)
IMM GRANULOCYTES NFR BLD AUTO: 2.7 % (ref 0–0.5)
IMM GRANULOCYTES NFR BLD AUTO: ABNORMAL % (ref 0–0.5)
LACTATE SERPL-SCNC: 1.9 MMOL/L (ref 0.5–2.2)
LACTATE SERPL-SCNC: 2.2 MMOL/L (ref 0.5–2.2)
LYMPHOCYTES # BLD AUTO: 0.7 K/UL (ref 1–4.8)
LYMPHOCYTES # BLD AUTO: ABNORMAL K/UL (ref 1–4.8)
LYMPHOCYTES NFR BLD: 16 % (ref 18–48)
LYMPHOCYTES NFR BLD: 5.2 % (ref 18–48)
MAGNESIUM SERPL-MCNC: 1.4 MG/DL (ref 1.6–2.6)
MCH RBC QN AUTO: 28.9 PG (ref 27–31)
MCH RBC QN AUTO: 29 PG (ref 27–31)
MCHC RBC AUTO-ENTMCNC: 32.5 G/DL (ref 32–36)
MCHC RBC AUTO-ENTMCNC: 32.8 G/DL (ref 32–36)
MCV RBC AUTO: 88 FL (ref 82–98)
MCV RBC AUTO: 89 FL (ref 82–98)
MONOCYTES # BLD AUTO: 0.8 K/UL (ref 0.3–1)
MONOCYTES # BLD AUTO: ABNORMAL K/UL (ref 0.3–1)
MONOCYTES NFR BLD: 5.4 % (ref 4–15)
MONOCYTES NFR BLD: 6 % (ref 4–15)
NEUTROPHILS # BLD AUTO: 12.1 K/UL (ref 1.8–7.7)
NEUTROPHILS NFR BLD: 75 % (ref 38–73)
NEUTROPHILS NFR BLD: 86.6 % (ref 38–73)
NEUTS BAND NFR BLD MANUAL: 3 %
NRBC BLD-RTO: 0 /100 WBC
NRBC BLD-RTO: 1 /100 WBC
OHS QRS DURATION: 66 MS
OHS QTC CALCULATION: 452 MS
PHOSPHATE SERPL-MCNC: 3.2 MG/DL (ref 2.7–4.5)
PLATELET # BLD AUTO: 202 K/UL (ref 150–450)
PLATELET # BLD AUTO: 207 K/UL (ref 150–450)
PLATELET BLD QL SMEAR: ABNORMAL
PLATELET BLD QL SMEAR: ABNORMAL
PMV BLD AUTO: 9.8 FL (ref 9.2–12.9)
PMV BLD AUTO: 9.8 FL (ref 9.2–12.9)
POCT GLUCOSE: 161 MG/DL (ref 70–110)
POCT GLUCOSE: 188 MG/DL (ref 70–110)
POCT GLUCOSE: 208 MG/DL (ref 70–110)
POIKILOCYTOSIS BLD QL SMEAR: SLIGHT
POIKILOCYTOSIS BLD QL SMEAR: SLIGHT
POTASSIUM SERPL-SCNC: 3.3 MMOL/L (ref 3.5–5.1)
POTASSIUM SERPL-SCNC: 4.1 MMOL/L (ref 3.5–5.1)
PROT SERPL-MCNC: 4.4 G/DL (ref 6–8.4)
RBC # BLD AUTO: 3.74 M/UL (ref 4–5.4)
RBC # BLD AUTO: 3.76 M/UL (ref 4–5.4)
SCHISTOCYTES BLD QL SMEAR: PRESENT
SODIUM SERPL-SCNC: 133 MMOL/L (ref 136–145)
SODIUM SERPL-SCNC: 134 MMOL/L (ref 136–145)
WBC # BLD AUTO: 14.01 K/UL (ref 3.9–12.7)
WBC # BLD AUTO: 16.23 K/UL (ref 3.9–12.7)

## 2024-11-24 PROCEDURE — 85025 COMPLETE CBC W/AUTO DIFF WBC: CPT | Performed by: NURSE PRACTITIONER

## 2024-11-24 PROCEDURE — 63600175 PHARM REV CODE 636 W HCPCS: Performed by: NURSE PRACTITIONER

## 2024-11-24 PROCEDURE — 85007 BL SMEAR W/DIFF WBC COUNT: CPT | Performed by: STUDENT IN AN ORGANIZED HEALTH CARE EDUCATION/TRAINING PROGRAM

## 2024-11-24 PROCEDURE — 63600175 PHARM REV CODE 636 W HCPCS: Performed by: INTERNAL MEDICINE

## 2024-11-24 PROCEDURE — 80048 BASIC METABOLIC PNL TOTAL CA: CPT | Mod: XB | Performed by: STUDENT IN AN ORGANIZED HEALTH CARE EDUCATION/TRAINING PROGRAM

## 2024-11-24 PROCEDURE — 25000003 PHARM REV CODE 250: Performed by: STUDENT IN AN ORGANIZED HEALTH CARE EDUCATION/TRAINING PROGRAM

## 2024-11-24 PROCEDURE — 27000207 HC ISOLATION

## 2024-11-24 PROCEDURE — 85027 COMPLETE CBC AUTOMATED: CPT | Performed by: STUDENT IN AN ORGANIZED HEALTH CARE EDUCATION/TRAINING PROGRAM

## 2024-11-24 PROCEDURE — 84100 ASSAY OF PHOSPHORUS: CPT | Performed by: NURSE PRACTITIONER

## 2024-11-24 PROCEDURE — 63600175 PHARM REV CODE 636 W HCPCS: Performed by: STUDENT IN AN ORGANIZED HEALTH CARE EDUCATION/TRAINING PROGRAM

## 2024-11-24 PROCEDURE — 97162 PT EVAL MOD COMPLEX 30 MIN: CPT

## 2024-11-24 PROCEDURE — 80053 COMPREHEN METABOLIC PANEL: CPT | Performed by: NURSE PRACTITIONER

## 2024-11-24 PROCEDURE — 97530 THERAPEUTIC ACTIVITIES: CPT

## 2024-11-24 PROCEDURE — 21400001 HC TELEMETRY ROOM

## 2024-11-24 PROCEDURE — 36415 COLL VENOUS BLD VENIPUNCTURE: CPT | Mod: XB | Performed by: STUDENT IN AN ORGANIZED HEALTH CARE EDUCATION/TRAINING PROGRAM

## 2024-11-24 PROCEDURE — 25000003 PHARM REV CODE 250: Performed by: NURSE PRACTITIONER

## 2024-11-24 PROCEDURE — 97166 OT EVAL MOD COMPLEX 45 MIN: CPT

## 2024-11-24 PROCEDURE — 51798 US URINE CAPACITY MEASURE: CPT

## 2024-11-24 PROCEDURE — 93010 ELECTROCARDIOGRAM REPORT: CPT | Mod: ,,, | Performed by: INTERNAL MEDICINE

## 2024-11-24 PROCEDURE — 25000003 PHARM REV CODE 250: Performed by: INTERNAL MEDICINE

## 2024-11-24 PROCEDURE — 36415 COLL VENOUS BLD VENIPUNCTURE: CPT | Performed by: NURSE PRACTITIONER

## 2024-11-24 PROCEDURE — 83735 ASSAY OF MAGNESIUM: CPT | Performed by: NURSE PRACTITIONER

## 2024-11-24 PROCEDURE — 93005 ELECTROCARDIOGRAM TRACING: CPT

## 2024-11-24 PROCEDURE — 83605 ASSAY OF LACTIC ACID: CPT | Performed by: STUDENT IN AN ORGANIZED HEALTH CARE EDUCATION/TRAINING PROGRAM

## 2024-11-24 PROCEDURE — A4217 STERILE WATER/SALINE, 500 ML: HCPCS | Performed by: STUDENT IN AN ORGANIZED HEALTH CARE EDUCATION/TRAINING PROGRAM

## 2024-11-24 RX ORDER — FAMOTIDINE 20 MG/1
20 TABLET, FILM COATED ORAL EVERY OTHER DAY
Status: DISCONTINUED | OUTPATIENT
Start: 2024-11-26 | End: 2024-11-29

## 2024-11-24 RX ORDER — FAMOTIDINE 20 MG/1
20 TABLET, FILM COATED ORAL DAILY
Status: DISCONTINUED | OUTPATIENT
Start: 2024-11-25 | End: 2024-11-24

## 2024-11-24 RX ADMIN — HEPARIN SODIUM 5000 UNITS: 5000 INJECTION INTRAVENOUS; SUBCUTANEOUS at 08:11

## 2024-11-24 RX ADMIN — FLUCONAZOLE 100 MG: 2 INJECTION, SOLUTION INTRAVENOUS at 05:11

## 2024-11-24 RX ADMIN — ATORVASTATIN CALCIUM 40 MG: 40 TABLET, FILM COATED ORAL at 08:11

## 2024-11-24 RX ADMIN — HEPARIN SODIUM 5000 UNITS: 5000 INJECTION INTRAVENOUS; SUBCUTANEOUS at 04:11

## 2024-11-24 RX ADMIN — SODIUM BICARBONATE: 84 INJECTION, SOLUTION INTRAVENOUS at 10:11

## 2024-11-24 RX ADMIN — SODIUM CHLORIDE, POTASSIUM CHLORIDE, SODIUM LACTATE AND CALCIUM CHLORIDE 1000 ML: 600; 310; 30; 20 INJECTION, SOLUTION INTRAVENOUS at 10:11

## 2024-11-24 RX ADMIN — MICONAZOLE NITRATE: 20 POWDER TOPICAL at 08:11

## 2024-11-24 RX ADMIN — ONDANSETRON 4 MG: 2 INJECTION INTRAMUSCULAR; INTRAVENOUS at 04:11

## 2024-11-24 RX ADMIN — SODIUM CHLORIDE, POTASSIUM CHLORIDE, SODIUM LACTATE AND CALCIUM CHLORIDE 1000 ML: 600; 310; 30; 20 INJECTION, SOLUTION INTRAVENOUS at 05:11

## 2024-11-24 RX ADMIN — ALPRAZOLAM 0.25 MG: 0.25 TABLET ORAL at 09:11

## 2024-11-24 RX ADMIN — MICONAZOLE NITRATE: 20 POWDER TOPICAL at 09:11

## 2024-11-24 RX ADMIN — LINEZOLID 600 MG: 600 INJECTION, SOLUTION INTRAVENOUS at 06:11

## 2024-11-24 RX ADMIN — HEPARIN SODIUM 5000 UNITS: 5000 INJECTION INTRAVENOUS; SUBCUTANEOUS at 05:11

## 2024-11-24 RX ADMIN — SODIUM CHLORIDE 500 ML: 9 INJECTION, SOLUTION INTRAVENOUS at 04:11

## 2024-11-24 RX ADMIN — ERTAPENEM 500 MG: 1 INJECTION INTRAMUSCULAR; INTRAVENOUS at 02:11

## 2024-11-24 RX ADMIN — LINEZOLID 600 MG: 600 INJECTION, SOLUTION INTRAVENOUS at 04:11

## 2024-11-24 RX ADMIN — ONDANSETRON 4 MG: 2 INJECTION INTRAMUSCULAR; INTRAVENOUS at 08:11

## 2024-11-24 RX ADMIN — ACETAMINOPHEN 650 MG: 325 TABLET ORAL at 08:11

## 2024-11-24 RX ADMIN — FAMOTIDINE 20 MG: 20 TABLET ORAL at 08:11

## 2024-11-24 RX ADMIN — HYDROCODONE BITARTRATE AND ACETAMINOPHEN 1 TABLET: 5; 325 TABLET ORAL at 08:11

## 2024-11-24 RX ADMIN — ARIPIPRAZOLE 10 MG: 5 TABLET ORAL at 08:11

## 2024-11-24 NOTE — CARE UPDATE
64 year old woman-    Previous urine culture-  06/17/24- ESBL Klebsiella  05/10- Enterococcus    11/09- klebsiella,-not esbl proteus,enterococcus  Cbc -wbc 24-  Plan -initially on Cefepime/Zyvox-  Will add Ertapenem- full note to follow

## 2024-11-24 NOTE — AI DETERIORATION ALERT
Artificial Intelligence Notification  [facility]  [id number] [place] [address] [address] Himanshu HASTINGS [id number]  Phone: [phone]    This documentation was triggered by an Artificial Intelligence Notification:    Admit Date: 2024   LOS: 1  Code Status: Full Code  : 1960  Age: 64 y.o.  Weight:   Wt Readings from Last 1 Encounters:   24 99.9 kg (220 lb 3.8 oz)        Sex: female  Bed: A509/A509 A  MRN: [id number]  Attending Physician: Christopher Borden MD     Date of Alert: 2024  Time AI Alert Received: 1252            Vitals:    24 1320   BP: (!) 91/46   Pulse: (!) 115   Resp:    Temp:      SpO2: 98 %      Artificial Intelligence alert discussed with Provider:     Name: Dr. Borden   Date/Time of Provider Notification: 1300      Patient Condition: Stable   Mild hypotension noted-BP 73/39 with . Pt is AAOx3, mildly drowsy with mild cognitive impairment. Skin warm and dry. 500ml RL bolus ordered and BP improved to 91/46. Dr. Borden at bedside.

## 2024-11-24 NOTE — ASSESSMENT & PLAN NOTE
-Hx ESBL, frequent UTIs, significant resistances (prior cultures at [facility] through Care everywhere, prior cultures as well in our system)  -Consult ID, appreciate reccs  -Currently on ertapenem and linezolid which should be sufficient to cover her  -Unfortunately the urine culture is marked as canceled, unsure of the reason --> called micro lab in [place], quantity was not sufficient for urine culture  -Ordering another UA reflex to culture although will have low sensitivity at this point  -Blood cultures pending

## 2024-11-24 NOTE — PLAN OF CARE
Problem: Adult Inpatient Plan of Care  Goal: Plan of Care Review  11/24/2024 0308 by Stas Luciano RN  Outcome: Progressing  11/24/2024 0307 by Stas Luciano RN  Outcome: Progressing  Goal: Patient-Specific Goal (Individualized)  11/24/2024 0308 by Stas Luciano RN  Outcome: Progressing  11/24/2024 0307 by Stas Luciano RN  Outcome: Progressing  Goal: Absence of Hospital-Acquired Illness or Injury  11/24/2024 0308 by Stas Luciano RN  Outcome: Progressing  11/24/2024 0307 by Stas Luciano RN  Outcome: Progressing  Goal: Optimal Comfort and Wellbeing  11/24/2024 0308 by Stas Luciano RN  Outcome: Progressing  11/24/2024 0307 by Stas Luciano RN  Outcome: Progressing  Goal: Readiness for Transition of Care  11/24/2024 0308 by Stas Luciano RN  Outcome: Progressing  11/24/2024 0307 by Stas Luciano RN  Outcome: Progressing     Problem: Bariatric Environmental Safety  Goal: Safety Maintained with Care  11/24/2024 0308 by Stas Luciano RN  Outcome: Progressing  11/24/2024 0307 by Stas Luciano RN  Outcome: Progressing     Problem: Infection  Goal: Absence of Infection Signs and Symptoms  11/24/2024 0308 by Stas Luciano RN  Outcome: Progressing  11/24/2024 0307 by Stas Luciano RN  Outcome: Progressing     Problem: Sepsis/Septic Shock  Goal: Optimal Coping  11/24/2024 0308 by Stas Luciano RN  Outcome: Progressing  11/24/2024 0307 by Stas Luciano RN  Outcome: Progressing  Goal: Absence of Bleeding  11/24/2024 0308 by Stas Luciano RN  Outcome: Progressing  11/24/2024 0307 by Stas Luciano RN  Outcome: Progressing  Goal: Blood Glucose Level Within Targeted Range  11/24/2024 0308 by Stas Luciano RN  Outcome: Progressing  11/24/2024 0307 by Luciano, Awshanay, RN  Outcome: Progressing  Goal: Absence of Infection Signs and Symptoms  11/24/2024 0308 by Stas Luciano, RN  Outcome: Progressing  11/24/2024 0307 by Stas Luciano, RN  Outcome: Progressing  Goal: Optimal Nutrition  Intake  11/24/2024 0308 by Stas Luciano RN  Outcome: Progressing  11/24/2024 0307 by Stas Luciano RN  Outcome: Progressing     Problem: Acute Kidney Injury/Impairment  Goal: Fluid and Electrolyte Balance  11/24/2024 0308 by Stas Luciano, RN  Outcome: Progressing  11/24/2024 0307 by Stas Luciano, RN  Outcome: Progressing  Goal: Improved Oral Intake  11/24/2024 0308 by Stas Luciano RN  Outcome: Progressing  11/24/2024 0307 by Stas Luciano RN  Outcome: Progressing  Goal: Effective Renal Function  11/24/2024 0308 by Stas Luciano RN  Outcome: Progressing  11/24/2024 0307 by Stas Luciano RN  Outcome: Progressing     Problem: Wound  Goal: Optimal Coping  11/24/2024 0308 by Stas Luciano, RN  Outcome: Progressing  11/24/2024 0307 by Stas Luciano, RN  Outcome: Progressing  Goal: Optimal Functional Ability  11/24/2024 0308 by Stas Luciano RN  Outcome: Progressing  11/24/2024 0307 by Stas Luciano RN  Outcome: Progressing  Goal: Absence of Infection Signs and Symptoms  11/24/2024 0308 by Stas Luciano, RN  Outcome: Progressing  11/24/2024 0307 by Stas Luciano, RN  Outcome: Progressing  Goal: Improved Oral Intake  11/24/2024 0308 by Stas Luciano, RN  Outcome: Progressing  11/24/2024 0307 by Stas Luciano, RN  Outcome: Progressing  Goal: Optimal Pain Control and Function  11/24/2024 0308 by Stas Luciano, RN  Outcome: Progressing  11/24/2024 0307 by Stas Luciano RN  Outcome: Progressing  Goal: Skin Health and Integrity  11/24/2024 0308 by Stas Luciano, RN  Outcome: Progressing  11/24/2024 0307 by Stas Luciano, RN  Outcome: Progressing  Goal: Optimal Wound Healing  11/24/2024 0308 by Stas Luciano, RN  Outcome: Progressing  11/24/2024 0307 by Stas Luciano RN  Outcome: Progressing     Problem: Skin Injury Risk Increased  Goal: Skin Health and Integrity  11/24/2024 0308 by Stas Luciano RN  Outcome: Progressing  11/24/2024 0307 by Stas Luciano RN  Outcome: Progressing

## 2024-11-24 NOTE — PROGRESS NOTES
Pharmacist Renal Dose Adjustment Note    Divya Bui is a 64 y.o. female being treated with the medication famotidine.    Patient Data:    Vital Signs (Most Recent):  Temp: 98.1 °F (36.7 °C) (11/24/24 0744)  Pulse: (!) 117 (11/24/24 0757)  Resp: 16 (11/24/24 0744)  BP: 104/68 (11/24/24 0744)  SpO2: 97 % (11/24/24 0744) Vital Signs (72h Range):  Temp:  [97.5 °F (36.4 °C)-98.6 °F (37 °C)]   Pulse:  [117-139]   Resp:  [16-26]   BP: ()/(42-80)   SpO2:  [96 %-100 %]      Recent Labs   Lab 11/19/24  1037 11/23/24  1145 11/24/24  0638   CREATININE 1.0 2.5* 2.2*     Serum creatinine: 2.2 mg/dL (H) 11/24/24 0638  Estimated creatinine clearance: 29.9 mL/min (A)    Medication: famotidine dose: 20 mg PO frequency every other day will be changed to medication: famotidine dose: 20 mg PO frequency: daily for CrCl 30-60 (±1%).    Pharmacist's Name: Maude Sanchez

## 2024-11-24 NOTE — PLAN OF CARE
PT EVAL complete. Required MIN A for bed mobility, MIN A for STS, MIN A for side steps with RW. Recommending Moderate vs Low intensity therapy pending progress.

## 2024-11-24 NOTE — ASSESSMENT & PLAN NOTE
Body mass index is 38.97 kg/m². Morbid obesity complicates all aspects of disease management from diagnostic modalities to treatment. Weight loss encouraged and health benefits explained to patient.

## 2024-11-24 NOTE — SUBJECTIVE & OBJECTIVE
Review of Systems   Constitutional:  Positive for fatigue and fever.   HENT: Negative.     Respiratory:  Negative for chest tightness and shortness of breath.    Cardiovascular:  Negative for chest pain and leg swelling.   Gastrointestinal:  Positive for abdominal pain. Negative for constipation, diarrhea and nausea.   Genitourinary:  Positive for dysuria. Negative for difficulty urinating.   Musculoskeletal: Negative.    Skin:  Negative for rash.   Neurological:  Negative for light-headedness and headaches.   Hematological:  Does not bruise/bleed easily.   Psychiatric/Behavioral:  Negative for agitation and behavioral problems.      Objective:     Vital Signs (Most Recent):  Temp: 97.8 °F (36.6 °C) (11/24/24 1622)  Pulse: (!) 114 (11/24/24 1622)  Resp: 18 (11/24/24 1622)  BP: (!) 92/51 (11/24/24 1622)  SpO2: 97 % (11/24/24 1622) Vital Signs (24h Range):  Temp:  [97.5 °F (36.4 °C)-98.3 °F (36.8 °C)] 97.8 °F (36.6 °C)  Pulse:  [113-138] 114  Resp:  [16-18] 18  SpO2:  [97 %-99 %] 97 %  BP: ()/(39-74) 92/51     Weight: 99.8 kg (220 lb 0.3 oz)  Body mass index is 38.97 kg/m².    Intake/Output Summary (Last 24 hours) at 11/24/2024 1752  Last data filed at 11/24/2024 1231  Gross per 24 hour   Intake 1294.26 ml   Output 0 ml   Net 1294.26 ml         Physical Exam  Constitutional:       General: She is not in acute distress.     Appearance: She is ill-appearing.      Comments: Lethargic   HENT:      Head: Normocephalic and atraumatic.      Mouth/Throat:      Mouth: Mucous membranes are moist.      Pharynx: Oropharynx is clear.   Eyes:      General: No scleral icterus.  Cardiovascular:      Rate and Rhythm: Normal rate and regular rhythm.      Heart sounds: No murmur heard.     No gallop.   Pulmonary:      Effort: Pulmonary effort is normal.      Breath sounds: Normal breath sounds.   Abdominal:      General: Bowel sounds are normal. There is no distension.      Tenderness: There is abdominal tenderness.       Comments: Tender throughout   Musculoskeletal:      Right lower leg: No edema.      Left lower leg: No edema.   Skin:     General: Skin is warm and dry.      Comments: See rash below  Present underneath the pannus, breasts, in the gluteal folds/perineal region   Neurological:      Mental Status: She is alert.      Comments: States the year is 2004, knows we are in [facility], knows her name  Slow to respond to questions, sleepy                                   Significant Labs: All pertinent labs within the past 24 hours have been reviewed.    Significant Imaging: I have reviewed all pertinent imaging results/findings within the past 24 hours.

## 2024-11-24 NOTE — NURSING
Pt's heart rate at 0256 126. It went up to 138 around midnight, and then went back to low 120's and stayed there. On-call provider notified - Provider ordered 500 ml fluid bolus.

## 2024-11-24 NOTE — HOSPITAL COURSE
"11/24:  Remains lethargic and intermittently confused, also hypotensive today to lowest of 73/39 taken on the right upper arm by the tech.  On my recheck with smaller size cuff she was 81/46, and then was 118/59 on her calf.  I am not sure about whether the low blood pressures are entirely accurate.  She continues to mentate the same.  Heart rates in the low 100s/110s.  Discussed with ICU and checked labs.  No evidence of bleeding.  Lactate is normal.  Have given 3 L of fluid  today.  Blood pressure this evening up to 92/51.   11/25/2024: transferred to ICU for pressor therapy. Levophed initiated in the evening. ID consulted for antibiotic recs.  11/26/2024: levophed weaned off this morning; stabilized thereafter. Deemed stable for transfer back to to telemetry.     11/27/2024  ELIZABETH steadily worsening. Restart IVFs. Consulted nephrology for guidance. Cultures pending. Still having multiple loose bowel movements. C. Diff ordered 11/23/2024 but still says "in process" in EPIC. Am currently on hold with Micro lab to inquire about results of study. disposition: [facility] once medically stable    11/28/2024  Mentation improved this morning. Procalcitonin rising. Discussed with micro lab in [place]. C.diff from 11/23/2024 confirmed to be positive. ID started PO Vanc Q6H. Nephrology evaluated. Added bicarb to IVFs. Labs this morning show improvement in renal function for the first time since admission. Appreciate Nephrology's recommendations/interventions. Urine cultures confirm ESBL E. Coli. Antibiotics narrowed by ID. disposition: [facility] once medically stable    11/29/2024  Labs steadily improving. Discussed case with Nephrology. There is some concern for vesicolic fistula given recurrent UTIs. Discussed with ID. Will order CT abdomen/pelvis with rectal contrast to further investigate.    11/30/2024  CT abdomen/pelvis shows no evidence of fistula. IV Antibiotics weaned. PO vancomycin started for 10d therapy. Patient now " "medically stable for discharge to SNF.    11/27/2024  ELIZABETH steadily worsening. Restart IVFs. Consulted nephrology for guidance. Cultures pending. Still having multiple loose bowel movements. C. Diff ordered 11/23/2024 but still says "in process" in EPIC. Am currently on hold with Micro lab to inquire about results of study. disposition: [facility] once medically stable    11/28/2024  Mentation improved this morning. Procalcitonin rising. Discussed with micro lab in [place]. C.diff from 11/23/2024 confirmed to be positive. ID started PO Vanc Q6H. Nephrology evaluated. Added bicarb to IVFs. Labs this morning show improvement in renal function for the first time since admission. Appreciate Nephrology's recommendations/interventions. Urine cultures confirm ESBL E. Coli. Antibiotics narrowed by ID. disposition: [facility] once medically stable    11/29/2024  Labs steadily improving. Discussed case with Nephrology. There is some concern for vesicolic fistula given recurrent UTIs. Discussed with ID. Will order CT abdomen/pelvis with rectal contrast to further investigate.    11/30/2024  CT abdomen/pelvis shows no evidence of fistula. IV Antibiotics weaned. PO vancomycin started for 10d therapy. Patient now medically stable for discharge to SNF.    12/01/2024  Orientation waxing and waning. Worse in the AM. However, has been consistently cooperative with PT/OT. Still a great candidate for SNF. Referral placed. Anticipate discharge to SNF Monday/early next week.    DISPOSITION: C. Diff being treated with PO Vanc.   Discharge to SNF with five additional days of PO vanc..     Patient seen and examined on day of discharge and stable for discharge to skilled nursing at [facility].  "

## 2024-11-24 NOTE — PT/OT/SLP EVAL
Physical Therapy Evaluation and Treatment    Patient Name: Divya Bui   MRN: [id number]  Recent Surgery: * No surgery found *      Recommendations:     Discharge Recommendations: Moderate Intensity Therapy (vs low intensity therapy pending progress)   Discharge Equipment Recommendations: to be determined by next level of care   Barriers to discharge: Increased level of assist    Assessment:     Divya Bui is a 64 y.o. female admitted with a medical diagnosis of Sepsis. She presents with the following impairments/functional limitations: weakness, impaired endurance, impaired functional mobility, gait instability, impaired balance, pain, decreased safety awareness, decreased lower extremity function, decreased ROM.    Rehab Prognosis: Good; patient would benefit from acute PT services to address these deficits and reach maximum level of function.    Plan:     During this hospitalization, patient to be seen 3 x/week to address the above listed problems via gait training, therapeutic activities, therapeutic exercises    Plan of Care Expires: 12/08/24    Subjective     Chief Complaint: Pt is agreed to participate, refused ambulation  Patient Comments/Goals: none stated  Pain/Comfort:  Pain Rating 1: 6/10  Location - Side 1: Bilateral  Location - Orientation 1: generalized  Location 1: abdomen  Pain Addressed 1: Reposition, Distraction  Pain Rating Post-Intervention 1: 6/10    Social History:  Living Environment: Patient lives with their son in a single story home with number of outside stair(s): 1  Prior Level of Function: Prior to admission, patient requires assistance with ADLs including bathing/dressing, driving and retired, and ambulated household and community distances using rollator  Equipment Used at Home: rollator, grab bar (walk in tub with seat)  DME owned (not currently used): none  Assistance Upon Discharge: family    Objective:     Communicated with nurse Jarvis/Rafael and [facility] chart review prior to  "session. Patient found HOB elevated with peripheral IV, telemetry, PureWick upon PT entry to room.    General Precautions: Standard, fall, contact   Orthopedic Precautions: N/A   Braces: N/A    Respiratory Status: Room air    Exams:  Cognition: Patient is oriented to Person, Place, Time, Situation  RLE ROM: WFL  RLE Strength:  Grossly 4/5  LLE ROM: WFL  LLE Strength:  Grossly 4/5  Sensation:    -       Intact  Skin Integrity/Edema:     -       Skin integrity: Visible skin intact    Functional Mobility:  Gait belt applied - Yes  Bed Mobility  Rolling Left: minimum assistance  Scooting: minimum assistance  Supine to Sit: minimum assistance for LE management and trunk management  Sit to Supine: minimum assistance for LE management and trunk management  Transfers  Sit to Stand: minimum assistance with rolling walker  Bed to Chair: pt refused  Gait  Patient ambulated 3 side steps to L with rolling walker and minimum assistance. Patient demonstrates unsteady gait. No c/o dizziness or SOB. Pt refused progression of gait. All lines remained intact throughout ambulation trail.  Balance  Sitting: contact guard assistance  Standing: minimum assistance    Therapeutic Activities and Exercises:   Pt educated on role of PT in acute care and POC. Educated on importance of OOB activities, activity pacing, and HEP (marching/hip flex, hip abd, heel slides/LAQ, quad sets, ankle pumps) in order to maintain/regain strength. Encouraged to sit up in chair for all meals. Educated on proper use of RW for safety and to reduce risk of falling. Educated on "call don't fall" policy and increased risk of falling due to weakness, instructed to utilize call bell for assistance with all transfers. Pt agreeable to all requests.    AM-PAC 6 CLICK MOBILITY  Total Score:16    Patient left HOB elevated with all lines intact, call button in reach, and bed alarm on.    GOALS:   Multidisciplinary Problems       Physical Therapy Goals          Problem: " Physical Therapy    Goal Priority Disciplines Outcome Interventions   Physical Therapy Goal     PT, PT/OT     Description: Goals to be met by 12/8/24.  1. Pt will complete bed mobility SBA.  2. Pt will complete sit to stand SBA.  3. Pt will ambulate 100ft SBA using RW.  4. Pt will increase AMPAC score by 2 points to progress functional mobility.                       History:     Past Medical History:   Diagnosis Date    Anemia     Anxiety     Basal cell carcinoma (BCC) of face 2/26/2020    Blood transfusion     Bowel incontinence     Depression     Esophageal stricture     GERD (gastroesophageal reflux disease)     Hypertension     Latent tuberculosis by skin test 2001    took INH    Liver nodule 1/6/2014    Morbid obesity with BMI of 45.0-49.9, adult     OA (osteoarthritis) of knee 12/12/2014    Pericardial effusion 9/4/2014       Past Surgical History:   Procedure Laterality Date    CHOLECYSTECTOMY      COLONOSCOPY N/A 3/6/2023    Procedure: COLONOSCOPY;  Surgeon: Beti Diallo MD;  Location: [facility];  Service: Endoscopy;  Laterality: N/A;    GASTRIC BYPASS      HERNIA REPAIR      INJECTION OF ANESTHETIC AGENT AROUND NERVE Bilateral 6/16/2023    Procedure: Bilateral Genicular nerve block with RN IV sedation;  Surgeon: Denis Lai MD;  Location: [facility];  Service: Pain Management;  Laterality: Bilateral;    INJECTION OF ANESTHETIC AGENT AROUND NERVE Bilateral 8/11/2023    Procedure: Bilateral Genicular nerve block with RN IV sedation;  Surgeon: Denis Lai MD;  Location: [facility];  Service: Pain Management;  Laterality: Bilateral;    RADIOFREQUENCY THERMOCOAGULATION Right 10/31/2023    Procedure: Right Genicular Nerve RFA with RN IV sedation;  Surgeon: Denis Lai MD;  Location: [facility];  Service: Pain Management;  Laterality: Right;    RADIOFREQUENCY THERMOCOAGULATION Left 11/14/2023    Procedure: Left Genicular Nerve RFA with RN IV sedation;  Surgeon: Denis Lai MD;   Location: [facility];  Service: Pain Management;  Laterality: Left;    right sholder surgery      SMALL INTESTINE SURGERY         Time Tracking:     PT Received On: 11/24/24  PT Start Time: 1340  PT Stop Time: 1405  PT Total Time (min): 25 min     Billable Minutes: Evaluation 15min and Therapeutic Activity 10min    11/24/2024

## 2024-11-24 NOTE — PROGRESS NOTES
Pharmacist Renal Dose Adjustment Note    Divya Bui is a 64 y.o. female being treated with the medication ertapenem.    Patient Data:    Vital Signs (Most Recent):  Temp: 98.1 °F (36.7 °C) (11/24/24 0744)  Pulse: (!) 117 (11/24/24 0757)  Resp: 16 (11/24/24 0744)  BP: 104/68 (11/24/24 0744)  SpO2: 97 % (11/24/24 0744) Vital Signs (72h Range):  Temp:  [97.5 °F (36.4 °C)-98.6 °F (37 °C)]   Pulse:  [117-139]   Resp:  [16-26]   BP: ()/(42-80)   SpO2:  [96 %-100 %]      Recent Labs   Lab 11/19/24  1037 11/23/24  1145 11/24/24  0638   CREATININE 1.0 2.5* 2.2*     Serum creatinine: 2.2 mg/dL (H) 11/24/24 0638  Estimated creatinine clearance: 29.9 mL/min (A)    Medication: ertapenem dose: 500 mg frequency every 24 hours will be changed to medication: ertapenem dose: 1000 mg frequency: every 24 hours, for CrCl >= 30 (±1%).     Pharmacist's Name: Maude Sanchez

## 2024-11-24 NOTE — PROGRESS NOTES
Pharmacist Renal Dose Adjustment Note    Divya Bui is a 64 y.o. female being treated with the medication ertapenem    Patient Data:    Vital Signs (Most Recent):  Temp: 97.5 °F (36.4 °C) (11/24/24 0041)  Pulse: (!) 138 (11/24/24 0041)  Resp: 18 (11/24/24 0041)  BP: 101/63 (11/24/24 0041)  SpO2: 97 % (11/24/24 0041) Vital Signs (72h Range):  Temp:  [97.5 °F (36.4 °C)-98.6 °F (37 °C)]   Pulse:  [122-139]   Resp:  [18-26]   BP: ()/(42-80)   SpO2:  [96 %-100 %]      Recent Labs   Lab 11/19/24  1037 11/23/24  1145   CREATININE 1.0 2.5*     Serum creatinine: 2.5 mg/dL (H) 11/23/24 1145  Estimated creatinine clearance: 26.3 mL/min (A)    Ertapenem 1 g q24h will be changed to ertapenem 500 mg q24h for CrCl <30 mL/min.     Pharmacist's Name: Brandi Cardenas  Pharmacist's Extension: [phone]

## 2024-11-24 NOTE — ASSESSMENT & PLAN NOTE
ELIZABETH is likely due to pre-renal azotemia due to dehydration. Baseline creatinine is  1.0 . Most recent creatinine and eGFR are listed below.  Recent Labs     11/23/24  1145 11/24/24  0638 11/24/24  1629   CREATININE 2.5* 2.2* 2.7*   EGFRNORACEVR 21* 24* 19*        Plan  - ELIZABETH is worsening. Will adjust treatment as follows: Hold  Lasix, Olmesartan/HCTZ  - Avoid nephrotoxins and renally dose meds for GFR listed above  - Monitor urine output, serial BMP, and adjust therapy as needed  - 3L IVF today  - likely secondary to sepsis, hypotension/prerenal etiology

## 2024-11-24 NOTE — ASSESSMENT & PLAN NOTE
Patients blood pressure range in the last 24 hours was: BP  Min: 73/39  Max: 155/74.The patient's inpatient anti-hypertensive regimen is listed below       Plan  Hold antihypertensives

## 2024-11-24 NOTE — PT/OT/SLP EVAL
Occupational Therapy Evaluation and Treatment    Name: Divya Bui  MRN: [id number]  Admitting Diagnosis: Sepsis  Recent Surgery: * No surgery found *      Recommendations:     Discharge Recommendations:  (LOW VS MOD INTENSITY DEPENDING ON PROGRESS)  Level of Assistance Recommended: 24 hours significant assistance  Discharge Equipment Recommendations: walker, rolling  Barriers to discharge:      Assessment:     Divya Bui is a 64 y.o. female with a medical diagnosis of Sepsis. She presents with performance deficits affecting function including weakness, impaired functional mobility, decreased safety awareness, gait instability, impaired endurance, impaired balance, impaired self care skills, decreased lower extremity function, decreased ROM.     Rehab Prognosis: Fair; patient would benefit from acute OT services to address these deficits and reach maximum level of function.    Plan:     Patient to be seen 2 x/week to address the above listed problems via self-care/home management, therapeutic activities, therapeutic exercises  Plan of Care Expires: 12/08/24  Plan of Care Reviewed with: patient    Subjective     Chief Complaint: DEBILITY AND GENERALIZED WEAKNESS  Patient Comments/Goals:   Pain/Comfort:  Pain Rating 1: 0/10    Patients cultural, spiritual, Judaism conflicts given the current situation:      Social History:  Living Environment: Patient lives with their son in a mobile home with number of outside stair(s): 5 STEPS  WITH B RAIL  Prior Level of Function: Prior to admission, patient was modified independent  Roles and Routines: Patient was driving and not working prior to admission.  Equipment Used at Home:  (WALK IN TUB;)  DME owned (not currently used): none  Assistance Upon Discharge: DEPENDING ON PROGRESS    Objective:     Communicated with NURSE SARMIENTO AND EPIC CHART REVIEW prior to session. Patient found HOB elevated with   upon OT entry to room.    General Precautions: Standard, fall, contact    Orthopedic Precautions: N/A   Braces: N/A    Respiratory Status: Room air    Occupational Performance      Bed Mobility:   Rolling/Turning to Left with minimum assistance  Rolling/Turning to Right with minimum assistance  Scooting anteriorly to EOB to have both feet planted on floor: minimum assistance  Supine to sit from left side of bed with minimum assistance  Sit to Supine with minimum assistance on L;EFT side of bed    Functional Mobility/Transfers:  Sit <> Stand Transfer with minimum assistance with rolling walker  Bed <> Chair Transfer using Step Transfer technique with minimum assistance with rolling walker  Functional Mobility: MIN A WITH SIDE STEPPING R<L A FEW STEPS    Activities of Daily Living:  Upper Body Dressing: minimum assistance  Lower Body Dressing: minimum assistance    Cognitive/Visual Perceptual:  Cognitive/Psychosocial Skills:    -     Oriented to: Person, Place, Time, Situation  -     Follows Commands/attention: Follows multistep  commands  -     Communication: clear/fluent  -     Memory: No Deficits noted  -     Safety awareness/insight to disability: intact    Physical Exam:  Upper Extremity Range of Motion:     -       Right Upper Extremity: WFL  -       Left Upper Extremity: WFL  Upper Extremity Strength:    -       Right Upper Extremity: mmt: 3/5 grossly  -       Left Upper Extremity: mmt: 3/5 grossly   Strength:    -       Right Upper Extremity: mmt: 3/5 grossly  -       Left Upper Extremity: mmt: 3/5 grossly    AMPAC 6 Click ADL:  AMPAC Total Score: 14    Treatment & Education:  Educated patient on importance of increased tolerance to upright position and direct impact on CV endurance and strength. Patient encouraged to sit up in bed with bed in chair position including for all meals.. Pt educated on hep. Encouraged patient to perform AROM TE to BUE throughout the day within all available planes of motion. Re enforced importance of utilizing call light to meet needs in room and  not attempt to get up without staff assistance. Patient verbalized understanding and agreed to comply.         Patient clear to stand pivot transfer with RN/PCT, assist xmin a  with bsc transfer with rolling walker .    Patient left with bed in chair position with all lines intact, call button in reach, and RN notified.    GOALS:   Multidisciplinary Problems       Occupational Therapy Goals          Problem: Occupational Therapy    Goal Priority Disciplines Outcome Interventions   Occupational Therapy Goal     OT, PT/OT     Description: O.T. GOAL TO BE MET BY 12-08-24  PT WILL TOLERATE 1 SET X 12 REPS B UE ROM EXERCISE  SBA WITH UE DRESSING  SBA WITH BSC TRANSFER                       History:     Past Medical History:   Diagnosis Date    Anemia     Anxiety     Basal cell carcinoma (BCC) of face 2/26/2020    Blood transfusion     Bowel incontinence     Depression     Esophageal stricture     GERD (gastroesophageal reflux disease)     Hypertension     Latent tuberculosis by skin test 2001    took INH    Liver nodule 1/6/2014    Morbid obesity with BMI of 45.0-49.9, adult     OA (osteoarthritis) of knee 12/12/2014    Pericardial effusion 9/4/2014         Past Surgical History:   Procedure Laterality Date    CHOLECYSTECTOMY      COLONOSCOPY N/A 3/6/2023    Procedure: COLONOSCOPY;  Surgeon: Beti Diallo MD;  Location: [facility];  Service: Endoscopy;  Laterality: N/A;    GASTRIC BYPASS      HERNIA REPAIR      INJECTION OF ANESTHETIC AGENT AROUND NERVE Bilateral 6/16/2023    Procedure: Bilateral Genicular nerve block with RN IV sedation;  Surgeon: Denis Lai MD;  Location: [facility];  Service: Pain Management;  Laterality: Bilateral;    INJECTION OF ANESTHETIC AGENT AROUND NERVE Bilateral 8/11/2023    Procedure: Bilateral Genicular nerve block with RN IV sedation;  Surgeon: Denis Lai MD;  Location: [facility];  Service: Pain Management;  Laterality: Bilateral;    RADIOFREQUENCY THERMOCOAGULATION  Right 10/31/2023    Procedure: Right Genicular Nerve RFA with RN IV sedation;  Surgeon: Denis Lai MD;  Location: [facility];  Service: Pain Management;  Laterality: Right;    RADIOFREQUENCY THERMOCOAGULATION Left 11/14/2023    Procedure: Left Genicular Nerve RFA with RN IV sedation;  Surgeon: Denis Lai MD;  Location: [facility];  Service: Pain Management;  Laterality: Left;    right sholder surgery      SMALL INTESTINE SURGERY         Time Tracking:     OT Date of Treatment: 11/24/24  OT Start Time: 1334  OT Stop Time: 1350  OT Total Time (min): 16 min    Billable Minutes: Evaluation 8 minutes and Therapeutic Activity 8 minutes    11/24/2024

## 2024-11-24 NOTE — PLAN OF CARE
Discussed poc with pt, pt verbalized understanding    Purposeful rounding every 2hours    Patient hypotensive throughout shift with no urine output. Bladder scan yielded 37 mL in bladder.   Cardiac monitoring in use, pt is NSR, tele monitor # [id number]  Blood glucose monitoring in effect. No insulin coverage needed throughout shift.   Fall precautions in place, remains injury free  Pt denies c/o pain  Pain and nausea under control with PRN meds    IVFs  Accurate I&Os  Abx given as prescribed  Bed locked at lowest position  Call light within reach    Chart check complete  Will cont with POC

## 2024-11-25 PROBLEM — G93.41 ENCEPHALOPATHY, METABOLIC: Status: ACTIVE | Noted: 2024-11-25

## 2024-11-25 PROBLEM — I95.9 HYPOTENSION: Status: ACTIVE | Noted: 2024-11-25

## 2024-11-25 LAB
ACINETOBACTER CALCOACETICUS/BAUMANNII COMPLEX: NOT DETECTED
ALBUMIN SERPL BCP-MCNC: 1.9 G/DL (ref 3.5–5.2)
ALP SERPL-CCNC: 68 U/L (ref 40–150)
ALT SERPL W/O P-5'-P-CCNC: 8 U/L (ref 10–44)
AMMONIA PLAS-SCNC: 26 UMOL/L (ref 10–50)
ANION GAP SERPL CALC-SCNC: 14 MMOL/L (ref 8–16)
APTT PPP: 35.6 SEC (ref 21–32)
AST SERPL-CCNC: 9 U/L (ref 10–40)
BACTERIA #/AREA URNS HPF: ABNORMAL /HPF
BACTEROIDES FRAGILIS: NOT DETECTED
BASOPHILS NFR BLD: 0 % (ref 0–1.9)
BILIRUB SERPL-MCNC: 0.3 MG/DL (ref 0.1–1)
BILIRUB UR QL STRIP: NEGATIVE
BNP SERPL-MCNC: 28 PG/ML (ref 0–99)
BUN SERPL-MCNC: 54 MG/DL (ref 8–23)
CALCIUM SERPL-MCNC: 6.9 MG/DL (ref 8.7–10.5)
CANDIDA ALBICANS: NOT DETECTED
CANDIDA AURIS: NOT DETECTED
CANDIDA GLABRATA: NOT DETECTED
CANDIDA KRUSEI: NOT DETECTED
CANDIDA PARAPSILOSIS: NOT DETECTED
CANDIDA TROPICALIS: NOT DETECTED
CHLORIDE SERPL-SCNC: 101 MMOL/L (ref 95–110)
CLARITY UR: ABNORMAL
CO2 SERPL-SCNC: 18 MMOL/L (ref 23–29)
COLOR UR: YELLOW
CREAT SERPL-MCNC: 2.9 MG/DL (ref 0.5–1.4)
CREAT UR-MCNC: 127.8 MG/DL (ref 15–325)
CRYPTOCOCCUS NEOFORMANS/GATTII: NOT DETECTED
CRYPTOSP AG STL QL IA: NEGATIVE
CTX-M GENE (ESBL PRODUCER): ABNORMAL
DIFFERENTIAL METHOD BLD: ABNORMAL
E COLI SXT1 STL QL IA: NEGATIVE
E COLI SXT2 STL QL IA: NEGATIVE
ENTEROBACTER CLOACAE COMPLEX: NOT DETECTED
ENTEROBACTERALES: NOT DETECTED
ENTEROCOCCUS FAECALIS: NOT DETECTED
ENTEROCOCCUS FAECIUM: NOT DETECTED
EOSINOPHIL NFR BLD: 0 % (ref 0–8)
ERYTHROCYTE [DISTWIDTH] IN BLOOD BY AUTOMATED COUNT: 16.8 % (ref 11.5–14.5)
ESCHERICHIA COLI: NOT DETECTED
EST. GFR  (NO RACE VARIABLE): 17 ML/MIN/1.73 M^2
FOLATE SERPL-MCNC: <2.2 NG/ML (ref 4–24)
G LAMBLIA AG STL QL IA: NEGATIVE
GLUCOSE SERPL-MCNC: 103 MG/DL (ref 70–110)
GLUCOSE UR QL STRIP: NEGATIVE
HAEMOPHILUS INFLUENZAE: NOT DETECTED
HCT VFR BLD AUTO: 29.7 % (ref 37–48.5)
HGB BLD-MCNC: 9.7 G/DL (ref 12–16)
HGB UR QL STRIP: ABNORMAL
HYALINE CASTS #/AREA URNS LPF: 0 /LPF
IMM GRANULOCYTES # BLD AUTO: ABNORMAL K/UL (ref 0–0.04)
IMM GRANULOCYTES NFR BLD AUTO: ABNORMAL % (ref 0–0.5)
IMP GENE (CARBAPENEM RESISTANT): ABNORMAL
INR PPP: 1.2 (ref 0.8–1.2)
KETONES UR QL STRIP: NEGATIVE
KLEBSIELLA AEROGENES: NOT DETECTED
KLEBSIELLA OXYTOCA: NOT DETECTED
KLEBSIELLA PNEUMONIAE GROUP: NOT DETECTED
KPC RESISTANCE GENE (CARBAPENEM): ABNORMAL
LACTATE SERPL-SCNC: 1.2 MMOL/L (ref 0.5–2.2)
LEUKOCYTE ESTERASE UR QL STRIP: ABNORMAL
LISTERIA MONOCYTOGENES: NOT DETECTED
LYMPHOCYTES NFR BLD: 6 % (ref 18–48)
MAGNESIUM SERPL-MCNC: 1.4 MG/DL (ref 1.6–2.6)
MCH RBC QN AUTO: 29.2 PG (ref 27–31)
MCHC RBC AUTO-ENTMCNC: 32.7 G/DL (ref 32–36)
MCR-1: ABNORMAL
MCV RBC AUTO: 90 FL (ref 82–98)
MEC A/C AND MREJ (MRSA): ABNORMAL
MEC A/C: NOT DETECTED
MICROSCOPIC COMMENT: ABNORMAL
MONOCYTES NFR BLD: 1 % (ref 4–15)
NDM GENE (CARBAPENEM RESISTANT): ABNORMAL
NEISSERIA MENINGITIDIS: NOT DETECTED
NEUTROPHILS NFR BLD: 89 % (ref 38–73)
NEUTS BAND NFR BLD MANUAL: 4 %
NITRITE UR QL STRIP: NEGATIVE
NRBC BLD-RTO: 1 /100 WBC
OHS QRS DURATION: 80 MS
OHS QTC CALCULATION: 478 MS
OSMOLALITY SERPL: 286 MOSM/KG (ref 275–295)
OSMOLALITY UR: 301 MOSM/KG (ref 50–1200)
OXA-48-LIKE (CARBAPENEM RESISTANT): ABNORMAL
PH UR STRIP: 6 [PH] (ref 5–8)
PLATELET # BLD AUTO: 156 K/UL (ref 150–450)
PLATELET BLD QL SMEAR: ABNORMAL
PMV BLD AUTO: 9.4 FL (ref 9.2–12.9)
POCT GLUCOSE: 100 MG/DL (ref 70–110)
POCT GLUCOSE: 108 MG/DL (ref 70–110)
POCT GLUCOSE: 132 MG/DL (ref 70–110)
POCT GLUCOSE: 166 MG/DL (ref 70–110)
POTASSIUM SERPL-SCNC: 3 MMOL/L (ref 3.5–5.1)
PROCALCITONIN SERPL IA-MCNC: 3.57 NG/ML
PROT SERPL-MCNC: 4.7 G/DL (ref 6–8.4)
PROT UR QL STRIP: ABNORMAL
PROTEUS SPECIES: NOT DETECTED
PROTHROMBIN TIME: 13.1 SEC (ref 9–12.5)
PSEUDOMONAS AERUGINOSA: NOT DETECTED
RBC # BLD AUTO: 3.32 M/UL (ref 4–5.4)
RBC #/AREA URNS HPF: 11 /HPF (ref 0–4)
SALMONELLA SP: NOT DETECTED
SERRATIA MARCESCENS: NOT DETECTED
SODIUM SERPL-SCNC: 133 MMOL/L (ref 136–145)
SODIUM UR-SCNC: 21 MMOL/L (ref 20–250)
SP GR UR STRIP: 1.02 (ref 1–1.03)
SQUAMOUS #/AREA URNS HPF: 11 /HPF
STAPHYLOCOCCUS AUREUS: NOT DETECTED
STAPHYLOCOCCUS EPIDERMIDIS: DETECTED
STAPHYLOCOCCUS LUGDUNESIS: NOT DETECTED
STAPHYLOCOCCUS SPECIES: ABNORMAL
STENOTROPHOMONAS MALTOPHILIA: NOT DETECTED
STREPTOCOCCUS AGALACTIAE: NOT DETECTED
STREPTOCOCCUS PNEUMONIAE: NOT DETECTED
STREPTOCOCCUS PYOGENES: NOT DETECTED
STREPTOCOCCUS SPECIES: NOT DETECTED
TROPONIN I SERPL DL<=0.01 NG/ML-MCNC: 0.01 NG/ML (ref 0–0.03)
UNIDENT CRYS URNS QL MICRO: ABNORMAL
URN SPEC COLLECT METH UR: ABNORMAL
UROBILINOGEN UR STRIP-ACNC: NEGATIVE EU/DL
VAN A/B (VRE GENE): ABNORMAL
VIM GENE (CARBAPENEM RESISTANT): ABNORMAL
VIT B12 SERPL-MCNC: 1370 PG/ML (ref 210–950)
WBC # BLD AUTO: 9.28 K/UL (ref 3.9–12.7)
WBC #/AREA URNS HPF: >100 /HPF (ref 0–5)
WBC CLUMPS URNS QL MICRO: ABNORMAL

## 2024-11-25 PROCEDURE — 85027 COMPLETE CBC AUTOMATED: CPT

## 2024-11-25 PROCEDURE — 20000000 HC ICU ROOM

## 2024-11-25 PROCEDURE — 97530 THERAPEUTIC ACTIVITIES: CPT | Mod: CQ

## 2024-11-25 PROCEDURE — 63600175 PHARM REV CODE 636 W HCPCS: Performed by: NURSE PRACTITIONER

## 2024-11-25 PROCEDURE — 85610 PROTHROMBIN TIME: CPT

## 2024-11-25 PROCEDURE — 97530 THERAPEUTIC ACTIVITIES: CPT

## 2024-11-25 PROCEDURE — 82746 ASSAY OF FOLIC ACID SERUM: CPT

## 2024-11-25 PROCEDURE — 85007 BL SMEAR W/DIFF WBC COUNT: CPT

## 2024-11-25 PROCEDURE — 87186 SC STD MICRODIL/AGAR DIL: CPT

## 2024-11-25 PROCEDURE — 82140 ASSAY OF AMMONIA: CPT

## 2024-11-25 PROCEDURE — 94761 N-INVAS EAR/PLS OXIMETRY MLT: CPT

## 2024-11-25 PROCEDURE — 87086 URINE CULTURE/COLONY COUNT: CPT

## 2024-11-25 PROCEDURE — 87088 URINE BACTERIA CULTURE: CPT

## 2024-11-25 PROCEDURE — 83880 ASSAY OF NATRIURETIC PEPTIDE: CPT

## 2024-11-25 PROCEDURE — 97110 THERAPEUTIC EXERCISES: CPT | Mod: CQ

## 2024-11-25 PROCEDURE — 83735 ASSAY OF MAGNESIUM: CPT

## 2024-11-25 PROCEDURE — 25000003 PHARM REV CODE 250: Performed by: HOSPITALIST

## 2024-11-25 PROCEDURE — 82607 VITAMIN B-12: CPT

## 2024-11-25 PROCEDURE — 87081 CULTURE SCREEN ONLY: CPT

## 2024-11-25 PROCEDURE — 51702 INSERT TEMP BLADDER CATH: CPT

## 2024-11-25 PROCEDURE — 25000003 PHARM REV CODE 250

## 2024-11-25 PROCEDURE — C1751 CATH, INF, PER/CENT/MIDLINE: HCPCS

## 2024-11-25 PROCEDURE — 25000003 PHARM REV CODE 250: Performed by: STUDENT IN AN ORGANIZED HEALTH CARE EDUCATION/TRAINING PROGRAM

## 2024-11-25 PROCEDURE — 02HV33Z INSERTION OF INFUSION DEVICE INTO SUPERIOR VENA CAVA, PERCUTANEOUS APPROACH: ICD-10-PCS | Performed by: HOSPITALIST

## 2024-11-25 PROCEDURE — A4217 STERILE WATER/SALINE, 500 ML: HCPCS | Performed by: STUDENT IN AN ORGANIZED HEALTH CARE EDUCATION/TRAINING PROGRAM

## 2024-11-25 PROCEDURE — 84300 ASSAY OF URINE SODIUM: CPT

## 2024-11-25 PROCEDURE — 85730 THROMBOPLASTIN TIME PARTIAL: CPT

## 2024-11-25 PROCEDURE — 63600175 PHARM REV CODE 636 W HCPCS: Mod: JZ,JG | Performed by: INTERNAL MEDICINE

## 2024-11-25 PROCEDURE — 25000003 PHARM REV CODE 250: Performed by: NURSE PRACTITIONER

## 2024-11-25 PROCEDURE — 97110 THERAPEUTIC EXERCISES: CPT

## 2024-11-25 PROCEDURE — 84145 PROCALCITONIN (PCT): CPT

## 2024-11-25 PROCEDURE — 83930 ASSAY OF BLOOD OSMOLALITY: CPT

## 2024-11-25 PROCEDURE — P9047 ALBUMIN (HUMAN), 25%, 50ML: HCPCS | Mod: JZ,JG | Performed by: INTERNAL MEDICINE

## 2024-11-25 PROCEDURE — 99223 1ST HOSP IP/OBS HIGH 75: CPT | Mod: NSCH,,, | Performed by: INTERNAL MEDICINE

## 2024-11-25 PROCEDURE — 36569 INSJ PICC 5 YR+ W/O IMAGING: CPT

## 2024-11-25 PROCEDURE — 25000003 PHARM REV CODE 250: Performed by: INTERNAL MEDICINE

## 2024-11-25 PROCEDURE — 83605 ASSAY OF LACTIC ACID: CPT

## 2024-11-25 PROCEDURE — 83935 ASSAY OF URINE OSMOLALITY: CPT

## 2024-11-25 PROCEDURE — 63600175 PHARM REV CODE 636 W HCPCS

## 2024-11-25 PROCEDURE — 82570 ASSAY OF URINE CREATININE: CPT

## 2024-11-25 PROCEDURE — 84484 ASSAY OF TROPONIN QUANT: CPT

## 2024-11-25 PROCEDURE — 63600175 PHARM REV CODE 636 W HCPCS: Performed by: STUDENT IN AN ORGANIZED HEALTH CARE EDUCATION/TRAINING PROGRAM

## 2024-11-25 PROCEDURE — 27000207 HC ISOLATION

## 2024-11-25 PROCEDURE — 80053 COMPREHEN METABOLIC PANEL: CPT

## 2024-11-25 PROCEDURE — 81000 URINALYSIS NONAUTO W/SCOPE: CPT

## 2024-11-25 RX ORDER — MUPIROCIN 20 MG/G
OINTMENT TOPICAL 2 TIMES DAILY
Status: COMPLETED | OUTPATIENT
Start: 2024-11-25 | End: 2024-11-29

## 2024-11-25 RX ORDER — SODIUM BICARBONATE 650 MG/1
650 TABLET ORAL 2 TIMES DAILY
Status: DISCONTINUED | OUTPATIENT
Start: 2024-11-26 | End: 2024-11-28

## 2024-11-25 RX ORDER — NOREPINEPHRINE BITARTRATE 0.02 MG/ML
INJECTION, SOLUTION INTRAVENOUS
Status: COMPLETED
Start: 2024-11-25 | End: 2024-11-25

## 2024-11-25 RX ORDER — ALBUMIN HUMAN 250 G/1000ML
25 SOLUTION INTRAVENOUS ONCE
Status: COMPLETED | OUTPATIENT
Start: 2024-11-25 | End: 2024-11-25

## 2024-11-25 RX ORDER — MIDODRINE HYDROCHLORIDE 5 MG/1
10 TABLET ORAL
Status: DISCONTINUED | OUTPATIENT
Start: 2024-11-25 | End: 2024-12-04 | Stop reason: HOSPADM

## 2024-11-25 RX ORDER — FOLIC ACID 1 MG/1
1 TABLET ORAL DAILY
Status: DISCONTINUED | OUTPATIENT
Start: 2024-11-25 | End: 2024-12-04 | Stop reason: HOSPADM

## 2024-11-25 RX ORDER — THIAMINE HCL 100 MG
100 TABLET ORAL DAILY
Status: DISCONTINUED | OUTPATIENT
Start: 2024-11-26 | End: 2024-12-04 | Stop reason: HOSPADM

## 2024-11-25 RX ORDER — NOREPINEPHRINE BITARTRATE 0.02 MG/ML
0-3 INJECTION, SOLUTION INTRAVENOUS CONTINUOUS
Status: DISCONTINUED | OUTPATIENT
Start: 2024-11-25 | End: 2024-11-26

## 2024-11-25 RX ORDER — MIDODRINE HYDROCHLORIDE 5 MG/1
5 TABLET ORAL
Status: DISCONTINUED | OUTPATIENT
Start: 2024-11-25 | End: 2024-11-25

## 2024-11-25 RX ORDER — MAGNESIUM SULFATE HEPTAHYDRATE 40 MG/ML
2 INJECTION, SOLUTION INTRAVENOUS ONCE
Status: COMPLETED | OUTPATIENT
Start: 2024-11-25 | End: 2024-11-25

## 2024-11-25 RX ADMIN — NOREPINEPHRINE BITARTRATE 0.02 MCG/KG/MIN: 0.02 INJECTION, SOLUTION INTRAVENOUS at 02:11

## 2024-11-25 RX ADMIN — FLUCONAZOLE 100 MG: 2 INJECTION, SOLUTION INTRAVENOUS at 03:11

## 2024-11-25 RX ADMIN — MUPIROCIN: 20 OINTMENT TOPICAL at 09:11

## 2024-11-25 RX ADMIN — MAGNESIUM SULFATE HEPTAHYDRATE 2 G: 40 INJECTION, SOLUTION INTRAVENOUS at 06:11

## 2024-11-25 RX ADMIN — MICONAZOLE NITRATE: 20 POWDER TOPICAL at 08:11

## 2024-11-25 RX ADMIN — POTASSIUM BICARBONATE 60 MEQ: 978 TABLET, EFFERVESCENT ORAL at 05:11

## 2024-11-25 RX ADMIN — SODIUM CHLORIDE 250 ML: 9 INJECTION, SOLUTION INTRAVENOUS at 07:11

## 2024-11-25 RX ADMIN — ATORVASTATIN CALCIUM 40 MG: 40 TABLET, FILM COATED ORAL at 07:11

## 2024-11-25 RX ADMIN — ACETAMINOPHEN 650 MG: 325 TABLET ORAL at 12:11

## 2024-11-25 RX ADMIN — ARIPIPRAZOLE 10 MG: 5 TABLET ORAL at 07:11

## 2024-11-25 RX ADMIN — MIDODRINE HYDROCHLORIDE 10 MG: 5 TABLET ORAL at 04:11

## 2024-11-25 RX ADMIN — MUPIROCIN: 20 OINTMENT TOPICAL at 08:11

## 2024-11-25 RX ADMIN — PROCHLORPERAZINE EDISYLATE 5 MG: 5 INJECTION INTRAMUSCULAR; INTRAVENOUS at 08:11

## 2024-11-25 RX ADMIN — ERTAPENEM 500 MG: 1 INJECTION INTRAMUSCULAR; INTRAVENOUS at 03:11

## 2024-11-25 RX ADMIN — MIDODRINE HYDROCHLORIDE 5 MG: 5 TABLET ORAL at 01:11

## 2024-11-25 RX ADMIN — ALBUMIN (HUMAN) 25 G: 5 SOLUTION INTRAVENOUS at 01:11

## 2024-11-25 RX ADMIN — ONDANSETRON 4 MG: 2 INJECTION INTRAMUSCULAR; INTRAVENOUS at 07:11

## 2024-11-25 RX ADMIN — HEPARIN SODIUM 5000 UNITS: 5000 INJECTION INTRAVENOUS; SUBCUTANEOUS at 02:11

## 2024-11-25 RX ADMIN — LINEZOLID 600 MG: 600 INJECTION, SOLUTION INTRAVENOUS at 04:11

## 2024-11-25 RX ADMIN — LINEZOLID 600 MG: 600 INJECTION, SOLUTION INTRAVENOUS at 06:11

## 2024-11-25 RX ADMIN — HEPARIN SODIUM 5000 UNITS: 5000 INJECTION INTRAVENOUS; SUBCUTANEOUS at 11:11

## 2024-11-25 RX ADMIN — MIDODRINE HYDROCHLORIDE 10 MG: 5 TABLET ORAL at 11:11

## 2024-11-25 RX ADMIN — FOLIC ACID 1 MG: 1 TABLET ORAL at 07:11

## 2024-11-25 RX ADMIN — SODIUM BICARBONATE: 84 INJECTION, SOLUTION INTRAVENOUS at 06:11

## 2024-11-25 RX ADMIN — HEPARIN SODIUM 5000 UNITS: 5000 INJECTION INTRAVENOUS; SUBCUTANEOUS at 05:11

## 2024-11-25 RX ADMIN — MIDODRINE HYDROCHLORIDE 10 MG: 5 TABLET ORAL at 07:11

## 2024-11-25 RX ADMIN — MICONAZOLE NITRATE: 20 POWDER TOPICAL at 09:11

## 2024-11-25 NOTE — ASSESSMENT & PLAN NOTE
This patient does have evidence of infective focus  My overall impression is sepsis.  Source: Urinary Tract  Antibiotics given-   Antibiotics (72h ago, onward)      Start     Stop Route Frequency Ordered    11/25/24 0230  ertapenem (INVANZ) 500 mg in 0.9% NaCl 100 mL IVPB         -- IV Every 24 hours (non-standard times) 11/24/24 1342    11/23/24 1745  linezolid 600 mg/300 mL IVPB 600 mg         -- IV Every 12 hours (non-standard times) 11/23/24 1640          Latest lactate reviewed-  Recent Labs   Lab 11/24/24  1718   LACTATE 1.9     Organ dysfunction indicated by Acute kidney injury and Encephalopathy  Fluid challenge was provided in ED on 11/23  Source control achieved by: antibiotics  - moving to ICU for recurrent hypotension, has been given Midodrine, albumin w/marginal improvement  - will assess on arrival, low threshold for pressor support

## 2024-11-25 NOTE — ASSESSMENT & PLAN NOTE
Nutrition Problem  Inadequate energy intake    Related to (etiology):   Physiological state    Signs and Symptoms (as evidenced by):   Pt admitted with sepsis, 0% intake recorded. Altered mental status.      Interventions:  Collaboration with other providers  [employer]- Boost Plus TID     Nutrition Diagnosis Status:   New

## 2024-11-25 NOTE — CONSULTS
"  [facility] - Med Surg  Adult Nutrition  Consult Note    SUMMARY     Recommendations    Recommendation:   1. Continue current diabetic 2000 kcal diet.   2. Addition of Boost Plus TID.   3. Monitor weight/labs.   4. RD to follow and monitor intake    Goals:   Pt intake >/=50% intake by RD follow up    Nutrition Goal Status: new  Communication of RD Recs: other (comment) (POC)    Assessment and Plan    ID  * Sepsis  Nutrition Problem  Inadequate energy intake    Related to (etiology):   Physiological state    Signs and Symptoms (as evidenced by):   Pt admitted with sepsis, 0% intake recorded. Altered mental status.      Interventions:  Collaboration with other providers  [employer]- Boost Plus TID     Nutrition Diagnosis Status:   New           Reason for Assessment    Reason For Assessment: consult (Malnutrition)  Diagnosis: infection/sepsis (sepsis)  General Information Comments: Pt is recieving diabetic 2000 kcal diet with 0% intake recorded. Per MD note: "Intermittently confused and lethargic , per MD note today Mentating the same (mildly confused but interacting appropriately)" PIV. Sumit Score: 15 moisture associated dermatitis right lower quadrant perineum NFPE not completed 2/2 remote weekend coverage  Nutrition Discharge Planning: d/c TBD    Nutrition Risk Screen    Nutrition Risk Screen: no indicators present    Nutrition/Diet History    Food Preferences: TAM  Spiritual, Cultural Beliefs, Evangelical Practices, Values that Affect Care: no  Food Allergies: NKFA  Factors Affecting Nutritional Intake: decreased appetite, impaired cognitive status/motor control    Anthropometrics    Temp: 98.6 °F (37 °C)  Height Method: Stated  Height: 5' 3" (160 cm)  Height (inches): 63 in  Weight Method: Bed Scale  Weight: 99.8 kg (220 lb 0.3 oz)  Weight (lb): 220.02 lb  Ideal Body Weight (IBW), Female: 115 lb  % Ideal Body Weight, Female (lb): 191.32 %  BMI (Calculated): 39  BMI Grade: 35 - 39.9 - obesity - grade II   "     Lab/Procedures/Meds    Pertinent Labs Reviewed: reviewed  Pertinent Labs Comments: Na 133, CO2 14, BUN 51, Cr 2.7, eGFR 19, Glu 159, Ca 6.8, Mag 1.4, total Pro 4.4, Alb 1.4, AST 7, ALT 7  Pertinent Medications Reviewed: reviewed  Pertinent Medications Comments: 100 mL/hr sterile water with Na Bicarb, aripiprazole, statin, invanz, famotidine, duflucan, heparin, lenzolid, miconazole        Estimated/Assessed Needs    Weight Used For Calorie Calculations: 99.8 kg (220 lb 0.3 oz)  Energy Calorie Requirements (kcal): 1959  Energy Need Method: [place]-St Jeor (x1.3)     Weight Used For Protein Calculations: 99.8 kg (220 lb 0.3 oz)     Estimated Fluid Requirement Method: RDA Method (or PER MD)  RDA Method (mL): 1959         Nutrition Prescription Ordered    Current Diet Order: Diabetic 2000 kcal    Evaluation of Received Nutrient/Fluid Intake    IV Fluid (mL): 2400 (100 mL/hr sterile water with Na bicarb)  I/O: +2746.3  Energy Calories Required: not meeting needs  Protein Required: not meeting needs  Fluid Required: not meeting needs  Comments: LBM  % Intake of Estimated Energy Needs: 0 - 25 %  % Meal Intake: 0 - 25 %    Nutrition Risk    Level of Risk/Frequency of Follow-up:  (2x/week)       Monitor and Evaluation    Food and Nutrient Intake: food and beverage intake, energy intake  Food and Nutrient Adminstration: diet order  Knowledge/Beliefs/Attitudes: food and nutrition knowledge/skill  Physical Activity and Function: nutrition-related ADLs and IADLs  Anthropometric Measurements: weight, body mass index, weight change  Biochemical Data, Medical Tests and Procedures: electrolyte and renal panel, gastrointestinal profile, glucose/endocrine profile, inflammatory profile, lipid profile       Nutrition Follow-Up    RD Follow-up?: Yes

## 2024-11-25 NOTE — PT/OT/SLP PROGRESS
"Physical Therapy  Treatment    Divya Bui   MRN: [id number]   Admitting Diagnosis: Sepsis    PT Received On: 11/25/24  PT Start Time: 0800     PT Stop Time: 0830    PT Total Time (min): 30 min       Billable Minutes:  Therapeutic Activity 15 and Therapeutic Exercise 15    Treatment Type: Treatment  PT/PTA: PTA     Number of PTA visits since last PT visit: 1       General Precautions: Standard, contact, fall  Orthopedic Precautions: N/A  Braces: N/A  Respiratory Status: Room air    Spiritual, Cultural Beliefs, Episcopal Practices, Values that Affect Care: no    Subjective:  Communicated with patient's nurse, Divya, and completed Epic chart review prior to session.  Patient agreed to PT session.   "I am so tired."    Pain/Comfort  Pain Rating 1: 0/10  Pain Rating Post-Intervention 1: 0/10    Objective:   Patient found with: bed alarm, blood pressure cuff, drummond catheter, peripheral IV, pulse ox (continuous), telemetry    BP assessed at initiation of session. Found to be 74/49 mmHg.  At this time, it was deemed too unsafe to get patient EOB or OOB due to severe hypotension.   Continued tx session with light supine strengthening exercises and adequate rest breaks between trials.     Supine AROM TE to BLE x10 reps:    Quad sets   Glute sets   SAQ     Supine AROM TE to BLE x15 reps:    AP    Supine scoot towards HOB: Total A of 2    Educated patient on importance of increased tolerance to upright position and direct impact on CV endurance and strength. Patient encouraged to utilize elevated HOB to simulate chair position until able to safely complete chair T/F. Patient given a minimum goal of majority of the day to be spent in upright, especially with all meals. Encouraged patient to perform AROM TE to BLE throughout the day within all available planes of motion. Re enforced importance of utilizing call light to meet needs in room and not attempt to get up without staff assistance. Patient verbalized understanding but " unsure of level of retention due to current cognitive status.       AM-PAC 6 CLICK MOBILITY  How much help from another person does this patient currently need?   1 = Unable, Total/Dependent Assistance  2 = A lot, Maximum/Moderate Assistance  3 = A little, Minimum/Contact Guard/Supervision  4 = None, Modified [place]/Independent    Turning over in bed (including adjusting bedclothes, sheets and blankets)?: 1 (NT)  Sitting down on and standing up from a chair with arms (e.g., wheelchair, bedside commode, etc.): 1 (NT)  Moving from lying on back to sitting on the side of the bed?: 1 (NT)  Moving to and from a bed to a chair (including a wheelchair)?: 1 (NT)  Need to walk in hospital room?: 1 (NT)  Climbing 3-5 steps with a railing?: 1 (NT)  Basic Mobility Total Score: 6    AM-PAC Raw Score CMS G-Code Modifier Level of Impairment Assistance   6 % Total / Unable   7 - 9 CM 80 - 100% Maximal Assist   10 - 14 CL 60 - 80% Moderate Assist   15 - 19 CK 40 - 60% Moderate Assist   20 - 22 CJ 20 - 40% Minimal Assist   23 CI 1-20% SBA / CGA   24 CH 0% Independent/ Mod I     Patient left with bed in chair position with all lines intact, call button in reach, bed alarm on, and nurse notified.    Assessment:  Divya Bui is a 64 y.o. female with a medical diagnosis of Sepsis and presents with overall decline in functional mobility. Patient would continue to benefit from skilled PT to address functional limitations listed below in order to return to PLOF/decrease caregiver burden.     Rehab identified problem list/impairments: weakness, impaired endurance, impaired self care skills, impaired functional mobility, impaired balance, impaired cardiopulmonary response to activity, decreased safety awareness, impaired cognition, impaired skin, decreased lower extremity function, decreased ROM    Rehab potential is fair.    Activity tolerance: Fair    Discharge recommendations: Moderate Intensity Therapy      Barriers to  discharge:      Equipment recommendations: to be determined by next level of care     GOALS:   Multidisciplinary Problems       Physical Therapy Goals          Problem: Physical Therapy    Goal Priority Disciplines Outcome Interventions   Physical Therapy Goal     PT, PT/OT     Description: Goals to be met by 12/8/24.  1. Pt will complete bed mobility SBA.  2. Pt will complete sit to stand SBA.  3. Pt will ambulate 100ft SBA using RW.  4. Pt will increase AMPAC score by 2 points to progress functional mobility.                       PLAN:    Patient to be seen 3 x/week to address the above listed problems via gait training, therapeutic activities, therapeutic exercises  Plan of Care expires: 12/08/24  Plan of Care reviewed with: patient         11/25/2024

## 2024-11-25 NOTE — NURSING
BP done manually at 1242 was 82/56. Pt is oriented x3, and slightly confused (this is how her orientation has been since admit). Currently has cont bicarb @100ml/hr going. Provider notified.

## 2024-11-25 NOTE — SUBJECTIVE & OBJECTIVE
Past Medical History:   Diagnosis Date    Anemia     Anxiety     Basal cell carcinoma (BCC) of face 2/26/2020    Blood transfusion     Bowel incontinence     Depression     Esophageal stricture     GERD (gastroesophageal reflux disease)     Hypertension     Latent tuberculosis by skin test 2001    took INH    Liver nodule 1/6/2014    Morbid obesity with BMI of 45.0-49.9, adult     OA (osteoarthritis) of knee 12/12/2014    Pericardial effusion 9/4/2014       Past Surgical History:   Procedure Laterality Date    CHOLECYSTECTOMY      COLONOSCOPY N/A 3/6/2023    Procedure: COLONOSCOPY;  Surgeon: Beti Diallo MD;  Location: [facility];  Service: Endoscopy;  Laterality: N/A;    GASTRIC BYPASS      HERNIA REPAIR      INJECTION OF ANESTHETIC AGENT AROUND NERVE Bilateral 6/16/2023    Procedure: Bilateral Genicular nerve block with RN IV sedation;  Surgeon: Denis Lai MD;  [facility];  Service: Pain Management;  Laterality: Bilateral;    INJECTION OF ANESTHETIC AGENT AROUND NERVE Bilateral 8/11/2023    Procedure: Bilateral Genicular nerve block with RN IV sedation;  Surgeon: Denis Lai MD;  Location: [facility];  Service: Pain Management;  Laterality: Bilateral;    RADIOFREQUENCY THERMOCOAGULATION Right 10/31/2023    Procedure: Right Genicular Nerve RFA with RN IV sedation;  Surgeon: Denis Lai MD;  Location: [facility];  Service: Pain Management;  Laterality: Right;    RADIOFREQUENCY THERMOCOAGULATION Left 11/14/2023    Procedure: Left Genicular Nerve RFA with RN IV sedation;  Surgeon: Denis Lai MD;  Location: [facility];  Service: Pain Management;  Laterality: Left;    right sholder surgery      SMALL INTESTINE SURGERY         Review of patient's allergies indicates:   Allergen Reactions    Metronidazole hcl Other (See Comments)     Mouth ulcers developed with Flagyl  Oral sores.  Mouth ulcers developed with Flagyl       Family History       Problem Relation (Age of Onset)     Crohn's disease Sister, Brother, Other    Diabetes Sister, Sister    Liver cancer Father, Sister    Lung cancer Mother          Tobacco Use    Smoking status: Never    Smokeless tobacco: Never   Substance and Sexual Activity    Alcohol use: Not Currently    Drug use: No    Sexual activity: Not Currently     Partners: Male     Birth control/protection: Post-menopausal         Review of Systems   Constitutional: Negative.    HENT: Negative.     Eyes: Negative.    Respiratory: Negative.     Gastrointestinal:  Positive for abdominal pain. Negative for nausea and vomiting.   Genitourinary:  Positive for decreased urine volume and dysuria.   Musculoskeletal:  Positive for back pain and gait problem.   Skin: Negative.    Neurological:  Positive for weakness.   Hematological: Negative.    Psychiatric/Behavioral: Negative.       Objective:     Vital Signs (Most Recent):  Temp: 97.5 °F (36.4 °C) (11/25/24 0023)  Pulse: 98 (11/25/24 0245)  Resp: 18 (11/25/24 0023)  BP: 97/67 (11/25/24 0245)  SpO2: 96 % (11/25/24 0245) Vital Signs (24h Range):  Temp:  [97.5 °F (36.4 °C)-98.6 °F (37 °C)] 97.5 °F (36.4 °C)  Pulse:  [] 98  Resp:  [16-18] 18  SpO2:  [17 %-98 %] 96 %  BP: ()/(39-68) 97/67     Weight: 99.8 kg (220 lb 0.3 oz)  Body mass index is 38.97 kg/m².      Intake/Output Summary (Last 24 hours) at 11/25/2024 0317  Last data filed at 11/25/2024 0248  Gross per 24 hour   Intake 1829.38 ml   Output 0 ml   Net 1829.38 ml        Physical Exam  Vitals and nursing note reviewed.   Constitutional:       General: She is awake.      Appearance: She is morbidly obese. She is ill-appearing.      Interventions: Nasal cannula in place.   HENT:      Head: Normocephalic and atraumatic.      Mouth/Throat:      Mouth: Mucous membranes are moist.      Pharynx: Oropharynx is clear.   Eyes:      Extraocular Movements: Extraocular movements intact.      Conjunctiva/sclera: Conjunctivae normal.      Pupils: Pupils are equal, round, and  reactive to light.   Cardiovascular:      Rate and Rhythm: Normal rate and regular rhythm.      Pulses: Normal pulses.      Heart sounds: No murmur heard.  Pulmonary:      Effort: No respiratory distress.      Breath sounds: Examination of the right-upper field reveals rales. Examination of the left-upper field reveals rales. Examination of the right-lower field reveals decreased breath sounds. Examination of the left-lower field reveals decreased breath sounds. Decreased breath sounds and rales present.   Abdominal:      General: Abdomen is protuberant. Bowel sounds are normal.      Palpations: Abdomen is soft.      Tenderness: There is generalized abdominal tenderness.   Musculoskeletal:      Cervical back: Normal range of motion and neck supple.      Right lower leg: No edema.      Left lower leg: No edema.   Skin:     Findings: Wound present.      Comments: See media   Neurological:      Mental Status: She is alert.      GCS: GCS eye subscore is 4. GCS verbal subscore is 4. GCS motor subscore is 6.   Psychiatric:         Behavior: Behavior is cooperative.          Vents:       Lines/Drains/Airways       Peripheral Intravenous Line  Duration                  Peripheral IV - Single Lumen 11/23/24 1131 20 G Posterior;Right Wrist 1 day         Peripheral IV - Single Lumen 11/23/24 1327 20 G Left Antecubital 1 day                    Significant Labs:    CBC/Anemia Profile:  Recent Labs   Lab 11/23/24  1145 11/24/24  0638 11/24/24  1718   WBC 24.61* 14.01* 16.23*   HGB 13.0 10.8* 10.9*   HCT 39.4 33.2* 33.2*    202 207   MCV 88 89 88   RDW 17.2* 16.8* 16.9*        Chemistries:  Recent Labs   Lab 11/23/24  1145 11/24/24  0638 11/24/24  1629    134* 133*   K 3.5 3.3* 4.1    109 107   CO2 11* 13* 14*   BUN 43* 47* 51*   CREATININE 2.5* 2.2* 2.7*   CALCIUM 8.3* 6.9* 6.8*   ALBUMIN 2.3* 1.4*  --    PROT 6.7 4.4*  --    BILITOT 0.5 0.3  --    ALKPHOS 110 69  --    ALT 13 7*  --    AST 8* 7*  --    MG  --   "1.4*  --    PHOS  --  3.2  --        A1C:   Recent Labs   Lab 11/23/24  1506   HGBA1C 6.0*     Coagulation: No results for input(s): "PT", "INR", "APTT" in the last 48 hours.  Lactic Acid:   Recent Labs   Lab 11/23/24 1955 11/23/24  2354 11/24/24  1718   LACTATE 2.2 2.2 1.9     Urine Culture: No results for input(s): "LABURIN" in the last 48 hours.  Urine Studies:   Recent Labs   Lab 11/23/24  1251   COLORU Yellow   APPEARANCEUA Cloudy*   PHUR 6.0   SPECGRAV 1.015   PROTEINUA 1+*   GLUCUA Negative   KETONESU Negative   BILIRUBINUA Negative   OCCULTUA 1+*   NITRITE Positive*   UROBILINOGEN Negative   LEUKOCYTESUR 3+*   RBCUA 2   WBCUA >100*   BACTERIA Moderate*   SQUAMEPITHEL 2   HYALINECASTS 0     All pertinent labs within the past 24 hours have been reviewed.    Significant Imaging:   I have reviewed all pertinent imaging results/findings within the past 24 hours.  "

## 2024-11-25 NOTE — ASSESSMENT & PLAN NOTE
Anemia is likely due to Iron deficiency. Most recent hemoglobin and hematocrit are listed below.  Recent Labs     11/23/24  1145 11/24/24  0638 11/24/24  1718   HGB 13.0 10.8* 10.9*   HCT 39.4 33.2* 33.2*     - hx gastric bypass surgery, JOSE, B12/folate def  - is normochromic, normocytic now  - will check B12/folate  - start vitamin supplements

## 2024-11-25 NOTE — ASSESSMENT & PLAN NOTE
Lab Results   Component Value Date    HGBA1C 6.0 (H) 11/23/2024     - has been on diabetic diet  - SSI with scheduled accuchecks  - has been on low correction scale with improved control  - adjust as indicated

## 2024-11-25 NOTE — HPI
Divya Bui is a 64-year-old female with a PMHx of HTN, GERD, morbid obesity s/p gastric bypass, depression, anxiety, esophageal stricture, ESBL UTI, who was admitted on 11/23 by hospital medicine for UTI after presenting to ED w/confusion, lethargy, generalized weakness. Pt was hospitalized with Sepsis due to UTI and ELIZABETH from 11/9-11/12/24 on IV Rocephin. CT abd was unrevealing. Urine culture grew Klebsiella, Proteus and Enterococcus and she was discharged on Augmentin x 7 days which she completed.     She is s/p sepsis fluid bolus, presents w/ELIZABETH, elevated procalcitonin. Blood culture Rapid ID showing staph epidermis only. Stool studies are in progress, rotavirus negative thus far. Throughout the day, she had been hypotensive, as low as 73/39, but not much improved with correcting cuff size. Of note, daytime provider reported BP in leg was 118/59. There is no evidence of bleeding. She has had 3 L of IVF total on 11/24. There was concern that she was developing some pulmonary edema, therefore further IVF have been held other than bicarb gtt that is currently infusing. Last evening, she had a normal lactate, slightly worsened creatinine, normal lactate, but BP marginally acceptable. The decision was made at that time to keep her on the floor. She remains intermittently confused. Overnight, BP trended down again. She has been given albumin and midodrine. On my exam, BP 97/67. She has had no urine output tonight and bladder scan just after my exam showed 70cc. In discussion with HM provider and given her trend of hypotension and need for more frequent monitoring, we have decided to transfer patient to ICU in order to facilitate higher level of care.

## 2024-11-25 NOTE — PLAN OF CARE
Hypotensive and confused. Supine therex only. Will progress as safely able. Recommending moderate intensity intervention at d/c.

## 2024-11-25 NOTE — ASSESSMENT & PLAN NOTE
>>ASSESSMENT AND PLAN FOR DIARRHEA WRITTEN ON 11/24/2024  6:03 PM BY KIRTI MACIAS MD    C Diff and stool cultures sent  Improving

## 2024-11-25 NOTE — PROGRESS NOTES
[facility] Medicine  Progress Note    Patient Name: Divya Bui  MRN: [id number]  Patient Class: IP- Inpatient   Admission Date: 11/23/2024  Length of Stay: 1 days  Attending Physician: Christopher Borden MD  Primary Care Provider: Angel Khan MD        Subjective:     Principal Problem:Sepsis        HPI:  The patient is a 65 yo female with ESBL UTI, HTN, GERD, Morbid Obesity s/p Gastric bypass surgery, Depression, GERD, Anxiety, Esophageal stricture who presented to the ED with Mild confusion, drowsiness, decreased appetite, and generalized weakness over the past 3 days. Today, she was so weak that she was incontinent to urine and stool. She reports diarrhea since discharge form the hospital on 11/12 which has gradually worsened. +vomiting x 1 episode. Patient denies any fever, CP, SOB. +dry cough    Pt was hospitalized with Sepsis due to UTI and ELIZABETH from 11/9-11/12/24 on IV Rocephin. CT abd was unrevealing. Urine culture grew Klebsiella, Proteus and Enterococcus and she was discharged on Augmentin x 7 days which she completed.     In the ED, pt was afebrile, hypotensive (BP 76/49) and tachycardic (130s). Labs revealed WBC 24, Bicarb 11, anion gap 17, sCr 2.5, Glucose 234. Procalcitonin 1.55. + UTI. CT head showed nothing acute. CXR-clear.   The patient was give 30ml/kg sepsis bolus and BP and HR improved. She was also given IV Rocephin     Overview/Hospital Course:  11/24:  Remains lethargic and intermittently confused, also hypotensive today to lowest of 73/39 taken on the right upper arm by the tech.  On my recheck with smaller size cuff she was 81/46, and then was 118/59 on her calf.  I am not sure about whether the low blood pressures are entirely accurate.  She continues to mentate the same.  Heart rates in the low 100s/110s.  Discussed with ICU and checked labs.  No evidence of bleeding.  Lactate is normal.  Have given 3 L of fluid  today.  Blood pressure this evening up to 92/51.   Continuing antibiotics, she will stay on the floor for now      Review of Systems   Constitutional:  Positive for fatigue and fever.   HENT: Negative.     Respiratory:  Negative for chest tightness and shortness of breath.    Cardiovascular:  Negative for chest pain and leg swelling.   Gastrointestinal:  Positive for abdominal pain. Negative for constipation, diarrhea and nausea.   Genitourinary:  Positive for dysuria. Negative for difficulty urinating.   Musculoskeletal: Negative.    Skin:  Negative for rash.   Neurological:  Negative for light-headedness and headaches.   Hematological:  Does not bruise/bleed easily.   Psychiatric/Behavioral:  Negative for agitation and behavioral problems.      Objective:     Vital Signs (Most Recent):  Temp: 97.8 °F (36.6 °C) (11/24/24 1622)  Pulse: (!) 114 (11/24/24 1622)  Resp: 18 (11/24/24 1622)  BP: (!) 92/51 (11/24/24 1622)  SpO2: 97 % (11/24/24 1622) Vital Signs (24h Range):  Temp:  [97.5 °F (36.4 °C)-98.3 °F (36.8 °C)] 97.8 °F (36.6 °C)  Pulse:  [113-138] 114  Resp:  [16-18] 18  SpO2:  [97 %-99 %] 97 %  BP: ()/(39-74) 92/51     Weight: 99.8 kg (220 lb 0.3 oz)  Body mass index is 38.97 kg/m².    Intake/Output Summary (Last 24 hours) at 11/24/2024 1752  Last data filed at 11/24/2024 1231  Gross per 24 hour   Intake 1294.26 ml   Output 0 ml   Net 1294.26 ml         Physical Exam  Constitutional:       General: She is not in acute distress.     Appearance: She is ill-appearing.      Comments: Lethargic   HENT:      Head: Normocephalic and atraumatic.      Mouth/Throat:      Mouth: Mucous membranes are moist.      Pharynx: Oropharynx is clear.   Eyes:      General: No scleral icterus.  Cardiovascular:      Rate and Rhythm: Normal rate and regular rhythm.      Heart sounds: No murmur heard.     No gallop.   Pulmonary:      Effort: Pulmonary effort is normal.      Breath sounds: Normal breath sounds.   Abdominal:      General: Bowel sounds are normal. There is no  distension.      Tenderness: There is abdominal tenderness.      Comments: Tender throughout   Musculoskeletal:      Right lower leg: No edema.      Left lower leg: No edema.   Skin:     General: Skin is warm and dry.      Comments: See rash below  Present underneath the pannus, breasts, in the gluteal folds/perineal region   Neurological:      Mental Status: She is alert.      Comments: States the year is 2004, knows we are in [facility], knows her name  Slow to respond to questions, sleepy                                   Significant Labs: All pertinent labs within the past 24 hours have been reviewed.    Significant Imaging: I have reviewed all pertinent imaging results/findings within the past 24 hours.    Assessment/Plan:      * Sepsis  This patient does have evidence of infective focus  My overall impression is sepsis.  Source: Urinary Tract  Antibiotics given-   Antibiotics (72h ago, onward)      Start     Stop Route Frequency Ordered    11/25/24 0230  ertapenem (INVANZ) 500 mg in 0.9% NaCl 100 mL IVPB         -- IV Every 24 hours (non-standard times) 11/24/24 1342    11/23/24 1745  linezolid 600 mg/300 mL IVPB 600 mg         -- IV Every 12 hours (non-standard times) 11/23/24 1640          Latest lactate reviewed-  Recent Labs   Lab 11/24/24  1718   LACTATE 1.9       Organ dysfunction indicated by Acute kidney injury and Encephalopathy    Fluid challenge Actual Body weight- Patient will receive 30ml/kg actual body weight to calculate fluid bolus for treatment of septic shock.     Currently she is hypotensive, discussed case with the ICU and saw patient with ICU PA, and rechecked her blood pressure multiple times.  Lactate is normal. For now she will stay on the floor.  She has received 3 L of IV fluid today.  She is confused and lethargic but per the son this is significantly improved from how she was before presentation and similar to her mental status yesterday.  If she worsens could consider CT  abdomen pelvis, may have nidus of infection    Hyperglycemia  Check Hgb A1c  Diabetic diet  NISS      Candidal intertrigo and dermatitis  Severe erythema to breasts, pannus, groin, and buttock with some skin breakdown   Appears to be candida overgrowth   IV Diflucan       Metabolic acidosis  Likely secondary to ELIZABETH/sepsis  Continue bicarb drip    Diarrhea  C Diff and stool cultures sent  Improving      Acute cystitis  -Hx ESBL, frequent UTIs, significant resistances (prior cultures at [facility], prior cultures as well in our system)  -Consult ID, appreciate reccs  -Currently on ertapenem and linezolid which should be sufficient to cover her  -Unfortunately the urine culture is marked as canceled, unsure of the reason --> called micro lab in [place], quantity was not sufficient for urine culture  -Ordering another UA reflex to culture although will have low sensitivity at this point  -Blood cultures pending      Recurrent major depressive disorder, in partial remission  Patient has persistent depression which is mild and is currently controlled. Will Continue anti-depressant medications. We will not consult psychiatry at this time. Patient does not display psychosis at this time. Continue to monitor closely and adjust plan of care as needed.    Cont Abilify and Xanax prn   Hold Doxepin for now while on Zyvox         Chronic pain disorder  Holding home Lyrica and Norco given lethargy in the setting of sepsis      ELIZABETH (acute kidney injury)  ELIZABETH is likely due to pre-renal azotemia due to dehydration. Baseline creatinine is  1.0 . Most recent creatinine and eGFR are listed below.  Recent Labs     11/23/24  1145 11/24/24  0638 11/24/24  1629   CREATININE 2.5* 2.2* 2.7*   EGFRNORACEVR 21* 24* 19*        Plan  - ELIZABETH is worsening. Will adjust treatment as follows: Hold  Lasix, Olmesartan/HCTZ  - Avoid nephrotoxins and renally dose meds for GFR listed above  - Monitor urine output, serial BMP,  and adjust therapy as needed  - 3L IVF today  - likely secondary to sepsis, hypotension/prerenal etiology    Morbid obesity with BMI of 40.0-44.9, adult  Body mass index is 38.97 kg/m². Morbid obesity complicates all aspects of disease management from diagnostic modalities to treatment. Weight loss encouraged and health benefits explained to patient.         HTN (hypertension)  Patients blood pressure range in the last 24 hours was: BP  Min: 73/39  Max: 155/74.The patient's inpatient anti-hypertensive regimen is listed below       Plan  Hold antihypertensives      VTE Risk Mitigation (From admission, onward)           Ordered     heparin (porcine) injection 5,000 Units  Every 8 hours         11/23/24 1441     IP VTE HIGH RISK PATIENT  Once         11/23/24 1441     Place sequential compression device  Until discontinued         11/23/24 1441                    Discharge Planning   ELADIA:      Code Status: Full Code   Is the patient medically ready for discharge?:     Reason for patient still in hospital (select all that apply): Treatment                     Christopher Borden MD  Department of Hospital Medicine   [facility] - Med Surg

## 2024-11-25 NOTE — ASSESSMENT & PLAN NOTE
- has been on bicarb fluids x365 h now with only marginal improvement in serum CO2 and worsening Cr  - original urine pH 6.0  - UA pending collection, will add some urine studies with  - consider nephrology consultation if not improving

## 2024-11-25 NOTE — ASSESSMENT & PLAN NOTE
Patient has persistent depression which is mild and is currently controlled. Will Continue anti-depressant medications. We will not consult psychiatry at this time. Patient does not display psychosis at this time. Continue to monitor closely and adjust plan of care as needed.  - holding Doxepin  - continue Abilify, low dose Xanax prn

## 2024-11-25 NOTE — PLAN OF CARE
[facility]  Initial Discharge Assessment       Primary Care Provider: Angel Khan MD    Admission Diagnosis: Altered mental status [R41.82]  Tachycardia [R00.0]  Acute cystitis with hematuria [N30.01]  ELIZABETH (acute kidney injury) [N17.9]  Chest pain [R07.9]    Admission Date: 11/23/2024  Expected Discharge Date:     Transition of Care Barriers: None    Payor: [employer] MANAGED MEDICARE / Plan: [employer] MEDICARE PLAN PPO / Product Type: Medicare Advantage /     Extended Emergency Contact Information  Primary Emergency Contact: Ashtyn Bui  Mobile Phone: [phone]  Relation: Daughter  Preferred language: English   needed? No  Secondary Emergency Contact: Herbert Bui  Mobile Phone: [phone]  Relation: Son  Preferred language: English   needed? No    Discharge Plan A: Home Health         [facility] - [address]  [address]  [place] [address]  Phone: [phone] Fax: [phone]    [facility] - [place], [place] - [address]  [address] [facility] [address]  Phone: [phone] Fax: [phone]    [id number] - [facility] [place], [place] - [address]  [address]  [facility] [address]  Phone: [phone] Fax: [phone]    [facility] [address] [facility], [place] - [address]  [address]  [facility] [address]  Phone: [phone] Fax: [phone]    [id number] - [place], [place] - [address] AT [facility] [address] [facility]  [address]  [facility] [address]  Phone: [phone] Fax: [phone]      Initial Assessment (most recent)       Adult Discharge Assessment - 11/25/24 1551          Discharge Assessment    Assessment Type Discharge Planning Assessment     Confirmed/corrected address, phone number and insurance Yes     Confirmed Demographics Correct on Facesheet     Source of Information patient     Communicated ELADIA with  patient/caregiver Date not available/Unable to determine     Reason For Admission Sepsis     People in Home alone     Facility Arrived From: home     Do you expect to return to your current living situation? Yes     Do you have help at home or someone to help you manage your care at home? Yes     Who are your caregiver(s) and their phone number(s)? Sons     Prior to hospitilization cognitive status: Alert/Oriented     Current cognitive status: Alert/Oriented     Walking or Climbing Stairs Difficulty yes     Walking or Climbing Stairs ambulation difficulty, requires equipment     Mobility Management cane and walker when needed     Dressing/Bathing Difficulty no     Home Accessibility wheelchair accessible     Home Layout Able to live on 1st floor     Equipment Currently Used at Home rollator;grab bar;cane, quad     Readmission within 30 days? No     Patient currently being followed by outpatient case management? No     Do you currently have service(s) that help you manage your care at home? No     Do you take prescription medications? Yes     Do you have prescription coverage? Yes     Coverage MCR     Do you have any problems affording any of your prescribed medications? No     Is the patient taking medications as prescribed? yes     Who is going to help you get home at discharge? Family     How do you get to doctors appointments? car, drives self     Are you on dialysis? No     Do you take coumadin? No     Discharge Plan A Home Health     DME Needed Upon Discharge  none     Discharge Plan discussed with: Patient     Transition of Care Barriers None                   Anticipated DC dispo: home health   Prior Level of Function: independent with ADLs   People in home:  lives alone     Comments:  CM met with patient at bedside to introduce role and discuss discharge planning. Adult sons will be help at home and can provide transport at time of discharge. Confirmed demographics, insurance, and emergency contacts.   discharge needs depends on hospital progress. CM will continue following to assist with other needs. Discharge plan has been determined by review of patient's clinical status, future medical and therapeutic needs, and coverage/benefits for post-acute care in coordination with multidisciplinary team members.

## 2024-11-25 NOTE — PLAN OF CARE
Pt transferred from M/S for closer monitoring, low BPs overnight and concern for worsening sepsis. BP stable on arrival, no UOP for significant amount of time, I/o cath perfomed, 150 cc out. Ibarra placed per MD order, thick, brown urine noted. Moisture associated dermatitis noted to buttocks, abd, and breast folds, wound care consult in place. Barrier cream applied.

## 2024-11-25 NOTE — SIGNIFICANT EVENT
BP up to 105/59 by my check, DEVI, automated  Mentating the same (mildly confused but interacting appropriately)  Has now rec'd approx 3L IVF, stopping last half of bag of LR as she is starting to sound a bit wet  [place] normal

## 2024-11-25 NOTE — CONSULTS
[facility] Surg  Critical Care Medicine  Consult Note    Patient Name: Divya Bui  MRN: [id number]  Admission Date: 11/23/2024  Hospital Length of Stay: 2 days  Code Status: Full Code  Attending Physician: Christopher Borden MD   Primary Care Provider: Angel Khan MD   Principal Problem: Sepsis    Inpatient consult to Critical Care Medicine  Consult performed by: Alona Early NP  Consult ordered by: Jennie Tejeda MD        Subjective:     HPI:  Divya Bui is a 64-year-old female with a PMHx of HTN, GERD, morbid obesity s/p gastric bypass, depression, anxiety, esophageal stricture, ESBL UTI, who was admitted on 11/23 by hospital medicine for UTI after presenting to ED w/confusion, lethargy, generalized weakness. Pt was hospitalized with Sepsis due to UTI and ELIZABETH from 11/9-11/12/24 on IV Rocephin. CT abd was unrevealing. Urine culture grew Klebsiella, Proteus and Enterococcus and she was discharged on Augmentin x 7 days which she completed.     She is s/p sepsis fluid bolus, presents w/ELIZABETH, elevated procalcitonin. Blood culture Rapid ID showing staph epidermis only. Stool studies are in progress, rotavirus negative thus far. Throughout the day, she had been hypotensive, as low as 73/39, but not much improved with correcting cuff size. Of note, daytime provider reported BP in leg was 118/59. There is no evidence of bleeding. She has had 3 L of IVF total on 11/24. There was concern that she was developing some pulmonary edema, therefore further IVF have been held other than bicarb gtt that is currently infusing. Last evening, she had a normal lactate, slightly worsened creatinine, normal lactate, but BP marginally acceptable. The decision was made at that time to keep her on the floor. She remains intermittently confused. Overnight, BP trended down again. She has been given albumin and midodrine. On my exam, BP 97/67. She has had no urine output tonight and bladder scan just after my exam showed 70cc.  In discussion with HM provider and given her trend of hypotension and need for more frequent monitoring, we have decided to transfer patient to ICU in order to facilitate higher level of care.     Hospital/ICU Course:  No notes on file    Past Medical History:   Diagnosis Date    Anemia     Anxiety     Basal cell carcinoma (BCC) of face 2/26/2020    Blood transfusion     Bowel incontinence     Depression     Esophageal stricture     GERD (gastroesophageal reflux disease)     Hypertension     Latent tuberculosis by skin test 2001    took INH    Liver nodule 1/6/2014    Morbid obesity with BMI of 45.0-49.9, adult     OA (osteoarthritis) of knee 12/12/2014    Pericardial effusion 9/4/2014       Past Surgical History:   Procedure Laterality Date    CHOLECYSTECTOMY      COLONOSCOPY N/A 3/6/2023    Procedure: COLONOSCOPY;  Surgeon: Beti Diallo MD;  Location: [facility];  Service: Endoscopy;  Laterality: N/A;    GASTRIC BYPASS      HERNIA REPAIR      INJECTION OF ANESTHETIC AGENT AROUND NERVE Bilateral 6/16/2023    Procedure: Bilateral Genicular nerve block with RN IV sedation;  Surgeon: Denis Lai MD;  Location: [facility];  Service: Pain Management;  Laterality: Bilateral;    INJECTION OF ANESTHETIC AGENT AROUND NERVE Bilateral 8/11/2023    Procedure: Bilateral Genicular nerve block with RN IV sedation;  Surgeon: Denis Lai MD;  Location: [facility];  Service: Pain Management;  Laterality: Bilateral;    RADIOFREQUENCY THERMOCOAGULATION Right 10/31/2023    Procedure: Right Genicular Nerve RFA with RN IV sedation;  Surgeon: Denis Lai MD;  Location: [facility];  Service: Pain Management;  Laterality: Right;    RADIOFREQUENCY THERMOCOAGULATION Left 11/14/2023    Procedure: Left Genicular Nerve RFA with RN IV sedation;  Surgeon: Denis Lai MD;  Location: [facility];  Service: Pain Management;  Laterality: Left;    right sholder surgery      SMALL INTESTINE SURGERY         Review of  patient's allergies indicates:   Allergen Reactions    Metronidazole hcl Other (See Comments)     Mouth ulcers developed with Flagyl  Oral sores.  Mouth ulcers developed with Flagyl       Family History       Problem Relation (Age of Onset)    Crohn's disease Sister, Brother, Other    Diabetes Sister, Sister    Liver cancer Father, Sister    Lung cancer Mother          Tobacco Use    Smoking status: Never    Smokeless tobacco: Never   Substance and Sexual Activity    Alcohol use: Not Currently    Drug use: No    Sexual activity: Not Currently     Partners: Male     Birth control/protection: Post-menopausal         Review of Systems   Constitutional: Negative.    HENT: Negative.     Eyes: Negative.    Respiratory: Negative.     Gastrointestinal:  Positive for abdominal pain. Negative for nausea and vomiting.   Genitourinary:  Positive for decreased urine volume and dysuria.   Musculoskeletal:  Positive for back pain and gait problem.   Skin: Negative.    Neurological:  Positive for weakness.   Hematological: Negative.    Psychiatric/Behavioral: Negative.       Objective:     Vital Signs (Most Recent):  Temp: 97.5 °F (36.4 °C) (11/25/24 0023)  Pulse: 98 (11/25/24 0245)  Resp: 18 (11/25/24 0023)  BP: 97/67 (11/25/24 0245)  SpO2: 96 % (11/25/24 0245) Vital Signs (24h Range):  Temp:  [97.5 °F (36.4 °C)-98.6 °F (37 °C)] 97.5 °F (36.4 °C)  Pulse:  [] 98  Resp:  [16-18] 18  SpO2:  [17 %-98 %] 96 %  BP: ()/(39-68) 97/67     Weight: 99.8 kg (220 lb 0.3 oz)  Body mass index is 38.97 kg/m².      Intake/Output Summary (Last 24 hours) at 11/25/2024 0317  Last data filed at 11/25/2024 0248  Gross per 24 hour   Intake 1829.38 ml   Output 0 ml   Net 1829.38 ml        Physical Exam  Vitals and nursing note reviewed.   Constitutional:       General: She is awake.      Appearance: She is morbidly obese. She is ill-appearing.      Interventions: Nasal cannula in place.   HENT:      Head: Normocephalic and atraumatic.       Mouth/Throat:      Mouth: Mucous membranes are moist.      Pharynx: Oropharynx is clear.   Eyes:      Extraocular Movements: Extraocular movements intact.      Conjunctiva/sclera: Conjunctivae normal.      Pupils: Pupils are equal, round, and reactive to light.   Cardiovascular:      Rate and Rhythm: Normal rate and regular rhythm.      Pulses: Normal pulses.      Heart sounds: No murmur heard.  Pulmonary:      Effort: No respiratory distress.      Breath sounds: Examination of the right-upper field reveals rales. Examination of the left-upper field reveals rales. Examination of the right-lower field reveals decreased breath sounds. Examination of the left-lower field reveals decreased breath sounds. Decreased breath sounds and rales present.   Abdominal:      General: Abdomen is protuberant. Bowel sounds are normal.      Palpations: Abdomen is soft.      Tenderness: There is generalized abdominal tenderness.   Musculoskeletal:      Cervical back: Normal range of motion and neck supple.      Right lower leg: No edema.      Left lower leg: No edema.   Skin:     Findings: Wound present.      Comments: See media   Neurological:      Mental Status: She is alert.      GCS: GCS eye subscore is 4. GCS verbal subscore is 4. GCS motor subscore is 6.   Psychiatric:         Behavior: Behavior is cooperative.          Vents:       Lines/Drains/Airways       Peripheral Intravenous Line  Duration                  Peripheral IV - Single Lumen 11/23/24 1131 20 G Posterior;Right Wrist 1 day         Peripheral IV - Single Lumen 11/23/24 1327 20 G Left Antecubital 1 day                    Significant Labs:    CBC/Anemia Profile:  Recent Labs   Lab 11/23/24  1145 11/24/24  0638 11/24/24  1718   WBC 24.61* 14.01* 16.23*   HGB 13.0 10.8* 10.9*   HCT 39.4 33.2* 33.2*    202 207   MCV 88 89 88   RDW 17.2* 16.8* 16.9*        Chemistries:  Recent Labs   Lab 11/23/24  1145 11/24/24  0638 11/24/24  1629    134* 133*   K 3.5 3.3*  "4.1    109 107   CO2 11* 13* 14*   BUN 43* 47* 51*   CREATININE 2.5* 2.2* 2.7*   CALCIUM 8.3* 6.9* 6.8*   ALBUMIN 2.3* 1.4*  --    PROT 6.7 4.4*  --    BILITOT 0.5 0.3  --    ALKPHOS 110 69  --    ALT 13 7*  --    AST 8* 7*  --    MG  --  1.4*  --    PHOS  --  3.2  --        A1C:   Recent Labs   Lab 11/23/24  1506   HGBA1C 6.0*     Coagulation: No results for input(s): "PT", "INR", "APTT" in the last 48 hours.  Lactic Acid:   Recent Labs   Lab 11/23/24  1955 11/23/24  2354 11/24/24  1718   LACTATE 2.2 2.2 1.9     Urine Culture: No results for input(s): "LABURIN" in the last 48 hours.  Urine Studies:   Recent Labs   Lab 11/23/24  1251   COLORU Yellow   APPEARANCEUA Cloudy*   PHUR 6.0   SPECGRAV 1.015   PROTEINUA 1+*   GLUCUA Negative   KETONESU Negative   BILIRUBINUA Negative   OCCULTUA 1+*   NITRITE Positive*   UROBILINOGEN Negative   LEUKOCYTESUR 3+*   RBCUA 2   WBCUA >100*   BACTERIA Moderate*   SQUAMEPITHEL 2   HYALINECASTS 0     All pertinent labs within the past 24 hours have been reviewed.    Significant Imaging:   I have reviewed all pertinent imaging results/findings within the past 24 hours.    ABG  No results for input(s): "PH", "PO2", "PCO2", "HCO3", "BE" in the last 168 hours.  Assessment/Plan:     Neuro  Encephalopathy, metabolic  - likely 2/2 severe sepsis, ELIZABETH, metabolic acidosis  - supportive care  - delirium precautions  - avoid sedating medications  - consider d/c of Abilify    Chronic pain disorder  - holding home medications w/acute encephalopathy    Psychiatric  Recurrent major depressive disorder, in partial remission  Patient has persistent depression which is mild and is currently controlled. Will Continue anti-depressant medications. We will not consult psychiatry at this time. Patient does not display psychosis at this time. Continue to monitor closely and adjust plan of care as needed.  - holding Doxepin  - continue Abilify, low dose Xanax prn        Derm  Candidal intertrigo and " dermatitis  - noted to under and sides of breasts, pannus, groin, buttocks  - on diflucan, appears candida overgrowth  - WOCN consult placed to assist in care, appreciate recs    Cardiac/Vascular  Hypotension  - needs more frequent hemodynamic monitoring than available on floor  - will monitor for vasopressor requirement  - goal MAP>65 mmHg  - consider stress dose steroids    HTN (hypertension)  - holding meds acutely    Renal/  Metabolic acidosis  - has been on bicarb fluids x365 h now with only marginal improvement in serum CO2 and worsening Cr  - original urine pH 6.0  - UA pending collection, will add some urine studies with  - consider nephrology consultation if not improving    Acute cystitis  - ESBL hx, recurrent UTI  - ID consulted by HM w/rec's for antibiotic therapy  - continue Ivanz, Zyvox  - urine culture, blood culture pending    ELIZABETH (acute kidney injury)  ELIZABETH is likely due to pre-renal azotemia due to dehydration. Baseline creatinine is  1.0 . Most recent creatinine and eGFR are listed below.  Recent Labs     11/23/24  1145 11/24/24  0638 11/24/24  1629   CREATININE 2.5* 2.2* 2.7*   EGFRNORACEVR 21* 24* 19*      Plan  - ELIZABETH is worsening. Will hold diuretics, ACEi/ARB's  - Avoid nephrotoxins and renally dose meds for GFR listed above  - Monitor urine output, serial BMP, and adjust therapy as needed  - continue bicarb fluids  - strict I&O  - will hold further IVF  - she will likely need some diuresis when BP stabilizes    ID  * Sepsis  This patient does have evidence of infective focus  My overall impression is sepsis.  Source: Urinary Tract  Antibiotics given-   Antibiotics (72h ago, onward)      Start     Stop Route Frequency Ordered    11/25/24 0230  ertapenem (INVANZ) 500 mg in 0.9% NaCl 100 mL IVPB         -- IV Every 24 hours (non-standard times) 11/24/24 1342    11/23/24 1745  linezolid 600 mg/300 mL IVPB 600 mg         -- IV Every 12 hours (non-standard times) 11/23/24 1640          Latest  lactate reviewed-  Recent Labs   Lab 11/24/24  1718   LACTATE 1.9     Organ dysfunction indicated by Acute kidney injury and Encephalopathy  Fluid challenge was provided in ED on 11/23  Source control achieved by: antibiotics  - moving to ICU for recurrent hypotension, has been given Midodrine, albumin w/marginal improvement  - will assess on arrival, low threshold for pressor support        Oncology  Iron deficiency anemia  Anemia is likely due to Iron deficiency. Most recent hemoglobin and hematocrit are listed below.  Recent Labs     11/23/24  1145 11/24/24  0638 11/24/24  1718   HGB 13.0 10.8* 10.9*   HCT 39.4 33.2* 33.2*     - hx gastric bypass surgery, JOSE, B12/folate def  - is normochromic, normocytic now  - will check B12/folate  - start vitamin supplements    Endocrine  Hyperglycemia  Lab Results   Component Value Date    HGBA1C 6.0 (H) 11/23/2024     - has been on diabetic diet  - SSI with scheduled accuchecks  - has been on low correction scale with improved control  - adjust as indicated      Morbid obesity with BMI of 40.0-44.9, adult  Body mass index is 38.97 kg/m². Morbid obesity complicates all aspects of disease management from diagnostic modalities to treatment. Weight loss encouraged and health benefits explained to patient.         Hypothyroid  - TSH/T4 acceptable    GI  Diarrhea  - reported that is improving  - stool studies in process        Critical Care Daily Checklist:    A: Awake: RASS Goal/Actual Goal:    Actual:     B: Spontaneous Breathing Trial Performed?     C: SAT & SBT Coordinated?  N/a                      D: Delirium: CAM-ICU     E: Early Mobility Performed? Yes   F: Feeding Goal: Goals: Pt intake >/=50% intake by RD follow up  Status: Nutrition Goal Status: new   Current Diet Order   Procedures    Diet diabetic 2000 Calories (up to 75 gm per meal)     Order Specific Question:   Total calories / carbs:     Answer:   2000 Calories (up to 75 gm per meal)      AS: Analgesia/Sedation  N/a   T: Thromboembolic Prophylaxis Heparin   H: HOB > 300 Yes   U: Stress Ulcer Prophylaxis (if needed) pepcid   G: Glucose Control SSI   B: Bowel Function Stool Occurrence: 1   I: Indwelling Catheter (Lines & Ibarra) Necessity N/a   D: De-escalation of Antimicrobials/Pharmacotherapies reviewed    Plan for the day/ETD Transfer to ICU    Code Status:  Family/Goals of Care: Full Code  TBD     Critical Care Time: 36 minutes  Critical secondary to high risk monitoring. Patient has a condition that poses threat to life and bodily function.      Critical care was time spent personally by me on the following activities: development of treatment plan with patient or surrogate and bedside caregivers, discussions with consultants, evaluation of patient's response to treatment, examination of patient, ordering and performing treatments and interventions, ordering and review of laboratory studies, ordering and review of radiographic studies, pulse oximetry, re-evaluation of patient's condition. This critical care time did not overlap with that of any other provider or involve time for any procedures.    Thank you for your consult. I will follow-up with patient. Please contact us if you have any additional questions.     Alona Early NP  Critical Care Medicine  [facility] Med S[facility]

## 2024-11-25 NOTE — ASSESSMENT & PLAN NOTE
- noted to under and sides of breasts, pannus, groin, buttocks  - on diflucan, appears candida overgrowth  - WOCN consult placed to assist in care, appreciate recs

## 2024-11-25 NOTE — ASSESSMENT & PLAN NOTE
ELIZABETH is likely due to pre-renal azotemia due to dehydration. Baseline creatinine is  1.0 . Most recent creatinine and eGFR are listed below.  Recent Labs     11/23/24  1145 11/24/24  0638 11/24/24  1629   CREATININE 2.5* 2.2* 2.7*   EGFRNORACEVR 21* 24* 19*      Plan  - ELIZABETH is worsening. Will hold diuretics, ACEi/ARB's  - Avoid nephrotoxins and renally dose meds for GFR listed above  - Monitor urine output, serial BMP, and adjust therapy as needed  - continue bicarb fluids  - strict I&O  - will hold further IVF  - she will likely need some diuresis when BP stabilizes

## 2024-11-25 NOTE — ASSESSMENT & PLAN NOTE
- needs more frequent hemodynamic monitoring than available on floor  - will monitor for vasopressor requirement  - goal MAP>65 mmHg  - consider stress dose steroids

## 2024-11-25 NOTE — ASSESSMENT & PLAN NOTE
- ESBL hx, recurrent UTI  - ID consulted by  w/rec's for antibiotic therapy  - continue Ivanz, Zyvox  - urine culture, blood culture pending

## 2024-11-25 NOTE — EICU
Tele-ICU Admit note    Reason for ICU admission:    Hypotension    HPI:    63 yo female admitted 11/23 for confusion, decreased appetite, and generalized weakness     Recent discharge 11/12 for Sepsis due to UTI, urine culture grew Klebsiella, Proteus and Enterococcus and she was discharged on Augmentin x 7 days which she completed.     Since admission remains lethargic and hypotensive today despite optimal hydration and bicarb infusion. Currently on ertapenem and linezolid     Decision was made to transfer patient to the ICU         Pmx:     ESBL UTI, HTN, GERD, Morbid Obesity s/p Gastric bypass surgery, Depression, GERD, Anxiety, Esophageal strictur        Pt viewed at 5:36 am:    100 sinus, 105/53, R 22    Currently not on pressor    Impression/Plan:    Sepsis  Urinary source  ESBL hx, recurrent UTI  ertapenem and linezolid   ID consulted   urine culture, blood culture pending  Candidal intertrigo and dermatitis   under and sides of breasts, pannus, groin, buttocks  on diflucan, appears candida overgrowth      Persistent hypotension  Vasopressor requirement if needed    Metabolic Encephalopathy  2/2 sepsis, ELIZABETH, metabolic acidosis      ELIZABETH, Metabolic acidosis  Has been on bicarb drip  Creat slowly rising, 2.7    DM  SSI      DVT px  Hep sc

## 2024-11-25 NOTE — ASSESSMENT & PLAN NOTE
>>ASSESSMENT AND PLAN FOR DIARRHEA WRITTEN ON 11/25/2024  3:38 AM BY ALEX BREWER, DAMON    - reported that is improving  - stool studies in process

## 2024-11-25 NOTE — CONSULTS
[facility] - Intensive Care ([facility])  Wound Care    Patient Name:  Divya Bui   MRN:  [id number]  Date: 11/25/2024  Diagnosis: Sepsis    History:     Past Medical History:   Diagnosis Date    Anemia     Anxiety     Basal cell carcinoma (BCC) of face 2/26/2020    Blood transfusion     Bowel incontinence     Depression     Esophageal stricture     GERD (gastroesophageal reflux disease)     Hypertension     Latent tuberculosis by skin test 2001    took INH    Liver nodule 1/6/2014    Morbid obesity with BMI of 45.0-49.9, adult     OA (osteoarthritis) of knee 12/12/2014    Pericardial effusion 9/4/2014       Social History     Socioeconomic History    Marital status:    Tobacco Use    Smoking status: Never    Smokeless tobacco: Never   Substance and Sexual Activity    Alcohol use: Not Currently    Drug use: No    Sexual activity: Not Currently     Partners: Male     Birth control/protection: Post-menopausal     Social Drivers of Health     Financial Resource Strain: Low Risk  (11/23/2024)    Overall Financial Resource Strain (CARDIA)     Difficulty of Paying Living Expenses: Not very hard   Food Insecurity: No Food Insecurity (11/23/2024)    Hunger Vital Sign     Worried About Running Out of Food in the Last Year: Never true     Ran Out of Food in the Last Year: Never true   Transportation Needs: No Transportation Needs (11/23/2024)    TRANSPORTATION NEEDS     Transportation : No   Physical Activity: Inactive (12/28/2023)    Exercise Vital Sign     Days of Exercise per Week: 0 days     Minutes of Exercise per Session: 0 min   Stress: No Stress Concern Present (11/23/2024)    [employer] - Occupational Stress Questionnaire     Feeling of Stress : Not at all   Housing Stability: Low Risk  (11/23/2024)    Housing Stability Vital Sign     Unable to Pay for Housing in the Last Year: No     Homeless in the Last Year: No       Precautions:     Allergies as of 11/23/2024 - Reviewed 11/23/2024    Allergen Reaction Noted    Metronidazole hcl Other (See Comments) 10/30/2013       [facility] Assessment Details/Treatment     Consulted on this 65 y/o F patient due to present on admission wounds to skin folds and buttocks. Patient is seen this morning in ICU. She was admitted with AMS on 11/23 at which time multiple wounds were noted and photographed. PMH significant for bowel incontinence, HTN, anemia, depression, GERD, anxiety, laten TB per skin test, liver nodule, pericardial effusion, obesity, OA of Knee, esophageal stricture. She reports she lives also, and spends most of her time including sleeping in recliner. She states she is ambulatory while at home. Skin assessed during bath with primary nurse and PCT.  Bilateral heels intact with blanchable redness. Bialteral plantar feet and toes with blanchable purple discoloration, R>L. Fingers also noted to have purple discoloration that is blanchable.  Severe intertrigo noted to lower abdominal fold, groin folds, medial thighs, with IAD to perineum. Moist red partial thickness tissue loss. Cleansed all with no rinse foaming cleanser, washed with vashe, then InterDry sheets applied to abdominal folds. Desenex powder in use to groin folds and perineum, and TRIAD paste also applied to those sites. Duoderm applied to bilateral medial thighs to protect from thigh rub and drummond catheter tubing.  Bilateral breasts then assessed - appear to have had severe intertrigo in recent past that has resolved, but did evolve into full thickness stage 3 pressure injuries bilaterally which still remain.  Left lower lateral breast fold stage 3 PI measures 2x4x0.2cm.   Right lower breast fold stage 3 PI with multiple open full thickness wounds, entire area measures 5x5x0.3cm.  Wounds to bilateral breasts cleansed with vashe and allowed to dwell, patted dry. Thick layer of TRIAD hydrophilic wound dressing paste applied to cover ulcerations and secured with foam dressings. InterDry sheet then  applied into breast folds bilaterally for prevention of recurrent Intertrigo.  Patient then turned to left side with assistance.  Evolving DTPI noted to medial buttocks in large area that measures 89i20c1.3cm and encompasses sacrum, coccyx, bilateral medial buttock, extending to perineum and bilateral posterior labia. Majority of wound is deep purple discoloration, centrally nonblanchable surrounded with blanchable purple discoloration, and scattered areas of full thickness tissue loss with moist red and yellow adipose tissue and scant slough to wound beds (these are overlying coccyx and bilateral buttock region of wound). Entire area cleansed with no rinse foaming cleanser, then washed with vashe, patted dry. Desenex powder applied per MAR, then thick layer TRIAD paste applied to cover affected skin. Patient tolerated care well.     Patient is on ICU Isol[employer]rium ERNST bed - recommend specialty Immerse ERNST bed.    Recommendations made to primary team for wound care, pressure injury prevention interventions, moisture management - apply heel offloading boots, turn q2 hours with foam wedge, maintain InterDry sheets to skin folds. Orders placed.        11/25/24 1120   WOCN Assessment   WOCN Total Time (mins) 90   Visit Date 11/25/24   Visit Time 1120   Consult Type New   WOCN Speciality Wound   Wound pressure;moisture;At risk for pressure Injury;yeast   Intervention assessed;applied;chart review;coordination of care;team conference;orders   Teaching on-going        Wound 11/23/24 1130 Moisture associated dermatitis Right lateral;anterior Lower quadrant   Date First Assessed/Time First Assessed: 11/23/24 1130   Present on Original Admission: Yes  Primary Wound Type: Moisture associated dermatitis  Side: Right  Orientation: lateral;anterior  Location: Lower quadrant   Wound Image      Dressing Appearance Open to air   Drainage Amount Small   Drainage Characteristics/Odor Creamy   Appearance Red;Moist   Tissue loss  description Partial thickness   Periwound Area Intact   Care Cleansed with:;Antimicrobial agent   Dressing Applied  (INTERDRY sheet)   Dressing Change Due 11/30/24        Wound 11/23/24 1130 Moisture associated dermatitis Perineum   Date First Assessed/Time First Assessed: 11/23/24 1130   Present on Original Admission: Yes  Primary Wound Type: Moisture associated dermatitis  Location: Perineum   Wound Image     Dressing Appearance Open to air   Drainage Amount Scant   Appearance Red;Moist;Maroon   Tissue loss description Partial thickness   Care Cleansed with:;Applied:   Dressing Applied  (TRIAD paste and desenex powder per MAR)        Wound 11/25/24 0655 Pressure Injury medial Buttocks   Date First Assessed/Time First Assessed: 11/25/24 0655   Present on Original Admission: Yes  Primary Wound Type: Pressure Injury  Orientation: medial  Location: (c) Buttocks   Wound Image      Pressure Injury Stage DTPI  (evolving)   Dressing Appearance Open to air   Drainage Amount Small   Drainage Characteristics/Odor Serous   Appearance Purple;Maroon;Red;Yellow;Slough;Adipose;Moist   Tissue loss description Full thickness   Periwound Area Intact   Wound Edges Defined   Wound Length (cm) 22 cm   Wound Width (cm) 12 cm   Wound Depth (cm) 0.3 cm   Wound Volume (cm^3) 79.2 cm^3   Wound Surface Area (cm^2) 264 cm^2   Care Cleansed with:;Antimicrobial agent   Dressing Applied  (TRIAD hydrophilic wound dressing paste and desenex powder per MAR)        Wound 05/10/24 2000 Moisture associated dermatitis Left lateral;lower Abdomen   Date First Assessed/Time First Assessed: 05/10/24 2000   Present on Original Admission: Yes  Primary Wound Type: Moisture associated dermatitis  Side: Left  Orientation: lateral;lower  Location: Abdomen   Wound Image      Dressing Appearance Open to air   Drainage Amount None   Appearance Red   Tissue loss description Partial thickness   Care Cleansed with:;Antimicrobial agent   Dressing Applied  (INTERDRY  sheet)   Dressing Change Due 11/30/24        Wound 05/10/24 2000 Pressure Injury Right lateral;lower Breast   Date First Assessed/Time First Assessed: 05/10/24 2000   Present on Original Admission: Yes  Primary Wound Type: Pressure Injury  Side: Right  Orientation: lateral;lower  Location: Breast   Wound Image     Pressure Injury Stage 3   Dressing Appearance Open to air   Drainage Amount Small   Drainage Characteristics/Odor Serosanguineous   Appearance Red;Pink;Yellow;Adipose;Slough;Moist   Tissue loss description Full thickness   Red (%), Wound Tissue Color 25 %   Yellow (%), Wound Tissue Color 75 %   Periwound Area Redness   Wound Edges Irregular;Defined   Wound Length (cm) 5 cm   Wound Width (cm) 5 cm   Wound Depth (cm) 0.3 cm   Wound Volume (cm^3) 7.5 cm^3   Wound Surface Area (cm^2) 25 cm^2   Care Cleansed with:;Antimicrobial agent;Applied:;Skin Barrier   Dressing Applied;Other (comment)  (TRIAD hydrophilic wound dressing paste, foam)   Dressing Change Due 11/27/24        Wound 05/10/24 2000 Pressure Injury Left lower Breast   Date First Assessed/Time First Assessed: 05/10/24 2000   Present on Original Admission: Yes  Primary Wound Type: Pressure Injury  Side: Left  Orientation: lower  Location: Breast   Wound Image      Pressure Injury Stage 3   Dressing Appearance Open to air   Drainage Amount Small   Drainage Characteristics/Odor Serosanguineous   Appearance Red;Pink;Yellow;Adipose;Slough;Moist   Tissue loss description Full thickness   Red (%), Wound Tissue Color 25 %   Yellow (%), Wound Tissue Color 75 %   Periwound Area Redness   Wound Edges Open;Defined   Wound Length (cm) 2 cm   Wound Width (cm) 4 cm   Wound Depth (cm) 0.2 cm   Wound Volume (cm^3) 1.6 cm^3   Wound Surface Area (cm^2) 8 cm^2   Care Cleansed with:;Antimicrobial agent;Applied:;Skin Barrier   Dressing Applied;Other (comment)  (TRIAD hydrophilic wound dressing paste, foam)   Dressing Change Due 11/27/24 11/25/2024

## 2024-11-25 NOTE — PROCEDURES
"Divya Bui is a 64 y.o. female patient.    Temp: 97.3 °F (36.3 °C) (11/25/24 0930)  Pulse: 98 (11/25/24 0930)  Resp: (!) 29 (11/25/24 0930)  BP: (!) 85/48 (11/25/24 0930)  SpO2: 100 % (11/25/24 0930)  Weight: 102 kg (224 lb 13.9 oz) (11/25/24 0600)  Height: 5' 3" (160 cm) (11/24/24 2209)    PICC  Performed by: Gabriel Arizmendi RN  Consent Done: Yes  Time out: Immediately prior to procedure a time out was called to verify the correct patient, procedure, equipment, support staff and site/side marked as required  Indications: med administration  Anesthesia: local infiltration  Local anesthetic: lidocaine 1% without epinephrine  Anesthetic Total (mL): 3  Preparation: skin prepped with ChloraPrep  Skin prep agent dried: skin prep agent completely dried prior to procedure  Sterile barriers: all five maximum sterile barriers used - cap, mask, sterile gown, sterile gloves, and large sterile sheet  Hand hygiene: hand hygiene performed prior to central venous catheter insertion  Location details: right basilic  Catheter type: double lumen  Catheter size: 4 Fr  Catheter Length: 38cm    Ultrasound guidance: yes  Vessel Caliber: medium and patent, compressibility normal  Vascular Doppler: not done  Needle advanced into vessel with real time Ultrasound guidance.  Guidewire confirmed in vessel.  Sterile sheath used.  no esophageal manometryNumber of attempts: 1  Post-procedure: blood return through all ports, chlorhexidine patch and sterile dressing applied  Estimated blood loss (mL): 0  Specimens: No  Implants: No          Name LUIS Rios  11/25/2024    "

## 2024-11-25 NOTE — ASSESSMENT & PLAN NOTE
- likely 2/2 severe sepsis, ELIZABETH, metabolic acidosis  - supportive care  - delirium precautions  - avoid sedating medications  - consider d/c of Abilify

## 2024-11-25 NOTE — ASSESSMENT & PLAN NOTE
Called patient with  [id number], name and  verified. Patient was notified that 3/21 surgery time was moved up from 14:30 to 1345 with an arrival time of 11:45. Patient agreed and will be here at 11:45.   This patient does have evidence of infective focus  My overall impression is sepsis.  Source: Urinary Tract  Antibiotics given-   Antibiotics (72h ago, onward)      Start     Stop Route Frequency Ordered    11/25/24 0230  ertapenem (INVANZ) 500 mg in 0.9% NaCl 100 mL IVPB         -- IV Every 24 hours (non-standard times) 11/24/24 1342    11/23/24 1745  linezolid 600 mg/300 mL IVPB 600 mg         -- IV Every 12 hours (non-standard times) 11/23/24 1640          Latest lactate reviewed-  Recent Labs   Lab 11/24/24  1718   LACTATE 1.9       Organ dysfunction indicated by Acute kidney injury and Encephalopathy    Fluid challenge Actual Body weight- Patient will receive 30ml/kg actual body weight to calculate fluid bolus for treatment of septic shock.     Currently she is hypotensive, discussed case with the ICU and saw patient with ICU PA, and rechecked her blood pressure multiple times.  Lactate is normal. For now she will stay on the floor.  She has received 3 L of IV fluid today.  She is confused and lethargic but per the son this is significantly improved from how she was before presentation and similar to her mental status yesterday.  If she worsens could consider CT abdomen pelvis, may have nidus of infection

## 2024-11-25 NOTE — NURSING TRANSFER
Nursing Transfer Note      11/25/2024   4:48 AM    Handoff given to Petra in ICU. Pt transferred by Khushbu and FIDE to ICU bed 16.

## 2024-11-25 NOTE — SIGNIFICANT EVENT
64-year-old white woman currently admitted for sepsis with acute cystitis, diarrhea, acute kidney injury with metabolic acidosis, hyperglycemia, Candida intertrigo and dermatitis, major depression, chronic pain syndrome, morbid obesity, and hypotension.    Patient with persistent hypotension despite receiving multiple IV fluid boluses and bicarb infusion at 100 mL/hr.  Nursing staff called as patient's systolic blood pressure in the 80s.  Albumin 25% 25 g IV and midodrine 5 mg p.o. t.i.d. were initiated.  Patient remained hypotensive.  Decision was made to transfer patient to the ICU after discussing case with [facility] critical care nurse practitioner.

## 2024-11-25 NOTE — NURSING
Nurse called pt's sons, Aries Bui and Herbert Bui, to inform family member that patient is being moved to ICU bed 16 and provided the nurse name and ICU phone number.

## 2024-11-25 NOTE — PLAN OF CARE
Recommendation:   1. Continue current diabetic 2000 kcal diet.   2. Addition of Boost Plus TID.   3. Monitor weight/labs.   4. RD to follow and monitor intake    Goals:   Pt intake >/=50% intake by RD follow up    Nutrition Goal Status: new  Communication of RD Recs: other (comment) (POC)

## 2024-11-25 NOTE — PT/OT/SLP PROGRESS
"Occupational Therapy   Treatment    Name: Divya Bui  MRN: [id number]  Admitting Diagnosis:  Sepsis       Recommendations:     Discharge Recommendations: Moderate Intensity Therapy  Discharge Equipment Recommendations:  to be determined by next level of care  Barriers to discharge:  None    Assessment:     Divya Bui is a 64 y.o. female with a medical diagnosis of Sepsis.  She presents with the following performance deficits affecting function are weakness, impaired endurance, impaired self care skills, impaired functional mobility, impaired balance, impaired cardiopulmonary response to activity, decreased safety awareness, impaired cognition, impaired skin.     Rehab Prognosis:  Fair and questionable ; patient would benefit from acute skilled OT services to address these deficits and reach maximum level of function.       Plan:     Patient to be seen 2 x/week to address the above listed problems via self-care/home management, therapeutic activities, therapeutic exercises  Plan of Care Expires: 12/08/24  Plan of Care Reviewed with: patient    Subjective     Chief Complaint: Reported "I am so tired."  Patient/Family Comments/goals: none reported  Pain/Comfort:  Pain Rating 1:  (no nonverbal indicators of pain)  Pain Addressed 1:  (activity pacing)    Objective:     Communicated with: NurseDivya, prior to session.  Patient found supine with peripheral IV, blood pressure cuff, pulse ox (continuous), telemetry, drummond catheter upon OT entry to room.    General Precautions: Standard, fall, contact    Orthopedic Precautions:N/A  Braces: N/A  Respiratory Status: Room air     Occupational Performance:     Bed Mobility:    Patient completed Rolling/Turning to Left with  total assistance and 2 persons  Patient completed Rolling/Turning to Right with total assistance and 2 persons  Patient completed Scooting/Bridging with total assistance and 2 persons   Completed repositioning in bed to allow for engagement in self feeding " and improved skin integrity.  Educated patient on appropriate positioning in bed to minimize back pain- stated understanding     Functional Mobility/Transfers:  Patient with severe hypotension- BP 74/49 in supine. Not appropriate for EOB sitting at this time- will progress as safely able.    Activities of Daily Living:  Feeding:  setup A for container management from bedside tray with bed in chair position    [facility] 6 Click ADL: 10    Treatment & Education:  Patient issued yellow tband for initiation of resistive B UE HEP. Completed tricep ext, bicep curls, and horizontal abd bilaterally with intermittent min A to improve technique and pacing. Completed x10 reps, x1 set ea with rest breaks between sets. Completed exercises to increase functional strength and activity tolerance needed for ADL completion. Educated on rationale for supine session this date and goal of treatment progression as safely able. Patient stated understanding, but due to cognitive deficits and lethargy, comprehension is questionable. Will reinforce at next session.    Patient left with bed in chair position with all lines intact, call button in reach, bed alarm on, and nurse notified    GOALS:   Multidisciplinary Problems       Occupational Therapy Goals          Problem: Occupational Therapy    Goal Priority Disciplines Outcome Interventions   Occupational Therapy Goal     OT, PT/OT Progressing    Description: O.T. GOAL TO BE MET BY 12-08-24  PT WILL TOLERATE 1 SET X 12 REPS B UE ROM EXERCISE  SBA WITH UE DRESSING  SBA WITH BSC TRANSFER                       Time Tracking:     OT Date of Treatment: 11/25/24  OT Start Time: 0750  OT Stop Time: 0820  OT Total Time (min): 30 min    Billable Minutes:Therapeutic Activity 15  Therapeutic Exercise 15    Marielena Andrade, RODOLFO  11/25/2024

## 2024-11-26 LAB
ALBUMIN SERPL BCP-MCNC: 1.7 G/DL (ref 3.5–5.2)
ALP SERPL-CCNC: 67 U/L (ref 40–150)
ALT SERPL W/O P-5'-P-CCNC: 8 U/L (ref 10–44)
ANION GAP SERPL CALC-SCNC: 13 MMOL/L (ref 8–16)
AST SERPL-CCNC: 9 U/L (ref 10–40)
BACTERIA STL CULT: NORMAL
BASO STIPL BLD QL SMEAR: ABNORMAL
BASOPHILS # BLD AUTO: 0.01 K/UL (ref 0–0.2)
BASOPHILS NFR BLD: 0.1 % (ref 0–1.9)
BILIRUB SERPL-MCNC: 0.3 MG/DL (ref 0.1–1)
BUN SERPL-MCNC: 57 MG/DL (ref 8–23)
BURR CELLS BLD QL SMEAR: ABNORMAL
CALCIUM SERPL-MCNC: 6.1 MG/DL (ref 8.7–10.5)
CHLORIDE SERPL-SCNC: 98 MMOL/L (ref 95–110)
CO2 SERPL-SCNC: 22 MMOL/L (ref 23–29)
CREAT SERPL-MCNC: 3 MG/DL (ref 0.5–1.4)
DIFFERENTIAL METHOD BLD: ABNORMAL
EOSINOPHIL # BLD AUTO: 0.1 K/UL (ref 0–0.5)
EOSINOPHIL NFR BLD: 0.9 % (ref 0–8)
ERYTHROCYTE [DISTWIDTH] IN BLOOD BY AUTOMATED COUNT: 15.9 % (ref 11.5–14.5)
EST. GFR  (NO RACE VARIABLE): 17 ML/MIN/1.73 M^2
GLUCOSE SERPL-MCNC: 110 MG/DL (ref 70–110)
HCT VFR BLD AUTO: 30.2 % (ref 37–48.5)
HGB BLD-MCNC: 10.2 G/DL (ref 12–16)
IMM GRANULOCYTES # BLD AUTO: 0.08 K/UL (ref 0–0.04)
IMM GRANULOCYTES NFR BLD AUTO: 0.9 % (ref 0–0.5)
LYMPHOCYTES # BLD AUTO: 1.1 K/UL (ref 1–4.8)
LYMPHOCYTES NFR BLD: 12.3 % (ref 18–48)
MAGNESIUM SERPL-MCNC: 1.8 MG/DL (ref 1.6–2.6)
MCH RBC QN AUTO: 29 PG (ref 27–31)
MCHC RBC AUTO-ENTMCNC: 33.8 G/DL (ref 32–36)
MCV RBC AUTO: 86 FL (ref 82–98)
MONOCYTES # BLD AUTO: 0.9 K/UL (ref 0.3–1)
MONOCYTES NFR BLD: 9.6 % (ref 4–15)
NEUTROPHILS # BLD AUTO: 6.7 K/UL (ref 1.8–7.7)
NEUTROPHILS NFR BLD: 76.2 % (ref 38–73)
NRBC BLD-RTO: 1 /100 WBC
PHOSPHATE SERPL-MCNC: 2.3 MG/DL (ref 2.7–4.5)
PLATELET # BLD AUTO: 220 K/UL (ref 150–450)
PLATELET BLD QL SMEAR: ABNORMAL
PMV BLD AUTO: 10.4 FL (ref 9.2–12.9)
POCT GLUCOSE: 124 MG/DL (ref 70–110)
POCT GLUCOSE: 149 MG/DL (ref 70–110)
POLYCHROMASIA BLD QL SMEAR: ABNORMAL
POTASSIUM SERPL-SCNC: 3.5 MMOL/L (ref 3.5–5.1)
PROT SERPL-MCNC: 4.2 G/DL (ref 6–8.4)
RBC # BLD AUTO: 3.52 M/UL (ref 4–5.4)
SODIUM SERPL-SCNC: 133 MMOL/L (ref 136–145)
WBC # BLD AUTO: 8.84 K/UL (ref 3.9–12.7)

## 2024-11-26 PROCEDURE — 99233 SBSQ HOSP IP/OBS HIGH 50: CPT | Mod: NSCH,,, | Performed by: INTERNAL MEDICINE

## 2024-11-26 PROCEDURE — 25000003 PHARM REV CODE 250

## 2024-11-26 PROCEDURE — 63600175 PHARM REV CODE 636 W HCPCS: Performed by: NURSE PRACTITIONER

## 2024-11-26 PROCEDURE — 63700000 PHARM REV CODE 250 ALT 637 W/O HCPCS: Performed by: NURSE PRACTITIONER

## 2024-11-26 PROCEDURE — 94761 N-INVAS EAR/PLS OXIMETRY MLT: CPT

## 2024-11-26 PROCEDURE — 27000207 HC ISOLATION

## 2024-11-26 PROCEDURE — 25000003 PHARM REV CODE 250: Performed by: STUDENT IN AN ORGANIZED HEALTH CARE EDUCATION/TRAINING PROGRAM

## 2024-11-26 PROCEDURE — 97530 THERAPEUTIC ACTIVITIES: CPT | Mod: CQ

## 2024-11-26 PROCEDURE — 99900035 HC TECH TIME PER 15 MIN (STAT)

## 2024-11-26 PROCEDURE — 63600175 PHARM REV CODE 636 W HCPCS: Performed by: STUDENT IN AN ORGANIZED HEALTH CARE EDUCATION/TRAINING PROGRAM

## 2024-11-26 PROCEDURE — 11000001 HC ACUTE MED/SURG PRIVATE ROOM

## 2024-11-26 PROCEDURE — 97530 THERAPEUTIC ACTIVITIES: CPT

## 2024-11-26 PROCEDURE — 25000003 PHARM REV CODE 250: Performed by: NURSE PRACTITIONER

## 2024-11-26 PROCEDURE — 87040 BLOOD CULTURE FOR BACTERIA: CPT | Performed by: NURSE PRACTITIONER

## 2024-11-26 PROCEDURE — 85025 COMPLETE CBC W/AUTO DIFF WBC: CPT

## 2024-11-26 PROCEDURE — 25000003 PHARM REV CODE 250: Mod: JZ,JG | Performed by: NURSE PRACTITIONER

## 2024-11-26 PROCEDURE — 83735 ASSAY OF MAGNESIUM: CPT

## 2024-11-26 PROCEDURE — 36415 COLL VENOUS BLD VENIPUNCTURE: CPT | Performed by: NURSE PRACTITIONER

## 2024-11-26 PROCEDURE — 80053 COMPREHEN METABOLIC PANEL: CPT

## 2024-11-26 PROCEDURE — 84100 ASSAY OF PHOSPHORUS: CPT

## 2024-11-26 RX ORDER — FLUCONAZOLE 100 MG/1
100 TABLET ORAL DAILY
Status: COMPLETED | OUTPATIENT
Start: 2024-11-26 | End: 2024-11-28

## 2024-11-26 RX ORDER — CALCIUM GLUCONATE 20 MG/ML
1 INJECTION, SOLUTION INTRAVENOUS ONCE
Status: COMPLETED | OUTPATIENT
Start: 2024-11-26 | End: 2024-11-26

## 2024-11-26 RX ORDER — MAGNESIUM SULFATE HEPTAHYDRATE 40 MG/ML
2 INJECTION, SOLUTION INTRAVENOUS ONCE
Status: COMPLETED | OUTPATIENT
Start: 2024-11-26 | End: 2024-11-26

## 2024-11-26 RX ORDER — SODIUM,POTASSIUM PHOSPHATES 280-250MG
2 POWDER IN PACKET (EA) ORAL ONCE
Status: COMPLETED | OUTPATIENT
Start: 2024-11-26 | End: 2024-11-26

## 2024-11-26 RX ADMIN — HEPARIN SODIUM 5000 UNITS: 5000 INJECTION INTRAVENOUS; SUBCUTANEOUS at 02:11

## 2024-11-26 RX ADMIN — POTASSIUM BICARBONATE 40 MEQ: 391 TABLET, EFFERVESCENT ORAL at 09:11

## 2024-11-26 RX ADMIN — ALPRAZOLAM 0.25 MG: 0.25 TABLET ORAL at 07:11

## 2024-11-26 RX ADMIN — Medication 2 PACKET: at 09:11

## 2024-11-26 RX ADMIN — SODIUM BICARBONATE 650 MG TABLET 650 MG: at 08:11

## 2024-11-26 RX ADMIN — MIDODRINE HYDROCHLORIDE 10 MG: 5 TABLET ORAL at 05:11

## 2024-11-26 RX ADMIN — ARIPIPRAZOLE 10 MG: 5 TABLET ORAL at 08:11

## 2024-11-26 RX ADMIN — Medication 100 MG: at 08:11

## 2024-11-26 RX ADMIN — MICONAZOLE NITRATE: 20 POWDER TOPICAL at 08:11

## 2024-11-26 RX ADMIN — MIDODRINE HYDROCHLORIDE 10 MG: 5 TABLET ORAL at 08:11

## 2024-11-26 RX ADMIN — MIDODRINE HYDROCHLORIDE 10 MG: 5 TABLET ORAL at 02:11

## 2024-11-26 RX ADMIN — MUPIROCIN: 20 OINTMENT TOPICAL at 08:11

## 2024-11-26 RX ADMIN — LINEZOLID 600 MG: 600 INJECTION, SOLUTION INTRAVENOUS at 07:11

## 2024-11-26 RX ADMIN — HEPARIN SODIUM 5000 UNITS: 5000 INJECTION INTRAVENOUS; SUBCUTANEOUS at 09:11

## 2024-11-26 RX ADMIN — FLUCONAZOLE 100 MG: 100 TABLET ORAL at 02:11

## 2024-11-26 RX ADMIN — FAMOTIDINE 20 MG: 20 TABLET ORAL at 08:11

## 2024-11-26 RX ADMIN — ACETAMINOPHEN 650 MG: 325 TABLET ORAL at 02:11

## 2024-11-26 RX ADMIN — THERA TABS 1 TABLET: TAB at 08:11

## 2024-11-26 RX ADMIN — HEPARIN SODIUM 5000 UNITS: 5000 INJECTION INTRAVENOUS; SUBCUTANEOUS at 05:11

## 2024-11-26 RX ADMIN — ERTAPENEM 500 MG: 1 INJECTION INTRAMUSCULAR; INTRAVENOUS at 03:11

## 2024-11-26 RX ADMIN — ATORVASTATIN CALCIUM 40 MG: 40 TABLET, FILM COATED ORAL at 08:11

## 2024-11-26 RX ADMIN — NOREPINEPHRINE BITARTRATE 0.06 MCG/KG/MIN: 0.02 INJECTION, SOLUTION INTRAVENOUS at 03:11

## 2024-11-26 RX ADMIN — FOLIC ACID 1 MG: 1 TABLET ORAL at 08:11

## 2024-11-26 RX ADMIN — ACETAMINOPHEN 650 MG: 325 TABLET ORAL at 09:11

## 2024-11-26 RX ADMIN — ACETAMINOPHEN 650 MG: 325 TABLET ORAL at 07:11

## 2024-11-26 RX ADMIN — MAGNESIUM SULFATE HEPTAHYDRATE 2 G: 40 INJECTION, SOLUTION INTRAVENOUS at 09:11

## 2024-11-26 RX ADMIN — CALCIUM GLUCONATE 1 G: 20 INJECTION, SOLUTION INTRAVENOUS at 05:11

## 2024-11-26 RX ADMIN — ONDANSETRON 4 MG: 2 INJECTION INTRAMUSCULAR; INTRAVENOUS at 08:11

## 2024-11-26 NOTE — ASSESSMENT & PLAN NOTE
>>ASSESSMENT AND PLAN FOR DIARRHEA WRITTEN ON 11/26/2024 12:52 PM BY DAVID YBARRA, NP    - reported that is improving  - stool studies in process

## 2024-11-26 NOTE — ASSESSMENT & PLAN NOTE
- ESBL hx, recurrent UTI  - ID consulted by HM w/recs for antibiotic therapy  - continue Ivanz, Zyvox  - urine culture with GNR  - blood culture + coag neg staph- repeat cultures pending

## 2024-11-26 NOTE — HOSPITAL COURSE
11/26/2024: Patient with mild delirium, confusion. Reports feeling better overall since admission. Weaning Levo as tolerated. Continuing antibiotics for UTI. Blood culture with staph coag neg, likely contaminate, will repeat blood cultures.

## 2024-11-26 NOTE — PT/OT/SLP PROGRESS
"Occupational Therapy   Treatment    Name: Divya Bui  MRN: [id number]  Admitting Diagnosis:  Sepsis       Recommendations:     Discharge Recommendations: Moderate Intensity Therapy  Discharge Equipment Recommendations:  to be determined by next level of care  Barriers to discharge:  None    Assessment:     Divya Bui is a 64 y.o. female with a medical diagnosis of Sepsis.  She presents with the following performance deficits affecting function are weakness, impaired endurance, impaired self care skills, impaired functional mobility, impaired balance, impaired cardiopulmonary response to activity, pain, decreased safety awareness, impaired cognition, decreased lower extremity function, impaired skin, decreased upper extremity function, impaired fine motor.     Rehab Prognosis:  Fair; patient would benefit from acute skilled OT services to address these deficits and reach maximum level of function.       Patient with slightly improved lethargy and cognition this date. Will continue to progress as safely able.    Plan:     Patient to be seen 2 x/week to address the above listed problems via self-care/home management, therapeutic activities, therapeutic exercises  Plan of Care Expires: 12/08/24  Plan of Care Reviewed with: patient    Subjective     Chief Complaint: Reported "I am ok to try."  Patient/Family Comments/goals: increase independence  Pain/Comfort:  Pain Rating 1: 0/10    Objective:     Communicated with: NurseAnat, prior to session.  Patient found supine with blood pressure cuff, pulse ox (continuous), telemetry, drummond catheter, peripheral IV, PICC line upon OT entry to room.    General Precautions: Standard, fall, contact    Orthopedic Precautions:N/A  Braces: N/A  Respiratory Status: Room air     Occupational Performance:     Bed Mobility:    Patient completed Rolling/Turning to Left with  maximal assistance and 2 persons  Patient completed Rolling/Turning to Right with maximal assistance and 2 " persons  Patient completed Scooting/Bridging with maximal assistance and 2 persons  Patient completed Supine to Sit with maximal assistance and 2 persons  Patient completed Sit to Supine with maximal assistance and 2 persons   Patient sat EOB x10 minutes to increase activity tolerance and gross functional strength.  Initially requiring max A, but with significant repositioning, sustained static sitting balance with CGA using B UE for support    Increased time with mobility for frequent BP checks and rest breaks. Patient grossly asymptomatic throughout  BP upon initial entry to room 85/46   Required significant repositioning of cuff and multiple rechecks  BP prior to EOB sitting 120/60  After transition to EOB 97/55  Prior to returning to supine 112/92    Educated on bed mobility and repositioning techniques with minimal sheering for skin protection.    Functional Mobility/Transfers:  Will progress towards functional transfers as appropriate    [facility] 6 Click ADL: 10    Treatment & Education:  Encouraged completion of B UE AROM therex throughout the day to increase functional strength and activity tolerance needed for ADL completion. Son present, and educated on safe mobility in setting of hypotension. Educated on importance of chair position in bed throughout the day for OOB to chair prep. Both patient and son stated understanding and in agreement with POC.    Patient left with bed in chair position with all lines intact, call button in reach, bed alarm on, nurse notified, and son present    GOALS:   Multidisciplinary Problems       Occupational Therapy Goals          Problem: Occupational Therapy    Goal Priority Disciplines Outcome Interventions   Occupational Therapy Goal     OT, PT/OT Progressing    Description: O.T. GOAL TO BE MET BY 12-08-24  PT WILL TOLERATE 1 SET X 12 REPS B UE ROM EXERCISE  SBA WITH UE DRESSING  SBA WITH BSC TRANSFER                       Time Tracking:     OT Date of Treatment: 11/26/24  OT  Start Time: 1000  OT Stop Time: 1030  OT Total Time (min): 30 min    Billable Minutes:Therapeutic Activity 30    Marielena Andrade OT  11/26/2024

## 2024-11-26 NOTE — PROGRESS NOTES
[facility] ([facility])  Adult Nutrition  Progress Note    SUMMARY       Recommendations    Recommendation/Intervention:   1. Recommend pt continues on a Diabetic 2000 calorie diet as medically appropriate  2. Recommend pt continues Boost glucose control TID to assist filling nutritional gaps   3. Encourage PO and supplement intake, recommend feeding assistance   4. Recommend Banatrol TID to assist with diarrhea or BID for loose stools as warranted   5. Recommend 500 mg/day vitamin C supplementation for wound healing   6. Weigh twice weekly     Goals:   1. Pt will tolerate and consume >75% EEN and EPN prior to RD follow up   2. Pt's diarrhea/ Loose stools will improve prior to RD follow up   3. Pt will receive vitamin C supplementation prior to RD follow up   Nutrition Goal Status: new  Communication of RD Recs: other (comment) (POC)    Assessment and Plan    Nutrition Problem  Inadequate protein/energy intake   Altered GI function   Increased nutrient needs    Related to (etiology):   Decreased ability to consume sufficient protein/energy  Alteration in GI tract structure and/or function   Increased demand for nutrition     Signs and Symptoms (as evidenced by):   Change in appetite, Estimated intake of food less than estimated needs   Diarrhea/ Loose stools  Wound healing needs     Interventions/Recommendations (treatment strategy):  1. Commercial beverage medical food supplement therapy  2. Feeding assistance management  3. Commercial food medical food supplement therapy  4. Vitamin C supplement therapy   5. Collaboration by nutrition professional with other providers    Nutrition Diagnosis Status:   New       Malnutrition Assessment     Skin (Micronutrient): pallor, bruised, wounds unhealed (Sumit score = 12 (high risk)                                 Reason for Assessment    Reason For Assessment: consult (Malnutrition)  Diagnosis: infection/sepsis (sepsis)  General Information Comments:   11/24/24:  "Pt is recieving diabetic 2000 kcal diet with 0% intake recorded. Per MD note: "Intermittently confused and lethargic , per MD note today Mentating the same (mildly confused but interacting appropriately)" PIV. Sumit Score: 15 moisture associated dermatitis right lower quadrant perineum NFPE not completed 2/2 remote weekend coverage    Follow up:   11/26/24: RD follow up. Pt currently on contact isolation, a Diabetic 2000 calorie diet, in the ICU. EMR noted pt transferred from floor to ICU for higher level of care d/t frequent monitoring of hypotension, pt w/ mild delirium/confusion, ELIZABETH worsening, hyperglycemia, pt negative for N/V, positive for abd pain. Discussed pt during rounds. NP reported pt not eating much but confirmed pt is drinking Boost glucose control. Visual NFPE performed d/t RN's working w/ pt with BM, AMS and pt's contact isolation status, pt appears nourished, will perform when appropriate. LBM 11/26/24 (loose). MASD, DTPI and stage 3 PI's noted, details per Wound care note 11/25/24. Note Kenn was ordered but now cancelled. Labs, meds, weight reviewed. Note labs 11/26/24: Glu 149, A1C 6.0. Note thiamine, MTV, folic acid. Weight charted 5/9/24 230 lbs, 11/26/24 231 lbs (BMI 41.08, Morbid obese), +1 lb wt gain x 3+ months, weight stable. RD will continue to follow and monitor pt's nutritional status during admit.     Nutrition Discharge Planning: Low sodium, Diabetic diet + 500 mg/day vitamin C supplement + Boost glucose control as warranted     Nutrition Risk Screen    Nutrition Risk Screen: large or nonhealing wound, burn or pressure injury    Nutrition Related Social Determinants of Health: SDOH: Unable to assess at this time.     Nutrition/Diet History    Food Preferences: TAM  Spiritual, Cultural Beliefs, Worship Practices, Values that Affect Care: no  Food Allergies: NKFA  Factors Affecting Nutritional Intake: decreased appetite, impaired cognitive status/motor " "control    Anthropometrics    Temp: 99 °F (37.2 °C)  Height Method: Stated  Height: 5' 3" (160 cm)  Height (inches): 63 in  Weight Method: Bed Scale  Weight: 105.2 kg (231 lb 14.8 oz)  Weight (lb): 231.93 lb  Ideal Body Weight (IBW), Female: 115 lb  % Ideal Body Weight, Female (lb): 191.32 %  BMI (Calculated): 41.1  BMI Grade: 35 - 39.9 - obesity - grade II     Wt Readings from Last 15 Encounters:   11/26/24 105.2 kg (231 lb 14.8 oz)   11/14/24 107 kg (235 lb 14.3 oz)   07/10/24 107.7 kg (237 lb 7 oz)   06/25/24 105 kg (231 lb 7.7 oz)   06/17/24 106.3 kg (234 lb 5.6 oz)   06/04/24 101.6 kg (223 lb 15.8 oz)   05/10/24 109.7 kg (241 lb 13.5 oz)   05/09/24 104.6 kg (230 lb 9.6 oz)   05/07/24 103.1 kg (227 lb 4.7 oz)   01/31/24 101.3 kg (223 lb 5.2 oz)   01/09/24 102.5 kg (225 lb 15.5 oz)   01/04/24 102.5 kg (225 lb 15.5 oz)   12/27/23 106.8 kg (235 lb 7.2 oz)   12/01/23 101.5 kg (223 lb 12.3 oz)   11/28/23 100.4 kg (221 lb 3.7 oz)     Lab/Procedures/Meds    Pertinent Labs Reviewed: reviewed  Pertinent Labs Comments: Na 133, CO2 14, BUN 51, Cr 2.7, eGFR 19, Glu 159, Ca 6.8, Mag 1.4, total Pro 4.4, Alb 1.4, AST 7, ALT 7  Pertinent Medications Reviewed: reviewed  Pertinent Medications Comments: 100 mL/hr sterile water with Na Bicarb, aripiprazole, statin, invanz, famotidine, duflucan, heparin, lenzolid, miconazole    BMP  Lab Results   Component Value Date     (L) 11/26/2024    K 3.5 11/26/2024    CL 98 11/26/2024    CO2 22 (L) 11/26/2024    BUN 57 (H) 11/26/2024    CREATININE 3.0 (H) 11/26/2024    CALCIUM 6.1 (LL) 11/26/2024    ANIONGAP 13 11/26/2024    EGFRNORACEVR 17 (A) 11/26/2024     Lab Results   Component Value Date    CALCIUM 6.1 (LL) 11/26/2024    PHOS 2.3 (L) 11/26/2024     Lab Results   Component Value Date    ALBUMIN 1.7 (L) 11/26/2024     Lab Results   Component Value Date    ALT 8 (L) 11/26/2024    AST 9 (L) 11/26/2024    ALKPHOS 67 11/26/2024    BILITOT 0.3 11/26/2024     Recent Labs   Lab " 11/26/24  0815   POCTGLUCOSE 149*     Lab Results   Component Value Date    HGBA1C 6.0 (H) 11/23/2024     Lab Results   Component Value Date    WBC 8.84 11/26/2024    HGB 10.2 (L) 11/26/2024    HCT 30.2 (L) 11/26/2024    MCV 86 11/26/2024     11/26/2024       Scheduled Meds:   ARIPiprazole  10 mg Oral Daily    atorvastatin  40 mg Oral Daily    ertapenem (INVanz) IV (PEDS and ADULTS)  500 mg Intravenous Q24H    famotidine  20 mg Oral Every other day    fluconazole  100 mg Oral Daily    folic acid  1 mg Oral Daily    heparin (porcine)  5,000 Units Subcutaneous Q8H    miconazole NITRATE 2 %   Topical (Top) BID    midodrine  10 mg Oral TID WM    multivitamin  1 tablet Oral Daily    mupirocin   Nasal BID    sodium bicarbonate  650 mg Oral BID    thiamine  100 mg Oral Daily     Continuous Infusions:   NORepinephrine bitartrate-D5W  0-3 mcg/kg/min Intravenous Continuous   Stopped at 11/26/24 0831     PRN Meds:.  Current Facility-Administered Medications:     acetaminophen, 650 mg, Oral, Q4H PRN    albuterol-ipratropium, 3 mL, Nebulization, Q6H PRN    ALPRAZolam, 0.25 mg, Oral, Nightly PRN    aluminum-magnesium hydroxide-simethicone, 30 mL, Oral, QID PRN    dextrose 10%, 12.5 g, Intravenous, PRN    dextrose 10%, 25 g, Intravenous, PRN    glucagon (human recombinant), 1 mg, Intramuscular, PRN    glucose, 16 g, Oral, PRN    glucose, 24 g, Oral, PRN    influenza (adjuvanted), 0.5 mL, Intramuscular, Prior to discharge    insulin aspart U-100, 0-5 Units, Subcutaneous, QID (AC + HS) PRN    naloxone, 0.02 mg, Intravenous, PRN    ondansetron, 4 mg, Intravenous, Q6H PRN    prochlorperazine, 5 mg, Intravenous, Q6H PRN    simethicone, 1 tablet, Oral, QID PRN    sodium chloride 0.9%, 10 mL, Intravenous, Q8H PRN      Physical Findings/Assessment         Estimated/Assessed Needs    Weight Used For Calorie Calculations: 65.5 kg (144 lb 4.7 oz) (Adj BW)  Energy Calorie Requirements (kcal): 5564-7152 kcals (25-30 kcals/kg Adj BW  (Sepsis, morbid obese)  Energy Need Method: Kcal/kg  Protein Requirements:  g (0.8-1.0 g/kg ABW (ELIZABETH, no dialysis vs Pressure injury/Wound vs Sepsis vs Obese)  Weight Used For Protein Calculations: 105.2 kg (231 lb 14.8 oz)  Fluid Requirements (mL): 500 mL + total output (ELIZABETH)  Estimated Fluid Requirement Method: other (see comments)  RDA Method (mL): 1636  CHO Requirement: 204-246 g (0674-6020 kcals/8)      Nutrition Prescription Ordered    Current Diet Order: Diabetic 2000 calorie diet  Oral Nutrition Supplement: Boost glucose control TID    Evaluation of Received Nutrient/Fluid Intake  I/O: (Net since admit):  11/24/24: +2746.3 mL  11/26/24: +8297.5 mL    Energy Calories Required: not meeting needs  Protein Required: not meeting needs  Fluid Required: exceeds needs  Total Fluid Intake (mL): 1998.8  Total Fluid Output (mL): 272  Tolerance: tolerating  % Intake of Estimated Energy Needs: 25 - 50 %  % Meal Intake: 0 - 25 %    Nutrition Risk    Level of Risk/Frequency of Follow-up: high (F/u x 2 weekly)     Monitor and Evaluation    Food and Nutrient Intake: food and beverage intake, energy intake  Food and Nutrient Adminstration: diet order  Knowledge/Beliefs/Attitudes: food and nutrition knowledge/skill  Physical Activity and Function: nutrition-related ADLs and IADLs  Anthropometric Measurements: weight, body mass index, weight change  Biochemical Data, Medical Tests and Procedures: electrolyte and renal panel, gastrointestinal profile, glucose/endocrine profile, inflammatory profile, lipid profile     Nutrition Follow-Up    RD Follow-up?: Yes  Grecia Rodriguez, BS, RDN, LDN

## 2024-11-26 NOTE — ASSESSMENT & PLAN NOTE
- has been on bicarb fluids x365 h now with only marginal improvement in serum CO2 and worsening Cr  - original urine pH 6.0  - UA pending collection, will add some urine studies with  - consider nephrology consultation if not improving    11/26- transitioned to bicarb tabs

## 2024-11-26 NOTE — SUBJECTIVE & OBJECTIVE
Interval History: Patient not completely oriented to situation, keeps asking to go home.      Objective:     Vital Signs (Most Recent):  Temp: 99 °F (37.2 °C) (11/26/24 1115)  Pulse: 103 (11/26/24 1115)  Resp: 19 (11/26/24 1115)  BP: (!) 91/53 (11/26/24 1100)  SpO2: 100 % (11/26/24 1115) Vital Signs (24h Range):  Temp:  [97.2 °F (36.2 °C)-99 °F (37.2 °C)] 99 °F (37.2 °C)  Pulse:  [] 103  Resp:  [16-44] 19  SpO2:  [90 %-100 %] 100 %  BP: ()/(41-79) 91/53     Weight: 105.2 kg (231 lb 14.8 oz)  Body mass index is 41.08 kg/m².      Intake/Output Summary (Last 24 hours) at 11/26/2024 1246  Last data filed at 11/26/2024 1100  Gross per 24 hour   Intake 1580 ml   Output 260 ml   Net 1320 ml        Physical Exam  Constitutional:       General: She is not in acute distress.     Appearance: She is obese. She is ill-appearing.   HENT:      Head: Normocephalic.      Nose: Nose normal.      Mouth/Throat:      Mouth: Mucous membranes are moist.   Eyes:      Pupils: Pupils are equal, round, and reactive to light.   Cardiovascular:      Rate and Rhythm: Normal rate and regular rhythm.      Pulses: Normal pulses.      Heart sounds: Normal heart sounds.   Pulmonary:      Effort: Pulmonary effort is normal.      Breath sounds: Normal breath sounds.   Abdominal:      General: Bowel sounds are normal. There is no distension.      Palpations: Abdomen is soft.      Tenderness: There is no abdominal tenderness.   Musculoskeletal:         General: No swelling.   Skin:     General: Skin is warm and dry.   Neurological:      Mental Status: She is alert. She is disoriented.      Motor: Weakness present.      Comments: Oriented to self, not time, place, situation.           Review of Systems   Constitutional:  Positive for appetite change and fatigue.   Respiratory:  Negative for cough, shortness of breath and wheezing.    Cardiovascular:  Negative for chest pain, palpitations and leg swelling.   Gastrointestinal:  Positive for  abdominal pain. Negative for diarrhea, nausea and vomiting.       Lines/Drains/Airways       Peripherally Inserted Central Catheter Line  Duration             PICC Double Lumen 11/25/24 0930 right basilic 1 day              Drain  Duration                  Urethral Catheter 11/25/24 0530 Temperature probe 16 Fr. 1 day              Peripheral Intravenous Line  Duration                  Peripheral IV - Single Lumen 11/23/24 1327 20 G Left Antecubital 2 days                    Significant Labs:    CBC/Anemia Profile:  Recent Labs   Lab 11/24/24  1718 11/25/24  0430 11/25/24  0431 11/26/24  0330   WBC 16.23* 9.28  --  8.84   HGB 10.9* 9.7*  --  10.2*   HCT 33.2* 29.7*  --  30.2*    156  --  220   MCV 88 90  --  86   RDW 16.9* 16.8*  --  15.9*   FOLATE  --   --  <2.2*  --    [id number]  --   --  1370*  --         Chemistries:  Recent Labs   Lab 11/24/24  1629 11/25/24  0431 11/26/24  0330   * 133* 133*   K 4.1 3.0* 3.5    101 98   CO2 14* 18* 22*   BUN 51* 54* 57*   CREATININE 2.7* 2.9* 3.0*   CALCIUM 6.8* 6.9* 6.1*   ALBUMIN  --  1.9* 1.7*   PROT  --  4.7* 4.2*   BILITOT  --  0.3 0.3   ALKPHOS  --  68 67   ALT  --  8* 8*   AST  --  9* 9*   MG  --  1.4* 1.8   PHOS  --   --  2.3*       All pertinent labs within the past 24 hours have been reviewed.    Significant Imaging:  I have reviewed all pertinent imaging results/findings within the past 24 hours.

## 2024-11-26 NOTE — PROGRESS NOTES
[facility] ([facility])  Critical Care Medicine  Progress Note    Patient Name: Divya Bui  MRN: [id number]  Admission Date: 11/23/2024  Hospital Length of Stay: 3 days  Code Status: Full Code  Attending Provider: Sara Moreira MD  Primary Care Provider: Angel Khan MD   Principal Problem: Sepsis    Subjective:     HPI:  Divya Bui is a 64-year-old female with a PMHx of HTN, GERD, morbid obesity s/p gastric bypass, depression, anxiety, esophageal stricture, ESBL UTI, who was admitted on 11/23 by hospital medicine for UTI after presenting to ED w/confusion, lethargy, generalized weakness. Pt was hospitalized with Sepsis due to UTI and ELIZABETH from 11/9-11/12/24 on IV Rocephin. CT abd was unrevealing. Urine culture grew Klebsiella, Proteus and Enterococcus and she was discharged on Augmentin x 7 days which she completed.     She is s/p sepsis fluid bolus, presents w/ELIZABETH, elevated procalcitonin. Blood culture Rapid ID showing staph epidermis only. Stool studies are in progress, rotavirus negative thus far. Throughout the day, she had been hypotensive, as low as 73/39, but not much improved with correcting cuff size. Of note, daytime provider reported BP in leg was 118/59. There is no evidence of bleeding. She has had 3 L of IVF total on 11/24. There was concern that she was developing some pulmonary edema, therefore further IVF have been held other than bicarb gtt that is currently infusing. Last evening, she had a normal lactate, slightly worsened creatinine, normal lactate, but BP marginally acceptable. The decision was made at that time to keep her on the floor. She remains intermittently confused. Overnight, BP trended down again. She has been given albumin and midodrine. On my exam, BP 97/67. She has had no urine output tonight and bladder scan just after my exam showed 70cc. In discussion with  provider and given her trend of hypotension and need for more frequent monitoring, we have  decided to transfer patient to ICU in order to facilitate higher level of care.     Hospital/ICU Course:  11/26/2024: Patient with mild delirium, confusion. Reports feeling better overall since admission. Weaning Levo as tolerated. Continuing antibiotics for UTI. Blood culture with staph coag neg, likely contaminate, will repeat blood cultures.    Interval History: Patient not completely oriented to situation, keeps asking to go home.      Objective:     Vital Signs (Most Recent):  Temp: 99 °F (37.2 °C) (11/26/24 1115)  Pulse: 103 (11/26/24 1115)  Resp: 19 (11/26/24 1115)  BP: (!) 91/53 (11/26/24 1100)  SpO2: 100 % (11/26/24 1115) Vital Signs (24h Range):  Temp:  [97.2 °F (36.2 °C)-99 °F (37.2 °C)] 99 °F (37.2 °C)  Pulse:  [] 103  Resp:  [16-44] 19  SpO2:  [90 %-100 %] 100 %  BP: ()/(41-79) 91/53     Weight: 105.2 kg (231 lb 14.8 oz)  Body mass index is 41.08 kg/m².      Intake/Output Summary (Last 24 hours) at 11/26/2024 1246  Last data filed at 11/26/2024 1100  Gross per 24 hour   Intake 1580 ml   Output 260 ml   Net 1320 ml        Physical Exam  Constitutional:       General: She is not in acute distress.     Appearance: She is obese. She is ill-appearing.   HENT:      Head: Normocephalic.      Nose: Nose normal.      Mouth/Throat:      Mouth: Mucous membranes are moist.   Eyes:      Pupils: Pupils are equal, round, and reactive to light.   Cardiovascular:      Rate and Rhythm: Normal rate and regular rhythm.      Pulses: Normal pulses.      Heart sounds: Normal heart sounds.   Pulmonary:      Effort: Pulmonary effort is normal.      Breath sounds: Normal breath sounds.   Abdominal:      General: Bowel sounds are normal. There is no distension.      Palpations: Abdomen is soft.      Tenderness: There is no abdominal tenderness.   Musculoskeletal:         General: No swelling.   Skin:     General: Skin is warm and dry.   Neurological:      Mental Status: She is alert. She is disoriented.      Motor:  "Weakness present.      Comments: Oriented to self, not time, place, situation.           Review of Systems   Constitutional:  Positive for appetite change and fatigue.   Respiratory:  Negative for cough, shortness of breath and wheezing.    Cardiovascular:  Negative for chest pain, palpitations and leg swelling.   Gastrointestinal:  Positive for abdominal pain. Negative for diarrhea, nausea and vomiting.       Lines/Drains/Airways       Peripherally Inserted Central Catheter Line  Duration             PICC Double Lumen 11/25/24 0930 right basilic 1 day              Drain  Duration                  Urethral Catheter 11/25/24 0530 Temperature probe 16 Fr. 1 day              Peripheral Intravenous Line  Duration                  Peripheral IV - Single Lumen 11/23/24 1327 20 G Left Antecubital 2 days                    Significant Labs:    CBC/Anemia Profile:  Recent Labs   Lab 11/24/24  1718 11/25/24  0430 11/25/24  0431 11/26/24  0330   WBC 16.23* 9.28  --  8.84   HGB 10.9* 9.7*  --  10.2*   HCT 33.2* 29.7*  --  30.2*    156  --  220   MCV 88 90  --  86   RDW 16.9* 16.8*  --  15.9*   FOLATE  --   --  <2.2*  --    [id number]  --   --  1370*  --         Chemistries:  Recent Labs   Lab 11/24/24  1629 11/25/24  0431 11/26/24  0330   * 133* 133*   K 4.1 3.0* 3.5    101 98   CO2 14* 18* 22*   BUN 51* 54* 57*   CREATININE 2.7* 2.9* 3.0*   CALCIUM 6.8* 6.9* 6.1*   ALBUMIN  --  1.9* 1.7*   PROT  --  4.7* 4.2*   BILITOT  --  0.3 0.3   ALKPHOS  --  68 67   ALT  --  8* 8*   AST  --  9* 9*   MG  --  1.4* 1.8   PHOS  --   --  2.3*       All pertinent labs within the past 24 hours have been reviewed.    Significant Imaging:  I have reviewed all pertinent imaging results/findings within the past 24 hours.    ABG  No results for input(s): "PH", "PO2", "PCO2", "HCO3", "BE" in the last 168 hours.  Assessment/Plan:     Neuro  Encephalopathy, metabolic  - likely 2/2 severe sepsis, ELIZABETH, metabolic acidosis  - supportive " care  - delirium precautions  - avoid sedating medications    Chronic pain disorder  - holding home medications w/acute encephalopathy    Psychiatric  Recurrent major depressive disorder, in partial remission  Patient has persistent depression which is mild and is currently controlled. Will Continue anti-depressant medications. We will not consult psychiatry at this time. Patient does not display psychosis at this time. Continue to monitor closely and adjust plan of care as needed.  - holding Doxepin  - continue Abilify, low dose Xanax prn    Derm  Candidal intertrigo and dermatitis  - noted to under and sides of breasts, pannus, groin, buttocks  - on diflucan, appears candida overgrowth  - WOCN consult placed to assist in care, appreciate recs    Cardiac/Vascular  Hypotension  - needs more frequent hemodynamic monitoring than available on floor  - goal MAP>65 mmHg  - See sepsis plan    HTN (hypertension)  - holding meds acutely    Renal/  Metabolic acidosis  - has been on bicarb fluids x365 h now with only marginal improvement in serum CO2 and worsening Cr  - original urine pH 6.0  - UA pending collection, will add some urine studies with  - consider nephrology consultation if not improving    11/26- transitioned to bicarb tabs    Acute cystitis  - ESBL hx, recurrent UTI  - ID consulted by  w/recs for antibiotic therapy  - continue Ivanz, Zyvox  - urine culture with GNR  - blood culture + coag neg staph- repeat cultures pending    ELIZABETH (acute kidney injury)  ELIZABETH is likely due to pre-renal azotemia due to dehydration. Baseline creatinine is  1.0 . Most recent creatinine and eGFR are listed below.  Recent Labs     11/24/24  1629 11/25/24  0431 11/26/24  0330   CREATININE 2.7* 2.9* 3.0*   EGFRNORACEVR 19* 17* 17*        Plan  - ELIZABETH is worsening. Will hold diuretics, ACEi/ARB's  - Avoid nephrotoxins and renally dose meds for GFR listed above  - Monitor urine output, serial BMP, and adjust therapy as needed  -  continue bicarb fluids  - strict I&O  - will hold further IVF  - she will likely need some diuresis when BP stabilizes    ID  * Sepsis  This patient does have evidence of infective focus  My overall impression is sepsis.  Source: Urinary Tract  Antibiotics given-   Antibiotics (72h ago, onward)      Start     Stop Route Frequency Ordered    11/25/24 0930  mupirocin 2 % ointment         11/30/24 0859 Nasl 2 times daily 11/25/24 0817    11/25/24 0230  ertapenem (INVANZ) 500 mg in 0.9% NaCl 100 mL IVPB         -- IV Every 24 hours (non-standard times) 11/24/24 1342          Latest lactate reviewed-  Recent Labs   Lab 11/25/24  0431   LACTATE 1.2       Organ dysfunction indicated by Acute kidney injury and Encephalopathy  Fluid challenge was provided in ED on 11/23  Source control achieved by: antibiotics  - moving to ICU for recurrent hypotension, has been given Midodrine, albumin w/marginal improvement  11/26:  - Patient started on Levo overnight- weaning to off today  - Continue current abx plan  - Blood culture with coag neg staph- likely a contaminate, repeat blood cultures sent        Oncology  Iron deficiency anemia  Anemia is likely due to Iron deficiency. Most recent hemoglobin and hematocrit are listed below.  Recent Labs     11/24/24  1718 11/25/24  0430 11/26/24  0330   HGB 10.9* 9.7* 10.2*   HCT 33.2* 29.7* 30.2*       - hx gastric bypass surgery, JOSE, B12/folate def  - is normochromic, normocytic now  - will check B12/folate  - start vitamin supplements    Endocrine  Hyperglycemia  Lab Results   Component Value Date    HGBA1C 6.0 (H) 11/23/2024     - has been on diabetic diet  - SSI with scheduled accuchecks  - has been on low correction scale with improved control  - adjust as indicated      Morbid obesity with BMI of 40.0-44.9, adult  Body mass index is 41.08 kg/m². Morbid obesity complicates all aspects of disease management from diagnostic modalities to treatment. Weight loss encouraged and health  benefits explained to patient.     Hypothyroid  - TSH/T4 acceptable    GI  Diarrhea  - reported that is improving  - stool studies in process      Critical Care Time: 35 minutes  Critical secondary to Patient has a condition that poses threat to life and bodily function: Sepsis requiring vasopressor support      Critical care was time spent personally by me on the following activities: development of treatment plan with patient or surrogate and bedside caregivers, discussions with consultants, evaluation of patient's response to treatment, examination of patient, ordering and performing treatments and interventions, ordering and review of laboratory studies, ordering and review of radiographic studies, pulse oximetry, re-evaluation of patient's condition. This critical care time did not overlap with that of any other provider or involve time for any procedures.     Carmen Go NP  Critical Care Medicine  [facility] - [facility] ([facility])

## 2024-11-26 NOTE — ASSESSMENT & PLAN NOTE
Anemia is likely due to Iron deficiency. Most recent hemoglobin and hematocrit are listed below.  Recent Labs     11/24/24  1718 11/25/24  0430 11/26/24  0330   HGB 10.9* 9.7* 10.2*   HCT 33.2* 29.7* 30.2*       - hx gastric bypass surgery, JOSE, B12/folate def  - is normochromic, normocytic now  - will check B12/folate  - start vitamin supplements

## 2024-11-26 NOTE — PT/OT/SLP PROGRESS
Physical Therapy  Treatment    Divya Bui   MRN: [id number]   Admitting Diagnosis: Sepsis    PT Received On: 11/26/24  PT Start Time: 1005     PT Stop Time: 1035    PT Total Time (min): 30 min       Billable Minutes:  Therapeutic Activity 30    Treatment Type: Treatment  PT/PTA: PTA     Number of PTA visits since last PT visit: 2       General Precautions: Standard, contact, fall  Orthopedic Precautions: N/A  Braces: N/A  Respiratory Status: Room air    Spiritual, Cultural Beliefs, [employer] Practices, Values that Affect Care: no    Subjective:  Communicated with patient's nurseAnat, and completed Epic chart review prior to session.  Patient agreed to PT session.     Pain/Comfort  Pain Rating 1: 0/10  Pain Rating Post-Intervention 1: 0/10    Objective:   Patient found with: blood pressure cuff, drummond catheter, peripheral IV, PICC line, telemetry    Supine > sit EOB: Max A of 2    Forward scoot towards EOB: Max A of 2 (emphasis on decreased shearing of bottom)    Seated EOB x 10 min total focusing on increased tolerance to upright position, core stability, trunk control and CV endurance.   Maintained self supported sitting balance with Max A progressing to CGA  Unable to maintain unsupported sitting balance at this time    Sit > supine: Max A of 2     Supine scoot towards HOB: Total A of 2    Required increased time to obtain blood pressure readings throughout session. Patient asymptomatic throughout.   Supine: 85/46 mmHg  Repeated in supine ~5 min later: 120/60 mmHg  Seated EOB: 97/55 mmHg  Repeated ~5 min later while EOB: 112/62 mmHg      AM-PAC 6 CLICK MOBILITY  How much help from another person does this patient currently need?   1 = Unable, Total/Dependent Assistance  2 = A lot, Maximum/Moderate Assistance  3 = A little, Minimum/Contact Guard/Supervision  4 = None, Modified [place]/Independent    Turning over in bed (including adjusting bedclothes, sheets and blankets)?: 2  Sitting down on and standing  up from a chair with arms (e.g., wheelchair, bedside commode, etc.): 1 (NT)  Moving from lying on back to sitting on the side of the bed?: 2  Moving to and from a bed to a chair (including a wheelchair)?: 1 (NT)  Need to walk in hospital room?: 1 (NT)  Climbing 3-5 steps with a railing?: 1 (NT)  Basic Mobility Total Score: 8    AM-PAC Raw Score CMS G-Code Modifier Level of Impairment Assistance   6 % Total / Unable   7 - 9 CM 80 - 100% Maximal Assist   10 - 14 CL 60 - 80% Moderate Assist   15 - 19 CK 40 - 60% Moderate Assist   20 - 22 CJ 20 - 40% Minimal Assist   23 CI 1-20% SBA / CGA   24 CH 0% Independent/ Mod I     Patient left with bed in chair position with all lines intact, call button in reach, nurse notified, and son present.    Assessment:  Divya Bui is a 64 y.o. female with a medical diagnosis of Sepsis and presents with overall decline in functional mobility. Patient would continue to benefit from skilled PT to address functional limitations listed below in order to return to PLOF/decrease caregiver burden.     Rehab identified problem list/impairments: weakness, impaired endurance, impaired self care skills, impaired functional mobility, impaired balance, pain, decreased safety awareness, decreased lower extremity function, decreased upper extremity function, decreased coordination, impaired cognition, decreased ROM, impaired cardiopulmonary response to activity    Rehab potential is fair.    Activity tolerance: Fair    Discharge recommendations: Moderate Intensity Therapy  (SPOKE WITH SPV PT RE: CHANGE IN D/C REC)    Barriers to discharge:      Equipment recommendations: to be determined by next level of care     GOALS:   Multidisciplinary Problems       Physical Therapy Goals          Problem: Physical Therapy    Goal Priority Disciplines Outcome Interventions   Physical Therapy Goal     PT, PT/OT     Description: Goals to be met by 12/8/24.  1. Pt will complete bed mobility SBA.  2. Pt will  complete sit to stand SBA.  3. Pt will ambulate 100ft SBA using RW.  4. Pt will increase AMPAC score by 2 points to progress functional mobility.                       PLAN:    Patient to be seen 3 x/week to address the above listed problems via gait training, therapeutic activities, therapeutic exercises  Plan of Care expires: 12/08/24  Plan of Care reviewed with: patient, son         11/26/2024

## 2024-11-26 NOTE — ASSESSMENT & PLAN NOTE
on Ertapenem /Zyvox based on previous cultures   Will follow repeat urine culture .    Will deescalate soon

## 2024-11-26 NOTE — HPI
History was obtained from the patient and the son.   64 year old female with ESBL UTI, HTN, GERD, Morbid Obesity s/p Gastric bypass surgery, Depression, GERD, Anxiety, Esophageal stricture .  She presented with worsening  confusion, drowsiness, decreased appetite,.There was associated history of urinary incontinence.    Labs and imaging test -    11/09- Urine culture-  >100,000 CFU/ML Klebsiella pneumoniae ssp pneumoniae Abnormal  This is an edited result. Previous organism was Gram Negative Howard on 11/11/2024 at 1202 CST.    Culture Urine >100,000 CFU/ML Proteus mirabilis Abnormal  This is an edited result. Previous organism was Second Gram Negative Howard on 11/11/2024 at 1202 CST.   Culture Urine >100,000 CFU/ML Enterococcus faecalis Abnormal     Blood culture- 11/23- Staph epi  Component      Latest Ref Rng 11/23/2024 11/24/2024 11/25/2024   WBC      3.90 - 12.70 K/uL 24.61 (H)  16.23 (H)  9.28    WBC        14.01 (H)        Legend:  (H) High

## 2024-11-26 NOTE — ASSESSMENT & PLAN NOTE
--leukocytosis resolved, normotensive, afebrile   --Blood Cultures (11/23)- growing  COAGULASE-NEGATIVE STAPHYLOCOCCUS- suspect contaminant  --ID following, continued IV ertapenem for cystitis- appreciate recs  --repeat cultures ordered on 11/26/2024, follow up results  --Organ dysfunction indicated by Acute kidney injury and Encephalopathy

## 2024-11-26 NOTE — ASSESSMENT & PLAN NOTE
This patient does have evidence of infective focus  My overall impression is sepsis.  Source: Urinary Tract  Antibiotics given-   Antibiotics (72h ago, onward)      Start     Stop Route Frequency Ordered    11/25/24 0930  mupirocin 2 % ointment         11/30/24 0859 Nasl 2 times daily 11/25/24 0817    11/25/24 0230  ertapenem (INVANZ) 500 mg in 0.9% NaCl 100 mL IVPB         -- IV Every 24 hours (non-standard times) 11/24/24 1342          Latest lactate reviewed-  Recent Labs   Lab 11/25/24  0431   LACTATE 1.2       Organ dysfunction indicated by Acute kidney injury and Encephalopathy  Fluid challenge was provided in ED on 11/23  Source control achieved by: antibiotics  - moving to ICU for recurrent hypotension, has been given Midodrine, albumin w/marginal improvement  11/26:  - Patient started on Levo overnight- weaning to off today  - Continue current abx plan  - Blood culture with coag neg staph- likely a contaminate, repeat blood cultures sent

## 2024-11-26 NOTE — ASSESSMENT & PLAN NOTE
ELIZABETH is likely due to pre-renal azotemia due to dehydration. Baseline creatinine is  1.0 . Most recent creatinine and eGFR are listed below.  Recent Labs     11/24/24  1629 11/25/24  0431 11/26/24  0330   CREATININE 2.7* 2.9* 3.0*   EGFRNORACEVR 19* 17* 17*        Plan  - ELIZABETH is worsening. Will hold diuretics, ACEi/ARB's  - Avoid nephrotoxins and renally dose meds for GFR listed above  - Monitor urine output, serial BMP, and adjust therapy as needed  - continue bicarb fluids  - strict I&O  - will hold further IVF  - she will likely need some diuresis when BP stabilizes

## 2024-11-26 NOTE — ASSESSMENT & PLAN NOTE
- likely 2/2 severe sepsis, ELIZABETH, metabolic acidosis  - supportive care  - delirium precautions  - avoid sedating medications

## 2024-11-26 NOTE — ASSESSMENT & PLAN NOTE
>>ASSESSMENT AND PLAN FOR DIARRHEA WRITTEN ON 11/26/2024  6:00 AM BY KIRTI CLAROS MD, FIDSA    Will follow stool assay- rule out c difficle

## 2024-11-26 NOTE — ASSESSMENT & PLAN NOTE
Body mass index is 41.08 kg/m². Morbid obesity complicates all aspects of disease management from diagnostic modalities to treatment. Weight loss encouraged and health benefits explained to patient.

## 2024-11-26 NOTE — CONSULTS
[facility] - Intensive [facility])  Infectious Disease  Consult Note    Patient Name: Divya Bui  MRN: [id number]  Admission Date: 11/23/2024  Hospital Length of Stay: 3 days  Attending Physician: Sara Moreira MD  Primary Care Provider: Angel Khan MD     Isolation Status: Contact    Patient information was obtained from relative(s) and ER records.      Consults  Assessment/Plan:     Cardiac/Vascular  HTN (hypertension)  BP control as per primary team    Renal/  Acute cystitis   on Ertapenem /Zyvox based on previous cultures   Will follow repeat urine culture .    Will deescalate soon    ID  Bacteremia  Isolate is CONS-staph epi- likely a contaminant- will follow repeat cultures -    Endocrine  Morbid obesity with BMI of 40.0-44.9, adult  Out patient follow up for weight loss regime    GI  Diarrhea  Will follow stool assay- rule out c difficle        Thank you for your consult. I will follow-up with patient. Please contact us if you have any additional questions.    Christopher Champagne MD, [facility]  Infectious Disease  [facility])    Subjective:     Principal Problem: Sepsis    HPI: History was obtained from the patient and the son.   64 year old female with ESBL UTI, HTN, GERD, Morbid Obesity s/p Gastric bypass surgery, Depression, GERD, Anxiety, Esophageal stricture .  She presented with worsening  confusion, drowsiness, decreased appetite,.There was associated history of urinary incontinence.    Labs and imaging test -    11/09- Urine culture-  >100,000 CFU/ML Klebsiella pneumoniae ssp pneumoniae Abnormal  This is an edited result. Previous organism was Gram Negative Howard on 11/11/2024 at 1202 CST.    Culture Urine >100,000 CFU/ML Proteus mirabilis Abnormal  This is an edited result. Previous organism was Second Gram Negative Howard on 11/11/2024 at 1202 CST.   Culture Urine >100,000 CFU/ML Enterococcus faecalis Abnormal     Blood culture- 11/23- Staph epi  Component      Latest Ref  Rng 11/23/2024 11/24/2024 11/25/2024   WBC      3.90 - 12.70 K/uL 24.61 (H)  16.23 (H)  9.28    WBC        14.01 (H)        Legend:  (H) High    Past Medical History:   Diagnosis Date    Anemia     Anxiety     Basal cell carcinoma (BCC) of face 2/26/2020    Blood transfusion     Bowel incontinence     Depression     Esophageal stricture     GERD (gastroesophageal reflux disease)     Hypertension     Latent tuberculosis by skin test 2001    took INH    Liver nodule 1/6/2014    Morbid obesity with BMI of 45.0-49.9, adult     OA (osteoarthritis) of knee 12/12/2014    Pericardial effusion 9/4/2014       Past Surgical History:   Procedure Laterality Date    CHOLECYSTECTOMY      COLONOSCOPY N/A 3/6/2023    Procedure: COLONOSCOPY;  Surgeon: Beti Diallo MD;  Location: [facility];  Service: Endoscopy;  Laterality: N/A;    GASTRIC BYPASS      HERNIA REPAIR      INJECTION OF ANESTHETIC AGENT AROUND NERVE Bilateral 6/16/2023    Procedure: Bilateral Genicular nerve block with RN IV sedation;  Surgeon: Denis Lai MD;  Location: [facility] PAIN MGT;  Service: Pain Management;  Laterality: Bilateral;    INJECTION OF ANESTHETIC AGENT AROUND NERVE Bilateral 8/11/2023    Procedure: Bilateral Genicular nerve block with RN IV sedation;  Surgeon: Denis Lai MD;  Location: [facility] PAIN [facility];  Service: Pain Management;  Laterality: Bilateral;    RADIOFREQUENCY THERMOCOAGULATION Right 10/31/2023    Procedure: Right Genicular Nerve RFA with RN IV sedation;  Surgeon: Denis Lai MD;  Location: [facility] PAIN [facility];  Service: Pain Management;  Laterality: Right;    RADIOFREQUENCY THERMOCOAGULATION Left 11/14/2023    Procedure: Left Genicular Nerve RFA with RN IV sedation;  Surgeon: Denis Lai MD;  Location: [facility] PAIN [facility]GT;  Service: Pain Management;  Laterality: Left;    right sholder surgery      SMALL INTESTINE SURGERY         Review of patient's allergies indicates:   Allergen Reactions    Metronidazole hcl Other (See Comments)      Mouth ulcers developed with Flagyl  Oral sores.  Mouth ulcers developed with Flagyl       Medications:  Medications Prior to Admission   Medication Sig    ALPRAZolam (XANAX) 1 MG tablet Take 1 mg by mouth 3 (three) times daily as needed.    amLODIPine (NORVASC) 5 MG tablet TAKE 1 TABLET BY MOUTH ONCE DAILY    ARIPiprazole (ABILIFY) 10 MG Tab Take 10 mg by mouth once daily.    atorvastatin (LIPITOR) 40 MG tablet Take 1 tablet (40 mg total) by mouth once daily.    cyanocobalamin 1,000 mcg/mL injection Inject 1,000 mcg into the skin every 30 days.    doxepin (SINEQUAN) 50 MG capsule Take 50 mg by mouth nightly.    fluconazole (DIFLUCAN) 150 MG Tab Take 1 tablet (150 mg total) by mouth every 7 days. for 7 doses    furosemide (LASIX) 40 MG tablet Take 1 tablet (40 mg total) by mouth 2 (two) times daily as needed (swelling).    HYDROcodone-acetaminophen (NORCO) 5-325 mg per tablet Take 1 tablet by mouth every 6 (six) hours as needed for Pain.    nystatin (MYCOSTATIN) cream Apply topically 2 (two) times daily.    olmesartan-hydrochlorothiazide (BENICAR HCT) 40-12.5 mg Tab TAKE 1 TABLET BY MOUTH ONCE DAILY    pregabalin (LYRICA) 100 MG capsule Take 1 capsule (100 mg total) by mouth every evening.    promethazine (PHENERGAN) 25 MG tablet Take 1 tablet (25 mg total) by mouth every 6 (six) hours as needed.    zolpidem (AMBIEN) 10 mg Tab Take 10 mg by mouth nightly as needed.     Antibiotics (From admission, onward)      Start     Stop Route Frequency Ordered    11/25/24 0930  mupirocin 2 % ointment         11/30/24 0859 Nasl 2 times daily 11/25/24 0817    11/25/24 0230  ertapenem (INVANZ) 500 mg in 0.9% NaCl 100 mL IVPB         -- IV Every 24 hours (non-standard times) 11/24/24 1342    11/23/24 1745  linezolid 600 mg/300 mL IVPB 600 mg         -- IV Every 12 hours (non-standard times) 11/23/24 1640          Antifungals (From admission, onward)      Start     Stop Route Frequency Ordered    11/24/24 1630  fluconazole  ((DIFLUCAN)) IVPB 100 mg         11/29/24 1629 IV Every 24 hours (non-standard times) 11/23/24 1617    11/23/24 1630  miconazole NITRATE 2 % top powder         -- Top 2 times daily 11/23/24 1629          Antivirals (From admission, onward)      None             Immunization History   Administered Date(s) Administered    COVID-19, MRNA, LN-S, PF (Pfizer) (Purple Cap) 03/20/2021, 04/08/2021, 10/11/2021    COVID-19, mRNA, LNP-S, bivalent booster, PF (PFIZER OMICRON) 11/02/2022    Influenza 10/21/2008    Influenza (FLUAD) - Quadrivalent - Adjuvanted - PF *Preferred* (65+) 11/26/2023    Influenza (FLUBLOK) - Quadrivalent - Recombinant - PF *Preferred* (egg allergy) 01/10/2019, 11/02/2022    Influenza - Intradermal - Quadrivalent - PF 10/08/2014    Influenza - Quadrivalent 12/29/2016    Influenza - Quadrivalent - PF *Preferred* (6 months and older) 12/09/2020    Influenza - Trivalent - Afluria, Fluzone MDV 10/15/2010, 01/09/2020    Influenza Split 10/25/2013    Pneumococcal Conjugate - 13 Valent 01/09/2020    Pneumococcal Polysaccharide - 23 Valent 12/29/2016    Tdap 03/31/2019    Zoster Recombinant 11/02/2022       Family History       Problem Relation (Age of Onset)    Crohn's disease Sister, Brother, Other    Diabetes Sister, Sister    Liver cancer Father, Sister    Lung cancer Mother          Social History     Socioeconomic History    Marital status:    Tobacco Use    Smoking status: Never    Smokeless tobacco: Never   Substance and Sexual Activity    Alcohol use: Not Currently    Drug use: No    Sexual activity: Not Currently     Partners: Male     Birth control/protection: Post-menopausal     Social Drivers of Health     Financial Resource Strain: Low Risk  (11/23/2024)    Overall Financial Resource Strain (CARDIA)     Difficulty of Paying Living Expenses: Not very hard   Food Insecurity: No Food Insecurity (11/23/2024)    Hunger Vital Sign     Worried About Running Out of Food in the Last Year: Never true      Ran Out of Food in the Last Year: Never true   Transportation Needs: No Transportation Needs (11/23/2024)    TRANSPORTATION NEEDS     Transportation : No   Physical Activity: Inactive (12/28/2023)    Exercise Vital Sign     Days of Exercise per Week: 0 days     Minutes of Exercise per Session: 0 min   Stress: No Stress Concern Present (11/23/2024)    [employer] - Occupational Stress Questionnaire     Feeling of Stress : Not at all   Housing Stability: Low Risk  (11/23/2024)    Housing Stability Vital Sign     Unable to Pay for Housing in the Last Year: No     Homeless in the Last Year: No     Review of Systems   Constitutional:  Negative for activity change, appetite change, chills, diaphoresis and fatigue.   Allergic/Immunologic: Negative for food allergies.   Neurological:  Negative for dizziness and facial asymmetry.     Objective:     Vital Signs (Most Recent):  Temp: 98.2 °F (36.8 °C) (11/25/24 1901)  Pulse: 102 (11/25/24 1901)  Resp: (!) 22 (11/25/24 1901)  BP: 103/62 (11/25/24 1901)  SpO2: 100 % (11/25/24 1901) Vital Signs (24h Range):  Temp:  [97.2 °F (36.2 °C)-98.2 °F (36.8 °C)] 98.2 °F (36.8 °C)  Pulse:  [] 102  Resp:  [17-38] 22  SpO2:  [100 %] 100 %  BP: ()/(34-87) 103/62     Weight: 102 kg (224 lb 13.9 oz)  Body mass index is 39.83 kg/m².    Estimated Creatinine Clearance: 21 mL/min (A) (based on SCr of 3 mg/dL (H)).     Physical Exam  Vitals and nursing note reviewed.   HENT:      Head: Normocephalic.   Cardiovascular:      Rate and Rhythm: Normal rate.   Abdominal:      General: Abdomen is flat.   Musculoskeletal:         General: Normal range of motion.   Skin:     General: Skin is warm.   Neurological:      Mental Status: She is alert.          Significant Labs: Blood Culture:   Recent Labs   Lab 11/23/24  1144 11/23/24  1318   LABBLOO No Growth to date  No Growth to date Gram stain shane bottle: Gram positive cocci in clusters resembling Staph  Results  "called to and read back by: Cheryl Luciano RN  11/24/2024 23:07     BMP:   Recent Labs   Lab 11/26/24  0330      *   K 3.5   CL 98   CO2 22*   BUN 57*   CREATININE 3.0*   CALCIUM 6.1*   MG 1.8     C4 Count: No results for input(s): "C4" in the last 48 hours.  Microbiology Results (last 7 days)       Procedure Component Value Units Date/Time    E. coli 0157 antigen [[id number]] Collected: 11/23/24 1721    Order Status: Completed Specimen: Stool Updated: 11/25/24 1911     Shiga Toxin 1 E.coli Negative     Shiga Toxin 2 E.coli Negative    Culture, MRSA [[id number]] Collected: 11/25/24 0757    Order Status: Sent Specimen: MRSA source from Nares, Right Updated: 11/25/24 1725    Stool culture [[id number]] Collected: 11/23/24 1721    Order Status: Completed Specimen: Stool Updated: 11/25/24 1104     Stool Culture Nothing significant to date    Blood culture x two cultures. Draw prior to antibiotics. [[id number]] Collected: 11/23/24 1318    Order Status: Completed Specimen: Blood from Peripheral, Antecubital, Left Updated: 11/25/24 0958     Blood Culture, Routine Gram stain shane bottle: Gram positive cocci in clusters resembling Staph      Results called to and read back by: Cheryl Luciano RN  11/24/2024 23:07    Narrative:      Aerobic and anaerobic    Urine culture [[id number]] Collected: 11/25/24 0532    Order Status: No result Specimen: Urine Updated: 11/25/24 0926    Blood culture x two cultures. Draw prior to antibiotics. [[id number]] Collected: 11/23/24 1144    Order Status: Completed Specimen: Blood from Peripheral, Wrist, Right Updated: 11/25/24 0613     Blood Culture, Routine No Growth to date      No Growth to date    Narrative:      Aerobic and anaerobic    Rapid Organism ID by PCR (from Blood culture) [[id number]]  (Abnormal) Collected: 11/23/24 1318    Order Status: Completed Updated: 11/25/24 0044     Enterococcus faecalis Not Detected     Enterococcus faecium Not Detected     Listeria monocytogenes Not " Detected     Staphylococcus spp. See species for ID     Staphylococcus aureus Not Detected     Staphylococcus epidermidis Detected     Staphylococcus lugdunensis Not Detected     Streptococcus species Not Detected     Streptococcus agalactiae Not Detected     Streptococcus pneumoniae Not Detected     Streptococcus pyogenes Not Detected     Acinetobacter calcoaceticus/baumannii complex Not Detected     Bacteroides fragilis Not Detected     Enterobacterales Not Detected     Enterobacter cloacae complex Not Detected     Escherichia coli Not Detected     Klebsiella aerogenes Not Detected     Klebsiella oxytoca Not Detected     Klebsiella pneumoniae group Not Detected     Proteus Not Detected     Salmonella sp Not Detected     Serratia marcescens Not Detected     Haemophilus influenzae Not Detected     Neisseria meningtidis Not Detected     Pseudomonas aeruginosa Not Detected     Stenotrophomonas maltophilia Not Detected     Candida albicans Not Detected     Candida auris Not Detected     Candida glabrata Not Detected     Candida krusei Not Detected     Candida parapsilosis Not Detected     Candida tropicalis Not Detected     Cryptococcus neoformans/gattii Not Detected     CTX-M (ESBL ) Test Not Applicable     IMP (Carbapenem resistant) Test Not Applicable     KPC resistance gene (Carbapenem resistant) Test Not Applicable     mcr-1  Test Not Applicable     mec A/C  Not Detected     mec A/C and MREJ (MRSA) gene Test Not Applicable     NDM (Carbapenem resistant) Test Not Applicable     OXA-48-like (Carbapenem resistant) Test Not Applicable     van A/B (VRE gene) Test Not Applicable     VIM (Carbapenem resistant) Test Not Applicable    Narrative:      Aerobic and anaerobic    Clostridium difficile EIA [[id number]] Collected: 11/23/24 1721    Order Status: Sent Specimen: Stool Updated: 11/23/24 1722    Stool culture **CANNOT BE ORDERED STAT** [[id number]] Collected: 11/23/24 1721    Order Status: Canceled Specimen:  Stool     E. coli 0157 antigen [[id number]] Collected: 11/23/24 1721    Order Status: Canceled Specimen: Stool     Urine culture [[id number]] Collected: 11/23/24 1251    Order Status: Canceled Specimen: Urine             Significant Imaging:   reviewed

## 2024-11-26 NOTE — ASSESSMENT & PLAN NOTE
- noted to under and sides of breasts, pannus, groin, buttocks  - on diflucan, appears candida overgrowth  - WOCN consult placed to assist in care, appreciate recs   10

## 2024-11-26 NOTE — PLAN OF CARE
Nutrition Recommendations/Interventions 11/26/24:   1. Recommend pt continues on a Diabetic 2000 calorie diet as medically appropriate  2. Recommend pt continues Boost glucose control TID to assist filling nutritional gaps   3. Encourage PO and supplement intake, recommend feeding assistance   4. Recommend Banatrol TID to assist with diarrhea or BID for loose stools as warranted   5. Recommend 500 mg/day vitamin C supplementation for wound healing   6. Weigh twice weekly   7. Collaboration by nutrition professional with other providers     Goals:   1. Pt will tolerate and consume >75% EEN and EPN prior to RD follow up   2. Pt's diarrhea/ Loose stools will improve prior to RD follow up   3. Pt will receive vitamin C supplementation prior to RD follow up     Grecia Rodriguez, LU, RDN, LDN

## 2024-11-26 NOTE — PROGRESS NOTES
[facility] - Intensive Care [facility] Medicine  Progress Note    Patient Name: Divya Bui  MRN: [id number]  Patient Class: IP- Inpatient   Admission Date: 11/23/2024  Length of Stay: 3 days  Attending Physician: Rajesh Agustin MD  Primary Care Provider: Angel Khan MD        Subjective:     Principal Problem:Sepsis        HPI:  Divya Bui, a 64-year-old female with a PMHx of HTN, GERD, morbid obesity s/p gastric bypass, depression, anxiety, esophageal stricture, and recurrent ESBL UTIs, was admitted on 11/23 for a UTI with sepsis. She was transferred to the ICU after exhibiting signs of septic shock, including recurrent hypotension unresponsive to initial fluid resuscitation. Upon admission, she presented with acute cystitis. ID was consulted by  for antibiotic therapy recommendations. She was continued on ertapenem (Invanz) and linezolid (Zyvox). Urine cultures grew GNRs, and blood cultures were positive for coagulase-negative staphylococci; repeat cultures were pending as the initial result was likely a contaminant. She showed evidence of sepsis with organ dysfunction indicated by acute kidney injury and encephalopathy. A fluid challenge was provided in the ED on 11/23. Her latest lactate level on 11/25 was 1.2. Despite marginal improvement with Midodrine and albumin, she required initiation of levophed overnight, but this was weaned off on 11/26. After stabilization, she was stepped down to telemetry, and HM was re-consulted to assume care.    Overview/Hospital Course:  11/24:  Remains lethargic and intermittently confused, also hypotensive today to lowest of 73/39 taken on the right upper arm by the tech.  On my recheck with smaller size cuff she was 81/46, and then was 118/59 on her calf.  I am not sure about whether the low blood pressures are entirely accurate.  She continues to mentate the same.  Heart rates in the low 100s/110s.  Discussed with ICU and checked labs.  No evidence of  "bleeding.  Lactate is normal.  Have given 3 L of fluid  today.  Blood pressure this evening up to 92/51.   11/25/2024: transferred to ICU for pressor therapy. Levophed initiated in the evening. ID consulted for antibiotic recs.  11/26/2024: levophed weaned off this morning; stabilized thereafter. Deemed stable for transfer back to to telemetry.     Interval history:  See hospital course    Objective:   BP (!) 94/58   Pulse 108   Temp 98.6 °F (37 °C)   Resp (!) 33   Ht 5' 3" (1.6 m)   Wt 105.2 kg (231 lb 14.8 oz)   SpO2 100%   BMI 41.08 kg/m²     Intake/Output Summary (Last 24 hours) at 11/26/2024 1706  Last data filed at 11/26/2024 1200  Gross per 24 hour   Intake 1013 ml   Output 243 ml   Net 770 ml       PHYSICAL EXAM  Vitals reviewed  Constitutional:       General: She is not in acute distress.     Appearance: She is obese. She is ill-appearing.   HENT:      Head: Normocephalic.      Nose: Nose normal.      Mouth/Throat:      Mouth: Mucous membranes are moist.   Eyes:      Pupils: Pupils are equal, round, and reactive to light.   Cardiovascular:      Rate and Rhythm: Normal rate and regular rhythm.      Pulses: Normal pulses.      Heart sounds: Normal heart sounds.   Pulmonary:      Effort: Pulmonary effort is normal.      Breath sounds: Normal breath sounds.   Abdominal:      General: Bowel sounds are normal. There is no distension.      Palpations: Abdomen is soft.      Tenderness: There is no abdominal tenderness.   Musculoskeletal:         General: No swelling.   Skin:     General: Skin is warm and dry.   Neurological:      Mental Status: She is alert. She is disoriented.      Motor: Weakness present.      Comments: Oriented to self, not time, place, situation.     LABS  All labs from the past 24 hours were reviewed.     BMP:   Recent Labs   Lab 11/26/24  0330      *   K 3.5   CL 98   CO2 22*   BUN 57*   CREATININE 3.0*   CALCIUM 6.1*   MG 1.8     CBC:   Recent Labs   Lab 11/24/24  1718 " 11/25/24  0430 11/26/24  0330   WBC 16.23* 9.28 8.84   HGB 10.9* 9.7* 10.2*   HCT 33.2* 29.7* 30.2*    156 220     CMP:   Recent Labs   Lab 11/25/24  0431 11/26/24  0330   * 133*   K 3.0* 3.5    98   CO2 18* 22*    110   BUN 54* 57*   CREATININE 2.9* 3.0*   CALCIUM 6.9* 6.1*   PROT 4.7* 4.2*   ALBUMIN 1.9* 1.7*   BILITOT 0.3 0.3   ALKPHOS 68 67   AST 9* 9*   ALT 8* 8*   ANIONGAP 14 13     Cardiac Markers:   Recent Labs   Lab 11/25/24  0430   BNP 28     Coagulation:   Recent Labs   Lab 11/25/24  0430   INR 1.2   APTT 35.6*     Lactic Acid:   Recent Labs   Lab 11/24/24  1718 11/25/24  0431   LACTATE 1.9 1.2     Magnesium:   Recent Labs   Lab 11/25/24  0431 11/26/24  0330   MG 1.4* 1.8     Troponin:   Recent Labs   Lab 11/25/24  0431   TROPONINI 0.009     TSH:   Recent Labs   Lab 11/23/24  1506   TSH 0.831     Urine Studies:   Recent Labs   Lab 11/25/24  0531 11/25/24  0532   COLORU Yellow  --    APPEARANCEUA Cloudy*  --    PHUR 6.0  --    SPECGRAV 1.020  --    PROTEINUA 1+*  --    GLUCUA Negative  --    KETONESU Negative  --    BILIRUBINUA Negative  --    OCCULTUA 1+*  --    NITRITE Negative  --    UROBILINOGEN Negative  --    LEUKOCYTESUR 3+*  --    RBCUA  --  11*   WBCUA  --  >100*   BACTERIA  --  None   SQUAMEPITHEL  --  11   HYALINECASTS  --  0       IMAGING  All imaging from the past 24 hours were reviewed.     Imaging Results              CT Head Without Contrast (Final result)  Result time 11/23/24 14:05:51      Final result by Lucio Romero MD (11/23/24 14:05:51)                   Impression:      No acute intracranial CT abnormality.    All CT scans at this facility are performed  using dose modulation techniques as appropriate to performed exam including the following:  automated exposure control; adjustment of mA and/or kV according to the patients size (this includes techniques or standardized protocols for targeted exams where dose is matched to indication/reason for exam:  i.e. extremities or head);  iterative reconstruction technique.      Electronically signed by: Lucio Romero  Date:    11/23/2024  Time:    14:05               Narrative:    EXAMINATION:  CT HEAD WITHOUT CONTRAST    CLINICAL HISTORY:  Mental status change, unknown cause;    TECHNIQUE:  Low dose axial CT images obtained throughout the head without intravenous contrast. Sagittal and coronal reconstructions were performed.    COMPARISON:  Multiple prior reports    FINDINGS:  Intracranial compartment:    Ventricles and sulci are normal in size for age without evidence of hydrocephalus. No extra-axial blood or fluid collections.    The brain parenchyma appears normal. No parenchymal mass, hemorrhage, edema or major vascular distribution infarct.    Skull/extracranial contents (limited evaluation): No fracture. Mastoid air cells and paranasal sinuses are essentially clear.                                       X-Ray Chest AP Portable (Final result)  Result time 11/23/24 12:10:43      Final result by Lucio Romero MD (11/23/24 12:10:43)                   Impression:      No acute abnormality.      Electronically signed by: Lucio Romero  Date:    11/23/2024  Time:    12:10               Narrative:    EXAMINATION:  XR CHEST AP PORTABLE    CLINICAL HISTORY:  Sepsis;    TECHNIQUE:  Single frontal view of the chest was performed.    COMPARISON:  Multiple    FINDINGS:  The lungs are clear, with normal appearance of pulmonary vasculature and no pleural effusion or pneumothorax.    The cardiac silhouette is normal in size. The hilar and mediastinal contours are unremarkable.    Bones are intact.                                        Assessment/Plan:      * Sepsis  --leukocytosis resolved, normotensive, afebrile   --Blood Cultures (11/23)- growing  COAGULASE-NEGATIVE STAPHYLOCOCCUS- suspect contaminant  --ID following, continued IV ertapenem for cystitis- appreciate recs  --repeat cultures ordered on 11/26/2024, follow up  results  --Organ dysfunction indicated by Acute kidney injury and Encephalopathy     Acute cystitis  -Hx ESBL, frequent UTIs, significant resistances (prior cultures at [facility], prior cultures as well in our system)  -Consult ID, appreciate reccs  -Currently on ertapenem and linezolid which should be sufficient to cover her  -Unfortunately the urine culture is marked as canceled, unsure of the reason --> called micro lab in [place], quantity was not sufficient for urine culture  -Ordering another UA reflex to culture although will have low sensitivity at this point  -Blood cultures pending    11/26/2024  Urine cultures growing gram negative spp  ID following, appreciate recs  Continue ertapenem, monitor fever curve  Follow up culture susceptibilities    Encephalopathy, metabolic  --improving  --monitor clinically      Hyperglycemia  Check Hgb A1c  Diabetic diet  NISS      Candidal intertrigo and dermatitis  Severe erythema to breasts, pannus, groin, and buttock with some skin breakdown   Appears to be candida overgrowth   IV Diflucan       Metabolic acidosis  Likely secondary to ELIZABETH/sepsis  Continue bicarb drip    Diarrhea  C Diff and stool cultures sent  Improving      Recurrent major depressive disorder, in partial remission  Patient has persistent depression which is mild and is currently controlled. Will Continue anti-depressant medications. We will not consult psychiatry at this time. Patient does not display psychosis at this time. Continue to monitor closely and adjust plan of care as needed.    Cont Abilify and Xanax prn   Hold Doxepin for now while on Zyvox         Chronic pain disorder  Holding home Lyrica and Norco given lethargy in the setting of sepsis      ELIZABETH (acute kidney injury)  ELIZABETH is likely due to pre-renal azotemia due to dehydration. Baseline creatinine is  1.0 . Most recent creatinine and eGFR are listed below.  Recent Labs     11/23/24  1145 11/24/24  0684  11/24/24  1629   CREATININE 2.5* 2.2* 2.7*   EGFRNORACEVR 21* 24* 19*        Plan  - ELIZABETH is worsening. Will adjust treatment as follows: Hold  Lasix, Olmesartan/HCTZ  - Avoid nephrotoxins and renally dose meds for GFR listed above  - Monitor urine output, serial BMP, and adjust therapy as needed  - 3L IVF today  - likely secondary to sepsis, hypotension/prerenal etiology    Morbid obesity with BMI of 40.0-44.9, adult  Body mass index is 38.97 kg/m². Morbid obesity complicates all aspects of disease management from diagnostic modalities to treatment. Weight loss encouraged and health benefits explained to patient.         HTN (hypertension)  Patients blood pressure range in the last 24 hours was: BP  Min: 73/39  Max: 155/74.The patient's inpatient anti-hypertensive regimen is listed below       Plan  Hold antihypertensives      VTE Risk Mitigation (From admission, onward)           Ordered     heparin (porcine) injection 5,000 Units  Every 8 hours         11/23/24 1441     IP VTE HIGH RISK PATIENT  Once         11/23/24 1441     Place sequential compression device  Until discontinued         11/23/24 1441                    Discharge Planning   ELADIA:      Code Status: Full Code   Is the patient medically ready for discharge?:     Reason for patient still in hospital (select all that apply): Patient trending condition, Laboratory test, Treatment, and Consult recommendations  Discharge Plan A: Home Health                Rajesh Agustin MD  Department of Hospital Medicine   [facility] - Intensive Care ([facility])

## 2024-11-26 NOTE — ASSESSMENT & PLAN NOTE
- needs more frequent hemodynamic monitoring than available on floor  - goal MAP>65 mmHg  - See sepsis plan

## 2024-11-26 NOTE — ASSESSMENT & PLAN NOTE
-Hx ESBL, frequent UTIs, significant resistances (prior cultures at [facility] through [facility] everywhere, prior cultures as well in our system)  -Consult ID, appreciate reccs  -Currently on ertapenem and linezolid which should be sufficient to cover her  -Unfortunately the urine culture is marked as canceled, unsure of the reason --> called micro lab in [place], quantity was not sufficient for urine culture  -Ordering another UA reflex to culture although will have low sensitivity at this point  -Blood cultures pending    11/26/2024  Urine cultures growing gram negative spp  ID following, appreciate recs  Continue ertapenem, monitor fever curve  Follow up culture susceptibilities

## 2024-11-26 NOTE — SUBJECTIVE & OBJECTIVE
Past Medical History:   Diagnosis Date    Anemia     Anxiety     Basal cell carcinoma (BCC) of face 2/26/2020    Blood transfusion     Bowel incontinence     Depression     Esophageal stricture     GERD (gastroesophageal reflux disease)     Hypertension     Latent tuberculosis by skin test 2001    took INH    Liver nodule 1/6/2014    Morbid obesity with BMI of 45.0-49.9, adult     OA (osteoarthritis) of knee 12/12/2014    Pericardial effusion 9/4/2014       Past Surgical History:   Procedure Laterality Date    CHOLECYSTECTOMY      COLONOSCOPY N/A 3/6/2023    Procedure: COLONOSCOPY;  Surgeon: Beti Diallo MD;  Location: [facility];  Service: Endoscopy;  Laterality: N/A;    GASTRIC BYPASS      HERNIA REPAIR      INJECTION OF ANESTHETIC AGENT AROUND NERVE Bilateral 6/16/2023    Procedure: Bilateral Genicular nerve block with RN IV sedation;  Surgeon: Denis Lai MD;  Location: [facility];  Service: Pain Management;  Laterality: Bilateral;    INJECTION OF ANESTHETIC AGENT AROUND NERVE Bilateral 8/11/2023    Procedure: Bilateral Genicular nerve block with RN IV sedation;  Surgeon: Denis Lai MD;  Location: [facility];  Service: Pain Management;  Laterality: Bilateral;    RADIOFREQUENCY THERMOCOAGULATION Right 10/31/2023    Procedure: Right Genicular Nerve RFA with RN IV sedation;  Surgeon: Denis Lai MD;  Location: [facility];  Service: Pain Management;  Laterality: Right;    RADIOFREQUENCY THERMOCOAGULATION Left 11/14/2023    Procedure: Left Genicular Nerve RFA with RN IV sedation;  Surgeon: Denis Lai MD;  Location: [facility];  Service: Pain Management;  Laterality: Left;    right sholder surgery      SMALL INTESTINE SURGERY         Review of patient's allergies indicates:   Allergen Reactions    Metronidazole hcl Other (See Comments)     Mouth ulcers developed with Flagyl  Oral sores.  Mouth ulcers developed with Flagyl       Medications:  Medications Prior to Admission    Medication Sig    ALPRAZolam (XANAX) 1 MG tablet Take 1 mg by mouth 3 (three) times daily as needed.    amLODIPine (NORVASC) 5 MG tablet TAKE 1 TABLET BY MOUTH ONCE DAILY    ARIPiprazole (ABILIFY) 10 MG Tab Take 10 mg by mouth once daily.    atorvastatin (LIPITOR) 40 MG tablet Take 1 tablet (40 mg total) by mouth once daily.    cyanocobalamin 1,000 mcg/mL injection Inject 1,000 mcg into the skin every 30 days.    doxepin (SINEQUAN) 50 MG capsule Take 50 mg by mouth nightly.    fluconazole (DIFLUCAN) 150 MG Tab Take 1 tablet (150 mg total) by mouth every 7 days. for 7 doses    furosemide (LASIX) 40 MG tablet Take 1 tablet (40 mg total) by mouth 2 (two) times daily as needed (swelling).    HYDROcodone-acetaminophen (NORCO) 5-325 mg per tablet Take 1 tablet by mouth every 6 (six) hours as needed for Pain.    nystatin (MYCOSTATIN) cream Apply topically 2 (two) times daily.    olmesartan-hydrochlorothiazide (BENICAR HCT) 40-12.5 mg Tab TAKE 1 TABLET BY MOUTH ONCE DAILY    pregabalin (LYRICA) 100 MG capsule Take 1 capsule (100 mg total) by mouth every evening.    promethazine (PHENERGAN) 25 MG tablet Take 1 tablet (25 mg total) by mouth every 6 (six) hours as needed.    zolpidem (AMBIEN) 10 mg Tab Take 10 mg by mouth nightly as needed.     Antibiotics (From admission, onward)      Start     Stop Route Frequency Ordered    11/25/24 0930  mupirocin 2 % ointment         11/30/24 0859 Nasl 2 times daily 11/25/24 0817    11/25/24 0230  ertapenem (INVANZ) 500 mg in 0.9% NaCl 100 mL IVPB         -- IV Every 24 hours (non-standard times) 11/24/24 1342    11/23/24 1745  linezolid 600 mg/300 mL IVPB 600 mg         -- IV Every 12 hours (non-standard times) 11/23/24 1640          Antifungals (From admission, onward)      Start     Stop Route Frequency Ordered    11/24/24 1630  fluconazole ((DIFLUCAN)) IVPB 100 mg         11/29/24 1629 IV Every 24 hours (non-standard times) 11/23/24 1617    11/23/24 1630  miconazole NITRATE 2 % top  powder         -- Top 2 times daily 11/23/24 1629          Antivirals (From admission, onward)      None             Immunization History   Administered Date(s) Administered    COVID-19, MRNA, LN-S, PF (Pfizer) (Purple Cap) 03/20/2021, 04/08/2021, 10/11/2021    COVID-19, mRNA, LNP-S, bivalent booster, PF (PFIZER OMICRON) 11/02/2022    Influenza 10/21/2008    Influenza (FLUAD) - Quadrivalent - Adjuvanted - PF *Preferred* (65+) 11/26/2023    Influenza (FLUBLOK) - Quadrivalent - Recombinant - PF *Preferred* (egg allergy) 01/10/2019, 11/02/2022    Influenza - Intradermal - Quadrivalent - PF 10/08/2014    Influenza - Quadrivalent 12/29/2016    Influenza - Quadrivalent - PF *Preferred* (6 months and older) 12/09/2020    Influenza - Trivalent - Afluria, Fluzone MDV 10/15/2010, 01/09/2020    Influenza Split 10/25/2013    Pneumococcal Conjugate - 13 Valent 01/09/2020    Pneumococcal Polysaccharide - 23 Valent 12/29/2016    Tdap 03/31/2019    Zoster Recombinant 11/02/2022       Family History       Problem Relation (Age of Onset)    Crohn's disease Sister, Brother, Other    Diabetes Sister, Sister    Liver cancer Father, Sister    Lung cancer Mother          Social History     Socioeconomic History    Marital status:    Tobacco Use    Smoking status: Never    Smokeless tobacco: Never   Substance and Sexual Activity    Alcohol use: Not Currently    Drug use: No    Sexual activity: Not Currently     Partners: Male     Birth control/protection: Post-menopausal     Social Drivers of Health     Financial Resource Strain: Low Risk  (11/23/2024)    Overall Financial Resource Strain (CARDIA)     Difficulty of Paying Living Expenses: Not very hard   Food Insecurity: No Food Insecurity (11/23/2024)    Hunger Vital Sign     Worried About Running Out of Food in the Last Year: Never true     Ran Out of Food in the Last Year: Never true   Transportation Needs: No Transportation Needs (11/23/2024)    TRANSPORTATION NEEDS      Transportation : No   Physical Activity: Inactive (12/28/2023)    Exercise Vital Sign     Days of Exercise per Week: 0 days     Minutes of Exercise per Session: 0 min   Stress: No Stress Concern Present (11/23/2024)    Citizen of [place] [employer] - Occupational Stress Questionnaire     Feeling of Stress : Not at all   Housing Stability: Low Risk  (11/23/2024)    Housing Stability Vital Sign     Unable to Pay for Housing in the Last Year: No     Homeless in the Last Year: No     Review of Systems   Constitutional:  Negative for activity change, appetite change, chills, diaphoresis and fatigue.   Allergic/Immunologic: Negative for food allergies.   Neurological:  Negative for dizziness and facial asymmetry.     Objective:     Vital Signs (Most Recent):  Temp: 98.2 °F (36.8 °C) (11/25/24 1901)  Pulse: 102 (11/25/24 1901)  Resp: (!) 22 (11/25/24 1901)  BP: 103/62 (11/25/24 1901)  SpO2: 100 % (11/25/24 1901) Vital Signs (24h Range):  Temp:  [97.2 °F (36.2 °C)-98.2 °F (36.8 °C)] 98.2 °F (36.8 °C)  Pulse:  [] 102  Resp:  [17-38] 22  SpO2:  [100 %] 100 %  BP: ()/(34-87) 103/62     Weight: 102 kg (224 lb 13.9 oz)  Body mass index is 39.83 kg/m².    Estimated Creatinine Clearance: 21 mL/min (A) (based on SCr of 3 mg/dL (H)).     Physical Exam  Vitals and nursing note reviewed.   HENT:      Head: Normocephalic.   Cardiovascular:      Rate and Rhythm: Normal rate.   Abdominal:      General: Abdomen is flat.   Musculoskeletal:         General: Normal range of motion.   Skin:     General: Skin is warm.   Neurological:      Mental Status: She is alert.          Significant Labs: Blood Culture:   Recent Labs   Lab 11/23/24  1144 11/23/24  1318   LABBLOO No Growth to date  No Growth to date Gram stain shane bottle: Gram positive cocci in clusters resembling Staph  Results called to and read back by: Cheryl Luciano RN  11/24/2024 23:07     BMP:   Recent Labs   Lab 11/26/24  0330      *   K 3.5   CL 98  "  CO2 22*   BUN 57*   CREATININE 3.0*   CALCIUM 6.1*   MG 1.8     C4 Count: No results for input(s): "C4" in the last 48 hours.  Microbiology Results (last 7 days)       Procedure Component Value Units Date/Time    E. coli 0157 antigen [[id number]] Collected: 11/23/24 1721    Order Status: Completed Specimen: Stool Updated: 11/25/24 1911     Shiga Toxin 1 E.coli Negative     Shiga Toxin 2 E.coli Negative    Culture, MRSA [[id number]] Collected: 11/25/24 0757    Order Status: Sent Specimen: MRSA source from Nares, Right Updated: 11/25/24 1725    Stool culture [[id number]] Collected: 11/23/24 1721    Order Status: Completed Specimen: Stool Updated: 11/25/24 1104     Stool Culture Nothing significant to date    Blood culture x two cultures. Draw prior to antibiotics. [[id number]] Collected: 11/23/24 1318    Order Status: Completed Specimen: Blood from Peripheral, Antecubital, Left Updated: 11/25/24 0958     Blood Culture, Routine Gram stain shane bottle: Gram positive cocci in clusters resembling Staph      Results called to and read back by: Cheryl Luciano RN  11/24/2024 23:07    Narrative:      Aerobic and anaerobic    Urine culture [[id number]] Collected: 11/25/24 0532    Order Status: No result Specimen: Urine Updated: 11/25/24 0926    Blood culture x two cultures. Draw prior to antibiotics. [[id number]] Collected: 11/23/24 1144    Order Status: Completed Specimen: Blood from Peripheral, Wrist, Right Updated: 11/25/24 0613     Blood Culture, Routine No Growth to date      No Growth to date    Narrative:      Aerobic and anaerobic    Rapid Organism ID by PCR (from Blood culture) [[id number]]  (Abnormal) Collected: 11/23/24 1318    Order Status: Completed Updated: 11/25/24 0044     Enterococcus faecalis Not Detected     Enterococcus faecium Not Detected     Listeria monocytogenes Not Detected     Staphylococcus spp. See species for ID     Staphylococcus aureus Not Detected     Staphylococcus epidermidis Detected     " Staphylococcus lugdunensis Not Detected     Streptococcus species Not Detected     Streptococcus agalactiae Not Detected     Streptococcus pneumoniae Not Detected     Streptococcus pyogenes Not Detected     Acinetobacter calcoaceticus/baumannii complex Not Detected     Bacteroides fragilis Not Detected     Enterobacterales Not Detected     Enterobacter cloacae complex Not Detected     Escherichia coli Not Detected     Klebsiella aerogenes Not Detected     Klebsiella oxytoca Not Detected     Klebsiella pneumoniae group Not Detected     Proteus Not Detected     Salmonella sp Not Detected     Serratia marcescens Not Detected     Haemophilus influenzae Not Detected     Neisseria meningtidis Not Detected     Pseudomonas aeruginosa Not Detected     Stenotrophomonas maltophilia Not Detected     Candida albicans Not Detected     Candida auris Not Detected     Candida glabrata Not Detected     Candida krusei Not Detected     Candida parapsilosis Not Detected     Candida tropicalis Not Detected     Cryptococcus neoformans/gattii Not Detected     CTX-M (ESBL ) Test Not Applicable     IMP (Carbapenem resistant) Test Not Applicable     KPC resistance gene (Carbapenem resistant) Test Not Applicable     mcr-1  Test Not Applicable     mec A/C  Not Detected     mec A/C and MREJ (MRSA) gene Test Not Applicable     NDM (Carbapenem resistant) Test Not Applicable     OXA-48-like (Carbapenem resistant) Test Not Applicable     van A/B (VRE gene) Test Not Applicable     VIM (Carbapenem resistant) Test Not Applicable    Narrative:      Aerobic and anaerobic    Clostridium difficile EIA [[id number]] Collected: 11/23/24 1721    Order Status: Sent Specimen: Stool Updated: 11/23/24 1722    Stool culture **CANNOT BE ORDERED STAT** [[id number]] Collected: 11/23/24 1721    Order Status: Canceled Specimen: Stool     E. coli 0157 antigen [[id number]] Collected: 11/23/24 1721    Order Status: Canceled Specimen: Stool     Urine culture  [[id number]] Collected: 11/23/24 1251    Order Status: Canceled Specimen: Urine             Significant Imaging:   reviewed

## 2024-11-27 LAB
ALBUMIN SERPL BCP-MCNC: 1.5 G/DL (ref 3.5–5.2)
ALP SERPL-CCNC: 87 U/L (ref 40–150)
ALT SERPL W/O P-5'-P-CCNC: 7 U/L (ref 10–44)
ANION GAP SERPL CALC-SCNC: 12 MMOL/L (ref 8–16)
ANISOCYTOSIS BLD QL SMEAR: SLIGHT
AST SERPL-CCNC: 15 U/L (ref 10–40)
BACTERIA BLD CULT: ABNORMAL
BACTERIA UR CULT: ABNORMAL
BASO STIPL BLD QL SMEAR: ABNORMAL
BASOPHILS NFR BLD: 0 % (ref 0–1.9)
BILIRUB SERPL-MCNC: 0.3 MG/DL (ref 0.1–1)
BUN SERPL-MCNC: 60 MG/DL (ref 8–23)
C DIFF GDH STL QL: POSITIVE
C DIFF TOX A+B STL QL IA: POSITIVE
CALCIUM SERPL-MCNC: 6.4 MG/DL (ref 8.7–10.5)
CHLORIDE SERPL-SCNC: 97 MMOL/L (ref 95–110)
CK SERPL-CCNC: 25 U/L (ref 20–180)
CO2 SERPL-SCNC: 22 MMOL/L (ref 23–29)
CREAT SERPL-MCNC: 3 MG/DL (ref 0.5–1.4)
CREAT UR-MCNC: 126.6 MG/DL (ref 15–325)
DACRYOCYTES BLD QL SMEAR: ABNORMAL
DIFFERENTIAL METHOD BLD: ABNORMAL
EOSINOPHIL NFR BLD: 0 % (ref 0–8)
ERYTHROCYTE [DISTWIDTH] IN BLOOD BY AUTOMATED COUNT: 15.6 % (ref 11.5–14.5)
EST. GFR  (NO RACE VARIABLE): 17 ML/MIN/1.73 M^2
GLUCOSE SERPL-MCNC: 84 MG/DL (ref 70–110)
HCT VFR BLD AUTO: 30.1 % (ref 37–48.5)
HGB BLD-MCNC: 10.2 G/DL (ref 12–16)
IMM GRANULOCYTES # BLD AUTO: ABNORMAL K/UL (ref 0–0.04)
IMM GRANULOCYTES NFR BLD AUTO: ABNORMAL % (ref 0–0.5)
LYMPHOCYTES NFR BLD: 25 % (ref 18–48)
MAGNESIUM SERPL-MCNC: 2.4 MG/DL (ref 1.6–2.6)
MCH RBC QN AUTO: 29.1 PG (ref 27–31)
MCHC RBC AUTO-ENTMCNC: 33.9 G/DL (ref 32–36)
MCV RBC AUTO: 86 FL (ref 82–98)
MONOCYTES NFR BLD: 12 % (ref 4–15)
MRSA SPEC QL CULT: NORMAL
NEUTROPHILS NFR BLD: 51 % (ref 38–73)
NEUTS BAND NFR BLD MANUAL: 11 %
NRBC BLD-RTO: 1 /100 WBC
OSMOLALITY UR: 336 MOSM/KG (ref 50–1200)
PATH REV BLD -IMP: NORMAL
PHOSPHATE SERPL-MCNC: 2.6 MG/DL (ref 2.7–4.5)
PLATELET # BLD AUTO: 160 K/UL (ref 150–450)
PLATELET BLD QL SMEAR: ABNORMAL
PMV BLD AUTO: 10 FL (ref 9.2–12.9)
POCT GLUCOSE: 115 MG/DL (ref 70–110)
POCT GLUCOSE: 117 MG/DL (ref 70–110)
POCT GLUCOSE: 130 MG/DL (ref 70–110)
POCT GLUCOSE: 141 MG/DL (ref 70–110)
POCT GLUCOSE: 74 MG/DL (ref 70–110)
POIKILOCYTOSIS BLD QL SMEAR: SLIGHT
POLYCHROMASIA BLD QL SMEAR: ABNORMAL
POTASSIUM SERPL-SCNC: 3.7 MMOL/L (ref 3.5–5.1)
PROCALCITONIN SERPL IA-MCNC: 4.95 NG/ML
PROT SERPL-MCNC: 4.6 G/DL (ref 6–8.4)
RBC # BLD AUTO: 3.5 M/UL (ref 4–5.4)
SODIUM SERPL-SCNC: 131 MMOL/L (ref 136–145)
SODIUM UR-SCNC: <20 MMOL/L (ref 20–250)
WBC # BLD AUTO: 5.04 K/UL (ref 3.9–12.7)
WBC OTHER NFR BLD MANUAL: 1 %

## 2024-11-27 PROCEDURE — 25000003 PHARM REV CODE 250: Performed by: NURSE PRACTITIONER

## 2024-11-27 PROCEDURE — 84100 ASSAY OF PHOSPHORUS: CPT

## 2024-11-27 PROCEDURE — 25000003 PHARM REV CODE 250: Performed by: STUDENT IN AN ORGANIZED HEALTH CARE EDUCATION/TRAINING PROGRAM

## 2024-11-27 PROCEDURE — 25000003 PHARM REV CODE 250

## 2024-11-27 PROCEDURE — 97530 THERAPEUTIC ACTIVITIES: CPT

## 2024-11-27 PROCEDURE — 94761 N-INVAS EAR/PLS OXIMETRY MLT: CPT

## 2024-11-27 PROCEDURE — 63600175 PHARM REV CODE 636 W HCPCS: Performed by: STUDENT IN AN ORGANIZED HEALTH CARE EDUCATION/TRAINING PROGRAM

## 2024-11-27 PROCEDURE — 63600175 PHARM REV CODE 636 W HCPCS: Performed by: INTERNAL MEDICINE

## 2024-11-27 PROCEDURE — 82550 ASSAY OF CK (CPK): CPT | Performed by: STUDENT IN AN ORGANIZED HEALTH CARE EDUCATION/TRAINING PROGRAM

## 2024-11-27 PROCEDURE — 85027 COMPLETE CBC AUTOMATED: CPT

## 2024-11-27 PROCEDURE — 84300 ASSAY OF URINE SODIUM: CPT | Performed by: STUDENT IN AN ORGANIZED HEALTH CARE EDUCATION/TRAINING PROGRAM

## 2024-11-27 PROCEDURE — 83735 ASSAY OF MAGNESIUM: CPT

## 2024-11-27 PROCEDURE — 85060 BLOOD SMEAR INTERPRETATION: CPT | Mod: ,,, | Performed by: STUDENT IN AN ORGANIZED HEALTH CARE EDUCATION/TRAINING PROGRAM

## 2024-11-27 PROCEDURE — 63700000 PHARM REV CODE 250 ALT 637 W/O HCPCS: Performed by: NURSE PRACTITIONER

## 2024-11-27 PROCEDURE — 63600175 PHARM REV CODE 636 W HCPCS: Performed by: NURSE PRACTITIONER

## 2024-11-27 PROCEDURE — 80053 COMPREHEN METABOLIC PANEL: CPT

## 2024-11-27 PROCEDURE — 36415 COLL VENOUS BLD VENIPUNCTURE: CPT | Performed by: STUDENT IN AN ORGANIZED HEALTH CARE EDUCATION/TRAINING PROGRAM

## 2024-11-27 PROCEDURE — 84145 PROCALCITONIN (PCT): CPT | Performed by: STUDENT IN AN ORGANIZED HEALTH CARE EDUCATION/TRAINING PROGRAM

## 2024-11-27 PROCEDURE — 27000207 HC ISOLATION

## 2024-11-27 PROCEDURE — 85007 BL SMEAR W/DIFF WBC COUNT: CPT

## 2024-11-27 PROCEDURE — 99223 1ST HOSP IP/OBS HIGH 75: CPT | Mod: ,,, | Performed by: INTERNAL MEDICINE

## 2024-11-27 PROCEDURE — 99233 SBSQ HOSP IP/OBS HIGH 50: CPT | Mod: NSCH,,, | Performed by: INTERNAL MEDICINE

## 2024-11-27 PROCEDURE — 83935 ASSAY OF URINE OSMOLALITY: CPT | Performed by: STUDENT IN AN ORGANIZED HEALTH CARE EDUCATION/TRAINING PROGRAM

## 2024-11-27 PROCEDURE — 82570 ASSAY OF URINE CREATININE: CPT | Performed by: STUDENT IN AN ORGANIZED HEALTH CARE EDUCATION/TRAINING PROGRAM

## 2024-11-27 PROCEDURE — 21400001 HC TELEMETRY ROOM

## 2024-11-27 PROCEDURE — 25000003 PHARM REV CODE 250: Performed by: INTERNAL MEDICINE

## 2024-11-27 PROCEDURE — 25000003 PHARM REV CODE 250: Performed by: HOSPITALIST

## 2024-11-27 RX ORDER — DEXTROSE, SODIUM CHLORIDE, SODIUM LACTATE, POTASSIUM CHLORIDE, AND CALCIUM CHLORIDE 5; .6; .31; .03; .02 G/100ML; G/100ML; G/100ML; G/100ML; G/100ML
INJECTION, SOLUTION INTRAVENOUS CONTINUOUS
Status: DISCONTINUED | OUTPATIENT
Start: 2024-11-27 | End: 2024-11-28

## 2024-11-27 RX ORDER — LOPERAMIDE HYDROCHLORIDE 2 MG/1
2 CAPSULE ORAL 4 TIMES DAILY PRN
Status: DISCONTINUED | OUTPATIENT
Start: 2024-11-27 | End: 2024-12-04 | Stop reason: HOSPADM

## 2024-11-27 RX ORDER — VANCOMYCIN HYDROCHLORIDE 25 MG/ML
125 KIT ORAL EVERY 6 HOURS
Status: DISCONTINUED | OUTPATIENT
Start: 2024-11-27 | End: 2024-12-04 | Stop reason: HOSPADM

## 2024-11-27 RX ADMIN — HEPARIN SODIUM 5000 UNITS: 5000 INJECTION INTRAVENOUS; SUBCUTANEOUS at 01:11

## 2024-11-27 RX ADMIN — FOLIC ACID 1 MG: 1 TABLET ORAL at 08:11

## 2024-11-27 RX ADMIN — ACETAMINOPHEN 650 MG: 325 TABLET ORAL at 07:11

## 2024-11-27 RX ADMIN — MICONAZOLE NITRATE: 20 POWDER TOPICAL at 09:11

## 2024-11-27 RX ADMIN — Medication 100 MG: at 08:11

## 2024-11-27 RX ADMIN — VANCOMYCIN HYDROCHLORIDE 125 MG: KIT at 05:11

## 2024-11-27 RX ADMIN — SODIUM BICARBONATE 650 MG TABLET 650 MG: at 08:11

## 2024-11-27 RX ADMIN — HEPARIN SODIUM 5000 UNITS: 5000 INJECTION INTRAVENOUS; SUBCUTANEOUS at 08:11

## 2024-11-27 RX ADMIN — HEPARIN SODIUM 5000 UNITS: 5000 INJECTION INTRAVENOUS; SUBCUTANEOUS at 05:11

## 2024-11-27 RX ADMIN — MUPIROCIN: 20 OINTMENT TOPICAL at 08:11

## 2024-11-27 RX ADMIN — FLUCONAZOLE 100 MG: 100 TABLET ORAL at 08:11

## 2024-11-27 RX ADMIN — MIDODRINE HYDROCHLORIDE 10 MG: 5 TABLET ORAL at 08:11

## 2024-11-27 RX ADMIN — SODIUM CHLORIDE, SODIUM LACTATE, POTASSIUM CHLORIDE, CALCIUM CHLORIDE AND DEXTROSE MONOHYDRATE: 5; 600; 310; 30; 20 INJECTION, SOLUTION INTRAVENOUS at 05:11

## 2024-11-27 RX ADMIN — MICONAZOLE NITRATE: 20 POWDER TOPICAL at 08:11

## 2024-11-27 RX ADMIN — ALPRAZOLAM 0.25 MG: 0.25 TABLET ORAL at 07:11

## 2024-11-27 RX ADMIN — ERTAPENEM 500 MG: 1 INJECTION INTRAMUSCULAR; INTRAVENOUS at 02:11

## 2024-11-27 RX ADMIN — VANCOMYCIN HYDROCHLORIDE 125 MG: KIT at 11:11

## 2024-11-27 RX ADMIN — ATORVASTATIN CALCIUM 40 MG: 40 TABLET, FILM COATED ORAL at 08:11

## 2024-11-27 RX ADMIN — SODIUM CHLORIDE, SODIUM LACTATE, POTASSIUM CHLORIDE, CALCIUM CHLORIDE AND DEXTROSE MONOHYDRATE: 5; 600; 310; 30; 20 INJECTION, SOLUTION INTRAVENOUS at 08:11

## 2024-11-27 RX ADMIN — MUPIROCIN: 20 OINTMENT TOPICAL at 09:11

## 2024-11-27 RX ADMIN — ACETAMINOPHEN 650 MG: 325 TABLET ORAL at 01:11

## 2024-11-27 RX ADMIN — Medication 4 TABLET: at 04:11

## 2024-11-27 RX ADMIN — MIDODRINE HYDROCHLORIDE 10 MG: 5 TABLET ORAL at 11:11

## 2024-11-27 RX ADMIN — ONDANSETRON 4 MG: 2 INJECTION INTRAMUSCULAR; INTRAVENOUS at 08:11

## 2024-11-27 RX ADMIN — MIDODRINE HYDROCHLORIDE 10 MG: 5 TABLET ORAL at 04:11

## 2024-11-27 RX ADMIN — THERA TABS 1 TABLET: TAB at 08:11

## 2024-11-27 RX ADMIN — ARIPIPRAZOLE 10 MG: 5 TABLET ORAL at 08:11

## 2024-11-27 RX ADMIN — Medication 4 TABLET: at 11:11

## 2024-11-27 NOTE — PROGRESS NOTES
[facility])  Infectious Disease  Progress Note    Patient Name: Divya Bui  MRN: [id number]  Admission Date: 11/23/2024  Length of Stay: 4 days  Attending Physician: Rajesh Agustin MD  Primary Care Provider: Angel Khan MD    Isolation Status: Contact  Assessment/Plan:      Cardiac/Vascular  HTN (hypertension)  BP control as per primary team    Renal/  Acute cystitis   on Ertapenem /Zyvox based on previous cultures   Will follow repeat urine culture .    Will deescalate soon    11/26- will follow ID of GNR in urine -11/25/24  Stop Zyvox  On Invanz    ID  Bacteremia  Isolate is CONS-staph epi- likely a contaminant- will follow repeat cultures -    11/26- will stop Zyvox.  Isolate is CONS          Anticipated Disposition:     Thank you for your consult. I will follow-up with patient. Please contact us if you have any additional questions.    Christopher Champagne MD, [facility]  Infectious Disease  [facility])    Subjective:     Principal Problem:Sepsis    HPI: History was obtained from the patient and the son.   64 year old female with ESBL UTI, HTN, GERD, Morbid Obesity s/p Gastric bypass surgery, Depression, GERD, Anxiety, Esophageal stricture .  She presented with worsening  confusion, drowsiness, decreased appetite,.There was associated history of urinary incontinence.    Labs and imaging test -    11/09- Urine culture-  >100,000 CFU/ML Klebsiella pneumoniae ssp pneumoniae Abnormal  This is an edited result. Previous organism was Gram Negative Howard on 11/11/2024 at 1202 CST.    Culture Urine >100,000 CFU/ML Proteus mirabilis Abnormal  This is an edited result. Previous organism was Second Gram Negative Howard on 11/11/2024 at 1202 CST.   Culture Urine >100,000 CFU/ML Enterococcus faecalis Abnormal     Blood culture- 11/23- Staph epi  Component      Latest Ref Rng 11/23/2024 11/24/2024 11/25/2024   WBC      3.90 - 12.70 K/uL 24.61 (H)  16.23 (H)  9.28    WBC        14.01 (H)         Legend:  (H) High  Interval History:   She is tolerating meds.    Urine culture- 11/25- GNR    Review of Systems   Constitutional:  Negative for activity change, appetite change, chills and diaphoresis.   Allergic/Immunologic: Negative for environmental allergies and food allergies.     Objective:     Vital Signs (Most Recent):  Temp: 98.3 °F (36.8 °C) (11/27/24 0533)  Pulse: 88 (11/27/24 0533)  Resp: 20 (11/27/24 0533)  BP: 101/61 (11/27/24 0533)  SpO2: 96 % (11/27/24 0536) Vital Signs (24h Range):  Temp:  [97.9 °F (36.6 °C)-99 °F (37.2 °C)] 98.3 °F (36.8 °C)  Pulse:  [] 88  Resp:  [17-45] 20  SpO2:  [90 %-100 %] 96 %  BP: ()/(46-75) 101/61     Weight: 105.2 kg (231 lb 14.8 oz)  Body mass index is 41.08 kg/m².    Estimated Creatinine Clearance: 21.4 mL/min (A) (based on SCr of 3 mg/dL (H)).     Physical Exam  Vitals and nursing note reviewed.   Cardiovascular:      Rate and Rhythm: Normal rate.   Abdominal:      General: Abdomen is flat.   Musculoskeletal:         General: Normal range of motion.      Cervical back: Normal range of motion.   Skin:     General: Skin is warm.   Neurological:      General: No focal deficit present.      Mental Status: She is alert.   Psychiatric:         Mood and Affect: Mood normal.          Significant Labs: Blood Culture:   Recent Labs   Lab 11/23/24  1144 11/23/24  1318 11/26/24  1429 11/26/24  1529   LABBLOO No Growth to date  No Growth to date  No Growth to date  No Growth to date Gram stain shane bottle: Gram positive cocci in clusters resembling Staph  Results called to and read back by: Cheryl Luciano RN  11/24/2024 23:07  COAGULASE-NEGATIVE STAPHYLOCOCCUS SPECIES  Organism is a probable contaminant  * No Growth to date No Growth to date     CBC:   Recent Labs   Lab 11/26/24  0330 11/27/24  0523   WBC 8.84 5.04   HGB 10.2* 10.2*   HCT 30.2* 30.1*    160     CMP:   Recent Labs   Lab 11/26/24  0330 11/27/24  0523   * 131*   K 3.5 3.7   CL 98 97   CO2  "22* 22*    84   BUN 57* 60*   CREATININE 3.0* 3.0*   CALCIUM 6.1* 6.4*   PROT 4.2* 4.6*   ALBUMIN 1.7* 1.5*   BILITOT 0.3 0.3   ALKPHOS 67 87   AST 9* 15   ALT 8* 7*   ANIONGAP 13 12     Urine Culture:   Recent Labs   Lab 06/04/24  0927 06/17/24  1609 11/25/24  0532   LABURIN ESCHERICHIA COLI  >100,000 cfu/ml  * KLEBSIELLA OXYTOCA ESBL  >100,000 cfu/ml  * GRAM NEGATIVE JUSTINE  < 10,000 cfu/ml  Identification and susceptibility pending  *     Urine Studies:   Recent Labs   Lab 11/25/24  0531 11/25/24  0532   COLORU Yellow  --    APPEARANCEUA Cloudy*  --    PHUR 6.0  --    SPECGRAV 1.020  --    PROTEINUA 1+*  --    GLUCUA Negative  --    KETONESU Negative  --    BILIRUBINUA Negative  --    OCCULTUA 1+*  --    NITRITE Negative  --    UROBILINOGEN Negative  --    LEUKOCYTESUR 3+*  --    RBCUA  --  11*   WBCUA  --  >100*   BACTERIA  --  None   SQUAMEPITHEL  --  11   HYALINECASTS  --  0     Wound Culture: No results for input(s): "LABAERO" in the last 4320 hours.  All pertinent labs within the past 24 hours have been reviewed.    Significant Imaging: I have reviewed all pertinent imaging results/findings within the past 24 hours.  "

## 2024-11-27 NOTE — PLAN OF CARE
SW attempted to meet with Patient at bedside to discuss SNF placement.   Bedside nurse was changing Patient's dressing at time of room visit and stated patient was confused, with son away from bedside.   SW attempted to contact Patient's son to discuss SNF placement. Patient's son did not answer. SW will follow up at a later time to discuss SNF placement.    SW will continue to follow and assist as needed.

## 2024-11-27 NOTE — NURSING TRANSFER
Nursing Transfer Note      11/27/2024   4:07 AM    Nurse giving handoff:Alona  Nurse receiving handoff:Gabriel     Reason patient is being transferred: step down icu, continued observation    Transfer From: ICU    Transfer via bed    Transfer with cardiac monitoring    Transported by RN and PCT    Transfer Vital Signs:  Blood Pressure:146/64  Heart Rate:91  O2:97  Temperature:97.9  Respirations:17    Telemetry: Box Number [id number], Rate 91, Rhythm normal sinus, and Telemetry  NA  Order for Tele Monitor? Yes    Additional Lines: Ibarra Catheter    Medicines sent: wound care    Any special needs or follow-up needed: na    Patient belongings transferred with patient: Yes    Chart send with patient: Yes    Notified: family to be notified in the morning    Patient reassessed at: 0130 11/27/2024   (date, time)  1  Upon arrival to floor: cardiac monitor applied, patient oriented to room, call bell in reach, and bed in lowest position3

## 2024-11-27 NOTE — ASSESSMENT & PLAN NOTE
on Ertapenem /Zyvox based on previous cultures   Will follow repeat urine culture .    Will deescalate soon    11/26- will follow ID of GNR in urine -11/25/24  Stop Zyvox  On Invanz

## 2024-11-27 NOTE — PLAN OF CARE
Continue OT POC.  Max A x2 for sup>sit. Mod A x2 for sit>sup.  Pt with bowel accident while attempting t/f, nursing notified.

## 2024-11-27 NOTE — SUBJECTIVE & OBJECTIVE
Interval History:   She is tolerating meds.    Urine culture- 11/25- GNR    Review of Systems   Constitutional:  Negative for activity change, appetite change, chills and diaphoresis.   Allergic/Immunologic: Negative for environmental allergies and food allergies.     Objective:     Vital Signs (Most Recent):  Temp: 98.3 °F (36.8 °C) (11/27/24 0533)  Pulse: 88 (11/27/24 0533)  Resp: 20 (11/27/24 0533)  BP: 101/61 (11/27/24 0533)  SpO2: 96 % (11/27/24 0536) Vital Signs (24h Range):  Temp:  [97.9 °F (36.6 °C)-99 °F (37.2 °C)] 98.3 °F (36.8 °C)  Pulse:  [] 88  Resp:  [17-45] 20  SpO2:  [90 %-100 %] 96 %  BP: ()/(46-75) 101/61     Weight: 105.2 kg (231 lb 14.8 oz)  Body mass index is 41.08 kg/m².    Estimated Creatinine Clearance: 21.4 mL/min (A) (based on SCr of 3 mg/dL (H)).     Physical Exam  Vitals and nursing note reviewed.   Cardiovascular:      Rate and Rhythm: Normal rate.   Abdominal:      General: Abdomen is flat.   Musculoskeletal:         General: Normal range of motion.      Cervical back: Normal range of motion.   Skin:     General: Skin is warm.   Neurological:      General: No focal deficit present.      Mental Status: She is alert.   Psychiatric:         Mood and Affect: Mood normal.          Significant Labs: Blood Culture:   Recent Labs   Lab 11/23/24  1144 11/23/24  1318 11/26/24  1429 11/26/24  1529   LABBLOO No Growth to date  No Growth to date  No Growth to date  No Growth to date Gram stain shane bottle: Gram positive cocci in clusters resembling Staph  Results called to and read back by: Cheryl Luciano RN  11/24/2024 23:07  COAGULASE-NEGATIVE STAPHYLOCOCCUS SPECIES  Organism is a probable contaminant  * No Growth to date No Growth to date     CBC:   Recent Labs   Lab 11/26/24  0330 11/27/24  0523   WBC 8.84 5.04   HGB 10.2* 10.2*   HCT 30.2* 30.1*    160     CMP:   Recent Labs   Lab 11/26/24  0330 11/27/24  0523   * 131*   K 3.5 3.7   CL 98 97   CO2 22* 22*    84  "  BUN 57* 60*   CREATININE 3.0* 3.0*   CALCIUM 6.1* 6.4*   PROT 4.2* 4.6*   ALBUMIN 1.7* 1.5*   BILITOT 0.3 0.3   ALKPHOS 67 87   AST 9* 15   ALT 8* 7*   ANIONGAP 13 12     Urine Culture:   Recent Labs   Lab 06/04/24  0927 06/17/24  1609 11/25/24  0532   LABURIN ESCHERICHIA COLI  >100,000 cfu/ml  * KLEBSIELLA OXYTOCA ESBL  >100,000 cfu/ml  * GRAM NEGATIVE JUSTINE  < 10,000 cfu/ml  Identification and susceptibility pending  *     Urine Studies:   Recent Labs   Lab 11/25/24  0531 11/25/24  0532   COLORU Yellow  --    APPEARANCEUA Cloudy*  --    PHUR 6.0  --    SPECGRAV 1.020  --    PROTEINUA 1+*  --    GLUCUA Negative  --    KETONESU Negative  --    BILIRUBINUA Negative  --    OCCULTUA 1+*  --    NITRITE Negative  --    UROBILINOGEN Negative  --    LEUKOCYTESUR 3+*  --    RBCUA  --  11*   WBCUA  --  >100*   BACTERIA  --  None   SQUAMEPITHEL  --  11   HYALINECASTS  --  0     Wound Culture: No results for input(s): "LABAERO" in the last 4320 hours.  All pertinent labs within the past 24 hours have been reviewed.    Significant Imaging: I have reviewed all pertinent imaging results/findings within the past 24 hours.  "

## 2024-11-27 NOTE — ASSESSMENT & PLAN NOTE
Isolate is CONS-staph epi- likely a contaminant- will follow repeat cultures -    11/26- will stop Zyvox.  Isolate is CONS

## 2024-11-27 NOTE — CONSULTS
Nephrology Consultation  Consulting Physician:  Dr. Agustin  Reason for consultation:  ELIZABETH  Informants: patient, chart review, staff     History of Present Illness     The patient is a 64 y.o. female with a hx of hypertension, GERD, morbid obesity s/p gastric bypass, depression, anxiety, esophageal stricture, and recurrent ESBL UTIs who was admitted on 11/23/2024 for UTI with sepsis.  She was in ICU for a period of time after exhibiting signs of septic shock which included hypotension unresponsive to initial fluid resuscitation.  Infectious Disease was consulted for antibiotic therapy recommendations.  She was continued on ertapenem and linezolid.  Urine cultures grew Gram-negative rods and blood cultures were positive for coagulase negative staphylococci; repeat cultures were done as initial result was likely a contaminant.  She also showed evidence of sepsis indicated by ELIZABETH and encephalopathy.  Fluid challenge was done in the ED. most recent lactate level on 11/25 was 1.2.  She showed marginal improvement with midodrine and albumin so she required initiation of Levophed for 1 night but was weaned off on 11/26.  She was then step down to a regular telemetry floor.  Admit labs showed WBC 24.6, BUN/creatinine 43/2.5, calcium 8.3, albumin 2.3.  Urinalysis showed 1+ protein, 1+ blood, positive for nitrites, greater than 100 WBCs.  Chest x-ray unremarkable.  CT head without contrast negative for any acute intracranial abnormalities.  Nephrology was consulted for ELIZABETH management.    Patient resting in bed on room air in no acute distress.  She was able to tell me her name and where she is at however she appears disoriented to the situation and did not know the year.  It appears she has seen Dr. Shane (urology) in the past however does not currently follow with urology.  Her blood pressure has still remained low and is currently on midodrine.    PMHx:    Past Medical History:   Diagnosis Date    Anemia     Anxiety      Basal cell carcinoma (BCC) of face 2/26/2020    Blood transfusion     Bowel incontinence     Depression     Esophageal stricture     GERD (gastroesophageal reflux disease)     Hypertension     Latent tuberculosis by skin test 2001    took INH    Liver nodule 1/6/2014    Morbid obesity with BMI of 45.0-49.9, adult     OA (osteoarthritis) of knee 12/12/2014    Pericardial effusion 9/4/2014       SocHx:    Social History     Socioeconomic History    Marital status:    Tobacco Use    Smoking status: Never    Smokeless tobacco: Never   Substance and Sexual Activity    Alcohol use: Not Currently    Drug use: No    Sexual activity: Not Currently     Partners: Male     Birth control/protection: Post-menopausal     Social Drivers of Health     Financial Resource Strain: Low Risk  (11/23/2024)    Overall Financial Resource Strain (CARDIA)     Difficulty of Paying Living Expenses: Not very hard   Food Insecurity: No Food Insecurity (11/23/2024)    Hunger Vital Sign     Worried About Running Out of Food in the Last Year: Never true     Ran Out of Food in the Last Year: Never true   Transportation Needs: No Transportation Needs (11/23/2024)    TRANSPORTATION NEEDS     Transportation : No   Physical Activity: Inactive (12/28/2023)    Exercise Vital Sign     Days of Exercise per Week: 0 days     Minutes of Exercise per Session: 0 min   Stress: No Stress Concern Present (11/23/2024)    [employer] - Occupational Stress Questionnaire     Feeling of Stress : Not at all   Housing Stability: Low Risk  (11/23/2024)    Housing Stability Vital Sign     Unable to Pay for Housing in the Last Year: No     Homeless in the Last Year: No       FamHx:    Family History   Problem Relation Name Age of Onset    Lung cancer Mother          smoker    Liver cancer Father      Diabetes Sister Sara     Crohn's disease Sister Sara     Liver cancer Sister Sara     Diabetes Sister oldest     Crohn's disease Brother       Crohn's disease Other nephew     Breast cancer Neg Hx      Ovarian cancer Neg Hx      Colon cancer Neg Hx         ROS:    Constitutional:  Negative for diaphoresis.  HEENT: Negative for congestion.  Respiratory: Negative for shortness of breath.  Cardiovascular: Negative for chest pain and leg swelling.  Gastrointestinal: Negative for constipation, diarrhea nausea.  Genitourinary:  Negative for hematuria or flank pain  Neurological: Negative for lightheadedness and dizziness.         Health Status   Allergies:    is allergic to metronidazole hcl.    Current medications:     Current Facility-Administered Medications:     acetaminophen tablet 650 mg, 650 mg, Oral, Q4H PRN, Hannah Angelo, DAMON, 650 mg at 11/27/24 0127    albuterol-ipratropium 2.5 mg-0.5 mg/3 mL nebulizer solution 3 mL, 3 mL, Nebulization, Q6H PRN, Hannah Angelo, NP    ALPRAZolam tablet 0.25 mg, 0.25 mg, Oral, Nightly PRN, Hannah Angelo NP, 0.25 mg at 11/26/24 1959    aluminum-magnesium hydroxide-simethicone 200-200-20 mg/5 mL suspension 30 mL, 30 mL, Oral, QID PRN, Hannah Angelo, NP    ARIPiprazole tablet 10 mg, 10 mg, Oral, Daily, Hannah Angelo NP, 10 mg at 11/27/24 0821    atorvastatin tablet 40 mg, 40 mg, Oral, Daily, Hannah Angelo NP, 40 mg at 11/27/24 0821    dextrose 10% bolus 125 mL 125 mL, 12.5 g, Intravenous, PRN, Hannah Angelo NP    dextrose 10% bolus 250 mL 250 mL, 25 g, Intravenous, PRN, Hannah Angelo, NP    dextrose 5 % in lactated ringers infusion, , Intravenous, Continuous, Ugo Chavez MD, Last Rate: 100 mL/hr at 11/27/24 0837, 100 mL/hr at 11/27/24 0837    ertapenem (INVANZ) 500 mg in 0.9% NaCl 100 mL IVPB, 500 mg, Intravenous, Q24H, Christopher Borden MD, Stopped at 11/27/24 0312    famotidine tablet 20 mg, 20 mg, Oral, Every other day, Christopher Borden MD, 20 mg at 11/26/24 0816    fluconazole tablet 100 mg, 100 mg, Oral, Daily, Carmen Go NP, 100 mg at 11/27/24 0821    folic acid tablet 1 mg,  "1 mg, Oral, Daily, Alona Early NP, 1 mg at 11/27/24 0821    glucagon (human recombinant) injection 1 mg, 1 mg, Intramuscular, PRN, Hannah Angelo NP    glucose chewable tablet 16 g, 16 g, Oral, PRN, Hannah Angelo, NP    glucose chewable tablet 24 g, 24 g, Oral, PRN, Hannah Angelo, NP    heparin (porcine) injection 5,000 Units, 5,000 Units, Subcutaneous, Q8H, Hannah Angelo, NP, 5,000 Units at 11/27/24 0521    influenza (adjuvanted) (Fluad) 45 mcg/0.5 mL IM vaccine (> or = 64 yo) 0.5 mL, 0.5 mL, Intramuscular, Prior to discharge, Sara Moreira MD    insulin aspart U-100 pen 0-5 Units, 0-5 Units, Subcutaneous, QID (AC + HS) PRN, Hannah Angelo NP    miconazole NITRATE 2 % top powder, , Topical (Top), BID, Hannah Angelo NP, Given at 11/27/24 0822    midodrine tablet 10 mg, 10 mg, Oral, TID WM, Alona Early NP, 10 mg at 11/27/24 0821    multivitamin tablet, 1 tablet, Oral, Daily, Jordi Duncan PA-C, 1 tablet at 11/27/24 0821    mupirocin 2 % ointment, , Nasal, BID, Alvaro Novak MD, Given at 11/27/24 0821    naloxone 0.4 mg/mL injection 0.02 mg, 0.02 mg, Intravenous, PRN, Hannah Angelo, NP    ondansetron injection 4 mg, 4 mg, Intravenous, Q6H PRN, Hannah Angelo NP, 4 mg at 11/27/24 0821    prochlorperazine injection Soln 5 mg, 5 mg, Intravenous, Q6H PRN, Hannah Angelo, NP, 5 mg at 11/25/24 2032    simethicone chewable tablet 80 mg, 1 tablet, Oral, QID PRN, Hannah Angelo, NP    sodium bicarbonate tablet 650 mg, 650 mg, Oral, BID, Jordi Duncan PA-C, 650 mg at 11/27/24 0821    sodium chloride 0.9% flush 10 mL, 10 mL, Intravenous, Q8H PRN, Hannah Angelo, NP    thiamine tablet 100 mg, 100 mg, Oral, Daily, Alona Early NP, 100 mg at 11/27/24 0821     Physical Examination   VS/Measurements   BP (!) 115/58 (BP Location: Left arm, Patient Position: Lying)   Pulse 99   Temp 98.2 °F (36.8 °C) (Axillary)   Resp 17   Ht 5' 3" (1.6 m)   Wt 105.2 kg (231 lb 14.8 " oz)   SpO2 100%   BMI 41.08 kg/m²     Physical Exam   General appearance:  She is not in acute distress.  She is obese  Head: Normocephalic, atraumatic  Eyes:  Conjunctivae nl. Sclera anicteric.  Neck: Supple, no JVD.   Lungs: Clear to auscultation bilaterally and normal respiratory effort  Heart: Regular rate and rhythm  Abdomen: Soft, non-tender non-distended; bowel sounds normal; no masses  Extremities:  No swelling  Skin: No rashes or lesions  Neurologic: Normal strength and tone. No focal numbness or weakness  Psychiatric:  Oriented to self and place, not oriented to situation or,    Review / Management   Laboratory Results   Today's Lab Results :    Recent Results (from the past 24 hours)   Blood culture    Collection Time: 11/26/24  2:29 PM    Specimen: Peripheral, Hand, Left; Blood   Result Value Ref Range    Blood Culture, Routine No Growth to date    Blood culture    Collection Time: 11/26/24  3:29 PM    Specimen: Blood   Result Value Ref Range    Blood Culture, Routine No Growth to date    POCT glucose    Collection Time: 11/26/24  5:50 PM   Result Value Ref Range    POCT Glucose 124 (H) 70 - 110 mg/dL   POCT glucose    Collection Time: 11/26/24  8:02 PM   Result Value Ref Range    POCT Glucose 141 (H) 70 - 110 mg/dL   CBC with Automated Differential    Collection Time: 11/27/24  5:23 AM   Result Value Ref Range    WBC 5.04 3.90 - 12.70 K/uL    RBC 3.50 (L) 4.00 - 5.40 M/uL    Hemoglobin 10.2 (L) 12.0 - 16.0 g/dL    Hematocrit 30.1 (L) 37.0 - 48.5 %    MCV 86 82 - 98 fL    MCH 29.1 27.0 - 31.0 pg    MCHC 33.9 32.0 - 36.0 g/dL    RDW 15.6 (H) 11.5 - 14.5 %    Platelets 160 150 - 450 K/uL    MPV 10.0 9.2 - 12.9 fL    Immature Granulocytes CANCELED 0.0 - 0.5 %    Immature Grans (Abs) CANCELED 0.00 - 0.04 K/uL    nRBC 1 (A) 0 /100 WBC    Gran % 51.0 38.0 - 73.0 %    Lymph % 25.0 18.0 - 48.0 %    Mono % 12.0 4.0 - 15.0 %    Eosinophil % 0.0 0.0 - 8.0 %    Basophil % 0.0 0.0 - 1.9 %    Bands 11.0 %    Other 1  %    Platelet Estimate Appears normal     Aniso Slight     Poik Slight     Poly Occasional     Tear Drop Cells Occasional     Basophilic Stippling Occasional     Differential Method Manual    Comprehensive Metabolic Panel (CMP)    Collection Time: 11/27/24  5:23 AM   Result Value Ref Range    Sodium 131 (L) 136 - 145 mmol/L    Potassium 3.7 3.5 - 5.1 mmol/L    Chloride 97 95 - 110 mmol/L    CO2 22 (L) 23 - 29 mmol/L    Glucose 84 70 - 110 mg/dL    BUN 60 (H) 8 - 23 mg/dL    Creatinine 3.0 (H) 0.5 - 1.4 mg/dL    Calcium 6.4 (LL) 8.7 - 10.5 mg/dL    Total Protein 4.6 (L) 6.0 - 8.4 g/dL    Albumin 1.5 (L) 3.5 - 5.2 g/dL    Total Bilirubin 0.3 0.1 - 1.0 mg/dL    Alkaline Phosphatase 87 40 - 150 U/L    AST 15 10 - 40 U/L    ALT 7 (L) 10 - 44 U/L    eGFR 17 (A) >60 mL/min/1.73 m^2    Anion Gap 12 8 - 16 mmol/L   Magnesium    Collection Time: 11/27/24  5:23 AM   Result Value Ref Range    Magnesium 2.4 1.6 - 2.6 mg/dL   Phosphorus    Collection Time: 11/27/24  5:23 AM   Result Value Ref Range    Phosphorus 2.6 (L) 2.7 - 4.5 mg/dL   POCT glucose    Collection Time: 11/27/24  5:24 AM   Result Value Ref Range    POCT Glucose 74 70 - 110 mg/dL   Procalcitonin    Collection Time: 11/27/24  7:37 AM   Result Value Ref Range    Procalcitonin 4.95 (H) <0.25 ng/mL   Sodium, urine, random    Collection Time: 11/27/24  8:04 AM   Result Value Ref Range    Sodium, Urine <20 (A) 20 - 250 mmol/L   Creatinine, urine, random    Collection Time: 11/27/24  8:04 AM   Result Value Ref Range    Creatinine, Urine 126.6 15.0 - 325.0 mg/dL        Impression and Plan   Diagnosis   ELIZABETH.  Likely related to sepsis/hypotension, suspect prerenal injury.  We will switch IV fluids to half-normal saline with 1 amp of bicarb.  Of note she has had recurrent UTIs.  Her home Lasix and olmesartan/HCTZ are currently being held which is appropriate at this time.  Monitor urine output, patient currently has a Ibarra catheter.  Urine creatinine, sodium and osmolality  pending.      UTI.  History of ESBL, recurrent UTIs.  She is not currently following with urology but has in the past.  Currently on ertapenem.  Urine culture growing Gram-negative rods, blood cultures pending.  Infectious Disease following    Encephalopathy.  Likely related to sepsis.  CT head negative    Hyponatremia.  Mild, monitor    Hypocalcemia.  Corrected calcium acceptable    Anemia.  Hemoglobin low but stable, monitor    Type 2 DM.  Management per primary team.  We will switch IV fluids from D5 to half-normal saline with 1 amp of bicarb as previously mentioned    Hypotension.  Blood pressure medicines currently being held, is on midodrine       .     ________________________________________________   JUNIOR Pierre    This document was created using voice recognition software.  It is possible that there are errors which have persisted after original proofreading.  If there is a question regarding contents of this document please contact me for clarification.

## 2024-11-27 NOTE — PT/OT/SLP PROGRESS
Occupational Therapy   Treatment    Name: Divya Bui  MRN: [id number]  Admitting Diagnosis:  Sepsis       Recommendations:     Discharge Recommendations: Moderate Intensity Therapy  Discharge Equipment Recommendations:  to be determined by next level of care  Barriers to discharge:  None    Assessment:     Divya Bui is a 64 y.o. female with a medical diagnosis of Sepsis.  She presents with the following performance deficits affecting function are weakness, impaired endurance, impaired self care skills, impaired functional mobility, gait instability, impaired balance, decreased coordination, decreased upper extremity function, decreased lower extremity function, decreased safety awareness, pain.     Rehab Prognosis:  Fair; patient would benefit from acute skilled OT services to address these deficits and reach maximum level of function.       Plan:     Patient to be seen 2 x/week to address the above listed problems via self-care/home management, therapeutic activities, therapeutic exercises  Plan of Care Expires: 12/08/24  Plan of Care Reviewed with: patient    Subjective     Chief Complaint: abdominal pain  Patient/Family Comments/goals: get better, improve strength and mobility  Pt's nurse reporting that pt's BP has been better today.  Pain/Comfort:  Pain Rating 1: 3/10  Location - Side 1: Bilateral  Location - Orientation 1: generalized  Location 1: abdomen  Pain Addressed 1: Reposition, Distraction, Nurse notified  Pain Rating Post-Intervention 1: 3/10    Objective:     Communicated with: Nurse and epic chart review prior to session.  Patient found HOB elevated with peripheral IV, telemetry, drummond catheter upon OT entry to room.    General Precautions: Standard, fall, contact    Orthopedic Precautions:N/A  Braces: N/A  Respiratory Status: Room air     Occupational Performance:     Bed Mobility:    Patient completed Rolling/Turning to Right with maximal assistance and 2 persons  Patient completed Supine to Sit  with maximal assistance and 2 persons  Patient completed Sit to Supine with moderate assistance and 2 persons   Forward scoot to EOB with Max A x2.  Supine scoot to HOB with Max A x2.  Required increased time to complete.    [facility] 6 Click ADL: 10    Treatment & Education:  Pt able to tolerate sitting EOB ~10 minutes, requiring CGA to maintain static sitting balance. Began to attempt t/f from EOB, but pt became incontinent and had a loose bowel accident. Nursing notified, and pt returned to supine in bed for cleaning.  Reviewed role of OT in acute setting and benefits of participation. Educated on techniques to use to increase independence and decrease fall risk with functional transfers. Educated on importance of OOB activity and calling for A to transfer and meet needs. Encouraged completion of B UE AROM therex throughout the day to tolerance to increase functional strength and activity tolerance. Educated patient on importance of increased tolerance to upright position and direct impact on CV endurance and strength. Patient encouraged to sit up with bed in chair position for a minimum of 2 consecutive hours per day and for meals. Patient stated understanding and in agreement with POC.     Patient left HOB elevated with all lines intact, call button in reach, and nursing notified    GOALS:   Multidisciplinary Problems       Occupational Therapy Goals          Problem: Occupational Therapy    Goal Priority Disciplines Outcome Interventions   Occupational Therapy Goal     OT, PT/OT Progressing    Description: O.T. GOAL TO BE MET BY 12-08-24  PT WILL TOLERATE 1 SET X 12 REPS B UE ROM EXERCISE  SBA WITH UE DRESSING  SBA WITH BSC TRANSFER                       Time Tracking:     OT Date of Treatment: 11/27/24  OT Start Time: 1140  OT Stop Time: 1210  OT Total Time (min): 30 min    Billable Minutes:Therapeutic Activity 30    OT/KIKE: RODOLFO Grant OT     11/27/2024

## 2024-11-27 NOTE — PT/OT/SLP PROGRESS
Physical Therapy  Treatment    Divya Bui   MRN: [id number]   Admitting Diagnosis: Sepsis       PT Start Time: 1140     PT Stop Time: 1210    PT Total Time (min): 30 min       Billable Minutes:  Therapeutic Activity 30    Treatment Type: Treatment  PT/PTA: PT     Number of PTA visits since last PT visit: 0       General Precautions: Standard, fall, special contact  Orthopedic Precautions: N/A  Braces: N/A      Spiritual, Cultural Beliefs, Hoahaoism Practices, Values that Affect Care: no    Subjective:  Communicated with nursing (Kathi) and performed chart review via epic system prior to session.  Pt agreeable to PT services, nsg at bedside with meds reported that her BP has been better today    Pain/Comfort  Pain Rating 1: 3/10 (3/10 abdominal)  Pain Addressed 1: Reposition, Distraction, Nurse notified  Pain Rating Post-Intervention 1: 3/10    Objective:   Patient found with: peripheral IV, telemetry, drummond catheter. Pt supine in bed    Functional Mobility:  Bed Mobility:    Supine to sit max A x 2 with cues for technique and hand placement   Seated scooting max A x 2   Tolerated sitting EOB x 8-10 mins    Sit to supine mod A x 2 with cues for technique and hand placement   Supine scooting max A x 2 with bed trended, pt able to assist with BLE   While providing transfer training pt reported BM, pt with loose stool episode    Balance:   Dynamic Sit: FAIR+: Maintains balance through MINIMAL excursions of active trunk motion      Treatment and Education:  Educated pt on benefits of consistent participation in PT services to meet functional goals. Educated pt on supine therex to promote strength, circulation and joint mobility. Exercises included AP,  hip abd/add, and heel slides. Educated to perform exercises intermittently throughout day to tolerance. Educated pt on importance of sitting OOB to promote endurance and overall activity tolerance. Educated pt on call don't fall policy and use of call button to alert  nursing staff of needs (including to assist in/out of bed). Pt expressed understanding.      AM-PAC 6 CLICK MOBILITY  How much help from another person does this patient currently need?   1 = Unable, Total/Dependent Assistance  2 = A lot, Maximum/Moderate Assistance  3 = A little, Minimum/Contact Guard/Supervision  4 = None, Modified [place]/Independent    Turning over in bed (including adjusting bedclothes, sheets and blankets)?: 2  Sitting down on and standing up from a chair with arms (e.g., wheelchair, bedside commode, etc.): 1  Moving from lying on back to sitting on the side of the bed?: 2  Moving to and from a bed to a chair (including a wheelchair)?: 1  Need to walk in hospital room?: 1  Climbing 3-5 steps with a railing?: 1  Basic Mobility Total Score: 8    AM-PAC Raw Score CMS G-Code Modifier Level of Impairment Assistance   6 % Total / Unable   7 - 9 CM 80 - 100% Maximal Assist   10 - 14 CL 60 - 80% Moderate Assist   15 - 19 CK 40 - 60% Moderate Assist   20 - 22 CJ 20 - 40% Minimal Assist   23 CI 1-20% SBA / CGA   24 CH 0% Independent/ Mod I     Patient left supine with all lines intact, call button in reach, bed alarm on, and nursing notified.    Assessment:  Divya Bui is a 64 y.o. female with a medical diagnosis of Sepsis and presents with deficits in overall mobility. Pt will benefit from continued PT services in order to progress toward baseline.    Rehab identified problem list/impairments: weakness, impaired endurance, impaired functional mobility, gait instability, impaired balance, decreased coordination, decreased lower extremity function, decreased safety awareness    Rehab potential is good.    Activity tolerance: Fair    Discharge recommendations: Moderate Intensity Therapy      Barriers to discharge:      Equipment recommendations: to be determined by next level of care     GOALS:   Multidisciplinary Problems       Physical Therapy Goals          Problem: Physical Therapy     Goal Priority Disciplines Outcome Interventions   Physical Therapy Goal     PT, PT/OT Progressing    Description: Goals to be met by 12/8/24.  1. Pt will complete bed mobility SBA.  2. Pt will complete sit to stand SBA.  3. Pt will ambulate 100ft SBA using RW.  4. Pt will increase AMPAC score by 2 points to progress functional mobility.                       PLAN:    Patient to be seen 3 x/week to address the above listed problems via gait training, therapeutic activities, therapeutic exercises, neuromuscular re-education  Plan of Care expires: 12/08/24  Plan of Care reviewed with: patient, son         11/27/2024

## 2024-11-27 NOTE — PLAN OF CARE
Pt AAOx2, self and place. GCS 14, confused. C/O pain to buttocks from wounds throughout shift; repositioned and PRNs given; see MAR. VSS; levo gtt off 0830. Afebrile. SR/ST on monitor. Room air. Ibarra in place; 175mL UOP this shift. BM x2 this shift. Accuchecks ACHS; no coverage needed. Family updated at bedside.    Pt resting comfortably in bed with side rails up x3; locked and lowered with alarm set. Call light in place. Advised pt to call out if anything is needed. Pt verbalized understanding.

## 2024-11-27 NOTE — PROGRESS NOTES
[facility] Int[facility] Medicine  Progress Note    Patient Name: Divya Bui  MRN: [id number]  Patient Class: IP- Inpatient   Admission Date: 11/23/2024  Length of Stay: 4 days  Attending Physician: Rajesh Agustin MD  Primary Care Provider: Angel Khan MD        Subjective:     Principal Problem:Sepsis        HPI:  Divya Bui, a 64-year-old female with a PMHx of HTN, GERD, morbid obesity s/p gastric bypass, depression, anxiety, esophageal stricture, and recurrent ESBL UTIs, was admitted on 11/23 for a UTI with sepsis. She was transferred to the ICU after exhibiting signs of septic shock, including recurrent hypotension unresponsive to initial fluid resuscitation. Upon admission, she presented with acute cystitis. ID was consulted by  for antibiotic therapy recommendations. She was continued on ertapenem (Invanz) and linezolid (Zyvox). Urine cultures grew GNRs, and blood cultures were positive for coagulase-negative staphylococci; repeat cultures were pending as the initial result was likely a contaminant. She showed evidence of sepsis with organ dysfunction indicated by acute kidney injury and encephalopathy. A fluid challenge was provided in the ED on 11/23. Her latest lactate level on 11/25 was 1.2. Despite marginal improvement with Midodrine and albumin, she required initiation of levophed overnight, but this was weaned off on 11/26. After stabilization, she was stepped down to telemetry, and HM was re-consulted to assume care.    Overview/Hospital Course:  11/24:  Remains lethargic and intermittently confused, also hypotensive today to lowest of 73/39 taken on the right upper arm by the tech.  On my recheck with smaller size cuff she was 81/46, and then was 118/59 on her calf.  I am not sure about whether the low blood pressures are entirely accurate.  She continues to mentate the same.  Heart rates in the low 100s/110s.  Discussed with ICU and checked labs.  No evidence of  "bleeding.  Lactate is normal.  Have given 3 L of fluid  today.  Blood pressure this evening up to 92/51.   11/25/2024: transferred to ICU for pressor therapy. Levophed initiated in the evening. ID consulted for antibiotic recs.  11/26/2024: levophed weaned off this morning; stabilized thereafter. Deemed stable for transfer back to to telemetry.     11/27/2024  ELIZABETH steadily worsening. Restart IVFs. Consulted nephrology for guidance. Cultures pending. Still having multiple loose bowel movements. C. Diff ordered 11/23/2024 but still says "in process" in EPIC. Am currently on hold with Micro lab to inquire about results of study. disposition: SNF once medically stable    Interval history:  See hospital course    Objective:   /61 (BP Location: Left arm, Patient Position: Lying)   Pulse 88   Temp 98.3 °F (36.8 °C) (Oral)   Resp 20   Ht 5' 3" (1.6 m)   Wt 105.2 kg (231 lb 14.8 oz)   SpO2 96%   BMI 41.08 kg/m²     Intake/Output Summary (Last 24 hours) at 11/27/2024 0645  Last data filed at 11/27/2024 0100  Gross per 24 hour   Intake 535.22 ml   Output 280 ml   Net 255.22 ml       PHYSICAL EXAM  Vitals reviewed  Constitutional:       General: She is not in acute distress.     Appearance: She is obese. She is ill-appearing.   HENT:      Head: Normocephalic.      Nose: Nose normal.      Mouth/Throat:      Mouth: Mucous membranes are moist.   Eyes:      Pupils: Pupils are equal, round, and reactive to light.   Cardiovascular:      Rate and Rhythm: Normal rate and regular rhythm.      Pulses: Normal pulses.      Heart sounds: Normal heart sounds.   Pulmonary:      Effort: Pulmonary effort is normal.      Breath sounds: Normal breath sounds.   Abdominal:      General: Bowel sounds are normal. There is no distension.      Palpations: Abdomen is soft.      Tenderness: There is no abdominal tenderness.   Musculoskeletal:         General: No swelling.   Skin:     General: Skin is warm and dry.   Neurological:      Mental " "Status: She is alert. She is disoriented.      Motor: Weakness present.      Comments: Oriented to self, not time, place, situation.     LABS  All labs from the past 24 hours were reviewed.     BMP:   Recent Labs   Lab 11/27/24  0523   GLU 84   *   K 3.7   CL 97   CO2 22*   BUN 60*   CREATININE 3.0*   CALCIUM 6.4*   MG 2.4     CBC:   Recent Labs   Lab 11/26/24 0330 11/27/24 0523   WBC 8.84 5.04   HGB 10.2* 10.2*   HCT 30.2* 30.1*    160     CMP:   Recent Labs   Lab 11/26/24 0330 11/27/24  0523   * 131*   K 3.5 3.7   CL 98 97   CO2 22* 22*    84   BUN 57* 60*   CREATININE 3.0* 3.0*   CALCIUM 6.1* 6.4*   PROT 4.2* 4.6*   ALBUMIN 1.7* 1.5*   BILITOT 0.3 0.3   ALKPHOS 67 87   AST 9* 15   ALT 8* 7*   ANIONGAP 13 12     Cardiac Markers:   No results for input(s): "CKMB", "MYOGLOBIN", "BNP", "TROPISTAT" in the last 48 hours.    Coagulation:   No results for input(s): "PT", "INR", "APTT" in the last 48 hours.    Lactic Acid:   No results for input(s): "LACTATE" in the last 48 hours.    Magnesium:   Recent Labs   Lab 11/26/24 0330 11/27/24 0523   MG 1.8 2.4     Troponin:   No results for input(s): "TROPONINI", "TROPONINIHS" in the last 48 hours.    TSH:   Recent Labs   Lab 11/23/24  1506   TSH 0.831     Urine Studies:   No results for input(s): "COLORU", "APPEARANCEUA", "PHUR", "SPECGRAV", "PROTEINUA", "GLUCUA", "KETONESU", "BILIRUBINUA", "OCCULTUA", "NITRITE", "UROBILINOGEN", "LEUKOCYTESUR", "RBCUA", "WBCUA", "BACTERIA", "SQUAMEPITHEL", "HYALINECASTS" in the last 48 hours.    Invalid input(s): "WRIGHTSUR"      IMAGING  All imaging from the past 24 hours were reviewed.     Imaging Results              CT Head Without Contrast (Final result)  Result time 11/23/24 14:05:51      Final result by Lucio Romero MD (11/23/24 14:05:51)                   Impression:      No acute intracranial CT abnormality.    All CT scans at this facility are performed  using dose modulation techniques as " appropriate to performed exam including the following:  automated exposure control; adjustment of mA and/or kV according to the patients size (this includes techniques or standardized protocols for targeted exams where dose is matched to indication/reason for exam: i.e. extremities or head);  iterative reconstruction technique.      Electronically signed by: Lucio Romero  Date:    11/23/2024  Time:    14:05               Narrative:    EXAMINATION:  CT HEAD WITHOUT CONTRAST    CLINICAL HISTORY:  Mental status change, unknown cause;    TECHNIQUE:  Low dose axial CT images obtained throughout the head without intravenous contrast. Sagittal and coronal reconstructions were performed.    COMPARISON:  Multiple prior reports    FINDINGS:  Intracranial compartment:    Ventricles and sulci are normal in size for age without evidence of hydrocephalus. No extra-axial blood or fluid collections.    The brain parenchyma appears normal. No parenchymal mass, hemorrhage, edema or major vascular distribution infarct.    Skull/extracranial contents (limited evaluation): No fracture. Mastoid air cells and paranasal sinuses are essentially clear.                                       X-Ray Chest AP Portable (Final result)  Result time 11/23/24 12:10:43      Final result by Lucio Romero MD (11/23/24 12:10:43)                   Impression:      No acute abnormality.      Electronically signed by: Lucio Romero  Date:    11/23/2024  Time:    12:10               Narrative:    EXAMINATION:  XR CHEST AP PORTABLE    CLINICAL HISTORY:  Sepsis;    TECHNIQUE:  Single frontal view of the chest was performed.    COMPARISON:  Multiple    FINDINGS:  The lungs are clear, with normal appearance of pulmonary vasculature and no pleural effusion or pneumothorax.    The cardiac silhouette is normal in size. The hilar and mediastinal contours are unremarkable.    Bones are intact.                                        Assessment/Plan:      *  Sepsis  --leukocytosis resolved, normotensive, afebrile   --Blood Cultures (11/23)- growing  COAGULASE-NEGATIVE STAPHYLOCOCCUS- suspect contaminant  --ID following, continued IV ertapenem for cystitis- appreciate recs  --repeat cultures ordered on 11/26/2024, follow up results  --Organ dysfunction indicated by Acute kidney injury and Encephalopathy     Acute cystitis  -Hx ESBL, frequent UTIs, significant resistances (prior cultures at [facility] through [facility], prior cultures as well in our system)  -Consult ID, appreciate reccs  -Currently on ertapenem and linezolid which should be sufficient to cover her  -Unfortunately the urine culture is marked as canceled, unsure of the reason --> called micro lab in [place], quantity was not sufficient for urine culture  -Ordering another UA reflex to culture although will have low sensitivity at this point  -Blood cultures pending    11/26/2024  Urine cultures growing gram negative spp  ID following, appreciate recs  Continue ertapenem, monitor fever curve  Follow up culture susceptibilities    Encephalopathy, metabolic  --improving  --monitor clinically      Hyperglycemia  Check Hgb A1c  Diabetic diet  NISS      Candidal intertrigo and dermatitis  Severe erythema to breasts, pannus, groin, and buttock with some skin breakdown   Appears to be candida overgrowth   IV Diflucan       Metabolic acidosis  Likely secondary to ELIZABETH/sepsis  Continue bicarb drip    Diarrhea  C Diff and stool cultures sent  Improving      Recurrent major depressive disorder, in partial remission  Patient has persistent depression which is mild and is currently controlled. Will Continue anti-depressant medications. We will not consult psychiatry at this time. Patient does not display psychosis at this time. Continue to monitor closely and adjust plan of care as needed.    Cont Abilify and Xanax prn   Hold Doxepin for now while on Zyvox         Chronic pain disorder  Holding home Lyrica  and Norco given lethargy in the setting of sepsis      ELIZABETH (acute kidney injury)  ELIZABETH is likely due to pre-renal azotemia due to dehydration. Baseline creatinine is  1.0 . Most recent creatinine and eGFR are listed below.  Recent Labs     11/23/24  1145 11/24/24  0638 11/24/24  1629   CREATININE 2.5* 2.2* 2.7*   EGFRNORACEVR 21* 24* 19*        Plan  - ELIZABETH is worsening. Will adjust treatment as follows: Hold  Lasix, Olmesartan/HCTZ  - Avoid nephrotoxins and renally dose meds for GFR listed above  - Monitor urine output, serial BMP, and adjust therapy as needed  - 3L IVF today  - likely secondary to sepsis, hypotension/prerenal etiology    Morbid obesity with BMI of 40.0-44.9, adult  Body mass index is 38.97 kg/m². Morbid obesity complicates all aspects of disease management from diagnostic modalities to treatment. Weight loss encouraged and health benefits explained to patient.         HTN (hypertension)  Patients blood pressure range in the last 24 hours was: BP  Min: 73/39  Max: 155/74.The patient's inpatient anti-hypertensive regimen is listed below       Plan  Hold antihypertensives      VTE Risk Mitigation (From admission, onward)           Ordered     heparin (porcine) injection 5,000 Units  Every 8 hours         11/23/24 1441     IP VTE HIGH RISK PATIENT  Once         11/23/24 1441     Place sequential compression device  Until discontinued         11/23/24 1441                    Discharge Planning   ELADIA:      Code Status: Full Code   Is the patient medically ready for discharge?:     Reason for patient still in hospital (select all that apply): Patient trending condition, Laboratory test, Treatment, and Consult recommendations  Discharge Plan A: Home Health                Rajesh Agustin MD  Department of Hospital Medicine   [facility] - [facility] ([facility])

## 2024-11-27 NOTE — PLAN OF CARE
TX COMPLETED: facilitated bed mobility with max A x 2, in the process of attempting a transfer when pt had loose bowel accident, nsg notified and pt return supine to await hygiene care. Recommend continued PT services.

## 2024-11-28 PROBLEM — A04.72 C. DIFFICILE DIARRHEA: Status: ACTIVE | Noted: 2024-11-28

## 2024-11-28 PROBLEM — R73.9 HYPERGLYCEMIA: Status: RESOLVED | Noted: 2024-11-23 | Resolved: 2024-11-28

## 2024-11-28 PROBLEM — R78.81 BACTEREMIA: Status: RESOLVED | Noted: 2020-12-09 | Resolved: 2024-11-28

## 2024-11-28 PROBLEM — I95.9 HYPOTENSION: Status: RESOLVED | Noted: 2024-11-25 | Resolved: 2024-11-28

## 2024-11-28 LAB
ALBUMIN SERPL BCP-MCNC: 1.4 G/DL (ref 3.5–5.2)
ALP SERPL-CCNC: 104 U/L (ref 40–150)
ALT SERPL W/O P-5'-P-CCNC: 6 U/L (ref 10–44)
ANION GAP SERPL CALC-SCNC: 12 MMOL/L (ref 8–16)
ANISOCYTOSIS BLD QL SMEAR: SLIGHT
AST SERPL-CCNC: 14 U/L (ref 10–40)
BASO STIPL BLD QL SMEAR: ABNORMAL
BASOPHILS # BLD AUTO: ABNORMAL K/UL (ref 0–0.2)
BASOPHILS NFR BLD: 0 % (ref 0–1.9)
BILIRUB SERPL-MCNC: 0.3 MG/DL (ref 0.1–1)
BUN SERPL-MCNC: 58 MG/DL (ref 8–23)
CALCIUM SERPL-MCNC: 6.6 MG/DL (ref 8.7–10.5)
CHLORIDE SERPL-SCNC: 99 MMOL/L (ref 95–110)
CO2 SERPL-SCNC: 22 MMOL/L (ref 23–29)
CREAT SERPL-MCNC: 2.6 MG/DL (ref 0.5–1.4)
DIFFERENTIAL METHOD BLD: ABNORMAL
EOSINOPHIL # BLD AUTO: ABNORMAL K/UL (ref 0–0.5)
EOSINOPHIL NFR BLD: 9 % (ref 0–8)
ERYTHROCYTE [DISTWIDTH] IN BLOOD BY AUTOMATED COUNT: 15.7 % (ref 11.5–14.5)
EST. GFR  (NO RACE VARIABLE): 20 ML/MIN/1.73 M^2
GLUCOSE SERPL-MCNC: 86 MG/DL (ref 70–110)
HCT VFR BLD AUTO: 28.6 % (ref 37–48.5)
HGB BLD-MCNC: 9.5 G/DL (ref 12–16)
HYPOCHROMIA BLD QL SMEAR: ABNORMAL
IMM GRANULOCYTES # BLD AUTO: ABNORMAL K/UL (ref 0–0.04)
IMM GRANULOCYTES NFR BLD AUTO: ABNORMAL % (ref 0–0.5)
LYMPHOCYTES # BLD AUTO: ABNORMAL K/UL (ref 1–4.8)
LYMPHOCYTES NFR BLD: 39 % (ref 18–48)
MAGNESIUM SERPL-MCNC: 2.2 MG/DL (ref 1.6–2.6)
MCH RBC QN AUTO: 28.7 PG (ref 27–31)
MCHC RBC AUTO-ENTMCNC: 33.2 G/DL (ref 32–36)
MCV RBC AUTO: 86 FL (ref 82–98)
MONOCYTES # BLD AUTO: ABNORMAL K/UL (ref 0.3–1)
MONOCYTES NFR BLD: 12 % (ref 4–15)
NEUTROPHILS NFR BLD: 38 % (ref 38–73)
NEUTS BAND NFR BLD MANUAL: 2 %
NRBC BLD-RTO: 1 /100 WBC
PHOSPHATE SERPL-MCNC: 3.2 MG/DL (ref 2.7–4.5)
PLATELET # BLD AUTO: 129 K/UL (ref 150–450)
PLATELET BLD QL SMEAR: ABNORMAL
PMV BLD AUTO: 9.5 FL (ref 9.2–12.9)
POCT GLUCOSE: 147 MG/DL (ref 70–110)
POCT GLUCOSE: 151 MG/DL (ref 70–110)
POCT GLUCOSE: 156 MG/DL (ref 70–110)
POCT GLUCOSE: 72 MG/DL (ref 70–110)
POLYCHROMASIA BLD QL SMEAR: ABNORMAL
POTASSIUM SERPL-SCNC: 3.1 MMOL/L (ref 3.5–5.1)
PROT SERPL-MCNC: 4.2 G/DL (ref 6–8.4)
RBC # BLD AUTO: 3.31 M/UL (ref 4–5.4)
SODIUM SERPL-SCNC: 133 MMOL/L (ref 136–145)
STOMATOCYTES BLD QL SMEAR: PRESENT
TARGETS BLD QL SMEAR: ABNORMAL
TOXIC GRANULES BLD QL SMEAR: PRESENT
WBC # BLD AUTO: 4.07 K/UL (ref 3.9–12.7)

## 2024-11-28 PROCEDURE — 25000003 PHARM REV CODE 250: Performed by: STUDENT IN AN ORGANIZED HEALTH CARE EDUCATION/TRAINING PROGRAM

## 2024-11-28 PROCEDURE — 63700000 PHARM REV CODE 250 ALT 637 W/O HCPCS: Performed by: NURSE PRACTITIONER

## 2024-11-28 PROCEDURE — 63600175 PHARM REV CODE 636 W HCPCS: Performed by: STUDENT IN AN ORGANIZED HEALTH CARE EDUCATION/TRAINING PROGRAM

## 2024-11-28 PROCEDURE — 80053 COMPREHEN METABOLIC PANEL: CPT

## 2024-11-28 PROCEDURE — 63600175 PHARM REV CODE 636 W HCPCS: Performed by: NURSE PRACTITIONER

## 2024-11-28 PROCEDURE — 99232 SBSQ HOSP IP/OBS MODERATE 35: CPT | Mod: ,,, | Performed by: INTERNAL MEDICINE

## 2024-11-28 PROCEDURE — 25000003 PHARM REV CODE 250

## 2024-11-28 PROCEDURE — 84100 ASSAY OF PHOSPHORUS: CPT

## 2024-11-28 PROCEDURE — 25000003 PHARM REV CODE 250: Performed by: NURSE PRACTITIONER

## 2024-11-28 PROCEDURE — 83735 ASSAY OF MAGNESIUM: CPT

## 2024-11-28 PROCEDURE — 25000003 PHARM REV CODE 250: Performed by: INTERNAL MEDICINE

## 2024-11-28 PROCEDURE — 27000207 HC ISOLATION

## 2024-11-28 PROCEDURE — 21400001 HC TELEMETRY ROOM

## 2024-11-28 PROCEDURE — 85007 BL SMEAR W/DIFF WBC COUNT: CPT

## 2024-11-28 PROCEDURE — 85027 COMPLETE CBC AUTOMATED: CPT

## 2024-11-28 RX ORDER — POTASSIUM CHLORIDE 7.45 MG/ML
10 INJECTION INTRAVENOUS
Status: COMPLETED | OUTPATIENT
Start: 2024-11-28 | End: 2024-11-28

## 2024-11-28 RX ORDER — POTASSIUM CHLORIDE 20 MEQ/1
20 TABLET, EXTENDED RELEASE ORAL 2 TIMES DAILY
Status: DISCONTINUED | OUTPATIENT
Start: 2024-11-29 | End: 2024-11-29

## 2024-11-28 RX ORDER — POTASSIUM CHLORIDE 20 MEQ/1
20 TABLET, EXTENDED RELEASE ORAL 2 TIMES DAILY
Status: DISCONTINUED | OUTPATIENT
Start: 2024-11-28 | End: 2024-11-28

## 2024-11-28 RX ORDER — DEXTROSE MONOHYDRATE, SODIUM CHLORIDE, AND POTASSIUM CHLORIDE 50; 1.49; 9 G/1000ML; G/1000ML; G/1000ML
INJECTION, SOLUTION INTRAVENOUS CONTINUOUS
Status: DISCONTINUED | OUTPATIENT
Start: 2024-11-28 | End: 2024-11-29

## 2024-11-28 RX ORDER — POTASSIUM CHLORIDE 20 MEQ/1
40 TABLET, EXTENDED RELEASE ORAL 2 TIMES DAILY
Status: COMPLETED | OUTPATIENT
Start: 2024-11-28 | End: 2024-11-28

## 2024-11-28 RX ORDER — SODIUM BICARBONATE 650 MG/1
1300 TABLET ORAL 2 TIMES DAILY
Status: DISCONTINUED | OUTPATIENT
Start: 2024-11-28 | End: 2024-12-04 | Stop reason: HOSPADM

## 2024-11-28 RX ADMIN — FOLIC ACID 1 MG: 1 TABLET ORAL at 10:11

## 2024-11-28 RX ADMIN — MUPIROCIN: 20 OINTMENT TOPICAL at 10:11

## 2024-11-28 RX ADMIN — POTASSIUM CHLORIDE 10 MEQ: 7.46 INJECTION, SOLUTION INTRAVENOUS at 12:11

## 2024-11-28 RX ADMIN — MIDODRINE HYDROCHLORIDE 10 MG: 5 TABLET ORAL at 04:11

## 2024-11-28 RX ADMIN — VANCOMYCIN HYDROCHLORIDE 125 MG: KIT at 05:11

## 2024-11-28 RX ADMIN — Medication 4 TABLET: at 04:11

## 2024-11-28 RX ADMIN — Medication 100 MG: at 10:11

## 2024-11-28 RX ADMIN — HEPARIN SODIUM 5000 UNITS: 5000 INJECTION INTRAVENOUS; SUBCUTANEOUS at 04:11

## 2024-11-28 RX ADMIN — POTASSIUM CHLORIDE 40 MEQ: 1500 TABLET, EXTENDED RELEASE ORAL at 10:11

## 2024-11-28 RX ADMIN — MIDODRINE HYDROCHLORIDE 10 MG: 5 TABLET ORAL at 12:11

## 2024-11-28 RX ADMIN — ARIPIPRAZOLE 10 MG: 5 TABLET ORAL at 10:11

## 2024-11-28 RX ADMIN — THERA TABS 1 TABLET: TAB at 10:11

## 2024-11-28 RX ADMIN — VANCOMYCIN HYDROCHLORIDE 125 MG: KIT at 12:11

## 2024-11-28 RX ADMIN — SODIUM BICARBONATE 1300 MG: 650 TABLET ORAL at 10:11

## 2024-11-28 RX ADMIN — FLUCONAZOLE 100 MG: 100 TABLET ORAL at 10:11

## 2024-11-28 RX ADMIN — SODIUM BICARBONATE 1300 MG: 650 TABLET ORAL at 08:11

## 2024-11-28 RX ADMIN — POTASSIUM CHLORIDE 10 MEQ: 7.46 INJECTION, SOLUTION INTRAVENOUS at 10:11

## 2024-11-28 RX ADMIN — HEPARIN SODIUM 5000 UNITS: 5000 INJECTION INTRAVENOUS; SUBCUTANEOUS at 05:11

## 2024-11-28 RX ADMIN — ATORVASTATIN CALCIUM 40 MG: 40 TABLET, FILM COATED ORAL at 10:11

## 2024-11-28 RX ADMIN — MUPIROCIN: 20 OINTMENT TOPICAL at 08:11

## 2024-11-28 RX ADMIN — POTASSIUM CHLORIDE 40 MEQ: 1500 TABLET, EXTENDED RELEASE ORAL at 08:11

## 2024-11-28 RX ADMIN — MICONAZOLE NITRATE: 20 POWDER TOPICAL at 10:11

## 2024-11-28 RX ADMIN — ALPRAZOLAM 0.25 MG: 0.25 TABLET ORAL at 08:11

## 2024-11-28 RX ADMIN — DEXTROSE, SODIUM CHLORIDE, AND POTASSIUM CHLORIDE: 5; .9; .15 INJECTION INTRAVENOUS at 10:11

## 2024-11-28 RX ADMIN — Medication 4 TABLET: at 12:11

## 2024-11-28 RX ADMIN — FAMOTIDINE 20 MG: 20 TABLET ORAL at 10:11

## 2024-11-28 RX ADMIN — Medication 4 TABLET: at 10:11

## 2024-11-28 RX ADMIN — HEPARIN SODIUM 5000 UNITS: 5000 INJECTION INTRAVENOUS; SUBCUTANEOUS at 08:11

## 2024-11-28 RX ADMIN — MICONAZOLE NITRATE: 20 POWDER TOPICAL at 08:11

## 2024-11-28 RX ADMIN — MIDODRINE HYDROCHLORIDE 10 MG: 5 TABLET ORAL at 10:11

## 2024-11-28 NOTE — ASSESSMENT & PLAN NOTE
--leukocytosis resolved, normotensive, afebrile   --Blood Cultures (11/23)- growing  COAGULASE-NEGATIVE STAPHYLOCOCCUS- suspect contaminant  --ID following, continued IV ertapenem for cystitis- appreciate recs  --repeat cultures ordered on 11/26/2024, follow up results  --Organ dysfunction indicated by Acute kidney injury and Encephalopathy     11/28/2024  Improving  See acute cystitis and C. diff

## 2024-11-28 NOTE — PROGRESS NOTES
Nephrology Progress Note     History of Present Illness   64 y.o. female with a hx of hypertension, GERD, morbid obesity s/p gastric bypass, depression, anxiety, esophageal stricture, and recurrent ESBL UTIs who was admitted on 11/23/2024 for UTI with sepsis.  She was in ICU for a period of time after exhibiting signs of septic shock which included hypotension unresponsive to initial fluid resuscitation.  Infectious Disease was consulted for antibiotic therapy recommendations.  She was continued on ertapenem and linezolid.  Urine cultures grew Gram-negative rods and blood cultures were positive for coagulase negative staphylococci; repeat cultures were done as initial result was likely a contaminant.  She also showed evidence of sepsis indicated by ELIZABETH and encephalopathy.   Admit labs showed WBC 24.6, BUN/creatinine 43/2.5, calcium 8.3, albumin 2.3. Urinalysis showed 1+ protein, 1+ blood, positive for nitrites, greater than 100 WBCs. Chest x-ray unremarkable. CT head without contrast negative for any acute intracranial abnormalities. Nephrology was consulted for ELIZABETH management.  Patient was placed on volume resuscitation with IV fluids adjustment of medications to avoid nephrotoxic agents.  Because of recurrent UTI multiple antibiotics concern of vesicocolic fistula was raised.  Subsequently diagnosed with C diff colitis, fungal skin infections and complicated urinary tract infection    Interval History     Overnight/currently:  Lying in bed morbidly obese not doing much counseled patient on diet need for regular bowel movements probiotics while being treated for C diff we will defer management to ID given recurrent infections from multiple antibiotics and now C diff colitis.  Complains of intermittent abdominal pain no acute abdominal signs     Health Status   Allergies:    is allergic to metronidazole hcl.    Current medications:     Current Facility-Administered Medications:     acetaminophen tablet 650 mg, 650  mg, Oral, Q4H PRN, Hannah Angelo NP, 650 mg at 11/27/24 1905    albuterol-ipratropium 2.5 mg-0.5 mg/3 mL nebulizer solution 3 mL, 3 mL, Nebulization, Q6H PRN, Hannah Angelo, NP    ALPRAZolam tablet 0.25 mg, 0.25 mg, Oral, Nightly PRN, Hannah Angelo NP, 0.25 mg at 11/27/24 1905    aluminum-magnesium hydroxide-simethicone 200-200-20 mg/5 mL suspension 30 mL, 30 mL, Oral, QID PRN, Hannah Angelo, NP    ARIPiprazole tablet 10 mg, 10 mg, Oral, Daily, Hannah Angelo NP, 10 mg at 11/27/24 0821    atorvastatin tablet 40 mg, 40 mg, Oral, Daily, Hannah Angelo NP, 40 mg at 11/27/24 0821    dextrose 10% bolus 125 mL 125 mL, 12.5 g, Intravenous, PRN, Hannah Angelo NP    dextrose 10% bolus 250 mL 250 mL, 25 g, Intravenous, PRN, Hannah Angelo NP    dextrose 5 % and 0.9 % NaCl with KCl 20 mEq infusion, , Intravenous, Continuous, Ugo Chavez MD    famotidine tablet 20 mg, 20 mg, Oral, Every other day, Christopher Borden MD, 20 mg at 11/26/24 0816    fluconazole tablet 100 mg, 100 mg, Oral, Daily, Carmen Go NP, 100 mg at 11/27/24 0821    folic acid tablet 1 mg, 1 mg, Oral, Daily, Alona Early NP, 1 mg at 11/27/24 0821    glucagon (human recombinant) injection 1 mg, 1 mg, Intramuscular, PRN, Hannah Angelo, NP    glucose chewable tablet 16 g, 16 g, Oral, PRN, Hannah Angelo, NP    glucose chewable tablet 24 g, 24 g, Oral, PRN, Hannah Angelo, NP    heparin (porcine) injection 5,000 Units, 5,000 Units, Subcutaneous, Q8H, Hannah Angelo, NP, 5,000 Units at 11/28/24 0555    influenza (adjuvanted) (Fluad) 45 mcg/0.5 mL IM vaccine (> or = 66 yo) 0.5 mL, 0.5 mL, Intramuscular, Prior to discharge, Sara Moreira MD    insulin aspart U-100 pen 0-5 Units, 0-5 Units, Subcutaneous, QID (AC + HS) PRN, Hannah Angelo, NP    Lactobacillus acidoph-L.marco a 1 million cell tablet 4 tablet, 4 tablet, Oral, TID WM, Rajesh Agustin MD, 4 tablet at 11/27/24 1630    loperamide capsule 2  "mg, 2 mg, Oral, QID PRN, Rajesh Agustin MD    miconazole NITRATE 2 % top powder, , Topical (Top), BID, Hannah Angelo NP, Given at 11/27/24 2100    midodrine tablet 10 mg, 10 mg, Oral, TID WM, Alona Early NP, 10 mg at 11/27/24 1630    multivitamin tablet, 1 tablet, Oral, Daily, Jordi Duncan PA-C, 1 tablet at 11/27/24 0821    mupirocin 2 % ointment, , Nasal, BID, Alvaro Novak MD, Given at 11/27/24 2100    naloxone 0.4 mg/mL injection 0.02 mg, 0.02 mg, Intravenous, PRN, Hannah Angelo NP    ondansetron injection 4 mg, 4 mg, Intravenous, Q6H PRN, Hannah Angelo NP, 4 mg at 11/27/24 0821    potassium chloride 10 mEq in 100 mL IVPB, 10 mEq, Intravenous, Q2H, Rajesh Agustin MD    [START ON 11/29/2024] potassium chloride SA CR tablet 20 mEq, 20 mEq, Oral, BID, Rajesh Agustin MD    potassium chloride SA CR tablet 40 mEq, 40 mEq, Oral, BID, Rajesh Agustin MD    prochlorperazine injection Soln 5 mg, 5 mg, Intravenous, Q6H PRN, Hannah Angelo NP, 5 mg at 11/25/24 2032    simethicone chewable tablet 80 mg, 1 tablet, Oral, QID PRN, Hannah Angelo NP    sodium bicarbonate tablet 1,300 mg, 1,300 mg, Oral, BID, Ugo Chavez MD    sodium chloride 0.9% flush 10 mL, 10 mL, Intravenous, Q8H PRN, Hannah Angelo NP    thiamine tablet 100 mg, 100 mg, Oral, Daily, Alona Early NP, 100 mg at 11/27/24 0821    vancomycin SolR 125 mg, 125 mg, Oral, Q6H, Christopher Champagne MD, FIDSA, 125 mg at 11/28/24 0555     Physical Examination   VS/Measurements   /70 (BP Location: Right arm, Patient Position: Lying)   Pulse 98   Temp 97 °F (36.1 °C) (Axillary)   Resp 18   Ht 5' 3" (1.6 m)   Wt 105.2 kg (231 lb 14.8 oz)   SpO2 100%   BMI 41.08 kg/m²      General:  Alert and oriented X3, No acute distress.         Nutritional status: Obese.  Lying in bed no distress eating her breakfast.  Pallor noted  Neck:  Supple, No lymphadenopathy.     Respiratory:  Decreased breath sounds bibasilar crackles " no use of accessory muscles.    Cardiovascular:  Normal rate, Regular rhythm.   Gastrointestinal:  Distended mild tenderness epigastric right upper quadrant no rebound bowel sounds decreased  Lower extremity +edema  Integumentary:  Warm, Dry.  Multiple skin lesions consistent with fungal infections  Psychiatric:  Cooperative, depressed affect     Review / Management   Laboratory Results   Today's Lab Results :    Recent Results (from the past 24 hours)   POCT glucose    Collection Time: 11/27/24 11:51 AM   Result Value Ref Range    POCT Glucose 117 (H) 70 - 110 mg/dL   POCT glucose    Collection Time: 11/27/24  4:27 PM   Result Value Ref Range    POCT Glucose 130 (H) 70 - 110 mg/dL   POCT glucose    Collection Time: 11/27/24  9:54 PM   Result Value Ref Range    POCT Glucose 115 (H) 70 - 110 mg/dL   CBC with Automated Differential    Collection Time: 11/28/24  3:01 AM   Result Value Ref Range    WBC 4.07 3.90 - 12.70 K/uL    RBC 3.31 (L) 4.00 - 5.40 M/uL    Hemoglobin 9.5 (L) 12.0 - 16.0 g/dL    Hematocrit 28.6 (L) 37.0 - 48.5 %    MCV 86 82 - 98 fL    MCH 28.7 27.0 - 31.0 pg    MCHC 33.2 32.0 - 36.0 g/dL    RDW 15.7 (H) 11.5 - 14.5 %    Platelets 129 (L) 150 - 450 K/uL    MPV 9.5 9.2 - 12.9 fL    Immature Granulocytes CANCELED 0.0 - 0.5 %    Immature Grans (Abs) CANCELED 0.00 - 0.04 K/uL    Lymph # CANCELED 1.0 - 4.8 K/uL    Mono # CANCELED 0.3 - 1.0 K/uL    Eos # CANCELED 0.0 - 0.5 K/uL    Baso # CANCELED 0.00 - 0.20 K/uL    nRBC 1 (A) 0 /100 WBC    Gran % 38.0 38.0 - 73.0 %    Lymph % 39.0 18.0 - 48.0 %    Mono % 12.0 4.0 - 15.0 %    Eosinophil % 9.0 (H) 0.0 - 8.0 %    Basophil % 0.0 0.0 - 1.9 %    Bands 2.0 %    Platelet Estimate Appears normal     Aniso Slight     Poly Occasional     Hypo Occasional     Target Cells Occasional     Stomatocytes Present     Basophilic Stippling Occasional     Toxic Granulation Present     Differential Method Manual    Comprehensive Metabolic Panel (CMP)    Collection Time:  11/28/24  3:01 AM   Result Value Ref Range    Sodium 133 (L) 136 - 145 mmol/L    Potassium 3.1 (L) 3.5 - 5.1 mmol/L    Chloride 99 95 - 110 mmol/L    CO2 22 (L) 23 - 29 mmol/L    Glucose 86 70 - 110 mg/dL    BUN 58 (H) 8 - 23 mg/dL    Creatinine 2.6 (H) 0.5 - 1.4 mg/dL    Calcium 6.6 (LL) 8.7 - 10.5 mg/dL    Total Protein 4.2 (L) 6.0 - 8.4 g/dL    Albumin 1.4 (L) 3.5 - 5.2 g/dL    Total Bilirubin 0.3 0.1 - 1.0 mg/dL    Alkaline Phosphatase 104 40 - 150 U/L    AST 14 10 - 40 U/L    ALT 6 (L) 10 - 44 U/L    eGFR 20 (A) >60 mL/min/1.73 m^2    Anion Gap 12 8 - 16 mmol/L   Magnesium    Collection Time: 11/28/24  3:01 AM   Result Value Ref Range    Magnesium 2.2 1.6 - 2.6 mg/dL   Phosphorus    Collection Time: 11/28/24  3:01 AM   Result Value Ref Range    Phosphorus 3.2 2.7 - 4.5 mg/dL   POCT glucose    Collection Time: 11/28/24  4:52 AM   Result Value Ref Range    POCT Glucose 72 70 - 110 mg/dL        Impression and Plan   Diagnosis   ELIZABETH CKD improve with IV fluids continue the same hold olmesartan Lasix as needed avoid nephrotoxic agents suspect patient has underlying UTI encourage patient for personal hygiene probiotics.    Recurrent UTI need to have discussions about avoiding treatments for colonization need to rule out was tachycardic fistula.  History of ESBL currently followed by ID    C diff colitis per ID    Morbid obesity    Type 2 diabetes need diet management    Severe sepsis improving currently on midodrine    Hypokalemia from GI losses increase potassium in fluids and oral supplementation monitor magnesium    Fungal skin infection intertrigo per hospital medicine      ______________________________________________  Ugo Chavez MD    This document was created using voice recognition software.  It is possible that there are errors which have persisted after original proofreading.  If there is a question regarding contents of this document please contact me for clarification.

## 2024-11-28 NOTE — ASSESSMENT & PLAN NOTE
Will follow stool assay- rule out c difficle    11/27- discussed with lab and stool send on 11/23  The stool was sent 11/23 when she was admitted- this is important to note if it becomes C difficile positive .  Add Po Hai as C difficle is suspected

## 2024-11-28 NOTE — ASSESSMENT & PLAN NOTE
Severe erythema to breasts, pannus, groin, and buttock with some skin breakdown   Appears to be candida overgrowth   IV Diflucan     11/28/2024  Transitioned to PO fluconazole   [Follow-Up: _____] : a [unfilled] follow-up visit

## 2024-11-28 NOTE — ASSESSMENT & PLAN NOTE
11/28/2024  Procalcitonin rising.   Discussed with micro lab in [place]. C.diff from 11/23/2024 confirmed to be positive.   ID started PO Vanc Q6H.

## 2024-11-28 NOTE — ASSESSMENT & PLAN NOTE
on Ertapenem /Zyvox based on previous cultures   Will follow repeat urine culture .    Will deescalate soon    11/26- will follow ID of GNR in urine -11/25/24  Stop Zyvox  On Invanz    11/27-urine has less than 10K ESBL UTI- will stop Ertapenem

## 2024-11-28 NOTE — PROGRESS NOTES
[facility])  Infectious Disease  Progress Note    Patient Name: Divya Bui  MRN: [id number]  Admission Date: 11/23/2024  Length of Stay: 5 days  Attending Physician: Rajesh Agustin MD  Primary Care Provider: Angel Khan MD    Isolation Status: Contact  Assessment/Plan:      Cardiac/Vascular  HTN (hypertension)  BP control as per primary team    Renal/  Acute cystitis   on Ertapenem /Zyvox based on previous cultures   Will follow repeat urine culture .    Will deescalate soon    11/26- will follow ID of GNR in urine -11/25/24  Stop Zyvox  On Invanz    11/27-urine has less than 10K ESBL UTI- will stop Ertapenem     GI  Diarrhea  Will follow stool assay- rule out c difficle    11/27- discussed with lab and stool send on 11/23  The stool was sent 11/23 when she was admitted- this is important to note if it becomes C difficile positive .  Add Po Vanco as C difficle is suspected         Anticipated Disposition:     Thank you for your consult. I will follow-up with patient. Please contact us if you have any additional questions.    Christopher Champagne MD, formerly [facility]  Infectious Disease  [facility])    Subjective:     Principal Problem:Sepsis    HPI: History was obtained from the patient and the son.   64 year old female with ESBL UTI, HTN, GERD, Morbid Obesity s/p Gastric bypass surgery, Depression, GERD, Anxiety, Esophageal stricture .  She presented with worsening  confusion, drowsiness, decreased appetite,.There was associated history of urinary incontinence.    Labs and imaging test -    11/09- Urine culture-  >100,000 CFU/ML Klebsiella pneumoniae ssp pneumoniae Abnormal  This is an edited result. Previous organism was Gram Negative Howard on 11/11/2024 at 1202 CST.    Culture Urine >100,000 CFU/ML Proteus mirabilis Abnormal  This is an edited result. Previous organism was Second Gram Negative Howard on 11/11/2024 at 1202 CST.   Culture Urine >100,000 CFU/ML Enterococcus faecalis  Abnormal     Blood culture- 11/23- Staph epi  Component      Latest Ref Rng 11/23/2024 11/24/2024 11/25/2024   WBC      3.90 - 12.70 K/uL 24.61 (H)  16.23 (H)  9.28    WBC        14.01 (H)        Legend:  (H) High  Interval History:   She is tolerating meds.    Urine culture- 11/25- GNR    11/27- she has diarrhoea and had C difficile ordered 11/23     Review of Systems   Constitutional:  Negative for activity change, appetite change, chills and diaphoresis.   Allergic/Immunologic: Negative for environmental allergies and food allergies.     Objective:     Vital Signs (Most Recent):  Temp: 98 °F (36.7 °C) (11/28/24 0418)  Pulse: 98 (11/28/24 0500)  Resp: 18 (11/28/24 0418)  BP: 105/61 (11/28/24 0418)  SpO2: 97 % (11/28/24 0418) Vital Signs (24h Range):  Temp:  [97.7 °F (36.5 °C)-98.2 °F (36.8 °C)] 98 °F (36.7 °C)  Pulse:  [] 98  Resp:  [17-18] 18  SpO2:  [94 %-100 %] 97 %  BP: ()/(51-70) 105/61     Weight: 105.2 kg (231 lb 14.8 oz)  Body mass index is 41.08 kg/m².    Estimated Creatinine Clearance: 24.7 mL/min (A) (based on SCr of 2.6 mg/dL (H)).     Physical Exam  Vitals and nursing note reviewed.   Cardiovascular:      Rate and Rhythm: Normal rate.   Abdominal:      General: Abdomen is flat.   Musculoskeletal:         General: Normal range of motion.      Cervical back: Normal range of motion.   Skin:     General: Skin is warm.   Neurological:      General: No focal deficit present.      Mental Status: She is alert.   Psychiatric:         Mood and Affect: Mood normal.          Significant Labs: Blood Culture:   Recent Labs   Lab 11/23/24  1144 11/23/24  1318 11/26/24  1429 11/26/24  1529   LABBLOO No Growth to date  No Growth to date  No Growth to date  No Growth to date  No Growth to date Gram stain shane bottle: Gram positive cocci in clusters resembling Staph  Results called to and read back by: Cheryl Luciano RN  11/24/2024 23:07  COAGULASE-NEGATIVE STAPHYLOCOCCUS SPECIES  Organism is a probable  "contaminant  * No Growth to date  No Growth to date No Growth to date  No Growth to date     CBC:   Recent Labs   Lab 11/27/24  0523 11/28/24  0301   WBC 5.04 4.07   HGB 10.2* 9.5*   HCT 30.1* 28.6*    129*     CMP:   Recent Labs   Lab 11/27/24  0523 11/28/24  0301   * 133*   K 3.7 3.1*   CL 97 99   CO2 22* 22*   GLU 84 86   BUN 60* 58*   CREATININE 3.0* 2.6*   CALCIUM 6.4* 6.6*   PROT 4.6* 4.2*   ALBUMIN 1.5* 1.4*   BILITOT 0.3 0.3   ALKPHOS 87 104   AST 15 14   ALT 7* 6*   ANIONGAP 12 12     Urine Culture:   Recent Labs   Lab 06/04/24  0927 06/17/24  1609 11/25/24  0532   LABURIN ESCHERICHIA COLI  >100,000 cfu/ml  * KLEBSIELLA OXYTOCA ESBL  >100,000 cfu/ml  * ESCHERICHIA COLI ESBL  < 10,000 cfu/ml  *     Urine Studies:   Recent Labs   Lab 11/25/24  0531 11/25/24  0532   COLORU Yellow  --    APPEARANCEUA Cloudy*  --    PHUR 6.0  --    SPECGRAV 1.020  --    PROTEINUA 1+*  --    GLUCUA Negative  --    KETONESU Negative  --    BILIRUBINUA Negative  --    OCCULTUA 1+*  --    NITRITE Negative  --    UROBILINOGEN Negative  --    LEUKOCYTESUR 3+*  --    RBCUA  --  11*   WBCUA  --  >100*   BACTERIA  --  None   SQUAMEPITHEL  --  11   HYALINECASTS  --  0     Wound Culture: No results for input(s): "LABAERO" in the last 4320 hours.  All pertinent labs within the past 24 hours have been reviewed.    Significant Imaging: I have reviewed all pertinent imaging results/findings within the past 24 hours.  "

## 2024-11-28 NOTE — PROGRESS NOTES
[facility] Medicine  Progress Note    Patient Name: Divya Bui  MRN: [id number]  Patient Class: IP- Inpatient   Admission Date: 11/23/2024  Length of Stay: 5 days  Attending Physician: Rajesh Agustin MD  Primary Care Provider: Angel Khan MD        Subjective:     Principal Problem:Sepsis        HPI:  Divya Bui, a 64-year-old female with a PMHx of HTN, GERD, morbid obesity s/p gastric bypass, depression, anxiety, esophageal stricture, and recurrent ESBL UTIs, was admitted on 11/23 for a UTI with sepsis. She was transferred to the ICU after exhibiting signs of septic shock, including recurrent hypotension unresponsive to initial fluid resuscitation. Upon admission, she presented with acute cystitis. ID was consulted by  for antibiotic therapy recommendations. She was continued on ertapenem (Invanz) and linezolid (Zyvox). Urine cultures grew GNRs, and blood cultures were positive for coagulase-negative staphylococci; repeat cultures were pending as the initial result was likely a contaminant. She showed evidence of sepsis with organ dysfunction indicated by acute kidney injury and encephalopathy. A fluid challenge was provided in the ED on 11/23. Her latest lactate level on 11/25 was 1.2. Despite marginal improvement with Midodrine and albumin, she required initiation of levophed overnight, but this was weaned off on 11/26. After stabilization, she was stepped down to telemetry, and HM was re-consulted to assume care.    Overview/Hospital Course:  11/24:  Remains lethargic and intermittently confused, also hypotensive today to lowest of 73/39 taken on the right upper arm by the tech.  On my recheck with smaller size cuff she was 81/46, and then was 118/59 on her calf.  I am not sure about whether the low blood pressures are entirely accurate.  She continues to mentate the same.  Heart rates in the low 100s/110s.  Discussed with ICU and checked labs.  No evidence of  "bleeding.  Lactate is normal.  Have given 3 L of fluid  today.  Blood pressure this evening up to 92/51.   11/25/2024: transferred to ICU for pressor therapy. Levophed initiated in the evening. ID consulted for antibiotic recs.  11/26/2024: levophed weaned off this morning; stabilized thereafter. Deemed stable for transfer back to to telemetry.     11/27/2024  ELIZABETH steadily worsening. Restart IVFs. Consulted nephrology for guidance. Cultures pending. Still having multiple loose bowel movements. C. Diff ordered 11/23/2024 but still says "in process" in EPIC. Am currently on hold with Micro lab to inquire about results of study. disposition: SNF once medically stable    11/28/2024  Mentation improved this morning. Procalcitonin rising. Discussed with micro lab in [place]. C.diff from 11/23/2024 confirmed to be positive. ID started PO Vanc Q6H. Nephrology evaluated. Added bicarb to IVFs. Labs this morning show improvement in renal function for the first time since admission. Appreciate Nephrology's recommendations/interventions. Urine cultures confirm ESBL E. Coli. Antibiotics narrowed by ID. disposition: SNF once medically stable    Interval history:  See hospital course    Objective:   /70 (BP Location: Right arm, Patient Position: Lying)   Pulse 98   Temp 97 °F (36.1 °C) (Axillary)   Resp 18   Ht 5' 3" (1.6 m)   Wt 105.2 kg (231 lb 14.8 oz)   SpO2 100%   BMI 41.08 kg/m²     Intake/Output Summary (Last 24 hours) at 11/28/2024 0745  Last data filed at 11/28/2024 0551  Gross per 24 hour   Intake 500 ml   Output 525 ml   Net -25 ml       PHYSICAL EXAM  Vitals reviewed  Constitutional:       General: She is not in acute distress.     Appearance: She is obese. She is ill-appearing.   HENT:      Head: Normocephalic.      Nose: Nose normal.      Mouth/Throat:      Mouth: Mucous membranes are moist.   Eyes:      Pupils: Pupils are equal, round, and reactive to light.   Cardiovascular:      Rate and Rhythm: " "Normal rate and regular rhythm.      Pulses: Normal pulses.      Heart sounds: Normal heart sounds.   Pulmonary:      Effort: Pulmonary effort is normal.      Breath sounds: Normal breath sounds.   Abdominal:      General: Bowel sounds are normal. There is no distension.      Palpations: Abdomen is soft.      Tenderness: There is no abdominal tenderness.   Musculoskeletal:         General: No swelling.   Skin:     General: Skin is warm and dry.   Neurological:      Mental Status: She is alert. She is disoriented.      Motor: Weakness present.      Comments: Oriented to self, not time, place, situation.     LABS  All labs from the past 24 hours were reviewed.     BMP:   Recent Labs   Lab 11/28/24 0301   GLU 86   *   K 3.1*   CL 99   CO2 22*   BUN 58*   CREATININE 2.6*   CALCIUM 6.6*   MG 2.2     CBC:   Recent Labs   Lab 11/27/24 0523 11/28/24 0301   WBC 5.04 4.07   HGB 10.2* 9.5*   HCT 30.1* 28.6*    129*     CMP:   Recent Labs   Lab 11/27/24 0523 11/28/24 0301   * 133*   K 3.7 3.1*   CL 97 99   CO2 22* 22*   GLU 84 86   BUN 60* 58*   CREATININE 3.0* 2.6*   CALCIUM 6.4* 6.6*   PROT 4.6* 4.2*   ALBUMIN 1.5* 1.4*   BILITOT 0.3 0.3   ALKPHOS 87 104   AST 15 14   ALT 7* 6*   ANIONGAP 12 12     Cardiac Markers:   No results for input(s): "CKMB", "MYOGLOBIN", "BNP", "TROPISTAT" in the last 48 hours.    Coagulation:   No results for input(s): "PT", "INR", "APTT" in the last 48 hours.    Lactic Acid:   No results for input(s): "LACTATE" in the last 48 hours.    Magnesium:   Recent Labs   Lab 11/27/24 0523 11/28/24 0301   MG 2.4 2.2     Troponin:   No results for input(s): "TROPONINI", "TROPONINIHS" in the last 48 hours.    TSH:   Recent Labs   Lab 11/23/24  1506   TSH 0.831     Urine Studies:   No results for input(s): "COLORU", "APPEARANCEUA", "PHUR", "SPECGRAV", "PROTEINUA", "GLUCUA", "KETONESU", "BILIRUBINUA", "OCCULTUA", "NITRITE", "UROBILINOGEN", "LEUKOCYTESUR", "RBCUA", "WBCUA", "BACTERIA", " ""SQUAMEPITHEL", "HYALINECASTS" in the last 48 hours.    Invalid input(s): "WRIGHTSUR"      IMAGING  All imaging from the past 24 hours were reviewed.     Imaging Results              CT Head Without Contrast (Final result)  Result time 11/23/24 14:05:51      Final result by Lucio Romero MD (11/23/24 14:05:51)                   Impression:      No acute intracranial CT abnormality.    All CT scans at this facility are performed  using dose modulation techniques as appropriate to performed exam including the following:  automated exposure control; adjustment of mA and/or kV according to the patients size (this includes techniques or standardized protocols for targeted exams where dose is matched to indication/reason for exam: i.e. extremities or head);  iterative reconstruction technique.      Electronically signed by: Lucio Romero  Date:    11/23/2024  Time:    14:05               Narrative:    EXAMINATION:  CT HEAD WITHOUT CONTRAST    CLINICAL HISTORY:  Mental status change, unknown cause;    TECHNIQUE:  Low dose axial CT images obtained throughout the head without intravenous contrast. Sagittal and coronal reconstructions were performed.    COMPARISON:  Multiple prior reports    FINDINGS:  Intracranial compartment:    Ventricles and sulci are normal in size for age without evidence of hydrocephalus. No extra-axial blood or fluid collections.    The brain parenchyma appears normal. No parenchymal mass, hemorrhage, edema or major vascular distribution infarct.    Skull/extracranial contents (limited evaluation): No fracture. Mastoid air cells and paranasal sinuses are essentially clear.                                       X-Ray Chest AP Portable (Final result)  Result time 11/23/24 12:10:43      Final result by Lucio Romero MD (11/23/24 12:10:43)                   Impression:      No acute abnormality.      Electronically signed by: Lucio Romero  Date:    11/23/2024  Time:    12:10               " Narrative:    EXAMINATION:  XR CHEST AP PORTABLE    CLINICAL HISTORY:  Sepsis;    TECHNIQUE:  Single frontal view of the chest was performed.    COMPARISON:  Multiple    FINDINGS:  The lungs are clear, with normal appearance of pulmonary vasculature and no pleural effusion or pneumothorax.    The cardiac silhouette is normal in size. The hilar and mediastinal contours are unremarkable.    Bones are intact.                                        Assessment/Plan:      * Sepsis  --leukocytosis resolved, normotensive, afebrile   --Blood Cultures (11/23)- growing  COAGULASE-NEGATIVE STAPHYLOCOCCUS- suspect contaminant  --ID following, continued IV ertapenem for cystitis- appreciate recs  --repeat cultures ordered on 11/26/2024, follow up results  --Organ dysfunction indicated by Acute kidney injury and Encephalopathy     11/28/2024  Improving  See acute cystitis and C. diff    Acute cystitis  -Hx ESBL, frequent UTIs, significant resistances (prior cultures at [facility] through [facility], prior cultures as well in our system)  -Consult ID, appreciate reccs  -Currently on ertapenem and linezolid which should be sufficient to cover her  -Unfortunately the urine culture is marked as canceled, unsure of the reason --> called micro lab in [place], quantity was not sufficient for urine culture  -Ordering another UA reflex to culture although will have low sensitivity at this point  -Blood cultures pending    11/26/2024  Urine cultures growing gram negative spp  ID following, appreciate recs  Continue ertapenem, monitor fever curve  Follow up culture susceptibilities    11/28/2024  Procalcitonin rising. Urine cultures confirm ESBL E. Coli. Antibiotics narrowed by ID.   Continue to monitor   Appreciate ID recs  disposition: SNF once medically stable    C. Diff positive diarrhea  11/28/2024  Procalcitonin rising.   Discussed with micro lab in [place]. C.diff from 11/23/2024 confirmed to be positive.   ID started  PO Vanc Q6H.     Encephalopathy, metabolic  --improving  --monitor clinically    Candidal intertrigo and dermatitis  Severe erythema to breasts, pannus, groin, and buttock with some skin breakdown   Appears to be candida overgrowth   IV Diflucan     11/28/2024  Transitioned to PO fluconazole    ELIZABETH (acute kidney injury)  Metabolic acidosis  11/28/2024  Nephrology evaluated  Bicarb added back to IVFs  Renal function improved on this morning's labs    Recurrent major depressive disorder, in partial remission  Patient has persistent depression which is mild and is currently controlled. Will Continue anti-depressant medications. We will not consult psychiatry at this time. Patient does not display psychosis at this time. Continue to monitor closely and adjust plan of care as needed.    Cont Abilify and Xanax prn   Hold Doxepin for now while on Zyvox     Chronic pain disorder  Holding home Lyrica and Norco given lethargy in the setting of sepsis    Morbid obesity with BMI of 40.0-44.9, adult  Body mass index is 38.97 kg/m². Morbid obesity complicates all aspects of disease management from diagnostic modalities to treatment. Weight loss encouraged and health benefits explained to patient.     HTN (hypertension)  Patients blood pressure range in the last 24 hours was: BP  Min: 73/39  Max: 155/74.The patient's inpatient anti-hypertensive regimen is listed below         VTE Risk Mitigation (From admission, onward)           Ordered     heparin (porcine) injection 5,000 Units  Every 8 hours         11/23/24 1441     IP VTE HIGH RISK PATIENT  Once         11/23/24 1441     Place sequential compression device  Until discontinued         11/23/24 1441                    Discharge Planning   ELADIA:      Code Status: Full Code   Is the patient medically ready for discharge?:     Reason for patient still in hospital (select all that apply): Patient trending condition, Laboratory test, Treatment, and Consult recommendations  Discharge  Plan A: Home Health                Rajesh Agustin MD  Department of Hospital Medicine   [facility] ([facility])

## 2024-11-28 NOTE — SUBJECTIVE & OBJECTIVE
Interval History:   She is tolerating meds.    Urine culture- 11/25- GNR    11/27- she has diarrhoea and had C difficile ordered 11/23     Review of Systems   Constitutional:  Negative for activity change, appetite change, chills and diaphoresis.   Allergic/Immunologic: Negative for environmental allergies and food allergies.     Objective:     Vital Signs (Most Recent):  Temp: 98 °F (36.7 °C) (11/28/24 0418)  Pulse: 98 (11/28/24 0500)  Resp: 18 (11/28/24 0418)  BP: 105/61 (11/28/24 0418)  SpO2: 97 % (11/28/24 0418) Vital Signs (24h Range):  Temp:  [97.7 °F (36.5 °C)-98.2 °F (36.8 °C)] 98 °F (36.7 °C)  Pulse:  [] 98  Resp:  [17-18] 18  SpO2:  [94 %-100 %] 97 %  BP: ()/(51-70) 105/61     Weight: 105.2 kg (231 lb 14.8 oz)  Body mass index is 41.08 kg/m².    Estimated Creatinine Clearance: 24.7 mL/min (A) (based on SCr of 2.6 mg/dL (H)).     Physical Exam  Vitals and nursing note reviewed.   Cardiovascular:      Rate and Rhythm: Normal rate.   Abdominal:      General: Abdomen is flat.   Musculoskeletal:         General: Normal range of motion.      Cervical back: Normal range of motion.   Skin:     General: Skin is warm.   Neurological:      General: No focal deficit present.      Mental Status: She is alert.   Psychiatric:         Mood and Affect: Mood normal.          Significant Labs: Blood Culture:   Recent Labs   Lab 11/23/24  1144 11/23/24  1318 11/26/24  1429 11/26/24  1529   LABBLOO No Growth to date  No Growth to date  No Growth to date  No Growth to date  No Growth to date Gram stain shane bottle: Gram positive cocci in clusters resembling Staph  Results called to and read back by: Cheryl Luciano RN  11/24/2024 23:07  COAGULASE-NEGATIVE STAPHYLOCOCCUS SPECIES  Organism is a probable contaminant  * No Growth to date  No Growth to date No Growth to date  No Growth to date     CBC:   Recent Labs   Lab 11/27/24  0523 11/28/24  0301   WBC 5.04 4.07   HGB 10.2* 9.5*   HCT 30.1* 28.6*    129*  "    CMP:   Recent Labs   Lab 11/27/24  0523 11/28/24  0301   * 133*   K 3.7 3.1*   CL 97 99   CO2 22* 22*   GLU 84 86   BUN 60* 58*   CREATININE 3.0* 2.6*   CALCIUM 6.4* 6.6*   PROT 4.6* 4.2*   ALBUMIN 1.5* 1.4*   BILITOT 0.3 0.3   ALKPHOS 87 104   AST 15 14   ALT 7* 6*   ANIONGAP 12 12     Urine Culture:   Recent Labs   Lab 06/04/24  0927 06/17/24  1609 11/25/24  0532   LABURIN ESCHERICHIA COLI  >100,000 cfu/ml  * KLEBSIELLA OXYTOCA ESBL  >100,000 cfu/ml  * ESCHERICHIA COLI ESBL  < 10,000 cfu/ml  *     Urine Studies:   Recent Labs   Lab 11/25/24  0531 11/25/24  0532   COLORU Yellow  --    APPEARANCEUA Cloudy*  --    PHUR 6.0  --    SPECGRAV 1.020  --    PROTEINUA 1+*  --    GLUCUA Negative  --    KETONESU Negative  --    BILIRUBINUA Negative  --    OCCULTUA 1+*  --    NITRITE Negative  --    UROBILINOGEN Negative  --    LEUKOCYTESUR 3+*  --    RBCUA  --  11*   WBCUA  --  >100*   BACTERIA  --  None   SQUAMEPITHEL  --  11   HYALINECASTS  --  0     Wound Culture: No results for input(s): "LABAERO" in the last 4320 hours.  All pertinent labs within the past 24 hours have been reviewed.    Significant Imaging: I have reviewed all pertinent imaging results/findings within the past 24 hours.  "

## 2024-11-28 NOTE — PROGRESS NOTES
Nephrology Progress Note     History of Present Illness   64 y.o. female with a hx of hypertension, GERD, morbid obesity s/p gastric bypass, depression, anxiety, esophageal stricture, and recurrent ESBL UTIs who was admitted on 11/23/2024 for UTI with sepsis.  She was in ICU for a period of time after exhibiting signs of septic shock which included hypotension unresponsive to initial fluid resuscitation.  Infectious Disease was consulted for antibiotic therapy recommendations.  She was continued on ertapenem and linezolid.  Urine cultures grew Gram-negative rods and blood cultures were positive for coagulase negative staphylococci; repeat cultures were done as initial result was likely a contaminant.  .  Admit labs showed WBC 24.6, BUN/creatinine 43/2.5, calcium 8.3, albumin 2.3.  Urinalysis showed 1+ protein, 1+ blood, positive for nitrites, greater than 100 WBCs.  Chest x-ray unremarkable.  CT head without contrast negative for any acute intracranial abnormalities.  Nephrology was consulted for ELIZABETH management.       Interval History     Overnight/currently: ***     Health Status   Allergies:    is allergic to metronidazole hcl.    Current medications:     Current Facility-Administered Medications:     acetaminophen tablet 650 mg, 650 mg, Oral, Q4H PRN, Hannah Angelo, DAMON, 650 mg at 11/27/24 1905    albuterol-ipratropium 2.5 mg-0.5 mg/3 mL nebulizer solution 3 mL, 3 mL, Nebulization, Q6H PRN, Hannah Angelo, NP    ALPRAZolam tablet 0.25 mg, 0.25 mg, Oral, Nightly PRN, Hannah Angelo, NP, 0.25 mg at 11/27/24 1905    aluminum-magnesium hydroxide-simethicone 200-200-20 mg/5 mL suspension 30 mL, 30 mL, Oral, QID PRN, Hannah Angelo, NP    ARIPiprazole tablet 10 mg, 10 mg, Oral, Daily, Hannah Angelo, NP, 10 mg at 11/27/24 0821    atorvastatin tablet 40 mg, 40 mg, Oral, Daily, Hannah Angelo, NP, 40 mg at 11/27/24 0821    dextrose 10% bolus 125 mL 125 mL, 12.5 g, Intravenous, PRN, Hannah Angelo, NP     dextrose 10% bolus 250 mL 250 mL, 25 g, Intravenous, PRN, Hannah Angelo NP    dextrose 5 % and 0.9 % NaCl with KCl 20 mEq infusion, , Intravenous, Continuous, Ugo Chavez MD    famotidine tablet 20 mg, 20 mg, Oral, Every other day, Christopher Borden MD, 20 mg at 11/26/24 0816    fluconazole tablet 100 mg, 100 mg, Oral, Daily, Carmen Go NP, 100 mg at 11/27/24 0821    folic acid tablet 1 mg, 1 mg, Oral, Daily, Alona Early NP, 1 mg at 11/27/24 0821    glucagon (human recombinant) injection 1 mg, 1 mg, Intramuscular, PRN, Hannah Angelo NP    glucose chewable tablet 16 g, 16 g, Oral, PRN, Hannah Angelo NP    glucose chewable tablet 24 g, 24 g, Oral, PRN, Hannah Angelo NP    heparin (porcine) injection 5,000 Units, 5,000 Units, Subcutaneous, Q8H, Hannah Angelo NP, 5,000 Units at 11/28/24 0555    influenza (adjuvanted) (Fluad) 45 mcg/0.5 mL IM vaccine (> or = 66 yo) 0.5 mL, 0.5 mL, Intramuscular, Prior to discharge, Sara Moreira MD    insulin aspart U-100 pen 0-5 Units, 0-5 Units, Subcutaneous, QID (AC + HS) PRN, Hannah Angelo NP    Lactobacillus acidoph-L.bulgar 1 million cell tablet 4 tablet, 4 tablet, Oral, TID WM, Rajesh Agustin MD, 4 tablet at 11/27/24 1630    loperamide capsule 2 mg, 2 mg, Oral, QID PRN, Rajesh Agustin MD    miconazole NITRATE 2 % top powder, , Topical (Top), BID, Hannah Angelo NP, Given at 11/27/24 2100    midodrine tablet 10 mg, 10 mg, Oral, TID WM, Alona Early NP, 10 mg at 11/27/24 1630    multivitamin tablet, 1 tablet, Oral, Daily, Jordi Duncan PA-C, 1 tablet at 11/27/24 0821    mupirocin 2 % ointment, , Nasal, BID, Alvaro Novak MD, Given at 11/27/24 2100    naloxone 0.4 mg/mL injection 0.02 mg, 0.02 mg, Intravenous, PRN, Hannah Angelo, NP    ondansetron injection 4 mg, 4 mg, Intravenous, Q6H PRN, Hannah Angelo, NP, 4 mg at 11/27/24 0821    potassium chloride SA CR tablet 20 mEq, 20 mEq, Oral, BID, Ugo Chavez,  "MD    prochlorperazine injection Soln 5 mg, 5 mg, Intravenous, Q6H PRN, Hannah Angelo NP, 5 mg at 11/25/24 2032    simethicone chewable tablet 80 mg, 1 tablet, Oral, QID PRN, Hannah Angelo, NP    sodium bicarbonate tablet 1,300 mg, 1,300 mg, Oral, BID, Ugo Chavez MD    sodium chloride 0.9% flush 10 mL, 10 mL, Intravenous, Q8H PRN, Hannah Angelo, DAMON    thiamine tablet 100 mg, 100 mg, Oral, Daily, Alona Early NP, 100 mg at 11/27/24 0821    vancomycin SolR 125 mg, 125 mg, Oral, Q6H, Christopher Champagne MD, FIDSA, 125 mg at 11/28/24 0555     Physical Examination   VS/Measurements   /61 (BP Location: Left arm, Patient Position: Lying)   Pulse 98   Temp 98 °F (36.7 °C) (Oral)   Resp 18   Ht 5' 3" (1.6 m)   Wt 105.2 kg (231 lb 14.8 oz)   SpO2 97%   BMI 41.08 kg/m²      General:  Alert and oriented X3, No acute distress.         Nutritional status: Obese.    Neck:  Supple, No lymphadenopathy.     Respiratory:  Lungs are clear to auscultation, Respirations are non-labored, Symmetrical chest wall expansion.    Cardiovascular:  Normal rate, Regular rhythm.   Gastrointestinal:  Soft, Non-tender, Normal bowel sounds.   Integumentary:  Warm, Dry.   Psychiatric:  Cooperative, Appropriate mood & affect.        Review / Management   Laboratory Results   Today's Lab Results :    Recent Results (from the past 24 hours)   Procalcitonin    Collection Time: 11/27/24  7:37 AM   Result Value Ref Range    Procalcitonin 4.95 (H) <0.25 ng/mL   CK    Collection Time: 11/27/24  7:37 AM   Result Value Ref Range    CPK 25 20 - 180 U/L   Sodium, urine, random    Collection Time: 11/27/24  8:04 AM   Result Value Ref Range    Sodium, Urine <20 (A) 20 - 250 mmol/L   Creatinine, urine, random    Collection Time: 11/27/24  8:04 AM   Result Value Ref Range    Creatinine, Urine 126.6 15.0 - 325.0 mg/dL   Osmolality, urine    Collection Time: 11/27/24  8:04 AM   Result Value Ref Range    Osmolality, Urine 336 50 - 1200 " mOsm/kg   POCT glucose    Collection Time: 11/27/24 11:51 AM   Result Value Ref Range    POCT Glucose 117 (H) 70 - 110 mg/dL   POCT glucose    Collection Time: 11/27/24  4:27 PM   Result Value Ref Range    POCT Glucose 130 (H) 70 - 110 mg/dL   POCT glucose    Collection Time: 11/27/24  9:54 PM   Result Value Ref Range    POCT Glucose 115 (H) 70 - 110 mg/dL   CBC with Automated Differential    Collection Time: 11/28/24  3:01 AM   Result Value Ref Range    WBC 4.07 3.90 - 12.70 K/uL    RBC 3.31 (L) 4.00 - 5.40 M/uL    Hemoglobin 9.5 (L) 12.0 - 16.0 g/dL    Hematocrit 28.6 (L) 37.0 - 48.5 %    MCV 86 82 - 98 fL    MCH 28.7 27.0 - 31.0 pg    MCHC 33.2 32.0 - 36.0 g/dL    RDW 15.7 (H) 11.5 - 14.5 %    Platelets 129 (L) 150 - 450 K/uL    MPV 9.5 9.2 - 12.9 fL    Immature Granulocytes CANCELED 0.0 - 0.5 %    Immature Grans (Abs) CANCELED 0.00 - 0.04 K/uL    Lymph # CANCELED 1.0 - 4.8 K/uL    Mono # CANCELED 0.3 - 1.0 K/uL    Eos # CANCELED 0.0 - 0.5 K/uL    Baso # CANCELED 0.00 - 0.20 K/uL    nRBC 1 (A) 0 /100 WBC    Gran % 38.0 38.0 - 73.0 %    Lymph % 39.0 18.0 - 48.0 %    Mono % 12.0 4.0 - 15.0 %    Eosinophil % 9.0 (H) 0.0 - 8.0 %    Basophil % 0.0 0.0 - 1.9 %    Bands 2.0 %    Platelet Estimate Appears normal     Aniso Slight     Poly Occasional     Hypo Occasional     Target Cells Occasional     Stomatocytes Present     Basophilic Stippling Occasional     Toxic Granulation Present     Differential Method Manual    Comprehensive Metabolic Panel (CMP)    Collection Time: 11/28/24  3:01 AM   Result Value Ref Range    Sodium 133 (L) 136 - 145 mmol/L    Potassium 3.1 (L) 3.5 - 5.1 mmol/L    Chloride 99 95 - 110 mmol/L    CO2 22 (L) 23 - 29 mmol/L    Glucose 86 70 - 110 mg/dL    BUN 58 (H) 8 - 23 mg/dL    Creatinine 2.6 (H) 0.5 - 1.4 mg/dL    Calcium 6.6 (LL) 8.7 - 10.5 mg/dL    Total Protein 4.2 (L) 6.0 - 8.4 g/dL    Albumin 1.4 (L) 3.5 - 5.2 g/dL    Total Bilirubin 0.3 0.1 - 1.0 mg/dL    Alkaline Phosphatase 104 40 - 150  U/L    AST 14 10 - 40 U/L    ALT 6 (L) 10 - 44 U/L    eGFR 20 (A) >60 mL/min/1.73 m^2    Anion Gap 12 8 - 16 mmol/L   Magnesium    Collection Time: 11/28/24  3:01 AM   Result Value Ref Range    Magnesium 2.2 1.6 - 2.6 mg/dL   Phosphorus    Collection Time: 11/28/24  3:01 AM   Result Value Ref Range    Phosphorus 3.2 2.7 - 4.5 mg/dL   POCT glucose    Collection Time: 11/28/24  4:52 AM   Result Value Ref Range    POCT Glucose 72 70 - 110 mg/dL        Impression and Plan   Diagnosis     ***    ______________________________________________  Ugo Chavez MD    This document was created using voice recognition software.  It is possible that there are errors which have persisted after original proofreading.  If there is a question regarding contents of this document please contact me for clarification.

## 2024-11-28 NOTE — ASSESSMENT & PLAN NOTE
>>ASSESSMENT AND PLAN FOR DIARRHEA WRITTEN ON 11/28/2024  7:09 AM BY KIRTI CLAROS MD, FIDSA    Will follow stool assay- rule out c difficle    11/27- discussed with lab and stool send on 11/23  The stool was sent 11/23 when she was admitted- this is important to note if it becomes C difficile positive .  Add Po Vanco as C difficle is suspected

## 2024-11-28 NOTE — ASSESSMENT & PLAN NOTE
-Hx ESBL, frequent UTIs, significant resistances (prior cultures at [facility] through [facility], prior cultures as well in our system)  -Consult ID, appreciate reccs  -Currently on ertapenem and linezolid which should be sufficient to cover her  -Unfortunately the urine culture is marked as canceled, unsure of the reason --> called micro lab in [place], quantity was not sufficient for urine culture  -Ordering another UA reflex to culture although will have low sensitivity at this point  -Blood cultures pending    11/26/2024  Urine cultures growing gram negative spp  ID following, appreciate recs  Continue ertapenem, monitor fever curve  Follow up culture susceptibilities    11/28/2024  Procalcitonin rising. Urine cultures confirm ESBL E. Coli. Antibiotics narrowed by ID.   Continue to monitor   Appreciate ID recs  disposition: SNF once insurance authorization obtained

## 2024-11-28 NOTE — PLAN OF CARE
Problem: Adult Inpatient Plan of Care  Goal: Plan of Care Review  Outcome: Progressing  Goal: Patient-Specific Goal (Individualized)  Outcome: Progressing  Goal: Absence of Hospital-Acquired Illness or Injury  Outcome: Progressing  Goal: Optimal Comfort and Wellbeing  Outcome: Progressing  Goal: Readiness for Transition of Care  Outcome: Progressing     Problem: Bariatric Environmental Safety  Goal: Safety Maintained with Care  Outcome: Progressing     Problem: Infection  Goal: Absence of Infection Signs and Symptoms  Outcome: Progressing     Problem: Sepsis/Septic Shock  Goal: Optimal Coping  Outcome: Progressing  Goal: Absence of Bleeding  Outcome: Progressing  Goal: Blood Glucose Level Within Targeted Range  Outcome: Progressing  Goal: Absence of Infection Signs and Symptoms  Outcome: Progressing  Goal: Optimal Nutrition Intake  Outcome: Progressing     Problem: Acute Kidney Injury/Impairment  Goal: Fluid and Electrolyte Balance  Outcome: Progressing  Goal: Improved Oral Intake  Outcome: Progressing  Goal: Effective Renal Function  Outcome: Progressing     Problem: Wound  Goal: Optimal Coping  Outcome: Progressing  Goal: Optimal Functional Ability  Outcome: Progressing  Goal: Absence of Infection Signs and Symptoms  Outcome: Progressing  Goal: Improved Oral Intake  Outcome: Progressing  Goal: Optimal Pain Control and Function  Outcome: Progressing  Goal: Skin Health and Integrity  Outcome: Progressing  Goal: Optimal Wound Healing  Outcome: Progressing     Problem: Skin Injury Risk Increased  Goal: Skin Health and Integrity  Outcome: Progressing     Problem: Fall Injury Risk  Goal: Absence of Fall and Fall-Related Injury  Outcome: Progressing

## 2024-11-29 LAB
ALBUMIN SERPL BCP-MCNC: 1.5 G/DL (ref 3.5–5.2)
ALP SERPL-CCNC: 115 U/L (ref 40–150)
ALT SERPL W/O P-5'-P-CCNC: 7 U/L (ref 10–44)
ANION GAP SERPL CALC-SCNC: 10 MMOL/L (ref 8–16)
ANION GAP SERPL CALC-SCNC: 7 MMOL/L (ref 8–16)
ANISOCYTOSIS BLD QL SMEAR: SLIGHT
AST SERPL-CCNC: 15 U/L (ref 10–40)
BACTERIA BLD CULT: NORMAL
BASO STIPL BLD QL SMEAR: ABNORMAL
BASOPHILS NFR BLD: 0 % (ref 0–1.9)
BILIRUB SERPL-MCNC: 0.3 MG/DL (ref 0.1–1)
BUN SERPL-MCNC: 51 MG/DL (ref 8–23)
BUN SERPL-MCNC: 54 MG/DL (ref 8–23)
CALCIUM SERPL-MCNC: 6.7 MG/DL (ref 8.7–10.5)
CALCIUM SERPL-MCNC: 7.1 MG/DL (ref 8.7–10.5)
CHLORIDE SERPL-SCNC: 104 MMOL/L (ref 95–110)
CHLORIDE SERPL-SCNC: 107 MMOL/L (ref 95–110)
CHLORIDE SERPL-SCNC: 107 MMOL/L (ref 95–110)
CHLORIDE SERPL-SCNC: 109 MMOL/L (ref 95–110)
CO2 SERPL-SCNC: 20 MMOL/L (ref 23–29)
CO2 SERPL-SCNC: 21 MMOL/L (ref 23–29)
CREAT SERPL-MCNC: 1.9 MG/DL (ref 0.5–1.4)
CREAT SERPL-MCNC: 2 MG/DL (ref 0.5–1.4)
DIFFERENTIAL METHOD BLD: ABNORMAL
EOSINOPHIL NFR BLD: 4 % (ref 0–8)
ERYTHROCYTE [DISTWIDTH] IN BLOOD BY AUTOMATED COUNT: 16.3 % (ref 11.5–14.5)
EST. GFR  (NO RACE VARIABLE): 27 ML/MIN/1.73 M^2
EST. GFR  (NO RACE VARIABLE): 29 ML/MIN/1.73 M^2
GLUCOSE SERPL-MCNC: 150 MG/DL (ref 70–110)
GLUCOSE SERPL-MCNC: 391 MG/DL (ref 70–110)
GLUCOSE SERPL-MCNC: 391 MG/DL (ref 70–110)
GLUCOSE SERPL-MCNC: 395 MG/DL (ref 70–110)
HCT VFR BLD AUTO: 30.5 % (ref 37–48.5)
HGB BLD-MCNC: 10 G/DL (ref 12–16)
HYPOCHROMIA BLD QL SMEAR: ABNORMAL
IMM GRANULOCYTES # BLD AUTO: ABNORMAL K/UL (ref 0–0.04)
IMM GRANULOCYTES NFR BLD AUTO: ABNORMAL % (ref 0–0.5)
LYMPHOCYTES NFR BLD: 25 % (ref 18–48)
MAGNESIUM SERPL-MCNC: 1.9 MG/DL (ref 1.6–2.6)
MAGNESIUM SERPL-MCNC: 2 MG/DL (ref 1.6–2.6)
MCH RBC QN AUTO: 29 PG (ref 27–31)
MCHC RBC AUTO-ENTMCNC: 32.8 G/DL (ref 32–36)
MCV RBC AUTO: 88 FL (ref 82–98)
MONOCYTES NFR BLD: 16 % (ref 4–15)
MYELOCYTES NFR BLD MANUAL: 1 %
NEUTROPHILS NFR BLD: 49 % (ref 38–73)
NEUTS BAND NFR BLD MANUAL: 5 %
NRBC BLD-RTO: 0 /100 WBC
O+P STL MICRO: NORMAL
PHOSPHATE SERPL-MCNC: 3.1 MG/DL (ref 2.7–4.5)
PLATELET # BLD AUTO: 127 K/UL (ref 150–450)
PLATELET BLD QL SMEAR: ABNORMAL
PMV BLD AUTO: 10.5 FL (ref 9.2–12.9)
POCT GLUCOSE: 127 MG/DL (ref 70–110)
POCT GLUCOSE: 127 MG/DL (ref 70–110)
POCT GLUCOSE: 159 MG/DL (ref 70–110)
POCT GLUCOSE: 185 MG/DL (ref 70–110)
POLYCHROMASIA BLD QL SMEAR: ABNORMAL
POTASSIUM SERPL-SCNC: 5 MMOL/L (ref 3.5–5.1)
POTASSIUM SERPL-SCNC: 5.9 MMOL/L (ref 3.5–5.1)
PROCALCITONIN SERPL IA-MCNC: 3.35 NG/ML
PROT SERPL-MCNC: 4.5 G/DL (ref 6–8.4)
RBC # BLD AUTO: 3.45 M/UL (ref 4–5.4)
SODIUM SERPL-SCNC: 134 MMOL/L (ref 136–145)
SODIUM SERPL-SCNC: 135 MMOL/L (ref 136–145)
SODIUM SERPL-SCNC: 135 MMOL/L (ref 136–145)
SODIUM SERPL-SCNC: 137 MMOL/L (ref 136–145)
STOMATOCYTES BLD QL SMEAR: PRESENT
TARGETS BLD QL SMEAR: ABNORMAL
WBC # BLD AUTO: 4.91 K/UL (ref 3.9–12.7)

## 2024-11-29 PROCEDURE — 84100 ASSAY OF PHOSPHORUS: CPT

## 2024-11-29 PROCEDURE — 25000003 PHARM REV CODE 250

## 2024-11-29 PROCEDURE — 80053 COMPREHEN METABOLIC PANEL: CPT

## 2024-11-29 PROCEDURE — 80069 RENAL FUNCTION PANEL: CPT | Performed by: INTERNAL MEDICINE

## 2024-11-29 PROCEDURE — 25500020 PHARM REV CODE 255: Performed by: STUDENT IN AN ORGANIZED HEALTH CARE EDUCATION/TRAINING PROGRAM

## 2024-11-29 PROCEDURE — 83735 ASSAY OF MAGNESIUM: CPT | Performed by: INTERNAL MEDICINE

## 2024-11-29 PROCEDURE — 21400001 HC TELEMETRY ROOM

## 2024-11-29 PROCEDURE — 97530 THERAPEUTIC ACTIVITIES: CPT | Mod: CQ

## 2024-11-29 PROCEDURE — 25000003 PHARM REV CODE 250: Performed by: STUDENT IN AN ORGANIZED HEALTH CARE EDUCATION/TRAINING PROGRAM

## 2024-11-29 PROCEDURE — 63600175 PHARM REV CODE 636 W HCPCS: Performed by: NURSE PRACTITIONER

## 2024-11-29 PROCEDURE — 25000003 PHARM REV CODE 250: Performed by: INTERNAL MEDICINE

## 2024-11-29 PROCEDURE — 84145 PROCALCITONIN (PCT): CPT | Performed by: STUDENT IN AN ORGANIZED HEALTH CARE EDUCATION/TRAINING PROGRAM

## 2024-11-29 PROCEDURE — 85027 COMPLETE CBC AUTOMATED: CPT

## 2024-11-29 PROCEDURE — 25000003 PHARM REV CODE 250: Performed by: NURSE PRACTITIONER

## 2024-11-29 PROCEDURE — 99233 SBSQ HOSP IP/OBS HIGH 50: CPT | Mod: NSCH,,, | Performed by: INTERNAL MEDICINE

## 2024-11-29 PROCEDURE — 80048 BASIC METABOLIC PNL TOTAL CA: CPT | Mod: XB | Performed by: HOSPITALIST

## 2024-11-29 PROCEDURE — 85007 BL SMEAR W/DIFF WBC COUNT: CPT

## 2024-11-29 PROCEDURE — 83735 ASSAY OF MAGNESIUM: CPT | Mod: 91

## 2024-11-29 PROCEDURE — 99232 SBSQ HOSP IP/OBS MODERATE 35: CPT | Mod: ,,, | Performed by: INTERNAL MEDICINE

## 2024-11-29 PROCEDURE — 27000207 HC ISOLATION

## 2024-11-29 PROCEDURE — 97530 THERAPEUTIC ACTIVITIES: CPT

## 2024-11-29 RX ORDER — FAMOTIDINE 20 MG/1
20 TABLET, FILM COATED ORAL DAILY
Status: DISCONTINUED | OUTPATIENT
Start: 2024-11-29 | End: 2024-12-01

## 2024-11-29 RX ADMIN — IOHEXOL 50 ML: 350 INJECTION, SOLUTION INTRAVENOUS at 04:11

## 2024-11-29 RX ADMIN — Medication 4 TABLET: at 12:11

## 2024-11-29 RX ADMIN — Medication 100 MG: at 09:11

## 2024-11-29 RX ADMIN — MIDODRINE HYDROCHLORIDE 10 MG: 5 TABLET ORAL at 12:11

## 2024-11-29 RX ADMIN — HEPARIN SODIUM 5000 UNITS: 5000 INJECTION INTRAVENOUS; SUBCUTANEOUS at 08:11

## 2024-11-29 RX ADMIN — VANCOMYCIN HYDROCHLORIDE 125 MG: KIT at 12:11

## 2024-11-29 RX ADMIN — FOLIC ACID 1 MG: 1 TABLET ORAL at 09:11

## 2024-11-29 RX ADMIN — Medication 4 TABLET: at 05:11

## 2024-11-29 RX ADMIN — ATORVASTATIN CALCIUM 40 MG: 40 TABLET, FILM COATED ORAL at 09:11

## 2024-11-29 RX ADMIN — MUPIROCIN: 20 OINTMENT TOPICAL at 09:11

## 2024-11-29 RX ADMIN — MICONAZOLE NITRATE: 20 POWDER TOPICAL at 09:11

## 2024-11-29 RX ADMIN — FAMOTIDINE 20 MG: 20 TABLET ORAL at 09:11

## 2024-11-29 RX ADMIN — MIDODRINE HYDROCHLORIDE 10 MG: 5 TABLET ORAL at 05:11

## 2024-11-29 RX ADMIN — HEPARIN SODIUM 5000 UNITS: 5000 INJECTION INTRAVENOUS; SUBCUTANEOUS at 05:11

## 2024-11-29 RX ADMIN — MIDODRINE HYDROCHLORIDE 10 MG: 5 TABLET ORAL at 09:11

## 2024-11-29 RX ADMIN — ALPRAZOLAM 0.25 MG: 0.25 TABLET ORAL at 08:11

## 2024-11-29 RX ADMIN — THERA TABS 1 TABLET: TAB at 09:11

## 2024-11-29 RX ADMIN — SODIUM BICARBONATE 1300 MG: 650 TABLET ORAL at 09:11

## 2024-11-29 RX ADMIN — ARIPIPRAZOLE 10 MG: 5 TABLET ORAL at 09:11

## 2024-11-29 RX ADMIN — VANCOMYCIN HYDROCHLORIDE 125 MG: KIT at 05:11

## 2024-11-29 RX ADMIN — SODIUM BICARBONATE 1300 MG: 650 TABLET ORAL at 08:11

## 2024-11-29 RX ADMIN — Medication 4 TABLET: at 09:11

## 2024-11-29 NOTE — PROGRESS NOTES
[facility] - Intensive Care [facility] Medicine  Progress Note    Patient Name: Divya Bui  MRN: [id number]  Patient Class: IP- Inpatient   Admission Date: 11/23/2024  Length of Stay: 6 days  Attending Physician: Rajesh Agustin MD  Primary Care Provider: Angel Khan MD        Subjective:     Principal Problem:Sepsis        HPI:  Divya Bui, a 64-year-old female with a PMHx of HTN, GERD, morbid obesity s/p gastric bypass, depression, anxiety, esophageal stricture, and recurrent ESBL UTIs, was admitted on 11/23 for a UTI with sepsis. She was transferred to the ICU after exhibiting signs of septic shock, including recurrent hypotension unresponsive to initial fluid resuscitation. Upon admission, she presented with acute cystitis. ID was consulted by  for antibiotic therapy recommendations. She was continued on ertapenem (Invanz) and linezolid (Zyvox). Urine cultures grew GNRs, and blood cultures were positive for coagulase-negative staphylococci; repeat cultures were pending as the initial result was likely a contaminant. She showed evidence of sepsis with organ dysfunction indicated by acute kidney injury and encephalopathy. A fluid challenge was provided in the ED on 11/23. Her latest lactate level on 11/25 was 1.2. Despite marginal improvement with Midodrine and albumin, she required initiation of levophed overnight, but this was weaned off on 11/26. After stabilization, she was stepped down to telemetry, and HM was re-consulted to assume care.    Overview/Hospital Course:  11/24:  Remains lethargic and intermittently confused, also hypotensive today to lowest of 73/39 taken on the right upper arm by the tech.  On my recheck with smaller size cuff she was 81/46, and then was 118/59 on her calf.  I am not sure about whether the low blood pressures are entirely accurate.  She continues to mentate the same.  Heart rates in the low 100s/110s.  Discussed with ICU and checked labs.  No evidence of  "bleeding.  Lactate is normal.  Have given 3 L of fluid  today.  Blood pressure this evening up to 92/51.   11/25/2024: transferred to ICU for pressor therapy. Levophed initiated in the evening. ID consulted for antibiotic recs.  11/26/2024: levophed weaned off this morning; stabilized thereafter. Deemed stable for transfer back to to telemetry.     11/27/2024  ELIZABETH steadily worsening. Restart IVFs. Consulted nephrology for guidance. Cultures pending. Still having multiple loose bowel movements. C. Diff ordered 11/23/2024 but still says "in process" in EPIC. Am currently on hold with Micro lab to inquire about results of study. disposition: [facility] once medically stable    11/28/2024  Mentation improved this morning. Procalcitonin rising. Discussed with micro lab in [place]. C.diff from 11/23/2024 confirmed to be positive. ID started PO Vanc Q6H. Nephrology evaluated. Added bicarb to IVFs. Labs this morning show improvement in renal function for the first time since admission. Appreciate Nephrology's recommendations/interventions. Urine cultures confirm ESBL E. Coli. Antibiotics narrowed by ID. disposition: [facility] once medically stable    11/29/2024  Labs steadily improving. Discussed case with Nephrology. There is some concern for vesicolic fistula given recurrent UTIs. Discussed with ID. Will order CT abdomen/pelvis with rectal contrast to further investigate.    Interval history:  More confused this morning.     Objective:   BP (!) 139/55 (BP Location: Left arm, Patient Position: Lying)   Pulse 101   Temp 98.8 °F (37.1 °C) (Oral)   Resp 18   Ht 5' 3" (1.6 m)   Wt 105.2 kg (231 lb 14.8 oz)   SpO2 98%   BMI 41.08 kg/m²     Intake/Output Summary (Last 24 hours) at 11/29/2024 0933  Last data filed at 11/28/2024 1700  Gross per 24 hour   Intake 200 ml   Output 276 ml   Net -76 ml       PHYSICAL EXAM  Vitals reviewed  Constitutional:       General: She is not in acute distress.     Appearance: She is obese. She is " "ill-appearing.   HENT:      Head: Normocephalic.      Nose: Nose normal.      Mouth/Throat:      Mouth: Mucous membranes are moist.   Eyes:      Pupils: Pupils are equal, round, and reactive to light.   Cardiovascular:      Rate and Rhythm: Normal rate and regular rhythm.      Pulses: Normal pulses.      Heart sounds: Normal heart sounds.   Pulmonary:      Effort: Pulmonary effort is normal.      Breath sounds: Normal breath sounds.   Abdominal:      General: Bowel sounds are normal. There is no distension.      Palpations: Abdomen is soft.      Tenderness: There is no abdominal tenderness.   Musculoskeletal:         General: No swelling.   Skin:     General: Skin is warm and dry.   Neurological:      Mental Status: She is alert. She is disoriented.      Motor: Weakness present.      Comments: Oriented to self, not time, place, situation.     LABS  All labs from the past 24 hours were reviewed.     BMP:   Recent Labs   Lab 11/29/24  0549   *   *   K 5.0      CO2 20*   BUN 54*   CREATININE 2.0*   CALCIUM 7.1*   MG 2.0     CBC:   Recent Labs   Lab 11/28/24  0301 11/29/24  0437   WBC 4.07 4.91   HGB 9.5* 10.0*   HCT 28.6* 30.5*   * 127*     CMP:   Recent Labs   Lab 11/28/24  0301 11/29/24  0437 11/29/24  0549   * 137  135*  135* 134*   K 3.1* 5.9*  5.9*  5.9* 5.0   CL 99 109  107  107 104   CO2 22* 21*  21*  21* 20*   GLU 86 395*  391*  391* 150*   BUN 58* 51*  51*  51* 54*   CREATININE 2.6* 1.9*  1.9*  1.9* 2.0*   CALCIUM 6.6* 6.7*  6.7*  6.7* 7.1*   PROT 4.2* 4.5*  --    ALBUMIN 1.4* 1.5*  1.5*  1.5*  --    BILITOT 0.3 0.3  --    ALKPHOS 104 115  --    AST 14 15  --    ALT 6* 7*  --    ANIONGAP 12 7*  7*  7* 10     Cardiac Markers:   No results for input(s): "CKMB", "MYOGLOBIN", "BNP", "TROPISTAT" in the last 48 hours.    Coagulation:   No results for input(s): "PT", "INR", "APTT" in the last 48 hours.    Lactic Acid:   No results for input(s): "LACTATE" in the last " "48 hours.    Magnesium:   Recent Labs   Lab 11/28/24  0301 11/29/24  0437 11/29/24  0549   MG 2.2 1.9 2.0     Troponin:   No results for input(s): "TROPONINI", "TROPONINIHS" in the last 48 hours.    TSH:   Recent Labs   Lab 11/23/24  1506   TSH 0.831     Urine Studies:   No results for input(s): "COLORU", "APPEARANCEUA", "PHUR", "SPECGRAV", "PROTEINUA", "GLUCUA", "KETONESU", "BILIRUBINUA", "OCCULTUA", "NITRITE", "UROBILINOGEN", "LEUKOCYTESUR", "RBCUA", "WBCUA", "BACTERIA", "SQUAMEPITHEL", "HYALINECASTS" in the last 48 hours.    Invalid input(s): "WRIGHTSUR"      IMAGING  All imaging from the past 24 hours were reviewed.     Imaging Results              CT Head Without Contrast (Final result)  Result time 11/23/24 14:05:51      Final result by Lucio Romero MD (11/23/24 14:05:51)                   Impression:      No acute intracranial CT abnormality.    All CT scans at this facility are performed  using dose modulation techniques as appropriate to performed exam including the following:  automated exposure control; adjustment of mA and/or kV according to the patients size (this includes techniques or standardized protocols for targeted exams where dose is matched to indication/reason for exam: i.e. extremities or head);  iterative reconstruction technique.      Electronically signed by: Lucio Romero  Date:    11/23/2024  Time:    14:05               Narrative:    EXAMINATION:  CT HEAD WITHOUT CONTRAST    CLINICAL HISTORY:  Mental status change, unknown cause;    TECHNIQUE:  Low dose axial CT images obtained throughout the head without intravenous contrast. Sagittal and coronal reconstructions were performed.    COMPARISON:  Multiple prior reports    FINDINGS:  Intracranial compartment:    Ventricles and sulci are normal in size for age without evidence of hydrocephalus. No extra-axial blood or fluid collections.    The brain parenchyma appears normal. No parenchymal mass, hemorrhage, edema or major vascular " distribution infarct.    Skull/extracranial contents (limited evaluation): No fracture. Mastoid air cells and paranasal sinuses are essentially clear.                                       X-Ray Chest AP Portable (Final result)  Result time 11/23/24 12:10:43      Final result by Lucio Romero MD (11/23/24 12:10:43)                   Impression:      No acute abnormality.      Electronically signed by: Lucio oRmero  Date:    11/23/2024  Time:    12:10               Narrative:    EXAMINATION:  XR CHEST AP PORTABLE    CLINICAL HISTORY:  Sepsis;    TECHNIQUE:  Single frontal view of the chest was performed.    COMPARISON:  Multiple    FINDINGS:  The lungs are clear, with normal appearance of pulmonary vasculature and no pleural effusion or pneumothorax.    The cardiac silhouette is normal in size. The hilar and mediastinal contours are unremarkable.    Bones are intact.                                        Assessment/Plan:      * Sepsis  --leukocytosis resolved, normotensive, afebrile   --Blood Cultures (11/23)- growing  COAGULASE-NEGATIVE STAPHYLOCOCCUS- suspect contaminant  --ID following, continued IV ertapenem for cystitis- appreciate recs  --repeat cultures ordered on 11/26/2024, follow up results  --Organ dysfunction indicated by Acute kidney injury and Encephalopathy     11/29/2024  Improving  See acute cystitis and C. diff    Acute cystitis  -Hx ESBL, frequent UTIs, significant resistances (prior cultures at [facility], prior cultures as well in our system)  -Consult ID, appreciate reccs  -Currently on ertapenem and linezolid which should be sufficient to cover her  -Unfortunately the urine culture is marked as canceled, unsure of the reason --> called micro lab in [place], quantity was not sufficient for urine culture  -Ordering another UA reflex to culture although will have low sensitivity at this point  -Blood cultures pending    11/26/2024  Urine cultures growing gram  negative spp  ID following, appreciate recs  Continue ertapenem, monitor fever curve  Follow up culture susceptibilities    11/28/2024  Procalcitonin rising. Urine cultures confirm ESBL E. Coli. Antibiotics narrowed by ID.   Continue to monitor   Appreciate ID recs  disposition: SNF once medically stable    11/29/2024  E. Coli < 10k, antibiotics discontinued by ID  Monitor clinically  CT with rectal contrast to assess for fistula    C. Diff positive diarrhea  11/28/2024  Procalcitonin rising.   Discussed with micro lab in [place]. C.diff from 11/23/2024 confirmed to be positive.   ID started PO Vanc Q6H.     11/29/2024  Procalcitonin downtrending since starting treatment for C. diff    Encephalopathy, metabolic  --improving  --monitor clinically    11/29/2024  Recurred  CT head to rule out indolent stroke  Suspect hospital delirium/sundowning to be contributing  Reassess in the afternoon      Candidal intertrigo and dermatitis  Severe erythema to breasts, pannus, groin, and buttock with some skin breakdown   Appears to be candida overgrowth   IV Diflucan     11/29/2024  Transitioned to PO fluconazole    ELIZABETH (acute kidney injury)  Metabolic acidosis  11/29/2024  Nephrology evaluated  Bicarb added back to IVFs  Renal function improved on this morning's labs    Recurrent major depressive disorder, in partial remission  Patient has persistent depression which is mild and is currently controlled. Will Continue anti-depressant medications. We will not consult psychiatry at this time. Patient does not display psychosis at this time. Continue to monitor closely and adjust plan of care as needed.    Cont Abilify and Xanax prn   Hold Doxepin for now while on Zyvox     Chronic pain disorder  Holding home Lyrica and Norco given lethargy in the setting of sepsis    Morbid obesity with BMI of 40.0-44.9, adult  Body mass index is 38.97 kg/m². Morbid obesity complicates all aspects of disease management from diagnostic  modalities to treatment. Weight loss encouraged and health benefits explained to patient.     HTN (hypertension)  Patients blood pressure range in the last 24 hours was: BP  Min: 73/39  Max: 155/74.The patient's inpatient anti-hypertensive regimen is listed below         VTE Risk Mitigation (From admission, onward)           Ordered     heparin (porcine) injection 5,000 Units  Every 8 hours         11/23/24 1441     IP VTE HIGH RISK PATIENT  Once         11/23/24 1441     Place sequential compression device  Until discontinued         11/23/24 1441                    Discharge Planning   ELADIA:      Code Status: Full Code   Is the patient medically ready for discharge?:     Reason for patient still in hospital (select all that apply): Patient trending condition, Laboratory test, Treatment, and Consult recommendations  Discharge Plan A: Home Health                Rajesh Agustin MD  Department of Hospital Medicine   [facility] - Intensive Care ([facility])

## 2024-11-29 NOTE — PT/OT/SLP PROGRESS
Occupational Therapy   Treatment    Name: Divya Bui  MRN: [id number]  Admitting Diagnosis:  Sepsis       Recommendations:     Discharge Recommendations: Moderate Intensity Therapy  Discharge Equipment Recommendations:  to be determined by next level of care  Barriers to discharge:  None    Assessment:     Divya Bui is a 64 y.o. female with a medical diagnosis of Sepsis.  She presents with the following performance deficits affecting function are weakness, impaired endurance, impaired functional mobility, impaired self care skills, gait instability, impaired balance, impaired cognition, decreased coordination, decreased upper extremity function, decreased lower extremity function, pain, decreased ROM, impaired coordination, impaired fine motor, impaired skin, impaired cardiopulmonary response to activity.     Rehab Prognosis:  Fair; patient would benefit from acute skilled OT services to address these deficits and reach maximum level of function.       Plan:     Patient to be seen 2 x/week to address the above listed problems via self-care/home management, therapeutic activities, therapeutic exercises  Plan of Care Expires: 12/08/24  Plan of Care Reviewed with: patient, son    Subjective     Chief Complaint: Pt reports she really did not want to do this today.  Patient/Family Comments/goals: Son stated she had more strength today  Pain/Comfort:  Pain Rating 1:  (no non verbal indicators of pain)    Objective:     Communicated with: Maryjane prior to session.  Patient found supine with peripheral IV, telemetry, bed alarm upon OT entry to room.    General Precautions: Standard, fall, special contact    Orthopedic Precautions:N/A  Braces: N/A  Respiratory Status: Room air     Occupational Performance:     Bed Mobility:    Patient completed Rolling/Turning to Left with  moderate assistance  Patient completed Rolling/Turning to Right with moderate assistance  Patient completed Scooting/Bridging with maximal assistance  and 2 persons  Patient completed Supine to Sit with maximal assistance and 2 persons  Patient completed Sit to Supine with maximal assistance and 2 persons  EOB static sitting 10 min, Initially required min assist to maintain static sitting balance due to posterior instability. Progressed to contact guard with cueing and repositioning.     Functional Mobility/Transfers:  Patient completed Sit <> Stand Transfer with maximal assistance and of 2 persons  with  no assistive device .  Completed sit to stand x2 from EOB. Able to sustain stand approx. 1 min each trial w/ min assist 2  On second trial, patient able to complete side stepping to HOB w/ mod assist    Activities of Daily Living:  Grooming: minimum assistance Completed grooming with mod assist, min assist with brushing back of hair. (I) face washing      [facility] 6 Click ADL: 9    Treatment & Education:  Encourage to complete ROM, educated on need to call for all transfers    Patient left with bed in chair position with all lines intact, call button in reach, and bed alarm on    GOALS:   Multidisciplinary Problems       Occupational Therapy Goals          Problem: Occupational Therapy    Goal Priority Disciplines Outcome Interventions   Occupational Therapy Goal     OT, PT/OT Progressing    Description: O.T. GOAL TO BE MET BY 12-08-24  PT WILL TOLERATE 1 SET X 12 REPS B UE ROM EXERCISE  SBA WITH UE DRESSING  SBA WITH BSC TRANSFER                       Time Tracking:     OT Date of Treatment: 11/29/24  OT Start Time: 1045  OT Stop Time: 1115  OT Total Time (min): 30 min    Billable Minutes:Therapeutic Activity 30    OT/KIKE: KIKE     Number of KIKE visits since last OT visit: 1    MAHESH Kaminski  11/29/2024    A client care conference was performed between the LOTR and MAHESH, prior to treatment by ARAGON, to discuss the patient's status, treatment plan and established goals.

## 2024-11-29 NOTE — PROGRESS NOTES
Pharmacist Renal Dose Adjustment Note    Divya Bui is a 64 y.o. female being treated with the medication famotidine.    Patient Data:    Vital Signs (Most Recent):  Temp: 97.7 °F (36.5 °C) (11/29/24 0021)  Pulse: 110 (11/29/24 0400)  Resp: 18 (11/29/24 0021)  BP: (!) 108/57 (11/28/24 2001)  SpO2: 98 % (11/29/24 0021) Vital Signs (72h Range):  Temp:  [97 °F (36.1 °C)-99 °F (37.2 °C)]   Pulse:  []   Resp:  [17-45]   BP: ()/(46-75)   SpO2:  [90 %-100 %]      Recent Labs   Lab 11/27/24  0523 11/28/24  0301 11/29/24  0437   CREATININE 3.0* 2.6* 1.9*  1.9*     Serum creatinine: 1.9 mg/dL (H) 11/29/24 0437  Estimated creatinine clearance: 33.8 mL/min (A)    Famotidine 20 mg PO every other day will be changed to famotidine 20 mg PO once daily for CrCl 30-59 ml/min.    Pharmacist's Name: Gabriel Haskins  Pharmacist's Extension: [phone]

## 2024-11-29 NOTE — PROGRESS NOTES
Nephrology Progress Note     History of Present Illness   64 y.o. female with a hx of hypertension, GERD, morbid obesity s/p gastric bypass, depression, anxiety, esophageal stricture, and recurrent ESBL UTIs who was admitted on 11/23/2024 for UTI with sepsis.  She was in ICU for a period of time after exhibiting signs of septic shock which included hypotension unresponsive to initial fluid resuscitation.  Infectious Disease was consulted for antibiotic therapy recommendations.  She was continued on ertapenem and linezolid.  Urine cultures grew Gram-negative rods and blood cultures were positive for coagulase negative staphylococci; repeat cultures were done as initial result was likely a contaminant.  She also showed evidence of sepsis indicated by ELIZABETH and encephalopathy.   Admit labs showed WBC 24.6, BUN/creatinine 43/2.5, calcium 8.3, albumin 2.3. Urinalysis showed 1+ protein, 1+ blood, positive for nitrites, greater than 100 WBCs. Chest x-ray unremarkable. CT head without contrast negative for any acute intracranial abnormalities. Nephrology was consulted for ELIZABETH management.  Patient was placed on volume resuscitation with IV fluids adjustment of medications to avoid nephrotoxic agents.  Because of recurrent UTI multiple antibiotics concern of vesicocolic fistula was raised.  Subsequently diagnosed with C diff colitis, fungal skin infections and complicated urinary tract infection      Interval History     Overnight/currently:  Lying in bed turned to the side no distress claims she is feeling better.  Remains incontinent diarrhea better according to nurses BUN creatinine stable 54 and 2 potassium 5 improved.  Afebrile.  We will stop IV fluids encourage p.o. intake of at least 2 L.  Long-term prognosis guarded with recurrent UTI treated with antibiotics now C diff colitis secondary fungal infections bed-bound status with poor weight obesity.  Discussed with hospitalist.  We will need follow up with Urology  Nephrology on discharge.  We will sign off call if questions     Health Status   Allergies:    is allergic to metronidazole hcl.    Current medications:     Current Facility-Administered Medications:     acetaminophen tablet 650 mg, 650 mg, Oral, Q4H PRN, Hannah Angelo NP, 650 mg at 11/27/24 1905    albuterol-ipratropium 2.5 mg-0.5 mg/3 mL nebulizer solution 3 mL, 3 mL, Nebulization, Q6H PRN, Hannah Angelo NP    ALPRAZolam tablet 0.25 mg, 0.25 mg, Oral, Nightly PRN, Hannah Angelo NP, 0.25 mg at 11/28/24 2029    aluminum-magnesium hydroxide-simethicone 200-200-20 mg/5 mL suspension 30 mL, 30 mL, Oral, QID PRN, Hannah Angelo NP    ARIPiprazole tablet 10 mg, 10 mg, Oral, Daily, Hannah Angelo NP, 10 mg at 11/28/24 1004    atorvastatin tablet 40 mg, 40 mg, Oral, Daily, Hannah Angelo NP, 40 mg at 11/28/24 1003    dextrose 10% bolus 125 mL 125 mL, 12.5 g, Intravenous, PRN, Hannah Angelo NP    dextrose 10% bolus 250 mL 250 mL, 25 g, Intravenous, PRN, Hannah Angelo NP    dextrose 5 % and 0.9 % NaCl with KCl 20 mEq infusion, , Intravenous, Continuous, Ugo Chavez MD, Last Rate: 100 mL/hr at 11/28/24 1000, New Bag at 11/28/24 1000    famotidine tablet 20 mg, 20 mg, Oral, Daily, Rajesh Agustin MD    folic acid tablet 1 mg, 1 mg, Oral, Daily, Alona Early NP, 1 mg at 11/28/24 1003    glucagon (human recombinant) injection 1 mg, 1 mg, Intramuscular, PRN, Hannah Angelo, NP    glucose chewable tablet 16 g, 16 g, Oral, PRN, Hannah Angelo NP    glucose chewable tablet 24 g, 24 g, Oral, PRN, Hannah Angelo NP    heparin (porcine) injection 5,000 Units, 5,000 Units, Subcutaneous, Q8H, Hannah Angelo, NP, 5,000 Units at 11/29/24 0534    influenza (adjuvanted) (Fluad) 45 mcg/0.5 mL IM vaccine (> or = 66 yo) 0.5 mL, 0.5 mL, Intramuscular, Prior to discharge, Sara Moreira MD    insulin aspart U-100 pen 0-5 Units, 0-5 Units, Subcutaneous, QID (AC + HS) PRN, Hannah Angelo,  "NP    Lactobacillus acidoph-L.kayleengar 1 million cell tablet 4 tablet, 4 tablet, Oral, TID WM, Rajesh Agustin MD, 4 tablet at 11/28/24 1639    loperamide capsule 2 mg, 2 mg, Oral, QID PRN, Rajesh Agustin MD    miconazole NITRATE 2 % top powder, , Topical (Top), BID, Hannah Angelo NP, Given at 11/28/24 2029    midodrine tablet 10 mg, 10 mg, Oral, TID WM, Alona Early NP, 10 mg at 11/28/24 1640    multivitamin tablet, 1 tablet, Oral, Daily, Jordi Duncan PA-C, 1 tablet at 11/28/24 1003    mupirocin 2 % ointment, , Nasal, BID, Alvaro Novak MD, Given at 11/28/24 2029    naloxone 0.4 mg/mL injection 0.02 mg, 0.02 mg, Intravenous, PRN, Hannah Angelo NP    ondansetron injection 4 mg, 4 mg, Intravenous, Q6H PRN, Hannah Angelo NP, 4 mg at 11/27/24 0821    potassium chloride SA CR tablet 20 mEq, 20 mEq, Oral, BID, Rajesh Agustin MD    prochlorperazine injection Soln 5 mg, 5 mg, Intravenous, Q6H PRN, Hannah Angeol NP, 5 mg at 11/25/24 2032    simethicone chewable tablet 80 mg, 1 tablet, Oral, QID PRN, Hannah Angelo NP    sodium bicarbonate tablet 1,300 mg, 1,300 mg, Oral, BID, Ugo Chavez MD, 1,300 mg at 11/28/24 2029    sodium chloride 0.9% flush 10 mL, 10 mL, Intravenous, Q8H PRN, Hannah Angelo NP    thiamine tablet 100 mg, 100 mg, Oral, Daily, Alona Early NP, 100 mg at 11/28/24 1002    vancomycin SolR 125 mg, 125 mg, Oral, Q6H, Christopher Champagne MD, FIDSA, 125 mg at 11/29/24 0534     Physical Examination   VS/Measurements   BP (!) 139/55 (BP Location: Left arm, Patient Position: Lying)   Pulse 101   Temp 98.8 °F (37.1 °C) (Oral)   Resp 18   Ht 5' 3" (1.6 m)   Wt 105.2 kg (231 lb 14.8 oz)   SpO2 98%   BMI 41.08 kg/m²    General:  Alert and oriented X3, No acute distress.         Nutritional status: Obese.  Lying in bed no distress eating her breakfast.  Pallor noted  Neck:  Supple, No lymphadenopathy.     Respiratory:  Decreased breath sounds bibasilar crackles no use " of accessory muscles.    Cardiovascular:  Normal rate, Regular rhythm.   Gastrointestinal:  Distended mild tenderness epigastric right upper quadrant no rebound bowel sounds decreased  Lower extremity +edema  Integumentary:  Warm, Dry.  Multiple skin lesions consistent with fungal infections  Psychiatric:  Cooperative, depressed affect          Review / Management   Laboratory Results   Today's Lab Results :    Recent Results (from the past 24 hours)   POCT glucose    Collection Time: 11/28/24 12:30 PM   Result Value Ref Range    POCT Glucose 151 (H) 70 - 110 mg/dL   POCT glucose    Collection Time: 11/28/24  4:38 PM   Result Value Ref Range    POCT Glucose 156 (H) 70 - 110 mg/dL   POCT glucose    Collection Time: 11/28/24  8:40 PM   Result Value Ref Range    POCT Glucose 147 (H) 70 - 110 mg/dL   CBC with Automated Differential    Collection Time: 11/29/24  4:37 AM   Result Value Ref Range    WBC 4.91 3.90 - 12.70 K/uL    RBC 3.45 (L) 4.00 - 5.40 M/uL    Hemoglobin 10.0 (L) 12.0 - 16.0 g/dL    Hematocrit 30.5 (L) 37.0 - 48.5 %    MCV 88 82 - 98 fL    MCH 29.0 27.0 - 31.0 pg    MCHC 32.8 32.0 - 36.0 g/dL    RDW 16.3 (H) 11.5 - 14.5 %    Platelets 127 (L) 150 - 450 K/uL    MPV 10.5 9.2 - 12.9 fL    Immature Granulocytes CANCELED 0.0 - 0.5 %    Immature Grans (Abs) CANCELED 0.00 - 0.04 K/uL    nRBC 0 0 /100 WBC    Gran % 49.0 38.0 - 73.0 %    Lymph % 25.0 18.0 - 48.0 %    Mono % 16.0 (H) 4.0 - 15.0 %    Eosinophil % 4.0 0.0 - 8.0 %    Basophil % 0.0 0.0 - 1.9 %    Bands 5.0 %    Myelocytes 1.0 %    Platelet Estimate Appears normal     Aniso Slight     Poly Occasional     Hypo Occasional     Target Cells Occasional     Stomatocytes Present     Basophilic Stippling Occasional     Differential Method Manual    Comprehensive Metabolic Panel (CMP)    Collection Time: 11/29/24  4:37 AM   Result Value Ref Range    Sodium 137 136 - 145 mmol/L    Potassium 5.9 (H) 3.5 - 5.1 mmol/L    Chloride 109 95 - 110 mmol/L    CO2 21 (L)  23 - 29 mmol/L    Glucose 395 (H) 70 - 110 mg/dL    BUN 51 (H) 8 - 23 mg/dL    Creatinine 1.9 (H) 0.5 - 1.4 mg/dL    Calcium 6.7 (LL) 8.7 - 10.5 mg/dL    Total Protein 4.5 (L) 6.0 - 8.4 g/dL    Albumin 1.5 (L) 3.5 - 5.2 g/dL    Total Bilirubin 0.3 0.1 - 1.0 mg/dL    Alkaline Phosphatase 115 40 - 150 U/L    AST 15 10 - 40 U/L    ALT 7 (L) 10 - 44 U/L    eGFR 29 (A) >60 mL/min/1.73 m^2    Anion Gap 7 (L) 8 - 16 mmol/L   Magnesium    Collection Time: 11/29/24  4:37 AM   Result Value Ref Range    Magnesium 1.9 1.6 - 2.6 mg/dL   Phosphorus    Collection Time: 11/29/24  4:37 AM   Result Value Ref Range    Phosphorus 3.1 2.7 - 4.5 mg/dL   Renal Function Panel    Collection Time: 11/29/24  4:37 AM   Result Value Ref Range    Glucose 391 (H) 70 - 110 mg/dL    Sodium 135 (L) 136 - 145 mmol/L    Potassium 5.9 (H) 3.5 - 5.1 mmol/L    Chloride 107 95 - 110 mmol/L    CO2 21 (L) 23 - 29 mmol/L    BUN 51 (H) 8 - 23 mg/dL    Calcium 6.7 (LL) 8.7 - 10.5 mg/dL    Creatinine 1.9 (H) 0.5 - 1.4 mg/dL    Albumin 1.5 (L) 3.5 - 5.2 g/dL    Phosphorus 3.1 2.7 - 4.5 mg/dL    eGFR 29 (A) >60 mL/min/1.73 m^2    Anion Gap 7 (L) 8 - 16 mmol/L   Procalcitonin    Collection Time: 11/29/24  4:37 AM   Result Value Ref Range    Procalcitonin 3.35 (H) <0.25 ng/mL   Renal Function Panel    Collection Time: 11/29/24  4:37 AM   Result Value Ref Range    Glucose 391 (H) 70 - 110 mg/dL    Sodium 135 (L) 136 - 145 mmol/L    Potassium 5.9 (H) 3.5 - 5.1 mmol/L    Chloride 107 95 - 110 mmol/L    CO2 21 (L) 23 - 29 mmol/L    BUN 51 (H) 8 - 23 mg/dL    Calcium 6.7 (LL) 8.7 - 10.5 mg/dL    Creatinine 1.9 (H) 0.5 - 1.4 mg/dL    Albumin 1.5 (L) 3.5 - 5.2 g/dL    Phosphorus 3.1 2.7 - 4.5 mg/dL    eGFR 29 (A) >60 mL/min/1.73 m^2    Anion Gap 7 (L) 8 - 16 mmol/L   POCT glucose    Collection Time: 11/29/24  5:33 AM   Result Value Ref Range    POCT Glucose 127 (H) 70 - 110 mg/dL   Magnesium    Collection Time: 11/29/24  5:49 AM   Result Value Ref Range    Magnesium 2.0  1.6 - 2.6 mg/dL   Basic metabolic panel    Collection Time: 11/29/24  5:49 AM   Result Value Ref Range    Sodium 134 (L) 136 - 145 mmol/L    Potassium 5.0 3.5 - 5.1 mmol/L    Chloride 104 95 - 110 mmol/L    CO2 20 (L) 23 - 29 mmol/L    Glucose 150 (H) 70 - 110 mg/dL    BUN 54 (H) 8 - 23 mg/dL    Creatinine 2.0 (H) 0.5 - 1.4 mg/dL    Calcium 7.1 (L) 8.7 - 10.5 mg/dL    Anion Gap 10 8 - 16 mmol/L    eGFR 27 (A) >60 mL/min/1.73 m^2        Impression and Plan   Diagnosis   ELIZABETH CKD improved with IV fluids .  We will stop IV fluids encourage patient to have obligate fluid intake 2 L hold olmesartan.  Patient at risk for ELIZABETH given recurrent infections morbid obesity and need for antibiotics.  Long-term prognosis poor discussed with hospitalist we will sign off call if questions      Recurrent UTI need to have discussions about avoiding treatments for colonization need to rule out was tachycardic fistula.  History of ESBL currently followed by ID     C diff colitis per ID     Morbid obesity     Type 2 diabetes need diet management     Severe sepsis improving currently on midodrine     Hypokalemia from GI losses increase potassium in fluids and oral supplementation monitor magnesium     Fungal skin infection intertrigo per hospital medicine      ______________________________________________  Ugo Chavez MD    This document was created using voice recognition software.  It is possible that there are errors which have persisted after original proofreading.  If there is a question regarding contents of this document please contact me for clarification.

## 2024-11-29 NOTE — PLAN OF CARE
11/29/24 1438   Post-Acute Status   Post-Acute Authorization Placement  (SNF)   Post-Acute Placement Status Referrals Sent   Coverage [employer] Managed Medicare   Discharge Delays None known at this time   Discharge Plan   Discharge Plan A Skilled Nursing Facility       SW meet with Patient at bedside to discuss therapy recommendation for Moderate Intense Therapy, via a Skilled Nursing Facility (SNF). SW explained SNF placement process and provided list of in-network SNF's. SW inquired if she could send referrals to facilities, to inquire about bed placement and see if they can accommodate Patient's needs. Patient agreeable to SNF referrals being sent. SW stated she would follow up with Patient once SNF's gave responses. Patient verbalized understanding. SNF referrals sent.      Patient's PASRR/ 142 unable to be called in today due to State office being closed.  Patient's PASRR/ 142 will be called in.    SW will continue to follow and assist as needed.

## 2024-11-29 NOTE — NURSING
Patient pulled picc line out. Notified night hospitalist. Patient had abnormal labs and potassium of 5.9. ordered BMP for redraw.

## 2024-11-29 NOTE — PT/OT/SLP PROGRESS
Physical Therapy  Treatment    Divya Bui   MRN: [id number]   Admitting Diagnosis: Sepsis    PT Received On: 11/29/24  PT Start Time: 1045     PT Stop Time: 1115    PT Total Time (min): 30 min       Billable Minutes:  Therapeutic Activity 30    Treatment Type: Treatment  PT/PTA: PTA     Number of PTA visits since last PT visit: 1       General Precautions: Standard, contact, fall, special contact  Orthopedic Precautions: N/A  Braces: N/A  Respiratory Status: Room air    Spiritual, Cultural Beliefs, Spiritism Practices, Values that Affect Care: no    Subjective:  Communicated with patient's nurse, Maryjane, and completed Epic chart review prior to session.  Patient agreed to PT session with encouragement from therapist.   Remains highly disoriented and is unable to answer orientation questions appropriately.   Re orientation and re assurance provided as needed.     Pain/Comfort  Pain Rating 1:  (NO PAIN INDICATED)    Objective:   Patient found with: peripheral IV, telemetry, bed alarm    Supine > sit EOB: Max A of 2    Forward scoot towards EOB: Max A of 2     Seated EOB x 10 min total focusing on increased tolerance to upright position, core stability, trunk control and CV endurance.   Maintained self supported sitting balance with Mod-Max A (increased level of assist as fatigue increased)  Unable to maintain unsupported sitting balance at this time.     STS x2 trials from EOB w/ HHAx2: Mod A of 2    Side stepping towards HOB (R side) w/ HHAx2: Mod A of 2 (mild to moderate buckling noted with each step)    Sit > supine: Max A of 2     Roll R/L: Mod A     Supine scoot towards HOB: Total A of 2 (bed in trend)    AM-PAC 6 CLICK MOBILITY  How much help from another person does this patient currently need?   1 = Unable, Total/Dependent Assistance  2 = A lot, Maximum/Moderate Assistance  3 = A little, Minimum/Contact Guard/Supervision  4 = None, Modified [place]/Independent    Turning over in bed (including adjusting  bedclothes, sheets and blankets)?: 2  Sitting down on and standing up from a chair with arms (e.g., wheelchair, bedside commode, etc.): 2  Moving from lying on back to sitting on the side of the bed?: 2  Moving to and from a bed to a chair (including a wheelchair)?: 1  Need to walk in hospital room?: 1  Climbing 3-5 steps with a railing?: 1 (NT)  Basic Mobility Total Score: 9    AM-PAC Raw Score CMS G-Code Modifier Level of Impairment Assistance   6 % Total / Unable   7 - 9 CM 80 - 100% Maximal Assist   10 - 14 CL 60 - 80% Moderate Assist   15 - 19 CK 40 - 60% Moderate Assist   20 - 22 CJ 20 - 40% Minimal Assist   23 CI 1-20% SBA / CGA   24 CH 0% Independent/ Mod I     Patient left with bed in chair position with call button in reach, bed alarm on, and son present.    Assessment:  Divya Bui is a 64 y.o. female with a medical diagnosis of Sepsis and presents with overall decline in functional mobility. Patient would continue to benefit from skilled PT to address functional limitations listed below in order to return to PLOF/decrease caregiver burden. Patient able to progress to standing and small side steps during today's session. Remains highly confused. Not appropriate or safe for chair transfer this date but will likely be ready within the next sessions should strength continue to improve. Patient does require encouragement throughout session to maintain a consistent level of participation within therapy session due to complaints of fatigue.     Rehab identified problem list/impairments: weakness, impaired endurance, impaired sensation, impaired self care skills, impaired functional mobility, gait instability, impaired balance, decreased safety awareness, decreased lower extremity function, decreased upper extremity function, decreased coordination, impaired cognition, decreased ROM, impaired cardiopulmonary response to activity    Rehab potential is fair.    Activity tolerance: Fair    Discharge  recommendations: Moderate Intensity Therapy      Barriers to discharge:      Equipment recommendations: to be determined by next level of care     GOALS:   Multidisciplinary Problems       Physical Therapy Goals          Problem: Physical Therapy    Goal Priority Disciplines Outcome Interventions   Physical Therapy Goal     PT, PT/OT Progressing    Description: Goals to be met by 12/8/24.  1. Pt will complete bed mobility SBA.  2. Pt will complete sit to stand SBA.  3. Pt will ambulate 100ft SBA using RW.  4. Pt will increase AMPAC score by 2 points to progress functional mobility.                       PLAN:    Patient to be seen 3 x/week to address the above listed problems via gait training, therapeutic activities, therapeutic exercises  Plan of Care expires: 12/08/24  Plan of Care reviewed with: patient, son         11/29/2024

## 2024-11-30 LAB
ALBUMIN SERPL BCP-MCNC: 1.6 G/DL (ref 3.5–5.2)
ALP SERPL-CCNC: 104 U/L (ref 40–150)
ALT SERPL W/O P-5'-P-CCNC: 11 U/L (ref 10–44)
ANION GAP SERPL CALC-SCNC: 9 MMOL/L (ref 8–16)
ANISOCYTOSIS BLD QL SMEAR: SLIGHT
AST SERPL-CCNC: 16 U/L (ref 10–40)
BASOPHILS NFR BLD: 0 % (ref 0–1.9)
BILIRUB SERPL-MCNC: 0.3 MG/DL (ref 0.1–1)
BUN SERPL-MCNC: 49 MG/DL (ref 8–23)
CALCIUM SERPL-MCNC: 7.6 MG/DL (ref 8.7–10.5)
CHLORIDE SERPL-SCNC: 107 MMOL/L (ref 95–110)
CO2 SERPL-SCNC: 22 MMOL/L (ref 23–29)
CREAT SERPL-MCNC: 1.4 MG/DL (ref 0.5–1.4)
DIFFERENTIAL METHOD BLD: ABNORMAL
EOSINOPHIL NFR BLD: 5 % (ref 0–8)
ERYTHROCYTE [DISTWIDTH] IN BLOOD BY AUTOMATED COUNT: 16 % (ref 11.5–14.5)
EST. GFR  (NO RACE VARIABLE): 41 ML/MIN/1.73 M^2
GLUCOSE SERPL-MCNC: 113 MG/DL (ref 70–110)
HCT VFR BLD AUTO: 31.9 % (ref 37–48.5)
HGB BLD-MCNC: 9.9 G/DL (ref 12–16)
IMM GRANULOCYTES # BLD AUTO: ABNORMAL K/UL (ref 0–0.04)
IMM GRANULOCYTES NFR BLD AUTO: ABNORMAL % (ref 0–0.5)
LYMPHOCYTES NFR BLD: 18 % (ref 18–48)
MAGNESIUM SERPL-MCNC: 2 MG/DL (ref 1.6–2.6)
MCH RBC QN AUTO: 29 PG (ref 27–31)
MCHC RBC AUTO-ENTMCNC: 31 G/DL (ref 32–36)
MCV RBC AUTO: 94 FL (ref 82–98)
METAMYELOCYTES NFR BLD MANUAL: 2 %
MONOCYTES NFR BLD: 12 % (ref 4–15)
NEUTROPHILS NFR BLD: 61 % (ref 38–73)
NEUTS BAND NFR BLD MANUAL: 2 %
NRBC BLD-RTO: 0 /100 WBC
PHOSPHATE SERPL-MCNC: 3.7 MG/DL (ref 2.7–4.5)
PLATELET # BLD AUTO: 127 K/UL (ref 150–450)
PLATELET BLD QL SMEAR: ABNORMAL
PMV BLD AUTO: 10.7 FL (ref 9.2–12.9)
POCT GLUCOSE: 108 MG/DL (ref 70–110)
POCT GLUCOSE: 125 MG/DL (ref 70–110)
POCT GLUCOSE: 135 MG/DL (ref 70–110)
POCT GLUCOSE: 140 MG/DL (ref 70–110)
POCT GLUCOSE: 150 MG/DL (ref 70–110)
POCT GLUCOSE: 172 MG/DL (ref 70–110)
POIKILOCYTOSIS BLD QL SMEAR: SLIGHT
POLYCHROMASIA BLD QL SMEAR: ABNORMAL
POTASSIUM SERPL-SCNC: 4.1 MMOL/L (ref 3.5–5.1)
PROCALCITONIN SERPL IA-MCNC: 1.79 NG/ML
PROT SERPL-MCNC: 4.7 G/DL (ref 6–8.4)
RBC # BLD AUTO: 3.41 M/UL (ref 4–5.4)
SODIUM SERPL-SCNC: 138 MMOL/L (ref 136–145)
WBC # BLD AUTO: 4.97 K/UL (ref 3.9–12.7)

## 2024-11-30 PROCEDURE — 85007 BL SMEAR W/DIFF WBC COUNT: CPT

## 2024-11-30 PROCEDURE — 25000003 PHARM REV CODE 250: Performed by: STUDENT IN AN ORGANIZED HEALTH CARE EDUCATION/TRAINING PROGRAM

## 2024-11-30 PROCEDURE — 25000003 PHARM REV CODE 250: Performed by: NURSE PRACTITIONER

## 2024-11-30 PROCEDURE — 84145 PROCALCITONIN (PCT): CPT | Performed by: STUDENT IN AN ORGANIZED HEALTH CARE EDUCATION/TRAINING PROGRAM

## 2024-11-30 PROCEDURE — 27000207 HC ISOLATION

## 2024-11-30 PROCEDURE — 21400001 HC TELEMETRY ROOM

## 2024-11-30 PROCEDURE — 83735 ASSAY OF MAGNESIUM: CPT

## 2024-11-30 PROCEDURE — 25000003 PHARM REV CODE 250

## 2024-11-30 PROCEDURE — 97110 THERAPEUTIC EXERCISES: CPT

## 2024-11-30 PROCEDURE — 25000003 PHARM REV CODE 250: Performed by: INTERNAL MEDICINE

## 2024-11-30 PROCEDURE — 80053 COMPREHEN METABOLIC PANEL: CPT

## 2024-11-30 PROCEDURE — 85027 COMPLETE CBC AUTOMATED: CPT

## 2024-11-30 PROCEDURE — 63600175 PHARM REV CODE 636 W HCPCS: Performed by: NURSE PRACTITIONER

## 2024-11-30 PROCEDURE — 84100 ASSAY OF PHOSPHORUS: CPT

## 2024-11-30 PROCEDURE — 97530 THERAPEUTIC ACTIVITIES: CPT

## 2024-11-30 RX ADMIN — SODIUM BICARBONATE 1300 MG: 650 TABLET ORAL at 09:11

## 2024-11-30 RX ADMIN — VANCOMYCIN HYDROCHLORIDE 125 MG: KIT at 12:11

## 2024-11-30 RX ADMIN — HEPARIN SODIUM 5000 UNITS: 5000 INJECTION INTRAVENOUS; SUBCUTANEOUS at 06:11

## 2024-11-30 RX ADMIN — HEPARIN SODIUM 5000 UNITS: 5000 INJECTION INTRAVENOUS; SUBCUTANEOUS at 03:11

## 2024-11-30 RX ADMIN — ARIPIPRAZOLE 10 MG: 5 TABLET ORAL at 08:11

## 2024-11-30 RX ADMIN — SODIUM BICARBONATE 1300 MG: 650 TABLET ORAL at 08:11

## 2024-11-30 RX ADMIN — ALPRAZOLAM 0.25 MG: 0.25 TABLET ORAL at 09:11

## 2024-11-30 RX ADMIN — ACETAMINOPHEN 650 MG: 325 TABLET ORAL at 10:11

## 2024-11-30 RX ADMIN — Medication 100 MG: at 08:11

## 2024-11-30 RX ADMIN — MICONAZOLE NITRATE: 20 POWDER TOPICAL at 08:11

## 2024-11-30 RX ADMIN — VANCOMYCIN HYDROCHLORIDE 125 MG: KIT at 06:11

## 2024-11-30 RX ADMIN — VANCOMYCIN HYDROCHLORIDE 125 MG: KIT at 05:11

## 2024-11-30 RX ADMIN — Medication 4 TABLET: at 05:11

## 2024-11-30 RX ADMIN — MICONAZOLE NITRATE: 20 POWDER TOPICAL at 09:11

## 2024-11-30 RX ADMIN — HEPARIN SODIUM 5000 UNITS: 5000 INJECTION INTRAVENOUS; SUBCUTANEOUS at 09:11

## 2024-11-30 RX ADMIN — ATORVASTATIN CALCIUM 40 MG: 40 TABLET, FILM COATED ORAL at 08:11

## 2024-11-30 RX ADMIN — Medication 4 TABLET: at 08:11

## 2024-11-30 RX ADMIN — FOLIC ACID 1 MG: 1 TABLET ORAL at 08:11

## 2024-11-30 RX ADMIN — Medication 4 TABLET: at 12:11

## 2024-11-30 RX ADMIN — THERA TABS 1 TABLET: TAB at 08:11

## 2024-11-30 RX ADMIN — FAMOTIDINE 20 MG: 20 TABLET ORAL at 08:11

## 2024-11-30 NOTE — PT/OT/SLP PROGRESS
Physical Therapy Treatment    Patient Name:  Divya Bui   MRN:  [id number]    Recommendations:     Discharge Recommendations: Moderate Intensity Therapy  Discharge Equipment Recommendations: to be determined by next level of care  Barriers to discharge: Decreased caregiver support    Assessment:     Mod A with bed mobility. Agreeable to minimal LE exercise after max encouragement from PT and family. Fatigues very quickly    Rehab Prognosis: Fair; patient would benefit from acute skilled PT services to address these deficits and reach maximum level of function.    Recent Surgery: * No surgery found *      Plan:     During this hospitalization, patient to be seen 3 x/week to address the identified rehab impairments via gait training, therapeutic activities, therapeutic exercises and progress toward the following goals:    Plan of Care Expires:  12/08/24    Subjective     Chief Complaint: pain/fatigue  Patient/Family Comments/goals: get rest  Pain/Comfort:  Pain Rating 1: 7/10  Location 1: abdomen      Objective:     Communicated with [facility] prior to session.  Patient found supine with   upon PT entry to room.     General Precautions: Standard, contact, fall, special contact  Orthopedic Precautions: N/A  Braces: N/A  Respiratory Status: Room air     Functional Mobility:  Bed Mobility:     Rolling Right: moderate assistance  Scooting: moderate assistance  Supine to Sit: moderate assistance      AM-PAC 6 CLICK MOBILITY  Turning over in bed (including adjusting bedclothes, sheets and blankets)?: 2  Sitting down on and standing up from a chair with arms (e.g., wheelchair, bedside commode, etc.): 1  Moving from lying on back to sitting on the side of the bed?: 2  Moving to and from a bed to a chair (including a wheelchair)?: 1  Need to walk in hospital room?: 1  Climbing 3-5 steps with a railing?: 1  Basic Mobility Total Score: 8       Treatment & Education:  Mod A with supine to sit and sit to R sidelying after exercises. Sat  EOB 3 minutes f/b LE exercises- AP, LAQ and marching - 2 x 5 reps with sitting rest breaks    Patient left right sidelying with all lines intact, call button in reach, and nursing present..    GOALS:   Multidisciplinary Problems       Physical Therapy Goals          Problem: Physical Therapy    Goal Priority Disciplines Outcome Interventions   Physical Therapy Goal     PT, PT/OT Progressing    Description: Goals to be met by 12/8/24.  1. Pt will complete bed mobility SBA.  2. Pt will complete sit to stand SBA.  3. Pt will ambulate 100ft SBA using RW.  4. Pt will increase AMPAC score by 2 points to progress functional mobility.                       Time Tracking:     PT Received On: 11/30/24  PT Start Time: 1200     PT Stop Time: 1225  PT Total Time (min): 25 min     Billable Minutes: Therapeutic Activity 15 and Therapeutic Exercise 10    Treatment Type: Treatment  PT/PTA: PT     Number of PTA visits since last PT visit: 1 11/30/2024

## 2024-11-30 NOTE — SUBJECTIVE & OBJECTIVE
Interval History:   She is tolerating meds.    Urine culture- 11/25- GNR    11/27- she has diarrhoea and had C difficile ordered 11/23 11/29- she has occ confusion   Tolerating meds   Review of Systems   Constitutional:  Negative for activity change, appetite change, chills and diaphoresis.   Allergic/Immunologic: Negative for environmental allergies and food allergies.     Objective:     Vital Signs (Most Recent):  Temp: 97.8 °F (36.6 °C) (11/30/24 0405)  Pulse: (!) 113 (11/30/24 0405)  Resp: 18 (11/30/24 0405)  BP: 127/73 (11/30/24 0405)  SpO2: 100 % (11/30/24 0405) Vital Signs (24h Range):  Temp:  [97.8 °F (36.6 °C)-98.2 °F (36.8 °C)] 97.8 °F (36.6 °C)  Pulse:  [107-126] 113  Resp:  [18] 18  SpO2:  [94 %-100 %] 100 %  BP: (127-144)/(70-92) 127/73     Weight: 106.5 kg (234 lb 12.6 oz)  Body mass index is 41.59 kg/m².    Estimated Creatinine Clearance: 46.2 mL/min (based on SCr of 1.4 mg/dL).     Physical Exam  Vitals and nursing note reviewed.   Cardiovascular:      Rate and Rhythm: Normal rate.   Abdominal:      General: Abdomen is flat.   Musculoskeletal:         General: Normal range of motion.      Cervical back: Normal range of motion.   Skin:     General: Skin is warm.   Neurological:      General: No focal deficit present.      Mental Status: She is alert.   Psychiatric:         Mood and Affect: Mood normal.          Significant Labs: Blood Culture:   Recent Labs   Lab 11/23/24  1144 11/23/24  1318 11/26/24  1429 11/26/24  1529   LABBLOO No growth after 5 days. Gram stain shane bottle: Gram positive cocci in clusters resembling Staph  Results called to and read back by: Cheryl Luciano RN  11/24/2024 23:07  COAGULASE-NEGATIVE STAPHYLOCOCCUS SPECIES  Organism is a probable contaminant  * No Growth to date  No Growth to date  No Growth to date  No Growth to date No Growth to date  No Growth to date  No Growth to date  No Growth to date     CBC:   Recent Labs   Lab 11/29/24  0437 11/30/24  0457   WBC 4.91  "4.97   HGB 10.0* 9.9*   HCT 30.5* 31.9*   * 127*     CMP:   Recent Labs   Lab 11/29/24  0437 11/29/24  0549 11/30/24  0457     135*  135* 134* 138   K 5.9*  5.9*  5.9* 5.0 4.1     107  107 104 107   CO2 21*  21*  21* 20* 22*   *  391*  391* 150* 113*   BUN 51*  51*  51* 54* 49*   CREATININE 1.9*  1.9*  1.9* 2.0* 1.4   CALCIUM 6.7*  6.7*  6.7* 7.1* 7.6*   PROT 4.5*  --  4.7*   ALBUMIN 1.5*  1.5*  1.5*  --  1.6*   BILITOT 0.3  --  0.3   ALKPHOS 115  --  104   AST 15  --  16   ALT 7*  --  11   ANIONGAP 7*  7*  7* 10 9     Urine Culture:   Recent Labs   Lab 06/04/24  0927 06/17/24  1609 11/25/24  0532   LABURIN ESCHERICHIA COLI  >100,000 cfu/ml  * KLEBSIELLA OXYTOCA ESBL  >100,000 cfu/ml  * ESCHERICHIA COLI ESBL  < 10,000 cfu/ml  *     Urine Studies:   Recent Labs   Lab 11/25/24  0531 11/25/24  0532   COLORU Yellow  --    APPEARANCEUA Cloudy*  --    PHUR 6.0  --    SPECGRAV 1.020  --    PROTEINUA 1+*  --    GLUCUA Negative  --    KETONESU Negative  --    BILIRUBINUA Negative  --    OCCULTUA 1+*  --    NITRITE Negative  --    UROBILINOGEN Negative  --    LEUKOCYTESUR 3+*  --    RBCUA  --  11*   WBCUA  --  >100*   BACTERIA  --  None   SQUAMEPITHEL  --  11   HYALINECASTS  --  0     Wound Culture: No results for input(s): "LABAERO" in the last 4320 hours.  All pertinent labs within the past 24 hours have been reviewed.    Significant Imaging: I have reviewed all pertinent imaging results/findings within the past 24 hours.  "

## 2024-11-30 NOTE — ASSESSMENT & PLAN NOTE
>>ASSESSMENT AND PLAN FOR DIARRHEA WRITTEN ON 11/28/2024  7:09 AM BY KIRTI CLAROS MD, FIDSA    Will follow stool assay- rule out c difficle    11/27- discussed with lab and stool send on 11/23  The stool was sent 11/23 when she was admitted- this is important to note if it becomes C difficile po  Add Po Vanco as C difficle is suspected   11/29- will complete 10 days of Po Vanco, needs close follow up

## 2024-11-30 NOTE — PLAN OF CARE
Agreed to tx after max encouragement from PT and son. Mod A with bed mobility. Completed Seated LE exercises

## 2024-11-30 NOTE — PROGRESS NOTES
[facility] - Intensive Care [facility] Medicine  Progress Note    Patient Name: Divya Bui  MRN: [id number]  Patient Class: IP- Inpatient   Admission Date: 11/23/2024  Length of Stay: 7 days  Attending Physician: Rajesh Agustin MD  Primary Care Provider: Angel Khan MD        Subjective:     Principal Problem:Sepsis        HPI:  Divya Bui, a 64-year-old female with a PMHx of HTN, GERD, morbid obesity s/p gastric bypass, depression, anxiety, esophageal stricture, and recurrent ESBL UTIs, was admitted on 11/23 for a UTI with sepsis. She was transferred to the ICU after exhibiting signs of septic shock, including recurrent hypotension unresponsive to initial fluid resuscitation. Upon admission, she presented with acute cystitis. ID was consulted by  for antibiotic therapy recommendations. She was continued on ertapenem (Invanz) and linezolid (Zyvox). Urine cultures grew GNRs, and blood cultures were positive for coagulase-negative staphylococci; repeat cultures were pending as the initial result was likely a contaminant. She showed evidence of sepsis with organ dysfunction indicated by acute kidney injury and encephalopathy. A fluid challenge was provided in the ED on 11/23. Her latest lactate level on 11/25 was 1.2. Despite marginal improvement with Midodrine and albumin, she required initiation of levophed overnight, but this was weaned off on 11/26. After stabilization, she was stepped down to telemetry, and HM was re-consulted to assume care.    Overview/Hospital Course:  11/24:  Remains lethargic and intermittently confused, also hypotensive today to lowest of 73/39 taken on the right upper arm by the tech.  On my recheck with smaller size cuff she was 81/46, and then was 118/59 on her calf.  I am not sure about whether the low blood pressures are entirely accurate.  She continues to mentate the same.  Heart rates in the low 100s/110s.  Discussed with ICU and checked labs.  No evidence of  "bleeding.  Lactate is normal.  Have given 3 L of fluid  today.  Blood pressure this evening up to 92/51.   11/25/2024: transferred to ICU for pressor therapy. Levophed initiated in the evening. ID consulted for antibiotic recs.  11/26/2024: levophed weaned off this morning; stabilized thereafter. Deemed stable for transfer back to to telemetry.     11/27/2024  ELIZABETH steadily worsening. Restart IVFs. Consulted nephrology for guidance. Cultures pending. Still having multiple loose bowel movements. C. Diff ordered 11/23/2024 but still says "in process" in EPIC. Am currently on hold with Micro lab to inquire about results of study. disposition: [facility] once medically stable    11/28/2024  Mentation improved this morning. Procalcitonin rising. Discussed with micro lab in [place]. C.diff from 11/23/2024 confirmed to be positive. ID started PO Vanc Q6H. Nephrology evaluated. Added bicarb to IVFs. Labs this morning show improvement in renal function for the first time since admission. Appreciate Nephrology's recommendations/interventions. Urine cultures confirm ESBL E. Coli. Antibiotics narrowed by ID. disposition: [facility] once medically stable    11/29/2024  Labs steadily improving. Discussed case with Nephrology. There is some concern for vesicolic fistula given recurrent UTIs. Discussed with ID. Will order CT abdomen/pelvis with rectal contrast to further investigate.    11/30/2024  CT abdomen/pelvis shows no evidence of fistula. IV Antibiotics weaned. PO vancomycin started for 10d therapy. Patient now medically stable for discharge to SNF.    Interval history:  More confused this morning.     Objective:   BP (!) 179/89 (BP Location: Right arm, Patient Position: Lying)   Pulse (!) 131   Temp 97.3 °F (36.3 °C) (Oral)   Resp 16   Ht 5' 3" (1.6 m)   Wt 106.5 kg (234 lb 12.6 oz)   SpO2 96%   BMI 41.59 kg/m²   No intake or output data in the 24 hours ending 11/30/24 0754      PHYSICAL EXAM  Vitals reviewed  Constitutional:  " "     General: She is not in acute distress.     Appearance: She is obese. She is ill-appearing.   HENT:      Head: Normocephalic.      Nose: Nose normal.      Mouth/Throat:      Mouth: Mucous membranes are moist.   Eyes:      Pupils: Pupils are equal, round, and reactive to light.   Cardiovascular:      Rate and Rhythm: Normal rate and regular rhythm.      Pulses: Normal pulses.      Heart sounds: Normal heart sounds.   Pulmonary:      Effort: Pulmonary effort is normal.      Breath sounds: Normal breath sounds.   Abdominal:      General: Bowel sounds are normal. There is no distension.      Palpations: Abdomen is soft.      Tenderness: There is no abdominal tenderness.   Musculoskeletal:         General: No swelling.   Skin:     General: Skin is warm and dry.   Neurological:      Mental Status: She is alert. She is disoriented.      Motor: Weakness present.      Comments: Oriented to self, not time, place, situation.     LABS  All labs from the past 24 hours were reviewed.     BMP:   Recent Labs   Lab 11/30/24 0457   *      K 4.1      CO2 22*   BUN 49*   CREATININE 1.4   CALCIUM 7.6*   MG 2.0     CBC:   Recent Labs   Lab 11/29/24 0437 11/30/24 0457   WBC 4.91 4.97   HGB 10.0* 9.9*   HCT 30.5* 31.9*   * 127*     CMP:   Recent Labs   Lab 11/29/24 0437 11/29/24  0549 11/30/24 0457     135*  135* 134* 138   K 5.9*  5.9*  5.9* 5.0 4.1     107  107 104 107   CO2 21*  21*  21* 20* 22*   *  391*  391* 150* 113*   BUN 51*  51*  51* 54* 49*   CREATININE 1.9*  1.9*  1.9* 2.0* 1.4   CALCIUM 6.7*  6.7*  6.7* 7.1* 7.6*   PROT 4.5*  --  4.7*   ALBUMIN 1.5*  1.5*  1.5*  --  1.6*   BILITOT 0.3  --  0.3   ALKPHOS 115  --  104   AST 15  --  16   ALT 7*  --  11   ANIONGAP 7*  7*  7* 10 9     Cardiac Markers:   No results for input(s): "CKMB", "MYOGLOBIN", "BNP", "TROPISTAT" in the last 48 hours.    Coagulation:   No results for input(s): "PT", "INR", "APTT" in the " "last 48 hours.    Lactic Acid:   No results for input(s): "LACTATE" in the last 48 hours.    Magnesium:   Recent Labs   Lab 11/29/24  0437 11/29/24  0549 11/30/24  0457   MG 1.9 2.0 2.0     Troponin:   No results for input(s): "TROPONINI", "TROPONINIHS" in the last 48 hours.    TSH:   Recent Labs   Lab 11/23/24  1506   TSH 0.831     Urine Studies:   No results for input(s): "COLORU", "APPEARANCEUA", "PHUR", "SPECGRAV", "PROTEINUA", "GLUCUA", "KETONESU", "BILIRUBINUA", "OCCULTUA", "NITRITE", "UROBILINOGEN", "LEUKOCYTESUR", "RBCUA", "WBCUA", "BACTERIA", "SQUAMEPITHEL", "HYALINECASTS" in the last 48 hours.    Invalid input(s): "WRIGHTSUR"      IMAGING  All imaging from the past 24 hours were reviewed.     Imaging Results              CT Head Without Contrast (Final result)  Result time 11/23/24 14:05:51      Final result by Lucio Romero MD (11/23/24 14:05:51)                   Impression:      No acute intracranial CT abnormality.    All CT scans at this facility are performed  using dose modulation techniques as appropriate to performed exam including the following:  automated exposure control; adjustment of mA and/or kV according to the patients size (this includes techniques or standardized protocols for targeted exams where dose is matched to indication/reason for exam: i.e. extremities or head);  iterative reconstruction technique.      Electronically signed by: Lucio Romero  Date:    11/23/2024  Time:    14:05               Narrative:    EXAMINATION:  CT HEAD WITHOUT CONTRAST    CLINICAL HISTORY:  Mental status change, unknown cause;    TECHNIQUE:  Low dose axial CT images obtained throughout the head without intravenous contrast. Sagittal and coronal reconstructions were performed.    COMPARISON:  Multiple prior reports    FINDINGS:  Intracranial compartment:    Ventricles and sulci are normal in size for age without evidence of hydrocephalus. No extra-axial blood or fluid collections.    The brain " parenchyma appears normal. No parenchymal mass, hemorrhage, edema or major vascular distribution infarct.    Skull/extracranial contents (limited evaluation): No fracture. Mastoid air cells and paranasal sinuses are essentially clear.                                       X-Ray Chest AP Portable (Final result)  Result time 11/23/24 12:10:43      Final result by Lucio Romero MD (11/23/24 12:10:43)                   Impression:      No acute abnormality.      Electronically signed by: Lucio Romero  Date:    11/23/2024  Time:    12:10               Narrative:    EXAMINATION:  XR CHEST AP PORTABLE    CLINICAL HISTORY:  Sepsis;    TECHNIQUE:  Single frontal view of the chest was performed.    COMPARISON:  Multiple    FINDINGS:  The lungs are clear, with normal appearance of pulmonary vasculature and no pleural effusion or pneumothorax.    The cardiac silhouette is normal in size. The hilar and mediastinal contours are unremarkable.    Bones are intact.                                        Assessment/Plan:      * Sepsis  --leukocytosis resolved, normotensive, afebrile   --Blood Cultures (11/23)- growing  COAGULASE-NEGATIVE STAPHYLOCOCCUS- suspect contaminant  --ID following, continued IV ertapenem for cystitis- appreciate recs  --repeat cultures ordered on 11/26/2024, follow up results  --Organ dysfunction indicated by Acute kidney injury and Encephalopathy     11/30/2024  Improving  See acute cystitis and C. Diff    C. Diff positive diarrhea  11/28/2024  Procalcitonin rising.   Discussed with micro lab in [place]. C.diff from 11/23/2024 confirmed to be positive.   ID started PO Vanc Q6H.     11/30/2024  Procalcitonin downtrending since starting treatment for C. Diff  Medically cleared to discharge to [facility]    Acute cystitis  -Hx ESBL, frequent UTIs, significant resistances (prior cultures at [facility], prior cultures as well in our system)  -Consult ID, appreciate reccs  -Currently  on ertapenem and linezolid which should be sufficient to cover her  -Unfortunately the urine culture is marked as canceled, unsure of the reason --> called micro lab in [place], quantity was not sufficient for urine culture  -Ordering another UA reflex to culture although will have low sensitivity at this point  -Blood cultures pending    11/26/2024  Urine cultures growing gram negative spp  ID following, appreciate recs  Continue ertapenem, monitor fever curve  Follow up culture susceptibilities    11/28/2024  Procalcitonin rising. Urine cultures confirm ESBL E. Coli. Antibiotics narrowed by ID.   Continue to monitor   Appreciate ID recs  disposition: SNF once medically stable    11/30/2024  E. Coli < 10k, antibiotics discontinued by ID  Monitor clinically  CT with rectal contrast negative for fistula    Encephalopathy, metabolic  --improving  --monitor clinically    11/29/2024  Recurred  CT head to rule out indolent stroke  Suspect hospital delirium/sundowning to be contributing  Reassess in the afternoon    11/30/2024  CT negative for stroke  Mentation waxes and wanes, suspect hospital delirium  Discussed with patient's sons yesterday afternoon    Candidal intertrigo and dermatitis  Severe erythema to breasts, pannus, groin, and buttock with some skin breakdown   Appears to be candida overgrowth   IV Diflucan     11/29/2024  Transitioned to PO fluconazole    ELIZABETH (acute kidney injury)  Metabolic acidosis  11/30/2024  Nephrology evaluated  Bicarb added back to IVFs  Renal function improved on this morning's labs    Recurrent major depressive disorder, in partial remission  Patient has persistent depression which is mild and is currently controlled. Will Continue anti-depressant medications. We will not consult psychiatry at this time. Patient does not display psychosis at this time. Continue to monitor closely and adjust plan of care as needed.    Cont Abilify and Xanax prn   Hold Doxepin for now while on Zyvox      Chronic pain disorder  Holding home Lyrica and Norco given lethargy in the setting of sepsis    Morbid obesity with BMI of 40.0-44.9, adult  Body mass index is 38.97 kg/m². Morbid obesity complicates all aspects of disease management from diagnostic modalities to treatment. Weight loss encouraged and health benefits explained to patient.     HTN (hypertension)  Patients blood pressure range in the last 24 hours was: BP  Min: 73/39  Max: 155/74.The patient's inpatient anti-hypertensive regimen is listed below         VTE Risk Mitigation (From admission, onward)           Ordered     heparin (porcine) injection 5,000 Units  Every 8 hours         11/23/24 1441     IP VTE HIGH RISK PATIENT  Once         11/23/24 1441     Place sequential compression device  Until discontinued         11/23/24 1441                    Discharge Planning   ELADIA:      Code Status: Full Code   Is the patient medically ready for discharge?:     Reason for patient still in hospital (select all that apply): Patient trending condition, Laboratory test, Treatment, and Consult recommendations  Discharge Plan A: Skilled Nursing Facility   Discharge Delays: None known at this time            Rajesh Agustin MD  Department of Hospital Medicine   [facility] - Intensive Care ([facility])

## 2024-11-30 NOTE — PROGRESS NOTES
[facility])  Infectious Disease  Progress Note    Patient Name: Divya Bui  MRN: [id number]  Admission Date: 11/23/2024  Length of Stay: 7 days  Attending Physician: Rajesh Agustin MD  Primary Care Provider: Angel Khan MD    Isolation Status: Contact  Assessment/Plan:      Neuro  Encephalopathy, metabolic  Follow primary team    Cardiac/Vascular  HTN (hypertension)  BP control as per primary team    ID  C. difficile diarrhea  >>ASSESSMENT AND PLAN FOR DIARRHEA WRITTEN ON 11/28/2024  7:09 AM BY KIRTI CHAMPAGNE MD, EITAN    Will follow stool assay- rule out c difficle    11/27- discussed with lab and stool send on 11/23  The stool was sent 11/23 when she was admitted- this is important to note if it becomes C difficile po  Add Po Vanco as C difficle is suspected   11/29- will complete 10 days of Po Vanco, needs close follow up      Endocrine  Morbid obesity with BMI of 40.0-44.9, adult  Out patient follow up for weight loss regime        Anticipated Disposition:     Thank you for your consult. I will follow-up with patient. Please contact us if you have any additional questions.    Kirti Champagne MD, EITAN  Infectious Disease  [facility])    Subjective:     Principal Problem:Sepsis    HPI: History was obtained from the patient and the son.   64 year old female with ESBL UTI, HTN, GERD, Morbid Obesity s/p Gastric bypass surgery, Depression, GERD, Anxiety, Esophageal stricture .  She presented with worsening  confusion, drowsiness, decreased appetite,.There was associated history of urinary incontinence.    Labs and imaging test -    11/09- Urine culture-  >100,000 CFU/ML Klebsiella pneumoniae ssp pneumoniae Abnormal  This is an edited result. Previous organism was Gram Negative Howard on 11/11/2024 at 1202 CST.    Culture Urine >100,000 CFU/ML Proteus mirabilis Abnormal  This is an edited result. Previous organism was Second Gram Negative Howard on 11/11/2024 at 1202 CST.    Culture Urine >100,000 CFU/ML Enterococcus faecalis Abnormal     Blood culture- 11/23- Staph epi  Component      Latest Ref Rng 11/23/2024 11/24/2024 11/25/2024   WBC      3.90 - 12.70 K/uL 24.61 (H)  16.23 (H)  9.28    WBC        14.01 (H)        Legend:  (H) High  Interval History:   She is tolerating meds.    Urine culture- 11/25- GNR    11/27- she has diarrhoea and had C difficile ordered 11/23 11/29- she has occ confusion   Tolerating meds   Review of Systems   Constitutional:  Negative for activity change, appetite change, chills and diaphoresis.   Allergic/Immunologic: Negative for environmental allergies and food allergies.     Objective:     Vital Signs (Most Recent):  Temp: 97.8 °F (36.6 °C) (11/30/24 0405)  Pulse: (!) 113 (11/30/24 0405)  Resp: 18 (11/30/24 0405)  BP: 127/73 (11/30/24 0405)  SpO2: 100 % (11/30/24 0405) Vital Signs (24h Range):  Temp:  [97.8 °F (36.6 °C)-98.2 °F (36.8 °C)] 97.8 °F (36.6 °C)  Pulse:  [107-126] 113  Resp:  [18] 18  SpO2:  [94 %-100 %] 100 %  BP: (127-144)/(70-92) 127/73     Weight: 106.5 kg (234 lb 12.6 oz)  Body mass index is 41.59 kg/m².    Estimated Creatinine Clearance: 46.2 mL/min (based on SCr of 1.4 mg/dL).     Physical Exam  Vitals and nursing note reviewed.   Cardiovascular:      Rate and Rhythm: Normal rate.   Abdominal:      General: Abdomen is flat.   Musculoskeletal:         General: Normal range of motion.      Cervical back: Normal range of motion.   Skin:     General: Skin is warm.   Neurological:      General: No focal deficit present.      Mental Status: She is alert.   Psychiatric:         Mood and Affect: Mood normal.          Significant Labs: Blood Culture:   Recent Labs   Lab 11/23/24  1144 11/23/24  1318 11/26/24  1429 11/26/24  1529   LABBLOO No growth after 5 days. Gram stain shane bottle: Gram positive cocci in clusters resembling Staph  Results called to and read back by: Cheryl Luciano RN  11/24/2024 23:07  COAGULASE-NEGATIVE STAPHYLOCOCCUS  "SPECIES  Organism is a probable contaminant  * No Growth to date  No Growth to date  No Growth to date  No Growth to date No Growth to date  No Growth to date  No Growth to date  No Growth to date     CBC:   Recent Labs   Lab 11/29/24 0437 11/30/24 0457   WBC 4.91 4.97   HGB 10.0* 9.9*   HCT 30.5* 31.9*   * 127*     CMP:   Recent Labs   Lab 11/29/24 0437 11/29/24  0549 11/30/24 0457     135*  135* 134* 138   K 5.9*  5.9*  5.9* 5.0 4.1     107  107 104 107   CO2 21*  21*  21* 20* 22*   *  391*  391* 150* 113*   BUN 51*  51*  51* 54* 49*   CREATININE 1.9*  1.9*  1.9* 2.0* 1.4   CALCIUM 6.7*  6.7*  6.7* 7.1* 7.6*   PROT 4.5*  --  4.7*   ALBUMIN 1.5*  1.5*  1.5*  --  1.6*   BILITOT 0.3  --  0.3   ALKPHOS 115  --  104   AST 15  --  16   ALT 7*  --  11   ANIONGAP 7*  7*  7* 10 9     Urine Culture:   Recent Labs   Lab 06/04/24  0927 06/17/24  1609 11/25/24  0532   LABURIN ESCHERICHIA COLI  >100,000 cfu/ml  * KLEBSIELLA OXYTOCA ESBL  >100,000 cfu/ml  * ESCHERICHIA COLI ESBL  < 10,000 cfu/ml  *     Urine Studies:   Recent Labs   Lab 11/25/24  0531 11/25/24  0532   COLORU Yellow  --    APPEARANCEUA Cloudy*  --    PHUR 6.0  --    SPECGRAV 1.020  --    PROTEINUA 1+*  --    GLUCUA Negative  --    KETONESU Negative  --    BILIRUBINUA Negative  --    OCCULTUA 1+*  --    NITRITE Negative  --    UROBILINOGEN Negative  --    LEUKOCYTESUR 3+*  --    RBCUA  --  11*   WBCUA  --  >100*   BACTERIA  --  None   SQUAMEPITHEL  --  11   HYALINECASTS  --  0     Wound Culture: No results for input(s): "LABAERO" in the last 4320 hours.  All pertinent labs within the past 24 hours have been reviewed.    Significant Imaging: I have reviewed all pertinent imaging results/findings within the past 24 hours.  "

## 2024-12-01 LAB
ALBUMIN SERPL BCP-MCNC: 1.6 G/DL (ref 3.5–5.2)
ALP SERPL-CCNC: 98 U/L (ref 40–150)
ALT SERPL W/O P-5'-P-CCNC: 14 U/L (ref 10–44)
ANION GAP SERPL CALC-SCNC: 12 MMOL/L (ref 8–16)
ANISOCYTOSIS BLD QL SMEAR: SLIGHT
AST SERPL-CCNC: 14 U/L (ref 10–40)
BACTERIA BLD CULT: NORMAL
BACTERIA BLD CULT: NORMAL
BASOPHILS NFR BLD: 0 % (ref 0–1.9)
BILIRUB SERPL-MCNC: 0.3 MG/DL (ref 0.1–1)
BUN SERPL-MCNC: 39 MG/DL (ref 8–23)
CALCIUM SERPL-MCNC: 7.5 MG/DL (ref 8.7–10.5)
CHLORIDE SERPL-SCNC: 106 MMOL/L (ref 95–110)
CO2 SERPL-SCNC: 23 MMOL/L (ref 23–29)
CREAT SERPL-MCNC: 1 MG/DL (ref 0.5–1.4)
DIFFERENTIAL METHOD BLD: ABNORMAL
EOSINOPHIL NFR BLD: 3 % (ref 0–8)
ERYTHROCYTE [DISTWIDTH] IN BLOOD BY AUTOMATED COUNT: 16.4 % (ref 11.5–14.5)
EST. GFR  (NO RACE VARIABLE): >60 ML/MIN/1.73 M^2
GLUCOSE SERPL-MCNC: 109 MG/DL (ref 70–110)
HCT VFR BLD AUTO: 31.9 % (ref 37–48.5)
HGB BLD-MCNC: 10 G/DL (ref 12–16)
IMM GRANULOCYTES # BLD AUTO: ABNORMAL K/UL (ref 0–0.04)
IMM GRANULOCYTES NFR BLD AUTO: ABNORMAL % (ref 0–0.5)
LYMPHOCYTES NFR BLD: 13 % (ref 18–48)
MAGNESIUM SERPL-MCNC: 1.8 MG/DL (ref 1.6–2.6)
MCH RBC QN AUTO: 29.3 PG (ref 27–31)
MCHC RBC AUTO-ENTMCNC: 31.3 G/DL (ref 32–36)
MCV RBC AUTO: 94 FL (ref 82–98)
MONOCYTES NFR BLD: 7 % (ref 4–15)
NEUTROPHILS NFR BLD: 77 % (ref 38–73)
NRBC BLD-RTO: 0 /100 WBC
PHOSPHATE SERPL-MCNC: 3.8 MG/DL (ref 2.7–4.5)
PLATELET # BLD AUTO: 122 K/UL (ref 150–450)
PLATELET BLD QL SMEAR: ABNORMAL
PMV BLD AUTO: 10 FL (ref 9.2–12.9)
POCT GLUCOSE: 102 MG/DL (ref 70–110)
POCT GLUCOSE: 107 MG/DL (ref 70–110)
POCT GLUCOSE: 154 MG/DL (ref 70–110)
POIKILOCYTOSIS BLD QL SMEAR: SLIGHT
POTASSIUM SERPL-SCNC: 3.4 MMOL/L (ref 3.5–5.1)
PROT SERPL-MCNC: 4.5 G/DL (ref 6–8.4)
RBC # BLD AUTO: 3.41 M/UL (ref 4–5.4)
SODIUM SERPL-SCNC: 141 MMOL/L (ref 136–145)
WBC # BLD AUTO: 5.11 K/UL (ref 3.9–12.7)

## 2024-12-01 PROCEDURE — 25000003 PHARM REV CODE 250: Performed by: INTERNAL MEDICINE

## 2024-12-01 PROCEDURE — 80053 COMPREHEN METABOLIC PANEL: CPT

## 2024-12-01 PROCEDURE — 85007 BL SMEAR W/DIFF WBC COUNT: CPT

## 2024-12-01 PROCEDURE — 84100 ASSAY OF PHOSPHORUS: CPT

## 2024-12-01 PROCEDURE — 97530 THERAPEUTIC ACTIVITIES: CPT | Mod: CQ

## 2024-12-01 PROCEDURE — 99232 SBSQ HOSP IP/OBS MODERATE 35: CPT | Mod: NSCH,,, | Performed by: INTERNAL MEDICINE

## 2024-12-01 PROCEDURE — 36415 COLL VENOUS BLD VENIPUNCTURE: CPT

## 2024-12-01 PROCEDURE — 85027 COMPLETE CBC AUTOMATED: CPT

## 2024-12-01 PROCEDURE — 25000003 PHARM REV CODE 250: Performed by: NURSE PRACTITIONER

## 2024-12-01 PROCEDURE — 25000003 PHARM REV CODE 250

## 2024-12-01 PROCEDURE — 21400001 HC TELEMETRY ROOM

## 2024-12-01 PROCEDURE — 27000207 HC ISOLATION

## 2024-12-01 PROCEDURE — 25000003 PHARM REV CODE 250: Performed by: STUDENT IN AN ORGANIZED HEALTH CARE EDUCATION/TRAINING PROGRAM

## 2024-12-01 PROCEDURE — 83735 ASSAY OF MAGNESIUM: CPT

## 2024-12-01 PROCEDURE — 63600175 PHARM REV CODE 636 W HCPCS: Performed by: NURSE PRACTITIONER

## 2024-12-01 RX ORDER — FAMOTIDINE 20 MG/1
20 TABLET, FILM COATED ORAL 2 TIMES DAILY
Status: DISCONTINUED | OUTPATIENT
Start: 2024-12-01 | End: 2024-12-04

## 2024-12-01 RX ADMIN — VANCOMYCIN HYDROCHLORIDE 125 MG: KIT at 06:12

## 2024-12-01 RX ADMIN — VANCOMYCIN HYDROCHLORIDE 125 MG: KIT at 12:12

## 2024-12-01 RX ADMIN — ATORVASTATIN CALCIUM 40 MG: 40 TABLET, FILM COATED ORAL at 09:12

## 2024-12-01 RX ADMIN — Medication 4 TABLET: at 09:12

## 2024-12-01 RX ADMIN — Medication 4 TABLET: at 04:12

## 2024-12-01 RX ADMIN — HEPARIN SODIUM 5000 UNITS: 5000 INJECTION INTRAVENOUS; SUBCUTANEOUS at 09:12

## 2024-12-01 RX ADMIN — FAMOTIDINE 20 MG: 20 TABLET ORAL at 09:12

## 2024-12-01 RX ADMIN — Medication 100 MG: at 09:12

## 2024-12-01 RX ADMIN — HEPARIN SODIUM 5000 UNITS: 5000 INJECTION INTRAVENOUS; SUBCUTANEOUS at 06:12

## 2024-12-01 RX ADMIN — SODIUM BICARBONATE 1300 MG: 650 TABLET ORAL at 09:12

## 2024-12-01 RX ADMIN — ARIPIPRAZOLE 10 MG: 5 TABLET ORAL at 09:12

## 2024-12-01 RX ADMIN — Medication 4 TABLET: at 12:12

## 2024-12-01 RX ADMIN — MICONAZOLE NITRATE: 20 POWDER TOPICAL at 09:12

## 2024-12-01 RX ADMIN — ACETAMINOPHEN 650 MG: 325 TABLET ORAL at 09:12

## 2024-12-01 RX ADMIN — VANCOMYCIN HYDROCHLORIDE 125 MG: KIT at 05:12

## 2024-12-01 RX ADMIN — FOLIC ACID 1 MG: 1 TABLET ORAL at 09:12

## 2024-12-01 RX ADMIN — HEPARIN SODIUM 5000 UNITS: 5000 INJECTION INTRAVENOUS; SUBCUTANEOUS at 03:12

## 2024-12-01 RX ADMIN — THERA TABS 1 TABLET: TAB at 09:12

## 2024-12-01 NOTE — NURSING
Wound care completed, but unable to obtain hydrophilic wound dressing paste or interdry sheets from wound care. Will have to order from wound care.

## 2024-12-01 NOTE — NURSING
Pt's son, Herbert Bui [phone], gave a copy of POA paperwork to nurse. Paperwork put in pt's folder to be scanned into med record.

## 2024-12-01 NOTE — PLAN OF CARE
Pt oriented x4. Vital signs stable  Pt remained afebrile throughout this shift.   All meds administered per order.   Pt remained free of falls this shift.   Plan of care reviewed. Patient verbalizes understanding.   Pt Blood glucose monitored, no coverage required.  Bed in lowest position, upper side side rails up x 2, wheels locked  Call light in reach, bed alarm on.   Patient instructed to call staff for mobility/assistance.  Patient education provided.  No further concerns voiced at this time.    Problem: Adult Inpatient Plan of Care  Goal: Plan of Care Review  Outcome: Progressing  Goal: Patient-Specific Goal (Individualized)  Outcome: Progressing  Goal: Absence of Hospital-Acquired Illness or Injury  Outcome: Progressing  Goal: Optimal Comfort and Wellbeing  Outcome: Progressing  Goal: Readiness for Transition of Care  Outcome: Progressing     Problem: Bariatric Environmental Safety  Goal: Safety Maintained with Care  Outcome: Progressing     Problem: Infection  Goal: Absence of Infection Signs and Symptoms  Outcome: Progressing     Problem: Sepsis/Septic Shock  Goal: Optimal Coping  Outcome: Progressing  Goal: Absence of Bleeding  Outcome: Progressing  Goal: Blood Glucose Level Within Targeted Range  Outcome: Progressing  Goal: Absence of Infection Signs and Symptoms  Outcome: Progressing  Goal: Optimal Nutrition Intake  Outcome: Progressing     Problem: Acute Kidney Injury/Impairment  Goal: Fluid and Electrolyte Balance  Outcome: Progressing  Goal: Improved Oral Intake  Outcome: Progressing  Goal: Effective Renal Function  Outcome: Progressing     Problem: Wound  Goal: Optimal Coping  Outcome: Progressing  Goal: Optimal Functional Ability  Outcome: Progressing  Goal: Absence of Infection Signs and Symptoms  Outcome: Progressing  Goal: Improved Oral Intake  Outcome: Progressing  Goal: Optimal Pain Control and Function  Outcome: Progressing  Goal: Skin Health and Integrity  Outcome: Progressing  Goal: Optimal  Wound Healing  Outcome: Progressing     Problem: Skin Injury Risk Increased  Goal: Skin Health and Integrity  Outcome: Progressing     Problem: Fall Injury Risk  Goal: Absence of Fall and Fall-Related Injury  Outcome: Progressing

## 2024-12-01 NOTE — PT/OT/SLP PROGRESS
Physical Therapy  Treatment    Divya Biu   MRN: [id number]   Admitting Diagnosis: Sepsis       PT Start Time: 0900     PT Stop Time: 0925    PT Total Time (min): 25 min       Billable Minutes:  Therapeutic Activity 25    Treatment Type: Treatment  PT/PTA: PTA     Number of PTA visits since last PT visit: 2       General Precautions: Standard, contact, fall, special contact  Orthopedic Precautions: N/A  Braces: N/A  Respiratory Status: Room air    Spiritual, Cultural Beliefs, [employer]ism Practices, Values that Affect Care: no    Subjective:  Communicated with FREYA prior to session.      Pain/Comfort  Pain Rating 1: 0/10  Pain Rating Post-Intervention 1: 0/10    Treatment and Education:    BED MOB VC FOR UPPER EXTREMITY AND LOWER EXTREMITY PLACEMENT MIN A X 2    SITTING EOB SBA    SIT<-->STAND VC FOR UPPER EXTREMITY PLACEMENT MOD A X 2 WITH RW    STAND PIVOT BED-->CHAIR WITH RW MIN A X 2 WITH VC FOR RW MANAGEMENT AND TECHNIQUE    EDUCATED ON CALL DON'T FALL, OOB X 3 MEALS/DAY, AND WEIGHT SHIFTING IN CHAIR FOR PRESSURE RELIEF.     PATIENT WITH HALLUCINATIONS THIS SESSION AND REQUIRED ENCOURAGEMENT TO INCREASE HER TOLERANCE TO OOB AS SHE WAS NOTED TO REQUEST TO RETURN TO [place] WITHIN 30 minutes OF THE CONCLUSION OF THIS SESSION.      AM-PAC 6 CLICK MOBILITY  How much help from another person does this patient currently need?   1 = Unable, Total/Dependent Assistance  2 = A lot, Maximum/Moderate Assistance  3 = A little, Minimum/Contact Guard/Supervision  4 = None, Modified [place]/Independent    Turning over in bed (including adjusting bedclothes, sheets and blankets)?: 4  Sitting down on and standing up from a chair with arms (e.g., wheelchair, bedside commode, etc.): 2  Moving from lying on back to sitting on the side of the bed?: 4  Moving to and from a bed to a chair (including a wheelchair)?: 3  Need to walk in hospital room?: 1  Climbing 3-5 steps with a railing?: 1  Basic Mobility Total Score: 15    AM-PAC  Raw Score CMS G-Code Modifier Level of Impairment Assistance   6 % Total / Unable   7 - 9 CM 80 - 100% Maximal Assist   10 - 14 CL 60 - 80% Moderate Assist   15 - 19 CK 40 - 60% Moderate Assist   20 - 22 CJ 20 - 40% Minimal Assist   23 CI 1-20% SBA / CGA   24 CH 0% Independent/ Mod I     Patient left up in chair with all lines intact, call button in reach, chair alarm on, and SON present.    Assessment:  Divya Bui is a 64 y.o. female with a medical diagnosis of Sepsis and presents with .    Rehab identified problem list/impairments: weakness, impaired self care skills, impaired functional mobility, decreased lower extremity function, gait instability, impaired cardiopulmonary response to activity    Rehab potential is fair.    Activity tolerance: Fair    Discharge recommendations: Moderate Intensity Therapy      Barriers to discharge:      Equipment recommendations: to be determined by next level of care     GOALS:   Multidisciplinary Problems       Physical Therapy Goals          Problem: Physical Therapy    Goal Priority Disciplines Outcome Interventions   Physical Therapy Goal     PT, PT/OT Progressing    Description: Goals to be met by 12/8/24.  1. Pt will complete bed mobility SBA.  2. Pt will complete sit to stand SBA.  3. Pt will ambulate 100ft SBA using RW.  4. Pt will increase AMPAC score by 2 points to progress functional mobility.                       PLAN:    Patient to be seen 3 x/week to address the above listed problems via gait training, therapeutic activities, therapeutic exercises  Plan of Care expires: 12/08/24  Plan of Care reviewed with: patient, son         12/01/2024

## 2024-12-01 NOTE — PLAN OF CARE
BED MOB VC FOR UPPER EXTREMITY AND LOWER EXTREMITY PLACEMENT MIN A X 2    SITTING EOB SBA    SIT<-->STAND VC FOR UPPER EXTREMITY PLACEMENT MOD A X 2 WITH RW    STAND PIVOT BED-->CHAIR WITH RW MIN A X 2 WITH VC FOR RW MANAGEMENT AND TECHNIQUE    EDUCATED ON CALL DON'T FALL, OOB X 3 MEALS/DAY, AND WEIGHT SHIFTING IN CHAIR FOR PRESSURE RELIEF.     PATIENT WITH HALLUCINATIONS THIS SESSION AND REQUIRED ENCOURAGEMENT TO INCREASE HER TOLERANCE TO OOB AS SHE WAS NOTED TO REQUEST TO RETURN TO [place] WITHIN 30 minutes OF THE CONCLUSION OF THIS SESSION.

## 2024-12-01 NOTE — PROGRESS NOTES
Pharmacist Renal Dose Adjustment Note    Divya Bui is a 64 y.o. female being treated with the medication famotidine.     Patient Data:    Vital Signs (Most Recent):  Temp: 97.6 °F (36.4 °C) (12/01/24 0711)  Pulse: (!) 114 (12/01/24 0711)  Resp: 16 (12/01/24 0711)  BP: 130/80 (12/01/24 0446)  SpO2: 96 % (12/01/24 0711) Vital Signs (72h Range):  Temp:  [97.3 °F (36.3 °C)-98.8 °F (37.1 °C)]   Pulse:  [101-131]   Resp:  [16-20]   BP: (108-179)/(55-92)   SpO2:  [94 %-100 %]      Recent Labs   Lab 11/29/24  0549 11/30/24  0457 12/01/24  0614   CREATININE 2.0* 1.4 1.0     Serum creatinine: 1 mg/dL 12/01/24 0614  Estimated creatinine clearance: 66 mL/min    Famotidine 20 mg oral daily will be changed to famotidine 20 mg oral twice daily per renal dose protocol for CrCl > 59 ml/min.     Thank you,  Pharmacist's Name: Osei Espinoza

## 2024-12-02 PROBLEM — E87.5 HYPERKALEMIA: Status: ACTIVE | Noted: 2024-12-02

## 2024-12-02 PROBLEM — D69.6 THROMBOCYTOPENIA: Status: ACTIVE | Noted: 2024-12-02

## 2024-12-02 PROBLEM — E83.42 HYPOMAGNESEMIA: Status: ACTIVE | Noted: 2024-12-02

## 2024-12-02 PROBLEM — E83.39 HYPOPHOSPHATEMIA: Status: ACTIVE | Noted: 2024-12-02

## 2024-12-02 PROBLEM — E87.1 HYPONATREMIA: Status: ACTIVE | Noted: 2024-12-02

## 2024-12-02 LAB
ALBUMIN SERPL BCP-MCNC: 1.6 G/DL (ref 3.5–5.2)
ALP SERPL-CCNC: 88 U/L (ref 40–150)
ALT SERPL W/O P-5'-P-CCNC: 17 U/L (ref 10–44)
ANION GAP SERPL CALC-SCNC: 12 MMOL/L (ref 8–16)
ANISOCYTOSIS BLD QL SMEAR: SLIGHT
AST SERPL-CCNC: 16 U/L (ref 10–40)
BASOPHILS NFR BLD: 0 % (ref 0–1.9)
BILIRUB SERPL-MCNC: 0.3 MG/DL (ref 0.1–1)
BUN SERPL-MCNC: 32 MG/DL (ref 8–23)
CALCIUM SERPL-MCNC: 7.2 MG/DL (ref 8.7–10.5)
CHLORIDE SERPL-SCNC: 106 MMOL/L (ref 95–110)
CO2 SERPL-SCNC: 20 MMOL/L (ref 23–29)
CREAT SERPL-MCNC: 0.9 MG/DL (ref 0.5–1.4)
DIFFERENTIAL METHOD BLD: ABNORMAL
EOSINOPHIL NFR BLD: 5 % (ref 0–8)
ERYTHROCYTE [DISTWIDTH] IN BLOOD BY AUTOMATED COUNT: 16.6 % (ref 11.5–14.5)
EST. GFR  (NO RACE VARIABLE): >60 ML/MIN/1.73 M^2
GLUCOSE SERPL-MCNC: 87 MG/DL (ref 70–110)
HCT VFR BLD AUTO: 29.9 % (ref 37–48.5)
HGB BLD-MCNC: 9.5 G/DL (ref 12–16)
IMM GRANULOCYTES # BLD AUTO: ABNORMAL K/UL (ref 0–0.04)
IMM GRANULOCYTES NFR BLD AUTO: ABNORMAL % (ref 0–0.5)
LYMPHOCYTES NFR BLD: 17 % (ref 18–48)
MAGNESIUM SERPL-MCNC: 1.6 MG/DL (ref 1.6–2.6)
MCH RBC QN AUTO: 29.2 PG (ref 27–31)
MCHC RBC AUTO-ENTMCNC: 31.8 G/DL (ref 32–36)
MCV RBC AUTO: 92 FL (ref 82–98)
MONOCYTES NFR BLD: 0 % (ref 4–15)
MYELOCYTES NFR BLD MANUAL: 2 %
NEUTROPHILS NFR BLD: 62 % (ref 38–73)
NEUTS BAND NFR BLD MANUAL: 14 %
NRBC BLD-RTO: 0 /100 WBC
PHOSPHATE SERPL-MCNC: 4 MG/DL (ref 2.7–4.5)
PLATELET # BLD AUTO: 127 K/UL (ref 150–450)
PLATELET BLD QL SMEAR: ABNORMAL
PMV BLD AUTO: 9.6 FL (ref 9.2–12.9)
POCT GLUCOSE: 103 MG/DL (ref 70–110)
POCT GLUCOSE: 122 MG/DL (ref 70–110)
POCT GLUCOSE: 94 MG/DL (ref 70–110)
POTASSIUM SERPL-SCNC: 3.4 MMOL/L (ref 3.5–5.1)
PROT SERPL-MCNC: 4.5 G/DL (ref 6–8.4)
RBC # BLD AUTO: 3.25 M/UL (ref 4–5.4)
SODIUM SERPL-SCNC: 138 MMOL/L (ref 136–145)
STOMATOCYTES BLD QL SMEAR: PRESENT
TARGETS BLD QL SMEAR: ABNORMAL
TREPONEMA PALLIDUM IGG+IGM AB [PRESENCE] IN SERUM OR PLASMA BY IMMUNOASSAY: NONREACTIVE
WBC # BLD AUTO: 7.44 K/UL (ref 3.9–12.7)

## 2024-12-02 PROCEDURE — 97530 THERAPEUTIC ACTIVITIES: CPT

## 2024-12-02 PROCEDURE — 85027 COMPLETE CBC AUTOMATED: CPT | Performed by: INTERNAL MEDICINE

## 2024-12-02 PROCEDURE — 25000003 PHARM REV CODE 250

## 2024-12-02 PROCEDURE — 63600175 PHARM REV CODE 636 W HCPCS: Performed by: NURSE PRACTITIONER

## 2024-12-02 PROCEDURE — 25000003 PHARM REV CODE 250: Performed by: NURSE PRACTITIONER

## 2024-12-02 PROCEDURE — 27000207 HC ISOLATION

## 2024-12-02 PROCEDURE — 21400001 HC TELEMETRY ROOM

## 2024-12-02 PROCEDURE — 86593 SYPHILIS TEST NON-TREP QUANT: CPT | Performed by: INTERNAL MEDICINE

## 2024-12-02 PROCEDURE — 36415 COLL VENOUS BLD VENIPUNCTURE: CPT | Performed by: INTERNAL MEDICINE

## 2024-12-02 PROCEDURE — 97530 THERAPEUTIC ACTIVITIES: CPT | Mod: CQ

## 2024-12-02 PROCEDURE — 84100 ASSAY OF PHOSPHORUS: CPT

## 2024-12-02 PROCEDURE — 25000003 PHARM REV CODE 250: Performed by: INTERNAL MEDICINE

## 2024-12-02 PROCEDURE — 25000003 PHARM REV CODE 250: Performed by: STUDENT IN AN ORGANIZED HEALTH CARE EDUCATION/TRAINING PROGRAM

## 2024-12-02 PROCEDURE — 83735 ASSAY OF MAGNESIUM: CPT

## 2024-12-02 PROCEDURE — 85007 BL SMEAR W/DIFF WBC COUNT: CPT | Performed by: INTERNAL MEDICINE

## 2024-12-02 PROCEDURE — 80053 COMPREHEN METABOLIC PANEL: CPT

## 2024-12-02 RX ADMIN — MIDODRINE HYDROCHLORIDE 10 MG: 5 TABLET ORAL at 08:12

## 2024-12-02 RX ADMIN — FAMOTIDINE 20 MG: 20 TABLET ORAL at 09:12

## 2024-12-02 RX ADMIN — VANCOMYCIN HYDROCHLORIDE 125 MG: KIT at 06:12

## 2024-12-02 RX ADMIN — THERA TABS 1 TABLET: TAB at 08:12

## 2024-12-02 RX ADMIN — Medication 4 TABLET: at 12:12

## 2024-12-02 RX ADMIN — MIDODRINE HYDROCHLORIDE 10 MG: 5 TABLET ORAL at 12:12

## 2024-12-02 RX ADMIN — SODIUM BICARBONATE 1300 MG: 650 TABLET ORAL at 09:12

## 2024-12-02 RX ADMIN — ALPRAZOLAM 0.25 MG: 0.25 TABLET ORAL at 10:12

## 2024-12-02 RX ADMIN — HEPARIN SODIUM 5000 UNITS: 5000 INJECTION INTRAVENOUS; SUBCUTANEOUS at 03:12

## 2024-12-02 RX ADMIN — HEPARIN SODIUM 5000 UNITS: 5000 INJECTION INTRAVENOUS; SUBCUTANEOUS at 09:12

## 2024-12-02 RX ADMIN — MIDODRINE HYDROCHLORIDE 10 MG: 5 TABLET ORAL at 06:12

## 2024-12-02 RX ADMIN — SODIUM BICARBONATE 1300 MG: 650 TABLET ORAL at 08:12

## 2024-12-02 RX ADMIN — ATORVASTATIN CALCIUM 40 MG: 40 TABLET, FILM COATED ORAL at 08:12

## 2024-12-02 RX ADMIN — VANCOMYCIN HYDROCHLORIDE 125 MG: KIT at 12:12

## 2024-12-02 RX ADMIN — MICONAZOLE NITRATE: 20 POWDER TOPICAL at 09:12

## 2024-12-02 RX ADMIN — FOLIC ACID 1 MG: 1 TABLET ORAL at 08:12

## 2024-12-02 RX ADMIN — MICONAZOLE NITRATE: 20 POWDER TOPICAL at 03:12

## 2024-12-02 RX ADMIN — Medication 100 MG: at 08:12

## 2024-12-02 RX ADMIN — Medication 4 TABLET: at 08:12

## 2024-12-02 RX ADMIN — HEPARIN SODIUM 5000 UNITS: 5000 INJECTION INTRAVENOUS; SUBCUTANEOUS at 06:12

## 2024-12-02 RX ADMIN — LOPERAMIDE HYDROCHLORIDE 2 MG: 2 CAPSULE ORAL at 12:12

## 2024-12-02 RX ADMIN — ARIPIPRAZOLE 10 MG: 5 TABLET ORAL at 08:12

## 2024-12-02 RX ADMIN — FAMOTIDINE 20 MG: 20 TABLET ORAL at 08:12

## 2024-12-02 RX ADMIN — ACETAMINOPHEN 650 MG: 325 TABLET ORAL at 10:12

## 2024-12-02 RX ADMIN — Medication 4 TABLET: at 06:12

## 2024-12-02 NOTE — SUBJECTIVE & OBJECTIVE
Interval History:  Seen and examined with son at bedside.  Patient reports she had difficulty getting bed today but is anxious and motivated to continue to improvement strength mobility.  She remains confused.    Review of Systems   Constitutional:  Positive for appetite change and fatigue. Negative for fever.   Respiratory:  Negative for cough and shortness of breath.    Cardiovascular:  Negative for chest pain and leg swelling.   Gastrointestinal:  Positive for abdominal pain and diarrhea. Negative for nausea and vomiting.   Psychiatric/Behavioral:  Positive for confusion and decreased concentration.    All other systems reviewed and are negative.    Objective:     Vital Signs (Most Recent):  Temp: 97 °F (36.1 °C) (12/02/24 1643)  Pulse: 108 (12/02/24 1643)  Resp: 20 (12/02/24 1643)  BP: 119/63 (12/02/24 1643)  SpO2: 98 % (12/02/24 1643) Vital Signs (24h Range):  Temp:  [97 °F (36.1 °C)-98.2 °F (36.8 °C)] 97 °F (36.1 °C)  Pulse:  [100-113] 108  Resp:  [18-21] 20  SpO2:  [94 %-98 %] 98 %  BP: ()/(48-69) 119/63     Weight: 110.4 kg (243 lb 6.2 oz)  Body mass index is 43.11 kg/m².    Intake/Output Summary (Last 24 hours) at 12/2/2024 1731  Last data filed at 12/2/2024 0850  Gross per 24 hour   Intake --   Output 1 ml   Net -1 ml         Physical Exam  Vitals reviewed.   Constitutional:       Appearance: She is ill-appearing.   HENT:      Head: Normocephalic and atraumatic.      Mouth/Throat:      Mouth: Mucous membranes are moist.      Pharynx: Oropharynx is clear.   Eyes:      Extraocular Movements: Extraocular movements intact.      Conjunctiva/sclera: Conjunctivae normal.   Cardiovascular:      Rate and Rhythm: Regular rhythm. Tachycardia present.      Pulses: Normal pulses.      Heart sounds: Murmur heard.   Pulmonary:      Effort: Pulmonary effort is normal.      Breath sounds: Rhonchi present.   Abdominal:      General: Bowel sounds are normal.      Palpations: Abdomen is soft.      Tenderness: There is  abdominal tenderness. There is no guarding or rebound.   Musculoskeletal:         General: Normal range of motion.      Cervical back: Normal range of motion and neck supple.   Skin:     General: Skin is warm and dry.   Neurological:      General: No focal deficit present.      Mental Status: She is alert. Mental status is at baseline. She is disoriented.      Motor: Weakness present.             Significant Labs: All pertinent labs within the past 24 hours have been reviewed.  CBC:   Recent Labs   Lab 12/01/24  0614 12/02/24  0823   WBC 5.11 7.44   HGB 10.0* 9.5*   HCT 31.9* 29.9*   * 127*     CMP:   Recent Labs   Lab 12/01/24  0614 12/02/24  0601    138   K 3.4* 3.4*    106   CO2 23 20*    87   BUN 39* 32*   CREATININE 1.0 0.9   CALCIUM 7.5* 7.2*   PROT 4.5* 4.5*   ALBUMIN 1.6* 1.6*   BILITOT 0.3 0.3   ALKPHOS 98 88   AST 14 16   ALT 14 17   ANIONGAP 12 12       Significant Imaging: I have reviewed all pertinent imaging results/findings within the past 24 hours.

## 2024-12-02 NOTE — PROGRESS NOTES
[facility] Medicine  Progress Note    Patient Name: Divya Bui  MRN: [id number]  Patient Class: IP- Inpatient   Admission Date: 11/23/2024  Length of Stay: 9 days  Attending Physician: Malia Moe MD  Primary Care Provider: Angel Khan MD        Subjective     Principal Problem:Sepsis        HPI:  Divya Bui, a 64-year-old female with a PMHx of HTN, GERD, morbid obesity s/p gastric bypass, depression, anxiety, esophageal stricture, and recurrent ESBL UTIs, was admitted on 11/23 for a UTI with sepsis. She was transferred to the ICU after exhibiting signs of septic shock, including recurrent hypotension unresponsive to initial fluid resuscitation. Upon admission, she presented with acute cystitis. ID was consulted by  for antibiotic therapy recommendations. She was continued on ertapenem (Invanz) and linezolid (Zyvox). Urine cultures grew GNRs, and blood cultures were positive for coagulase-negative staphylococci; repeat cultures were pending as the initial result was likely a contaminant. She showed evidence of sepsis with organ dysfunction indicated by acute kidney injury and encephalopathy. A fluid challenge was provided in the ED on 11/23. Her latest lactate level on 11/25 was 1.2. Despite marginal improvement with Midodrine and albumin, she required initiation of levophed overnight, but this was weaned off on 11/26. After stabilization, she was stepped down to telemetry, and HM was re-consulted to assume care.    Overview/Hospital Course:  11/24:  Remains lethargic and intermittently confused, also hypotensive today to lowest of 73/39 taken on the right upper arm by the tech.  On my recheck with smaller size cuff she was 81/46, and then was 118/59 on her calf.  I am not sure about whether the low blood pressures are entirely accurate.  She continues to mentate the same.  Heart rates in the low 100s/110s.  Discussed with ICU and checked labs.  No evidence of  "bleeding.  Lactate is normal.  Have given 3 L of fluid  today.  Blood pressure this evening up to 92/51.   11/25/2024: transferred to ICU for pressor therapy. Levophed initiated in the evening. ID consulted for antibiotic recs.  11/26/2024: levophed weaned off this morning; stabilized thereafter. Deemed stable for transfer back to to telemetry.     11/27/2024  ELIZABETH steadily worsening. Restart IVFs. Consulted nephrology for guidance. Cultures pending. Still having multiple loose bowel movements. C. Diff ordered 11/23/2024 but still says "in process" in EPIC. Am currently on hold with Micro lab to inquire about results of study. disposition: [facility] once medically stable    11/28/2024  Mentation improved this morning. Procalcitonin rising. Discussed with micro lab in [place]. C.diff from 11/23/2024 confirmed to be positive. ID started PO Vanc Q6H. Nephrology evaluated. Added bicarb to IVFs. Labs this morning show improvement in renal function for the first time since admission. Appreciate Nephrology's recommendations/interventions. Urine cultures confirm ESBL E. Coli. Antibiotics narrowed by ID. disposition: [facility] once medically stable    11/29/2024  Labs steadily improving. Discussed case with Nephrology. There is some concern for vesicolic fistula given recurrent UTIs. Discussed with ID. Will order CT abdomen/pelvis with rectal contrast to further investigate.    11/30/2024  CT abdomen/pelvis shows no evidence of fistula. IV Antibiotics weaned. PO vancomycin started for 10d therapy. Patient now medically stable for discharge to [facility].    11/27/2024  ELIZABETH steadily worsening. Restart IVFs. Consulted nephrology for guidance. Cultures pending. Still having multiple loose bowel movements. C. Diff ordered 11/23/2024 but still says "in process" in EPIC. Am currently on hold with Micro lab to inquire about results of study. disposition: [facility] once medically stable    11/28/2024  Mentation improved this morning. Procalcitonin " rising. Discussed with micro lab in [place]. C.diff from 11/23/2024 confirmed to be positive. ID started PO Vanc Q6H. Nephrology evaluated. Added bicarb to IVFs. Labs this morning show improvement in renal function for the first time since admission. Appreciate Nephrology's recommendations/interventions. Urine cultures confirm ESBL E. Coli. Antibiotics narrowed by ID. disposition: SNF once medically stable    11/29/2024  Labs steadily improving. Discussed case with Nephrology. There is some concern for vesicolic fistula given recurrent UTIs. Discussed with ID. Will order CT abdomen/pelvis with rectal contrast to further investigate.    11/30/2024  CT abdomen/pelvis shows no evidence of fistula. IV Antibiotics weaned. PO vancomycin started for 10d therapy. Patient now medically stable for discharge to SNF.    12/01/2024  Orientation waxing and waning. Worse in the AM. However, has been consistently cooperative with PT/OT. Still a great candidate for SNF. Referral placed. Anticipate discharge to SNF Monday/early next week.    DISPOSITION: C. Diff being treated with PO Vanc.   Discharge to SNF with six additional days of PO vanc..     Interval History:  Seen and examined with son at bedside.  Patient reports she had difficulty getting bed today but is anxious and motivated to continue to improvement strength mobility.  She remains confused.    Review of Systems   Constitutional:  Positive for appetite change and fatigue. Negative for fever.   Respiratory:  Negative for cough and shortness of breath.    Cardiovascular:  Negative for chest pain and leg swelling.   Gastrointestinal:  Positive for abdominal pain and diarrhea. Negative for nausea and vomiting.   Psychiatric/Behavioral:  Positive for confusion and decreased concentration.    All other systems reviewed and are negative.    Objective:     Vital Signs (Most Recent):  Temp: 97 °F (36.1 °C) (12/02/24 1643)  Pulse: 108 (12/02/24 1643)  Resp: 20 (12/02/24  1643)  BP: 119/63 (12/02/24 1643)  SpO2: 98 % (12/02/24 1643) Vital Signs (24h Range):  Temp:  [97 °F (36.1 °C)-98.2 °F (36.8 °C)] 97 °F (36.1 °C)  Pulse:  [100-113] 108  Resp:  [18-21] 20  SpO2:  [94 %-98 %] 98 %  BP: ()/(48-69) 119/63     Weight: 110.4 kg (243 lb 6.2 oz)  Body mass index is 43.11 kg/m².    Intake/Output Summary (Last 24 hours) at 12/2/2024 1731  Last data filed at 12/2/2024 0850  Gross per 24 hour   Intake --   Output 1 ml   Net -1 ml         Physical Exam  Vitals reviewed.   Constitutional:       Appearance: She is ill-appearing.   HENT:      Head: Normocephalic and atraumatic.      Mouth/Throat:      Mouth: Mucous membranes are moist.      Pharynx: Oropharynx is clear.   Eyes:      Extraocular Movements: Extraocular movements intact.      Conjunctiva/sclera: Conjunctivae normal.   Cardiovascular:      Rate and Rhythm: Regular rhythm. Tachycardia present.      Pulses: Normal pulses.      Heart sounds: Murmur heard.   Pulmonary:      Effort: Pulmonary effort is normal.      Breath sounds: Rhonchi present.   Abdominal:      General: Bowel sounds are normal.      Palpations: Abdomen is soft.      Tenderness: There is abdominal tenderness. There is no guarding or rebound.   Musculoskeletal:         General: Normal range of motion.      Cervical back: Normal range of motion and neck supple.   Skin:     General: Skin is warm and dry.   Neurological:      General: No focal deficit present.      Mental Status: She is alert. Mental status is at baseline. She is disoriented.      Motor: Weakness present.             Significant Labs: All pertinent labs within the past 24 hours have been reviewed.  CBC:   Recent Labs   Lab 12/01/24  0614 12/02/24  0823   WBC 5.11 7.44   HGB 10.0* 9.5*   HCT 31.9* 29.9*   * 127*     CMP:   Recent Labs   Lab 12/01/24  0614 12/02/24  0601    138   K 3.4* 3.4*    106   CO2 23 20*    87   BUN 39* 32*   CREATININE 1.0 0.9   CALCIUM 7.5* 7.2*   PROT  4.5* 4.5*   ALBUMIN 1.6* 1.6*   BILITOT 0.3 0.3   ALKPHOS 98 88   AST 14 16   ALT 14 17   ANIONGAP 12 12       Significant Imaging: I have reviewed all pertinent imaging results/findings within the past 24 hours.    Assessment and Plan     * Sepsis  --leukocytosis resolved, normotensive, afebrile   --Blood Cultures (11/23)- growing  COAGULASE-NEGATIVE STAPHYLOCOCCUS- suspect contaminant  --ID following, continued IV ertapenem for cystitis- appreciate recs  --repeat cultures ordered on 11/26/2024, follow up results  --Organ dysfunction indicated by Acute kidney injury and Encephalopathy     11/28/2024  Improving  See acute cystitis and C. diff    Thrombocytopenia  The likely etiology of thrombocytopenia is sepsis. The patients 3 most recent labs are listed below.  Recent Labs     11/30/24 0457 12/01/24  0614 12/02/24  0823   * 122* 127*     Plan  - Will transfuse if platelet count is <50k (if undergoing surgical procedure or have active bleeding).        Hypophosphatemia  Resolved    Hyponatremia  Resolved    Hypomagnesemia  Patient has Abnormal Magnesium: hypomagnesemia. Will continue to monitor electrolytes closely. Will replace the affected electrolytes and repeat labs to be done after interventions completed. The patient's magnesium results have been reviewed and are listed below.  Recent Labs   Lab 12/02/24  0601   MG 1.6        Hyperkalemia  Resolved  Recent Labs     11/30/24  0457 12/01/24  0614 12/02/24  0601   K 4.1 3.4* 3.4*     Plan  - Monitor for arrhythmias with EKG and/or continuous telemetry.   Resolved              C. difficile diarrhea  11/28/2024  Procalcitonin rising.   Discussed with micro lab in [place]. C.diff from 11/23/2024 confirmed to be positive.   ID started PO Vanc Q6H.     Encephalopathy, metabolic  --improving  --monitor clinically      Candidal intertrigo and dermatitis  Severe erythema to breasts, pannus, groin, and buttock with some skin breakdown   Appears to be candida  overgrowth   IV Diflucan     11/28/2024  Transitioned to PO fluconazole    Metabolic acidosis  Likely secondary to ELIZABETH/sepsis  On p.o. bicarb    Acute cystitis  -Hx ESBL, frequent UTIs, significant resistances (prior cultures at [facility], prior cultures as well in our system)  -Consult ID, appreciate reccs  -Currently on ertapenem and linezolid which should be sufficient to cover her  -Unfortunately the urine culture is marked as canceled, unsure of the reason --> called micro lab in [place], quantity was not sufficient for urine culture  -Ordering another UA reflex to culture although will have low sensitivity at this point  -Blood cultures pending    11/26/2024  Urine cultures growing gram negative spp  ID following, appreciate recs  Continue ertapenem, monitor fever curve  Follow up culture susceptibilities    11/28/2024  Procalcitonin rising. Urine cultures confirm ESBL E. Coli. Antibiotics narrowed by ID.   Continue to monitor   Appreciate ID recs  disposition: [facility] once insurance authorization obtained    Recurrent major depressive disorder, in partial remission  Patient has persistent depression which is mild and is currently controlled. Will Continue anti-depressant medications. We will not consult psychiatry at this time. Patient does not display psychosis at this time. Continue to monitor closely and adjust plan of care as needed.    Cont Abilify and Xanax prn   Hold Doxepin for now while on Zyvox         Chronic pain disorder  Holding home Lyrica and Norco given lethargy in the setting of sepsis      ELIZABETH (acute kidney injury)  ELIZABETH is likely due to pre-renal azotemia due to dehydration. Baseline creatinine is  1.0 . Most recent creatinine and eGFR are listed below.  Recent Labs     11/30/24  0457 12/01/24  0614 12/02/24  0601   CREATININE 1.4 1.0 0.9   EGFRNORACEVR 41* >60 >60        Plan  - ELIZABETH resolved  - Avoid nephrotoxins and renally dose meds for GFR listed above  -  Monitor urine output, serial BMP    Morbid obesity with BMI of 40.0-44.9, adult  Body mass index is 43.11 kg/m². Morbid obesity complicates all aspects of disease management from diagnostic modalities to treatment. Weight loss encouraged and health benefits explained to patient.         Iron deficiency anemia  Anemia is likely due to chronic disease due to Chronic Kidney Disease. Most recent hemoglobin and hematocrit are listed below.  Recent Labs     11/30/24  0457 12/01/24  0614 12/02/24  0823   HGB 9.9* 10.0* 9.5*   HCT 31.9* 31.9* 29.9*     Plan  - Monitor serial CBC: Daily  - Transfuse PRBC if patient becomes hemodynamically unstable, symptomatic or H/H drops below 7/21.  - Patient's anemia is currently stable      Hypothyroid        HTN (hypertension)  Patients blood pressure range in the last 24 hours was: BP  Min: 80/50  Max: 129/59.The patient's inpatient anti-hypertensive regimen is listed below       Plan  Hold antihypertensives      VTE Risk Mitigation (From admission, onward)           Ordered     heparin (porcine) injection 5,000 Units  Every 8 hours         11/23/24 1441     IP VTE HIGH RISK PATIENT  Once         11/23/24 1441     Place sequential compression device  Until discontinued         11/23/24 1441                    Discharge Planning   ELADIA:      Code Status: Full Code   Is the patient medically ready for discharge?:      Discharge Plan A: Skilled Nursing Facility   Discharge Delays: None known at this time            Please place Justification for DME        Malia Henry MD  Department of Hospital Medicine   [facility] - Telemetry ([facility])

## 2024-12-02 NOTE — PT/OT/SLP PROGRESS
"Occupational Therapy   Treatment    Name: Divya Bui  MRN: [id number]  Admitting Diagnosis:  Sepsis       Recommendations:     Discharge Recommendations: Moderate Intensity Therapy  Discharge Equipment Recommendations:  to be determined by next level of care  Barriers to discharge:  None    Assessment:     Divya Bui is a 64 y.o. female with a medical diagnosis of Sepsis.  She presents with the following performance deficits affecting function are weakness, impaired endurance, impaired self care skills, impaired functional mobility, gait instability, decreased safety awareness, impaired balance, impaired cardiopulmonary response to activity, decreased upper extremity function, decreased lower extremity function.     Rehab Prognosis:  Fair; patient would benefit from acute skilled OT services to address these deficits and reach maximum level of function.       Plan:     Patient to be seen 2 x/week to address the above listed problems via self-care/home management, therapeutic activities, therapeutic exercises  Plan of Care Expires: 12/08/24  Plan of Care Reviewed with: patient, son    Subjective     Chief Complaint: "I am so tired"  Patient/Family Comments/goals: to get stronger   Pain/Comfort:  Pain Rating 1: 0/10    Objective:     Communicated with: nurse prior to session.  Patient found supine with bed alarm, peripheral IV upon OT entry to room.    General Precautions: Standard, fall, contact, special contact (cdiff)    Orthopedic Precautions:N/A  Braces: N/A  Respiratory Status: Room air     Occupational Performance:     Bed Mobility:    Patient completed Scooting with maximal assistance and 2 persons  Pt req max x 2 persons for anterior and posterior scooting to properly and safely position while seated EOB with close SBA.   Pt seated EOB ~15 mins for increased upright tolerance req for functional self care tasks  Patient completed Supine to Sit with moderate assistance req increased time to complete with vc " for technique using bed rail to assist     Functional Mobility/Transfers:  Patient completed Sit <> Stand Transfer with maximal assistance and of 2 persons  with  rolling walker    Pt completed 1 full stand with RW and 4 half stands 2' to increased fatigue and confusion this AM. Pt req extended rest breaks throughout tx session.   Activities of Daily Living:  Grooming: stand by assistance seated EOB while brushing hair       AMPAC 6 Click ADL: 11    Treatment & Education:  OT role, plan of care, progression of goals, importance of continued OOB activity, ADL/functional transfer and mobility retraining, call don't fall, safety precautions, fall prevention. Encouraged completion of B UE AROM therex throughout the day to increase functional strength and activity tolerance needed for ADL completion. Pt and son agreeable to POC.        Patient left sitting edge of bed with all lines intact, call button in reach, son present, and positioned to be supported sitting with cushions and pillows.    GOALS:   Multidisciplinary Problems       Occupational Therapy Goals          Problem: Occupational Therapy    Goal Priority Disciplines Outcome Interventions   Occupational Therapy Goal     OT, PT/OT Progressing    Description: O.T. GOAL TO BE MET BY 12-08-24  PT WILL TOLERATE 1 SET X 12 REPS B UE ROM EXERCISE  SBA WITH UE DRESSING  SBA WITH BSC TRANSFER                       Time Tracking:     OT Date of Treatment: 12/02/24  OT Start Time: 0945  OT Stop Time: 1015  OT Total Time (min): 30 min    Billable Minutes:Therapeutic Activity 30  MAHESH Carrington  A client care conference was performed between the OTR/L and ARAGON, prior to treatment by ARAGON, to discuss the patient's status, treatment plan and established goals.        OT/KIKE: KIKE     Number of KIKE visits since last OT visit: 2    12/2/2024

## 2024-12-02 NOTE — ASSESSMENT & PLAN NOTE
The likely etiology of thrombocytopenia is sepsis. The patients 3 most recent labs are listed below.  Recent Labs     11/30/24  0457 12/01/24  0614 12/02/24  0823   * 122* 127*     Plan  - Will transfuse if platelet count is <50k (if undergoing surgical procedure or have active bleeding).

## 2024-12-02 NOTE — ASSESSMENT & PLAN NOTE
Body mass index is 43.11 kg/m². Morbid obesity complicates all aspects of disease management from diagnostic modalities to treatment. Weight loss encouraged and health benefits explained to patient.

## 2024-12-02 NOTE — ASSESSMENT & PLAN NOTE
ELIZABETH is likely due to pre-renal azotemia due to dehydration. Baseline creatinine is  1.0 . Most recent creatinine and eGFR are listed below.  Recent Labs     11/30/24  0457 12/01/24  0614 12/02/24  0601   CREATININE 1.4 1.0 0.9   EGFRNORACEVR 41* >60 >60        Plan  - ELIZABETH resolved  - Avoid nephrotoxins and renally dose meds for GFR listed above  - Monitor urine output, serial BMP

## 2024-12-02 NOTE — ASSESSMENT & PLAN NOTE
Patients blood pressure range in the last 24 hours was: BP  Min: 80/50  Max: 129/59.The patient's inpatient anti-hypertensive regimen is listed below       Plan  Hold antihypertensives

## 2024-12-02 NOTE — ASSESSMENT & PLAN NOTE
Follow primary team    12/01- will send RPR/FTA  Will monitor   Discussed concerns of children about AMS with primary team

## 2024-12-02 NOTE — PLAN OF CARE
Nutrition Recommendations/Interventions on 12/2/24:   1. Recommend pt continues on a Diabetic 2000 calorie diet as medically appropriate  2. Recommend pt continues Boost glucose control TID to assist filling nutritional gaps   3. Encourage PO and supplement intake, recommend feeding assistance   4. Recommend Banatrol TID to assist with diarrhea or BID for loose stools as warranted   5. Recommend 500 mg/day vitamin C supplementation and 220 mg/day zinc supplementation for wound healing. Also recommend re-ordering Kenn BID if pt willing to consume.  6. Weigh twice weekly   7. Continue to monitor % intake meals + supplements. May benefit from temporary EN via NG if intake remains 0-25% and if NG placement would align with pt/family wishes for goals of care.    Goals:   1. Pt will tolerate and consume >75% EEN and EPN prior to RD follow up (progressing)  2. Pt's diarrhea/ Loose stools will improve prior to RD follow up (progressing)  3. Pt will receive vitamin C supplementation prior to RD follow up (goal not met)  Nutrition Goal Status: progressing  Communication of RD Recs: other (comment) (POC, sticky note)    Christie Ramirez, RD, LDN, CNSC

## 2024-12-02 NOTE — ASSESSMENT & PLAN NOTE
Patient has Abnormal Magnesium: hypomagnesemia. Will continue to monitor electrolytes closely. Will replace the affected electrolytes and repeat labs to be done after interventions completed. The patient's magnesium results have been reviewed and are listed below.  Recent Labs   Lab 12/02/24  0601   MG 1.6

## 2024-12-02 NOTE — PLAN OF CARE
SW meet with Patient and son at bedside regarding SNF placement. Patient's son stated they live in [place], [place] and Patient has experience with [facility] for SNF and wished for placement there. SW stated she has not heard from [facility] but she would reach out to liaison.  SW also stated she would reach out to [facility] also. Patient and son verbalized understanding.     SW reached out to [facility] and spoke to Evette. Per Evette, Tushar with admissions was away from her desk but she could leave a message. SW left a message with Evette, for Tushar to let SW know if they could accept Patient for care.     SW reached out to [facility] [place], with [facility] to see if they could accept Patient for care. Per [facility], they cannot accept due to wounds at this time. SW verbalized understanding.     12 16 SW received a call from patient's son, Herbert, stating he was agreeable to [facility], if [facility] could not accept. SW stated she has yet to hear form [facility] and [facility] has denied Patient for care. Patient's son verbalized understanding and stated he was just wanting the best care for the Patient. SW verbalized understanding and stated patient does have other facilities in [place] accepting patient. SW encouraged patient's son to research some facilities in [place] and [place] that have accepted Patient. Patient's son stated he will research facilities in [place] and have secondary options for if [facility] denied Patient. MIGUEL ANGEL verbalized understanding and stated she would touch base this afternoon.     14 16 MIGUEL ANGEL called Tushar, with [facility], to see if they could accept Patient for care at [facility]. Per Tushar, their DON has referral but has not yet accepted or denied. Per Tushar, she can let SW know around 3:30 pm this afternoon if they can accept or deny. MIGUEL ANGEL verbalized understanding.     MIGUEL ANGEL will  continue to follow and assist as needed.

## 2024-12-02 NOTE — ASSESSMENT & PLAN NOTE
on Ertapenem /Zyvox based on previous cultures   Will follow repeat urine culture .    Will deescalate soon    11/26- will follow ID of GNR in urine -11/25/24  Stop Zyvox  On Invanz    11/27-urine has less than 10K ESBL UTI- will stop Ertapenem    Unremarkable

## 2024-12-02 NOTE — SUBJECTIVE & OBJECTIVE
Interval History:   She is tolerating meds.    Urine culture- 11/25- GNR    11/27- she has diarrhoea and had C difficile ordered 11/23 11/29- she has occ confusion   Tolerating meds   12/01/24-  Diarrhoea has improved    Review of Systems   Constitutional:  Negative for activity change, appetite change, chills and diaphoresis.   Allergic/Immunologic: Negative for environmental allergies and food allergies.     Objective:     Vital Signs (Most Recent):  Temp: 97.6 °F (36.4 °C) (12/02/24 0431)  Pulse: 109 (12/02/24 0431)  Resp: (!) 21 (12/02/24 0431)  BP: (!) 129/59 (12/02/24 0431)  SpO2: (!) 94 % (12/02/24 0431) Vital Signs (24h Range):  Temp:  [97.3 °F (36.3 °C)-97.9 °F (36.6 °C)] 97.6 °F (36.4 °C)  Pulse:  [109-114] 109  Resp:  [16-21] 21  SpO2:  [94 %-98 %] 94 %  BP: ()/(48-68) 129/59     Weight: 110.4 kg (243 lb 6.2 oz)  Body mass index is 43.11 kg/m².    Estimated Creatinine Clearance: 66 mL/min (based on SCr of 1 mg/dL).     Physical Exam  Vitals and nursing note reviewed.   Cardiovascular:      Rate and Rhythm: Normal rate.   Abdominal:      General: Abdomen is flat.   Musculoskeletal:         General: Normal range of motion.      Cervical back: Normal range of motion.   Skin:     General: Skin is warm.   Neurological:      General: No focal deficit present.      Mental Status: She is alert.      Comments: Has confusion   Psychiatric:         Mood and Affect: Mood normal.          Significant Labs: Blood Culture:   Recent Labs   Lab 11/23/24  1144 11/23/24  1318 11/26/24  1429 11/26/24  1529   LABBLOO No growth after 5 days. Gram stain shane bottle: Gram positive cocci in clusters resembling Staph  Results called to and read back by: Cheryl Luciano RN  11/24/2024 23:07  COAGULASE-NEGATIVE STAPHYLOCOCCUS SPECIES  Organism is a probable contaminant  * No growth after 5 days. No growth after 5 days.     CBC:   Recent Labs   Lab 12/01/24  0614   WBC 5.11   HGB 10.0*   HCT 31.9*   *     CMP:   Recent Labs  "  Lab 12/01/24  0614      K 3.4*      CO2 23      BUN 39*   CREATININE 1.0   CALCIUM 7.5*   PROT 4.5*   ALBUMIN 1.6*   BILITOT 0.3   ALKPHOS 98   AST 14   ALT 14   ANIONGAP 12     Urine Culture:   Recent Labs   Lab 06/17/24  1609 11/25/24  0532   LABURIN KLEBSIELLA OXYTOCA ESBL  >100,000 cfu/ml  * ESCHERICHIA COLI ESBL  < 10,000 cfu/ml  *     Urine Studies:   Recent Labs   Lab 11/25/24  0531 11/25/24  0532   COLORU Yellow  --    APPEARANCEUA Cloudy*  --    PHUR 6.0  --    SPECGRAV 1.020  --    PROTEINUA 1+*  --    GLUCUA Negative  --    KETONESU Negative  --    BILIRUBINUA Negative  --    OCCULTUA 1+*  --    NITRITE Negative  --    UROBILINOGEN Negative  --    LEUKOCYTESUR 3+*  --    RBCUA  --  11*   WBCUA  --  >100*   BACTERIA  --  None   SQUAMEPITHEL  --  11   HYALINECASTS  --  0     Wound Culture: No results for input(s): "LABAERO" in the last 4320 hours.  All pertinent labs within the past 24 hours have been reviewed.    Significant Imaging: I have reviewed all pertinent imaging results/findings within the past 24 hours.  "

## 2024-12-02 NOTE — PROGRESS NOTES
[facility] - Telemetry ([facility])  Wound Care    Patient Name:  Divya Bui   MRN:  [id number]  Date: 12/2/2024  Diagnosis: Sepsis    History:     Past Medical History:   Diagnosis Date    Anemia     Anxiety     Basal cell carcinoma (BCC) of face 2/26/2020    Blood transfusion     Bowel incontinence     Depression     Esophageal stricture     GERD (gastroesophageal reflux disease)     Hypertension     Latent tuberculosis by skin test 2001    took INH    Liver nodule 1/6/2014    Morbid obesity with BMI of 45.0-49.9, adult     OA (osteoarthritis) of knee 12/12/2014    Pericardial effusion 9/4/2014       Social History     Socioeconomic History    Marital status:    Tobacco Use    Smoking status: Never    Smokeless tobacco: Never   Substance and Sexual Activity    Alcohol use: Not Currently    Drug use: No    Sexual activity: Not Currently     Partners: Male     Birth control/protection: Post-menopausal     Social Drivers of Health     Financial Resource Strain: Low Risk  (11/23/2024)    Overall Financial Resource Strain (CARDIA)     Difficulty of Paying Living Expenses: Not very hard   Food Insecurity: No Food Insecurity (11/23/2024)    Hunger Vital Sign     Worried About Running Out of Food in the Last Year: Never true     Ran Out of Food in the Last Year: Never true   Transportation Needs: No Transportation Needs (11/23/2024)    TRANSPORTATION NEEDS     Transportation : No   Physical Activity: Inactive (12/28/2023)    Exercise Vital Sign     Days of Exercise per Week: 0 days     Minutes of Exercise per Session: 0 min   Stress: No Stress Concern Present (11/23/2024)    [employer] [place] [employer] - Occupational Stress Questionnaire     Feeling of Stress : Not at all   Housing Stability: Low Risk  (11/23/2024)    Housing Stability Vital Sign     Unable to Pay for Housing in the Last Year: No     Homeless in the Last Year: No       Precautions:     Allergies as of 11/23/2024 - Reviewed 11/23/2024    Allergen Reaction Noted    Metronidazole hcl Other (See Comments) 10/30/2013       [facility] Assessment Details/Treatment     F/U visit with Ms. Bui for ongoing wound care needs. She has been transferred out of ICU since last visit, awake and alert, denies pain.  Skin assessed.    Bilateral heels remain intact with blanchable redness. Heel foam dressings maintained.    Improving/resolving intertrigo noted to lower abdominal fold, groin folds, medial thighs, with IAD to perineum.  Cleansed all with no rinse foaming cleanser, washed with vashe, then InterDry sheets applied to abdominal folds. Small area of full thickness ulceration to mid lower abdominal fold coated with TRIAD paste and foam dressing applied. Desenex powder in use to groin folds and perineum, and TRIAD paste also applied to those sites.     Bilateral breasts re-assessed -  Left lower lateral breast fold stage 3 PI now measures 1.6x2.2x0.2cm  Right lower breast fold stage 3 PI improving, satellite lesions have re-epithelialized, now open area measures 3x4x0.3cm.  Wounds to bilateral breasts cleansed with vashe and allowed to dwell, patted dry. Thick layer of TRIAD hydrophilic wound dressing paste applied to cover ulcerations and secured with foam dressings.   Patient then turned to left side with assistance.  Evolving DTPI again noted to medial buttocks in large area that now measures 18x8x0.2 cm and encompasses sacrum, coccyx, bilateral medial buttock, extending to perineum and bilateral posterior labia. Majority of wound is now fading maroon discoloration, centrally nonblanchable surrounded with blanchable maroon and red discoloration, and scattered areas of full thickness tissue loss again noted with moist red and yellow adipose tissue and scant slough to wound beds (these are overlying coccyx and bilateral buttock region of wound). Entire area cleansed with with vashe, patted dry. Desenex powder applied per MAR, then thick layer TRIAD paste applied  to cover affected skin and left KIKE. Patient tolerated care well.     She is on Isotour bed with ERNST pump in place.    Recommendations made to primary team for ongoing wound care per orders.        12/02/24 0920   WOCN Assessment   WOCN Total Time (mins) 60   Visit Date 12/02/24   Visit Time 0920   Consult Type Follow Up   WOCN Speciality Wound   Wound pressure;moisture;At risk for pressure Injury   Intervention assessed;changed;applied;chart review;coordination of care;team conference;orders   Teaching on-going;complication        Wound 11/23/24 1130 Moisture associated dermatitis Right lateral;anterior Lower quadrant   Date First Assessed/Time First Assessed: 11/23/24 1130   Present on Original Admission: Yes  Primary Wound Type: Moisture associated dermatitis  Side: Right  Orientation: lateral;anterior  Location: Lower quadrant   Wound Image     Dressing Appearance Open to air   Drainage Amount Scant   Drainage Characteristics/Odor Serous   Appearance Pink;Red;Yellow;Moist;Adipose   Tissue loss description Full thickness   Periwound Area Intact   Care Cleansed with:;Antimicrobial agent;Applied:;Skin Barrier   Dressing Applied  (TRIAD paste, foam; InterDry sheet)        Wound 11/23/24 1130 Moisture associated dermatitis Perineum   Date First Assessed/Time First Assessed: 11/23/24 1130   Present on Original Admission: Yes  Primary Wound Type: Moisture associated dermatitis  Location: Perineum   Dressing Appearance Open to air   Drainage Amount None   Appearance Pink;Red   Care Cleansed with:;Antimicrobial agent;Applied:;Skin Barrier   Dressing Applied  (TRIAD paste)        Wound 11/25/24 0655 Pressure Injury medial Buttocks   Date First Assessed/Time First Assessed: 11/25/24 0655   Present on Original Admission: Yes  Primary Wound Type: Pressure Injury  Orientation: medial  Location: (c) Buttocks   Wound Image    Pressure Injury Stage 3   Dressing Appearance Open to air   Drainage Amount Scant   Drainage  Characteristics/Odor Serous   Appearance Red;Pink;Yellow;Adipose;Moist   Tissue loss description Full thickness   Periwound Area Maroon   Wound Length (cm) 18 cm   Wound Width (cm) 8 cm   Wound Depth (cm) 0.2 cm   Wound Volume (cm^3) 28.8 cm^3   Wound Surface Area (cm^2) 144 cm^2   Care Cleansed with:;Antimicrobial agent   Dressing Applied  (TRIAD paste, miconazole powder per MAR)        Wound 05/10/24 2000 Moisture associated dermatitis Left lateral;lower Abdomen   Date First Assessed/Time First Assessed: 05/10/24 2000   Present on Original Admission: Yes  Primary Wound Type: Moisture associated dermatitis  Side: Left  Orientation: lateral;lower  Location: Abdomen   Wound Image    Dressing Appearance Open to air   Drainage Amount None   Appearance Pink   Tissue loss description Not applicable   Care Cleansed with:;Antimicrobial agent   Dressing Applied  (InterDry sheet)        Wound 05/10/24 2000 Pressure Injury Right lateral;lower Breast   Date First Assessed/Time First Assessed: 05/10/24 2000   Present on Original Admission: Yes  Primary Wound Type: Pressure Injury  Side: Right  Orientation: lateral;lower  Location: Breast   Wound Image    Pressure Injury Stage 3   Dressing Appearance Intact;Moist drainage   Drainage Amount Small   Drainage Characteristics/Odor Serous   Appearance Red;Pink;Yellow;Adipose;Granulating;Moist   Tissue loss description Full thickness   Red (%), Wound Tissue Color 25 %   Yellow (%), Wound Tissue Color 75 %   Periwound Area Intact   Wound Edges Defined   Wound Length (cm) 3 cm   Wound Width (cm) 4 cm   Wound Depth (cm) 0.3 cm   Wound Volume (cm^3) 3.6 cm^3   Wound Surface Area (cm^2) 12 cm^2   Care Cleansed with:;Antimicrobial agent   Dressing Applied  (TRIAD, foam)        Wound 05/10/24 2000 Pressure Injury Left lower Breast   Date First Assessed/Time First Assessed: 05/10/24 2000   Present on Original Admission: Yes  Primary Wound Type: Pressure Injury  Side: Left  Orientation: lower   Location: Breast   Wound Image    Pressure Injury Stage 3   Dressing Appearance Intact   Drainage Amount Small   Drainage Characteristics/Odor Serous   Appearance Pink;Red;Yellow;Adipose;Moist;Granulating   Tissue loss description Full thickness   Red (%), Wound Tissue Color 25 %   Yellow (%), Wound Tissue Color 75 %   Periwound Area Intact   Wound Edges Open   Wound Length (cm) 1.6 cm   Wound Width (cm) 2.2 cm   Wound Depth (cm) 0.2 cm   Wound Volume (cm^3) 0.704 cm^3   Wound Surface Area (cm^2) 3.52 cm^2   Care Cleansed with:;Antimicrobial agent;Applied:;Skin Barrier   Dressing Applied  (TRIAD, foam)       12/02/2024

## 2024-12-02 NOTE — ASSESSMENT & PLAN NOTE
>>ASSESSMENT AND PLAN FOR DIARRHEA WRITTEN ON 11/28/2024  7:09 AM BY KIRTI CLAROS MD, FIDSA    Will follow stool assay- rule out c difficle    11/27- discussed with lab and stool send on 11/23  The stool was sent 11/23 when she was admitted- this is important to note if it becomes C difficile po  Add Po Vanco as C difficle is suspected   11/29- will complete 10 days of Po Vanco, needs close follow up    12/01- will treat for 10 days as planned  Follow closely

## 2024-12-02 NOTE — PROGRESS NOTES
[facility] Medicine  Progress Note    Patient Name: Divya Bui  MRN: [id number]  Patient Class: IP- Inpatient   Admission Date: 11/23/2024  Length of Stay: 8 days  Attending Physician: Rajesh Agustin MD  Primary Care Provider: Angel Khan MD        Subjective:     Principal Problem:Sepsis        HPI:  Divya Bui, a 64-year-old female with a PMHx of HTN, GERD, morbid obesity s/p gastric bypass, depression, anxiety, esophageal stricture, and recurrent ESBL UTIs, was admitted on 11/23 for a UTI with sepsis. She was transferred to the ICU after exhibiting signs of septic shock, including recurrent hypotension unresponsive to initial fluid resuscitation. Upon admission, she presented with acute cystitis. ID was consulted by  for antibiotic therapy recommendations. She was continued on ertapenem (Invanz) and linezolid (Zyvox). Urine cultures grew GNRs, and blood cultures were positive for coagulase-negative staphylococci; repeat cultures were pending as the initial result was likely a contaminant. She showed evidence of sepsis with organ dysfunction indicated by acute kidney injury and encephalopathy. A fluid challenge was provided in the ED on 11/23. Her latest lactate level on 11/25 was 1.2. Despite marginal improvement with Midodrine and albumin, she required initiation of levophed overnight, but this was weaned off on 11/26. After stabilization, she was stepped down to telemetry, and HM was re-consulted to assume care.    Overview/Hospital Course:  11/24:  Remains lethargic and intermittently confused, also hypotensive today to lowest of 73/39 taken on the right upper arm by the tech.  On my recheck with smaller size cuff she was 81/46, and then was 118/59 on her calf.  I am not sure about whether the low blood pressures are entirely accurate.  She continues to mentate the same.  Heart rates in the low 100s/110s.  Discussed with ICU and checked labs.  No evidence of  "bleeding.  Lactate is normal.  Have given 3 L of fluid  today.  Blood pressure this evening up to 92/51.   11/25/2024: transferred to ICU for pressor therapy. Levophed initiated in the evening. ID consulted for antibiotic recs.  11/26/2024: levophed weaned off this morning; stabilized thereafter. Deemed stable for transfer back to to telemetry.     11/27/2024  ELIZABETH steadily worsening. Restart IVFs. Consulted nephrology for guidance. Cultures pending. Still having multiple loose bowel movements. C. Diff ordered 11/23/2024 but still says "in process" in EPIC. Am currently on hold with Micro lab to inquire about results of study. disposition: [facility] once medically stable    11/28/2024  Mentation improved this morning. Procalcitonin rising. Discussed with micro lab in [place]. C.diff from 11/23/2024 confirmed to be positive. ID started PO Vanc Q6H. Nephrology evaluated. Added bicarb to IVFs. Labs this morning show improvement in renal function for the first time since admission. Appreciate Nephrology's recommendations/interventions. Urine cultures confirm ESBL E. Coli. Antibiotics narrowed by ID. disposition: [facility] once medically stable    11/29/2024  Labs steadily improving. Discussed case with Nephrology. There is some concern for vesicolic fistula given recurrent UTIs. Discussed with ID. Will order CT abdomen/pelvis with rectal contrast to further investigate.    11/30/2024  CT abdomen/pelvis shows no evidence of fistula. IV Antibiotics weaned. PO vancomycin started for 10d therapy. Patient now medically stable for discharge to [facility].    11/27/2024  ELIZABETH steadily worsening. Restart IVFs. Consulted nephrology for guidance. Cultures pending. Still having multiple loose bowel movements. C. Diff ordered 11/23/2024 but still says "in process" in EPIC. Am currently on hold with Micro lab to inquire about results of study. disposition: [facility] once medically stable    11/28/2024  Mentation improved this morning. Procalcitonin " "rising. Discussed with micro lab in [place]. C.diff from 11/23/2024 confirmed to be positive. ID started PO Vanc Q6H. Nephrology evaluated. Added bicarb to IVFs. Labs this morning show improvement in renal function for the first time since admission. Appreciate Nephrology's recommendations/interventions. Urine cultures confirm ESBL E. Coli. Antibiotics narrowed by ID. disposition: SNF once medically stable    11/29/2024  Labs steadily improving. Discussed case with Nephrology. There is some concern for vesicolic fistula given recurrent UTIs. Discussed with ID. Will order CT abdomen/pelvis with rectal contrast to further investigate.    11/30/2024  CT abdomen/pelvis shows no evidence of fistula. IV Antibiotics weaned. PO vancomycin started for 10d therapy. Patient now medically stable for discharge to SNF.    12/01/2024  Orientation waxing and waning. Worse in the AM. However, has been consistently cooperative with PT/OT. Still a great candidate for SNF. Referral placed. Anticipate discharge to SNF Monday/early next week.    Interval history:  Less confused this morning. NAEON.    Objective:   /65 (BP Location: Right arm, Patient Position: Lying)   Pulse 109   Temp 97.6 °F (36.4 °C) (Oral)   Resp 19   Ht 5' 3" (1.6 m)   Wt 110.4 kg (243 lb 6.2 oz)   SpO2 97%   BMI 43.11 kg/m²     Intake/Output Summary (Last 24 hours) at 12/1/2024 1950  Last data filed at 12/1/2024 1634  Gross per 24 hour   Intake 360 ml   Output --   Net 360 ml         PHYSICAL EXAM  Vitals reviewed  Constitutional:       General: She is not in acute distress.     Appearance: She is obese. She is ill-appearing.   HENT:      Head: Normocephalic.      Nose: Nose normal.      Mouth/Throat:      Mouth: Mucous membranes are moist.   Eyes:      Pupils: Pupils are equal, round, and reactive to light.   Cardiovascular:      Rate and Rhythm: Normal rate and regular rhythm.      Pulses: Normal pulses.      Heart sounds: Normal heart sounds. " "  Pulmonary:      Effort: Pulmonary effort is normal.      Breath sounds: Normal breath sounds.   Abdominal:      General: Bowel sounds are normal. There is no distension.      Palpations: Abdomen is soft.      Tenderness: There is no abdominal tenderness.   Musculoskeletal:         General: No swelling.   Skin:     General: Skin is warm and dry.   Neurological:      Mental Status: She is alert. She is disoriented.      Motor: Weakness present.      Comments: Oriented to self and place, but not time    LABS  All labs from the past 24 hours were reviewed.     BMP:   Recent Labs   Lab 12/01/24  0614         K 3.4*      CO2 23   BUN 39*   CREATININE 1.0   CALCIUM 7.5*   MG 1.8     CBC:   Recent Labs   Lab 11/30/24 0457 12/01/24  0614   WBC 4.97 5.11   HGB 9.9* 10.0*   HCT 31.9* 31.9*   * 122*     CMP:   Recent Labs   Lab 11/30/24 0457 12/01/24  0614    141   K 4.1 3.4*    106   CO2 22* 23   * 109   BUN 49* 39*   CREATININE 1.4 1.0   CALCIUM 7.6* 7.5*   PROT 4.7* 4.5*   ALBUMIN 1.6* 1.6*   BILITOT 0.3 0.3   ALKPHOS 104 98   AST 16 14   ALT 11 14   ANIONGAP 9 12     Cardiac Markers:   No results for input(s): "CKMB", "MYOGLOBIN", "BNP", "TROPISTAT" in the last 48 hours.    Coagulation:   No results for input(s): "PT", "INR", "APTT" in the last 48 hours.    Lactic Acid:   No results for input(s): "LACTATE" in the last 48 hours.    Magnesium:   Recent Labs   Lab 11/30/24 0457 12/01/24  0614   MG 2.0 1.8     Troponin:   No results for input(s): "TROPONINI", "TROPONINIHS" in the last 48 hours.    TSH:   Recent Labs   Lab 11/23/24  1506   TSH 0.831     Urine Studies:   No results for input(s): "COLORU", "APPEARANCEUA", "PHUR", "SPECGRAV", "PROTEINUA", "GLUCUA", "KETONESU", "BILIRUBINUA", "OCCULTUA", "NITRITE", "UROBILINOGEN", "LEUKOCYTESUR", "RBCUA", "WBCUA", "BACTERIA", "SQUAMEPITHEL", "HYALINECASTS" in the last 48 hours.    Invalid input(s): "WRIGHTSUR"      IMAGING  All " imaging from the past 24 hours were reviewed.     Imaging Results              CT Head Without Contrast (Final result)  Result time 11/23/24 14:05:51      Final result by Lucio Romero MD (11/23/24 14:05:51)                   Impression:      No acute intracranial CT abnormality.    All CT scans at this facility are performed  using dose modulation techniques as appropriate to performed exam including the following:  automated exposure control; adjustment of mA and/or kV according to the patients size (this includes techniques or standardized protocols for targeted exams where dose is matched to indication/reason for exam: i.e. extremities or head);  iterative reconstruction technique.      Electronically signed by: Lucio Romero  Date:    11/23/2024  Time:    14:05               Narrative:    EXAMINATION:  CT HEAD WITHOUT CONTRAST    CLINICAL HISTORY:  Mental status change, unknown cause;    TECHNIQUE:  Low dose axial CT images obtained throughout the head without intravenous contrast. Sagittal and coronal reconstructions were performed.    COMPARISON:  Multiple prior reports    FINDINGS:  Intracranial compartment:    Ventricles and sulci are normal in size for age without evidence of hydrocephalus. No extra-axial blood or fluid collections.    The brain parenchyma appears normal. No parenchymal mass, hemorrhage, edema or major vascular distribution infarct.    Skull/extracranial contents (limited evaluation): No fracture. Mastoid air cells and paranasal sinuses are essentially clear.                                       X-Ray Chest AP Portable (Final result)  Result time 11/23/24 12:10:43      Final result by Lucio Romero MD (11/23/24 12:10:43)                   Impression:      No acute abnormality.      Electronically signed by: Lucio Romero  Date:    11/23/2024  Time:    12:10               Narrative:    EXAMINATION:  XR CHEST AP PORTABLE    CLINICAL HISTORY:  Sepsis;    TECHNIQUE:  Single frontal  view of the chest was performed.    COMPARISON:  Multiple    FINDINGS:  The lungs are clear, with normal appearance of pulmonary vasculature and no pleural effusion or pneumothorax.    The cardiac silhouette is normal in size. The hilar and mediastinal contours are unremarkable.    Bones are intact.                                        Assessment/Plan:      * Sepsis  --leukocytosis resolved, normotensive, afebrile   --Blood Cultures (11/23)- growing  COAGULASE-NEGATIVE STAPHYLOCOCCUS- suspect contaminant  --ID following, continued IV ertapenem for cystitis- appreciate recs  --repeat cultures ordered on 11/26/2024, follow up results  --Organ dysfunction indicated by Acute kidney injury and Encephalopathy     12/01/2024  Improving  See acute cystitis and C. Diff    C. Diff positive diarrhea  11/28/2024  Procalcitonin rising.   Discussed with micro lab in [place]. C.diff from 11/23/2024 confirmed to be positive.   ID started PO Vanc Q6H.     11/30/2024  Procalcitonin downtrending since starting treatment for C. Diff  Medically cleared to discharge to [facility]    Acute cystitis  -Hx ESBL, frequent UTIs, significant resistances (prior cultures at [facility] through [facility], prior cultures as well in our system)  -Consult ID, appreciate reccs  -Currently on ertapenem and linezolid which should be sufficient to cover her  -Unfortunately the urine culture is marked as canceled, unsure of the reason --> called micro lab in [place], quantity was not sufficient for urine culture  -Ordering another UA reflex to culture although will have low sensitivity at this point  -Blood cultures pending    11/26/2024  Urine cultures growing gram negative spp  ID following, appreciate recs  Continue ertapenem, monitor fever curve  Follow up culture susceptibilities    11/28/2024  Procalcitonin rising. Urine cultures confirm ESBL E. Coli. Antibiotics narrowed by ID.   Continue to monitor   Appreciate ID recs  disposition: [facility]  once medically stable    11/30/2024  E. Coli < 10k, antibiotics discontinued by ID  Monitor clinically  CT with rectal contrast negative for fistula    Encephalopathy, metabolic  --improving  --monitor clinically    11/29/2024  Recurred  CT head to rule out indolent stroke  Suspect hospital delirium/sundowning to be contributing  Reassess in the afternoon    12/01/2024  CT negative for stroke  Mentation waxes and wanes, suspect hospital delirium  Discussed with patient's sons yesterday afternoon    Candidal intertrigo and dermatitis  Severe erythema to breasts, pannus, groin, and buttock with some skin breakdown   Appears to be candida overgrowth   IV Diflucan     11/29/2024  Transitioned to PO fluconazole    ELIZABETH (acute kidney injury)  Metabolic acidosis  12/01/2024  Nephrology evaluated  Bicarb added back to IVFs  Renal function improved on this morning's labs    Recurrent major depressive disorder, in partial remission  Patient has persistent depression which is mild and is currently controlled. Will Continue anti-depressant medications. We will not consult psychiatry at this time. Patient does not display psychosis at this time. Continue to monitor closely and adjust plan of care as needed.    Cont Abilify and Xanax prn   Hold Doxepin for now while on Zyvox     Chronic pain disorder  Holding home Lyrica and Norco given lethargy in the setting of sepsis    Morbid obesity with BMI of 40.0-44.9, adult  Body mass index is 38.97 kg/m². Morbid obesity complicates all aspects of disease management from diagnostic modalities to treatment. Weight loss encouraged and health benefits explained to patient.     HTN (hypertension)  Patients blood pressure range in the last 24 hours was: BP  Min: 73/39  Max: 155/74.The patient's inpatient anti-hypertensive regimen is listed below         VTE Risk Mitigation (From admission, onward)           Ordered     heparin (porcine) injection 5,000 Units  Every 8 hours         11/23/24  1441     IP VTE HIGH RISK PATIENT  Once         11/23/24 1441     Place sequential compression device  Until discontinued         11/23/24 1441                    Discharge Planning   ELADIA:      Code Status: Full Code   Is the patient medically ready for discharge?:     Reason for patient still in hospital (select all that apply): Patient trending condition, Laboratory test, Treatment, and Consult recommendations  Discharge Plan A: Skilled Nursing Facility   Discharge Delays: None known at this time            Rajesh Agustin MD  Department of Hospital Medicine   [facility] ([facility])

## 2024-12-02 NOTE — PLAN OF CARE
Problem: Adult Inpatient Plan of Care  Goal: Plan of Care Review  Outcome: Progressing  Goal: Patient-Specific Goal (Individualized)  Outcome: Progressing  Goal: Absence of Hospital-Acquired Illness or Injury  Outcome: Progressing  Goal: Optimal Comfort and Wellbeing  Outcome: Progressing  Goal: Readiness for Transition of Care  Outcome: Progressing     Problem: Infection  Goal: Absence of Infection Signs and Symptoms  Outcome: Progressing     Problem: Sepsis/Septic Shock  Goal: Optimal Coping  Outcome: Progressing  Goal: Absence of Bleeding  Outcome: Progressing  Goal: Blood Glucose Level Within Targeted Range  Outcome: Progressing  Goal: Absence of Infection Signs and Symptoms  Outcome: Progressing  Goal: Optimal Nutrition Intake  Outcome: Progressing     Problem: Wound  Goal: Optimal Coping  Outcome: Progressing  Goal: Optimal Functional Ability  Outcome: Progressing  Goal: Absence of Infection Signs and Symptoms  Outcome: Progressing  Goal: Improved Oral Intake  Outcome: Progressing  Goal: Optimal Pain Control and Function  Outcome: Progressing  Goal: Skin Health and Integrity  Outcome: Progressing  Goal: Optimal Wound Healing  Outcome: Progressing     Problem: Skin Injury Risk Increased  Goal: Skin Health and Integrity  Outcome: Progressing     Problem: Fall Injury Risk  Goal: Absence of Fall and Fall-Related Injury  Outcome: Progressing

## 2024-12-02 NOTE — PT/OT/SLP PROGRESS
Physical Therapy  Treatment    Divya Bui   MRN: [id number]   Admitting Diagnosis: Sepsis    PT Received On: 12/02/24  PT Start Time: 0930     PT Stop Time: 1015    PT Total Time (min): 45 min       Billable Minutes:  Therapeutic Activity 45    Treatment Type: Treatment  PT/PTA: PTA     Number of PTA visits since last PT visit: 3       General Precautions: Standard, contact, fall, special contact  Orthopedic Precautions: N/A  Braces: N/A  Respiratory Status: Room air    Spiritual, Cultural Beliefs, Mu-ism Practices, Values that Affect Care: no    Subjective:  Communicated with patient's nurse, Ronna, and completed Epic chart review prior to session.  Patient agreed to PT session. Remains moderately confused requiring redirection at times.   Re orientation provided as needed throughout session.     Pain/Comfort  Pain Rating 1: 0/10  Pain Rating Post-Intervention 1: 0/10    Objective:   Patient found with: peripheral IV    Supine > sit EOB: Mod A     Forward scoot towards EOB: Max A of 2    STS from EOB > RW x5 attempts total requiring Max A of 2:   1 full stand achieved   Remainder were 1/2 stands  Required moderate length seated rest breaks between trials due to fatigue.    Due to patient's inability to achieve full stand more than momentarily and only once, chair transfer not initiated today for patient's safety.    Seated EOB x 15 min total focusing on increased tolerance to upright position, core stability, trunk control and CV endurance.   Maintained self supported sitting balance with close SBA  Unable to maintain unsupported sitting balance at this time    Educated patient on importance of increased tolerance to upright position and direct impact on CV endurance and strength. Patient encouraged to utilize elevated HOB/supported sitting EOB to simulate chair position until able to safely complete chair T/F. Patient given a minimum goal of majority of the day to be spent in upright, especially with all meals.  Encouraged patient to perform AROM TE to BLE throughout the day within all available planes of motion. Re enforced importance of utilizing call light to meet needs in room and not attempt to get up without staff assistance. Patient verbalized understanding and agreed to comply.      AM-PAC 6 CLICK MOBILITY  How much help from another person does this patient currently need?   1 = Unable, Total/Dependent Assistance  2 = A lot, Maximum/Moderate Assistance  3 = A little, Minimum/Contact Guard/Supervision  4 = None, Modified [place]/Independent    Turning over in bed (including adjusting bedclothes, sheets and blankets)?: 2  Sitting down on and standing up from a chair with arms (e.g., wheelchair, bedside commode, etc.): 2  Moving from lying on back to sitting on the side of the bed?: 2  Moving to and from a bed to a chair (including a wheelchair)?: 1  Need to walk in hospital room?: 1  Climbing 3-5 steps with a railing?: 1 (NT)  Basic Mobility Total Score: 9    AM-PAC Raw Score CMS G-Code Modifier Level of Impairment Assistance   6 % Total / Unable   7 - 9 CM 80 - 100% Maximal Assist   10 - 14 CL 60 - 80% Moderate Assist   15 - 19 CK 40 - 60% Moderate Assist   20 - 22 CJ 20 - 40% Minimal Assist   23 CI 1-20% SBA / CGA   24 CH 0% Independent/ Mod I     Patient left sitting edge of bed (supported with cushions & pillows) with call button in reach and son present.    Assessment:  Divya Bui is a 64 y.o. female with a medical diagnosis of Sepsis and presents with overall decline in functional mobility. Patient would continue to benefit from skilled PT to address functional limitations listed below in order to return to PLOF/decrease caregiver burden.     Rehab identified problem list/impairments: weakness, impaired endurance, impaired self care skills, impaired functional mobility, gait instability, impaired balance, impaired cognition, decreased coordination, decreased upper extremity function, decreased  lower extremity function, decreased safety awareness, decreased ROM, impaired cardiopulmonary response to activity    Rehab potential is fair.    Activity tolerance: Fair    Discharge recommendations: Moderate Intensity Therapy      Barriers to discharge:      Equipment recommendations: to be determined by next level of care     GOALS:   Multidisciplinary Problems       Physical Therapy Goals          Problem: Physical Therapy    Goal Priority Disciplines Outcome Interventions   Physical Therapy Goal     PT, PT/OT Progressing    Description: Goals to be met by 12/8/24.  1. Pt will complete bed mobility SBA.  2. Pt will complete sit to stand SBA.  3. Pt will ambulate 100ft SBA using RW.  4. Pt will increase AMPAC score by 2 points to progress functional mobility.                       PLAN:    Patient to be seen 3 x/week to address the above listed problems via gait training, therapeutic activities, therapeutic exercises  Plan of Care expires: 12/08/24  Plan of Care reviewed with: patient, son         12/02/2024

## 2024-12-02 NOTE — PLAN OF CARE
12/02/24 1514   Post-Acute Status   Post-Acute Authorization Placement  (SNF)   Post-Acute Placement Status Pending payor review/awaiting authorization (if required)   Coverage [employer] Managed Medicare   Discharge Delays None known at this time   Discharge Plan   Discharge Plan A Skilled Nursing Facility       Per Tushar, with [facility], they can accept Patient for care and can submit for authorization today. SW verbalized understanding and will notify patient's son.    Patient's PASRR/ 142 received and will be sent to [facility].    MIGUEL ANGEL will continue to follow and assist as needed.

## 2024-12-02 NOTE — PROGRESS NOTES
[facility])  Infectious Disease  Progress Note    Patient Name: Divya uBi  MRN: [id number]  Admission Date: 11/23/2024  Length of Stay: 9 days  Attending Physician: Rajesh Agustin MD  Primary Care Provider: Angel Khan MD    Isolation Status: Contact  Assessment/Plan:      Neuro  Encephalopathy, metabolic  Follow primary team    12/01- will send RPR/FTA  Will monitor   Discussed concerns of children about AMS with primary team    Cardiac/Vascular  HTN (hypertension)  BP control as per primary team    Renal/  Acute cystitis   on Ertapenem /Zyvox based on previous cultures   Will follow repeat urine culture .    Will deescalate soon    11/26- will follow ID of GNR in urine -11/25/24  Stop Zyvox  On Invanz    11/27-urine has less than 10K ESBL UTI- will stop Ertapenem     ID  C. difficile diarrhea  >>ASSESSMENT AND PLAN FOR DIARRHEA WRITTEN ON 11/28/2024  7:09 AM BY KIRTI CHAMPAGNE MD, EITAN    Will follow stool assay- rule out c difficle    11/27- discussed with lab and stool send on 11/23  The stool was sent 11/23 when she was admitted- this is important to note if it becomes C difficile po  Add Po Vanco as C difficle is suspected   11/29- will complete 10 days of Po Vanco, needs close follow up    12/01- will treat for 10 days as planned  Follow closely          Anticipated Disposition:     Thank you for your consult. I will follow-up with patient. Please contact us if you have any additional questions.    Kirti Champagne MD, EITAN  Infectious Disease  [facility] ([facility])    Subjective:     Principal Problem:Sepsis    HPI: History was obtained from the patient and the son.   64 year old female with ESBL UTI, HTN, GERD, Morbid Obesity s/p Gastric bypass surgery, Depression, GERD, Anxiety, Esophageal stricture .  She presented with worsening  confusion, drowsiness, decreased appetite,.There was associated history of urinary incontinence.    Labs and imaging test -    11/09-  Urine culture-  >100,000 CFU/ML Klebsiella pneumoniae ssp pneumoniae Abnormal  This is an edited result. Previous organism was Gram Negative Howard on 11/11/2024 at 1202 CST.    Culture Urine >100,000 CFU/ML Proteus mirabilis Abnormal  This is an edited result. Previous organism was Second Gram Negative Howard on 11/11/2024 at 1202 CST.   Culture Urine >100,000 CFU/ML Enterococcus faecalis Abnormal     Blood culture- 11/23- Staph epi  Component      Latest Ref Rng 11/23/2024 11/24/2024 11/25/2024   WBC      3.90 - 12.70 K/uL 24.61 (H)  16.23 (H)  9.28    WBC        14.01 (H)        Legend:  (H) High  Interval History:   She is tolerating meds.    Urine culture- 11/25- GNR    11/27- she has diarrhoea and had C difficile ordered 11/23 11/29- she has occ confusion   Tolerating meds   12/01/24-  Diarrhoea has improved    Review of Systems   Constitutional:  Negative for activity change, appetite change, chills and diaphoresis.   Allergic/Immunologic: Negative for environmental allergies and food allergies.     Objective:     Vital Signs (Most Recent):  Temp: 97.6 °F (36.4 °C) (12/02/24 0431)  Pulse: 109 (12/02/24 0431)  Resp: (!) 21 (12/02/24 0431)  BP: (!) 129/59 (12/02/24 0431)  SpO2: (!) 94 % (12/02/24 0431) Vital Signs (24h Range):  Temp:  [97.3 °F (36.3 °C)-97.9 °F (36.6 °C)] 97.6 °F (36.4 °C)  Pulse:  [109-114] 109  Resp:  [16-21] 21  SpO2:  [94 %-98 %] 94 %  BP: ()/(48-68) 129/59     Weight: 110.4 kg (243 lb 6.2 oz)  Body mass index is 43.11 kg/m².    Estimated Creatinine Clearance: 66 mL/min (based on SCr of 1 mg/dL).     Physical Exam  Vitals and nursing note reviewed.   Cardiovascular:      Rate and Rhythm: Normal rate.   Abdominal:      General: Abdomen is flat.   Musculoskeletal:         General: Normal range of motion.      Cervical back: Normal range of motion.   Skin:     General: Skin is warm.   Neurological:      General: No focal deficit present.      Mental Status: She is alert.      Comments: Has  "confusion   Psychiatric:         Mood and Affect: Mood normal.          Significant Labs: Blood Culture:   Recent Labs   Lab 11/23/24  1144 11/23/24  1318 11/26/24  1429 11/26/24  1529   LABBLOO No growth after 5 days. Gram stain shane bottle: Gram positive cocci in clusters resembling Staph  Results called to and read back by: Cheryl Luciano RN  11/24/2024 23:07  COAGULASE-NEGATIVE STAPHYLOCOCCUS SPECIES  Organism is a probable contaminant  * No growth after 5 days. No growth after 5 days.     CBC:   Recent Labs   Lab 12/01/24  0614   WBC 5.11   HGB 10.0*   HCT 31.9*   *     CMP:   Recent Labs   Lab 12/01/24  0614      K 3.4*      CO2 23      BUN 39*   CREATININE 1.0   CALCIUM 7.5*   PROT 4.5*   ALBUMIN 1.6*   BILITOT 0.3   ALKPHOS 98   AST 14   ALT 14   ANIONGAP 12     Urine Culture:   Recent Labs   Lab 06/17/24  1609 11/25/24  0532   LABURIN KLEBSIELLA OXYTOCA ESBL  >100,000 cfu/ml  * ESCHERICHIA COLI ESBL  < 10,000 cfu/ml  *     Urine Studies:   Recent Labs   Lab 11/25/24  0531 11/25/24  0532   COLORU Yellow  --    APPEARANCEUA Cloudy*  --    PHUR 6.0  --    SPECGRAV 1.020  --    PROTEINUA 1+*  --    GLUCUA Negative  --    KETONESU Negative  --    BILIRUBINUA Negative  --    OCCULTUA 1+*  --    NITRITE Negative  --    UROBILINOGEN Negative  --    LEUKOCYTESUR 3+*  --    RBCUA  --  11*   WBCUA  --  >100*   BACTERIA  --  None   SQUAMEPITHEL  --  11   HYALINECASTS  --  0     Wound Culture: No results for input(s): "LABAERO" in the last 4320 hours.  All pertinent labs within the past 24 hours have been reviewed.    Significant Imaging: I have reviewed all pertinent imaging results/findings within the past 24 hours.  "

## 2024-12-02 NOTE — ASSESSMENT & PLAN NOTE
Anemia is likely due to chronic disease due to Chronic Kidney Disease. Most recent hemoglobin and hematocrit are listed below.  Recent Labs     11/30/24  0457 12/01/24  0614 12/02/24  0823   HGB 9.9* 10.0* 9.5*   HCT 31.9* 31.9* 29.9*     Plan  - Monitor serial CBC: Daily  - Transfuse PRBC if patient becomes hemodynamically unstable, symptomatic or H/H drops below 7/21.  - Patient's anemia is currently stable

## 2024-12-02 NOTE — PLAN OF CARE
A222/A222 NESTOR Bui is a 64 y.o.female admitted on 11/23/2024 for Sepsis   Code Status: Full Code MRN: [id number]   Review of patient's allergies indicates:   Allergen Reactions    Metronidazole hcl Other (See Comments)     Mouth ulcers developed with Flagyl  Oral sores.  Mouth ulcers developed with Flagyl     Past Medical History:   Diagnosis Date    Anemia     Anxiety     Basal cell carcinoma (BCC) of face 2/26/2020    Blood transfusion     Bowel incontinence     Depression     Esophageal stricture     GERD (gastroesophageal reflux disease)     Hypertension     Latent tuberculosis by skin test 2001    took INH    Liver nodule 1/6/2014    Morbid obesity with BMI of 45.0-49.9, adult     OA (osteoarthritis) of knee 12/12/2014    Pericardial effusion 9/4/2014      PRN meds    acetaminophen, 650 mg, Q4H PRN  albuterol-ipratropium, 3 mL, Q6H PRN  ALPRAZolam, 0.25 mg, Nightly PRN  aluminum-magnesium hydroxide-simethicone, 30 mL, QID PRN  dextrose 10%, 12.5 g, PRN  dextrose 10%, 25 g, PRN  glucagon (human recombinant), 1 mg, PRN  glucose, 16 g, PRN  glucose, 24 g, PRN  influenza (adjuvanted), 0.5 mL, Prior to discharge  insulin aspart U-100, 0-5 Units, QID (AC + HS) PRN  loperamide, 2 mg, QID PRN  naloxone, 0.02 mg, PRN  ondansetron, 4 mg, Q6H PRN  prochlorperazine, 5 mg, Q6H PRN  simethicone, 1 tablet, QID PRN  sodium chloride 0.9%, 10 mL, Q8H PRN      Chart check completed. Will continue plan of care.        Pt oriented to person and place  VSS.  Pt remained afebrile throughout this shift.   All meds administered per order.   Pt remained free of falls this shift.   Plan of care reviewed. Patient verbalizes understanding.   Pt moving/turning independently.   Bed low, side rails up x 2, wheels locked, call light in reach.   Patient instructed to call for assistance.  Patient education provided  Will continue to monitor.               Orientation: disoriented to, time, situation  [place] Coma Scale Score: 14     Lead  Monitored: other (see comments) (not on monitor; pt refusing) Rhythm: sinus tachycardia    Cardiac/Telemetry Box Number: [id number]  VTE Core Measure: Pharmacological prophylaxis initiated/maintained Last Bowel Movement: 12/02/24  Diet diabetic 2000 Calories (up to 75 gm per meal)  Voiding Characteristics: incontinence  Sumit Score: 9  Fall Risk Score: 25  Accucheck [x]   Freq?      Lines/Drains/Airways       None

## 2024-12-02 NOTE — ASSESSMENT & PLAN NOTE
Resolved  Recent Labs     11/30/24  0457 12/01/24  0614 12/02/24  0601   K 4.1 3.4* 3.4*     Plan  - Monitor for arrhythmias with EKG and/or continuous telemetry.   Resolved

## 2024-12-02 NOTE — PROGRESS NOTES
[facility] - Intensive Care ([facility])  Adult Nutrition  Progress Note    SUMMARY       Recommendations    Recommendation/Intervention:   1. Recommend pt continues on a Diabetic 2000 calorie diet as medically appropriate  2. Recommend pt continues Boost glucose control TID to assist filling nutritional gaps   3. Encourage PO and supplement intake, recommend feeding assistance   4. Recommend Banatrol TID to assist with diarrhea or BID for loose stools as warranted   5. Recommend 500 mg/day vitamin C supplementation and 220 mg/day zinc supplementation for wound healing. Also recommend re-ordering Kenn BID if pt willing to consume.  6. Weigh twice weekly   7. Continue to monitor % intake meals + supplements. May benefit from temporary EN via NG if intake remains 0-25% and if NG placement would align with pt/family wishes for goals of care.    Goals:   1. Pt will tolerate and consume >75% EEN and EPN prior to RD follow up (progressing)  2. Pt's diarrhea/ Loose stools will improve prior to RD follow up (progressing)  3. Pt will receive vitamin C supplementation prior to RD follow up (goal not met)  Nutrition Goal Status: progressing  Communication of RD Recs: other (comment) (POC, sticky note)    Assessment and Plan    Nutrition Problem  Inadequate protein/energy intake   Altered GI function   Increased nutrient needs    Related to (etiology):   Decreased ability to consume sufficient protein/energy  Alteration in GI tract structure and/or function   Increased demand for nutrition     Signs and Symptoms (as evidenced by):   Change in appetite, Estimated intake of food less than estimated needs   Diarrhea/ Loose stools  Wound healing needs     Interventions/Recommendations (treatment strategy):  1. Commercial beverage medical food supplement therapy  2. Feeding assistance management  3. Commercial food medical food supplement therapy  4. Vitamin C supplement therapy   5. Collaboration by nutrition professional with other  "providers    Nutrition Diagnosis Status:   New       Malnutrition Assessment     Skin (Micronutrient): pallor, bruised, wounds unhealed (Sumit score = 12 (high risk)                                 Reason for Assessment    Reason For Assessment: consult (Malnutrition)  Diagnosis: infection/sepsis (sepsis)  General Information Comments:   11/24/24: Pt is recieving diabetic 2000 kcal diet with 0% intake recorded. Per MD note: "Intermittently confused and lethargic , per MD note today Mentating the same (mildly confused but interacting appropriately)" PIV. Sumit Score: 15 moisture associated dermatitis right lower quadrant perineum NFPE not completed 2/2 remote weekend coverage    Follow up:   11/26/24: RD follow up. Pt currently on contact isolation, a Diabetic 2000 calorie diet, in the ICU. EMR noted pt transferred from floor to ICU for higher level of care d/t frequent monitoring of hypotension, pt w/ mild delirium/confusion, ELIZABETH worsening, hyperglycemia, pt negative for N/V, positive for abd pain. Discussed pt during rounds. NP reported pt not eating much but confirmed pt is drinking Boost glucose control. Visual NFPE performed d/t RN's working w/ pt with BM, AMS and pt's contact isolation status, pt appears nourished, will perform when appropriate. LBM 11/26/24 (loose). MASD, DTPI and stage 3 PI's noted, details per Wound care note 11/25/24. Note Kenn was ordered but now cancelled. Labs, meds, weight reviewed. Note labs 11/26/24: Glu 149, A1C 6.0. Note thiamine, MTV, folic acid. Weight charted 5/9/24 230 lbs, 11/26/24 231 lbs (BMI 41.08, Morbid obese), +1 lb wt gain x 3+ months, weight stable. RD will continue to follow and monitor pt's nutritional status during admit.     12/2/24: Dietitian working remotely. Attempted to reach pt by phone without success. Per flowsheets, pt eating 0-25% of most meals, 100% of a handful of meals this admit. Receiving Boost Glucose Control with meals for additional " "calories/protein. Had Kenn ordered but then discontinued. Does have DTPI and Stage 3 pressure injuries per wound care notes. LBM 12/2, diarrhea, + Cdiff noted - has probiotics ordered. Spoke with nursing via secure chat - pt refusing meals and supplements today even with encouragement/offers of assistance.     Nutrition Discharge Planning: Low sodium, Diabetic diet + 500 mg/day vitamin C supplement + Boost glucose control as warranted     Nutrition Risk Screen    Nutrition Risk Screen: no indicators present    Nutrition Related Social Determinants of Health: SDOH: Unable to assess at this time.     Nutrition/Diet History    Food Preferences: TAM  Spiritual, Cultural Beliefs, Congregational Practices, Values that Affect Care: no  Food Allergies: NKFA  Factors Affecting Nutritional Intake: decreased appetite, impaired cognitive status/motor control    Anthropometrics    Temp: 97.6 °F (36.4 °C)  Height Method: Stated  Height: 5' 3" (160 cm)  Height (inches): 63 in  Weight Method: Bed Scale  Weight: 110.4 kg (243 lb 6.2 oz)  Weight (lb): 243.39 lb  Ideal Body Weight (IBW), Female: 115 lb  % Ideal Body Weight, Female (lb): 191.32 %  BMI (Calculated): 43.1  BMI Grade: 35 - 39.9 - obesity - grade II     Wt Readings from Last 15 Encounters:   12/01/24 110.4 kg (243 lb 6.2 oz)   11/14/24 107 kg (235 lb 14.3 oz)   07/10/24 107.7 kg (237 lb 7 oz)   06/25/24 105 kg (231 lb 7.7 oz)   06/17/24 106.3 kg (234 lb 5.6 oz)   06/04/24 101.6 kg (223 lb 15.8 oz)   05/10/24 109.7 kg (241 lb 13.5 oz)   05/09/24 104.6 kg (230 lb 9.6 oz)   05/07/24 103.1 kg (227 lb 4.7 oz)   01/31/24 101.3 kg (223 lb 5.2 oz)   01/09/24 102.5 kg (225 lb 15.5 oz)   01/04/24 102.5 kg (225 lb 15.5 oz)   12/27/23 106.8 kg (235 lb 7.2 oz)   12/01/23 101.5 kg (223 lb 12.3 oz)   11/28/23 100.4 kg (221 lb 3.7 oz)     Lab/Procedures/Meds    Pertinent Labs Reviewed: reviewed  Pertinent Labs Comments: K 3.4, CO2 20, BUN 32, Ca 7.1/alb 1.6, A1c 6.0%, H/H 9.5/29.9  Pertinent " Medications Reviewed: reviewed  Pertinent Medications Comments: atorvastatin, famotidine, folic acid, probiotics, midodrine, MVI, sodium bicarbonate, thiamine, vancomycin    BMP  Lab Results   Component Value Date     12/02/2024    K 3.4 (L) 12/02/2024     12/02/2024    CO2 20 (L) 12/02/2024    BUN 32 (H) 12/02/2024    CREATININE 0.9 12/02/2024    CALCIUM 7.2 (L) 12/02/2024    ANIONGAP 12 12/02/2024    EGFRNORACEVR >60 12/02/2024     Lab Results   Component Value Date    CALCIUM 7.2 (L) 12/02/2024    PHOS 4.0 12/02/2024     Lab Results   Component Value Date    ALBUMIN 1.6 (L) 12/02/2024     Lab Results   Component Value Date    ALT 17 12/02/2024    AST 16 12/02/2024    ALKPHOS 88 12/02/2024    BILITOT 0.3 12/02/2024     Recent Labs   Lab 12/02/24  1119   POCTGLUCOSE 122*     Lab Results   Component Value Date    HGBA1C 6.0 (H) 11/23/2024     Lab Results   Component Value Date    WBC 7.44 12/02/2024    HGB 9.5 (L) 12/02/2024    HCT 29.9 (L) 12/02/2024    MCV 92 12/02/2024     (L) 12/02/2024       Scheduled Meds:   ARIPiprazole  10 mg Oral Daily    atorvastatin  40 mg Oral Daily    famotidine  20 mg Oral BID    folic acid  1 mg Oral Daily    heparin (porcine)  5,000 Units Subcutaneous Q8H    Lactobacillus acidoph-L.bulgar  4 tablet Oral TID WM    miconazole NITRATE 2 %   Topical (Top) BID    midodrine  10 mg Oral TID WM    multivitamin  1 tablet Oral Daily    sodium bicarbonate  1,300 mg Oral BID    thiamine  100 mg Oral Daily    vancomycin  125 mg Oral Q6H     Continuous Infusions:      PRN Meds:.  Current Facility-Administered Medications:     acetaminophen, 650 mg, Oral, Q4H PRN    albuterol-ipratropium, 3 mL, Nebulization, Q6H PRN    ALPRAZolam, 0.25 mg, Oral, Nightly PRN    aluminum-magnesium hydroxide-simethicone, 30 mL, Oral, QID PRN    dextrose 10%, 12.5 g, Intravenous, PRN    dextrose 10%, 25 g, Intravenous, PRN    glucagon (human recombinant), 1 mg, Intramuscular, PRN    glucose, 16 g,  Oral, PRN    glucose, 24 g, Oral, PRN    influenza (adjuvanted), 0.5 mL, Intramuscular, Prior to discharge    insulin aspart U-100, 0-5 Units, Subcutaneous, QID (AC + HS) PRN    loperamide, 2 mg, Oral, QID PRN    naloxone, 0.02 mg, Intravenous, PRN    ondansetron, 4 mg, Intravenous, Q6H PRN    prochlorperazine, 5 mg, Intravenous, Q6H PRN    simethicone, 1 tablet, Oral, QID PRN    sodium chloride 0.9%, 10 mL, Intravenous, Q8H PRN      Physical Findings/Assessment         Estimated/Assessed Needs    Weight Used For Calorie Calculations: 66.8 kg (147 lb 4.3 oz) (adjusted weight)  Energy Calorie Requirements (kcal): 8971-1022 kcal (Stage 3 and deep tissue pressure injuries)  Energy Need Method: Kcal/kg  Protein Requirements: 100-134 g (1.5-2.0 g/kg) (ELIZABETH resolved, pressure injuries)  Weight Used For Protein Calculations: 66.8 kg (147 lb 4.3 oz)  Fluid Requirements (mL): 8942-4332 mL (1 mL/kcal) or per MD  Estimated Fluid Requirement Method: RDA Method  RDA Method (mL): 2004  CHO Requirement: 225-263 g (45% of estimated needs)      Nutrition Prescription Ordered    Current Diet Order: Diabetic 2000 calorie diet  Oral Nutrition Supplement: Boost glucose control TID    Evaluation of Received Nutrient/Fluid Intake  I/O: (Net since admit):  11/24/24: +2746.3 mL  11/26/24: +8297.5 mL    Energy Calories Required: not meeting needs  Protein Required: not meeting needs  Fluid Required: exceeds needs  Total Fluid Intake (mL): 1998.8  Total Fluid Output (mL): 272  Tolerance: tolerating  % Intake of Estimated Energy Needs: 25 - 50 %  % Meal Intake: 0 - 25 %    Nutrition Risk    Level of Risk/Frequency of Follow-up: high (F/u x 2 weekly)     Monitor and Evaluation    Food and Nutrient Intake: food and beverage intake, energy intake  Food and Nutrient Adminstration: diet order  Knowledge/Beliefs/Attitudes: food and nutrition knowledge/skill  Physical Activity and Function: nutrition-related ADLs and IADLs  Anthropometric  Measurements: weight, body mass index, weight change  Biochemical Data, Medical Tests and Procedures: electrolyte and renal panel, gastrointestinal profile, glucose/endocrine profile, inflammatory profile, lipid profile     Nutrition Follow-Up    RD Follow-up?: Yes  Christie Ramirez RD, MARIAJOSE, SAMARIAC

## 2024-12-03 LAB
ALBUMIN SERPL BCP-MCNC: 1.3 G/DL (ref 3.5–5.2)
ALP SERPL-CCNC: 73 U/L (ref 40–150)
ALT SERPL W/O P-5'-P-CCNC: 12 U/L (ref 10–44)
ANION GAP SERPL CALC-SCNC: 9 MMOL/L (ref 8–16)
ANISOCYTOSIS BLD QL SMEAR: SLIGHT
AST SERPL-CCNC: 11 U/L (ref 10–40)
BASOPHILS NFR BLD: 1 % (ref 0–1.9)
BILIRUB SERPL-MCNC: 0.2 MG/DL (ref 0.1–1)
BUN SERPL-MCNC: 31 MG/DL (ref 8–23)
CALCIUM SERPL-MCNC: 6.7 MG/DL (ref 8.7–10.5)
CHLORIDE SERPL-SCNC: 107 MMOL/L (ref 95–110)
CO2 SERPL-SCNC: 23 MMOL/L (ref 23–29)
CREAT SERPL-MCNC: 1 MG/DL (ref 0.5–1.4)
DACRYOCYTES BLD QL SMEAR: ABNORMAL
DIFFERENTIAL METHOD BLD: ABNORMAL
EOSINOPHIL NFR BLD: 0 % (ref 0–8)
ERYTHROCYTE [DISTWIDTH] IN BLOOD BY AUTOMATED COUNT: 16.8 % (ref 11.5–14.5)
EST. GFR  (NO RACE VARIABLE): >60 ML/MIN/1.73 M^2
GLUCOSE SERPL-MCNC: 87 MG/DL (ref 70–110)
HCT VFR BLD AUTO: 28.7 % (ref 37–48.5)
HGB BLD-MCNC: 8.5 G/DL (ref 12–16)
HYPOCHROMIA BLD QL SMEAR: ABNORMAL
IMM GRANULOCYTES # BLD AUTO: ABNORMAL K/UL (ref 0–0.04)
IMM GRANULOCYTES NFR BLD AUTO: ABNORMAL % (ref 0–0.5)
LYMPHOCYTES NFR BLD: 8 % (ref 18–48)
MAGNESIUM SERPL-MCNC: 1.7 MG/DL (ref 1.6–2.6)
MCH RBC QN AUTO: 28.2 PG (ref 27–31)
MCHC RBC AUTO-ENTMCNC: 29.6 G/DL (ref 32–36)
MCV RBC AUTO: 95 FL (ref 82–98)
METAMYELOCYTES NFR BLD MANUAL: 2 %
MONOCYTES NFR BLD: 5 % (ref 4–15)
NEUTROPHILS NFR BLD: 79 % (ref 38–73)
NEUTS BAND NFR BLD MANUAL: 4 %
NRBC BLD-RTO: 0 /100 WBC
OVALOCYTES BLD QL SMEAR: ABNORMAL
PHOSPHATE SERPL-MCNC: 4.6 MG/DL (ref 2.7–4.5)
PLATELET # BLD AUTO: 132 K/UL (ref 150–450)
PLATELET BLD QL SMEAR: ABNORMAL
PMV BLD AUTO: 10.1 FL (ref 9.2–12.9)
POCT GLUCOSE: 134 MG/DL (ref 70–110)
POCT GLUCOSE: 91 MG/DL (ref 70–110)
POIKILOCYTOSIS BLD QL SMEAR: SLIGHT
POLYCHROMASIA BLD QL SMEAR: ABNORMAL
POTASSIUM SERPL-SCNC: 3 MMOL/L (ref 3.5–5.1)
PROMYELOCYTES NFR BLD MANUAL: 1 %
PROT SERPL-MCNC: 3.8 G/DL (ref 6–8.4)
RBC # BLD AUTO: 3.01 M/UL (ref 4–5.4)
SODIUM SERPL-SCNC: 139 MMOL/L (ref 136–145)
TARGETS BLD QL SMEAR: ABNORMAL
WBC # BLD AUTO: 7.25 K/UL (ref 3.9–12.7)

## 2024-12-03 PROCEDURE — 25000003 PHARM REV CODE 250: Performed by: INTERNAL MEDICINE

## 2024-12-03 PROCEDURE — 92610 EVALUATE SWALLOWING FUNCTION: CPT

## 2024-12-03 PROCEDURE — 97530 THERAPEUTIC ACTIVITIES: CPT

## 2024-12-03 PROCEDURE — 25000003 PHARM REV CODE 250: Performed by: STUDENT IN AN ORGANIZED HEALTH CARE EDUCATION/TRAINING PROGRAM

## 2024-12-03 PROCEDURE — 85027 COMPLETE CBC AUTOMATED: CPT

## 2024-12-03 PROCEDURE — 25000003 PHARM REV CODE 250

## 2024-12-03 PROCEDURE — 85007 BL SMEAR W/DIFF WBC COUNT: CPT

## 2024-12-03 PROCEDURE — 97535 SELF CARE MNGMENT TRAINING: CPT

## 2024-12-03 PROCEDURE — 25000003 PHARM REV CODE 250: Performed by: NURSE PRACTITIONER

## 2024-12-03 PROCEDURE — 27000207 HC ISOLATION

## 2024-12-03 PROCEDURE — 83735 ASSAY OF MAGNESIUM: CPT

## 2024-12-03 PROCEDURE — 80053 COMPREHEN METABOLIC PANEL: CPT

## 2024-12-03 PROCEDURE — 63600175 PHARM REV CODE 636 W HCPCS: Performed by: NURSE PRACTITIONER

## 2024-12-03 PROCEDURE — 36415 COLL VENOUS BLD VENIPUNCTURE: CPT

## 2024-12-03 PROCEDURE — 21400001 HC TELEMETRY ROOM

## 2024-12-03 PROCEDURE — 84100 ASSAY OF PHOSPHORUS: CPT

## 2024-12-03 RX ORDER — POTASSIUM CHLORIDE 20 MEQ/1
40 TABLET, EXTENDED RELEASE ORAL
Status: COMPLETED | OUTPATIENT
Start: 2024-12-03 | End: 2024-12-03

## 2024-12-03 RX ADMIN — POTASSIUM CHLORIDE 40 MEQ: 1500 TABLET, EXTENDED RELEASE ORAL at 03:12

## 2024-12-03 RX ADMIN — POTASSIUM CHLORIDE 40 MEQ: 1500 TABLET, EXTENDED RELEASE ORAL at 12:12

## 2024-12-03 RX ADMIN — Medication 4 TABLET: at 12:12

## 2024-12-03 RX ADMIN — HEPARIN SODIUM 5000 UNITS: 5000 INJECTION INTRAVENOUS; SUBCUTANEOUS at 03:12

## 2024-12-03 RX ADMIN — FAMOTIDINE 20 MG: 20 TABLET ORAL at 08:12

## 2024-12-03 RX ADMIN — ARIPIPRAZOLE 10 MG: 5 TABLET ORAL at 09:12

## 2024-12-03 RX ADMIN — HEPARIN SODIUM 5000 UNITS: 5000 INJECTION INTRAVENOUS; SUBCUTANEOUS at 05:12

## 2024-12-03 RX ADMIN — MICONAZOLE NITRATE: 20 POWDER TOPICAL at 10:12

## 2024-12-03 RX ADMIN — SODIUM BICARBONATE 1300 MG: 650 TABLET ORAL at 09:12

## 2024-12-03 RX ADMIN — ALPRAZOLAM 0.25 MG: 0.25 TABLET ORAL at 08:12

## 2024-12-03 RX ADMIN — Medication 4 TABLET: at 07:12

## 2024-12-03 RX ADMIN — MIDODRINE HYDROCHLORIDE 10 MG: 5 TABLET ORAL at 06:12

## 2024-12-03 RX ADMIN — Medication 4 TABLET: at 06:12

## 2024-12-03 RX ADMIN — POTASSIUM CHLORIDE 40 MEQ: 1500 TABLET, EXTENDED RELEASE ORAL at 09:12

## 2024-12-03 RX ADMIN — MIDODRINE HYDROCHLORIDE 10 MG: 5 TABLET ORAL at 07:12

## 2024-12-03 RX ADMIN — MIDODRINE HYDROCHLORIDE 10 MG: 5 TABLET ORAL at 12:12

## 2024-12-03 RX ADMIN — FAMOTIDINE 20 MG: 20 TABLET ORAL at 09:12

## 2024-12-03 RX ADMIN — VANCOMYCIN HYDROCHLORIDE 125 MG: KIT at 12:12

## 2024-12-03 RX ADMIN — MICONAZOLE NITRATE: 20 POWDER TOPICAL at 08:12

## 2024-12-03 RX ADMIN — THERA TABS 1 TABLET: TAB at 09:12

## 2024-12-03 RX ADMIN — ACETAMINOPHEN 650 MG: 325 TABLET ORAL at 06:12

## 2024-12-03 RX ADMIN — HEPARIN SODIUM 5000 UNITS: 5000 INJECTION INTRAVENOUS; SUBCUTANEOUS at 08:12

## 2024-12-03 RX ADMIN — VANCOMYCIN HYDROCHLORIDE 125 MG: KIT at 05:12

## 2024-12-03 RX ADMIN — Medication 100 MG: at 09:12

## 2024-12-03 RX ADMIN — FOLIC ACID 1 MG: 1 TABLET ORAL at 09:12

## 2024-12-03 RX ADMIN — VANCOMYCIN HYDROCHLORIDE 125 MG: KIT at 06:12

## 2024-12-03 RX ADMIN — ATORVASTATIN CALCIUM 40 MG: 40 TABLET, FILM COATED ORAL at 09:12

## 2024-12-03 RX ADMIN — SODIUM BICARBONATE 1300 MG: 650 TABLET ORAL at 08:12

## 2024-12-03 NOTE — ASSESSMENT & PLAN NOTE
Resolved  Recent Labs     12/01/24  0614 12/02/24  0601 12/03/24  0520   K 3.4* 3.4* 3.0*       Plan  - Monitor for arrhythmias with EKG and/or continuous telemetry.   Resolved

## 2024-12-03 NOTE — ASSESSMENT & PLAN NOTE
Patient has Abnormal Magnesium: hypomagnesemia. Will continue to monitor electrolytes closely. Will replace the affected electrolytes and repeat labs to be done after interventions completed. The patient's magnesium results have been reviewed and are listed below.  Recent Labs   Lab 12/03/24  0520   MG 1.7

## 2024-12-03 NOTE — PT/OT/SLP PROGRESS
Physical Therapy Treatment    Patient Name:  Divya Bui   MRN:  [id number]    Recommendations:     Discharge Recommendations: Moderate Intensity Therapy  Discharge Equipment Recommendations: to be determined by next level of care  Barriers to discharge: None    Assessment:     Divya Bui is a 64 y.o. female admitted with a medical diagnosis of Sepsis.  She presents with the following impairments/functional limitations: weakness, impaired endurance, impaired functional mobility, gait instability, impaired balance, pain, decreased safety awareness, decreased lower extremity function, impaired cognition, decreased ROM, impaired cardiopulmonary response to activity, decreased coordination.    Rehab Prognosis: Good; patient would benefit from acute skilled PT services to address these deficits and reach maximum level of function.    Recent Surgery: * No surgery found *      Plan:     During this hospitalization, patient to be seen 3 x/week to address the identified rehab impairments via gait training, therapeutic activities, therapeutic exercises, neuromuscular re-education and progress toward the following goals:    Plan of Care Expires:  12/08/24    Subjective     Chief Complaint: Pt is motivated to participate  Patient/Family Comments/goals: none stated  Pain/Comfort:  Pain Rating 1: 0/10      Objective:     Communicated with Ronna and shima chart review prior to session.  Patient found up in chair with chair check, peripheral IV, telemetry upon PT entry to room.     General Precautions: Standard, fall, special contact  Orthopedic Precautions: N/A  Braces: N/A  Respiratory Status: Room air     Functional Mobility:  Gait belt applied - Yes  Bed Mobility  Seated in chair at start of session and returned to chair  Anterior Seated Scoot: Moderated assistance and of 2 persons  Posterior Seated Scoot: Moderated assistance and of 2 persons  Transfers  Sit to Stand: moderate assistance and of 2 persons with rolling  "walker  Increased RR when standing, educated about pursed lip breathing technique and cued for use with mobility, educated to slow breathing. Educated on activity pacing  Pt stood x3 min, pt sat due to fatigue. Verbal cuing needed for upright posture  Balance  Sitting: contact guard assistance  Standing: moderate assistance and of 2 persons    Therapeutic Exercise  Patient performed 1 set(s) of 8 repetitions of the following seated exercises: ankle pumps, long arc quads, and marches for bilateral LE. Educated on activity pacing for completion of exercises.   Patient required skilled PT for instruction of exercises and appropriate cues to perform exercises safely and appropriately.      AM-PAC 6 CLICK MOBILITY  Turning over in bed (including adjusting bedclothes, sheets and blankets)?: 2  Sitting down on and standing up from a chair with arms (e.g., wheelchair, bedside commode, etc.): 2  Moving from lying on back to sitting on the side of the bed?: 2  Moving to and from a bed to a chair (including a wheelchair)?: 2  Need to walk in hospital room?: 2  Climbing 3-5 steps with a railing?: 1 (NT)  Basic Mobility Total Score: 11       Treatment & Education:  Reviewed role of PT in acute care and POC. Pt tolerated interventions well. Reviewed importance of OOB activities, activity pacing, and HEP (marching/hip flex, hip abd, heel slides/LAQ, quad sets, ankle pumps) in order to maintain/regain strength. Encouraged to sit up in chair for all meals. Reviewed proper use of RW for safety and to reduce risk of falling. Reviewed "call don't fall" policy and increased risk of falling due to weakness, instructed to utilize call bell for assistance with all transfers. Pt agreeable to all requests.    Patient left up in chair with all lines intact, call button in reach, chair alarm on, and family present..    GOALS:   Multidisciplinary Problems       Physical Therapy Goals          Problem: Physical Therapy    Goal Priority " Disciplines Outcome Interventions   Physical Therapy Goal     PT, PT/OT Progressing    Description: Goals to be met by 12/8/24.  1. Pt will complete bed mobility SBA.  2. Pt will complete sit to stand SBA.  3. Pt will ambulate 100ft SBA using RW.  4. Pt will increase AMPAC score by 2 points to progress functional mobility.                       Time Tracking:     PT Received On: 12/03/24  PT Start Time: 1046     PT Stop Time: 1059  PT Total Time (min): 13 min     Billable Minutes: Therapeutic Activity 13min    Treatment Type: Treatment  PT/PTA: PT     Number of PTA visits since last PT visit: 0     12/03/2024

## 2024-12-03 NOTE — PLAN OF CARE
Pt oriented x2. Vital signs stable.  Pt remained afebrile throughout this shift.   All meds administered per order.   Pt remained free of falls this shift.   Plan of care reviewed. Patient verbalizes understanding.   Pt turned q2h.  Pt Blood glucose monitored, no coverage required.  Bed in lowest position, upper side side rails up x 2, wheels locked  Call light in reach, bed alarm on, Family at bedside  Patient instructed to call staff for mobility/assistance.  Patient education provided.  No further concerns voiced at this time.    Problem: Adult Inpatient Plan of Care  Goal: Plan of Care Review  Outcome: Progressing  Goal: Patient-Specific Goal (Individualized)  Outcome: Progressing  Goal: Absence of Hospital-Acquired Illness or Injury  Outcome: Progressing  Goal: Optimal Comfort and Wellbeing  Outcome: Progressing  Goal: Readiness for Transition of Care  Outcome: Progressing     Problem: Bariatric Environmental Safety  Goal: Safety Maintained with Care  Outcome: Progressing     Problem: Infection  Goal: Absence of Infection Signs and Symptoms  Outcome: Progressing     Problem: Sepsis/Septic Shock  Goal: Optimal Coping  Outcome: Progressing  Goal: Absence of Bleeding  Outcome: Progressing  Goal: Blood Glucose Level Within Targeted Range  Outcome: Progressing  Goal: Absence of Infection Signs and Symptoms  Outcome: Progressing  Goal: Optimal Nutrition Intake  Outcome: Progressing     Problem: Acute Kidney Injury/Impairment  Goal: Fluid and Electrolyte Balance  Outcome: Progressing  Goal: Improved Oral Intake  Outcome: Progressing  Goal: Effective Renal Function  Outcome: Progressing     Problem: Wound  Goal: Optimal Coping  Outcome: Progressing  Goal: Optimal Functional Ability  Outcome: Progressing  Goal: Absence of Infection Signs and Symptoms  Outcome: Progressing  Goal: Improved Oral Intake  Outcome: Progressing  Goal: Optimal Pain Control and Function  Outcome: Progressing  Goal: Skin Health and  Integrity  Outcome: Progressing  Goal: Optimal Wound Healing  Outcome: Progressing     Problem: Skin Injury Risk Increased  Goal: Skin Health and Integrity  Outcome: Progressing     Problem: Fall Injury Risk  Goal: Absence of Fall and Fall-Related Injury  Outcome: Progressing

## 2024-12-03 NOTE — ASSESSMENT & PLAN NOTE
Severe erythema to breasts, pannus, groin, and buttock with some skin breakdown   Appears to be candida overgrowth   IV Diflucan     11/28/2024  Transitioned to PO fluconazole

## 2024-12-03 NOTE — PT/OT/SLP PROGRESS
"Occupational Therapy   Treatment    Name: Divya Bui  MRN: [id number]  Admitting Diagnosis:  Sepsis       Recommendations:     Discharge Recommendations: Moderate Intensity Therapy  Discharge Equipment Recommendations:  to be determined by next level of care  Barriers to discharge:  None    Assessment:     Divya Bui is a 64 y.o. female with a medical diagnosis of Sepsis.  She presents with the following performance deficits affecting function are weakness, impaired endurance, impaired self care skills, impaired functional mobility, impaired balance, impaired cardiopulmonary response to activity, decreased safety awareness, impaired cognition, decreased lower extremity function, decreased upper extremity function, impaired skin, edema.     Rehab Prognosis:  Fair; patient would benefit from acute skilled OT services to address these deficits and reach maximum level of function.       Plan:     Patient to be seen 2 x/week to address the above listed problems via therapeutic exercises, therapeutic activities, self-care/home management  Plan of Care Expires: 12/08/24  Plan of Care Reviewed with: patient, family    Subjective     Chief Complaint: Reported "I just get tired quickly."  Patient/Family Comments/goals: increase independence  Pain/Comfort:  Pain Rating 1: 0/10    Objective:     Communicated with: NurseRonna, prior to session.  Patient found up in chair with peripheral IV and chair check upon OT entry to room.    General Precautions: Standard, special contact, fall    Orthopedic Precautions:N/A  Braces: N/A  Respiratory Status: Room air     Occupational Performance:     Bed Mobility:    OOB in chair at start and end of session    Functional Mobility/Transfers:  Patient completed Sit <> Stand Transfer with moderate assistance and of 2 persons  with  rolling walker   Sustained static standing ~2 minutes with mod A of 2 and RW to increase upright activity  Rapid RR while in standing due to severe fear of " falling  V/c for technique with transfers to increase safety and independence with completion  Posterior scooting in chair to improve positioning with mod A of 2  Prolonged rest breaks between activities to decrease RR    AMPAC 6 Click ADL: 11    Treatment & Education:  Patient completed x10 reps, x3 planes of motion B UE AROM therex to increase functional strength and activity tolerance needed for ADL completion. Provided with v/c for technique and pacing. Encouraged completion throughout the day. Patient and family member present in the room stated understanding and in agreement with POC. Will continue to progress as able.    Patient left up in chair with all lines intact, call button in reach, and chair alarm on    GOALS:   Multidisciplinary Problems       Occupational Therapy Goals          Problem: Occupational Therapy    Goal Priority Disciplines Outcome Interventions   Occupational Therapy Goal     OT, PT/OT Progressing    Description: O.T. GOAL TO BE MET BY 12-08-24  PT WILL TOLERATE 1 SET X 12 REPS B UE ROM EXERCISE  SBA WITH UE DRESSING  SBA WITH BSC TRANSFER                       Time Tracking:     OT Date of Treatment: 12/03/24  OT Start Time: 1030  OT Stop Time: 1055  OT Total Time (min): 25 min    Billable Minutes:Therapeutic Activity 15  Therapeutic Exercise 10    OT/KIKE: OT     Number of KIKE visits since last OT visit: 0    Marielena Andrade OT  12/3/2024

## 2024-12-03 NOTE — ASSESSMENT & PLAN NOTE
Patients blood pressure range in the last 24 hours was: BP  Min: 90/51  Max: 119/55.The patient's inpatient anti-hypertensive regimen is listed below       Plan  Hold antihypertensives

## 2024-12-03 NOTE — ASSESSMENT & PLAN NOTE
Discussed with micro lab in [place]. C.diff from 11/23/2024 confirmed to be positive.   ID started PO Vanc Q6H.

## 2024-12-03 NOTE — PLAN OF CARE
TX COMPLETED: facilitated bed mobility with max A x 2, transfers with max A x 2, ambulated few steps from bed<>chair with max A x 2, twice during today. Cont POC

## 2024-12-03 NOTE — ASSESSMENT & PLAN NOTE
The likely etiology of thrombocytopenia is sepsis. The patients 3 most recent labs are listed below.  Recent Labs     12/01/24  0614 12/02/24  0823 12/03/24  0520   * 127* 132*       Plan  - Will transfuse if platelet count is <50k (if undergoing surgical procedure or have active bleeding).

## 2024-12-03 NOTE — ASSESSMENT & PLAN NOTE
ELIZABETH is likely due to pre-renal azotemia due to dehydration. Baseline creatinine is  1.0 . Most recent creatinine and eGFR are listed below.  Recent Labs     12/01/24  0614 12/02/24  0601 12/03/24  0520   CREATININE 1.0 0.9 1.0   EGFRNORACEVR >60 >60 >60        Plan  - ELIZABETH resolved  - Avoid nephrotoxins and renally dose meds for GFR listed above  - Monitor urine output, serial BMP

## 2024-12-03 NOTE — ASSESSMENT & PLAN NOTE
-Hx ESBL, frequent UTIs, significant resistances (prior cultures at [facility] through [facility], prior cultures as well in our system)  -Consult ID, appreciate reccs  -Currently on ertapenem and linezolid which should be sufficient to cover her  -Unfortunately the urine culture is marked as canceled, unsure of the reason --> called micro lab in [place], quantity was not sufficient for urine culture  -Ordering another UA reflex to culture although will have low sensitivity at this point  -Blood cultures pending    11/26/2024  Urine cultures growing gram negative spp  ID following, appreciate recs  Continue ertapenem, monitor fever curve  Follow up culture susceptibilities    11/28/2024  Procalcitonin rising. Urine cultures confirm ESBL E. Coli. Antibiotics narrowed by ID.   Continue to monitor   Appreciate ID recs  disposition: SNF once insurance authorization obtained

## 2024-12-03 NOTE — SUBJECTIVE & OBJECTIVE
Interval History:  Patient seen and examined at bedside.  She was slowly progressing.  She had some difficulty swallowing pills.  She was evaluated by speech therapy who recommended minced moist diet and aspiration precautions.  She also recommended crushing meds and putting in pudding or applesauce.  Patient's son states she recently changed insurance as of 12/1 to [facility] and they are attempting to get that 2.  [facility] to submit for insurance authorization    Review of Systems  Objective:     Vital Signs (Most Recent):  Temp: 98.9 °F (37.2 °C) (12/03/24 1608)  Pulse: 98 (12/03/24 1608)  Resp: 18 (12/03/24 1608)  BP: (!) 90/51 (12/03/24 1608)  SpO2: 97 % (12/03/24 1608) Vital Signs (24h Range):  Temp:  [97.3 °F (36.3 °C)-98.9 °F (37.2 °C)] 98.9 °F (37.2 °C)  Pulse:  [] 98  Resp:  [18-20] 18  SpO2:  [92 %-97 %] 97 %  BP: ()/(51-62) 90/51     Weight: 110.4 kg (243 lb 6.2 oz)  Body mass index is 43.11 kg/m².  No intake or output data in the 24 hours ending 12/03/24 1722      Physical Exam  Vitals reviewed.   Constitutional:       Appearance: She is obese. She is ill-appearing.   HENT:      Head: Normocephalic and atraumatic.      Mouth/Throat:      Mouth: Mucous membranes are moist.      Pharynx: Oropharynx is clear.   Eyes:      Extraocular Movements: Extraocular movements intact.      Conjunctiva/sclera: Conjunctivae normal.   Cardiovascular:      Rate and Rhythm: Normal rate and regular rhythm.      Pulses: Normal pulses.      Heart sounds: Normal heart sounds.   Pulmonary:      Effort: Pulmonary effort is normal.      Breath sounds: Normal breath sounds.   Abdominal:      General: Bowel sounds are normal.      Palpations: Abdomen is soft.      Tenderness: There is abdominal tenderness. There is no guarding or rebound.   Musculoskeletal:         General: Normal range of motion.      Cervical back: Normal range of motion and neck supple.   Skin:     General: Skin is warm and  dry.   Neurological:      General: No focal deficit present.      Mental Status: She is alert and oriented to person, place, and time. Mental status is at baseline.      Motor: Weakness present.   Psychiatric:         Mood and Affect: Mood normal.         Behavior: Behavior normal.         Thought Content: Thought content normal.             Significant Labs: All pertinent labs within the past 24 hours have been reviewed.  CBC:   Recent Labs   Lab 12/02/24  0823 12/03/24  0520   WBC 7.44 7.25   HGB 9.5* 8.5*   HCT 29.9* 28.7*   * 132*     CMP:   Recent Labs   Lab 12/02/24  0601 12/03/24  0520    139   K 3.4* 3.0*    107   CO2 20* 23   GLU 87 87   BUN 32* 31*   CREATININE 0.9 1.0   CALCIUM 7.2* 6.7*   PROT 4.5* 3.8*   ALBUMIN 1.6* 1.3*   BILITOT 0.3 0.2   ALKPHOS 88 73   AST 16 11   ALT 17 12   ANIONGAP 12 9     Magnesium:   Recent Labs   Lab 12/02/24  0601 12/03/24  0520   MG 1.6 1.7       Significant Imaging: I have reviewed all pertinent imaging results/findings within the past 24 hours.

## 2024-12-03 NOTE — PT/OT/SLP PROGRESS
Physical Therapy  Treatment    Divya Bui   MRN: [id number]   Admitting Diagnosis: Sepsis       PT Start Time: 1036 (and [id number])     PT Stop Time: 1046    PT Total Time (min): 26 mins total (10 min and 16  mins)    Billable Minutes:  Therapeutic Activity 26    Treatment Type: Treatment  PT/PTA: PT     Number of PTA visits since last PT visit: 0       General Precautions: Standard, fall, special contact  Orthopedic Precautions: N/A  Braces: N/A  Respiratory Status: Room air    Spiritual, Cultural Beliefs, Latter-day Practices, Values that Affect Care: no    Subjective:  Communicated with nursing (Ronna) and performed chart review via epic system prior to session.  Pt agreeable to sitting OOB, then requesting to return to bed. PCT requesting assist with mobility    Pain/Comfort  Pain Rating 1: 7/10 (at second trial pt complained of pain to bottom as reason she was unable to sit OOB for more than 30-40ish mins.)  Pain Addressed 1: Reposition, Distraction  Pain Rating Post-Intervention 1: 7/10    Objective:   Patient found with: peripheral IV, telemetry. Pt sitting EOB, family and PCT at bedside    Functional Mobility:  Bed Mobility:   Sitting EOB upon PT arrival  Second visit: pt performed sit to supine with max A x 2, cues for hand placement and safe mobility as pt opted to relax backward onto bed x 2 without warning. Supine scooting max A x 2, cues for pt to bend and plant BLE into bed to assist with scoot.     Transfers/Gait: :   Sit<>stand mod A x 2 with RW, at first attempted but pt unable to shift weight over ISAIAH with RW. Required Mod A x 2 to stand, then max A x 2 HHA to pivot to chair.   Second time: pt requesting to return to bed, increased encouragement on benefits of OOB to chair and education but pt unwilling due to pain to bottom per daughter in law at bedside. Stand pivot to bed with mod A x 2 HHA, able to take 3-4 shuffled steps back to bed.         Balance:   Dynamic Sit: FAIR+: Maintains balance  "through MINIMAL excursions of active trunk motion  Dynamic stand: poor minus dynamic standing balance       Treatment and Education:  Educated pt on benefits of consistent participation in PT services to meet functional goals. Educated pt on importance of sitting OOB to promote endurance and overall activity tolerance. Educated pt on call don't fall policy and use of call button to alert nursing staff of needs (including to assist in/out of bed). Pt expressed understanding.      AM-PAC 6 CLICK MOBILITY  How much help from another person does this patient currently need?   1 = Unable, Total/Dependent Assistance  2 = A lot, Maximum/Moderate Assistance  3 = A little, Minimum/Contact Guard/Supervision  4 = None, Modified [place]/Independent    Turning over in bed (including adjusting bedclothes, sheets and blankets)?: 2  Sitting down on and standing up from a chair with arms (e.g., wheelchair, bedside commode, etc.): 2  Moving from lying on back to sitting on the side of the bed?: 2  Moving to and from a bed to a chair (including a wheelchair)?: 2  Need to walk in hospital room?: 2  Climbing 3-5 steps with a railing?: 1  Basic Mobility Total Score: 11    AM-PAC Raw Score CMS G-Code Modifier Level of Impairment Assistance   6 % Total / Unable   7 - 9 CM 80 - 100% Maximal Assist   10 - 14 CL 60 - 80% Moderate Assist   15 - 19 CK 40 - 60% Moderate Assist   20 - 22 CJ 20 - 40% Minimal Assist   23 CI 1-20% SBA / CGA   24 CH 0% Independent/ Mod I     Patient left supine with all lines intact, call button in reach, bed alarm on, nursing notified, and family and PCT present.    Assessment:  Divya Bui is a 64 y.o. female with a medical diagnosis of Sepsis and presents with deficits in overall mobility. Pt limited by increased fatigue and fear of movement. Pt reporting that she doesn't want to "hurt us" when educating on dangers of losing balance posteriorly in an effort to hurriedly return to bed. Pt will benefit " from continue PT services in order to progress toward baseline.    Rehab identified problem list/impairments: weakness, impaired endurance, impaired functional mobility, gait instability, impaired balance, decreased lower extremity function, decreased safety awareness    Rehab potential is good.    Activity tolerance: Fair    Discharge recommendations: Moderate Intensity Therapy      Barriers to discharge:      Equipment recommendations: to be determined by next level of care     GOALS:   Multidisciplinary Problems       Physical Therapy Goals          Problem: Physical Therapy    Goal Priority Disciplines Outcome Interventions   Physical Therapy Goal     PT, PT/OT Progressing    Description: Goals to be met by 12/8/24.  1. Pt will complete bed mobility SBA.  2. Pt will complete sit to stand SBA.  3. Pt will ambulate 100ft SBA using RW.  4. Pt will increase AMPAC score by 2 points to progress functional mobility.                       PLAN:    Patient to be seen 3 x/week to address the above listed problems via gait training, therapeutic activities, therapeutic exercises, neuromuscular re-education  Plan of Care expires: 12/08/24  Plan of Care reviewed with: patient, son (daughter in law)         12/03/2024

## 2024-12-03 NOTE — PLAN OF CARE
Pt tolerated interventions well. Required MOD A of 2 for STS. Recommending moderate intensity therapy upon d/c.

## 2024-12-03 NOTE — PLAN OF CARE
Per Tushar, with [employer], Patient's medical, vision, and prescription benefits are showing as invalid on [employer] website. Patient does not have SNF benefits, only benefit Tushar is able to see is Dental, via [employer].    MIGUEL ANGEL stated Patient's son did not have insurance card on file, but he did provide it to registration. Tushar sent SW information she was able to see from [employer]'s Portal and SW will provide insurance card and paper from [employer] insurance, to patient and son at bedside.    15 12 Per CM Director,  stated patient has insurance authorization for hospitalization via [employer] Managed Medicare. CM Director stated she asked  team for Member ID that was provided as covering patient's hospitalization. MIGUEL ANGEL called Tushar, with [employer] to see if she would re-submit for SNF authorization via the patient's insurance. Tushar stated she would resubmit and let SW know what happens.  Per Tushar, when she spoke to [employer] Representative earlier, they stated patient had medical and vision coverage 1/1/24 - 11/1/24 and then dental was active 12/1/24.  SW verbalized understanding.     16 08 Per Patient's son, Patient's insurance was switched from [employer] Managed Medicare to [employer] Managed Medicare as of 12/1/24. Patient's son was currently on the phone with insurance company regarding new Member ID and policy changes. MIGUEL ANGEL stated if they are able to give Member ID to Patient's son, SW can provide it to [employer] to run benefits. Patient's son verbalized understanding.     SW will continue to follow and assist as needed.

## 2024-12-03 NOTE — PT/OT/SLP EVAL
Speech Language Pathology Evaluation  Bedside Swallow    Patient Name:  Divya Bui   MRN:  [id number]  Admitting Diagnosis: Sepsis    Recommendations:                 General Recommendations:   diet f/u  Diet recommendations:  Soft & Bite Sized Diet - IDDSI Level 6, Thin liquids - IDDSI Level 0   Aspiration Precautions: Feed only when awake/alert, Frequent oral care, HOB to 90 degrees, Meds crushed in puree, Meds whole buried in puree, Small bites/sips, and Standard aspiration precautions   General Precautions: Standard, aspiration, other (see comments) (behavioral reflux precautions)  Communication strategies:   cognitive/memory impairment    Assessment:     Divya Bui is a 64 y.o. female admitted to [facility] with dx metabolic encephalopathy, sepsis, acute cystitis and C. diff.  She presents with improving mentation and communication, although cognitive/memory deficits noted.  No overt s/s of aspiration present during bedside CSE and pt denied current GI/dysphagia complaints; however, reported hx pill-form dysphagia and significant GI hx (gastric bypass, GERD, hiatal hernia, esophageal stricture).  She is recommended for IDDSI 6-soft/bite sized solids and IDDSI 0-thin liquids, following aspiration/behavioral reflux precautions. Medications recommended to be crushed/split or placed whole in pudding.  She may benefit from re-establishing with OP GI given hx and reported pill-form dysphagia. ST will f/u diet.    History:     Past Medical History:   Diagnosis Date    Anemia     Anxiety     Basal cell carcinoma (BCC) of face 2/26/2020    Blood transfusion     Bowel incontinence     Depression     Esophageal stricture     GERD (gastroesophageal reflux disease)     Hypertension     Latent tuberculosis by skin test 2001    took INH    Liver nodule 1/6/2014    Morbid obesity with BMI of 45.0-49.9, adult     OA (osteoarthritis) of knee 12/12/2014    Pericardial effusion 9/4/2014       Past Surgical History:   Procedure  "Laterality Date    CHOLECYSTECTOMY      COLONOSCOPY N/A 3/6/2023    Procedure: COLONOSCOPY;  Surgeon: Beti Diallo MD;  Location: [facility];  Service: Endoscopy;  Laterality: N/A;    GASTRIC BYPASS      HERNIA REPAIR      INJECTION OF ANESTHETIC AGENT AROUND NERVE Bilateral 6/16/2023    Procedure: Bilateral Genicular nerve block with RN IV sedation;  Surgeon: Denis Lai MD;  Location: [facility];  Service: Pain Management;  Laterality: Bilateral;    INJECTION OF ANESTHETIC AGENT AROUND NERVE Bilateral 8/11/2023    Procedure: Bilateral Genicular nerve block with RN IV sedation;  Surgeon: Denis Lai MD;  Location: [facility];  Service: Pain Management;  Laterality: Bilateral;    RADIOFREQUENCY THERMOCOAGULATION Right 10/31/2023    Procedure: Right Genicular Nerve RFA with RN IV sedation;  Surgeon: Denis Lai MD;  Location: [facility];  Service: Pain Management;  Laterality: Right;    RADIOFREQUENCY THERMOCOAGULATION Left 11/14/2023    Procedure: Left Genicular Nerve RFA with RN IV sedation;  Surgeon: Denis Lai MD;  Location: [facility];  Service: Pain Management;  Laterality: Left;    right sholder surgery      SMALL INTESTINE SURGERY         Social History: Patient lives alone and independent with ADL's. Reportedly ambulates with walker, cane. Drives.    Prior Intubation HX:  N/A this admission    Modified Barium Swallow 3/21/2017 at [facility]- "normal study"    Esophagram 7/18/2016 at [facility]--see impressions below:  Moderate 6 mm wide, 7 mm liver in length stricture at the GE junction which delays passage of the barium pill. Moderate presbyesophagus.  Moderate sliding hiatal hernia in this patient with prior Delores-en-Y gastric bypass.     Upper GI with Small Bowel 12/11/2020 at [facility]  No findings of a high-grade bowel obstruction.  Status post Delores-en-Y gastric bypass with hiatal hernia containing the residual stomach but no delay in passage of contrast through the esophagus or " stomach    CT ABDOMEN PELVIS WITHOUT CONTRAST, multiplanar reconstructions     CLINICAL HISTORY:  concern for vesicocolic fistula;     TECHNIQUE:  Axial images through the abdomen and pelvis were obtained without the use of IV contrast.  Sagittal and coronal reconstructions are provided for review.  Oral contrast was not utilized.  Rectal contrast was utilized.  Post evacuation images were obtained as well.     COMPARISON:  June 25, 2024     FINDINGS:  LUNG BASES: Right lower lobe infiltrate.  Lung bases are otherwise clear.  Small right effusion left.  Negative for pleural or pericardial effusions. The distal esophagus is normal.  Coronary artery calcifications.  Aortic and mitral valve plane calcifications.     ABDOMEN: Mild ascites.  Cholecystectomy clips.  Fatty infiltration of the pancreas.  The liver and spleen otherwise appear normal.  The pancreas is unremarkable.  Kidneys and adrenal glands are normal.     Negative for adenopathy noted within the abdomen or pelvis.  Vascular calcifications are present without aneurysmal changes.     There is distention of the proximal half of the colon with air and a small amount of fluid, with associated wall thickening.  There is rectal contrast in the distal half of the colon, which is nondilated although diffusely thick walled.  There is no evidence for extravasation of contrast out of the colon on either phase of imaging.     Appendectomy postoperative changes noted.  The small bowel is normal in caliber.  Gastric bypass postoperative changes noted.  Normal appearing afferent limb.  Negative for free air.     PELVIS: There is a small amount of air within the urinary bladder.  The urinary bladder is otherwise normal.  The uterus is absent the rest of the female pelvic organs are normal. There are pelvic phleboliths.     Atrophy of the abdominal wall musculature.  Mild anasarca.  The abdominal wall is otherwise intact.     No significant osseous abnormality is  identified.  Negative for significant spinal canal stenosis. Age-appropriate degenerative changes of the spine and pelvis.  Osteopenia.     Negative for groin adenopathy.     Impression:     1.  There is no evidence for contrast extravasation from the bowel on this study.  Specifically, there is no evidence for a colovesical fistula.     2. Diffuse wall thickening of the:, with distention of the proximal half of the colon.  Diffuse infectious or inflammatory colitis could have this appearance.  Negative for pneumatosis to suggest ischemic colitis on this unenhanced scan.     3.  Right lower lobe infiltrate with adjacent effusion, consistent with pneumonia with a parapneumonic effusion.     4.  Mild ascites.  Anasarca.  Third spacing?     5.  Appendectomy postoperative changes.  Marked fatty infiltration of the liver.  Gastric bypass postoperative changes with normal appearing afferent limb.  Cholecystectomy clips.  Other stable and nonemergent findings as described above.     All CT scans at this facility are performed  using dose modulation techniques as appropriate to performed exam including the following:  automated exposure control; adjustment of mA and/or kV according to the patients size (this includes techniques or standardized protocols for targeted exams where dose is matched to indication/reason for exam: i.e. extremities or head);  iterative reconstruction technique.        Electronically signed by:Shawn Mcgarry MD  Date:                                            11/29/2024  Time:                                           17:29    Prior diet: Pt consume a regular/soft diet at home per report. Noted GI hx.    Subjective     Pt seen bedside for ST. Awake and communicative.  Son at bedside. No c/o pain.  Patient goals: SNF     Pain/Comfort:  Pain Rating 1: 0/10  Pain Rating Post-Intervention 1: 0/10  Pain Rating 2: 0/10  Pain Rating Post-Intervention 2: 0/10    Objective:     Oral Musculature  Evaluation  Oral Musculature: WFL  Dentition: upper and lower dentures  Secretion Management: adequate  Mucosal Quality: good  Mandibular Strength and Mobility: WFL  Oral Labial Strength and Mobility: WFL  Lingual Strength and Mobility: WFL  Velar Elevation: WFL  Buccal Strength and Mobility: WFL  Volitional Cough: present  Volitional Swallow: present  Voice Prior to PO Intake: WFL    Bedside Swallow Eval:   Clinical Swallow Examination:   Of note, patient self-fed throughout evaluation. Patient presented with:     CONSISTENCY  NOTES   THIN (IDDSI 0) Water cup/straw   No overt s/s of aspiration  Intermittent belching post-swallow   PUREE (IDDSI 4/Extremely Thick)   TSP/TBSP bites of pudding No overt s/s of aspiration   SOLID (IDDSI 7/Regular) Bite of Koki [employer] cookie    Reported solid at dry, required liquid rinse.  No overt s/s of aspiration. Prefers softer solids.     Thickened liquids were not used in this assessment. Jefe (2018) reported that thickened liquids have no sound evidence at reducing the risk of pneumonia in patients with dysphagia and can cause harm by increasing their risk of dehydration. It also presents an increased risk of UTI, electrolyte imbalance, constipation, fecal impaction, cognitive impairment, functional decline and even death (Langmore, 2002; Spruce, 2016).  Thickened liquids are associated with risks including dehydration, increased pharyngeal residue, potential interference with medication absorption, and decreased quality of life (Leia, 2013). Thickened liquids are also more likely to be silently aspirated than thin liquids (Masoud et al., 2018). This supports the assertion that we should confirm a patient requires thickened liquids with an instrumental swallow study prior to recommending them.    References:   Leia BAEZ (2013). Thickening agents used for dysphagia management: Effect on bioavailability of water, medication and feelings of satiety. Nutrition Journal, 12,  54. https://doi.org/10.1186/[email or web address]    NESTOR Meredith, GABBY Pompa, NORMAN Valiente, & NESTOR Velasquez (2018). Cough response to aspiration in thin and thick fluids during FEES in hospitalized inpatients. International journal of language & communication disorders, 53(5), 909-918. https://doi.org/10.1111/0199-6642.36924    INTERPRETATION AND RISK ASSESSMENT:  Clinical swallow evaluation (CSE) revealed oral phase characterized by lingual, labial, and buccal strength and range of motion functional for lip closure, bolus preparation and propulsion. The patient had no anterior loss of the bolus with complete closure of the lips around the utensils. No residue remained in the oral cavity following the swallow. Patient without overt clinical signs/symptoms of aspiration on any PO trials given; however, she presents with a possibly inefficient swallow as indicated by GI hx. Patient denied current GI or dysphagia symptoms with liquids or solids; however, reported chronic pill-form dysphagia warranting OP GI follow up. Contributing risk factors for dysphagia include cognitive deficits s/t infection and metabolic encephalopathy.    Goals:   Multidisciplinary Problems       SLP Goals          Problem: SLP    Goal Priority Disciplines Outcome   SLP Goal     SLP    Description: 1.  Pt will consume IDDSI 6-soft/bite sized solids and IDDSI 0-thin liquids without incident.                       Plan:     Patient to be seen:  2 x/week, 1 x/week   Plan of Care expires:  12/10/24  Plan of Care reviewed with:  patient, son   SLP Follow-Up:  Yes       Discharge recommendations:  Moderate Intensity Therapy   Barriers to Discharge:  None    Time Tracking:     SLP Treatment Date:   12/03/24  Speech Start Time:  1400  Speech Stop Time:  1430     Speech Total Time (min):  30 min    Billable Minutes: Eval Swallow and Oral Function 15 minutes and Self Care/Home Management Training 15 minutes    12/03/2024

## 2024-12-03 NOTE — PROGRESS NOTES
[facility] Medicine  Progress Note    Patient Name: Divya Bui  MRN: [id number]  Patient Class: IP- Inpatient   Admission Date: 11/23/2024  Length of Stay: 10 days  Attending Physician: Malia Moe MD  Primary Care Provider: Angel Khan MD        Subjective     Principal Problem:Sepsis        HPI:  Divya Bui, a 64-year-old female with a PMHx of HTN, GERD, morbid obesity s/p gastric bypass, depression, anxiety, esophageal stricture, and recurrent ESBL UTIs, was admitted on 11/23 for a UTI with sepsis. She was transferred to the ICU after exhibiting signs of septic shock, including recurrent hypotension unresponsive to initial fluid resuscitation. Upon admission, she presented with acute cystitis. ID was consulted by  for antibiotic therapy recommendations. She was continued on ertapenem (Invanz) and linezolid (Zyvox). Urine cultures grew GNRs, and blood cultures were positive for coagulase-negative staphylococci; repeat cultures were pending as the initial result was likely a contaminant. She showed evidence of sepsis with organ dysfunction indicated by acute kidney injury and encephalopathy. A fluid challenge was provided in the ED on 11/23. Her latest lactate level on 11/25 was 1.2. Despite marginal improvement with Midodrine and albumin, she required initiation of levophed overnight, but this was weaned off on 11/26. After stabilization, she was stepped down to telemetry, and HM was re-consulted to assume care.    Overview/Hospital Course:  11/24:  Remains lethargic and intermittently confused, also hypotensive today to lowest of 73/39 taken on the right upper arm by the tech.  On my recheck with smaller size cuff she was 81/46, and then was 118/59 on her calf.  I am not sure about whether the low blood pressures are entirely accurate.  She continues to mentate the same.  Heart rates in the low 100s/110s.  Discussed with ICU and checked labs.  No evidence of  "bleeding.  Lactate is normal.  Have given 3 L of fluid  today.  Blood pressure this evening up to 92/51.   11/25/2024: transferred to ICU for pressor therapy. Levophed initiated in the evening. ID consulted for antibiotic recs.  11/26/2024: levophed weaned off this morning; stabilized thereafter. Deemed stable for transfer back to to telemetry.     11/27/2024  ELIZABETH steadily worsening. Restart IVFs. Consulted nephrology for guidance. Cultures pending. Still having multiple loose bowel movements. C. Diff ordered 11/23/2024 but still says "in process" in EPIC. Am currently on hold with Micro lab to inquire about results of study. disposition: [facility] once medically stable    11/28/2024  Mentation improved this morning. Procalcitonin rising. Discussed with micro lab in [place]. C.diff from 11/23/2024 confirmed to be positive. ID started PO Vanc Q6H. Nephrology evaluated. Added bicarb to IVFs. Labs this morning show improvement in renal function for the first time since admission. Appreciate Nephrology's recommendations/interventions. Urine cultures confirm ESBL E. Coli. Antibiotics narrowed by ID. disposition: [facility] once medically stable    11/29/2024  Labs steadily improving. Discussed case with Nephrology. There is some concern for vesicolic fistula given recurrent UTIs. Discussed with ID. Will order CT abdomen/pelvis with rectal contrast to further investigate.    11/30/2024  CT abdomen/pelvis shows no evidence of fistula. IV Antibiotics weaned. PO vancomycin started for 10d therapy. Patient now medically stable for discharge to [facility].    11/27/2024  ELIZABETH steadily worsening. Restart IVFs. Consulted nephrology for guidance. Cultures pending. Still having multiple loose bowel movements. C. Diff ordered 11/23/2024 but still says "in process" in EPIC. Am currently on hold with Micro lab to inquire about results of study. disposition: [facility] once medically stable    11/28/2024  Mentation improved this morning. Procalcitonin " rising. Discussed with micro lab in [place]. C.diff from 11/23/2024 confirmed to be positive. ID started PO Vanc Q6H. Nephrology evaluated. Added bicarb to IVFs. Labs this morning show improvement in renal function for the first time since admission. Appreciate Nephrology's recommendations/interventions. Urine cultures confirm ESBL E. Coli. Antibiotics narrowed by ID. disposition: SNF once medically stable    11/29/2024  Labs steadily improving. Discussed case with Nephrology. There is some concern for vesicolic fistula given recurrent UTIs. Discussed with ID. Will order CT abdomen/pelvis with rectal contrast to further investigate.    11/30/2024  CT abdomen/pelvis shows no evidence of fistula. IV Antibiotics weaned. PO vancomycin started for 10d therapy. Patient now medically stable for discharge to SNF.    12/01/2024  Orientation waxing and waning. Worse in the AM. However, has been consistently cooperative with PT/OT. Still a great candidate for SNF. Referral placed. Anticipate discharge to SNF Monday/early next week.    DISPOSITION: C. Diff being treated with PO Vanc.   Discharge to SNF with five additional days of PO vanc..     Interval History:  Patient seen and examined at bedside.  She was slowly progressing.  She had some difficulty swallowing pills.  She was evaluated by speech therapy who recommended minced moist diet and aspiration precautions.  She also recommended crushing meds and putting in pudding or applesauce.  Patient's son states she recently changed insurance as of 12/1 to [facility] and they are attempting to get that 2.  [facility] to submit for insurance authorization    Review of Systems  Objective:     Vital Signs (Most Recent):  Temp: 98.9 °F (37.2 °C) (12/03/24 1608)  Pulse: 98 (12/03/24 1608)  Resp: 18 (12/03/24 1608)  BP: (!) 90/51 (12/03/24 1608)  SpO2: 97 % (12/03/24 1608) Vital Signs (24h Range):  Temp:  [97.3 °F (36.3 °C)-98.9 °F (37.2 °C)] 98.9 °F (37.2  °C)  Pulse:  [] 98  Resp:  [18-20] 18  SpO2:  [92 %-97 %] 97 %  BP: ()/(51-62) 90/51     Weight: 110.4 kg (243 lb 6.2 oz)  Body mass index is 43.11 kg/m².  No intake or output data in the 24 hours ending 12/03/24 1722      Physical Exam  Vitals reviewed.   Constitutional:       Appearance: She is obese. She is ill-appearing.   HENT:      Head: Normocephalic and atraumatic.      Mouth/Throat:      Mouth: Mucous membranes are moist.      Pharynx: Oropharynx is clear.   Eyes:      Extraocular Movements: Extraocular movements intact.      Conjunctiva/sclera: Conjunctivae normal.   Cardiovascular:      Rate and Rhythm: Normal rate and regular rhythm.      Pulses: Normal pulses.      Heart sounds: Normal heart sounds.   Pulmonary:      Effort: Pulmonary effort is normal.      Breath sounds: Normal breath sounds.   Abdominal:      General: Bowel sounds are normal.      Palpations: Abdomen is soft.      Tenderness: There is abdominal tenderness. There is no guarding or rebound.   Musculoskeletal:         General: Normal range of motion.      Cervical back: Normal range of motion and neck supple.   Skin:     General: Skin is warm and dry.   Neurological:      General: No focal deficit present.      Mental Status: She is alert and oriented to person, place, and time. Mental status is at baseline.      Motor: Weakness present.   Psychiatric:         Mood and Affect: Mood normal.         Behavior: Behavior normal.         Thought Content: Thought content normal.             Significant Labs: All pertinent labs within the past 24 hours have been reviewed.  CBC:   Recent Labs   Lab 12/02/24  0823 12/03/24  0520   WBC 7.44 7.25   HGB 9.5* 8.5*   HCT 29.9* 28.7*   * 132*     CMP:   Recent Labs   Lab 12/02/24  0601 12/03/24  0520    139   K 3.4* 3.0*    107   CO2 20* 23   GLU 87 87   BUN 32* 31*   CREATININE 0.9 1.0   CALCIUM 7.2* 6.7*   PROT 4.5* 3.8*   ALBUMIN 1.6* 1.3*   BILITOT 0.3 0.2   ALKPHOS 88  73   AST 16 11   ALT 17 12   ANIONGAP 12 9     Magnesium:   Recent Labs   Lab 12/02/24  0601 12/03/24  0520   MG 1.6 1.7       Significant Imaging: I have reviewed all pertinent imaging results/findings within the past 24 hours.    Assessment and Plan     * Sepsis  --leukocytosis resolved, normotensive, afebrile   --Blood Cultures (11/23)- growing  COAGULASE-NEGATIVE STAPHYLOCOCCUS- suspect contaminant  --ID following, continued IV ertapenem for cystitis- appreciate recs  --repeat cultures ordered on 11/26/2024, follow up results  --Organ dysfunction indicated by Acute kidney injury and Encephalopathy     11/28/2024  Improving  See acute cystitis and C. diff    Thrombocytopenia  The likely etiology of thrombocytopenia is sepsis. The patients 3 most recent labs are listed below.  Recent Labs     12/01/24  0614 12/02/24  0823 12/03/24  0520   * 127* 132*       Plan  - Will transfuse if platelet count is <50k (if undergoing surgical procedure or have active bleeding).        Hypophosphatemia  Resolved    Hyponatremia  Resolved    Hypomagnesemia  Patient has Abnormal Magnesium: hypomagnesemia. Will continue to monitor electrolytes closely. Will replace the affected electrolytes and repeat labs to be done after interventions completed. The patient's magnesium results have been reviewed and are listed below.  Recent Labs   Lab 12/03/24  0520   MG 1.7          Hyperkalemia  Resolved  Recent Labs     12/01/24  0614 12/02/24  0601 12/03/24  0520   K 3.4* 3.4* 3.0*       Plan  - Monitor for arrhythmias with EKG and/or continuous telemetry.   Resolved              C. difficile diarrhea    Discussed with micro lab in [place]. C.diff from 11/23/2024 confirmed to be positive.   ID started PO Vanc Q6H.     Encephalopathy, metabolic  --improving  --monitor clinically      Candidal intertrigo and dermatitis  Severe erythema to breasts, pannus, groin, and buttock with some skin breakdown   Appears to be candida overgrowth    IV Diflucan     11/28/2024  Transitioned to PO fluconazole    Metabolic acidosis  Likely secondary to ELIZABETH/sepsis  On p.o. bicarb    Acute cystitis  -Hx ESBL, frequent UTIs, significant resistances (prior cultures at [facility] through Care everywhere, prior cultures as well in our system)  -Consult ID, appreciate reccs  -Currently on ertapenem and linezolid which should be sufficient to cover her  -Unfortunately the urine culture is marked as canceled, unsure of the reason --> called micro lab in [place], quantity was not sufficient for urine culture  -Ordering another UA reflex to culture although will have low sensitivity at this point  -Blood cultures pending    11/26/2024  Urine cultures growing gram negative spp  ID following, appreciate recs  Continue ertapenem, monitor fever curve  Follow up culture susceptibilities    11/28/2024  Procalcitonin rising. Urine cultures confirm ESBL E. Coli. Antibiotics narrowed by ID.   Continue to monitor   Appreciate ID recs  disposition: [facility] once insurance authorization obtained    Recurrent major depressive disorder, in partial remission  Patient has persistent depression which is mild and is currently controlled. Will Continue anti-depressant medications. We will not consult psychiatry at this time. Patient does not display psychosis at this time. Continue to monitor closely and adjust plan of care as needed.    Cont Abilify and Xanax prn   Hold Doxepin for now while on Zyvox         Chronic pain disorder  Holding home Lyrica and Norco given lethargy in the setting of sepsis      ELIZABETH (acute kidney injury)  ELIZABETH is likely due to pre-renal azotemia due to dehydration. Baseline creatinine is  1.0 . Most recent creatinine and eGFR are listed below.  Recent Labs     12/01/24  0614 12/02/24  0601 12/03/24  0520   CREATININE 1.0 0.9 1.0   EGFRNORACEVR >60 >60 >60        Plan  - ELIZABETH resolved  - Avoid nephrotoxins and renally dose meds for GFR listed above  - Monitor urine  output, serial BMP    Morbid obesity with BMI of 40.0-44.9, adult  Body mass index is 43.11 kg/m². Morbid obesity complicates all aspects of disease management from diagnostic modalities to treatment. Weight loss encouraged and health benefits explained to patient.         Iron deficiency anemia  Anemia is likely due to chronic disease due to Chronic Kidney Disease. Most recent hemoglobin and hematocrit are listed below.  Recent Labs     11/30/24  0457 12/01/24  0614 12/02/24  0823   HGB 9.9* 10.0* 9.5*   HCT 31.9* 31.9* 29.9*     Plan  - Monitor serial CBC: Daily  - Transfuse PRBC if patient becomes hemodynamically unstable, symptomatic or H/H drops below 7/21.  - Patient's anemia is currently stable      Hypothyroid        HTN (hypertension)  Patients blood pressure range in the last 24 hours was: BP  Min: 90/51  Max: 119/55.The patient's inpatient anti-hypertensive regimen is listed below       Plan  Hold antihypertensives      VTE Risk Mitigation (From admission, onward)           Ordered     heparin (porcine) injection 5,000 Units  Every 8 hours         11/23/24 1441     IP VTE HIGH RISK PATIENT  Once         11/23/24 1441     Place sequential compression device  Until discontinued         11/23/24 1441                    Discharge Planning   ELADIA:      Code Status: Full Code   Medical Readiness for Discharge Date:   Discharge Plan A: Skilled Nursing Facility   Discharge Delays: None known at this time            Please place Justification for DME        Malia Henry MD  Department of Hospital Medicine   [facility] - Telemetry ([facility]

## 2024-12-04 VITALS
DIASTOLIC BLOOD PRESSURE: 57 MMHG | WEIGHT: 243.38 LBS | OXYGEN SATURATION: 98 % | RESPIRATION RATE: 19 BRPM | SYSTOLIC BLOOD PRESSURE: 94 MMHG | HEIGHT: 63 IN | HEART RATE: 106 BPM | TEMPERATURE: 98 F | BODY MASS INDEX: 43.12 KG/M2

## 2024-12-04 PROBLEM — F33.41 RECURRENT MAJOR DEPRESSIVE DISORDER, IN PARTIAL REMISSION: Status: RESOLVED | Noted: 2019-02-05 | Resolved: 2024-12-04

## 2024-12-04 PROBLEM — A41.9 SEPSIS: Status: RESOLVED | Noted: 2020-12-09 | Resolved: 2024-12-04

## 2024-12-04 PROBLEM — N17.9 AKI (ACUTE KIDNEY INJURY): Status: RESOLVED | Noted: 2020-12-09 | Resolved: 2024-12-04

## 2024-12-04 PROBLEM — E87.20 METABOLIC ACIDOSIS: Status: RESOLVED | Noted: 2024-11-23 | Resolved: 2024-12-04

## 2024-12-04 PROBLEM — N30.00 ACUTE CYSTITIS: Status: RESOLVED | Noted: 2024-05-10 | Resolved: 2024-12-04

## 2024-12-04 LAB
ALBUMIN SERPL BCP-MCNC: 1.4 G/DL (ref 3.5–5.2)
ANION GAP SERPL CALC-SCNC: 12 MMOL/L (ref 8–16)
ANISOCYTOSIS BLD QL SMEAR: SLIGHT
BASOPHILS NFR BLD: 0 % (ref 0–1.9)
BUN SERPL-MCNC: 34 MG/DL (ref 8–23)
CALCIUM SERPL-MCNC: 6.4 MG/DL (ref 8.7–10.5)
CHLORIDE SERPL-SCNC: 107 MMOL/L (ref 95–110)
CO2 SERPL-SCNC: 21 MMOL/L (ref 23–29)
CREAT SERPL-MCNC: 1.1 MG/DL (ref 0.5–1.4)
DIFFERENTIAL METHOD BLD: ABNORMAL
EOSINOPHIL NFR BLD: 2 % (ref 0–8)
ERYTHROCYTE [DISTWIDTH] IN BLOOD BY AUTOMATED COUNT: 17 % (ref 11.5–14.5)
EST. GFR  (NO RACE VARIABLE): 55 ML/MIN/1.73 M^2
GLUCOSE SERPL-MCNC: 84 MG/DL (ref 70–110)
HCT VFR BLD AUTO: 29.1 % (ref 37–48.5)
HGB BLD-MCNC: 8.7 G/DL (ref 12–16)
IMM GRANULOCYTES # BLD AUTO: ABNORMAL K/UL (ref 0–0.04)
IMM GRANULOCYTES NFR BLD AUTO: ABNORMAL % (ref 0–0.5)
LYMPHOCYTES NFR BLD: 4 % (ref 18–48)
MAGNESIUM SERPL-MCNC: 1.6 MG/DL (ref 1.6–2.6)
MCH RBC QN AUTO: 28.6 PG (ref 27–31)
MCHC RBC AUTO-ENTMCNC: 29.9 G/DL (ref 32–36)
MCV RBC AUTO: 96 FL (ref 82–98)
MONOCYTES NFR BLD: 9 % (ref 4–15)
NEUTROPHILS NFR BLD: 84 % (ref 38–73)
NEUTS BAND NFR BLD MANUAL: 1 %
NRBC BLD-RTO: 0 /100 WBC
PHOSPHATE SERPL-MCNC: 4.2 MG/DL (ref 2.7–4.5)
PLATELET # BLD AUTO: 145 K/UL (ref 150–450)
PLATELET BLD QL SMEAR: ABNORMAL
PMV BLD AUTO: 10 FL (ref 9.2–12.9)
POCT GLUCOSE: 109 MG/DL (ref 70–110)
POIKILOCYTOSIS BLD QL SMEAR: SLIGHT
POLYCHROMASIA BLD QL SMEAR: ABNORMAL
POTASSIUM SERPL-SCNC: 3.3 MMOL/L (ref 3.5–5.1)
RBC # BLD AUTO: 3.04 M/UL (ref 4–5.4)
SARS-COV-2 RDRP RESP QL NAA+PROBE: NEGATIVE
SODIUM SERPL-SCNC: 140 MMOL/L (ref 136–145)
STOMATOCYTES BLD QL SMEAR: PRESENT
TARGETS BLD QL SMEAR: ABNORMAL
WBC # BLD AUTO: 7.56 K/UL (ref 3.9–12.7)

## 2024-12-04 PROCEDURE — 97535 SELF CARE MNGMENT TRAINING: CPT

## 2024-12-04 PROCEDURE — 85007 BL SMEAR W/DIFF WBC COUNT: CPT

## 2024-12-04 PROCEDURE — 97530 THERAPEUTIC ACTIVITIES: CPT

## 2024-12-04 PROCEDURE — 87635 SARS-COV-2 COVID-19 AMP PRB: CPT | Performed by: INTERNAL MEDICINE

## 2024-12-04 PROCEDURE — 25000003 PHARM REV CODE 250

## 2024-12-04 PROCEDURE — 30200315 PPD INTRADERMAL TEST REV CODE 302: Performed by: INTERNAL MEDICINE

## 2024-12-04 PROCEDURE — 63600175 PHARM REV CODE 636 W HCPCS: Performed by: NURSE PRACTITIONER

## 2024-12-04 PROCEDURE — 85027 COMPLETE CBC AUTOMATED: CPT

## 2024-12-04 PROCEDURE — 83735 ASSAY OF MAGNESIUM: CPT

## 2024-12-04 PROCEDURE — 25000003 PHARM REV CODE 250: Performed by: NURSE PRACTITIONER

## 2024-12-04 PROCEDURE — 25000003 PHARM REV CODE 250: Performed by: INTERNAL MEDICINE

## 2024-12-04 PROCEDURE — 25000003 PHARM REV CODE 250: Performed by: STUDENT IN AN ORGANIZED HEALTH CARE EDUCATION/TRAINING PROGRAM

## 2024-12-04 PROCEDURE — 92526 ORAL FUNCTION THERAPY: CPT

## 2024-12-04 PROCEDURE — 86580 TB INTRADERMAL TEST: CPT | Performed by: INTERNAL MEDICINE

## 2024-12-04 PROCEDURE — 80069 RENAL FUNCTION PANEL: CPT | Performed by: INTERNAL MEDICINE

## 2024-12-04 RX ORDER — LANOLIN ALCOHOL/MO/W.PET/CERES
100 CREAM (GRAM) TOPICAL DAILY
Start: 2024-12-05 | End: 2025-01-04

## 2024-12-04 RX ORDER — SODIUM BICARBONATE 650 MG/1
650 TABLET ORAL 2 TIMES DAILY
Start: 2024-12-04 | End: 2025-01-03

## 2024-12-04 RX ORDER — FAMOTIDINE 20 MG/1
20 TABLET, FILM COATED ORAL DAILY
Status: DISCONTINUED | OUTPATIENT
Start: 2024-12-05 | End: 2024-12-04 | Stop reason: HOSPADM

## 2024-12-04 RX ORDER — MICONAZOLE NITRATE 2 G/100G
POWDER TOPICAL 2 TIMES DAILY
Start: 2024-12-04

## 2024-12-04 RX ORDER — VANCOMYCIN HYDROCHLORIDE 25 MG/ML
125 KIT ORAL EVERY 6 HOURS
Start: 2024-12-04 | End: 2024-12-06

## 2024-12-04 RX ORDER — FOLIC ACID 1 MG/1
1 TABLET ORAL DAILY
Start: 2024-12-05 | End: 2025-12-05

## 2024-12-04 RX ORDER — MIDODRINE HYDROCHLORIDE 10 MG/1
10 TABLET ORAL
Start: 2024-12-04 | End: 2025-12-04

## 2024-12-04 RX ORDER — FAMOTIDINE 20 MG/1
20 TABLET, FILM COATED ORAL DAILY
Start: 2024-12-05 | End: 2025-12-05

## 2024-12-04 RX ORDER — LOPERAMIDE HYDROCHLORIDE 2 MG/1
2 CAPSULE ORAL 4 TIMES DAILY PRN
Start: 2024-12-04 | End: 2024-12-14

## 2024-12-04 RX ORDER — ALPRAZOLAM 0.25 MG/1
0.25 TABLET ORAL NIGHTLY PRN
Qty: 15 TABLET | Refills: 0 | Status: SHIPPED | OUTPATIENT
Start: 2024-12-04 | End: 2025-01-03

## 2024-12-04 RX ADMIN — VANCOMYCIN HYDROCHLORIDE 125 MG: KIT at 11:12

## 2024-12-04 RX ADMIN — Medication 4 TABLET: at 08:12

## 2024-12-04 RX ADMIN — MIDODRINE HYDROCHLORIDE 10 MG: 5 TABLET ORAL at 11:12

## 2024-12-04 RX ADMIN — VANCOMYCIN HYDROCHLORIDE 125 MG: KIT at 01:12

## 2024-12-04 RX ADMIN — FAMOTIDINE 20 MG: 20 TABLET ORAL at 08:12

## 2024-12-04 RX ADMIN — MIDODRINE HYDROCHLORIDE 10 MG: 5 TABLET ORAL at 08:12

## 2024-12-04 RX ADMIN — FOLIC ACID 1 MG: 1 TABLET ORAL at 08:12

## 2024-12-04 RX ADMIN — Medication 4 TABLET: at 05:12

## 2024-12-04 RX ADMIN — TUBERCULIN PURIFIED PROTEIN DERIVATIVE 5 UNITS: 5 INJECTION, SOLUTION INTRADERMAL at 12:12

## 2024-12-04 RX ADMIN — Medication 4 TABLET: at 11:12

## 2024-12-04 RX ADMIN — ATORVASTATIN CALCIUM 40 MG: 40 TABLET, FILM COATED ORAL at 08:12

## 2024-12-04 RX ADMIN — VANCOMYCIN HYDROCHLORIDE 125 MG: KIT at 06:12

## 2024-12-04 RX ADMIN — THERA TABS 1 TABLET: TAB at 08:12

## 2024-12-04 RX ADMIN — SODIUM BICARBONATE 1300 MG: 650 TABLET ORAL at 08:12

## 2024-12-04 RX ADMIN — HEPARIN SODIUM 5000 UNITS: 5000 INJECTION INTRAVENOUS; SUBCUTANEOUS at 06:12

## 2024-12-04 RX ADMIN — Medication 100 MG: at 08:12

## 2024-12-04 RX ADMIN — MICONAZOLE NITRATE: 20 POWDER TOPICAL at 08:12

## 2024-12-04 RX ADMIN — MIDODRINE HYDROCHLORIDE 10 MG: 5 TABLET ORAL at 05:12

## 2024-12-04 RX ADMIN — VANCOMYCIN HYDROCHLORIDE 125 MG: KIT at 05:12

## 2024-12-04 RX ADMIN — ARIPIPRAZOLE 10 MG: 5 TABLET ORAL at 08:12

## 2024-12-04 NOTE — PLAN OF CARE
[facility] - Telemetry (Hospital)  Discharge Final Note    Primary Care Provider: Angel Khan MD    Expected Discharge Date: 12/4/2024    Final Discharge Note (most recent)       Final Note - 12/04/24 1436          Final Note    Assessment Type Final Discharge Note     Anticipated Discharge Disposition Skilled Nursing Facility        Post-Acute Status    Post-Acute Authorization Placement     Post-Acute Placement Status Set-up Complete/Auth obtained     Coverage [employer] Managed Medicare     Discharge Delays None known at this time                     Important Message from Medicare             DC Disposition: [facility]  Family Notified: Patient and son at bedside  Transportation: [facility] transportation, pending  time    Patient needed SNF placement upon DC. Patient and family decided on [facility] for placement. MIGUEL ANGEL sent referral for review and insurance approval. [facility] received approval and gave SW number for nurse report. MIGUEL ANGEL messages nurse with number for report. MIGUEL ANGEL is working with Tushar, to get ETA for transportation. MIGUEL ANGEL will update bedside nurse if/ when transportation time is given.

## 2024-12-04 NOTE — PLAN OF CARE
MIGUEL ANGEL reached out to Patient's son regarding new [facility] Medicare Policy ID. Patient's son did not answer, but MIGUEL ANGEL was able to leave a voicemail with call back number.     MIGUEL ANGEL received a call from Patient's son, regarding insurance for patient. Per Patient's son, Patient was switched from [employer]na to C - [employer], and they are unsure as to how Patient got switched. Patient's son stated the [facility], AARP is only prescription coverage. Patient's son stated that he did reach out to [facility] for cash amount of SNF placement. Per Patient's son, Tushar, with [facility], was asking about Medicare Member ID and Patient's son was not aware of Member ID.   MIGUEL ANGEL stated she would research and speak to [facility] also to see what could be done to assist with placement.    MIGUEL ANGEL looked into Patient's chart and found [employer] Medicare card. MIGUEL ANGEL called Tushar with [facility] who stated she did speak with the son and SW was able to provide Medicare Member ID. Tushar stated she would run Patient's Medicare ID number to see if Patient was active, following their morning meeting.     9 49 Per Tushar with [facility], patient did pull up with [employer] and they would be able to admit today. Per Tushar, they would need a COVID test and TB test administered and they will read once she gets to [facility]. MIGUEL ANGEL stated she would update Attending and get test's placed and resulted.   MIGUEL ANGEL updated Attending on Patient being able to admit to [facility] today. MIGUEL ANGEL also updated Patient and son at bedside.   Patient is current with Traditional Medicare and [employer]/ A[employer] as a prescription supplemental plan. Patient's son inquired if they should leave the plan as is for now and re-enrol in Aetna later. MIGUEL ANGEL advised waiting to switch insurances back, upon patient arriving back home from [facility]. Patient and son verbalized understanding.     SW will continue to follow and assist as needed.

## 2024-12-04 NOTE — PLAN OF CARE
Problem: Adult Inpatient Plan of Care  Goal: Plan of Care Review  12/4/2024 1726 by Dee Rhoades RN  Outcome: Adequate for Care Transition  12/4/2024 1028 by Dee Rhoades RN  Outcome: Adequate for Care Transition  Goal: Patient-Specific Goal (Individualized)  12/4/2024 1726 by Dee Rhoades RN  Outcome: Adequate for Care Transition  12/4/2024 1028 by Dee Rhoades RN  Outcome: Adequate for Care Transition  Goal: Absence of Hospital-Acquired Illness or Injury  12/4/2024 1726 by Dee Rhoades RN  Outcome: Adequate for Care Transition  12/4/2024 1028 by Dee Rhoades RN  Outcome: Adequate for Care Transition  Goal: Optimal Comfort and Wellbeing  12/4/2024 1726 by Dee Rhoades RN  Outcome: Adequate for Care Transition  12/4/2024 1028 by Dee Rhoades RN  Outcome: Adequate for Care Transition  Goal: Readiness for Transition of Care  12/4/2024 1726 by Dee Rhoades RN  Outcome: Adequate for Care Transition  12/4/2024 1028 by Dee Rhoades RN  Outcome: Adequate for Care Transition     Problem: Bariatric Environmental Safety  Goal: Safety Maintained with Care  12/4/2024 1726 by Dee Rhoades RN  Outcome: Adequate for Care Transition  12/4/2024 1028 by Dee Rhoades RN  Outcome: Adequate for Care Transition     Problem: Infection  Goal: Absence of Infection Signs and Symptoms  12/4/2024 1726 by Dee Rhoades RN  Outcome: Adequate for Care Transition  12/4/2024 1028 by Dee Rhoades RN  Outcome: Adequate for Care Transition     Problem: Sepsis/Septic Shock  Goal: Optimal Coping  12/4/2024 1726 by Dee Rhoades RN  Outcome: Adequate for Care Transition  12/4/2024 1028 by Dee Rhoades RN  Outcome: Adequate for Care Transition  Goal: Absence of Bleeding  12/4/2024 1726 by Dee Rhoades RN  Outcome: Adequate for Care Transition  12/4/2024 1028 by Dee Rhoades RN  Outcome: Adequate for Care Transition  Goal: Blood Glucose Level Within Targeted  Range  12/4/2024 1726 by Dee Rhoades RN  Outcome: Adequate for Care Transition  12/4/2024 1028 by Dee Rhoades RN  Outcome: Adequate for Care Transition  Goal: Absence of Infection Signs and Symptoms  12/4/2024 1726 by Dee Rhoades RN  Outcome: Adequate for Care Transition  12/4/2024 1028 by Dee Rhoades RN  Outcome: Adequate for Care Transition  Goal: Optimal Nutrition Intake  12/4/2024 1726 by Dee Rhoades RN  Outcome: Adequate for Care Transition  12/4/2024 1028 by Dee Rhoades RN  Outcome: Adequate for Care Transition     Problem: Acute Kidney Injury/Impairment  Goal: Fluid and Electrolyte Balance  12/4/2024 1726 by Dee Rhoades RN  Outcome: Adequate for Care Transition  12/4/2024 1028 by Dee Rhoades RN  Outcome: Adequate for Care Transition  Goal: Improved Oral Intake  12/4/2024 1726 by Dee Rhoades RN  Outcome: Adequate for Care Transition  12/4/2024 1028 by Dee Rhoades RN  Outcome: Adequate for Care Transition  Goal: Effective Renal Function  12/4/2024 1726 by Dee Rhoades RN  Outcome: Adequate for Care Transition  12/4/2024 1028 by Dee Rhoades RN  Outcome: Adequate for Care Transition     Problem: Wound  Goal: Optimal Coping  12/4/2024 1726 by Dee Rhoades RN  Outcome: Adequate for Care Transition  12/4/2024 1028 by Dee Rhoades, RN  Outcome: Adequate for Care Transition  Goal: Optimal Functional Ability  12/4/2024 1726 by Dee Rhoades RN  Outcome: Adequate for Care Transition  12/4/2024 1028 by Dee Rhoades, RN  Outcome: Adequate for Care Transition  Goal: Absence of Infection Signs and Symptoms  12/4/2024 1726 by Dee Rhoades RN  Outcome: Adequate for Care Transition  12/4/2024 1028 by Dee Rhoades RN  Outcome: Adequate for Care Transition  Goal: Improved Oral Intake  12/4/2024 1726 by Dee Rhoades, RN  Outcome: Adequate for Care Transition  12/4/2024 1028 by Dee Rhoades, RN  Outcome: Adequate for Care  Transition  Goal: Optimal Pain Control and Function  12/4/2024 1726 by Dee Rhoades RN  Outcome: Adequate for Care Transition  12/4/2024 1028 by Dee Rhoades RN  Outcome: Adequate for Care Transition  Goal: Skin Health and Integrity  12/4/2024 1726 by Dee Rhoades RN  Outcome: Adequate for Care Transition  12/4/2024 1028 by Dee Rhoaeds RN  Outcome: Adequate for Care Transition  Goal: Optimal Wound Healing  12/4/2024 1726 by Dee Rhoades RN  Outcome: Adequate for Care Transition  12/4/2024 1028 by Dee Rhoades RN  Outcome: Adequate for Care Transition     Problem: Skin Injury Risk Increased  Goal: Skin Health and Integrity  12/4/2024 1726 by Dee Rhoades RN  Outcome: Adequate for Care Transition  12/4/2024 1028 by Dee Rhoades RN  Outcome: Adequate for Care Transition     Problem: Fall Injury Risk  Goal: Absence of Fall and Fall-Related Injury  12/4/2024 1726 by Dee Rhoades RN  Outcome: Adequate for Care Transition  12/4/2024 1028 by Dee Rhoades RN  Outcome: Adequate for Care Transition

## 2024-12-04 NOTE — PT/OT/SLP PROGRESS
"Occupational Therapy   Treatment    Name: Divya Bui  MRN: [id number]  Admitting Diagnosis:  Sepsis       Recommendations:     Discharge Recommendations: Moderate Intensity Therapy  Discharge Equipment Recommendations:  to be determined by next level of care  Barriers to discharge:  None    Assessment:     Divya Bui is a 64 y.o. female with a medical diagnosis of Sepsis.  She presents with the following performance deficits affecting function are weakness, impaired endurance, impaired self care skills, impaired functional mobility, impaired balance, impaired cardiopulmonary response to activity, pain, decreased safety awareness, impaired cognition, decreased lower extremity function, decreased upper extremity function, impaired skin.     Rehab Prognosis:  Fair; patient would benefit from acute skilled OT services to address these deficits and reach maximum level of function.       Plan:     Patient to be seen 2 x/week to address the above listed problems via self-care/home management, therapeutic activities, therapeutic exercises  Plan of Care Expires: 12/18/24  Plan of Care Reviewed with: patient    Subjective     Chief Complaint: Reported "I know I need to get up."  Patient/Family Comments/goals: increase independence  Pain/Comfort:  Pain Rating 1:  (c/o generalinzed soreness)  Pain Addressed 1:  (activity pacing)    Objective:     Communicated with: NurseDee, prior to session.  Patient found supine with telemetry, bed alarm upon OT entry to room.    General Precautions: Standard, special contact, fall    Orthopedic Precautions:N/A  Braces: N/A  Respiratory Status: Room air     Occupational Performance:     Bed Mobility:    Patient completed Supine to Sit with contact guard assistance   Increased time and intermittent cueing for technique to maximize engagement    Functional Mobility/Transfers:  Patient completed Sit <> Stand Transfer with moderate assistance  with  rolling walker   Patient completed Bed " <> Chair Transfer using Step Transfer technique with minimum assistance with rolling walker  Stood at edge of chair ~2 minutes prior to sitting down with RW and CGA   V/c for technique with transfers to increase safety and independence with completion  Intermittent encouragement required to minimize anxieties    Activities of Daily Living:  Grooming: setup completed grooming while seated in bedside chair/tray with A for container management and increased time    AMPAC 6 Click ADL: 14    Treatment & Education:  Encouraged completion of B UE AROM therex throughout the day to increase functional strength and activity tolerance needed for ADL completion. Visual demonstration x3 planes of motion to reinforce HEP. Educated on benefits of OOB activity and importance of calling for A to transfer back to bed. Patient stated understanding and in agreement with POC.    Patient left up in chair with all lines intact, call button in reach, chair alarm on, and family member present    GOALS:   Multidisciplinary Problems       Occupational Therapy Goals          Problem: Occupational Therapy    Goal Priority Disciplines Outcome Interventions   Occupational Therapy Goal     OT, PT/OT Progressing    Description: O.T. GOAL TO BE MET BY 12-18-24  PT WILL TOLERATE 1 SET X 12 REPS B UE ROM EXERCISE  SBA WITH UE DRESSING  SBA WITH BSC TRANSFER                       Time Tracking:     OT Date of Treatment: 12/04/24  OT Start Time: 0800  OT Stop Time: 0830  OT Total Time (min): 30 min    Billable Minutes:Self Care/Home Management 15  Therapeutic Activity 15    OT/KIKE: OT     Number of KIKE visits since last OT visit: 0    Marielena Andrade OT  12/4/2024

## 2024-12-04 NOTE — PROGRESS NOTES
Pharmacist Renal Dose Adjustment Note    Divya Bui is a 64 y.o. female being treated with the medication Pepcid    Patient Data:    Vital Signs (Most Recent):  Temp: 98.1 °F (36.7 °C) (12/04/24 0742)  Pulse: 106 (12/04/24 0742)  Resp: 20 (12/04/24 0742)  BP: (!) 117/53 (12/04/24 0742)  SpO2: (!) 94 % (12/04/24 0742) Vital Signs (72h Range):  Temp:  [97 °F (36.1 °C)-98.9 °F (37.2 °C)]   Pulse:  []   Resp:  [15-21]   BP: ()/(48-69)   SpO2:  [92 %-98 %]      Recent Labs   Lab 12/02/24  0601 12/03/24  0520 12/04/24  0718   CREATININE 0.9 1.0 1.1     Serum creatinine: 1.1 mg/dL 12/04/24 0718  Estimated creatinine clearance: 60 mL/min    Medication:Pepcid dose: 20mg frequency BID will be changed to medication:Pepcid dose:20mg frequency:QD    Pharmacist's Name: Adrianne King  Pharmacist's Extension: [phone]

## 2024-12-04 NOTE — PT/OT/SLP PROGRESS
Speech Language Pathology Treatment    Patient Name:  Divya Bui   MRN:  [id number]  Admitting Diagnosis: Sepsis    Recommendations:                 General Recommendations:  Follow-up not indicated  Diet recommendations:  Soft & Bite Sized Diet - IDDSI Level 6, Liquid Diet Level: Thin liquids - IDDSI Level 0   Aspiration Precautions:  GERD/behavioral reflux precautions, Frequent oral care, HOB to 90 degrees, and Standard aspiration precautions   General Precautions: Standard, aspiration  Communication strategies:  none    Assessment:     Divya Bui is a 64 y.o. female admitted to [facility] with dx metabolic encephalopathy, sepsis, acute cystitis and C. diff.  She presents with improving mentation and communication.  She is consuming IDDSI 6-soft/bite sized solids and IDDSI 0-thin liquids without incident.  Aspiration/behavioral reflux precautions reviewed. Medications recommended to be crushed/split or placed whole in pudding.  Ms. Biu denied current GI/dysphagia complaints; however, reported hx pill-form dysphagia with significant GI hx (gastric bypass, GERD, hiatal hernia, esophageal stricture).  She may benefit from re-establishing with OP GI given hx and reported pill-form dysphagia. She would also benefit from further cognitive diagnostic assessment post-acute/ moderate intensity level of intervention. No further acute ST intervention indicated at this time. Please re-consult if need.    Subjective     Pt seen bedside for ST. Daughter in law present in room. No c/o pain.  Ready for PT and to get OOB she stated.  Patient goals: return to PLOF     Pain/Comfort:  Pain Rating 1: 0/10  Pain Rating Post-Intervention 1: 0/10  Pain Rating 2: 0/10  Pain Rating Post-Intervention 2: 0/10    Objective:     Has the patient been evaluated by SLP for swallowing?   Yes  Keep patient NPO? No     Diet f/u completed.  She is consuming IDDSI 6-soft/bite sized solids and IDDSI 0-thin liquids without incident.   Aspiration/behavioral reflux precautions reviewed.  PO intake also improving along with cognitive-linguistic skills.    Goals:   Multidisciplinary Problems       SLP Goals       Not on file              Multidisciplinary Problems (Resolved)          Problem: SLP    Goal Priority Disciplines Outcome   SLP Goal   (Resolved)     SLP Met   Description: 1.  Pt will consume IDDSI 6-soft/bite sized solids and IDDSI 0-thin liquids without incident.                       Plan:     Patient to be seen:  2 x/week, 1 x/week   Plan of Care expires:  12/10/24  Plan of Care reviewed with:  patient   SLP Follow-Up:  No (acute POC met)       Discharge recommendations:  Moderate Intensity Therapy   Barriers to Discharge:  None    Time Tracking:     SLP Treatment Date:   12/04/24  Speech Start Time:  0945  Speech Stop Time:  1000     Speech Total Time (min):  15 min    Billable Minutes: Treatment Swallowing Dysfunction 15 minutes    12/04/2024

## 2024-12-08 NOTE — DISCHARGE SUMMARY
[facility] - Telemetry ([facility] Medicine  Discharge Summary      Patient Name: Divya Bui  MRN: [id number]  NEREIDA: [id number]  Patient Class: IP- Inpatient  Admission Date: 11/23/2024  Hospital Length of Stay: 11 days  Discharge Date and Time: 12/4/2024  6:42 PM  Attending Physician: No att. providers found   Discharging Provider: Malia Henry MD  Primary Care Provider: Angel Khan MD    Primary Care Team: Networked reference to record PCT     HPI:   Divya Bui, a 64-year-old female with a PMHx of HTN, GERD, morbid obesity s/p gastric bypass, depression, anxiety, esophageal stricture, and recurrent ESBL UTIs, was admitted on 11/23 for a UTI with sepsis. She was transferred to the ICU after exhibiting signs of septic shock, including recurrent hypotension unresponsive to initial fluid resuscitation. Upon admission, she presented with acute cystitis. ID was consulted by  for antibiotic therapy recommendations. She was continued on ertapenem (Invanz) and linezolid (Zyvox). Urine cultures grew GNRs, and blood cultures were positive for coagulase-negative staphylococci; repeat cultures were pending as the initial result was likely a contaminant. She showed evidence of sepsis with organ dysfunction indicated by acute kidney injury and encephalopathy. A fluid challenge was provided in the ED on 11/23. Her latest lactate level on 11/25 was 1.2. Despite marginal improvement with Midodrine and albumin, she required initiation of levophed overnight, but this was weaned off on 11/26. After stabilization, she was stepped down to telemetry, and  was re-consulted to assume care.    * No surgery found *      Hospital Course:   11/24:  Remains lethargic and intermittently confused, also hypotensive today to lowest of 73/39 taken on the right upper arm by the tech.  On my recheck with smaller size cuff she was 81/46, and then was 118/59 on her calf.  I am not sure about whether the low blood pressures are  "entirely accurate.  She continues to mentate the same.  Heart rates in the low 100s/110s.  Discussed with ICU and checked labs.  No evidence of bleeding.  Lactate is normal.  Have given 3 L of fluid  today.  Blood pressure this evening up to 92/51.   11/25/2024: transferred to ICU for pressor therapy. Levophed initiated in the evening. ID consulted for antibiotic recs.  11/26/2024: levophed weaned off this morning; stabilized thereafter. Deemed stable for transfer back to to telemetry.     11/27/2024  ELIZABETH steadily worsening. Restart IVFs. Consulted nephrology for guidance. Cultures pending. Still having multiple loose bowel movements. C. Diff ordered 11/23/2024 but still says "in process" in EPIC. Am currently on hold with Micro lab to inquire about results of study. disposition: [facility] once medically stable    11/28/2024  Mentation improved this morning. Procalcitonin rising. Discussed with micro lab in [place]. C.diff from 11/23/2024 confirmed to be positive. ID started PO Vanc Q6H. Nephrology evaluated. Added bicarb to IVFs. Labs this morning show improvement in renal function for the first time since admission. Appreciate Nephrology's recommendations/interventions. Urine cultures confirm ESBL E. Coli. Antibiotics narrowed by ID. disposition: [facility] once medically stable    11/29/2024  Labs steadily improving. Discussed case with Nephrology. There is some concern for vesicolic fistula given recurrent UTIs. Discussed with ID. Will order CT abdomen/pelvis with rectal contrast to further investigate.    11/30/2024  CT abdomen/pelvis shows no evidence of fistula. IV Antibiotics weaned. PO vancomycin started for 10d therapy. Patient now medically stable for discharge to SNF.    11/27/2024  ELIZABETH steadily worsening. Restart IVFs. Consulted nephrology for guidance. Cultures pending. Still having multiple loose bowel movements. C. Diff ordered 11/23/2024 but still says "in process" in EPIC. Am currently on hold with Micro " lab to inquire about results of study. disposition: SNF once medically stable    11/28/2024  Mentation improved this morning. Procalcitonin rising. Discussed with micro lab in [place]. C.diff from 11/23/2024 confirmed to be positive. ID started PO Vanc Q6H. Nephrology evaluated. Added bicarb to IVFs. Labs this morning show improvement in renal function for the first time since admission. Appreciate Nephrology's recommendations/interventions. Urine cultures confirm ESBL E. Coli. Antibiotics narrowed by ID. disposition: SNF once medically stable    11/29/2024  Labs steadily improving. Discussed case with Nephrology. There is some concern for vesicolic fistula given recurrent UTIs. Discussed with ID. Will order CT abdomen/pelvis with rectal contrast to further investigate.    11/30/2024  CT abdomen/pelvis shows no evidence of fistula. IV Antibiotics weaned. PO vancomycin started for 10d therapy. Patient now medically stable for discharge to SNF.    12/01/2024  Orientation waxing and waning. Worse in the AM. However, has been consistently cooperative with PT/OT. Still a great candidate for SNF. Referral placed. Anticipate discharge to SNF Monday/early next week.    DISPOSITION: C. Diff being treated with PO Vanc.   Discharge to SNF with five additional days of PO vanc..     Patient seen and examined on day of discharge and stable for discharge to skilled nursing at [facility].     Goals of Care Treatment Preferences:  Code Status: Full Code      SDOH Screening:  The patient was screened for utility difficulties, food insecurity, transport difficulties, housing insecurity, and interpersonal safety and there were no concerns identified this admission.     Consults:   Consults (From admission, onward)          Status Ordering Provider     Inpatient consult to Social Work  Once        Provider:  (Not yet assigned)    Completed MARY MAURICIO     Inpatient consult to Nephrology  Once         Provider:  Ugo Chavez MD    Completed GERARDO PEREZ     Inpatient consult to Critical Care Medicine  Once        Provider:  Alona Early NP    Completed SHONNA MORRISON     Inpatient consult to Registered Dietitian/Nutritionist  Once        Provider:  (Not yet assigned)    Completed CRISTOFER BRANDT            Thrombocytopenia  The likely etiology of thrombocytopenia is sepsis. The patients 3 most recent labs are listed below.  Recent Labs     12/01/24  0614 12/02/24  0823 12/03/24  0520   * 127* 132*       Plan  - Will transfuse if platelet count is <50k (if undergoing surgical procedure or have active bleeding).        Hypophosphatemia  Resolved    Hyponatremia  Resolved    Hypomagnesemia  Patient has Abnormal Magnesium: hypomagnesemia. Will continue to monitor electrolytes closely. Will replace the affected electrolytes and repeat labs to be done after interventions completed. The patient's magnesium results have been reviewed and are listed below.  Recent Labs   Lab 12/03/24  0520   MG 1.7          Hyperkalemia  Resolved  Recent Labs     12/01/24  0614 12/02/24  0601 12/03/24  0520   K 3.4* 3.4* 3.0*       Plan  - Monitor for arrhythmias with EKG and/or continuous telemetry.   Resolved              C. difficile diarrhea    Discussed with micro lab in [place]. C.diff from 11/23/2024 confirmed to be positive.   ID started PO Vanc Q6H.     Encephalopathy, metabolic  --improving  --monitor clinically      Candidal intertrigo and dermatitis  Severe erythema to breasts, pannus, groin, and buttock with some skin breakdown   Appears to be candida overgrowth   IV Diflucan     11/28/2024  Transitioned to PO fluconazole    Chronic pain disorder  Holding home Lyrica and Norco given lethargy in the setting of sepsis      Morbid obesity with BMI of 40.0-44.9, adult  Body mass index is 43.11 kg/m². Morbid obesity complicates all aspects of disease management from diagnostic modalities to treatment.  Weight loss encouraged and health benefits explained to patient.         Iron deficiency anemia  Anemia is likely due to chronic disease due to Chronic Kidney Disease. Most recent hemoglobin and hematocrit are listed below.  Recent Labs     11/30/24  0457 12/01/24  0614 12/02/24  0823   HGB 9.9* 10.0* 9.5*   HCT 31.9* 31.9* 29.9*     Plan  - Monitor serial CBC: Daily  - Transfuse PRBC if patient becomes hemodynamically unstable, symptomatic or H/H drops below 7/21.  - Patient's anemia is currently stable      Hypothyroid        HTN (hypertension)  Patients blood pressure range in the last 24 hours was: BP  Min: 90/51  Max: 119/55.The patient's inpatient anti-hypertensive regimen is listed below       Plan  Hold antihypertensives      Final Active Diagnoses:    Diagnosis Date Noted POA    Hyperkalemia [E87.5] 12/02/2024 No    Hypomagnesemia [E83.42] 12/02/2024 No    Hyponatremia [E87.1] 12/02/2024 Yes    Hypophosphatemia [E83.39] 12/02/2024 No    Thrombocytopenia [D69.6] 12/02/2024 Yes    Encephalopathy, metabolic [G93.41] 11/25/2024 Yes    Candidal intertrigo and dermatitis [B37.2] 11/23/2024 Yes    Chronic pain disorder [G89.4] 01/10/2019 Yes    C. difficile diarrhea [A04.72] 12/29/2016 Yes    Morbid obesity with BMI of 40.0-44.9, adult [E66.01, Z68.41]  Not Applicable    Iron deficiency anemia [D50.9] 12/19/2013 Yes    HTN (hypertension) [I10] 08/26/2013 Yes    Hypothyroid [E03.9] 08/26/2013 Yes      Problems Resolved During this Admission:    Diagnosis Date Noted Date Resolved POA    PRINCIPAL PROBLEM:  Sepsis [A41.9] 12/09/2020 12/04/2024 Yes    Hypotension [I95.9] 11/25/2024 11/28/2024 No    Metabolic acidosis [E87.20] 11/23/2024 12/04/2024 Yes    Hyperglycemia [R73.9] 11/23/2024 11/28/2024 Yes    Acute cystitis [N30.00] 05/10/2024 12/04/2024 Yes    ELIZABETH (acute kidney injury) [N17.9] 12/09/2020 12/04/2024 Yes    Bacteremia [R78.81] 12/09/2020 11/28/2024 Yes    Recurrent major depressive disorder, in partial  remission [F33.41] 02/05/2019 12/04/2024 Yes       Discharged Condition: fair    Disposition: Home or Self Care    Follow Up:    Patient Instructions:      Diet Adult Regular     Notify your health care provider if you experience any of the following:  temperature >100.4     Notify your health care provider if you experience any of the following:  persistent nausea and vomiting or diarrhea     Notify your health care provider if you experience any of the following:  severe uncontrolled pain     Notify your health care provider if you experience any of the following:  difficulty breathing or increased cough     Notify your health care provider if you experience any of the following:  persistent dizziness, light-headedness, or visual disturbances     Notify your health care provider if you experience any of the following:  increased confusion or weakness     POCT TB Skin Test     Activity as tolerated   Order Comments: Pt/ot eval and treat       Significant Diagnostic Studies: Labs: All labs within the past 24 hours have been reviewed  Imaging Results              CT Head Without Contrast (Final result)  Result time 11/23/24 14:05:51      Final result by Lucio Romero MD (11/23/24 14:05:51)                   Impression:      No acute intracranial CT abnormality.    All CT scans at this facility are performed  using dose modulation techniques as appropriate to performed exam including the following:  automated exposure control; adjustment of mA and/or kV according to the patients size (this includes techniques or standardized protocols for targeted exams where dose is matched to indication/reason for exam: i.e. extremities or head);  iterative reconstruction technique.      Electronically signed by: Lucio Romero  Date:    11/23/2024  Time:    14:05               Narrative:    EXAMINATION:  CT HEAD WITHOUT CONTRAST    CLINICAL HISTORY:  Mental status change, unknown cause;    TECHNIQUE:  Low dose axial CT images  obtained throughout the head without intravenous contrast. Sagittal and coronal reconstructions were performed.    COMPARISON:  Multiple prior reports    FINDINGS:  Intracranial compartment:    Ventricles and sulci are normal in size for age without evidence of hydrocephalus. No extra-axial blood or fluid collections.    The brain parenchyma appears normal. No parenchymal mass, hemorrhage, edema or major vascular distribution infarct.    Skull/extracranial contents (limited evaluation): No fracture. Mastoid air cells and paranasal sinuses are essentially clear.                                       X-Ray Chest AP Portable (Final result)  Result time 11/23/24 12:10:43      Final result by Lucio Romero MD (11/23/24 12:10:43)                   Impression:      No acute abnormality.      Electronically signed by: Lucio Romero  Date:    11/23/2024  Time:    12:10               Narrative:    EXAMINATION:  XR CHEST AP PORTABLE    CLINICAL HISTORY:  Sepsis;    TECHNIQUE:  Single frontal view of the chest was performed.    COMPARISON:  Multiple    FINDINGS:  The lungs are clear, with normal appearance of pulmonary vasculature and no pleural effusion or pneumothorax.    The cardiac silhouette is normal in size. The hilar and mediastinal contours are unremarkable.    Bones are intact.                                      Pending Diagnostic Studies:       None           Medications:  Reconciled Home Medications:      Medication List        START taking these medications      famotidine 20 MG tablet  Commonly known as: PEPCID  Take 1 tablet (20 mg total) by mouth once daily.     folic acid 1 MG tablet  Commonly known as: FOLVITE  Take 1 tablet (1 mg total) by mouth once daily.     loperamide 2 mg capsule  Commonly known as: IMODIUM  Take 1 capsule (2 mg total) by mouth 4 (four) times daily as needed for Diarrhea.     miconazole NITRATE 2 % 2 % top powder  Commonly known as: MICOTIN  Apply topically 2 (two) times daily.      midodrine 10 MG tablet  Commonly known as: PROAMATINE  Take 1 tablet (10 mg total) by mouth 3 (three) times daily with meals.     multivitamin Tab  Take 1 tablet by mouth once daily.     sodium bicarbonate 650 MG tablet  Take 1 tablet (650 mg total) by mouth 2 (two) times daily.     thiamine 100 MG tablet  Take 1 tablet (100 mg total) by mouth once daily.            CHANGE how you take these medications      ALPRAZolam 0.25 MG tablet  Commonly known as: XANAX  Take 1 tablet (0.25 mg total) by mouth nightly as needed for Anxiety or Insomnia.  What changed:   medication strength  how much to take  when to take this  reasons to take this            CONTINUE taking these medications      ARIPiprazole 10 MG Tab  Commonly known as: ABILIFY  Take 10 mg by mouth once daily.     atorvastatin 40 MG tablet  Commonly known as: LIPITOR  Take 1 tablet (40 mg total) by mouth once daily.     cyanocobalamin 1,000 mcg/mL injection  Inject 1,000 mcg into the skin every 30 days.     doxepin 50 MG capsule  Commonly known as: SINEQUAN  Take 50 mg by mouth nightly.     fluconazole 150 MG Tab  Commonly known as: DIFLUCAN  Take 1 tablet (150 mg total) by mouth every 7 days. for 7 doses     furosemide 40 MG tablet  Commonly known as: LASIX  Take 1 tablet (40 mg total) by mouth 2 (two) times daily as needed (swelling).     nystatin cream  Commonly known as: MYCOSTATIN  Apply topically 2 (two) times daily.     promethazine 25 MG tablet  Commonly known as: PHENERGAN  Take 1 tablet (25 mg total) by mouth every 6 (six) hours as needed.            STOP taking these medications      amLODIPine 5 MG tablet  Commonly known as: NORVASC     HYDROcodone-acetaminophen 5-325 mg per tablet  Commonly known as: NORCO     olmesartan-hydrochlorothiazide 40-12.5 mg Tab  Commonly known as: BENICAR HCT     pregabalin 100 MG capsule  Commonly known as: LYRICA     zolpidem 10 mg Tab  Commonly known as: AMBIEN            ASK your doctor about these medications       vancomycin 25 mg/mL Solr  Commonly known as: FIRVANQ  Take 5 mLs (125 mg total) by mouth every 6 (six) hours. for 2 days  Ask about: Should I take this medication?              Indwelling Lines/Drains at time of discharge:   Lines/Drains/Airways       None                   Time spent on the discharge of patient: 39 minutes         Malia Henry MD  Department of Hospital Medicine  [facility] ([facility])

## 2024-12-11 ENCOUNTER — DOCUMENT SCAN (OUTPATIENT)
Dept: HOME HEALTH SERVICES | Facility: HOSPITAL | Age: 64
End: 2024-12-11
Payer: MEDICARE

## 2025-01-09 NOTE — PLAN OF CARE
A222/A222 NESTOR Bui is a 64 y.o.female admitted on 11/23/2024 for Sepsis   Code Status: Full Code MRN: [id number]   Review of patient's allergies indicates:   Allergen Reactions    Metronidazole hcl Other (See Comments)     Mouth ulcers developed with Flagyl  Oral sores.  Mouth ulcers developed with Flagyl     Past Medical History:   Diagnosis Date    Anemia     Anxiety     Basal cell carcinoma (BCC) of face 2/26/2020    Blood transfusion     Bowel incontinence     Depression     Esophageal stricture     GERD (gastroesophageal reflux disease)     Hypertension     Latent tuberculosis by skin test 2001    took INH    Liver nodule 1/6/2014    Morbid obesity with BMI of 45.0-49.9, adult     OA (osteoarthritis) of knee 12/12/2014    Pericardial effusion 9/4/2014      PRN meds    acetaminophen, 650 mg, Q4H PRN  albuterol-ipratropium, 3 mL, Q6H PRN  ALPRAZolam, 0.25 mg, Nightly PRN  aluminum-magnesium hydroxide-simethicone, 30 mL, QID PRN  dextrose 10%, 12.5 g, PRN  dextrose 10%, 25 g, PRN  glucagon (human recombinant), 1 mg, PRN  glucose, 16 g, PRN  glucose, 24 g, PRN  influenza (adjuvanted), 0.5 mL, Prior to discharge  insulin aspart U-100, 0-5 Units, QID (AC + HS) PRN  loperamide, 2 mg, QID PRN  naloxone, 0.02 mg, PRN  ondansetron, 4 mg, Q6H PRN  prochlorperazine, 5 mg, Q6H PRN  simethicone, 1 tablet, QID PRN  sodium chloride 0.9%, 10 mL, Q8H PRN      Chart check completed. Will continue plan of care.        Pt oriented x4.  VSS.  Pt remained afebrile throughout this shift.   All meds administered per order.   Pt remained free of falls this shift.   Plan of care reviewed. Patient verbalizes understanding.   Pt moving/turning x2  Bed low, side rails up x 2, wheels locked, call light in reach.   Patient instructed to call for assistance.  Patient education provided  Will continue to monitor.   Pt was able to eat small amounts of meals today.            Orientation: oriented x 4  [place] Coma Scale Score: 15     Lead  Monitored: other (see comments) (not on monitor; pt refusing) Rhythm: sinus tachycardia    Cardiac/Telemetry Box Number: [id number]  VTE Core Measure: Pharmacological prophylaxis initiated/maintained Last Bowel Movement: 12/02/24  Diet diabetic Soft & Bite Sized (IDDSI Level 6); 2000 Calories (up to 75 gm per meal); Isolation Tray - Styrofoam  Voiding Characteristics: incontinence  Sumit Score: 10  Fall Risk Score: 23  Accucheck [x]   Freq?      Lines/Drains/Airways       None                       fair balance

## 2025-01-16 NOTE — ED PROVIDER NOTES
Pharmacy Note  Warfarin Consult  Dx: Afib  Goal INR range 2-3  Home Warfarin dose: 7.5 mg Mon and Wed, 5 mg all other days      Date                 INR                  Warfarin   1/14                1.58                    2.5 mg (patient reports taking 5 mg already today)  1/15                1.74                    5 mg  1/16                1.87                    5 mg      Recommend Warfarin 5 mg tonight x1.  Daily INR ordered.  Rx will continue to manage therapy per consult order.  Joshua LimaD, BCPS 1/16/2025 7:24 AM   Encounter Date: 5/16/2022       History     Chief Complaint   Patient presents with    Hematuria     Pt diagnosed with UTI, placed on Cipro and tramadol, c/o left flank pain and hematuria with burning urination. Concerned about possible stones.     The history is provided by the patient.   Hematuria  This is a recurrent problem. The current episode started in the past 7 days. The problem is unchanged. She describes the hematuria as microscopic hematuria. Hematuria confirmed by urinalysis. Her pain is at a severity of 4/10. The pain is moderate. She describes her urine color as light pink. Irritative symptoms do not include frequency, nocturia or urgency. Obstructive symptoms do not include dribbling, incomplete emptying, an intermittent stream, a slower stream, straining or a weak stream. Associated symptoms include flank pain. Pertinent negatives include no abdominal pain, fever, nausea or vomiting.   Pt seen by PCP today for UTI and Placed on Cipro, referred to Urology, but pt presents today for imaging because she was told she might have a kidney stone.    Review of patient's allergies indicates:   Allergen Reactions    Metronidazole hcl Other (See Comments)     Mouth ulcers developed with Flagyl  Oral sores.  Mouth ulcers developed with Flagyl     Past Medical History:   Diagnosis Date    Anemia     Anxiety     Blood transfusion     Bowel incontinence     Depression     Esophageal stricture     GERD (gastroesophageal reflux disease)     Hypertension     Latent tuberculosis by skin test 2001    took INH    Liver nodule 1/6/2014    Morbid obesity with BMI of 45.0-49.9, adult     OA (osteoarthritis) of knee 12/12/2014    Pericardial effusion 9/4/2014     Past Surgical History:   Procedure Laterality Date    CHOLECYSTECTOMY      GASTRIC BYPASS      HERNIA REPAIR      right sholder surgery      SMALL INTESTINE SURGERY       Family History   Problem Relation Age of Onset    Crohn's disease Other      Liver cancer Father     Diabetes Sister     Crohn's disease Sister     Liver cancer Sister     Crohn's disease Brother     Lung cancer Mother      Social History     Tobacco Use    Smoking status: Never Smoker    Smokeless tobacco: Never Used   Substance Use Topics    Alcohol use: Not Currently     Alcohol/week: 0.0 standard drinks    Drug use: No     Review of Systems   Constitutional: Negative for fever.   Respiratory: Negative.    Cardiovascular: Negative.    Gastrointestinal: Negative.  Negative for abdominal distention, abdominal pain, nausea and vomiting.   Genitourinary: Positive for flank pain and hematuria. Negative for frequency, incomplete emptying, nocturia and urgency.   All other systems reviewed and are negative.      Physical Exam     Initial Vitals [05/16/22 1922]   BP Pulse Resp Temp SpO2   (!) 159/100 92 18 98.8 °F (37.1 °C) 95 %      MAP       --         Physical Exam    Nursing note and vitals reviewed.  Constitutional: She appears well-developed and well-nourished. No distress.   HENT:   Head: Normocephalic and atraumatic.   Mouth/Throat: Oropharynx is clear and moist.   Eyes: Conjunctivae and EOM are normal. Pupils are equal, round, and reactive to light.   Neck: Neck supple.   Normal range of motion.  Cardiovascular: Normal rate, regular rhythm and normal heart sounds.   Pulmonary/Chest: Breath sounds normal. No respiratory distress.   Abdominal: Abdomen is soft. Bowel sounds are normal. She exhibits no distension. There is no abdominal tenderness.   Musculoskeletal:         General: Normal range of motion.      Cervical back: Normal range of motion and neck supple.     Neurological: She is alert and oriented to person, place, and time. She has normal strength.   Skin: Skin is warm and dry.   Psychiatric: She has a normal mood and affect. Thought content normal.         ED Course   Procedures  Labs Reviewed   CBC W/ AUTO DIFFERENTIAL - Abnormal; Notable for the following  components:       Result Value    WBC 3.84 (*)     Hemoglobin 11.4 (*)     MCHC 30.8 (*)     MPV 8.5 (*)     All other components within normal limits   COMPREHENSIVE METABOLIC PANEL - Abnormal; Notable for the following components:    CO2 22 (*)     ALT 9 (*)     All other components within normal limits   URINALYSIS, REFLEX TO URINE CULTURE - Abnormal; Notable for the following components:    Occult Blood UA Trace (*)     Leukocytes, UA Trace (*)     All other components within normal limits    Narrative:     Specimen Source->Urine   URINALYSIS MICROSCOPIC    Narrative:     Specimen Source->Urine     Results for orders placed or performed during the hospital encounter of 05/16/22   CBC Auto Differential   Result Value Ref Range    WBC 3.84 (L) 3.90 - 12.70 K/uL    RBC 4.18 4.00 - 5.40 M/uL    Hemoglobin 11.4 (L) 12.0 - 16.0 g/dL    Hematocrit 37.0 37.0 - 48.5 %    MCV 89 82 - 98 fL    MCH 27.3 27.0 - 31.0 pg    MCHC 30.8 (L) 32.0 - 36.0 g/dL    RDW 13.6 11.5 - 14.5 %    Platelets 283 150 - 450 K/uL    MPV 8.5 (L) 9.2 - 12.9 fL    Immature Granulocytes 0.5 0.0 - 0.5 %    Gran # (ANC) 2.1 1.8 - 7.7 K/uL    Immature Grans (Abs) 0.02 0.00 - 0.04 K/uL    Lymph # 1.1 1.0 - 4.8 K/uL    Mono # 0.3 0.3 - 1.0 K/uL    Eos # 0.3 0.0 - 0.5 K/uL    Baso # 0.04 0.00 - 0.20 K/uL    nRBC 0 0 /100 WBC    Gran % 53.4 38.0 - 73.0 %    Lymph % 28.9 18.0 - 48.0 %    Mono % 8.6 4.0 - 15.0 %    Eosinophil % 7.6 0.0 - 8.0 %    Basophil % 1.0 0.0 - 1.9 %    Differential Method Automated    Comprehensive Metabolic Panel   Result Value Ref Range    Sodium 140 136 - 145 mmol/L    Potassium 3.9 3.5 - 5.1 mmol/L    Chloride 104 95 - 110 mmol/L    CO2 22 (L) 23 - 29 mmol/L    Glucose 95 70 - 110 mg/dL    BUN 11 8 - 23 mg/dL    Creatinine 0.8 0.5 - 1.4 mg/dL    Calcium 8.7 8.7 - 10.5 mg/dL    Total Protein 6.9 6.0 - 8.4 g/dL    Albumin 3.7 3.5 - 5.2 g/dL    Total Bilirubin 0.5 0.1 - 1.0 mg/dL    Alkaline Phosphatase 114 55 - 135 U/L    AST 11 10  - 40 U/L    ALT 9 (L) 10 - 44 U/L    Anion Gap 14 8 - 16 mmol/L    eGFR if African American >60 >60 mL/min/1.73 m^2    eGFR if non African American >60 >60 mL/min/1.73 m^2   Urinalysis, Reflex to Urine Culture Urine, Clean Catch    Specimen: Urine   Result Value Ref Range    Specimen UA Urine, Clean Catch     Color, UA Yellow Yellow, Straw, Debra    Appearance, UA Clear Clear    pH, UA 7.0 5.0 - 8.0    Specific Gravity, UA 1.020 1.005 - 1.030    Protein, UA Negative Negative    Glucose, UA Negative Negative    Ketones, UA Negative Negative    Bilirubin (UA) Negative Negative    Occult Blood UA Trace (A) Negative    Nitrite, UA Negative Negative    Urobilinogen, UA Negative <2.0 EU/dL    Leukocytes, UA Trace (A) Negative   Urinalysis Microscopic   Result Value Ref Range    RBC, UA 1 0 - 4 /hpf    WBC, UA 5 0 - 5 /hpf    Bacteria Rare None-Occ /hpf    Squam Epithel, UA 1 /hpf    Microscopic Comment SEE COMMENT             Imaging Results          CT Renal Stone Study ABD Pelvis WO (Final result)  Result time 05/16/22 21:22:37    Final result by Lucio Romero MD (05/16/22 21:22:37)                 Impression:      No definite acute abdominal abnormality.  Clinical correlation and further evaluation as warranted.    Additional details as above.    All CT scans at this facility are performed  using dose modulation techniques as appropriate to performed exam including the following:  automated exposure control; adjustment of mA and/or kV according to the patients size (this includes techniques or standardized protocols for targeted exams where dose is matched to indication/reason for exam: i.e. extremities or head);  iterative reconstruction technique.      Electronically signed by: Lucio Romero  Date:    05/16/2022  Time:    21:22             Narrative:    EXAMINATION:  CT RENAL STONE STUDY ABD PELVIS WO    CLINICAL HISTORY:  Flank pain, kidney stone suspected;    TECHNIQUE:  Low dose axial images, sagittal and  coronal reformations were obtained from the lung bases to the pubic symphysis.  Contrast was not administered.    COMPARISON:  Multiple priors.    FINDINGS:  Heart: Normal in size. No pericardial effusion.    Lung Bases: Well aerated, without consolidation or pleural fluid.    Liver: Normal in size and attenuation, with no focal hepatic lesions.    Gallbladder: Gallbladder surgically absent.    Bile Ducts: No evidence of dilated ducts.    Pancreas: Fatty atrophy of the pancreas.    Spleen: Unremarkable.    Adrenals: Unremarkable.    Kidneys/ Ureters: No hydronephrosis or obstructive uropathy.  No nonobstructive nephrolithiasis.    Bladder: No evidence of wall thickening.    Reproductive organs: Unremarkable.    GI Tract/Mesentery: No evidence of bowel obstruction or inflammation.  Status post gastric bypass surgery.  Diverticulosis without diverticulitis.  Small hiatal hernia.    Peritoneal Space: No ascites. No free air.    Retroperitoneum: No significant adenopathy.    Abdominal wall: Unremarkable.    Vasculature: Mild atherosclerosis.  No aneurysm.    Bones: No acute fracture.  Age expected degenerative changes.                            11:49 PM - Counseling: Spoke with the patient and discussed todays findings, in addition to providing specific details for the plan of care and counseling regarding the diagnosis and prognosis. Questions are answered at this time.  I discussed with patient and/or family/caretaker that evaluation in the ED does not suggest any emergent or life threatening medical conditions requiring immediate intervention beyond what was provided in the ED, and I believe patient is safe for discharge.  Regardless, an unremarkable evaluation in the ED does not preclude the development or presence of a serious of life threatening condition. As such, patient was instructed to return immediately for any worsening or change in current symptoms.       Medications   sodium chloride 0.9% bolus 500 mL  (500 mLs Intravenous New Bag 5/16/22 2251)   promethazine (PHENERGAN) 12.5 mg in dextrose 5 % 50 mL IVPB (0 mg Intravenous Stopped 5/16/22 2310)                          Clinical Impression:   Final diagnoses:  [R31.9] Hematuria, unspecified type (Primary)  [R10.9] Flank pain, acute  [I10] Hypertension, unspecified type          ED Disposition Condition    Discharge Stable        ED Prescriptions     None        Follow-up Information     Follow up With Specialties Details Why Contact Info    Lucio Salas MD Internal Medicine Call in 2 days As needed [address]  [facility] [address] [phone]      [facility] - Emergency Dept. Emergency Medicine  If symptoms worsen [address]  [facility] [address] [phone]    Urology  In 3 days As needed as previously scheduled            Jayjay Oseguera MD  05/16/22 [id number]

## 2025-04-24 ENCOUNTER — PATIENT OUTREACH (OUTPATIENT)
Dept: ADMINISTRATIVE | Facility: HOSPITAL | Age: 65
End: 2025-04-24
Payer: MEDICARE

## 2025-04-24 NOTE — PROGRESS NOTES
Working mammogram due on next 6 months report:    Chart searched  Attempted to contact pt regarding overdue mammogram  Phone number is not accepting calls  PM sent to pt

## 2025-08-13 ENCOUNTER — PATIENT MESSAGE (OUTPATIENT)
Dept: ADMINISTRATIVE | Facility: HOSPITAL | Age: 65
End: 2025-08-13
Payer: MEDICARE